# Patient Record
Sex: MALE | Race: WHITE | Employment: OTHER | URBAN - NONMETROPOLITAN AREA
[De-identification: names, ages, dates, MRNs, and addresses within clinical notes are randomized per-mention and may not be internally consistent; named-entity substitution may affect disease eponyms.]

---

## 2017-01-25 ENCOUNTER — OFFICE VISIT (OUTPATIENT)
Dept: FAMILY MEDICINE CLINIC | Age: 62
End: 2017-01-25

## 2017-01-25 VITALS
SYSTOLIC BLOOD PRESSURE: 120 MMHG | RESPIRATION RATE: 12 BRPM | WEIGHT: 191.6 LBS | BODY MASS INDEX: 24.59 KG/M2 | HEART RATE: 60 BPM | TEMPERATURE: 98.2 F | HEIGHT: 74 IN | DIASTOLIC BLOOD PRESSURE: 64 MMHG

## 2017-01-25 DIAGNOSIS — M25.512 ACUTE PAIN OF BOTH SHOULDERS: Primary | ICD-10-CM

## 2017-01-25 DIAGNOSIS — M25.521 BILATERAL ELBOW JOINT PAIN: ICD-10-CM

## 2017-01-25 DIAGNOSIS — M25.522 BILATERAL ELBOW JOINT PAIN: ICD-10-CM

## 2017-01-25 DIAGNOSIS — M25.511 ACUTE PAIN OF BOTH SHOULDERS: Primary | ICD-10-CM

## 2017-01-25 PROCEDURE — 99213 OFFICE O/P EST LOW 20 MIN: CPT | Performed by: NURSE PRACTITIONER

## 2017-01-25 RX ORDER — LISINOPRIL 2.5 MG/1
2.5 TABLET ORAL DAILY
COMMUNITY
End: 2017-10-09 | Stop reason: SDUPTHER

## 2017-01-25 RX ORDER — ETODOLAC 400 MG/1
400 TABLET, EXTENDED RELEASE ORAL DAILY
Qty: 30 TABLET | Refills: 3 | Status: SHIPPED | OUTPATIENT
Start: 2017-01-25 | End: 2017-04-04 | Stop reason: SDUPTHER

## 2017-01-31 ENCOUNTER — TELEPHONE (OUTPATIENT)
Dept: FAMILY MEDICINE CLINIC | Age: 62
End: 2017-01-31

## 2017-04-04 ENCOUNTER — OFFICE VISIT (OUTPATIENT)
Dept: FAMILY MEDICINE CLINIC | Age: 62
End: 2017-04-04

## 2017-04-04 ENCOUNTER — TELEPHONE (OUTPATIENT)
Dept: FAMILY MEDICINE CLINIC | Age: 62
End: 2017-04-04

## 2017-04-04 VITALS
BODY MASS INDEX: 23.97 KG/M2 | HEIGHT: 74 IN | SYSTOLIC BLOOD PRESSURE: 128 MMHG | RESPIRATION RATE: 12 BRPM | HEART RATE: 72 BPM | WEIGHT: 186.8 LBS | DIASTOLIC BLOOD PRESSURE: 72 MMHG

## 2017-04-04 DIAGNOSIS — J30.1 NON-SEASONAL ALLERGIC RHINITIS DUE TO POLLEN: ICD-10-CM

## 2017-04-04 DIAGNOSIS — G47.00 INSOMNIA, UNSPECIFIED TYPE: ICD-10-CM

## 2017-04-04 DIAGNOSIS — Z13.9 SCREENING: ICD-10-CM

## 2017-04-04 DIAGNOSIS — E78.01 FAMILIAL HYPERCHOLESTEROLEMIA: ICD-10-CM

## 2017-04-04 DIAGNOSIS — I10 ESSENTIAL HYPERTENSION: Primary | ICD-10-CM

## 2017-04-04 DIAGNOSIS — M25.511 ACUTE PAIN OF BOTH SHOULDERS: ICD-10-CM

## 2017-04-04 DIAGNOSIS — J42 CHRONIC BRONCHITIS, UNSPECIFIED CHRONIC BRONCHITIS TYPE (HCC): ICD-10-CM

## 2017-04-04 DIAGNOSIS — E11.9 CONTROLLED TYPE 2 DIABETES MELLITUS WITHOUT COMPLICATION, WITHOUT LONG-TERM CURRENT USE OF INSULIN (HCC): Primary | ICD-10-CM

## 2017-04-04 DIAGNOSIS — K21.9 GASTROESOPHAGEAL REFLUX DISEASE WITHOUT ESOPHAGITIS: ICD-10-CM

## 2017-04-04 DIAGNOSIS — M25.512 ACUTE PAIN OF BOTH SHOULDERS: ICD-10-CM

## 2017-04-04 DIAGNOSIS — M15.9 PRIMARY OSTEOARTHRITIS INVOLVING MULTIPLE JOINTS: Primary | ICD-10-CM

## 2017-04-04 DIAGNOSIS — E11.9 TYPE 2 DIABETES MELLITUS WITHOUT COMPLICATION, WITHOUT LONG-TERM CURRENT USE OF INSULIN (HCC): ICD-10-CM

## 2017-04-04 LAB
CREATININE, URINE: 69.9 MG/DL
HBA1C MFR BLD: 6.2 %
MICROALBUMIN UR-MCNC: < 1.2 MG/DL
MICROALBUMIN/CREAT UR-RTO: 17 MG/G (ref 0–30)

## 2017-04-04 PROCEDURE — 83036 HEMOGLOBIN GLYCOSYLATED A1C: CPT | Performed by: NURSE PRACTITIONER

## 2017-04-04 PROCEDURE — 99214 OFFICE O/P EST MOD 30 MIN: CPT | Performed by: NURSE PRACTITIONER

## 2017-04-04 RX ORDER — ETODOLAC 400 MG/1
400 TABLET, EXTENDED RELEASE ORAL DAILY
Qty: 30 TABLET | Refills: 6 | Status: SHIPPED | OUTPATIENT
Start: 2017-04-04 | End: 2017-04-04 | Stop reason: SDUPTHER

## 2017-04-04 RX ORDER — AZELASTINE 1 MG/ML
SPRAY, METERED NASAL
Qty: 1 BOTTLE | Refills: 3 | Status: SHIPPED | OUTPATIENT
Start: 2017-04-04 | End: 2017-10-09 | Stop reason: SDUPTHER

## 2017-04-04 RX ORDER — ASPIRIN 81 MG/1
TABLET, CHEWABLE ORAL
Qty: 30 TABLET | Refills: 11 | Status: SHIPPED | OUTPATIENT
Start: 2017-04-04 | End: 2018-04-09 | Stop reason: SDUPTHER

## 2017-04-04 RX ORDER — GABAPENTIN 600 MG/1
TABLET ORAL
Qty: 270 TABLET | Refills: 1 | Status: SHIPPED | OUTPATIENT
Start: 2017-04-04 | End: 2017-10-09 | Stop reason: ALTCHOICE

## 2017-04-04 RX ORDER — ATORVASTATIN CALCIUM 40 MG/1
TABLET, FILM COATED ORAL
Qty: 90 TABLET | Refills: 1 | Status: SHIPPED | OUTPATIENT
Start: 2017-04-04 | End: 2017-10-01 | Stop reason: SDUPTHER

## 2017-04-04 RX ORDER — QUETIAPINE FUMARATE 100 MG/1
TABLET, FILM COATED ORAL
Qty: 180 TABLET | Refills: 3 | Status: SHIPPED | OUTPATIENT
Start: 2017-04-04 | End: 2017-10-09 | Stop reason: SDUPTHER

## 2017-04-04 RX ORDER — ETODOLAC 400 MG/1
400 TABLET, EXTENDED RELEASE ORAL DAILY
Qty: 90 TABLET | Refills: 1 | Status: SHIPPED | OUTPATIENT
Start: 2017-04-04 | End: 2017-10-09 | Stop reason: SDUPTHER

## 2017-04-04 RX ORDER — PANTOPRAZOLE SODIUM 40 MG/1
TABLET, DELAYED RELEASE ORAL
Qty: 90 TABLET | Refills: 1 | Status: SHIPPED | OUTPATIENT
Start: 2017-04-04 | End: 2017-10-09 | Stop reason: SDUPTHER

## 2017-04-04 ASSESSMENT — PATIENT HEALTH QUESTIONNAIRE - PHQ9
SUM OF ALL RESPONSES TO PHQ9 QUESTIONS 1 & 2: 0
SUM OF ALL RESPONSES TO PHQ QUESTIONS 1-9: 0
2. FEELING DOWN, DEPRESSED OR HOPELESS: 0
1. LITTLE INTEREST OR PLEASURE IN DOING THINGS: 0

## 2017-04-05 ENCOUNTER — TELEPHONE (OUTPATIENT)
Dept: FAMILY MEDICINE CLINIC | Age: 62
End: 2017-04-05

## 2017-04-05 DIAGNOSIS — M25.511 ACUTE PAIN OF RIGHT SHOULDER: Primary | ICD-10-CM

## 2017-04-05 DIAGNOSIS — M25.511 ARTHRALGIA OF RIGHT ACROMIOCLAVICULAR JOINT: ICD-10-CM

## 2017-04-12 ENCOUNTER — OFFICE VISIT (OUTPATIENT)
Dept: INTERNAL MEDICINE | Age: 62
End: 2017-04-12

## 2017-04-12 VITALS
SYSTOLIC BLOOD PRESSURE: 124 MMHG | HEIGHT: 75 IN | HEART RATE: 80 BPM | WEIGHT: 186 LBS | BODY MASS INDEX: 23.13 KG/M2 | DIASTOLIC BLOOD PRESSURE: 72 MMHG

## 2017-04-12 DIAGNOSIS — Z86.010 HX OF ADENOMATOUS COLONIC POLYPS: Primary | ICD-10-CM

## 2017-04-12 PROCEDURE — 99202 OFFICE O/P NEW SF 15 MIN: CPT | Performed by: INTERNAL MEDICINE

## 2017-04-12 RX ORDER — POLYETHYLENE GLYCOL 3350 17 G/17G
250 POWDER, FOR SOLUTION ORAL DAILY
Qty: 250 BOTTLE | Refills: 0 | Status: SHIPPED | OUTPATIENT
Start: 2017-04-12 | End: 2017-04-13

## 2017-04-20 ENCOUNTER — TELEPHONE (OUTPATIENT)
Dept: FAMILY MEDICINE CLINIC | Age: 62
End: 2017-04-20

## 2017-05-30 ENCOUNTER — TELEPHONE (OUTPATIENT)
Dept: FAMILY MEDICINE CLINIC | Age: 62
End: 2017-05-30

## 2017-06-05 ENCOUNTER — OFFICE VISIT (OUTPATIENT)
Dept: FAMILY MEDICINE CLINIC | Age: 62
End: 2017-06-05

## 2017-06-05 VITALS
SYSTOLIC BLOOD PRESSURE: 120 MMHG | WEIGHT: 187 LBS | RESPIRATION RATE: 14 BRPM | BODY MASS INDEX: 24 KG/M2 | HEART RATE: 82 BPM | HEIGHT: 74 IN | TEMPERATURE: 97.7 F | DIASTOLIC BLOOD PRESSURE: 70 MMHG

## 2017-06-05 DIAGNOSIS — Z12.5 SCREENING FOR PROSTATE CANCER: ICD-10-CM

## 2017-06-05 DIAGNOSIS — Z00.00 ROUTINE GENERAL MEDICAL EXAMINATION AT A HEALTH CARE FACILITY: ICD-10-CM

## 2017-06-05 PROCEDURE — G0438 PPPS, INITIAL VISIT: HCPCS | Performed by: NURSE PRACTITIONER

## 2017-06-05 ASSESSMENT — ANXIETY QUESTIONNAIRES
GAD7 TOTAL SCORE: 2
GAD7 TOTAL SCORE: 0

## 2017-06-05 ASSESSMENT — PATIENT HEALTH QUESTIONNAIRE - PHQ9
SUM OF ALL RESPONSES TO PHQ QUESTIONS 1-9: 2
SUM OF ALL RESPONSES TO PHQ QUESTIONS 1-9: 0

## 2017-06-05 ASSESSMENT — LIFESTYLE VARIABLES: HOW OFTEN DO YOU HAVE A DRINK CONTAINING ALCOHOL: 0

## 2017-08-17 ENCOUNTER — HOSPITAL ENCOUNTER (OUTPATIENT)
Dept: PHYSICAL THERAPY | Age: 62
Setting detail: THERAPIES SERIES
Discharge: HOME | End: 2017-08-17
Payer: MEDICARE

## 2017-08-17 PROCEDURE — G8978 MOBILITY CURRENT STATUS: HCPCS

## 2017-08-17 PROCEDURE — G8979 MOBILITY GOAL STATUS: HCPCS

## 2017-08-17 PROCEDURE — 97162 PT EVAL MOD COMPLEX 30 MIN: CPT

## 2017-08-17 ASSESSMENT — PAIN SCALES - GENERAL: PAINLEVEL_OUTOF10: 7

## 2017-08-17 ASSESSMENT — PAIN DESCRIPTION - PAIN TYPE: TYPE: CHRONIC PAIN

## 2017-08-17 ASSESSMENT — PAIN DESCRIPTION - ORIENTATION: ORIENTATION: RIGHT;LEFT;MID

## 2017-08-17 ASSESSMENT — PAIN DESCRIPTION - LOCATION: LOCATION: NECK;SHOULDER

## 2017-09-01 ENCOUNTER — APPOINTMENT (OUTPATIENT)
Dept: PHYSICAL THERAPY | Age: 62
End: 2017-09-01
Payer: MEDICARE

## 2017-09-11 ENCOUNTER — TELEPHONE (OUTPATIENT)
Dept: FAMILY MEDICINE CLINIC | Age: 62
End: 2017-09-11

## 2017-09-12 ENCOUNTER — HOSPITAL ENCOUNTER (OUTPATIENT)
Dept: PHYSICAL THERAPY | Age: 62
Setting detail: THERAPIES SERIES
Discharge: HOME | End: 2017-09-12
Payer: MEDICARE

## 2017-09-12 PROCEDURE — 97035 APP MDLTY 1+ULTRASOUND EA 15: CPT

## 2017-09-12 PROCEDURE — 97110 THERAPEUTIC EXERCISES: CPT

## 2017-09-12 ASSESSMENT — PAIN SCALES - GENERAL: PAINLEVEL_OUTOF10: 7

## 2017-09-12 ASSESSMENT — PAIN DESCRIPTION - LOCATION: LOCATION: NECK;SHOULDER

## 2017-09-12 ASSESSMENT — PAIN DESCRIPTION - ORIENTATION: ORIENTATION: RIGHT;LEFT

## 2017-09-12 ASSESSMENT — PAIN DESCRIPTION - PAIN TYPE: TYPE: CHRONIC PAIN

## 2017-09-13 ENCOUNTER — HOSPITAL ENCOUNTER (OUTPATIENT)
Dept: PHYSICAL THERAPY | Age: 62
Setting detail: THERAPIES SERIES
Discharge: HOME | End: 2017-09-13
Payer: MEDICARE

## 2017-09-13 PROCEDURE — 97035 APP MDLTY 1+ULTRASOUND EA 15: CPT

## 2017-09-13 PROCEDURE — 97110 THERAPEUTIC EXERCISES: CPT

## 2017-09-13 ASSESSMENT — PAIN DESCRIPTION - ORIENTATION: ORIENTATION: RIGHT;LEFT

## 2017-09-13 ASSESSMENT — PAIN SCALES - GENERAL: PAINLEVEL_OUTOF10: 7

## 2017-09-13 ASSESSMENT — PAIN DESCRIPTION - PAIN TYPE: TYPE: CHRONIC PAIN

## 2017-09-13 ASSESSMENT — PAIN DESCRIPTION - LOCATION: LOCATION: NECK;SHOULDER

## 2017-09-15 ENCOUNTER — APPOINTMENT (OUTPATIENT)
Dept: PHYSICAL THERAPY | Age: 62
End: 2017-09-15
Payer: MEDICARE

## 2017-09-18 ENCOUNTER — APPOINTMENT (OUTPATIENT)
Dept: PHYSICAL THERAPY | Age: 62
End: 2017-09-18
Payer: MEDICARE

## 2017-09-19 ENCOUNTER — HOSPITAL ENCOUNTER (OUTPATIENT)
Dept: PHYSICAL THERAPY | Age: 62
Setting detail: THERAPIES SERIES
Discharge: HOME | End: 2017-09-19
Payer: MEDICARE

## 2017-09-19 PROCEDURE — 97110 THERAPEUTIC EXERCISES: CPT

## 2017-09-19 PROCEDURE — 97035 APP MDLTY 1+ULTRASOUND EA 15: CPT

## 2017-09-19 ASSESSMENT — PAIN DESCRIPTION - ORIENTATION: ORIENTATION: RIGHT;LEFT

## 2017-09-19 ASSESSMENT — PAIN SCALES - GENERAL: PAINLEVEL_OUTOF10: 7

## 2017-09-19 ASSESSMENT — PAIN DESCRIPTION - PAIN TYPE: TYPE: CHRONIC PAIN

## 2017-09-19 ASSESSMENT — PAIN DESCRIPTION - LOCATION: LOCATION: NECK;SHOULDER

## 2017-09-21 ENCOUNTER — HOSPITAL ENCOUNTER (OUTPATIENT)
Dept: PHYSICAL THERAPY | Age: 62
Setting detail: THERAPIES SERIES
Discharge: HOME | End: 2017-09-21
Payer: MEDICARE

## 2017-09-21 PROCEDURE — 97110 THERAPEUTIC EXERCISES: CPT

## 2017-09-21 PROCEDURE — G8979 MOBILITY GOAL STATUS: HCPCS

## 2017-09-21 PROCEDURE — G8978 MOBILITY CURRENT STATUS: HCPCS

## 2017-09-21 ASSESSMENT — PAIN DESCRIPTION - ORIENTATION: ORIENTATION: RIGHT;LEFT

## 2017-09-21 ASSESSMENT — PAIN DESCRIPTION - PAIN TYPE: TYPE: CHRONIC PAIN

## 2017-09-21 ASSESSMENT — PAIN SCALES - GENERAL: PAINLEVEL_OUTOF10: 7

## 2017-09-21 ASSESSMENT — PAIN DESCRIPTION - LOCATION: LOCATION: NECK;SHOULDER

## 2017-09-25 ENCOUNTER — HOSPITAL ENCOUNTER (OUTPATIENT)
Age: 62
Discharge: HOME | End: 2017-09-25
Payer: MEDICARE

## 2017-09-25 ENCOUNTER — TELEPHONE (OUTPATIENT)
Dept: FAMILY MEDICINE CLINIC | Age: 62
End: 2017-09-25

## 2017-09-25 LAB
ANION GAP SERPL CALCULATED.3IONS-SCNC: 14 MEQ/L (ref 8–16)
BUN BLDV-MCNC: 18 MG/DL (ref 7–22)
CALCIUM SERPL-MCNC: 9.8 MG/DL (ref 8.5–10.5)
CHLORIDE BLD-SCNC: 101 MEQ/L (ref 98–111)
CHOLESTEROL, TOTAL: 154 MG/DL (ref 100–199)
CO2: 25 MEQ/L (ref 23–33)
CREAT SERPL-MCNC: 0.9 MG/DL (ref 0.4–1.2)
GFR SERPL CREATININE-BSD FRML MDRD: 86 ML/MIN/1.73M2
GLUCOSE BLD-MCNC: 134 MG/DL (ref 70–108)
HDLC SERPL-MCNC: 36 MG/DL
LDL CHOLESTEROL CALCULATED: 83 MG/DL
POTASSIUM SERPL-SCNC: 4.8 MEQ/L (ref 3.5–5.2)
PROSTATE SPECIFIC ANTIGEN: 0.4 NG/ML (ref 0–1)
SODIUM BLD-SCNC: 140 MEQ/L (ref 135–145)
TRIGL SERPL-MCNC: 177 MG/DL (ref 0–199)

## 2017-09-25 PROCEDURE — 80048 BASIC METABOLIC PNL TOTAL CA: CPT

## 2017-09-25 PROCEDURE — 36415 COLL VENOUS BLD VENIPUNCTURE: CPT

## 2017-09-25 PROCEDURE — G0103 PSA SCREENING: HCPCS

## 2017-09-25 PROCEDURE — 80061 LIPID PANEL: CPT

## 2017-10-01 DIAGNOSIS — E78.01 FAMILIAL HYPERCHOLESTEROLEMIA: ICD-10-CM

## 2017-10-01 DIAGNOSIS — E11.9 TYPE 2 DIABETES MELLITUS WITHOUT COMPLICATION, WITHOUT LONG-TERM CURRENT USE OF INSULIN (HCC): ICD-10-CM

## 2017-10-02 RX ORDER — ATORVASTATIN CALCIUM 40 MG/1
TABLET, FILM COATED ORAL
Qty: 90 TABLET | Refills: 0 | Status: SHIPPED | OUTPATIENT
Start: 2017-10-02 | End: 2017-10-09 | Stop reason: SDUPTHER

## 2017-10-04 DIAGNOSIS — J42 CHRONIC BRONCHITIS, UNSPECIFIED CHRONIC BRONCHITIS TYPE (HCC): ICD-10-CM

## 2017-10-04 RX ORDER — TIOTROPIUM BROMIDE 18 UG/1
CAPSULE ORAL; RESPIRATORY (INHALATION)
Qty: 30 CAPSULE | Refills: 0 | Status: SHIPPED | OUTPATIENT
Start: 2017-10-04 | End: 2017-10-09 | Stop reason: SDUPTHER

## 2017-10-09 ENCOUNTER — OFFICE VISIT (OUTPATIENT)
Dept: FAMILY MEDICINE CLINIC | Age: 62
End: 2017-10-09
Payer: MEDICARE

## 2017-10-09 VITALS
BODY MASS INDEX: 27.54 KG/M2 | RESPIRATION RATE: 16 BRPM | HEIGHT: 73 IN | HEART RATE: 76 BPM | TEMPERATURE: 97.5 F | WEIGHT: 207.8 LBS | SYSTOLIC BLOOD PRESSURE: 136 MMHG | DIASTOLIC BLOOD PRESSURE: 82 MMHG

## 2017-10-09 DIAGNOSIS — M15.9 PRIMARY OSTEOARTHRITIS INVOLVING MULTIPLE JOINTS: ICD-10-CM

## 2017-10-09 DIAGNOSIS — K21.9 GASTROESOPHAGEAL REFLUX DISEASE WITHOUT ESOPHAGITIS: ICD-10-CM

## 2017-10-09 DIAGNOSIS — G47.00 INSOMNIA, UNSPECIFIED TYPE: ICD-10-CM

## 2017-10-09 DIAGNOSIS — Z23 NEEDS FLU SHOT: ICD-10-CM

## 2017-10-09 DIAGNOSIS — J30.1 NON-SEASONAL ALLERGIC RHINITIS DUE TO POLLEN: ICD-10-CM

## 2017-10-09 DIAGNOSIS — E11.9 TYPE 2 DIABETES MELLITUS WITHOUT COMPLICATION, WITHOUT LONG-TERM CURRENT USE OF INSULIN (HCC): ICD-10-CM

## 2017-10-09 DIAGNOSIS — J42 CHRONIC BRONCHITIS, UNSPECIFIED CHRONIC BRONCHITIS TYPE (HCC): ICD-10-CM

## 2017-10-09 DIAGNOSIS — Z72.0 TOBACCO ABUSE: Primary | ICD-10-CM

## 2017-10-09 DIAGNOSIS — E78.01 FAMILIAL HYPERCHOLESTEROLEMIA: ICD-10-CM

## 2017-10-09 DIAGNOSIS — I10 ESSENTIAL HYPERTENSION: ICD-10-CM

## 2017-10-09 PROCEDURE — G0008 ADMIN INFLUENZA VIRUS VAC: HCPCS | Performed by: NURSE PRACTITIONER

## 2017-10-09 PROCEDURE — 99214 OFFICE O/P EST MOD 30 MIN: CPT | Performed by: NURSE PRACTITIONER

## 2017-10-09 PROCEDURE — 90688 IIV4 VACCINE SPLT 0.5 ML IM: CPT | Performed by: NURSE PRACTITIONER

## 2017-10-09 RX ORDER — PANTOPRAZOLE SODIUM 40 MG/1
TABLET, DELAYED RELEASE ORAL
Qty: 90 TABLET | Refills: 0 | Status: SHIPPED | OUTPATIENT
Start: 2017-10-09 | End: 2017-10-09 | Stop reason: SDUPTHER

## 2017-10-09 RX ORDER — PANTOPRAZOLE SODIUM 40 MG/1
TABLET, DELAYED RELEASE ORAL
Qty: 90 TABLET | Refills: 1 | Status: SHIPPED | OUTPATIENT
Start: 2017-10-09 | End: 2018-04-09 | Stop reason: SDUPTHER

## 2017-10-09 RX ORDER — ATORVASTATIN CALCIUM 40 MG/1
TABLET, FILM COATED ORAL
Qty: 90 TABLET | Refills: 1 | Status: SHIPPED | OUTPATIENT
Start: 2017-10-09 | End: 2018-04-09 | Stop reason: SDUPTHER

## 2017-10-09 RX ORDER — BUPROPION HYDROCHLORIDE 150 MG/1
150 TABLET ORAL EVERY MORNING
Qty: 90 TABLET | Refills: 1 | Status: SHIPPED | OUTPATIENT
Start: 2017-10-09 | End: 2018-04-09 | Stop reason: SDUPTHER

## 2017-10-09 RX ORDER — AZELASTINE 1 MG/ML
SPRAY, METERED NASAL
Qty: 3 BOTTLE | Refills: 1 | Status: SHIPPED | OUTPATIENT
Start: 2017-10-09 | End: 2018-04-09 | Stop reason: SDUPTHER

## 2017-10-09 RX ORDER — ETODOLAC 400 MG/1
400 TABLET, EXTENDED RELEASE ORAL DAILY
Qty: 90 TABLET | Refills: 1 | Status: SHIPPED | OUTPATIENT
Start: 2017-10-09 | End: 2018-04-09 | Stop reason: SDUPTHER

## 2017-10-09 RX ORDER — ALBUTEROL SULFATE 90 UG/1
AEROSOL, METERED RESPIRATORY (INHALATION)
Qty: 1 INHALER | Refills: 3 | Status: SHIPPED | OUTPATIENT
Start: 2017-10-09 | End: 2018-04-09 | Stop reason: SDUPTHER

## 2017-10-09 RX ORDER — QUETIAPINE FUMARATE 100 MG/1
TABLET, FILM COATED ORAL
Qty: 180 TABLET | Refills: 1 | Status: SHIPPED | OUTPATIENT
Start: 2017-10-09 | End: 2018-10-02 | Stop reason: SDUPTHER

## 2017-10-09 RX ORDER — LISINOPRIL 2.5 MG/1
2.5 TABLET ORAL DAILY
Qty: 90 TABLET | Refills: 1 | Status: SHIPPED | OUTPATIENT
Start: 2017-10-09 | End: 2018-04-09 | Stop reason: SDUPTHER

## 2017-10-09 ASSESSMENT — ENCOUNTER SYMPTOMS
GASTROINTESTINAL NEGATIVE: 1
RHINORRHEA: 1
RESPIRATORY NEGATIVE: 1

## 2017-10-09 NOTE — PROGRESS NOTES
(GLUCOPHAGE) 500 MG tablet TAKE 1 TABLET BY MOUTH TWICE DAILY WITH MEALS 180 tablet 1    metoprolol tartrate (LOPRESSOR) 25 MG tablet TAKE 1/2 TABLET BY MOUTH TWICE DAILY.(HOLD FOR HEART RATE LESS THAN 60) 180 tablet 1    pantoprazole (PROTONIX) 40 MG tablet TAKE 1 TABLET BY MOUTH DAILY 90 tablet 1    QUEtiapine (SEROQUEL) 100 MG tablet TAKE 1-2 TABLET BY MOUTH AT BEDTIME 180 tablet 1    tiotropium (SPIRIVA HANDIHALER) 18 MCG inhalation capsule INHALE THE CONTENTS OF 1 CAPSULE INTO THE LUNGS EVERY DAY 90 capsule 1    lisinopril (PRINIVIL;ZESTRIL) 2.5 MG tablet Take 1 tablet by mouth daily 90 tablet 1    buPROPion (WELLBUTRIN XL) 150 MG extended release tablet Take 1 tablet by mouth every morning 90 tablet 1    aspirin 81 MG chewable tablet TAKE 1 TABLET BY MOUTH DAILY 30 tablet 11    tiZANidine (ZANAFLEX) 4 MG tablet Take 4 mg by mouth every 6 hours as needed       No current facility-administered medications for this visit.         Past Medical History:   Diagnosis Date    Abnormal liver enzymes     Alcohol abuse     Allergic rhinitis     Chronic back pain     Colonic polyp     COPD (chronic obstructive pulmonary disease) (HCC)     Depression     Diabetes mellitus (HCC)     DJD (degenerative joint disease) of cervical spine     DJD (degenerative joint disease) of lumbar spine     SP SURGERY    GERD (gastroesophageal reflux disease)     Hyperlipidemia     Hypertension     Osteoarthritis     Osteoarthritis     Smoker       Past Surgical History:   Procedure Laterality Date    BACK SURGERY  2008    X 3    C[address]IAC CATHETERIZATION  3/2015    [address]    CHOLECYSTECTOMY      COLONOSCOPY  6/2012    repeat 6/2017- no precancerous  polyps    EKG 12-LEAD  4/6/2015         HERNIA REPAIR      Umbilical    INGUINAL HERNIA REPAIR  2002    Bilateral Inguinal     LUMBAR SPINE SURGERY  2006, 2007, 2008    fusion 2008     Family History   Problem Relation Age of Onset    Diabetes Mother     Cancer Father tablet by mouth daily  Dispense: 90 tablet; Refill: 1    5. Gastroesophageal reflux disease without esophagitis  GERD diet  - pantoprazole (PROTONIX) 40 MG tablet; TAKE 1 TABLET BY MOUTH DAILY  Dispense: 90 tablet; Refill: 1    6. Insomnia, unspecified type    - QUEtiapine (SEROQUEL) 100 MG tablet; TAKE 1-2 TABLET BY MOUTH AT BEDTIME  Dispense: 180 tablet; Refill: 1    7. Chronic bronchitis, unspecified chronic bronchitis type ([facility]    - tiotropium (SPIRIVA HANDIHALER) 18 MCG inhalation capsule; INHALE THE CONTENTS OF 1 CAPSULE INTO THE LUNGS EVERY DAY  Dispense: 90 capsule; Refill: 1    8. Tobacco abuse    - buPROPion (WELLBUTRIN XL) 150 MG extended release tablet; Take 1 tablet by mouth every morning  Dispense: 90 tablet; Refill: 1    9. Primary osteoarthritis involving multiple joints      10. Smoker      11. Needs flu shot  - INFLUENZA, QUADV, 3 YRS AND OLDER, IM, MDV, 0.5ML (FLUZONE QUADV)    Chronic conditions stable continue meds as ordered  Return in about 6 months (around 4/9/2018). Reccommended tobacco cessation options including pharmacologic methods, counseled great than 3 minutes during this visit:  Yes  []  No  []       Patient given educational materials - see patient instructions. Discussed use, benefit, and side effects of prescribed medications. All patient questions answered. Pt voiced understanding. Reviewed health maintenance. Instructed to continue current medications, diet and exercise. Patient agreed with treatment plan. Follow up as directed.        Electronically signed by Flor Raygoza CNP on 10/9/2017 at 12:45 PM

## 2017-10-19 ENCOUNTER — HOSPITAL ENCOUNTER (OUTPATIENT)
Dept: PHYSICAL THERAPY | Age: 62
Setting detail: THERAPIES SERIES
Discharge: HOME | End: 2017-10-19
Payer: MEDICARE

## 2017-10-19 PROCEDURE — G8980 MOBILITY D/C STATUS: HCPCS

## 2017-10-19 PROCEDURE — G8979 MOBILITY GOAL STATUS: HCPCS

## 2017-10-19 PROCEDURE — 97110 THERAPEUTIC EXERCISES: CPT

## 2017-10-19 ASSESSMENT — PAIN DESCRIPTION - PAIN TYPE: TYPE: CHRONIC PAIN

## 2017-10-19 ASSESSMENT — PAIN DESCRIPTION - LOCATION: LOCATION: SHOULDER;NECK

## 2017-10-19 ASSESSMENT — PAIN SCALES - GENERAL: PAINLEVEL_OUTOF10: 8

## 2017-10-19 NOTE — PROGRESS NOTES
[facility]     I certify that I have examined the patient below and determined that Physical Medicine and Rehabilitation service is necessary; that the secondary diagnosis for the provision of rehabilitation services is consistent with identified needs; that service will be furnished on an outpatient basis while the patient is in my care; that I approve the above plan of care for up to 90 days or as specifically noted above and will review it within that time frame or more often if the patients condition requires. Attestation, signature or co-signature of physician indicates approval of certification requirements.    ________________________ ____________ __________  Physician Signature   Date   Time     Time In: 0253  Time Out: 1706  Minutes: 30  Timed Code Treatment Minutes: 30 Minutes     Date: 10/19/2017  Patient Name: Ochoa Metcalf,  Gender:  male        CSN: [id number]   : 1955  (65 y.o.)       Referring Practitioner: Dr. Kacie Tamayo      Diagnosis: M99.81 (ICD-10-CM) - Other biomechanical lesions of cervical region-foraminal stenosis of of cervical region  Treatment Diagnosis: cervical pain with BUE radicular sympoms effecting active shoulder ROM with reaching activities, limited neck rotation, poor posture/position awareness. Additional Pertinent Hx: comorbidities: DM, COPD, Back surgeries , ,  with lumbar fusion L4L5 PSF 1615, Neck surgery 1016. General:  PT Visit Information  Onset Date: 17  PT Insurance Information: [employer] Needs precerted after evaluation. Must send evaluation and plan of care to receive authorization for further treatment. Aquatic therapy covered, Modaltiies covered. Approved for 12 visits from 17 to 17 therapeutic ex and phonophoresis CPT codes only approved. As of 10/17/17  [id number]    RECEIVED AUTH.  FOR EXTENSION TO in supine position to 4/10 but returns to 8/10 with sitting and standing. with radiating symptoms to 4th adn 5th fingers. Short term goal 5: Note increased active ROM bilateral shoulder flexion to 110 degrees with no referred symptoms into UEs and neck. PARTIALLY MET right shoulder flexion 110 degrees, and left shoulder flexion to 93 degrees. Long term goals  Time Frame for Long term goals : 6 weeks  ( 6 sessions) 10/19/17   Long term goal 1: Neck Disability Index  from 40 % to no greater than 20-30%. GOAL NOT MET NDI at 60%   Long term goal 2: Patient to report of headache frequencey decreased from 5x per week to 2-3x per week with symptoms no longer referred and localized subocciput . GOAL NOT MET Continues to have headaches daily. Long term goal 3: Patient to demonstrate increased left and right neck rotation to 60 degrees with increased ease with tunring neck when driving. GOAL NOT MET left rotation to 20 degrees, right rotation 40 degrees. Long term goal 4: Pain levels decreased at neck and upper trap region from 5/10 to no greater than 3/10. GOAL NOT MET Pain levels remain 7-8/10   Long term goal 5: AROM at bilateral shoulders to 120 degress flexion and abduction without increase in neck and upper trap/shoulder pain. GOAL NOT MET See STG for measurements on 10/19/17  Long term goal 6: Patient to demonstrate independence in home ex program in order to meet above goalsGOAL MET Patient to follow up with Dr. Tera Ernandez due to lack of progress and ability to progress in exercises. PT G-Codes  Functional Assessment Tool Used: Neck Disability Index  Score: 30/50 @ 60%   Functional Limitation: Mobility: Walking and moving around  Mobility: Walking and Moving Around Goal Status (): At least 20 percent but less than 40 percent impaired, limited or restricted  Mobility: Walking and Moving Around Discharge Status ():  At least 60 percent but less than 80 percent impaired, limited or

## 2017-10-23 ENCOUNTER — APPOINTMENT (OUTPATIENT)
Dept: PHYSICAL THERAPY | Age: 62
End: 2017-10-23
Payer: MEDICARE

## 2017-11-03 DIAGNOSIS — J42 CHRONIC BRONCHITIS, UNSPECIFIED CHRONIC BRONCHITIS TYPE (HCC): ICD-10-CM

## 2017-11-03 RX ORDER — TIOTROPIUM BROMIDE 18 UG/1
CAPSULE ORAL; RESPIRATORY (INHALATION)
Qty: 90 CAPSULE | Refills: 1 | Status: SHIPPED | OUTPATIENT
Start: 2017-11-03 | End: 2019-04-10 | Stop reason: SDUPTHER

## 2017-12-01 ENCOUNTER — PROCEDURE VISIT (OUTPATIENT)
Dept: NEUROLOGY | Age: 62
End: 2017-12-01
Payer: MEDICARE

## 2017-12-01 DIAGNOSIS — R20.0 LEFT ARM NUMBNESS: ICD-10-CM

## 2017-12-01 DIAGNOSIS — G56.22 ULNAR NEUROPATHY AT ELBOW, LEFT: Primary | ICD-10-CM

## 2017-12-01 DIAGNOSIS — R29.898 LEFT ARM WEAKNESS: ICD-10-CM

## 2017-12-01 PROCEDURE — 95911 NRV CNDJ TEST 9-10 STUDIES: CPT | Performed by: PSYCHIATRY & NEUROLOGY

## 2017-12-01 PROCEDURE — 95886 MUSC TEST DONE W/N TEST COMP: CPT | Performed by: PSYCHIATRY & NEUROLOGY

## 2018-02-26 ENCOUNTER — TELEPHONE (OUTPATIENT)
Dept: FAMILY MEDICINE CLINIC | Age: 63
End: 2018-02-26

## 2018-03-26 ENCOUNTER — TELEPHONE (OUTPATIENT)
Dept: FAMILY MEDICINE CLINIC | Age: 63
End: 2018-03-26

## 2018-03-26 ENCOUNTER — OFFICE VISIT (OUTPATIENT)
Dept: FAMILY MEDICINE CLINIC | Age: 63
End: 2018-03-26
Payer: MEDICARE

## 2018-03-26 VITALS
HEIGHT: 73 IN | TEMPERATURE: 98.5 F | WEIGHT: 212 LBS | RESPIRATION RATE: 12 BRPM | SYSTOLIC BLOOD PRESSURE: 122 MMHG | BODY MASS INDEX: 28.1 KG/M2 | HEART RATE: 60 BPM | DIASTOLIC BLOOD PRESSURE: 64 MMHG

## 2018-03-26 DIAGNOSIS — N50.89 TESTICLE SWELLING: ICD-10-CM

## 2018-03-26 DIAGNOSIS — N50.812 LEFT TESTICULAR PAIN: Primary | ICD-10-CM

## 2018-03-26 LAB
BILIRUBIN, POC: NORMAL
BLOOD URINE, POC: NORMAL
CLARITY, POC: CLEAR
COLOR, POC: YELLOW
GLUCOSE URINE, POC: NORMAL
KETONES, POC: NORMAL
LEUKOCYTE EST, POC: NORMAL
NITRITE, POC: NORMAL
PH, POC: 7
PROTEIN, POC: NORMAL
SPECIFIC GRAVITY, POC: 1.01
UROBILINOGEN, POC: 1

## 2018-03-26 PROCEDURE — 99213 OFFICE O/P EST LOW 20 MIN: CPT | Performed by: NURSE PRACTITIONER

## 2018-03-26 PROCEDURE — 81002 URINALYSIS NONAUTO W/O SCOPE: CPT | Performed by: NURSE PRACTITIONER

## 2018-03-26 RX ORDER — AZELASTINE 1 MG/ML
SPRAY, METERED NASAL
Qty: 3 BOTTLE | Refills: 1 | Status: CANCELLED | OUTPATIENT
Start: 2018-03-26

## 2018-03-26 RX ORDER — QUETIAPINE FUMARATE 100 MG/1
TABLET, FILM COATED ORAL
Qty: 180 TABLET | Refills: 1 | Status: CANCELLED | OUTPATIENT
Start: 2018-03-26

## 2018-03-26 RX ORDER — ATORVASTATIN CALCIUM 40 MG/1
TABLET, FILM COATED ORAL
Qty: 90 TABLET | Refills: 1 | Status: CANCELLED | OUTPATIENT
Start: 2018-03-26

## 2018-03-26 RX ORDER — ASPIRIN 81 MG/1
TABLET, CHEWABLE ORAL
Qty: 30 TABLET | Refills: 11 | Status: CANCELLED | OUTPATIENT
Start: 2018-03-26

## 2018-03-26 RX ORDER — PANTOPRAZOLE SODIUM 40 MG/1
TABLET, DELAYED RELEASE ORAL
Qty: 90 TABLET | Refills: 1 | Status: CANCELLED | OUTPATIENT
Start: 2018-03-26

## 2018-03-26 RX ORDER — TIZANIDINE 4 MG/1
4 TABLET ORAL EVERY 6 HOURS PRN
Status: CANCELLED | OUTPATIENT
Start: 2018-03-26

## 2018-03-26 RX ORDER — BUPROPION HYDROCHLORIDE 150 MG/1
150 TABLET ORAL EVERY MORNING
Qty: 90 TABLET | Refills: 1 | Status: CANCELLED | OUTPATIENT
Start: 2018-03-26

## 2018-03-26 RX ORDER — LISINOPRIL 2.5 MG/1
2.5 TABLET ORAL DAILY
Qty: 90 TABLET | Refills: 1 | Status: CANCELLED | OUTPATIENT
Start: 2018-03-26

## 2018-03-26 RX ORDER — LEVOFLOXACIN 500 MG/1
500 TABLET, FILM COATED ORAL DAILY
Qty: 10 TABLET | Refills: 0 | Status: SHIPPED | OUTPATIENT
Start: 2018-03-26 | End: 2018-04-05

## 2018-03-26 RX ORDER — TRAMADOL HYDROCHLORIDE 50 MG/1
50 TABLET ORAL EVERY 6 HOURS PRN
Qty: 20 TABLET | Refills: 0 | Status: SHIPPED | OUTPATIENT
Start: 2018-03-26 | End: 2018-04-02

## 2018-03-26 RX ORDER — ETODOLAC 400 MG/1
400 TABLET, EXTENDED RELEASE ORAL DAILY
Qty: 90 TABLET | Refills: 1 | Status: CANCELLED | OUTPATIENT
Start: 2018-03-26

## 2018-03-26 RX ORDER — ALBUTEROL SULFATE 90 UG/1
AEROSOL, METERED RESPIRATORY (INHALATION)
Qty: 1 INHALER | Refills: 3 | Status: CANCELLED | OUTPATIENT
Start: 2018-03-26

## 2018-03-26 ASSESSMENT — ENCOUNTER SYMPTOMS
NAUSEA: 0
ABDOMINAL DISTENTION: 0
DIARRHEA: 0
RESPIRATORY NEGATIVE: 1
CONSTIPATION: 0

## 2018-03-26 NOTE — PROGRESS NOTES
Visit Information    Have you changed or started any medications since your last visit including any over-the-counter medicines, vitamins, or herbal medicines? no   Are you having any side effects from any of your medications? -  no  Have you stopped taking any of your medications? Is so, why? -  no    Have you seen any other physician or provider since your last visit? Yes - Records Obtained  Have you had any other diagnostic tests since your last visit? Yes - Records Obtained  Have you been seen in the emergency room and/or had an admission to a hospital since we last saw you? No  Have you had your routine dental cleaning in the past 6 months? no    Have you activated your [employer] account? If not, what are your barriers? Yes     Patient Care Team:  Hector Garcia CNP as PCP - General (Family Nurse Practitioner)    Medical History Review  Past Medical, Family, and Social History     Defer to provider.
Smokeless tobacco: Never Used    Alcohol use No      Comment: quit 2013        No Known Allergies    Health Maintenance   Topic Date Due    HIV screen  05/28/1971    Shingles Vaccine (1 of 2 - 2 Dose Series) 05/28/2006    Diabetic retinal exam  10/01/2017    A1C test (Diabetic or Prediabetic)  04/04/2018    Diabetic microalbuminuria test  04/04/2018    Lipid screen  09/25/2018    Potassium monitoring  09/25/2018    Creatinine monitoring  09/25/2018    Diabetic foot exam  10/09/2018    DTaP/Tdap/Td vaccine (2 - Td) 03/26/2023    Colon cancer screen colonoscopy  04/19/2027    Flu vaccine  Completed    Pneumococcal med risk  Completed    Hepatitis C screen  Completed       Subjective:      Review of Systems   Respiratory: Negative. Cardiovascular: Negative. Gastrointestinal: Negative for abdominal distention, constipation, diarrhea and nausea. Genitourinary: Positive for scrotal swelling and testicular pain. Negative for decreased urine volume, discharge, genital sores, hematuria, penile pain, penile swelling and urgency. Objective:     /64   Pulse 60   Temp 98.5 °F (36.9 °C) (Oral)   Resp 12   Ht 6' 0.5\" (1.842 m)   Wt 212 lb (96.2 kg)   BMI 28.36 kg/m²     Physical Exam   Constitutional: He appears well-developed and well-nourished. No distress. Cardiovascular: Normal rate, regular rhythm, normal heart sounds and intact distal pulses. No murmur heard. Pulmonary/Chest: Effort normal and breath sounds normal. No respiratory distress. He has no wheezes. Abdominal: Soft. Bowel sounds are normal. He exhibits no distension. There is no tenderness. Hernia confirmed negative in the left inguinal area. No suprapubic tenderness with palpation   Genitourinary: Penis normal. Left testis shows mass, swelling and tenderness. Left testis is descended. Cremasteric reflex is not absent on the left side. Uncircumcised. No phimosis, hypospadias or penile erythema. No discharge found.

## 2018-03-26 NOTE — PATIENT INSTRUCTIONS
You may receive a survey about your visit with us today. The feedback from our patients helps us identify what is working well and where the service to all patients can be enhanced. Thank you! Tobacco Cessation Programs     Telephonic behavior modification  Ash Danette ([phone])   Counseling service for those who are ready to quit using tobacco.     Available for uninsured [place] residents, [place] recipients, pregnant women, or patients whose health plans or employers are members of the [address] [place] [address] behavior modification   http://[place]. Quitlogix. org   Online support program to help patients through each step of the quitting process. Available 24 hours a day 7 days a week. Provides up to date researched based tool, step-by-step guides, and motivational messages. Online behavior modification   www.lungusa.org/stop-smoking/how-to-quit   HelpLine: 1-800-LUNG-USA (832-4430)   Email questions to:  Alonzo@CellNovo. ditlo    Website offers resources to help tobacco users figure out their reasons for quitting and then take the big step of quitting for good. Hypnosis   Location: [address], [facility], [place]   Contact: Joan Zhou, PhD at [phone]   Hypnosis for tobacco cessation   Cost $225 for the initial session and $175 for each session afterwards. Most patients require 6-8 sessions. There is the option to submit through the patients insurance. Hypnosis and behavior modification   Location: [place] [address],  [address] [facility], [place]   Contact: Karlos Torres, PhD at [phone]  [facility] Counseling and hypnosis for nicotine addition   Cost: For uninsured patients:  Please call above phone number  Cost for insured patients depends on patients insurance plan.     Behavior modification   Location: [facility], [address] [place] [address]., [facility], [address]: [place] [address] include four one-on-one appointments between the patient and a respiratory therapist.  The four appointments span over three weeks. The respiratory therapist schedules one of the appointments to occur 48 hours after the patients quit date.  Cost $100 total for the four sessions.   Tobacco cessation products are not included in the cost and are not provided by [facility] [place].     Hortencia

## 2018-03-26 NOTE — TELEPHONE ENCOUNTER
Pt notified    U,.S. scrotum  and testicles  [facility]  03/28/18 @ 2:30  Arrive 2 :00 main radiology  1st floor   Bring insurance card ,photo I.D.  And  Current med list

## 2018-03-27 ENCOUNTER — HOSPITAL ENCOUNTER (OUTPATIENT)
Dept: ULTRASOUND IMAGING | Age: 63
Discharge: HOME | End: 2018-03-27
Payer: MEDICARE

## 2018-03-27 ENCOUNTER — TELEPHONE (OUTPATIENT)
Dept: FAMILY MEDICINE CLINIC | Age: 63
End: 2018-03-27

## 2018-03-27 DIAGNOSIS — N50.812 LEFT TESTICULAR PAIN: ICD-10-CM

## 2018-03-27 DIAGNOSIS — N50.89 TESTICLE SWELLING: ICD-10-CM

## 2018-03-27 PROCEDURE — 76870 US EXAM SCROTUM: CPT

## 2018-03-27 NOTE — TELEPHONE ENCOUNTER
Patient is calling stating his testicle has tripled in size since his appointment on 3/26/18, he is not sure what to do please advise at [phone]

## 2018-03-28 ENCOUNTER — TELEPHONE (OUTPATIENT)
Dept: FAMILY MEDICINE CLINIC | Age: 63
End: 2018-03-28

## 2018-03-28 NOTE — TELEPHONE ENCOUNTER
----- Message from Ashanti Cooper CNP sent at 3/28/2018  1:42 PM EDT -----  Please let pt know his u/s identified a hydrocele of his left testicle. This is a collection of fluid. There is normal blood flow and the testicle is not twisted. If the swelling down not go down with the atb and he continues to have pain I would suggest a referral to Urology for the hydrocele.

## 2018-04-04 DIAGNOSIS — G47.00 INSOMNIA, UNSPECIFIED TYPE: ICD-10-CM

## 2018-04-04 DIAGNOSIS — I10 ESSENTIAL HYPERTENSION: ICD-10-CM

## 2018-04-04 RX ORDER — QUETIAPINE FUMARATE 100 MG/1
TABLET, FILM COATED ORAL
Qty: 180 TABLET | Refills: 0 | Status: SHIPPED | OUTPATIENT
Start: 2018-04-04 | End: 2018-04-09 | Stop reason: SDUPTHER

## 2018-04-09 ENCOUNTER — OFFICE VISIT (OUTPATIENT)
Dept: FAMILY MEDICINE CLINIC | Age: 63
End: 2018-04-09
Payer: MEDICARE

## 2018-04-09 VITALS
RESPIRATION RATE: 16 BRPM | BODY MASS INDEX: 28.34 KG/M2 | HEIGHT: 73 IN | WEIGHT: 213.8 LBS | HEART RATE: 80 BPM | SYSTOLIC BLOOD PRESSURE: 126 MMHG | TEMPERATURE: 98.1 F | DIASTOLIC BLOOD PRESSURE: 74 MMHG

## 2018-04-09 DIAGNOSIS — J30.1 NON-SEASONAL ALLERGIC RHINITIS DUE TO POLLEN, UNSPECIFIED CHRONICITY: ICD-10-CM

## 2018-04-09 DIAGNOSIS — K21.9 GASTROESOPHAGEAL REFLUX DISEASE WITHOUT ESOPHAGITIS: ICD-10-CM

## 2018-04-09 DIAGNOSIS — I10 ESSENTIAL HYPERTENSION: ICD-10-CM

## 2018-04-09 DIAGNOSIS — N43.3 HYDROCELE IN ADULT: ICD-10-CM

## 2018-04-09 DIAGNOSIS — E11.42 DIABETIC POLYNEUROPATHY ASSOCIATED WITH TYPE 2 DIABETES MELLITUS (HCC): ICD-10-CM

## 2018-04-09 DIAGNOSIS — J42 CHRONIC BRONCHITIS, UNSPECIFIED CHRONIC BRONCHITIS TYPE (HCC): ICD-10-CM

## 2018-04-09 DIAGNOSIS — Z72.0 TOBACCO ABUSE: ICD-10-CM

## 2018-04-09 DIAGNOSIS — G47.00 INSOMNIA, UNSPECIFIED TYPE: ICD-10-CM

## 2018-04-09 DIAGNOSIS — N50.812 TESTICULAR PAIN, LEFT: ICD-10-CM

## 2018-04-09 DIAGNOSIS — E78.01 FAMILIAL HYPERCHOLESTEROLEMIA: ICD-10-CM

## 2018-04-09 DIAGNOSIS — E11.9 TYPE 2 DIABETES MELLITUS WITHOUT COMPLICATION, WITHOUT LONG-TERM CURRENT USE OF INSULIN (HCC): Primary | ICD-10-CM

## 2018-04-09 LAB
BILIRUBIN, POC: NORMAL
BLOOD URINE, POC: NORMAL
CLARITY, POC: CLEAR
COLOR, POC: YELLOW
CREATININE URINE POCT: NORMAL
GLUCOSE URINE, POC: NORMAL
HBA1C MFR BLD: 6.7 %
KETONES, POC: NORMAL
LEUKOCYTE EST, POC: NORMAL
MICROALBUMIN/CREAT 24H UR: NORMAL MG/G{CREAT}
MICROALBUMIN/CREAT UR-RTO: NORMAL
NITRITE, POC: NORMAL
PH, POC: 5.5
PROTEIN, POC: NORMAL
SPECIFIC GRAVITY, POC: 1.02
UROBILINOGEN, POC: 1

## 2018-04-09 PROCEDURE — 82044 UR ALBUMIN SEMIQUANTITATIVE: CPT | Performed by: NURSE PRACTITIONER

## 2018-04-09 PROCEDURE — 83036 HEMOGLOBIN GLYCOSYLATED A1C: CPT | Performed by: NURSE PRACTITIONER

## 2018-04-09 PROCEDURE — 99214 OFFICE O/P EST MOD 30 MIN: CPT | Performed by: NURSE PRACTITIONER

## 2018-04-09 PROCEDURE — 81002 URINALYSIS NONAUTO W/O SCOPE: CPT | Performed by: NURSE PRACTITIONER

## 2018-04-09 RX ORDER — QUETIAPINE FUMARATE 100 MG/1
TABLET, FILM COATED ORAL
Qty: 180 TABLET | Refills: 1 | Status: CANCELLED | OUTPATIENT
Start: 2018-04-09

## 2018-04-09 RX ORDER — ATORVASTATIN CALCIUM 40 MG/1
TABLET, FILM COATED ORAL
Qty: 90 TABLET | Refills: 1 | Status: SHIPPED | OUTPATIENT
Start: 2018-04-09 | End: 2018-07-02 | Stop reason: SDUPTHER

## 2018-04-09 RX ORDER — ETODOLAC 400 MG/1
400 TABLET, EXTENDED RELEASE ORAL DAILY
Qty: 90 TABLET | Refills: 1 | Status: SHIPPED | OUTPATIENT
Start: 2018-04-09 | End: 2018-10-09 | Stop reason: SDUPTHER

## 2018-04-09 RX ORDER — HYDROCODONE BITARTRATE AND ACETAMINOPHEN 5; 325 MG/1; MG/1
1 TABLET ORAL EVERY 6 HOURS PRN
Qty: 28 TABLET | Refills: 0 | Status: SHIPPED | OUTPATIENT
Start: 2018-04-09 | End: 2018-04-19 | Stop reason: SDUPTHER

## 2018-04-09 RX ORDER — PANTOPRAZOLE SODIUM 40 MG/1
TABLET, DELAYED RELEASE ORAL
Qty: 90 TABLET | Refills: 1 | Status: SHIPPED | OUTPATIENT
Start: 2018-04-09 | End: 2018-10-02 | Stop reason: SDUPTHER

## 2018-04-09 RX ORDER — TIZANIDINE 4 MG/1
4 TABLET ORAL EVERY 6 HOURS PRN
Status: CANCELLED | OUTPATIENT
Start: 2018-04-09

## 2018-04-09 RX ORDER — BUPROPION HYDROCHLORIDE 150 MG/1
150 TABLET ORAL EVERY MORNING
Qty: 90 TABLET | Refills: 1 | Status: SHIPPED | OUTPATIENT
Start: 2018-04-09 | End: 2018-10-02 | Stop reason: SDUPTHER

## 2018-04-09 RX ORDER — ASPIRIN 81 MG/1
TABLET, CHEWABLE ORAL
Qty: 90 TABLET | Refills: 3 | Status: SHIPPED | OUTPATIENT
Start: 2018-04-09 | End: 2019-04-10 | Stop reason: SDUPTHER

## 2018-04-09 RX ORDER — QUETIAPINE FUMARATE 100 MG/1
TABLET, FILM COATED ORAL
Qty: 180 TABLET | Refills: 1 | Status: SHIPPED | OUTPATIENT
Start: 2018-04-09 | End: 2018-10-09 | Stop reason: SDUPTHER

## 2018-04-09 RX ORDER — LISINOPRIL 2.5 MG/1
2.5 TABLET ORAL DAILY
Qty: 90 TABLET | Refills: 1 | Status: SHIPPED | OUTPATIENT
Start: 2018-04-09 | End: 2018-10-02 | Stop reason: SDUPTHER

## 2018-04-09 RX ORDER — AZELASTINE 1 MG/ML
SPRAY, METERED NASAL
Qty: 3 BOTTLE | Refills: 1 | Status: SHIPPED | OUTPATIENT
Start: 2018-04-09 | End: 2018-10-09 | Stop reason: SDUPTHER

## 2018-04-09 RX ORDER — ALBUTEROL SULFATE 90 UG/1
AEROSOL, METERED RESPIRATORY (INHALATION)
Qty: 1 INHALER | Refills: 3 | Status: SHIPPED | OUTPATIENT
Start: 2018-04-09 | End: 2018-10-09 | Stop reason: SDUPTHER

## 2018-04-16 DIAGNOSIS — N50.812 TESTICULAR PAIN, LEFT: ICD-10-CM

## 2018-04-16 DIAGNOSIS — N43.3 HYDROCELE IN ADULT: ICD-10-CM

## 2018-04-16 RX ORDER — HYDROCODONE BITARTRATE AND ACETAMINOPHEN 5; 325 MG/1; MG/1
1 TABLET ORAL EVERY 6 HOURS PRN
Qty: 28 TABLET | Refills: 0 | OUTPATIENT
Start: 2018-04-16 | End: 2018-04-23

## 2018-04-19 ENCOUNTER — TELEPHONE (OUTPATIENT)
Dept: FAMILY MEDICINE CLINIC | Age: 63
End: 2018-04-19

## 2018-04-19 DIAGNOSIS — N50.812 TESTICULAR PAIN, LEFT: ICD-10-CM

## 2018-04-19 DIAGNOSIS — N43.3 HYDROCELE IN ADULT: ICD-10-CM

## 2018-04-19 RX ORDER — HYDROCODONE BITARTRATE AND ACETAMINOPHEN 5; 325 MG/1; MG/1
1 TABLET ORAL EVERY 6 HOURS PRN
Qty: 28 TABLET | Refills: 0 | Status: SHIPPED | OUTPATIENT
Start: 2018-04-19 | End: 2018-04-26

## 2018-04-19 RX ORDER — HYDROCODONE BITARTRATE AND ACETAMINOPHEN 5; 325 MG/1; MG/1
1 TABLET ORAL EVERY 6 HOURS PRN
Qty: 28 TABLET | Refills: 0 | Status: SHIPPED | OUTPATIENT
Start: 2018-04-19 | End: 2018-04-19 | Stop reason: SDUPTHER

## 2018-04-30 ENCOUNTER — OFFICE VISIT (OUTPATIENT)
Dept: UROLOGY | Age: 63
End: 2018-04-30
Payer: MEDICARE

## 2018-04-30 VITALS
BODY MASS INDEX: 28.23 KG/M2 | DIASTOLIC BLOOD PRESSURE: 80 MMHG | WEIGHT: 213 LBS | SYSTOLIC BLOOD PRESSURE: 122 MMHG | HEIGHT: 73 IN

## 2018-04-30 DIAGNOSIS — N43.3 HYDROCELE, UNSPECIFIED HYDROCELE TYPE: Primary | ICD-10-CM

## 2018-04-30 DIAGNOSIS — N50.819 TESTICULAR PAIN: ICD-10-CM

## 2018-04-30 LAB
BILIRUBIN URINE: ABNORMAL
BLOOD URINE, POC: NEGATIVE
CHARACTER, URINE: CLEAR
COLOR, URINE: YELLOW
GLUCOSE URINE: NEGATIVE MG/DL
KETONES, URINE: NEGATIVE
LEUKOCYTE CLUMPS, URINE: NEGATIVE
NITRITE, URINE: NEGATIVE
PH, URINE: 5
PROTEIN, URINE: NEGATIVE MG/DL
SPECIFIC GRAVITY, URINE: 1.01 (ref 1–1.03)
UROBILINOGEN, URINE: 0.2 EU/DL

## 2018-04-30 PROCEDURE — 81003 URINALYSIS AUTO W/O SCOPE: CPT | Performed by: NURSE PRACTITIONER

## 2018-04-30 PROCEDURE — 99204 OFFICE O/P NEW MOD 45 MIN: CPT | Performed by: NURSE PRACTITIONER

## 2018-04-30 RX ORDER — SULFAMETHOXAZOLE AND TRIMETHOPRIM 800; 160 MG/1; MG/1
1 TABLET ORAL 2 TIMES DAILY
Qty: 28 TABLET | Refills: 0 | Status: SHIPPED | OUTPATIENT
Start: 2018-04-30 | End: 2018-05-14

## 2018-04-30 RX ORDER — NAPROXEN 500 MG/1
500 TABLET ORAL 2 TIMES DAILY WITH MEALS
Qty: 60 TABLET | Refills: 1 | Status: SHIPPED | OUTPATIENT
Start: 2018-04-30 | End: 2018-06-27 | Stop reason: SDUPTHER

## 2018-05-01 RX ORDER — NAPROXEN 500 MG/1
TABLET ORAL
Qty: 180 TABLET | Refills: 1 | OUTPATIENT
Start: 2018-05-01

## 2018-05-23 ENCOUNTER — OFFICE VISIT (OUTPATIENT)
Dept: FAMILY MEDICINE CLINIC | Age: 63
End: 2018-05-23
Payer: MEDICARE

## 2018-05-23 VITALS
TEMPERATURE: 98.3 F | SYSTOLIC BLOOD PRESSURE: 116 MMHG | RESPIRATION RATE: 16 BRPM | DIASTOLIC BLOOD PRESSURE: 82 MMHG | BODY MASS INDEX: 27.8 KG/M2 | WEIGHT: 209.8 LBS | HEART RATE: 80 BPM | HEIGHT: 73 IN

## 2018-05-23 DIAGNOSIS — K92.1 BLACK STOOL: ICD-10-CM

## 2018-05-23 DIAGNOSIS — N50.82 SCROTAL PAIN: ICD-10-CM

## 2018-05-23 DIAGNOSIS — R10.32 LEFT LOWER QUADRANT PAIN: Primary | ICD-10-CM

## 2018-05-23 DIAGNOSIS — R11.0 NAUSEA: ICD-10-CM

## 2018-05-23 LAB
BILIRUBIN, POC: NORMAL
BLOOD URINE, POC: NEGATIVE
CLARITY, POC: CLEAR
COLOR, POC: YELLOW
GLUCOSE URINE, POC: NEGATIVE
KETONES, POC: NEGATIVE
LEUKOCYTE EST, POC: NEGATIVE
NITRITE, POC: NEGATIVE
PH, POC: 5
PROTEIN, POC: NEGATIVE
SPECIFIC GRAVITY, POC: 1.02
UROBILINOGEN, POC: NORMAL

## 2018-05-23 PROCEDURE — 99213 OFFICE O/P EST LOW 20 MIN: CPT | Performed by: NURSE PRACTITIONER

## 2018-05-23 PROCEDURE — 81002 URINALYSIS NONAUTO W/O SCOPE: CPT | Performed by: NURSE PRACTITIONER

## 2018-05-23 RX ORDER — MEDICAL SUPPLY, MISCELLANEOUS
1 EACH MISCELLANEOUS CONTINUOUS PRN
Qty: 1 EACH | Refills: 0 | Status: SHIPPED | OUTPATIENT
Start: 2018-05-23

## 2018-05-24 ENCOUNTER — HOSPITAL ENCOUNTER (OUTPATIENT)
Age: 63
Setting detail: SPECIMEN
Discharge: HOME | End: 2018-05-24
Payer: MEDICARE

## 2018-05-24 ENCOUNTER — TELEPHONE (OUTPATIENT)
Dept: FAMILY MEDICINE CLINIC | Age: 63
End: 2018-05-24

## 2018-05-24 ENCOUNTER — HOSPITAL ENCOUNTER (OUTPATIENT)
Age: 63
Discharge: HOME | End: 2018-05-24
Payer: MEDICARE

## 2018-05-24 DIAGNOSIS — R10.32 LEFT LOWER QUADRANT PAIN: ICD-10-CM

## 2018-05-24 DIAGNOSIS — K92.1 BLACK STOOL: ICD-10-CM

## 2018-05-24 DIAGNOSIS — R11.0 NAUSEA: ICD-10-CM

## 2018-05-24 LAB
ANION GAP SERPL CALCULATED.3IONS-SCNC: 15 MEQ/L (ref 8–16)
BASOPHILS # BLD: 0.2 %
BASOPHILS ABSOLUTE: 0 THOU/MM3 (ref 0–0.1)
BUN BLDV-MCNC: 17 MG/DL (ref 7–22)
CALCIUM SERPL-MCNC: 9.2 MG/DL (ref 8.5–10.5)
CHLORIDE BLD-SCNC: 98 MEQ/L (ref 98–111)
CO2: 24 MEQ/L (ref 23–33)
CREAT SERPL-MCNC: 0.9 MG/DL (ref 0.4–1.2)
EOSINOPHIL # BLD: 1.8 %
EOSINOPHILS ABSOLUTE: 0.2 THOU/MM3 (ref 0–0.4)
GFR SERPL CREATININE-BSD FRML MDRD: 86 ML/MIN/1.73M2
GLUCOSE BLD-MCNC: 177 MG/DL (ref 70–108)
HCT VFR BLD CALC: 45.3 % (ref 42–52)
HEMOCCULT STL QL: NEGATIVE
HEMOGLOBIN: 15.6 GM/DL (ref 14–18)
LYMPHOCYTES # BLD: 21.5 %
LYMPHOCYTES ABSOLUTE: 2.2 THOU/MM3 (ref 1–4.8)
MCH RBC QN AUTO: 32.4 PG (ref 27–31)
MCHC RBC AUTO-ENTMCNC: 34.4 GM/DL (ref 33–37)
MCV RBC AUTO: 94.3 FL (ref 80–94)
MONOCYTES # BLD: 5 %
MONOCYTES ABSOLUTE: 0.5 THOU/MM3 (ref 0.4–1.3)
NUCLEATED RED BLOOD CELLS: 0 /100 WBC
PDW BLD-RTO: 13.8 % (ref 11.5–14.5)
PLATELET # BLD: 236 THOU/MM3 (ref 130–400)
PMV BLD AUTO: 7.3 FL (ref 7.4–10.4)
POTASSIUM SERPL-SCNC: 4.1 MEQ/L (ref 3.5–5.2)
RBC # BLD: 4.8 MILL/MM3 (ref 4.7–6.1)
SEG NEUTROPHILS: 71.5 %
SEGMENTED NEUTROPHILS ABSOLUTE COUNT: 7.3 THOU/MM3 (ref 1.8–7.7)
SODIUM BLD-SCNC: 137 MEQ/L (ref 135–145)
WBC # BLD: 10.2 THOU/MM3 (ref 4.8–10.8)

## 2018-05-24 PROCEDURE — 80048 BASIC METABOLIC PNL TOTAL CA: CPT

## 2018-05-24 PROCEDURE — 36415 COLL VENOUS BLD VENIPUNCTURE: CPT

## 2018-05-24 PROCEDURE — 82272 OCCULT BLD FECES 1-3 TESTS: CPT

## 2018-05-24 PROCEDURE — 85025 COMPLETE CBC W/AUTO DIFF WBC: CPT

## 2018-05-30 ENCOUNTER — TELEPHONE (OUTPATIENT)
Dept: FAMILY MEDICINE CLINIC | Age: 63
End: 2018-05-30

## 2018-06-05 ENCOUNTER — NURSE TRIAGE (OUTPATIENT)
Dept: ADMINISTRATIVE | Age: 63
End: 2018-06-05

## 2018-06-12 ENCOUNTER — HOSPITAL ENCOUNTER (OUTPATIENT)
Dept: CT IMAGING | Age: 63
Discharge: HOME | End: 2018-06-12
Payer: MEDICARE

## 2018-06-12 DIAGNOSIS — R10.32 LEFT LOWER QUADRANT PAIN: ICD-10-CM

## 2018-06-12 DIAGNOSIS — K92.1 BLACK STOOL: ICD-10-CM

## 2018-06-12 DIAGNOSIS — R11.0 NAUSEA: ICD-10-CM

## 2018-06-12 PROCEDURE — 6360000004 HC RX CONTRAST MEDICATION: Performed by: NURSE PRACTITIONER

## 2018-06-12 PROCEDURE — 74177 CT ABD & PELVIS W/CONTRAST: CPT

## 2018-06-12 RX ADMIN — IOHEXOL 50 ML: 240 INJECTION, SOLUTION INTRATHECAL; INTRAVASCULAR; INTRAVENOUS; ORAL at 15:18

## 2018-06-12 RX ADMIN — IOPAMIDOL 80 ML: 755 INJECTION, SOLUTION INTRAVENOUS at 15:18

## 2018-06-13 ENCOUNTER — TELEPHONE (OUTPATIENT)
Dept: FAMILY MEDICINE CLINIC | Age: 63
End: 2018-06-13

## 2018-06-18 ENCOUNTER — TELEPHONE (OUTPATIENT)
Dept: UROLOGY | Age: 63
End: 2018-06-18

## 2018-06-18 ENCOUNTER — TELEPHONE (OUTPATIENT)
Dept: FAMILY MEDICINE CLINIC | Age: 63
End: 2018-06-18

## 2018-06-18 ENCOUNTER — OFFICE VISIT (OUTPATIENT)
Dept: UROLOGY | Age: 63
End: 2018-06-18
Payer: MEDICARE

## 2018-06-18 VITALS
HEIGHT: 73 IN | WEIGHT: 211.4 LBS | SYSTOLIC BLOOD PRESSURE: 134 MMHG | DIASTOLIC BLOOD PRESSURE: 86 MMHG | BODY MASS INDEX: 28.02 KG/M2

## 2018-06-18 DIAGNOSIS — N43.3 HYDROCELE IN ADULT: Primary | ICD-10-CM

## 2018-06-18 DIAGNOSIS — N43.3 HYDROCELE, UNSPECIFIED HYDROCELE TYPE: Primary | ICD-10-CM

## 2018-06-18 DIAGNOSIS — Z01.818 PRE-OP TESTING: ICD-10-CM

## 2018-06-18 DIAGNOSIS — I10 ESSENTIAL HYPERTENSION: ICD-10-CM

## 2018-06-18 DIAGNOSIS — J44.9 CHRONIC OBSTRUCTIVE PULMONARY DISEASE, UNSPECIFIED COPD TYPE (HCC): ICD-10-CM

## 2018-06-18 LAB
BILIRUBIN URINE: ABNORMAL
BLOOD URINE, POC: NEGATIVE
CHARACTER, URINE: CLEAR
COLOR, URINE: YELLOW
GLUCOSE URINE: NEGATIVE MG/DL
KETONES, URINE: NEGATIVE
LEUKOCYTE CLUMPS, URINE: NEGATIVE
NITRITE, URINE: NEGATIVE
PH, URINE: 6
PROTEIN, URINE: NEGATIVE MG/DL
SPECIFIC GRAVITY, URINE: <= 1.005 (ref 1–1.03)
UROBILINOGEN, URINE: 0.2 EU/DL

## 2018-06-18 PROCEDURE — 81003 URINALYSIS AUTO W/O SCOPE: CPT | Performed by: NURSE PRACTITIONER

## 2018-06-18 PROCEDURE — 99213 OFFICE O/P EST LOW 20 MIN: CPT | Performed by: NURSE PRACTITIONER

## 2018-06-26 ENCOUNTER — HOSPITAL ENCOUNTER (OUTPATIENT)
Age: 63
Discharge: HOME | End: 2018-06-26
Payer: MEDICARE

## 2018-06-26 ENCOUNTER — HOSPITAL ENCOUNTER (OUTPATIENT)
Dept: GENERAL RADIOLOGY | Age: 63
Discharge: HOME | End: 2018-06-26
Payer: MEDICARE

## 2018-06-26 DIAGNOSIS — N43.3 HYDROCELE IN ADULT: ICD-10-CM

## 2018-06-26 DIAGNOSIS — Z01.818 PRE-OP TESTING: ICD-10-CM

## 2018-06-26 DIAGNOSIS — I10 ESSENTIAL HYPERTENSION: ICD-10-CM

## 2018-06-26 DIAGNOSIS — J44.9 CHRONIC OBSTRUCTIVE PULMONARY DISEASE, UNSPECIFIED COPD TYPE (HCC): ICD-10-CM

## 2018-06-26 LAB
ANION GAP SERPL CALCULATED.3IONS-SCNC: 14 MEQ/L (ref 8–16)
BASOPHILS # BLD: 0.4 %
BASOPHILS ABSOLUTE: 0 THOU/MM3 (ref 0–0.1)
BUN BLDV-MCNC: 21 MG/DL (ref 7–22)
CALCIUM SERPL-MCNC: 9.9 MG/DL (ref 8.5–10.5)
CHLORIDE BLD-SCNC: 101 MEQ/L (ref 98–111)
CO2: 22 MEQ/L (ref 23–33)
CREAT SERPL-MCNC: 1 MG/DL (ref 0.4–1.2)
EKG ATRIAL RATE: 87 BPM
EKG P AXIS: 35 DEGREES
EKG P-R INTERVAL: 160 MS
EKG Q-T INTERVAL: 354 MS
EKG QRS DURATION: 86 MS
EKG QTC CALCULATION (BAZETT): 425 MS
EKG R AXIS: 68 DEGREES
EKG T AXIS: 45 DEGREES
EKG VENTRICULAR RATE: 87 BPM
EOSINOPHIL # BLD: 1.4 %
EOSINOPHILS ABSOLUTE: 0.1 THOU/MM3 (ref 0–0.4)
ERYTHROCYTE [DISTWIDTH] IN BLOOD BY AUTOMATED COUNT: 12.3 % (ref 11.5–14.5)
ERYTHROCYTE [DISTWIDTH] IN BLOOD BY AUTOMATED COUNT: 42.4 FL (ref 35–45)
GFR SERPL CREATININE-BSD FRML MDRD: 76 ML/MIN/1.73M2
GLUCOSE BLD-MCNC: 179 MG/DL (ref 70–108)
HCT VFR BLD CALC: 45.1 % (ref 42–52)
HEMOGLOBIN: 15.2 GM/DL (ref 14–18)
IMMATURE GRANS (ABS): 0.04 THOU/MM3 (ref 0–0.07)
IMMATURE GRANULOCYTES: 0.4 %
LYMPHOCYTES # BLD: 22.1 %
LYMPHOCYTES ABSOLUTE: 2.2 THOU/MM3 (ref 1–4.8)
MCH RBC QN AUTO: 31.6 PG (ref 26–33)
MCHC RBC AUTO-ENTMCNC: 33.7 GM/DL (ref 32–35)
MCV RBC AUTO: 93.8 FL (ref 80–94)
MONOCYTES # BLD: 5 %
MONOCYTES ABSOLUTE: 0.5 THOU/MM3 (ref 0.4–1.3)
NUCLEATED RED BLOOD CELLS: 0 /100 WBC
PLATELET # BLD: 215 THOU/MM3 (ref 130–400)
PMV BLD AUTO: 9.4 FL (ref 9–12)
POTASSIUM SERPL-SCNC: 4 MEQ/L (ref 3.5–5.2)
RBC # BLD: 4.81 MILL/MM3 (ref 4.7–6.1)
SEG NEUTROPHILS: 70.7 %
SEGMENTED NEUTROPHILS ABSOLUTE COUNT: 7.1 THOU/MM3 (ref 1.8–7.7)
SODIUM BLD-SCNC: 137 MEQ/L (ref 135–145)
WBC # BLD: 10.1 THOU/MM3 (ref 4.8–10.8)

## 2018-06-26 PROCEDURE — 80048 BASIC METABOLIC PNL TOTAL CA: CPT

## 2018-06-26 PROCEDURE — 85025 COMPLETE CBC W/AUTO DIFF WBC: CPT

## 2018-06-26 PROCEDURE — 71046 X-RAY EXAM CHEST 2 VIEWS: CPT

## 2018-06-26 PROCEDURE — 36415 COLL VENOUS BLD VENIPUNCTURE: CPT

## 2018-06-26 PROCEDURE — 93010 ELECTROCARDIOGRAM REPORT: CPT | Performed by: NUCLEAR MEDICINE

## 2018-06-26 PROCEDURE — 93005 ELECTROCARDIOGRAM TRACING: CPT

## 2018-07-02 ENCOUNTER — OFFICE VISIT (OUTPATIENT)
Dept: FAMILY MEDICINE CLINIC | Age: 63
End: 2018-07-02
Payer: MEDICARE

## 2018-07-02 VITALS
RESPIRATION RATE: 12 BRPM | BODY MASS INDEX: 28.36 KG/M2 | HEIGHT: 73 IN | DIASTOLIC BLOOD PRESSURE: 68 MMHG | TEMPERATURE: 98.1 F | SYSTOLIC BLOOD PRESSURE: 110 MMHG | WEIGHT: 214 LBS | HEART RATE: 76 BPM

## 2018-07-02 DIAGNOSIS — Z01.818 PREOPERATIVE CLEARANCE: Primary | ICD-10-CM

## 2018-07-02 DIAGNOSIS — Z11.59 ENCOUNTER FOR HEPATITIS C SCREENING TEST FOR LOW RISK PATIENT: ICD-10-CM

## 2018-07-02 DIAGNOSIS — E11.9 TYPE 2 DIABETES MELLITUS WITHOUT COMPLICATION, WITHOUT LONG-TERM CURRENT USE OF INSULIN (HCC): ICD-10-CM

## 2018-07-02 DIAGNOSIS — N43.3 HYDROCELE IN ADULT: ICD-10-CM

## 2018-07-02 LAB — HBA1C MFR BLD: 7.2 %

## 2018-07-02 PROCEDURE — 99213 OFFICE O/P EST LOW 20 MIN: CPT | Performed by: NURSE PRACTITIONER

## 2018-07-02 PROCEDURE — 83036 HEMOGLOBIN GLYCOSYLATED A1C: CPT | Performed by: NURSE PRACTITIONER

## 2018-07-02 ASSESSMENT — PATIENT HEALTH QUESTIONNAIRE - PHQ9
2. FEELING DOWN, DEPRESSED OR HOPELESS: 0
SUM OF ALL RESPONSES TO PHQ QUESTIONS 1-9: 1
1. LITTLE INTEREST OR PLEASURE IN DOING THINGS: 1
SUM OF ALL RESPONSES TO PHQ9 QUESTIONS 1 & 2: 1

## 2018-07-02 ASSESSMENT — ENCOUNTER SYMPTOMS
ABDOMINAL DISTENTION: 0
ABDOMINAL PAIN: 0
RESPIRATORY NEGATIVE: 1
SINUS PAIN: 0
RHINORRHEA: 0
BACK PAIN: 0
SINUS PRESSURE: 0

## 2018-07-02 NOTE — PROGRESS NOTES
Visit Information    Have you changed or started any medications since your last visit including any over-the-counter medicines, vitamins, or herbal medicines? no   Are you having any side effects from any of your medications? -  no  Have you stopped taking any of your medications? Is so, why? -  no    Have you seen any other physician or provider since your last visit? Yes - Records Obtained  Have you had any other diagnostic tests since your last visit? Yes - Records Obtained  Have you been seen in the emergency room and/or had an admission to a hospital since we last saw you? No  Have you had your routine dental cleaning in the past 6 months? no    Have you activated your [employer] account? If not, what are your barriers? Yes     Patient Care Team:  GERARDO Todd - CNP as PCP - General (Family Nurse Practitioner)    Medical History Review  Past Medical, Family, and Social History     Defer to provider.

## 2018-07-02 NOTE — PATIENT INSTRUCTIONS
You may receive a survey about your visit with us today. The feedback from our patients helps us identify what is working well and where the service to all patients can be enhanced. Thank you! Tobacco Cessation Programs     Telephonic behavior modification  Heidi Liu ([phone])   Counseling service for those who are ready to quit using tobacco.     Available for uninsured [place] residents, [facility] recipients, pregnant women, or patients whose health plans or employers are members of the [address] [place] [address] behavior modification   http://[place]. Quitlogix. org   Online support program to help patients through each step of the quitting process. Available 24 hours a day 7 days a week. Provides up to date researched based tool, step-by-step guides, and motivational messages. Online behavior modification   www.lungusa.org/stop-smoking/how-to-quit   HelpLine: 1-800-LUNG-USA (545-7516)   Email questions to:  Anson@baimos technologies. G-Snap!    Website offers resources to help tobacco users figure out their reasons for quitting and then take the big step of quitting for good. Hypnosis   [facility], [place], [place]   Contact: Jannie Talavera, PhD at [phone]   Hypnosis for tobacco cessation   Cost $225 for the initial session and $175 for each session afterwards. Most patients require 6-8 sessions. There is the option to submit through the patients insurance. Hypnosis and behavior modification   Location: [address],  [address] [employer] [employer] [address], [place]   Contact: Pati Humphries, PhD at [phone]  [facility] Counseling and hypnosis for nicotine addition   Cost: For uninsured patients:  Please call above phone number  Cost for insured patients depends on patients insurance plan.     Behavior modification   Location: [address], [address] [place] [address] [employer] [address], [address]: [place] [address] include four one-on-one appointments between the patient and a

## 2018-07-02 NOTE — PROGRESS NOTES
COLONOSCOPY  6/2012    repeat 6/2017- no precancerous  polyps    EKG 12-LEAD  4/6/2015         HERNIA REPAIR      Umbilical    INGUINAL HERNIA REPAIR  2002    Bilateral Inguinal    [facility] SPINE SURGERY  2006, 2007, 2008    fusion 2008     Family History   Problem Relation Age of Onset    Diabetes Mother     Cancer Father     Diabetes Sister     Heart Disease Brother     High Blood Pressure Neg Hx     Stroke Neg Hx      Social History   Substance Use Topics    Smoking status: Current Every Day Smoker     Packs/day: 0.75     Years: 38.00     Types: Cigarettes    Smokeless tobacco: Never Used    Alcohol use No      Comment: quit 2013        No Known Allergies    Health Maintenance   Topic Date Due    HIV screen  05/28/1971    Shingles Vaccine (1 of 2 - 2 Dose Series) 05/28/2006    Diabetic retinal exam  08/22/2018    Flu vaccine (1) 09/01/2018    Lipid screen  09/25/2018    Diabetic foot exam  10/09/2018    A1C test (Diabetic or Prediabetic)  04/09/2019    Diabetic microalbuminuria test  04/09/2019    Potassium monitoring  06/26/2019    Creatinine monitoring  06/26/2019    DTaP/Tdap/Td vaccine (2 - Td) 03/26/2023    Colon cancer screen colonoscopy  04/19/2027    Pneumococcal med risk  Completed    Hepatitis C screen  Completed       Subjective:      Review of Systems   Constitutional: Negative for chills, fatigue and fever. HENT: Negative for congestion, postnasal drip, rhinorrhea, sinus pain and sinus pressure. Respiratory: Negative. Cardiovascular: Negative. Gastrointestinal: Negative for abdominal distention and abdominal pain. Genitourinary: Positive for scrotal swelling. Negative for difficulty urinating and dysuria. Musculoskeletal: Negative for arthralgias, back pain and myalgias. Skin: Negative. Neurological: Negative for dizziness, weakness and headaches. Psychiatric/Behavioral: Negative for self-injury, sleep disturbance and suicidal ideas.        Objective: women, or patients whose health plans or employers are members of the [address] [place] [address] behavior modification   http://[place]. Quitlogix. org   Online support program to help patients through each step of the quitting process. Available 24 hours a day 7 days a week. Provides up to date researched based tool, step-by-step guides, and motivational messages. Online behavior modification   www.lungusa.org/stop-smoking/how-to-quit   HelpLine: 1-[facility] (865-9062)   Email questions to:  Matthew@M87. Streamup    Website offers resources to help tobacco users figure out their reasons for quitting and then take the big step of quitting for good. Hypnosis   Location: [address] [facility], [employer] [address], [address]   Contact: Moses Monroy, PhD at [phone]   Hypnosis for tobacco cessation   Cost $225 for the initial session and $175 for each session afterwards. Most patients require 6-8 sessions. There is the option to submit through the patients insurance. Hypnosis and behavior modification   Location: [facility],  [address] [employer] [place] [address], [address]   Contact: Je Montelongo, PhD at [phone]  [facility] Counseling and hypnosis for nicotine addition   Cost: For uninsured patients:  Please call above phone number  Cost for insured patients depends on patients insurance plan. Behavior modification   Location: [facility], [address] [facility] [address] [employer] [address] [employer], [address]   Contact: Mallory Hernandez include four one-on-one appointments between the patient and a respiratory therapist.  The four appointments span over three weeks. The respiratory therapist schedules one of the appointments to occur 48 hours after the patients quit date.  Cost $100 total for the four sessions. Tobacco cessation products are not included in the cost and are not provided by [facility].        Increase metformin to 1000 mg twice a day.   Decrease metoprolol to 1/2 tablet at night    Return in about 6 months (around 1/2/2019). Reccommended tobacco cessation options including pharmacologic methods, counseled great than 3 minutes during this visit:  Yes  []  No  []       Patient given educational materials - see patient instructions. Discussed use, benefit, and side effects of prescribed medications. All patient questions answered. Pt voiced understanding. Reviewed health maintenance. Instructed to continue current medications, diet and exercise. Patient agreed with treatment plan. Follow up as directed.        Electronically signed by GERARDO Hernandez CNP on 7/2/2018 at 11:51 AM

## 2018-07-05 ENCOUNTER — TELEPHONE (OUTPATIENT)
Dept: UROLOGY | Age: 63
End: 2018-07-05

## 2018-07-05 NOTE — TELEPHONE ENCOUNTER
Spoke to pt regarding scheduling the left hydrocelectomy with Dr Vic Tinajero on 7/31/18. Pt agreed. Advised arrival 2:00pm at [facility]. NPO midnight. Advised that PCP has given medical clearance. He voiced understanding.

## 2018-07-05 NOTE — TELEPHONE ENCOUNTER
SURGERY [address] [place] [address] [address] [facility] [address] [place] [address], [address]      Phone *[phone] *[phone]   Surgical Scheduling Direct Line Phone *[phone] Fax *[phone]      [place]ar [place] 1955 male    [address]   Marital Status:          Home Phone: [phone]      Cell Phone:    Telephone Information:   Mobile [phone]          Surgeon: Dr. Swapnil Howard Surgery Date: 7/31/18  Time: [place]    Procedure: Left hydrocelectomy    Diagnosis: Left hydrocele     Important Medical History: In [facility]    Special Inst/Equip: Regular Room    CPT Codes:    81179  Latex Allergy:  No     Cardiac Device:  No     Anesthesia:  Anesthesiologist (General/Spinal)          Admission Type:  Same Day                             Admit Prior to Day of Surgery: No    Case Location:  Main OR           Preadmission Testing:Phone Call              PAT Date and Time:______________________________________________________    PAT Confirmation #: ______________________________________________________    Post Op Visit: ___________________________________________________________    Need Preop Cardiac Clearance: No    Does Patient have Cardiologist/physician?      None    Surgery Confirmation #: __________________________________________________    : ________________________   Date: __________________________     [employer] Name: Foot Niraj

## 2018-07-09 ENCOUNTER — TELEPHONE (OUTPATIENT)
Dept: UROLOGY | Age: 63
End: 2018-07-09

## 2018-07-23 ENCOUNTER — HOSPITAL ENCOUNTER (OUTPATIENT)
Age: 63
Discharge: HOME | End: 2018-07-23
Payer: MEDICARE

## 2018-07-23 DIAGNOSIS — N43.3 HYDROCELE IN ADULT: ICD-10-CM

## 2018-07-23 DIAGNOSIS — I10 ESSENTIAL HYPERTENSION: ICD-10-CM

## 2018-07-23 DIAGNOSIS — Z01.818 PRE-OP TESTING: ICD-10-CM

## 2018-07-23 DIAGNOSIS — Z11.59 ENCOUNTER FOR HEPATITIS C SCREENING TEST FOR LOW RISK PATIENT: ICD-10-CM

## 2018-07-23 LAB
BILIRUBIN URINE: ABNORMAL
BLOOD, URINE: NEGATIVE
CHARACTER, URINE: CLEAR
COLOR: YELLOW
GLUCOSE URINE: NEGATIVE MG/DL
HEPATITIS C ANTIBODY: NEGATIVE
ICTOTEST: NEGATIVE
KETONES, URINE: NEGATIVE
LEUKOCYTE ESTERASE, URINE: NEGATIVE
NITRITE, URINE: NEGATIVE
PH UA: 5
PROTEIN UA: NEGATIVE
SPECIFIC GRAVITY, URINE: 1.02 (ref 1–1.03)
UROBILINOGEN, URINE: 0.2 EU/DL

## 2018-07-23 PROCEDURE — 86803 HEPATITIS C AB TEST: CPT

## 2018-07-23 PROCEDURE — 36415 COLL VENOUS BLD VENIPUNCTURE: CPT

## 2018-07-23 PROCEDURE — 81003 URINALYSIS AUTO W/O SCOPE: CPT

## 2018-07-27 NOTE — H&P
3    metoprolol tartrate (LOPRESSOR) 25 MG tablet TAKE 1/2 TABLET BY MOUTH TWICE DAILY(HOLD IF HEART RATE LESS THAN 60) 180 tablet 1    QUEtiapine (SEROQUEL) 100 MG tablet TAKE 1 TO 2 TABLETS BY MOUTH AT BEDTIME 180 tablet 1    SPIRIVA HANDIHALER 18 MCG inhalation capsule INHALE THE CONTENTS OF 1 CAPSULE INTO THE LUNGS EVERY DAY 90 capsule 1    metoprolol tartrate (LOPRESSOR) 25 MG tablet TAKE 1/2 TABLET BY MOUTH TWICE DAILY.(HOLD FOR HEART RATE LESS THAN 60) 180 tablet 1    QUEtiapine (SEROQUEL) 100 MG tablet TAKE 1-2 TABLET BY MOUTH AT BEDTIME 180 tablet 1    tiZANidine (ZANAFLEX) 4 MG tablet Take 4 mg by mouth every 6 hours as needed          No current facility-administered medications for this visit.             Past Medical History  Raymond Stark  has a past medical history of Abnormal liver enzymes; Alcohol abuse; Allergic rhinitis; Chronic back pain; Colonic polyp; COPD (chronic obstructive pulmonary disease) ([facility] [address] Depression; Diabetes mellitus ([facility] [address] DJD (degenerative joint disease) of cervical spine; DJD (degenerative joint disease) of lumbar spine; GERD (gastroesophageal reflux disease); Hyperlipidemia; Hypertension; Osteoarthritis; Osteoarthritis; and Smoker.     Past Surgical History  The patient  has a past surgical history that includes back surgery (2008); Inguinal hernia repair (2002); hernia repair; Cholecystectomy; Colonoscopy (6/2012); EKG 12 Lead (4/6/2015); Lumbar spine surgery (2006, 2007, 2008); and Cardiac catheterization (3/2015).    Family History  This patient's family history includes Cancer in his father; Diabetes in his mother and sister; Heart Disease in his brother.  Leilani Bolanos  reports that he has been smoking Cigarettes. He has a 28.50 pack-year smoking history. He has never used smokeless tobacco. He reports that he does not drink alcohol or use drugs.     Subjective:      Review of Systems  No problems with ears, nose or throat. No problems with eyes.   No chest pain, shortness of breath, abdominal pain, extremity pain or weakness, and no neurological deficits. No rashes.  symptoms per HPI. The remainder of the review of symptoms is negative.     Objective:      PE:   Vitals       Vitals:     06/18/18 1439   BP: 134/86   Weight: 211 lb 6.4 oz (95.9 kg)   Height: 6' 1\" (1.854 m)            Constitutional: Alert and oriented times 3, no acute distress and cooperative to examination with appropriate mood and affect. HENT:   Head:        Normocephalic and atraumatic. Mouth/Throat:         Mucous membranes are normal.   Eyes:         EOM are normal. No scleral icterus. PERRLA. Neck:        Supple, symmetrical, trachea midline  Pulmonary/Chest:      Chest symmetric with normal A/P diameter. Normal respiratory rate and rhthym. No use of accessory muscles. Abdominal:         Soft. No tenderness. Bowel sounds present. Musculoskeletal:         Normal range of motion. No edema or tenderness of lower extremities. Extremities: No cyanosis, clubbing, or edema present. Neurological:        Alert and oriented. No cranial nerve deficit. [facility]:        Normal mood and affect. Genitalia: Uncircumcised penis with urethral meatus in normal location, foreskin easily retracts over the glans, scrotal exam reveals a moderate size left hydrocele, no tenderness to palpation.  epididymal cyst palpated on the right     Labs   Urine dip demonstrates         Results for POC orders placed in visit on 06/18/18   POCT Urinalysis No Micro (Auto)   Result Value Ref Range     Glucose, Ur Negative NEGATIVE mg/dl     Bilirubin Urine Small (A)       Ketones, Urine Negative NEGATIVE     Specific Gravity, Urine <= 1.005 1.002 - 1.03     Blood, UA POC Negative NEGATIVE     pH, Urine 6.00 5.0 - 9.0     Protein, Urine Negative NEGATIVE mg/dl     Urobilinogen, Urine 0.20 0.0 - 1.0 eu/dl     Nitrite, Urine Negative NEGATIVE     Leukocyte Clumps, Urine Negative NEGATIVE     Color, Urine

## 2018-07-31 ENCOUNTER — ANESTHESIA EVENT (OUTPATIENT)
Dept: OPERATING ROOM | Age: 63
End: 2018-07-31
Payer: MEDICARE

## 2018-07-31 ENCOUNTER — ANESTHESIA (OUTPATIENT)
Dept: OPERATING ROOM | Age: 63
End: 2018-07-31
Payer: MEDICARE

## 2018-07-31 ENCOUNTER — HOSPITAL ENCOUNTER (OUTPATIENT)
Age: 63
Setting detail: OUTPATIENT SURGERY
Discharge: HOME | End: 2018-07-31
Attending: UROLOGY | Admitting: UROLOGY
Payer: MEDICARE

## 2018-07-31 VITALS
BODY MASS INDEX: 27.73 KG/M2 | SYSTOLIC BLOOD PRESSURE: 164 MMHG | OXYGEN SATURATION: 95 % | RESPIRATION RATE: 16 BRPM | HEART RATE: 58 BPM | DIASTOLIC BLOOD PRESSURE: 80 MMHG | HEIGHT: 73 IN | TEMPERATURE: 97.9 F | WEIGHT: 209.2 LBS

## 2018-07-31 VITALS
OXYGEN SATURATION: 96 % | RESPIRATION RATE: 19 BRPM | SYSTOLIC BLOOD PRESSURE: 89 MMHG | DIASTOLIC BLOOD PRESSURE: 63 MMHG

## 2018-07-31 DIAGNOSIS — G89.18 ACUTE POST-OPERATIVE PAIN: Primary | ICD-10-CM

## 2018-07-31 LAB
GLUCOSE BLD-MCNC: 115 MG/DL (ref 70–108)
GLUCOSE BLD-MCNC: 128 MG/DL (ref 70–108)

## 2018-07-31 PROCEDURE — 2500000003 HC RX 250 WO HCPCS: Performed by: UROLOGY

## 2018-07-31 PROCEDURE — 3600000003 HC SURGERY LEVEL 3 BASE: Performed by: UROLOGY

## 2018-07-31 PROCEDURE — 6360000002 HC RX W HCPCS: Performed by: NURSE ANESTHETIST, CERTIFIED REGISTERED

## 2018-07-31 PROCEDURE — 3700000000 HC ANESTHESIA ATTENDED CARE: Performed by: UROLOGY

## 2018-07-31 PROCEDURE — 2580000003 HC RX 258

## 2018-07-31 PROCEDURE — 3600000013 HC SURGERY LEVEL 3 ADDTL 15MIN: Performed by: UROLOGY

## 2018-07-31 PROCEDURE — 6360000002 HC RX W HCPCS

## 2018-07-31 PROCEDURE — 82948 REAGENT STRIP/BLOOD GLUCOSE: CPT

## 2018-07-31 PROCEDURE — 6360000002 HC RX W HCPCS: Performed by: ANESTHESIOLOGY

## 2018-07-31 PROCEDURE — 88302 TISSUE EXAM BY PATHOLOGIST: CPT

## 2018-07-31 PROCEDURE — 2709999900 HC NON-CHARGEABLE SUPPLY: Performed by: UROLOGY

## 2018-07-31 PROCEDURE — 7100000011 HC PHASE II RECOVERY - ADDTL 15 MIN: Performed by: UROLOGY

## 2018-07-31 PROCEDURE — 7100000010 HC PHASE II RECOVERY - FIRST 15 MIN: Performed by: UROLOGY

## 2018-07-31 PROCEDURE — 7100000000 HC PACU RECOVERY - FIRST 15 MIN: Performed by: UROLOGY

## 2018-07-31 PROCEDURE — 7100000001 HC PACU RECOVERY - ADDTL 15 MIN: Performed by: UROLOGY

## 2018-07-31 PROCEDURE — 6370000000 HC RX 637 (ALT 250 FOR IP)

## 2018-07-31 PROCEDURE — 55040 REMOVAL OF HYDROCELE: CPT | Performed by: UROLOGY

## 2018-07-31 PROCEDURE — 3700000001 HC ADD 15 MINUTES (ANESTHESIA): Performed by: UROLOGY

## 2018-07-31 RX ORDER — HYDROCODONE BITARTRATE AND ACETAMINOPHEN 5; 325 MG/1; MG/1
1 TABLET ORAL EVERY 6 HOURS PRN
Qty: 20 TABLET | Refills: 0 | Status: SHIPPED | OUTPATIENT
Start: 2018-07-31 | End: 2018-08-07

## 2018-07-31 RX ORDER — MEPERIDINE HYDROCHLORIDE 25 MG/ML
12.5 INJECTION INTRAMUSCULAR; INTRAVENOUS; SUBCUTANEOUS EVERY 5 MIN PRN
Status: DISCONTINUED | OUTPATIENT
Start: 2018-07-31 | End: 2018-07-31 | Stop reason: HOSPADM

## 2018-07-31 RX ORDER — DIPHENHYDRAMINE HYDROCHLORIDE 50 MG/ML
12.5 INJECTION INTRAMUSCULAR; INTRAVENOUS
Status: DISCONTINUED | OUTPATIENT
Start: 2018-07-31 | End: 2018-07-31 | Stop reason: HOSPADM

## 2018-07-31 RX ORDER — FENTANYL CITRATE 50 UG/ML
INJECTION, SOLUTION INTRAMUSCULAR; INTRAVENOUS PRN
Status: DISCONTINUED | OUTPATIENT
Start: 2018-07-31 | End: 2018-07-31 | Stop reason: SDUPTHER

## 2018-07-31 RX ORDER — PROPOFOL 10 MG/ML
INJECTION, EMULSION INTRAVENOUS PRN
Status: DISCONTINUED | OUTPATIENT
Start: 2018-07-31 | End: 2018-07-31 | Stop reason: SDUPTHER

## 2018-07-31 RX ORDER — HYDROCODONE BITARTRATE AND ACETAMINOPHEN 5; 325 MG/1; MG/1
TABLET ORAL
Status: COMPLETED
Start: 2018-07-31 | End: 2018-07-31

## 2018-07-31 RX ORDER — HYDROCODONE BITARTRATE AND ACETAMINOPHEN 5; 325 MG/1; MG/1
1 TABLET ORAL EVERY 6 HOURS PRN
Status: DISCONTINUED | OUTPATIENT
Start: 2018-07-31 | End: 2018-07-31 | Stop reason: HOSPADM

## 2018-07-31 RX ORDER — HYDRALAZINE HYDROCHLORIDE 20 MG/ML
5 INJECTION INTRAMUSCULAR; INTRAVENOUS EVERY 10 MIN PRN
Status: DISCONTINUED | OUTPATIENT
Start: 2018-07-31 | End: 2018-07-31 | Stop reason: HOSPADM

## 2018-07-31 RX ORDER — SULFAMETHOXAZOLE AND TRIMETHOPRIM 800; 160 MG/1; MG/1
1 TABLET ORAL 2 TIMES DAILY
Qty: 10 TABLET | Refills: 0 | Status: SHIPPED | OUTPATIENT
Start: 2018-07-31 | End: 2018-08-05

## 2018-07-31 RX ORDER — LABETALOL HYDROCHLORIDE 5 MG/ML
5 INJECTION, SOLUTION INTRAVENOUS EVERY 5 MIN PRN
Status: DISCONTINUED | OUTPATIENT
Start: 2018-07-31 | End: 2018-07-31 | Stop reason: HOSPADM

## 2018-07-31 RX ORDER — MORPHINE SULFATE 2 MG/ML
2 INJECTION, SOLUTION INTRAMUSCULAR; INTRAVENOUS EVERY 5 MIN PRN
Status: DISCONTINUED | OUTPATIENT
Start: 2018-07-31 | End: 2018-07-31 | Stop reason: HOSPADM

## 2018-07-31 RX ORDER — BUPIVACAINE HYDROCHLORIDE 5 MG/ML
INJECTION, SOLUTION PERINEURAL PRN
Status: DISCONTINUED | OUTPATIENT
Start: 2018-07-31 | End: 2018-07-31 | Stop reason: HOSPADM

## 2018-07-31 RX ORDER — ONDANSETRON 2 MG/ML
4 INJECTION INTRAMUSCULAR; INTRAVENOUS
Status: DISCONTINUED | OUTPATIENT
Start: 2018-07-31 | End: 2018-07-31 | Stop reason: HOSPADM

## 2018-07-31 RX ORDER — FENTANYL CITRATE 50 UG/ML
50 INJECTION, SOLUTION INTRAMUSCULAR; INTRAVENOUS EVERY 5 MIN PRN
Status: DISCONTINUED | OUTPATIENT
Start: 2018-07-31 | End: 2018-07-31 | Stop reason: HOSPADM

## 2018-07-31 RX ORDER — SODIUM CHLORIDE 9 MG/ML
INJECTION, SOLUTION INTRAVENOUS CONTINUOUS
Status: DISCONTINUED | OUTPATIENT
Start: 2018-07-31 | End: 2018-07-31 | Stop reason: HOSPADM

## 2018-07-31 RX ADMIN — FENTANYL CITRATE 100 MCG: 50 INJECTION INTRAMUSCULAR; INTRAVENOUS at 16:33

## 2018-07-31 RX ADMIN — FENTANYL CITRATE 50 MCG: 50 INJECTION INTRAMUSCULAR; INTRAVENOUS at 16:59

## 2018-07-31 RX ADMIN — PROPOFOL 200 MG: 10 INJECTION, EMULSION INTRAVENOUS at 16:33

## 2018-07-31 RX ADMIN — SODIUM CHLORIDE: 9 INJECTION, SOLUTION INTRAVENOUS at 14:41

## 2018-07-31 RX ADMIN — HYDROCODONE BITARTRATE AND ACETAMINOPHEN 1 TABLET: 5; 325 TABLET ORAL at 18:40

## 2018-07-31 RX ADMIN — FENTANYL CITRATE 50 MCG: 50 INJECTION INTRAMUSCULAR; INTRAVENOUS at 16:57

## 2018-07-31 RX ADMIN — FENTANYL CITRATE 50 MCG: 50 INJECTION, SOLUTION INTRAMUSCULAR; INTRAVENOUS at 17:36

## 2018-07-31 RX ADMIN — CEFAZOLIN 1 G: 1 INJECTION, POWDER, FOR SOLUTION INTRAMUSCULAR; INTRAVENOUS; PARENTERAL at 16:40

## 2018-07-31 ASSESSMENT — PULMONARY FUNCTION TESTS
PIF_VALUE: 13
PIF_VALUE: 12
PIF_VALUE: 13
PIF_VALUE: 13
PIF_VALUE: 12
PIF_VALUE: 13
PIF_VALUE: 11
PIF_VALUE: 13
PIF_VALUE: 13
PIF_VALUE: 1
PIF_VALUE: 11
PIF_VALUE: 5
PIF_VALUE: 13
PIF_VALUE: 12
PIF_VALUE: 1
PIF_VALUE: 13
PIF_VALUE: 13
PIF_VALUE: 12
PIF_VALUE: 12
PIF_VALUE: 1
PIF_VALUE: 13
PIF_VALUE: 13
PIF_VALUE: 17
PIF_VALUE: 12
PIF_VALUE: 3
PIF_VALUE: 13
PIF_VALUE: 13
PIF_VALUE: 12
PIF_VALUE: 13
PIF_VALUE: 1
PIF_VALUE: 13
PIF_VALUE: 12
PIF_VALUE: 1
PIF_VALUE: 12
PIF_VALUE: 13
PIF_VALUE: 17
PIF_VALUE: 12
PIF_VALUE: 12
PIF_VALUE: 13
PIF_VALUE: 12
PIF_VALUE: 11
PIF_VALUE: 13
PIF_VALUE: 18
PIF_VALUE: 13
PIF_VALUE: 12
PIF_VALUE: 2
PIF_VALUE: 12
PIF_VALUE: 13
PIF_VALUE: 12
PIF_VALUE: 1
PIF_VALUE: 13

## 2018-07-31 ASSESSMENT — PAIN SCALES - GENERAL
PAINLEVEL_OUTOF10: 5
PAINLEVEL_OUTOF10: 7
PAINLEVEL_OUTOF10: 6
PAINLEVEL_OUTOF10: 8
PAINLEVEL_OUTOF10: 8
PAINLEVEL_OUTOF10: 4

## 2018-07-31 ASSESSMENT — PAIN DESCRIPTION - PAIN TYPE
TYPE: SURGICAL PAIN

## 2018-07-31 ASSESSMENT — PAIN DESCRIPTION - LOCATION: LOCATION: SCROTUM

## 2018-07-31 NOTE — ANESTHESIA PRE PROCEDURE
pain) 5/23/18   Melisa Andrews APRN - CNP   etodolac (LODINE XL) 400 MG extended release tablet Take 1 tablet by mouth daily 4/9/18   Melisa Andrews APRAMBER - CNP   tiotropium (Gerlene Damme) 18 MCG inhalation capsule INHALE THE CONTENTS OF 1 CAPSULE INTO THE LUNGS EVERY DAY 4/9/18   Melisa DarrylGERARDO - CNP   aspirin 81 MG chewable tablet TAKE 1 TABLET BY MOUTH DAILY 4/9/18   Melisa DarrylGERARDO - CNP   QUEtiapine (SEROQUEL) 100 MG tablet TAKE 1 TO 2 TABLETS BY MOUTH AT BEDTIME 4/9/18   Melisa DarrylGERARDO CNP       Current medications:    Current Facility-Administered Medications   Medication Dose Route Frequency Provider Last Rate Last Dose    0.9 % sodium chloride infusion   Intravenous Continuous Lexis Carrasquillo  mL/hr at 55/85/80 1441      ceFAZolin (ANCEF) 1 g in dextrose 5 % 50 mL IVPB (mini-bag)  1 g Intravenous 27 Min Pre-Op Lexis Carrasquillo LPN           Allergies:  No Known Allergies    Problem List:    Patient Active Problem List   Diagnosis Code    Allergic rhinitis J30.9    Osteoarthritis M19.90    Depression F32.9    GERD (gastroesophageal reflux disease) K21.9    Hypertension I10    COPD (chronic obstructive pulmonary disease) J44.9    Smoker F17.200    DJD (degenerative joint disease) of cervical spine M47.812    Diabetes mellitus ([address] E11.9       Past Medical History:        Diagnosis Date    Abnormal liver enzymes     Alcohol abuse     Allergic rhinitis     Chronic back pain     Colonic polyp     COPD (chronic obstructive pulmonary disease) ([address]     Depression     Diabetes mellitus ([address]     DJD (degenerative joint disease) of cervical spine     DJD (degenerative joint disease) of lumbar spine     SP SURGERY    GERD (gastroesophageal reflux disease)     Hyperlipidemia     Hypertension     Osteoarthritis     Osteoarthritis     Smoker        Past Surgical History:        Procedure Laterality Date    BACK SURGERY  2008    X 3    [facility]    CHOLECYSTECTOMY      COLONOSCOPY  6/2012    repeat 6/2017- no precancerous  polyps    EKG 12-LEAD  4/6/2015         HERNIA REPAIR      Umbilical    INGUINAL HERNIA REPAIR  2002    Bilateral Inguinal     LUMBAR SPINE SURGERY  2006, 2007, 2008    fusion 2008       Social History:    Social History   Substance Use Topics    Smoking status: Current Every Day Smoker     Packs/day: 0.75     Years: 38.00     Types: Cigarettes    Smokeless tobacco: Never Used    Alcohol use No      Comment: quit 2013                                Ready to quit: Not Answered  Counseling given: Not Answered      Vital Signs (Current):   Vitals:    07/31/18 1410   BP: (!) 140/87   Pulse: 61   Resp: 18   Temp: 98 °F (36.7 °C)   TempSrc: Temporal   SpO2: 94%   Weight: 209 lb 3.2 oz (94.9 kg)   Height: 6' 1\" (1.854 m)                                              BP Readings from Last 3 Encounters:   07/31/18 (!) 140/87   07/02/18 110/68   06/18/18 134/86       NPO Status: Time of last liquid consumption: 1000                        Time of last solid consumption: 2200                        Date of last liquid consumption: 07/31/18                        Date of last solid food consumption: 07/30/18    BMI:   Wt Readings from Last 3 Encounters:   07/31/18 209 lb 3.2 oz (94.9 kg)   07/02/18 214 lb (97.1 kg)   06/18/18 211 lb 6.4 oz (95.9 kg)     Body mass index is 27.6 kg/m².     CBC:   Lab Results   Component Value Date    WBC 10.1 06/26/2018    RBC 4.81 06/26/2018    HGB 15.2 06/26/2018    HCT 45.1 06/26/2018    MCV 93.8 06/26/2018    RDW 13.8 05/24/2018     06/26/2018       CMP:   Lab Results   Component Value Date     06/26/2018    K 4.0 06/26/2018     06/26/2018    CO2 22 06/26/2018    BUN 21 06/26/2018    CREATININE 1.0 06/26/2018    LABGLOM 76 06/26/2018    GLUCOSE 179 06/26/2018    GLUCOSE 141 06/20/2016    PROT 7.8 12/30/2013    PROT 7.7 09/24/2013    CALCIUM 9.9 06/26/2018    BILITOT 0.4

## 2018-07-31 NOTE — ANESTHESIA POSTPROCEDURE EVALUATION
Department of Anesthesiology  Postprocedure Note    Patient: Mitesh Goodman  MRN: [id number]  YOB: 1955  Date of evaluation: 7/31/2018  Time:  7:33 PM     Procedure Summary     Date:  07/31/18 Room / Location:  [address] [place]    Anesthesia Start:  1629 Anesthesia Stop:  9439    Procedure:  LEFT HYDROCELECTOMY (Left Scrotum) Diagnosis:  (LEFT HYDROCELE)    Surgeon:  Naomy Perez MD Responsible Provider:  Lakeshia Maldonado DO    Anesthesia Type:  general ASA Status:  4          Anesthesia Type: general    Bhaskar Phase I: Bhaskar Score: 9    Bhaskar Phase II: Bhaskar Score: 10    Last vitals: Reviewed and per EMR flowsheets.        Anesthesia Post Evaluation    Patient location during evaluation: bedside  Patient participation: complete - patient participated  Level of consciousness: awake and alert  Pain score: 3  Airway patency: patent  Nausea & Vomiting: no nausea and no vomiting  Complications: no  Cardiovascular status: hemodynamically stable and blood pressure returned to baseline  Respiratory status: spontaneous ventilation and acceptable  Hydration status: stable

## 2018-08-02 NOTE — INTERVAL H&P NOTE
[address] [address]  History and Physical Update    Pt Name: Ksenia Sesay  MRN: [id number]  YOB: 1955  Date of evaluation: 8/2/2018    [] I have examined the patient and reviewed the H&P/Consult and there are no changes to the patient or plans. [x] I have examined the patient and reviewed the H&P/Consult and have noted the following changes: No changes per patient.         Carly Bridges MD  Electronically signed 8/2/2018 at 3:26 PM

## 2018-08-08 ENCOUNTER — TELEPHONE (OUTPATIENT)
Dept: UROLOGY | Age: 63
End: 2018-08-08

## 2018-08-13 ENCOUNTER — OFFICE VISIT (OUTPATIENT)
Dept: UROLOGY | Age: 63
End: 2018-08-13
Payer: MEDICARE

## 2018-08-13 VITALS
WEIGHT: 213 LBS | DIASTOLIC BLOOD PRESSURE: 76 MMHG | BODY MASS INDEX: 28.23 KG/M2 | HEIGHT: 73 IN | SYSTOLIC BLOOD PRESSURE: 122 MMHG

## 2018-08-13 DIAGNOSIS — N43.3 HYDROCELE IN ADULT: Primary | ICD-10-CM

## 2018-08-13 LAB
BILIRUBIN URINE: ABNORMAL
BLOOD URINE, POC: NEGATIVE
CHARACTER, URINE: CLEAR
COLOR, URINE: YELLOW
GLUCOSE URINE: NEGATIVE MG/DL
KETONES, URINE: NEGATIVE
LEUKOCYTE CLUMPS, URINE: NEGATIVE
NITRITE, URINE: NEGATIVE
PH, URINE: 7
PROTEIN, URINE: ABNORMAL MG/DL
SPECIFIC GRAVITY, URINE: 1.02 (ref 1–1.03)
UROBILINOGEN, URINE: 2 EU/DL

## 2018-08-13 PROCEDURE — 81003 URINALYSIS AUTO W/O SCOPE: CPT | Performed by: NURSE PRACTITIONER

## 2018-08-13 PROCEDURE — 99024 POSTOP FOLLOW-UP VISIT: CPT | Performed by: NURSE PRACTITIONER

## 2018-08-13 NOTE — PROGRESS NOTES
is negative. Objective:     PE:   Vitals:    08/13/18 0902   BP: 122/76   Weight: 213 lb (96.6 kg)   Height: 6' 1\" (1.854 m)       Constitutional: Alert and oriented times 3, no acute distress and cooperative to examination with appropriate mood and affect. HENT:   Head:        Normocephalic and atraumatic. Mouth/Throat:         Mucous membranes are normal.   Eyes:         EOM are normal. No scleral icterus. PERRLA. Neck:        Supple, symmetrical, trachea midline  Pulmonary/Chest:      Chest symmetric with normal A/P diameter. Normal respiratory rate and rhthym. No use of accessory muscles. Abdominal:         Soft. No tenderness. Bowel sounds present. Musculoskeletal:         Normal range of motion. No edema or tenderness of lower extremities. Extremities: No cyanosis, clubbing, or edema present. Neurological:        Alert and oriented. No cranial nerve deficit. Michelle Gomez Psychiatric:        Normal mood and affect. Genitalia: Uncircumcised penis with urethral meatus in normal location, foreskin easily retracts over the glans, scrotal exam reveals a moderate size left hydrocele, no tenderness to palpation.  epididymal cyst palpated on the right    Labs   Urine dip demonstrates   Results for POC orders placed in visit on 08/13/18   POCT Urinalysis No Micro (Auto)   Result Value Ref Range    Glucose, Ur Negative NEGATIVE mg/dl    Bilirubin Urine Large (A)     Ketones, Urine Negative NEGATIVE    Specific Gravity, Urine 1.025 1.002 - 1.03    Blood, UA POC Negative NEGATIVE    pH, Urine 7.00 5.0 - 9.0    Protein, Urine Trace (A) NEGATIVE mg/dl    Urobilinogen, Urine 2.00 0.0 - 1.0 eu/dl    Nitrite, Urine Negative NEGATIVE    Leukocyte Clumps, Urine Negative NEGATIVE    Color, Urine Yellow YELLOW-STR    Character, Urine Clear [id number]       Patients recent PSA values are as follows  Lab Results   Component Value Date    PSA 0.40 09/25/2017        Recent BUN/Creatinine:  Lab Results   Component Value Date

## 2018-10-02 DIAGNOSIS — Z72.0 TOBACCO ABUSE: ICD-10-CM

## 2018-10-02 DIAGNOSIS — I10 ESSENTIAL HYPERTENSION: ICD-10-CM

## 2018-10-02 DIAGNOSIS — K21.9 GASTROESOPHAGEAL REFLUX DISEASE WITHOUT ESOPHAGITIS: ICD-10-CM

## 2018-10-02 DIAGNOSIS — G47.00 INSOMNIA, UNSPECIFIED TYPE: ICD-10-CM

## 2018-10-02 RX ORDER — BUPROPION HYDROCHLORIDE 150 MG/1
150 TABLET ORAL EVERY MORNING
Qty: 90 TABLET | Refills: 0 | Status: SHIPPED | OUTPATIENT
Start: 2018-10-02 | End: 2018-12-30 | Stop reason: SDUPTHER

## 2018-10-02 RX ORDER — QUETIAPINE FUMARATE 100 MG/1
TABLET, FILM COATED ORAL
Qty: 180 TABLET | Refills: 0 | Status: SHIPPED | OUTPATIENT
Start: 2018-10-02 | End: 2018-10-09 | Stop reason: SDUPTHER

## 2018-10-02 RX ORDER — PANTOPRAZOLE SODIUM 40 MG/1
TABLET, DELAYED RELEASE ORAL
Qty: 90 TABLET | Refills: 0 | Status: SHIPPED | OUTPATIENT
Start: 2018-10-02 | End: 2018-10-09 | Stop reason: SDUPTHER

## 2018-10-02 RX ORDER — LISINOPRIL 2.5 MG/1
2.5 TABLET ORAL DAILY
Qty: 90 TABLET | Refills: 0 | Status: SHIPPED | OUTPATIENT
Start: 2018-10-02 | End: 2018-10-09 | Stop reason: SDUPTHER

## 2018-10-09 ENCOUNTER — TELEPHONE (OUTPATIENT)
Dept: FAMILY MEDICINE CLINIC | Age: 63
End: 2018-10-09

## 2018-10-09 ENCOUNTER — OFFICE VISIT (OUTPATIENT)
Dept: FAMILY MEDICINE CLINIC | Age: 63
End: 2018-10-09
Payer: MEDICARE

## 2018-10-09 ENCOUNTER — NURSE ONLY (OUTPATIENT)
Dept: LAB | Age: 63
End: 2018-10-09

## 2018-10-09 VITALS
DIASTOLIC BLOOD PRESSURE: 70 MMHG | SYSTOLIC BLOOD PRESSURE: 138 MMHG | HEIGHT: 73 IN | HEART RATE: 85 BPM | RESPIRATION RATE: 16 BRPM | WEIGHT: 217 LBS | BODY MASS INDEX: 28.76 KG/M2 | TEMPERATURE: 98.1 F

## 2018-10-09 DIAGNOSIS — R22.1 LOCALIZED SWELLING, MASS OR LUMP OF NECK: ICD-10-CM

## 2018-10-09 DIAGNOSIS — Z23 NEEDS FLU SHOT: ICD-10-CM

## 2018-10-09 DIAGNOSIS — E78.01 FAMILIAL HYPERCHOLESTEROLEMIA: ICD-10-CM

## 2018-10-09 DIAGNOSIS — M62.838 MUSCLE SPASM: ICD-10-CM

## 2018-10-09 DIAGNOSIS — G47.00 INSOMNIA, UNSPECIFIED TYPE: ICD-10-CM

## 2018-10-09 DIAGNOSIS — J42 CHRONIC BRONCHITIS, UNSPECIFIED CHRONIC BRONCHITIS TYPE (HCC): ICD-10-CM

## 2018-10-09 DIAGNOSIS — E08.21 DIABETES MELLITUS DUE TO UNDERLYING CONDITION WITH DIABETIC NEPHROPATHY, WITHOUT LONG-TERM CURRENT USE OF INSULIN (HCC): ICD-10-CM

## 2018-10-09 DIAGNOSIS — I10 ESSENTIAL HYPERTENSION: ICD-10-CM

## 2018-10-09 DIAGNOSIS — E11.9 TYPE 2 DIABETES MELLITUS WITHOUT COMPLICATION, WITHOUT LONG-TERM CURRENT USE OF INSULIN (HCC): Primary | ICD-10-CM

## 2018-10-09 DIAGNOSIS — J30.1 ALLERGIC RHINITIS DUE TO POLLEN, UNSPECIFIED SEASONALITY: ICD-10-CM

## 2018-10-09 DIAGNOSIS — Z72.0 TOBACCO ABUSE: ICD-10-CM

## 2018-10-09 DIAGNOSIS — K21.9 GASTROESOPHAGEAL REFLUX DISEASE WITHOUT ESOPHAGITIS: ICD-10-CM

## 2018-10-09 LAB
T4 FREE: 1.2 NG/DL (ref 0.93–1.76)
TSH SERPL DL<=0.05 MIU/L-ACNC: 2.44 UIU/ML (ref 0.4–4.2)

## 2018-10-09 PROCEDURE — 90686 IIV4 VACC NO PRSV 0.5 ML IM: CPT | Performed by: NURSE PRACTITIONER

## 2018-10-09 PROCEDURE — 99215 OFFICE O/P EST HI 40 MIN: CPT | Performed by: NURSE PRACTITIONER

## 2018-10-09 PROCEDURE — G0008 ADMIN INFLUENZA VIRUS VAC: HCPCS | Performed by: NURSE PRACTITIONER

## 2018-10-09 RX ORDER — QUETIAPINE FUMARATE 100 MG/1
TABLET, FILM COATED ORAL
Qty: 180 TABLET | Refills: 1 | Status: SHIPPED | OUTPATIENT
Start: 2018-10-09 | End: 2019-04-10 | Stop reason: SDUPTHER

## 2018-10-09 RX ORDER — AMITRIPTYLINE HYDROCHLORIDE 10 MG/1
10 TABLET, FILM COATED ORAL NIGHTLY
Qty: 90 TABLET | Refills: 1 | Status: SHIPPED | OUTPATIENT
Start: 2018-10-09 | End: 2019-04-10 | Stop reason: SDUPTHER

## 2018-10-09 RX ORDER — LISINOPRIL 2.5 MG/1
2.5 TABLET ORAL DAILY
Qty: 90 TABLET | Refills: 1 | Status: SHIPPED | OUTPATIENT
Start: 2018-10-09 | End: 2019-04-10 | Stop reason: SDUPTHER

## 2018-10-09 RX ORDER — ALBUTEROL SULFATE 90 UG/1
AEROSOL, METERED RESPIRATORY (INHALATION)
Qty: 1 INHALER | Refills: 3 | Status: SHIPPED | OUTPATIENT
Start: 2018-10-09 | End: 2019-10-10 | Stop reason: SDUPTHER

## 2018-10-09 RX ORDER — QUETIAPINE FUMARATE 200 MG/1
TABLET, FILM COATED ORAL
Qty: 90 TABLET | Refills: 1 | Status: SHIPPED | OUTPATIENT
Start: 2018-10-09 | End: 2019-03-30 | Stop reason: SDUPTHER

## 2018-10-09 RX ORDER — PANTOPRAZOLE SODIUM 40 MG/1
TABLET, DELAYED RELEASE ORAL
Qty: 90 TABLET | Refills: 1 | Status: SHIPPED | OUTPATIENT
Start: 2018-10-09 | End: 2019-04-10 | Stop reason: SDUPTHER

## 2018-10-09 RX ORDER — TIZANIDINE 4 MG/1
TABLET ORAL
Qty: 360 TABLET | Refills: 2 | Status: SHIPPED | OUTPATIENT
Start: 2018-10-09 | End: 2019-04-10 | Stop reason: ALTCHOICE

## 2018-10-09 RX ORDER — BUPROPION HYDROCHLORIDE 150 MG/1
150 TABLET ORAL EVERY MORNING
Qty: 90 TABLET | Refills: 0 | Status: CANCELLED | OUTPATIENT
Start: 2018-10-09

## 2018-10-09 RX ORDER — ATORVASTATIN CALCIUM 40 MG/1
TABLET, FILM COATED ORAL
Qty: 90 TABLET | Refills: 3 | Status: SHIPPED | OUTPATIENT
Start: 2018-10-09 | End: 2019-04-10 | Stop reason: SDUPTHER

## 2018-10-09 RX ORDER — TIZANIDINE 4 MG/1
4 TABLET ORAL EVERY 6 HOURS PRN
Qty: 40 TABLET | Refills: 2 | Status: SHIPPED | OUTPATIENT
Start: 2018-10-09 | End: 2018-10-09 | Stop reason: SDUPTHER

## 2018-10-09 RX ORDER — AZELASTINE 1 MG/ML
SPRAY, METERED NASAL
Qty: 3 BOTTLE | Refills: 1 | Status: SHIPPED | OUTPATIENT
Start: 2018-10-09 | End: 2019-10-10 | Stop reason: SDUPTHER

## 2018-10-09 RX ORDER — ETODOLAC 400 MG/1
400 TABLET, EXTENDED RELEASE ORAL DAILY
Qty: 90 TABLET | Refills: 1 | Status: SHIPPED | OUTPATIENT
Start: 2018-10-09 | End: 2019-04-03 | Stop reason: SDUPTHER

## 2018-10-09 NOTE — PROGRESS NOTES
Immunization(s) given during visit:    Immunizations     Name Date Dose Route    Influenza, Ashley Gouty, 3 yrs and older, IM, PF (Fluzone 3 yrs and older or Afluria 5 yrs and older) 10/9/2018 0.5 mL Intramuscular    Site: Deltoid- Left    Lot: [id number]    NDC: 54003-620-44          Most recent Vaccine Information Sheet dated 8/7/15 given to pt
4/9/2019) for A1C and med refill.

## 2018-10-11 ENCOUNTER — HOSPITAL ENCOUNTER (OUTPATIENT)
Dept: ULTRASOUND IMAGING | Age: 63
Discharge: HOME | End: 2018-10-11
Payer: MEDICARE

## 2018-10-11 DIAGNOSIS — R22.1 LOCALIZED SWELLING, MASS OR LUMP OF NECK: ICD-10-CM

## 2018-10-11 PROCEDURE — 76536 US EXAM OF HEAD AND NECK: CPT

## 2018-10-12 ENCOUNTER — TELEPHONE (OUTPATIENT)
Dept: FAMILY MEDICINE CLINIC | Age: 63
End: 2018-10-12

## 2018-10-12 DIAGNOSIS — E04.1 THYROID CYST: Primary | ICD-10-CM

## 2018-10-12 NOTE — TELEPHONE ENCOUNTER
----- Message from GERARDO Alexis CNP sent at 10/12/2018 10:09 AM EDT -----  Please let pt know his thyroid u/s  Did not identify any abnormalities at the area of the right neck lump. It did identify a small left thyroid lobe cyst. It is not suspicious for malignancy and according to criteria should be re u/s in 12-24 months. I will place the future order and please place it in the recall file folder.  thanks

## 2018-12-01 DIAGNOSIS — E11.9 TYPE 2 DIABETES MELLITUS WITHOUT COMPLICATION, WITHOUT LONG-TERM CURRENT USE OF INSULIN (HCC): ICD-10-CM

## 2018-12-05 ENCOUNTER — TELEPHONE (OUTPATIENT)
Dept: FAMILY MEDICINE CLINIC | Age: 63
End: 2018-12-05

## 2018-12-05 NOTE — TELEPHONE ENCOUNTER
Pharmacy called state they have  2 orders for Glucophage  Verifying which dose is correct ?   500 mg  Or 1000 mg

## 2018-12-30 DIAGNOSIS — K21.9 GASTROESOPHAGEAL REFLUX DISEASE WITHOUT ESOPHAGITIS: ICD-10-CM

## 2018-12-30 DIAGNOSIS — E78.01 FAMILIAL HYPERCHOLESTEROLEMIA: ICD-10-CM

## 2018-12-30 DIAGNOSIS — G47.00 INSOMNIA, UNSPECIFIED TYPE: ICD-10-CM

## 2018-12-30 DIAGNOSIS — Z72.0 TOBACCO ABUSE: ICD-10-CM

## 2018-12-30 DIAGNOSIS — E11.9 TYPE 2 DIABETES MELLITUS WITHOUT COMPLICATION, WITHOUT LONG-TERM CURRENT USE OF INSULIN (HCC): ICD-10-CM

## 2018-12-30 DIAGNOSIS — I10 ESSENTIAL HYPERTENSION: ICD-10-CM

## 2018-12-31 DIAGNOSIS — I10 ESSENTIAL HYPERTENSION: ICD-10-CM

## 2018-12-31 RX ORDER — ATORVASTATIN CALCIUM 40 MG/1
TABLET, FILM COATED ORAL
Qty: 90 TABLET | Refills: 0 | Status: SHIPPED | OUTPATIENT
Start: 2018-12-31 | End: 2019-10-10 | Stop reason: SDUPTHER

## 2018-12-31 RX ORDER — BUPROPION HYDROCHLORIDE 150 MG/1
150 TABLET ORAL EVERY MORNING
Qty: 90 TABLET | Refills: 0 | Status: SHIPPED | OUTPATIENT
Start: 2018-12-31 | End: 2019-04-10 | Stop reason: SDUPTHER

## 2018-12-31 RX ORDER — QUETIAPINE FUMARATE 100 MG/1
TABLET, FILM COATED ORAL
Qty: 180 TABLET | Refills: 0 | Status: SHIPPED | OUTPATIENT
Start: 2018-12-31 | End: 2019-04-10 | Stop reason: ALTCHOICE

## 2018-12-31 RX ORDER — LISINOPRIL 2.5 MG/1
2.5 TABLET ORAL DAILY
Qty: 90 TABLET | Refills: 0 | Status: SHIPPED | OUTPATIENT
Start: 2018-12-31 | End: 2019-04-10 | Stop reason: SDUPTHER

## 2018-12-31 RX ORDER — PANTOPRAZOLE SODIUM 40 MG/1
TABLET, DELAYED RELEASE ORAL
Qty: 90 TABLET | Refills: 0 | Status: SHIPPED | OUTPATIENT
Start: 2018-12-31 | End: 2019-04-10 | Stop reason: SDUPTHER

## 2019-03-09 ENCOUNTER — TELEPHONE (OUTPATIENT)
Dept: FAMILY MEDICINE CLINIC | Age: 64
End: 2019-03-09

## 2019-03-30 DIAGNOSIS — Z72.0 TOBACCO ABUSE: ICD-10-CM

## 2019-03-30 DIAGNOSIS — G47.00 INSOMNIA, UNSPECIFIED TYPE: ICD-10-CM

## 2019-03-30 DIAGNOSIS — J42 CHRONIC BRONCHITIS, UNSPECIFIED CHRONIC BRONCHITIS TYPE (HCC): ICD-10-CM

## 2019-04-01 RX ORDER — BUPROPION HYDROCHLORIDE 150 MG/1
150 TABLET ORAL EVERY MORNING
Qty: 90 TABLET | Refills: 0 | Status: SHIPPED | OUTPATIENT
Start: 2019-04-01 | End: 2019-04-10 | Stop reason: SDUPTHER

## 2019-04-01 RX ORDER — QUETIAPINE FUMARATE 200 MG/1
TABLET, FILM COATED ORAL
Qty: 90 TABLET | Refills: 0 | Status: SHIPPED | OUTPATIENT
Start: 2019-04-01 | End: 2019-04-10 | Stop reason: SDUPTHER

## 2019-04-03 DIAGNOSIS — E11.9 TYPE 2 DIABETES MELLITUS WITHOUT COMPLICATION, WITHOUT LONG-TERM CURRENT USE OF INSULIN (HCC): ICD-10-CM

## 2019-04-03 RX ORDER — ETODOLAC 400 MG/1
400 TABLET, EXTENDED RELEASE ORAL DAILY
Qty: 90 TABLET | Refills: 1 | Status: SHIPPED | OUTPATIENT
Start: 2019-04-03 | End: 2019-04-10 | Stop reason: SDUPTHER

## 2019-04-10 ENCOUNTER — OFFICE VISIT (OUTPATIENT)
Dept: FAMILY MEDICINE CLINIC | Age: 64
End: 2019-04-10
Payer: MEDICARE

## 2019-04-10 VITALS
RESPIRATION RATE: 18 BRPM | SYSTOLIC BLOOD PRESSURE: 134 MMHG | TEMPERATURE: 97.9 F | DIASTOLIC BLOOD PRESSURE: 64 MMHG | WEIGHT: 213 LBS | HEIGHT: 73 IN | HEART RATE: 84 BPM | BODY MASS INDEX: 28.23 KG/M2

## 2019-04-10 DIAGNOSIS — J42 CHRONIC BRONCHITIS, UNSPECIFIED CHRONIC BRONCHITIS TYPE (HCC): ICD-10-CM

## 2019-04-10 DIAGNOSIS — M19.90 ARTHRITIS: Primary | ICD-10-CM

## 2019-04-10 DIAGNOSIS — Z72.0 TOBACCO ABUSE: ICD-10-CM

## 2019-04-10 DIAGNOSIS — E78.5 HYPERLIPIDEMIA, UNSPECIFIED HYPERLIPIDEMIA TYPE: ICD-10-CM

## 2019-04-10 DIAGNOSIS — E11.9 TYPE 2 DIABETES MELLITUS WITHOUT COMPLICATION, WITHOUT LONG-TERM CURRENT USE OF INSULIN (HCC): ICD-10-CM

## 2019-04-10 DIAGNOSIS — I10 ESSENTIAL HYPERTENSION: ICD-10-CM

## 2019-04-10 DIAGNOSIS — G47.00 INSOMNIA, UNSPECIFIED TYPE: ICD-10-CM

## 2019-04-10 DIAGNOSIS — K21.9 GASTROESOPHAGEAL REFLUX DISEASE WITHOUT ESOPHAGITIS: ICD-10-CM

## 2019-04-10 LAB
CREATININE URINE POCT: NORMAL
HBA1C MFR BLD: 6.4 %
MICROALBUMIN/CREAT 24H UR: NORMAL MG/G{CREAT}
MICROALBUMIN/CREAT UR-RTO: NORMAL

## 2019-04-10 PROCEDURE — 99214 OFFICE O/P EST MOD 30 MIN: CPT | Performed by: NURSE PRACTITIONER

## 2019-04-10 PROCEDURE — 82044 UR ALBUMIN SEMIQUANTITATIVE: CPT | Performed by: NURSE PRACTITIONER

## 2019-04-10 PROCEDURE — 83036 HEMOGLOBIN GLYCOSYLATED A1C: CPT | Performed by: NURSE PRACTITIONER

## 2019-04-10 RX ORDER — BUPROPION HYDROCHLORIDE 150 MG/1
150 TABLET ORAL EVERY MORNING
Qty: 90 TABLET | Refills: 3 | Status: SHIPPED | OUTPATIENT
Start: 2019-04-10 | End: 2020-05-14 | Stop reason: SDUPTHER

## 2019-04-10 RX ORDER — QUETIAPINE FUMARATE 200 MG/1
TABLET, FILM COATED ORAL
Qty: 90 TABLET | Refills: 3 | Status: SHIPPED | OUTPATIENT
Start: 2019-04-10 | End: 2020-05-14 | Stop reason: SDUPTHER

## 2019-04-10 RX ORDER — ASPIRIN 81 MG/1
TABLET, CHEWABLE ORAL
Qty: 90 TABLET | Refills: 3 | Status: SHIPPED | OUTPATIENT
Start: 2019-04-10 | End: 2021-11-29 | Stop reason: SDUPTHER

## 2019-04-10 RX ORDER — ETODOLAC 400 MG/1
400 TABLET, EXTENDED RELEASE ORAL DAILY
Qty: 90 TABLET | Refills: 3 | Status: SHIPPED | OUTPATIENT
Start: 2019-04-10 | End: 2020-05-14

## 2019-04-10 RX ORDER — PANTOPRAZOLE SODIUM 40 MG/1
TABLET, DELAYED RELEASE ORAL
Qty: 90 TABLET | Refills: 3 | Status: SHIPPED | OUTPATIENT
Start: 2019-04-10 | End: 2020-05-14 | Stop reason: SDUPTHER

## 2019-04-10 RX ORDER — AMITRIPTYLINE HYDROCHLORIDE 25 MG/1
25 TABLET, FILM COATED ORAL NIGHTLY
Qty: 90 TABLET | Refills: 3 | Status: SHIPPED | OUTPATIENT
Start: 2019-04-10 | End: 2019-10-10 | Stop reason: SDUPTHER

## 2019-04-10 RX ORDER — SYRING-NEEDL,DISP,INSUL,0.3 ML 30 GX5/16"
1 SYRINGE, EMPTY DISPOSABLE MISCELLANEOUS ONCE
Qty: 1 DEVICE | Refills: 0 | Status: SHIPPED | OUTPATIENT
Start: 2019-04-10 | End: 2021-11-29

## 2019-04-10 RX ORDER — LISINOPRIL 2.5 MG/1
2.5 TABLET ORAL DAILY
Qty: 90 TABLET | Refills: 3 | Status: SHIPPED | OUTPATIENT
Start: 2019-04-10 | End: 2020-05-14 | Stop reason: SDUPTHER

## 2019-04-10 RX ORDER — LANCETS 30 GAUGE
1 EACH MISCELLANEOUS 2 TIMES DAILY
Qty: 100 EACH | Refills: 3 | Status: SHIPPED | OUTPATIENT
Start: 2019-04-10

## 2019-04-10 NOTE — PROGRESS NOTES
Ramona Merino is a 59 y.o. male for 6 Month Follow-Up (medication refill and discuss increasing Elavil)      Diabetes Type 2    Glucose control:   Does patient check blood glucoses at home? No  Report of hypoglycemia: no  Lab Results   Component Value Date    LABA1C 6.4 04/10/2019     No results found for: EAG    Symptoms  Polyuria, Polydipsia or Polyphagia? No  Chest Pain, SOB, or Palpitations? -  No  New Vision complaints? No  Paresthesias of the extremities? No    Medications  Current medication were reviewed. Compliant with medications? yes  Medication side effects? No  On ACE-I or ARB? Yes  On antiplatelet therapy? Yes  On Statin? Yes    Last Diabetic Eye Exam: 8/2018    Exercise  Exercise? No  Wt Readings from Last 3 Encounters:   04/10/19 213 lb (96.6 kg)   10/09/18 217 lb (98.4 kg)   08/13/18 213 lb (96.6 kg)       Diet discipline?:  Low salt, fat, sugar diet? Yes    He complains of arthritis in his bilateral index fingers, states his middle joint will hurt and ache, sometimes his fingers will feel stiff. Denies redness or swelling, he does take lodine for this and it does help. Insomnia    HPI:  Symptoms have been present for several year(s). Symptoms - frequent night time awakening and difficulty falling asleep    Patient sleeps in bed. Average Time going to Bed - 11pm  Average Time to Sleep - 6 hours  Average Wake Time - 5 am  Early morning wakenings? Yes  Naps during the day? No  Does patient read, watch TV or use computer in bed? No  Do other people sleep in the bed? No  Are there pets in the room? No  Caffeine, Alcohol or Nicotine use? Yes  Does patient exercise? No    Snoring? No  Apnea symptoms? No  Daytime sleepiness? No He has been taking seroquel 200 mg at night and elavil 10 mg at night for this. Would like to increase his elavil dose.      He continues to use his spiriva for his copd symptoms, he does continue to smoke is taking wellbutrin to help decrease the THE CONTENTS OF 1 CAPSULE INTO THE LUNGS EVERY DAY    Tobacco abuse  -     buPROPion (WELLBUTRIN XL) 150 MG extended release tablet; Take 1 tablet by mouth every morning    Type 2 diabetes mellitus without complication, without long-term current use of insulin (HCC)  -     SITagliptin (JANUVIA) 50 MG tablet; Take 1 tablet by mouth daily  -     POCT glycosylated hemoglobin (Hb A1C)  -     Cancel: POCT Urinalysis No Micro (Auto)  -     aspirin 81 MG chewable tablet; TAKE 1 TABLET BY MOUTH DAILY  -     Lancet Device MISC; 1 Device by Does not apply route once for 1 dose  -     Lancets MISC; 1 each by Does not apply route 2 times daily  -     POCT microalbumin  Follow diabetic diet  Essential hypertension  -     lisinopril (PRINIVIL;ZESTRIL) 2.5 MG tablet; Take 1 tablet by mouth daily  -     Lipid Panel; Future  -     Basic Metabolic Panel; Future    Gastroesophageal reflux disease without esophagitis  -     pantoprazole (PROTONIX) 40 MG tablet; TAKE 1 TABLET BY MOUTH DAILY  gerd diet  Hyperlipidemia, unspecified hyperlipidemia type  -     Lipid Panel; Future  -     Basic Metabolic Panel; Future    Low fat low cholesterol diet    Return in about 6 months (around 10/10/2019) for check up and med refill.

## 2019-05-23 ENCOUNTER — NURSE ONLY (OUTPATIENT)
Dept: LAB | Age: 64
End: 2019-05-23

## 2019-05-23 DIAGNOSIS — I10 ESSENTIAL HYPERTENSION: ICD-10-CM

## 2019-05-23 DIAGNOSIS — E78.5 HYPERLIPIDEMIA, UNSPECIFIED HYPERLIPIDEMIA TYPE: ICD-10-CM

## 2019-05-23 LAB
ANION GAP SERPL CALCULATED.3IONS-SCNC: 14 MEQ/L (ref 8–16)
BUN BLDV-MCNC: 10 MG/DL (ref 7–22)
CALCIUM SERPL-MCNC: 10.2 MG/DL (ref 8.5–10.5)
CHLORIDE BLD-SCNC: 103 MEQ/L (ref 98–111)
CHOLESTEROL, TOTAL: 148 MG/DL (ref 100–199)
CO2: 26 MEQ/L (ref 23–33)
CREAT SERPL-MCNC: 1 MG/DL (ref 0.4–1.2)
GFR SERPL CREATININE-BSD FRML MDRD: 75 ML/MIN/1.73M2
GLUCOSE BLD-MCNC: 123 MG/DL (ref 70–108)
HDLC SERPL-MCNC: 34 MG/DL
LDL CHOLESTEROL CALCULATED: 80 MG/DL
POTASSIUM SERPL-SCNC: 4.4 MEQ/L (ref 3.5–5.2)
SODIUM BLD-SCNC: 143 MEQ/L (ref 135–145)
TRIGL SERPL-MCNC: 170 MG/DL (ref 0–199)

## 2019-05-24 ENCOUNTER — TELEPHONE (OUTPATIENT)
Dept: FAMILY MEDICINE CLINIC | Age: 64
End: 2019-05-24

## 2019-05-24 NOTE — TELEPHONE ENCOUNTER
----- Message from GERARDO Moreno CNP sent at 5/23/2019  5:44 PM EDT -----  Please let pt know his labs are stable and to continue his current meds

## 2019-09-14 DIAGNOSIS — E11.9 TYPE 2 DIABETES MELLITUS WITHOUT COMPLICATION, WITHOUT LONG-TERM CURRENT USE OF INSULIN (HCC): ICD-10-CM

## 2019-09-17 ENCOUNTER — TELEPHONE (OUTPATIENT)
Dept: FAMILY MEDICINE CLINIC | Age: 64
End: 2019-09-17

## 2019-09-17 NOTE — TELEPHONE ENCOUNTER
Spoke with pt concerning schedule US   Future Appointments   Date Time Provider Ezra Macdonald   10/10/2019  8:40 AM Maria Alejandra Morel, APRN - [address] [address] [facility] [address] [facility] [place] [address]   10/11/2019 10:00 AM STR ULTRASOUND RM 2 STRZ US STR Radiolog       Denied any questions at this time

## 2019-10-10 ENCOUNTER — OFFICE VISIT (OUTPATIENT)
Dept: FAMILY MEDICINE CLINIC | Age: 64
End: 2019-10-10
Payer: MEDICARE

## 2019-10-10 VITALS
TEMPERATURE: 98.4 F | SYSTOLIC BLOOD PRESSURE: 114 MMHG | HEIGHT: 72 IN | WEIGHT: 211 LBS | BODY MASS INDEX: 28.58 KG/M2 | RESPIRATION RATE: 12 BRPM | DIASTOLIC BLOOD PRESSURE: 68 MMHG | HEART RATE: 88 BPM

## 2019-10-10 DIAGNOSIS — J30.1 ALLERGIC RHINITIS DUE TO POLLEN, UNSPECIFIED SEASONALITY: ICD-10-CM

## 2019-10-10 DIAGNOSIS — Z23 NEEDS FLU SHOT: ICD-10-CM

## 2019-10-10 DIAGNOSIS — J42 CHRONIC BRONCHITIS, UNSPECIFIED CHRONIC BRONCHITIS TYPE (HCC): ICD-10-CM

## 2019-10-10 DIAGNOSIS — G47.00 INSOMNIA, UNSPECIFIED TYPE: ICD-10-CM

## 2019-10-10 DIAGNOSIS — E78.01 FAMILIAL HYPERCHOLESTEROLEMIA: ICD-10-CM

## 2019-10-10 DIAGNOSIS — Z72.0 TOBACCO ABUSE: ICD-10-CM

## 2019-10-10 DIAGNOSIS — E11.9 TYPE 2 DIABETES MELLITUS WITHOUT COMPLICATION, WITHOUT LONG-TERM CURRENT USE OF INSULIN (HCC): Primary | ICD-10-CM

## 2019-10-10 PROCEDURE — G0008 ADMIN INFLUENZA VIRUS VAC: HCPCS | Performed by: NURSE PRACTITIONER

## 2019-10-10 PROCEDURE — 99214 OFFICE O/P EST MOD 30 MIN: CPT | Performed by: NURSE PRACTITIONER

## 2019-10-10 PROCEDURE — 90686 IIV4 VACC NO PRSV 0.5 ML IM: CPT | Performed by: NURSE PRACTITIONER

## 2019-10-10 RX ORDER — ALBUTEROL SULFATE 90 UG/1
AEROSOL, METERED RESPIRATORY (INHALATION)
Qty: 1 INHALER | Refills: 3 | Status: SHIPPED | OUTPATIENT
Start: 2019-10-10 | End: 2020-05-14 | Stop reason: SDUPTHER

## 2019-10-10 RX ORDER — AZELASTINE 1 MG/ML
SPRAY, METERED NASAL
Qty: 3 BOTTLE | Refills: 1 | Status: SHIPPED | OUTPATIENT
Start: 2019-10-10 | End: 2020-05-14 | Stop reason: SDUPTHER

## 2019-10-10 RX ORDER — AMITRIPTYLINE HYDROCHLORIDE 50 MG/1
25 TABLET, FILM COATED ORAL NIGHTLY
Qty: 90 TABLET | Refills: 1 | Status: SHIPPED | OUTPATIENT
Start: 2019-10-10 | End: 2019-12-17 | Stop reason: SDUPTHER

## 2019-10-10 RX ORDER — ATORVASTATIN CALCIUM 40 MG/1
TABLET, FILM COATED ORAL
Qty: 90 TABLET | Refills: 1 | Status: SHIPPED | OUTPATIENT
Start: 2019-10-10 | End: 2019-12-13 | Stop reason: SDUPTHER

## 2019-10-10 ASSESSMENT — ENCOUNTER SYMPTOMS
CHOKING: 0
COUGH: 1
GASTROINTESTINAL NEGATIVE: 1
SINUS PAIN: 0
WHEEZING: 0
SHORTNESS OF BREATH: 0
RHINORRHEA: 0

## 2019-10-11 ENCOUNTER — HOSPITAL ENCOUNTER (OUTPATIENT)
Dept: ULTRASOUND IMAGING | Age: 64
Discharge: HOME | End: 2019-10-11
Payer: MEDICARE

## 2019-10-11 DIAGNOSIS — E04.1 THYROID CYST: ICD-10-CM

## 2019-10-11 PROCEDURE — 76536 US EXAM OF HEAD AND NECK: CPT

## 2019-10-14 ENCOUNTER — TELEPHONE (OUTPATIENT)
Dept: FAMILY MEDICINE CLINIC | Age: 64
End: 2019-10-14

## 2019-12-13 DIAGNOSIS — E78.01 FAMILIAL HYPERCHOLESTEROLEMIA: ICD-10-CM

## 2019-12-13 DIAGNOSIS — I10 ESSENTIAL HYPERTENSION: ICD-10-CM

## 2019-12-13 DIAGNOSIS — E11.9 TYPE 2 DIABETES MELLITUS WITHOUT COMPLICATION, WITHOUT LONG-TERM CURRENT USE OF INSULIN (HCC): ICD-10-CM

## 2019-12-13 RX ORDER — ATORVASTATIN CALCIUM 40 MG/1
TABLET, FILM COATED ORAL
Qty: 90 TABLET | Refills: 3 | Status: SHIPPED | OUTPATIENT
Start: 2019-12-13 | End: 2020-11-09 | Stop reason: SDUPTHER

## 2019-12-17 ENCOUNTER — OFFICE VISIT (OUTPATIENT)
Dept: FAMILY MEDICINE CLINIC | Age: 64
End: 2019-12-17
Payer: MEDICARE

## 2019-12-17 VITALS
RESPIRATION RATE: 14 BRPM | HEIGHT: 72 IN | HEART RATE: 77 BPM | WEIGHT: 218.4 LBS | DIASTOLIC BLOOD PRESSURE: 66 MMHG | TEMPERATURE: 97.7 F | BODY MASS INDEX: 29.58 KG/M2 | SYSTOLIC BLOOD PRESSURE: 129 MMHG

## 2019-12-17 DIAGNOSIS — Z00.00 ROUTINE GENERAL MEDICAL EXAMINATION AT A HEALTH CARE FACILITY: ICD-10-CM

## 2019-12-17 DIAGNOSIS — I10 ESSENTIAL HYPERTENSION: ICD-10-CM

## 2019-12-17 DIAGNOSIS — G47.00 INSOMNIA, UNSPECIFIED TYPE: ICD-10-CM

## 2019-12-17 DIAGNOSIS — Z87.891 PERSONAL HISTORY OF TOBACCO USE, PRESENTING HAZARDS TO HEALTH: ICD-10-CM

## 2019-12-17 PROCEDURE — G0439 PPPS, SUBSEQ VISIT: HCPCS | Performed by: NURSE PRACTITIONER

## 2019-12-17 RX ORDER — AMITRIPTYLINE HYDROCHLORIDE 50 MG/1
50 TABLET, FILM COATED ORAL NIGHTLY
Qty: 90 TABLET | Refills: 3 | Status: SHIPPED | OUTPATIENT
Start: 2019-12-17 | End: 2020-11-09

## 2019-12-17 ASSESSMENT — PATIENT HEALTH QUESTIONNAIRE - PHQ9: SUM OF ALL RESPONSES TO PHQ QUESTIONS 1-9: 7

## 2019-12-17 ASSESSMENT — LIFESTYLE VARIABLES: HOW OFTEN DO YOU HAVE A DRINK CONTAINING ALCOHOL: 0

## 2020-05-05 ENCOUNTER — TELEPHONE (OUTPATIENT)
Dept: FAMILY MEDICINE CLINIC | Age: 65
End: 2020-05-05

## 2020-05-05 NOTE — TELEPHONE ENCOUNTER
PATIENT'S HIPAA IS  - LEFT GENERAL CALL BACK MESSAGE!     2020 LCBM - need for patient to RS 2020 appt. If patient is willing to do virtual visit, please set up this way!  If not willing to do VV, can RS on any of the following dates as OV:     2020 PM only  2020 AM only     2020 PM only  2020 AM only      Or as OV anytime 2020 on.

## 2020-05-14 ENCOUNTER — OFFICE VISIT (OUTPATIENT)
Dept: FAMILY MEDICINE CLINIC | Age: 65
End: 2020-05-14
Payer: MEDICARE

## 2020-05-14 VITALS
TEMPERATURE: 98 F | SYSTOLIC BLOOD PRESSURE: 126 MMHG | DIASTOLIC BLOOD PRESSURE: 68 MMHG | HEIGHT: 73 IN | WEIGHT: 218 LBS | OXYGEN SATURATION: 95 % | RESPIRATION RATE: 14 BRPM | BODY MASS INDEX: 28.89 KG/M2 | HEART RATE: 84 BPM

## 2020-05-14 LAB
CREATININE URINE POCT: 100
HBA1C MFR BLD: 7 % (ref 4.3–5.7)
MICROALBUMIN/CREAT 24H UR: 10 MG/G{CREAT}
MICROALBUMIN/CREAT UR-RTO: NORMAL

## 2020-05-14 PROCEDURE — 82044 UR ALBUMIN SEMIQUANTITATIVE: CPT | Performed by: NURSE PRACTITIONER

## 2020-05-14 PROCEDURE — 99215 OFFICE O/P EST HI 40 MIN: CPT | Performed by: NURSE PRACTITIONER

## 2020-05-14 PROCEDURE — 83036 HEMOGLOBIN GLYCOSYLATED A1C: CPT | Performed by: NURSE PRACTITIONER

## 2020-05-14 PROCEDURE — 3051F HG A1C>EQUAL 7.0%<8.0%: CPT | Performed by: NURSE PRACTITIONER

## 2020-05-14 RX ORDER — AZELASTINE 1 MG/ML
SPRAY, METERED NASAL
Qty: 3 BOTTLE | Refills: 1 | Status: CANCELLED | OUTPATIENT
Start: 2020-05-14

## 2020-05-14 RX ORDER — TIOTROPIUM BROMIDE 18 UG/1
CAPSULE ORAL; RESPIRATORY (INHALATION)
Qty: 90 CAPSULE | Refills: 3 | Status: CANCELLED | OUTPATIENT
Start: 2020-05-14

## 2020-05-14 RX ORDER — ALBUTEROL SULFATE 90 UG/1
AEROSOL, METERED RESPIRATORY (INHALATION)
Qty: 1 INHALER | Refills: 3 | Status: CANCELLED | OUTPATIENT
Start: 2020-05-14

## 2020-05-14 RX ORDER — ALBUTEROL SULFATE 90 UG/1
AEROSOL, METERED RESPIRATORY (INHALATION)
Qty: 1 INHALER | Refills: 3 | Status: SHIPPED | OUTPATIENT
Start: 2020-05-14 | End: 2021-07-02

## 2020-05-14 RX ORDER — LISINOPRIL 2.5 MG/1
2.5 TABLET ORAL DAILY
Qty: 90 TABLET | Refills: 3 | Status: SHIPPED | OUTPATIENT
Start: 2020-05-14 | End: 2021-05-07

## 2020-05-14 RX ORDER — PANTOPRAZOLE SODIUM 40 MG/1
TABLET, DELAYED RELEASE ORAL
Qty: 90 TABLET | Refills: 3 | Status: CANCELLED | OUTPATIENT
Start: 2020-05-14

## 2020-05-14 RX ORDER — NAPROXEN 500 MG/1
500 TABLET ORAL 2 TIMES DAILY WITH MEALS
Qty: 180 TABLET | Refills: 3 | Status: SHIPPED | OUTPATIENT
Start: 2020-05-14 | End: 2020-11-19

## 2020-05-14 RX ORDER — TIOTROPIUM BROMIDE 18 UG/1
CAPSULE ORAL; RESPIRATORY (INHALATION)
Qty: 90 CAPSULE | Refills: 3 | Status: SHIPPED | OUTPATIENT
Start: 2020-05-14 | End: 2020-12-17

## 2020-05-14 RX ORDER — QUETIAPINE FUMARATE 200 MG/1
TABLET, FILM COATED ORAL
Qty: 90 TABLET | Refills: 3 | Status: SHIPPED | OUTPATIENT
Start: 2020-05-14 | End: 2021-05-07

## 2020-05-14 RX ORDER — ETODOLAC 400 MG/1
400 TABLET, EXTENDED RELEASE ORAL DAILY
Qty: 90 TABLET | Refills: 3 | Status: CANCELLED | OUTPATIENT
Start: 2020-05-14

## 2020-05-14 RX ORDER — BUPROPION HYDROCHLORIDE 150 MG/1
150 TABLET ORAL EVERY MORNING
Qty: 90 TABLET | Refills: 3 | Status: SHIPPED | OUTPATIENT
Start: 2020-05-14 | End: 2021-05-07

## 2020-05-14 RX ORDER — AZELASTINE 1 MG/ML
SPRAY, METERED NASAL
Qty: 3 BOTTLE | Refills: 1 | Status: SHIPPED | OUTPATIENT
Start: 2020-05-14 | End: 2021-11-29 | Stop reason: SDUPTHER

## 2020-05-14 RX ORDER — QUETIAPINE FUMARATE 200 MG/1
TABLET, FILM COATED ORAL
Qty: 90 TABLET | Refills: 3 | Status: CANCELLED | OUTPATIENT
Start: 2020-05-14

## 2020-05-14 RX ORDER — ATORVASTATIN CALCIUM 40 MG/1
TABLET, FILM COATED ORAL
Qty: 90 TABLET | Refills: 3 | Status: CANCELLED | OUTPATIENT
Start: 2020-05-14

## 2020-05-14 RX ORDER — AMITRIPTYLINE HYDROCHLORIDE 50 MG/1
50 TABLET, FILM COATED ORAL NIGHTLY
Qty: 90 TABLET | Refills: 3 | Status: CANCELLED | OUTPATIENT
Start: 2020-05-14

## 2020-05-14 RX ORDER — PANTOPRAZOLE SODIUM 40 MG/1
TABLET, DELAYED RELEASE ORAL
Qty: 90 TABLET | Refills: 3 | Status: SHIPPED | OUTPATIENT
Start: 2020-05-14 | End: 2021-05-07

## 2020-05-14 RX ORDER — LISINOPRIL 2.5 MG/1
2.5 TABLET ORAL DAILY
Qty: 90 TABLET | Refills: 3 | Status: CANCELLED | OUTPATIENT
Start: 2020-05-14

## 2020-05-14 ASSESSMENT — ENCOUNTER SYMPTOMS
BACK PAIN: 1
RESPIRATORY NEGATIVE: 1

## 2020-05-14 NOTE — PROGRESS NOTES
[place] [address] MEDICINE  [address] [place] [facility] [place] [address]  Dept: [phone]  Dept Fax: [phone]  Loc: [phone]    Rudolph Kessler is a 62 y.o. Dietra Salen presents today for his medical conditions/complaints as noted below. Mary Jo Stephenson c/o of Follow-up (DM EYE  - fischbaugh , no new concerns ) and Medication Refill      HPI:      Pt here to follow up on many chronic health concerns today. Diabetes Type 2    Glucose control:   Does patient check blood glucoses at home? Yes  Report of hypoglycemia: no  Lab Results       Component                Value               Date                       LABA1C                   7.0 (H)             05/14/2020            No results found for: EAG    Symptoms  Polyuria, Polydipsia or Polyphagia? No  Chest Pain, SOB, or Palpitations? -  No  New Vision complaints? No  Paresthesias of the extremities? Yes - he does have some intermittent left arm numbness and tingling to his elbow due to neck impingement. Medications  Current medication were reviewed. Compliant with medications? yes  Medication side effects? No  On ACE-I or ARB? Yes  On antiplatelet therapy? Yes  On Statin? Yes    Last Diabetic Eye Exam: 2020    Exercise  Exercise? Yes  Wt Readings from Last 3 Encounters:  05/14/20 : 218 lb (98.9 kg)  12/17/19 : 218 lb 6.4 oz (99.1 kg)  10/10/19 : 211 lb (95.7 kg)      Diet discipline?:  Low salt, fat, sugar diet? yes    Blood pressure control:  BP Readings from Last 3 Encounters:  05/14/20 : 126/68  12/17/19 : 129/66  10/10/19 : 114/68      Lab Results       Component                Value               Date                       LABMICR                  < 1.20              04/04/2017              Lab Results       Component                Value               Date                       LDLCALC                  80                  05/23/2019         His A1C has increased from 6.4 to 7.  States he has also Electronicallysigned by GERARDO Simon CNP on 5/14/2020 at 9:34 AM

## 2020-05-26 ENCOUNTER — TELEPHONE (OUTPATIENT)
Dept: FAMILY MEDICINE CLINIC | Age: 65
End: 2020-05-26

## 2020-06-12 ENCOUNTER — HOSPITAL ENCOUNTER (OUTPATIENT)
Dept: CT IMAGING | Age: 65
Discharge: HOME | End: 2020-06-12
Payer: MEDICARE

## 2020-06-12 PROCEDURE — 72128 CT CHEST SPINE W/O DYE: CPT

## 2020-06-12 PROCEDURE — 72125 CT NECK SPINE W/O DYE: CPT

## 2020-06-15 ENCOUNTER — TELEPHONE (OUTPATIENT)
Dept: FAMILY MEDICINE CLINIC | Age: 65
End: 2020-06-15

## 2020-06-15 ENCOUNTER — HOSPITAL ENCOUNTER (OUTPATIENT)
Age: 65
Discharge: HOME | End: 2020-06-15
Payer: MEDICARE

## 2020-06-15 LAB
ALBUMIN SERPL-MCNC: 4.2 G/DL (ref 3.5–5.1)
ALP BLD-CCNC: 90 U/L (ref 38–126)
ALT SERPL-CCNC: 33 U/L (ref 11–66)
ANION GAP SERPL CALCULATED.3IONS-SCNC: 9 MEQ/L (ref 8–16)
AST SERPL-CCNC: 27 U/L (ref 5–40)
BILIRUB SERPL-MCNC: 0.3 MG/DL (ref 0.3–1.2)
BUN BLDV-MCNC: 12 MG/DL (ref 7–22)
CALCIUM SERPL-MCNC: 9.5 MG/DL (ref 8.5–10.5)
CHLORIDE BLD-SCNC: 103 MEQ/L (ref 98–111)
CHOLESTEROL, TOTAL: 148 MG/DL (ref 100–199)
CO2: 27 MEQ/L (ref 23–33)
CREAT SERPL-MCNC: 0.8 MG/DL (ref 0.4–1.2)
GFR SERPL CREATININE-BSD FRML MDRD: > 90 ML/MIN/1.73M2
GLUCOSE BLD-MCNC: 159 MG/DL (ref 70–108)
HDLC SERPL-MCNC: 40 MG/DL
LDL CHOLESTEROL CALCULATED: 51 MG/DL
POTASSIUM SERPL-SCNC: 4.6 MEQ/L (ref 3.5–5.2)
PROSTATE SPECIFIC ANTIGEN: 0.33 NG/ML (ref 0–1)
SODIUM BLD-SCNC: 139 MEQ/L (ref 135–145)
TOTAL PROTEIN: 7.1 G/DL (ref 6.1–8)
TRIGL SERPL-MCNC: 284 MG/DL (ref 0–199)
TSH SERPL DL<=0.05 MIU/L-ACNC: 2.92 UIU/ML (ref 0.4–4.2)

## 2020-06-15 PROCEDURE — 84153 ASSAY OF PSA TOTAL: CPT

## 2020-06-15 PROCEDURE — 80061 LIPID PANEL: CPT

## 2020-06-15 PROCEDURE — 36415 COLL VENOUS BLD VENIPUNCTURE: CPT

## 2020-06-15 PROCEDURE — 84443 ASSAY THYROID STIM HORMONE: CPT

## 2020-06-15 PROCEDURE — 80053 COMPREHEN METABOLIC PANEL: CPT

## 2020-06-15 NOTE — TELEPHONE ENCOUNTER
----- Message from GERARDO Nassar CNP sent at 6/15/2020 11:39 AM EDT -----  Let pt know most labs look good other than his fasting sugar.  It was 159 ask if he has been taking his meds and see if lab can run an A1C on this thanks

## 2020-06-15 NOTE — TELEPHONE ENCOUNTER
Spoke to  Lab they cannot  run an A1C with the blood they have @ the lab . Pt notified of results. States he has been taking medications as prescribed. States he iws willing to get extra blood work drawn if Eric wants. Just mail labs to home address and he will do them .

## 2020-06-17 ENCOUNTER — OFFICE VISIT (OUTPATIENT)
Dept: PHYSICAL MEDICINE AND REHAB | Age: 65
End: 2020-06-17
Payer: MEDICARE

## 2020-06-17 VITALS
WEIGHT: 218 LBS | BODY MASS INDEX: 28.89 KG/M2 | SYSTOLIC BLOOD PRESSURE: 138 MMHG | TEMPERATURE: 96.8 F | HEIGHT: 73 IN | DIASTOLIC BLOOD PRESSURE: 74 MMHG

## 2020-06-17 PROCEDURE — 99204 OFFICE O/P NEW MOD 45 MIN: CPT | Performed by: PAIN MEDICINE

## 2020-06-17 RX ORDER — HYDROCODONE BITARTRATE AND ACETAMINOPHEN 5; 325 MG/1; MG/1
1 TABLET ORAL 2 TIMES DAILY PRN
Qty: 30 TABLET | Refills: 0 | Status: SHIPPED | OUTPATIENT
Start: 2020-06-17 | End: 2020-07-02

## 2020-06-17 ASSESSMENT — ENCOUNTER SYMPTOMS
SINUS PRESSURE: 0
CHEST TIGHTNESS: 0
RHINORRHEA: 0
VOMITING: 0
WHEEZING: 0
COUGH: 0
SHORTNESS OF BREATH: 0
PHOTOPHOBIA: 0
BACK PAIN: 0
NAUSEA: 0
EYE PAIN: 0
DIARRHEA: 0
SORE THROAT: 0
ABDOMINAL PAIN: 0
CONSTIPATION: 0
COLOR CHANGE: 0

## 2020-06-17 NOTE — PROGRESS NOTES
[facility]  [address]  Dept: [phone]  Dept Fax: [phone]: [phone]    Visit Date: 6/17/2020    Srinath Sesay is a 60 y.o. male who is referred for pain management evaluation and treatment per Dr. Fidel Kent. CAGE and CAGE-AID Questions   1. In the last three months, have you felt you should cut down or stop drinking or using drugs? Yes []        No [x]     2. In the last three months, has anyone annoyed you or gotten on your nerves by telling you to cut down or stop drinking or using drugs? Yes []        No [x]     3. In the last three months, have you felt guilty or bad about how much you drink or use drugs? Yes []        No [x]     4. In the last three months, have you been waking up wanting to have an alcoholic drink or use drugs? Yes []        No [x]        Opioid Risk Tool:  Clinician Form       1. Family History of Substance Abuse: Female Male    Alcohol   []1   [x]3    Illegal drugs   []2   []3    Prescription drugs     []4   []4   2. Personal History of Substance Abuse:          Alcohol   []3   []3    Illegal drugs   []4   []4    Prescription drugs     []5   []5   3. Age (phill box if between 12 and 39):     []1   []1   4. History of Preadolescent Sexual Abuse:     []3   []0   5. Psychological Disease:      Attention deficit disorder, obsessive-compulsive disorder, bipolar, schizophrenia   []2   []2      Depression     []1   []1    Scoring Totals       Total Score  Low Risk  Moderate Risk  High Risk   Risk Category   0 - 3   4 - 7   8 or Above      Patient states symptoms interfere with:  A.  General Activity:  yes   B. Mood: no    C. Walking Ability:   yes   D. Normal Work (Includes both work outside the home and housework):   yes    E.  Relations with Other People:  yes   F. Sleep:   yes   G.  Enjoyment of Life:  yes       HPI: acute osseous abnormality. There are relatively minor degenerative changes at the nonfused levels most pronounced at C3-4 where there is mild right and moderate left neural    foraminal stenosis in association with uncovertebral joint degenerative change.           CT THORACIC SPINE 06/12/2020:  FINDINGS:   Renato Oven is anatomic vertebral body height and alignment. No fracture of the thoracic vertebral column is identified. There are mild to moderate emphysematous changes in the visualized portions of lungs. Paraspinal soft tissues are otherwise unremarkable. Intervertebral discs are relatively preserved. There is no significant spinal canal stenosis at any thoracic level. There is mild to moderate neural foraminal stenosis at the T11-12 level from mild facet hypertrophy. There are small anterior osteophytes    at T9-10 and T10-11.           Impression    Mild degenerative changes of the thoracic spine without significant spinal canal or neuroforaminal stenosis at any thoracic level.                   The patient has No Known Allergies. PastMedical History  Lennie Castillo  has a past medical history of Abnormal liver enzymes, Alcohol abuse, Allergic rhinitis, Chronic back pain, Colonic polyp, COPD (chronic obstructive pulmonary disease) ([address] Depression, Diabetes mellitus ([address] DJD (degenerative joint disease) of cervical spine, DJD (degenerative joint disease) of lumbar spine, GERD (gastroesophageal reflux disease), Hyperlipidemia, Hypertension, Osteoarthritis, Osteoarthritis, and Smoker. Past Surgical History  The patient  has a past surgical history that includes back surgery (2008); Inguinal hernia repair (2002); hernia repair; Cholecystectomy; Colonoscopy (6/2012); EKG 12 Lead (4/6/2015); Lumbar spine surgery (2006, 2007, 2008); Cardiac catheterization (3/2015); and pr removal of hydrocele,tunica,unilat (Left, 7/31/2018).     Family History  This patient's family history includes Cancer in his father; Diabetes in effects. Previous Treatments tried:  PT: Yes,  any benefit? Yes, how many weeks? 6, last date done: several yrs ago after neck sx. NSAIDs: Yes,  any benefit? No,  how long taken: 6 weeks  Chiropractic: No,  any benefit? No  Muscle relaxants: No,  any benefit? No  Narcotics: No,  any benefit? No  Spine surgeon consult: Yes  Any Implants: Yes    Meds. Prescribed:   Orders Placed This Encounter   Medications    HYDROcodone-acetaminophen (NORCO) 5-325 MG per tablet     Sig: Take 1 tablet by mouth 2 times daily as needed for Pain for up to 15 days. Take lowest dose possible to manage pain     Dispense:  30 tablet     Refill:  0     Reduce doses taken as pain becomes manageable       Return BILATERAL C-FACET MBB @ C4-5,C5-6 and C6-7. Time spent with patient was 60 minutes more than 50% was spent  Counseling/coordinated the patient'scare.     Electronically signed by José Gray MD on 6/17/2020 at 10:01 AM

## 2020-06-25 NOTE — TELEPHONE ENCOUNTER
OARRS reviewed. UDS: + for  ETS, ETG inconsitent. Narcan offered: offered  Last seen: 06/17/2020. Follow-up: 07/21/2020    The pharmacy only was able to fill as 7 day supply.

## 2020-06-25 NOTE — TELEPHONE ENCOUNTER
Patient is calling in regarding his hydrocodone prescription that was sent in on 6/17/2020 for 15 day supply. He said his insurance would only allow 7 day prescription so he is asking for another refill to [facility] on [address], [address]. Please verify.

## 2020-06-29 ENCOUNTER — TELEPHONE (OUTPATIENT)
Dept: PHYSICAL MEDICINE AND REHAB | Age: 65
End: 2020-06-29

## 2020-06-29 RX ORDER — HYDROCODONE BITARTRATE AND ACETAMINOPHEN 5; 325 MG/1; MG/1
1 TABLET ORAL 2 TIMES DAILY PRN
Qty: 30 TABLET | Refills: 0 | OUTPATIENT
Start: 2020-06-29 | End: 2020-07-14

## 2020-06-29 RX ORDER — HYDROCODONE BITARTRATE AND ACETAMINOPHEN 5; 325 MG/1; MG/1
1 TABLET ORAL 2 TIMES DAILY PRN
Qty: 60 TABLET | Refills: 0 | Status: SHIPPED | OUTPATIENT
Start: 2020-06-29 | End: 2020-07-21 | Stop reason: SDUPTHER

## 2020-07-02 ENCOUNTER — HOSPITAL ENCOUNTER (OUTPATIENT)
Age: 65
Discharge: HOME | End: 2020-07-02
Payer: MEDICARE

## 2020-07-02 ENCOUNTER — TELEPHONE (OUTPATIENT)
Dept: FAMILY MEDICINE CLINIC | Age: 65
End: 2020-07-02

## 2020-07-02 LAB
AVERAGE GLUCOSE: 159 MG/DL (ref 70–126)
HBA1C MFR BLD: 7.3 % (ref 4.4–6.4)

## 2020-07-02 PROCEDURE — 83036 HEMOGLOBIN GLYCOSYLATED A1C: CPT

## 2020-07-02 PROCEDURE — U0002 COVID-19 LAB TEST NON-CDC: HCPCS

## 2020-07-02 PROCEDURE — 36415 COLL VENOUS BLD VENIPUNCTURE: CPT

## 2020-07-03 LAB
PERFORMING LAB: NORMAL
REPORT: NORMAL
SARS-COV-2: NOT DETECTED

## 2020-07-06 ENCOUNTER — TELEPHONE (OUTPATIENT)
Dept: PHYSICAL MEDICINE AND REHAB | Age: 65
End: 2020-07-06

## 2020-07-07 ENCOUNTER — ANESTHESIA EVENT (OUTPATIENT)
Dept: OPERATING ROOM | Age: 65
End: 2020-07-07
Payer: MEDICARE

## 2020-07-07 ENCOUNTER — ANESTHESIA (OUTPATIENT)
Dept: OPERATING ROOM | Age: 65
End: 2020-07-07
Payer: MEDICARE

## 2020-07-07 ENCOUNTER — HOSPITAL ENCOUNTER (OUTPATIENT)
Age: 65
Setting detail: OUTPATIENT SURGERY
Discharge: HOME | End: 2020-07-07
Attending: PAIN MEDICINE | Admitting: PAIN MEDICINE
Payer: MEDICARE

## 2020-07-07 ENCOUNTER — APPOINTMENT (OUTPATIENT)
Dept: GENERAL RADIOLOGY | Age: 65
End: 2020-07-07
Attending: PAIN MEDICINE
Payer: MEDICARE

## 2020-07-07 VITALS
WEIGHT: 215 LBS | SYSTOLIC BLOOD PRESSURE: 138 MMHG | OXYGEN SATURATION: 92 % | RESPIRATION RATE: 16 BRPM | HEART RATE: 82 BPM | BODY MASS INDEX: 27.59 KG/M2 | TEMPERATURE: 97.5 F | HEIGHT: 74 IN | DIASTOLIC BLOOD PRESSURE: 74 MMHG

## 2020-07-07 VITALS
DIASTOLIC BLOOD PRESSURE: 83 MMHG | OXYGEN SATURATION: 100 % | RESPIRATION RATE: 14 BRPM | SYSTOLIC BLOOD PRESSURE: 127 MMHG

## 2020-07-07 LAB — GLUCOSE BLD-MCNC: 154 MG/DL (ref 70–108)

## 2020-07-07 PROCEDURE — 6360000002 HC RX W HCPCS: Performed by: PAIN MEDICINE

## 2020-07-07 PROCEDURE — 6360000002 HC RX W HCPCS: Performed by: NURSE ANESTHETIST, CERTIFIED REGISTERED

## 2020-07-07 PROCEDURE — 64490 INJ PARAVERT F JNT C/T 1 LEV: CPT | Performed by: PAIN MEDICINE

## 2020-07-07 PROCEDURE — 64492 INJ PARAVERT F JNT C/T 3 LEV: CPT | Performed by: PAIN MEDICINE

## 2020-07-07 PROCEDURE — 2709999900 HC NON-CHARGEABLE SUPPLY: Performed by: PAIN MEDICINE

## 2020-07-07 PROCEDURE — 2500000003 HC RX 250 WO HCPCS: Performed by: PAIN MEDICINE

## 2020-07-07 PROCEDURE — 3600000054 HC PAIN LEVEL 3 BASE: Performed by: PAIN MEDICINE

## 2020-07-07 PROCEDURE — 64491 INJ PARAVERT F JNT C/T 2 LEV: CPT | Performed by: PAIN MEDICINE

## 2020-07-07 PROCEDURE — 3700000000 HC ANESTHESIA ATTENDED CARE: Performed by: PAIN MEDICINE

## 2020-07-07 PROCEDURE — 3209999900 FLUORO FOR SURGICAL PROCEDURES

## 2020-07-07 PROCEDURE — 3700000001 HC ADD 15 MINUTES (ANESTHESIA): Performed by: PAIN MEDICINE

## 2020-07-07 PROCEDURE — 3600000055 HC PAIN LEVEL 3 ADDL 15 MIN: Performed by: PAIN MEDICINE

## 2020-07-07 PROCEDURE — 82948 REAGENT STRIP/BLOOD GLUCOSE: CPT

## 2020-07-07 PROCEDURE — 2580000003 HC RX 258: Performed by: NURSE ANESTHETIST, CERTIFIED REGISTERED

## 2020-07-07 PROCEDURE — 7100000010 HC PHASE II RECOVERY - FIRST 15 MIN: Performed by: PAIN MEDICINE

## 2020-07-07 PROCEDURE — 7100000011 HC PHASE II RECOVERY - ADDTL 15 MIN: Performed by: PAIN MEDICINE

## 2020-07-07 RX ORDER — DEXAMETHASONE SODIUM PHOSPHATE 4 MG/ML
INJECTION, SOLUTION INTRA-ARTICULAR; INTRALESIONAL; INTRAMUSCULAR; INTRAVENOUS; SOFT TISSUE PRN
Status: DISCONTINUED | OUTPATIENT
Start: 2020-07-07 | End: 2020-07-07 | Stop reason: ALTCHOICE

## 2020-07-07 RX ORDER — SODIUM CHLORIDE 9 MG/ML
INJECTION, SOLUTION INTRAVENOUS CONTINUOUS PRN
Status: DISCONTINUED | OUTPATIENT
Start: 2020-07-07 | End: 2020-07-07 | Stop reason: SDUPTHER

## 2020-07-07 RX ORDER — BUPIVACAINE HYDROCHLORIDE 2.5 MG/ML
INJECTION, SOLUTION EPIDURAL; INFILTRATION; INTRACAUDAL PRN
Status: DISCONTINUED | OUTPATIENT
Start: 2020-07-07 | End: 2020-07-07 | Stop reason: ALTCHOICE

## 2020-07-07 RX ORDER — LIDOCAINE HYDROCHLORIDE 10 MG/ML
INJECTION, SOLUTION INFILTRATION; PERINEURAL PRN
Status: DISCONTINUED | OUTPATIENT
Start: 2020-07-07 | End: 2020-07-07 | Stop reason: ALTCHOICE

## 2020-07-07 RX ORDER — FENTANYL CITRATE 50 UG/ML
INJECTION, SOLUTION INTRAMUSCULAR; INTRAVENOUS PRN
Status: DISCONTINUED | OUTPATIENT
Start: 2020-07-07 | End: 2020-07-07 | Stop reason: SDUPTHER

## 2020-07-07 RX ADMIN — FENTANYL CITRATE 100 MCG: 50 INJECTION, SOLUTION INTRAMUSCULAR; INTRAVENOUS at 08:50

## 2020-07-07 RX ADMIN — SODIUM CHLORIDE: 9 INJECTION, SOLUTION INTRAVENOUS at 08:50

## 2020-07-07 ASSESSMENT — PAIN - FUNCTIONAL ASSESSMENT: PAIN_FUNCTIONAL_ASSESSMENT: 0-10

## 2020-07-07 ASSESSMENT — PAIN DESCRIPTION - DESCRIPTORS: DESCRIPTORS: CONSTANT;ACHING

## 2020-07-07 ASSESSMENT — PULMONARY FUNCTION TESTS
PIF_VALUE: 0
PIF_VALUE: 1
PIF_VALUE: 0

## 2020-07-07 NOTE — ANESTHESIA PRE PROCEDURE
Department of Anesthesiology  Preprocedure Note       Name:  Roma Colvin   Age:  60 y.o.  :  1955                                          MRN:  [id number]         Date:  2020      Surgeon: Karen Broussard):  Lissette Marc MD    Procedure: Procedure(s):  BILATERAL C-FACET MBB @ C4-5,C5-6 and C6-7    Medications prior to admission:   Prior to Admission medications    Medication Sig Start Date End Date Taking? Authorizing Provider   SITagliptin (JANUVIA) 100 MG tablet Take 1 tablet by mouth daily 20   Stan Homans, APRN - CNP   HYDROcodone-acetaminophen (NORCO) 5-325 MG per tablet Take 1 tablet by mouth 2 times daily as needed for Pain for up to 30 days. 20  Lissette aMrc MD   naproxen (NAPROSYN) 500 MG tablet Take 1 tablet by mouth 2 times daily (with meals) 20   Stan Homans, APRN - CNP   albuterol sulfate HFA (PROAIR HFA) 108 (90 Base) MCG/ACT inhaler INHALE 2 PUFFS INTO THE LUNGS EVERY 6 HOURS AS NEEDED FOR WHEEZING 20   Stan Homans, APRN - CNP   azelastine (ASTELIN) 0.1 % nasal spray SPRAY ONCE IN EACH NOSTRIL TWICE DAILY AS DIRECTED 20   Stan Homans, APRN - CNP   QUEtiapine (SEROQUEL) 200 MG tablet Take 1 tablet at bedtime 20   Stan Homans, APRN - CNP   tiotropium (Walterine Taisah) 18 MCG inhalation capsule INHALE THE CONTENTS OF 1 CAPSULE INTO THE LUNGS EVERY DAY 20   Stan Homans, APRN - CNP   buPROPion (WELLBUTRIN XL) 150 MG extended release tablet Take 1 tablet by mouth every morning 20   Stan Homans, APRN - CNP   lisinopril (PRINIVIL;ZESTRIL) 2.5 MG tablet Take 1 tablet by mouth daily 20   Stan Homans, APRN - CNP   pantoprazole (PROTONIX) 40 MG tablet TAKE 1 TABLET BY MOUTH DAILY 20   Stan Homans, APRN - CNP   amitriptyline (ELAVIL) 50 MG tablet Take 1 tablet by mouth nightly 19   Eric Homans, APRN - CNP   metoprolol tartrate (LOPRESSOR) 25 MG tablet TAKE 1/2 TABLET BY MOUTH TWICE DAILY.  [address] RATE LESS THAN 60 12/17/19   GERARDO Frances CNP   atorvastatin (LIPITOR) 40 MG tablet TAKE 1 TABLET BY MOUTH DAILY 12/13/19   Gabo Marlow DO   metFORMIN (GLUCOPHAGE) 1000 MG tablet Take 1 tablet by mouth 2 times daily (with meals) 12/13/19   GERARDO Otriz CNP   nicotine (NICODERM CQ) 7 MG/24HR Place 1 patch onto the skin every 24 hours    Historical Provider, MD   aspirin 81 MG chewable tablet TAKE 1 TABLET BY MOUTH DAILY 4/10/19   GERARDO Frances CNP   Lancet Device MISC 1 Device by Does not apply route once for 1 dose 4/10/19 4/10/19  GERARDO Frances CNP   Lancets MISC 1 each by Does not apply route 2 times daily 4/10/19   GERARDO Frances CNP   Elastic Bandages & Supports (B & B A-2 ATHLETIC SUPPORTER) MISC 1 Device by Does not apply route continuous prn (prn pain) 5/23/18   GERARDO Frances CNP       Current medications:    No current facility-administered medications for this encounter.         Allergies:  No Known Allergies    Problem List:    Patient Active Problem List   Diagnosis Code    Allergic rhinitis J30.9    Osteoarthritis M19.90    Depression F32.9    GERD (gastroesophageal reflux disease) K21.9    Hypertension I10    COPD (chronic obstructive pulmonary disease) J44.9    Smoker F17.200    DJD (degenerative joint disease) of cervical spine M47.812    Diabetes mellitus ([facility] [address] E11.9    Hydrocele N43.3       Past Medical History:        Diagnosis Date    Abnormal liver enzymes     Alcohol abuse     Allergic rhinitis     Chronic back pain     Colonic polyp     COPD (chronic obstructive pulmonary disease) (HCC)     Depression     Diabetes mellitus (HCC)     DJD (degenerative joint disease) of cervical spine     DJD (degenerative joint disease) of lumbar spine     SP SURGERY    GERD (gastroesophageal reflux disease)     Hyperlipidemia     Hypertension     Osteoarthritis     Osteoarthritis     Smoker        Past Surgical History: Procedure Laterality Date    BACK SURGERY  2008    X 3    [facility]    CHOLECYSTECTOMY      COLONOSCOPY  6/2012    repeat 6/2017- no precancerous  polyps    EKG 12-LEAD  4/6/2015         HERNIA REPAIR      Umbilical    INGUINAL HERNIA REPAIR  2002    Bilateral Inguinal    [facility] SPINE SURGERY  2006, 2007, 2008    fusion 2008    [place] REMOVAL OF HYDROCELE,TUNICA,UNILAT Left 7/31/2018    LEFT HYDROCELECTOMY performed by Sylwia Veloz MD at [place] [address] History:    Social History     Tobacco Use    Smoking status: Current Every Day Smoker     Packs/day: 0.75     Years: 38.00     Pack years: 28.50     Types: Cigarettes    Smokeless tobacco: Never Used   Substance Use Topics    Alcohol use: No     Alcohol/week: 0.0 standard drinks     Comment: quit 2013                                Ready to quit: Not Answered  Counseling given: Not Answered      Vital Signs (Current): There were no vitals filed for this visit.                                            BP Readings from Last 3 Encounters:   06/17/20 138/74   05/14/20 126/68   12/17/19 129/66       NPO Status:                                                                                 BMI:   Wt Readings from Last 3 Encounters:   06/17/20 218 lb (98.9 kg)   05/14/20 218 lb (98.9 kg)   12/17/19 218 lb 6.4 oz (99.1 kg)     There is no height or weight on file to calculate BMI.    CBC:   Lab Results   Component Value Date    WBC 10.1 06/26/2018    RBC 4.81 06/26/2018    HGB 15.2 06/26/2018    HCT 45.1 06/26/2018    MCV 93.8 06/26/2018    RDW 13.8 05/24/2018     06/26/2018       CMP:   Lab Results   Component Value Date     06/15/2020    K 4.6 06/15/2020     06/15/2020    CO2 27 06/15/2020    BUN 12 06/15/2020    CREATININE 0.8 06/15/2020    LABGLOM >90 06/15/2020    GLUCOSE 159 06/15/2020    GLUCOSE 141 06/20/2016    PROT 7.1 06/15/2020    PROT 7.7 09/24/2013    CALCIUM 9.5 06/15/2020    BILITOT 0.3 06/15/2020    ALKPHOS 90 06/15/2020    AST 27 06/15/2020    ALT 33 06/15/2020       POC Tests: No results for input(s): POCGLU, POCNA, POCK, POCCL, POCBUN, POCHEMO, POCHCT in the last 72 hours. Coags: No results found for: PROTIME, INR, APTT    HCG (If Applicable): No results found for: PREGTESTUR, PREGSERUM, HCG, HCGQUANT     ABGs: No results found for: PHART, PO2ART, DSG8OPO, HUJ8MKQ, BEART, N4TOJMWT     Type & Screen (If Applicable):  Lab Results   Component Value Date    LABRH POS 04/06/2015       Drug/Infectious Status (If Applicable):  Lab Results   Component Value Date    HEPCAB Negative 07/23/2018       COVID-19 Screening (If Applicable):   Lab Results   Component Value Date    COVID19 NOT DETECTED 07/02/2020         Anesthesia Evaluation    Airway: Mallampati: II  TM distance: >3 FB   Neck ROM: full  Mouth opening: > = 3 FB Dental:          Pulmonary:   (+) COPD:  decreased breath sounds,                             Cardiovascular:            Rhythm: regular                      Neuro/Psych:   (+) psychiatric history:            GI/Hepatic/Renal:   (+) GERD:,           Endo/Other:    (+) Diabetes, . Abdominal:           Vascular:                                        Anesthesia Plan      MAC     ASA 3       Induction: intravenous. Anesthetic plan and risks discussed with patient. Plan discussed with CRNA.                   Lakesha Casper MD   7/7/2020

## 2020-07-07 NOTE — ANESTHESIA POSTPROCEDURE EVALUATION
Department of Anesthesiology  Postprocedure Note    Patient: Mj Stock  MRN: [id number]  YOB: 1955  Date of evaluation: 7/7/2020  Time:  9:38 AM     Procedure Summary     Date:  07/07/20 Room / Location:  [address] [place] [address] 03 / [address] [facility]    Anesthesia Start:  7838 Anesthesia Stop:  0900    Procedure:  BILATERAL C-FACET MBB @ C4-5,C5-6 and C6-7 (Bilateral Neck) Diagnosis:  (Cervical Spondylosis)    Surgeon:  Staci Arvizu MD Responsible Provider:  Slime Bell MD    Anesthesia Type:  MAC ASA Status:  3          Anesthesia Type: MAC    Bhaskar Phase I:      Bhaskar Phase II: Bhaskar Score: 10    Last vitals: Reviewed and per EMR flowsheets.        Anesthesia Post Evaluation    Patient location during evaluation: PACU  Patient participation: complete - patient participated  Level of consciousness: awake  Airway patency: patent  Nausea & Vomiting: no vomiting and no nausea  Complications: no  Cardiovascular status: hemodynamically stable  Respiratory status: acceptable  Hydration status: stable

## 2020-07-07 NOTE — PROGRESS NOTES
Blood sugar was 154. Derril Shoup Discharge instructions given to pt. Belongings packed and sent with pt. Pt verbalized understanding of instructions.

## 2020-07-07 NOTE — H&P
H & P    Neck Pain    Chronicity: Patient is a 61 y/o male comes with neck pain. Patient had ACDF C4-C7 per Dr Mercedes Faulkner which helped his pain for about a year. Patient denies any recent injuries. The problem occurs constantly. The problem has been gradually worsening. The pain is associated with nothing. The pain is present in the left side and right side. The quality of the pain is described as aching. Pain scale: States pain scal is a 8/10 and at best is a 5/10. The pain is worse during the day. Associated symptoms include headaches, numbness and tingling. Pertinent negatives include no chest pain, fever, photophobia or weakness. He has tried NSAIDs for the symptoms. The treatment provided no relief. CT CERVICAL SPINE 06/12/2020:  FINDINGS:   There is been interval ACDF bridging C4-C7. There is no evidence of hardware failure. There is incorporation of the intervertebral fusion devices. No fracture is evident. The craniocervical junction appears intact. No paraspinal or epidural fluid    collection is identified. Paraspinal soft tissues are unremarkable. At C2-3 there is no significant spinal canal or neuroforaminal stenosis. At C3-4 there is no significant spinal canal stenosis. There is mild right and moderate left neural foraminal stenosis in association with uncovertebral joint degenerative change and facet hypertrophy. At C4-5 there is no significant spinal canal stenosis. There is moderate left neuroforaminal stenosis in association with facet hypertrophy. At C5-6 there is no significant spinal canal stenosis. There is moderate right neuroforaminal stenosis in association with residual osteophytes. At C6-7 there is no significant spinal canal or neuroforaminal stenosis. At C7-T1 there is a shallow disc bulge without significant spinal canal or neuroforaminal stenosis.         Impression    Interval ACDF bridging C4-C7 compared to prior CT in 2012 without evidence of acute osseous abnormality. (NICODERM CQ) 7 MG/24HR, Place 1 patch onto the skin every 24 hours, Disp: , Rfl:     SITagliptin (JANUVIA) 50 MG tablet, Take 1 tablet by mouth daily, Disp: 90 tablet, Rfl: 3    aspirin 81 MG chewable tablet, TAKE 1 TABLET BY MOUTH DAILY, Disp: 90 tablet, Rfl: 3    Lancets MISC, 1 each by Does not apply route 2 times daily, Disp: 100 each, Rfl: 3    Elastic Bandages & Supports (B & B A-2 ATHLETIC SUPPORTER) MISC, 1 Device by Does not apply route continuous prn (prn pain), Disp: 1 each, Rfl: 0    Lancet Device MISC, 1 Device by Does not apply route once for 1 dose, Disp: 1 Device, Rfl: 0        Subjective:      Review of Systems   Constitutional: Negative for activity change, appetite change, chills, diaphoresis, fatigue, fever and unexpected weight change. HENT: Negative for congestion, ear pain, hearing loss, mouth sores, nosebleeds, rhinorrhea, sinus pressure and sore throat. Eyes: Negative for photophobia, pain and visual disturbance. Respiratory: Negative for cough, chest tightness, shortness of breath and wheezing. Cardiovascular: Negative for chest pain and palpitations. Gastrointestinal: Negative for abdominal pain, constipation, diarrhea, nausea and vomiting. Endocrine: Negative for cold intolerance, heat intolerance, polydipsia, polyphagia and polyuria. Hx DM   Genitourinary: Negative for decreased urine volume, difficulty urinating, frequency and hematuria. Musculoskeletal: Positive for neck pain and neck stiffness. Negative for arthralgias, back pain, gait problem, joint swelling and myalgias. Skin: Negative for color change and rash. Allergic/Immunologic: Negative for food allergies and immunocompromised state. Neurological: Positive for tingling, numbness and headaches. Negative for dizziness, tremors, seizures, syncope, facial asymmetry, speech difficulty, weakness and light-headedness. Hematological: Does not bruise/bleed easily.    Psychiatric/Behavioral: Positive for sleep disturbance. Negative for agitation, behavioral problems, confusion, decreased concentration, dysphoric mood, hallucinations, self-injury and suicidal ideas. The patient is nervous/anxious. The patient is not hyperactive.          Objective:      Vitals       Vitals:     06/17/20 0852   BP: 138/74   Site: Left Upper Arm   Position: Sitting   Cuff Size: Medium Adult   Temp: 96.8 °F (36 °C)   Weight: 218 lb (98.9 kg)   Height: 6' 0.5\" (1.842 m)            Physical Exam  Vitals signs and nursing note reviewed. Constitutional:       General: He is not in acute distress. Appearance: He is well-developed. He is not diaphoretic. HENT:      Head: Normocephalic and atraumatic. Right Ear: External ear normal.      Left Ear: External ear normal.      Nose: Nose normal.      Mouth/Throat:      Pharynx: No oropharyngeal exudate. Eyes:      General: No scleral icterus. Right eye: No discharge. Left eye: No discharge. Conjunctiva/sclera: Conjunctivae normal.      Pupils: Pupils are equal, round, and reactive to light. Neck:      Musculoskeletal: Full passive range of motion without pain, normal range of motion and neck supple. Normal range of motion. No edema, erythema, neck rigidity or muscular tenderness. Thyroid: No thyromegaly. Cardiovascular:      Rate and Rhythm: Normal rate and regular rhythm. Heart sounds: Normal heart sounds. No murmur. No friction rub. No gallop. Pulmonary:      Effort: Pulmonary effort is normal. No respiratory distress. Breath sounds: Normal breath sounds. No wheezing or rales. Chest:      Chest wall: No tenderness. Abdominal:      General: Bowel sounds are normal. There is no distension. Palpations: Abdomen is soft. Tenderness: There is no abdominal tenderness. There is no guarding or rebound. Musculoskeletal:      Right shoulder: He exhibits decreased range of motion and tenderness.       Left shoulder: He exhibits normal. He is communicative. Speech is not rapid and pressured, delayed, slurred or tangential.         Behavior: Behavior normal. Behavior is not agitated, slowed, aggressive, withdrawn, hyperactive or combative. Behavior is cooperative. Thought Content: Thought content normal. Thought content is not paranoid or delusional. Thought content does not include homicidal or suicidal ideation. Thought content does not include homicidal or suicidal plan. Cognition and Memory: Cognition normal. Memory is not impaired. He does not exhibit impaired recent memory or impaired remote memory. Judgment: Judgment is not impulsive or inappropriate.         JIMENA test: POS  Yeoman's test: POS  [place]len test: POS  Assessment:      1. Cervical spondylosis    2. Thoracic spondylosis    3. Sacroiliac inflammation ([facility] [address]    4. Chronic pain syndrome       Plan:      · Patient read and signed orientation and opioid agreement. · OARRS reviewed. Current MED: 0  · Patient was not offered naloxone for home. · Discussed long term side effects of medications, tolerance, dependency and addiction. · UDS preformed today. · Patient told can not receive any pain medications from any other source. · No evidence of abuse, diversion or aberrant behavior. · Prescription Needs: Will prescribe patient a short length of pain medications in good samantha until UDS come back  · Medications and/or procedures to improve function and quality of life- patient understanding with this and that may not be pain free  · Discussed possible weaning of medication dosing dependent on treatment/procedure results. · Discussed with patient about safe storage of medications at home  · Testing: Reviewed Ct Cervical and Thoracic Spine with patient. . in detail. · Procedures: Bilateral C-facet MBB @ C4-5,C5-6 and C6-7 for diagnostic purpose.   · Discussed with patient about risks with procedure including infection, reaction to medication, increased pain, or bleeding. · Medications:Reviewed side effects.        Previous Treatments tried:  · PT: Yes,  any benefit? Yes, how many weeks? 6, last date done: several yrs ago after neck sx. · NSAIDs: Yes,  any benefit? No,  how long taken: 6 weeks  · Chiropractic: No,  any benefit? No  · Muscle relaxants: No,  any benefit? No  · Narcotics: No,  any benefit? No  · Spine surgeon consult: Yes  · Any Implants: Yes          Return BILATERAL C-FACET MBB @ C4-5,C5-6 and C6-7.

## 2020-07-07 NOTE — PROGRESS NOTES
7349 patient to phase 2 via cart. Surgery RN at bedside with report. IV patent and infusing. Vitals stable on room air. Injections sites clean and dry. 0893 gave patient snack and drink.     9773 IV removed no complications    3063 patient getting dressed    [phone] escorted patient to discharge

## 2020-07-07 NOTE — OP NOTE
Operative Note  Pre-Procedure Note    Patient Name: Patty Guzmán   YOB: 1955  Medical Record Number: [id number]  Date: 7/7/20    Indication:  NECK PAIN  Consent: On file. Vital Signs:   Vitals:    07/07/20 0826   BP: 138/74   Pulse: 82   Resp: 16   Temp: 97.5 °F (36.4 °C)   SpO2: 92%       Past Medical History:   has a past medical history of Abnormal liver enzymes, Alcohol abuse, Allergic rhinitis, Chronic back pain, Colonic polyp, COPD (chronic obstructive pulmonary disease) ([facility] [address] Depression, Diabetes mellitus ([facility] [address] DJD (degenerative joint disease) of cervical spine, DJD (degenerative joint disease) of lumbar spine, GERD (gastroesophageal reflux disease), Hyperlipidemia, Hypertension, Osteoarthritis, Osteoarthritis, and Smoker. Past Surgical History:   has a past surgical history that includes back surgery (2008); Inguinal hernia repair (2002); hernia repair; Cholecystectomy; Colonoscopy (6/2012); EKG 12 Lead (4/6/2015); Lumbar spine surgery (2006, 2007, 2008); Cardiac catheterization (3/2015); and pr removal of hydrocele,tunica,unilat (Left, 7/31/2018). Pre-Sedation Documentation and Exam:   Vital signs have been reviewed (see flow sheet for vitals). Sedation/ Anesthesia Plan:   MAC      Patient is an appropriate candidate for plan of sedation: yes      Preoperative Diagnosis: C-spondylosis    Post-Op Dx: as above    Procedure Performed : Diagnostic / Confirmatory Median Branch Blocks at the levels of C4-5,C5-6 and C6-7 bilateral under fluoroscopic guidance # 1. Indication for the Procedure:  Patient had history of chronic neck pain that is not responding well to the conservative treatment. Patient's pain is mostly axial in nature. Pain is interfering with the activities of daily living. Examination revealed facet tenderness and facet loading is positive.    Hence we decided to do confirmatory median branch blocks for possible radiofrequency abalation of median branches for long term pain releif. The procedure and risks  were discussed with the patient and an informed consent was obtained. Procedure:  Bilateral  Patient is placed in prone position and skin over the back was prepped and draped in sterile manner. Then using fluoroscopy the waist of the facet joint complex of the vertebra was observed and the view  was optimized. The skin and deep tissues over the area were infiltrated with 18 ml of 1% lidocaine. Then a #22-gauge 3-1/2 / 5-1/2  inch spinal needle was introduced through the skin wheal under fluoroscopy guidance such that the tip of the needle lies at the junction of the transverse process with the superior processes of the facet joint. Then after negative aspiration a total of 3 ml of 0.25% Marcaine and Dexamethasone 12 mg was injected through the needle in divided doses . EBL-0   Patient's vital signs and neurological status remained stable through  the procedure and the post procedural  period. The patient was instructed to keep track of pain for next 24 hours every hour and bring it to the next visit. Patient tolerated the procedure well and was discharged home in stable condition.     Electronically signed by Philip Escobar MD on 7/7/20 at 8:49 AM EDT

## 2020-07-21 ENCOUNTER — OFFICE VISIT (OUTPATIENT)
Dept: PHYSICAL MEDICINE AND REHAB | Age: 65
End: 2020-07-21
Payer: MEDICARE

## 2020-07-21 VITALS
HEIGHT: 74 IN | DIASTOLIC BLOOD PRESSURE: 84 MMHG | TEMPERATURE: 97.5 F | SYSTOLIC BLOOD PRESSURE: 136 MMHG | BODY MASS INDEX: 27.59 KG/M2 | WEIGHT: 215 LBS

## 2020-07-21 PROCEDURE — 99213 OFFICE O/P EST LOW 20 MIN: CPT | Performed by: NURSE PRACTITIONER

## 2020-07-21 RX ORDER — HYDROCODONE BITARTRATE AND ACETAMINOPHEN 5; 325 MG/1; MG/1
1 TABLET ORAL 2 TIMES DAILY PRN
Qty: 60 TABLET | Refills: 0 | Status: SHIPPED | OUTPATIENT
Start: 2020-07-29 | End: 2020-08-20 | Stop reason: SDUPTHER

## 2020-07-21 ASSESSMENT — ENCOUNTER SYMPTOMS
RESPIRATORY NEGATIVE: 1
GASTROINTESTINAL NEGATIVE: 1

## 2020-07-21 NOTE — PROGRESS NOTES
scale with pain medications or at its best is 3/10. Last dose of Polson was today  Drug screen reviewed from 6/17/2020 and was + for alcohol   Pill count was completed today and was appropriate  Patient does not have naloxone available at home. Patient has not required use of naloxone at home since last office visit. The patienthas No Known Allergies. Past Medical History  Tamela Rico  has a past medical history of Abnormal liver enzymes, Alcohol abuse, Allergic rhinitis, Chronic back pain, Colonic polyp, COPD (chronic obstructive pulmonary disease) ([facility] [address] Depression, Diabetes mellitus ([facility] [address] DJD (degenerative joint disease) of cervical spine, DJD (degenerative joint disease) of lumbar spine, GERD (gastroesophageal reflux disease), Hyperlipidemia, Hypertension, Osteoarthritis, Osteoarthritis, and Smoker. Past Surgical History  The patient  has a past surgical history that includes back surgery (2008); Inguinal hernia repair (2002); hernia repair; Cholecystectomy; Colonoscopy (6/2012); EKG 12 Lead (4/6/2015); Lumbar spine surgery (2006, 2007, 2008); Cardiac catheterization (3/2015); pr removal of hydrocele,tunica,unilat (Left, 7/31/2018); and Facet joint injection (Bilateral, 7/7/2020). Family History  This patient's family history includes Cancer in his father; Diabetes in his mother and sister; Heart Disease in his brother. Social History  Tamela Rico  reports that he has been smoking cigarettes. He has a 28.50 pack-year smoking history. He has never used smokeless tobacco. He reports that he does not drink alcohol or use drugs. Medications    Current Outpatient Medications:     [START ON 7/29/2020] HYDROcodone-acetaminophen (NORCO) 5-325 MG per tablet, Take 1 tablet by mouth 2 times daily as needed for Pain for up to 30 days. , Disp: 60 tablet, Rfl: 0    SITagliptin (JANUVIA) 100 MG tablet, Take 1 tablet by mouth daily, Disp: 90 tablet, Rfl: 1    naproxen (NAPROSYN) 500 MG tablet, Take 1 tablet by mouth 2 times daily (with meals), Disp: 180 tablet, Rfl: 3    albuterol sulfate HFA (PROAIR HFA) 108 (90 Base) MCG/ACT inhaler, INHALE 2 PUFFS INTO THE LUNGS EVERY 6 HOURS AS NEEDED FOR WHEEZING, Disp: 1 Inhaler, Rfl: 3    azelastine (ASTELIN) 0.1 % nasal spray, SPRAY ONCE IN EACH NOSTRIL TWICE DAILY AS DIRECTED, Disp: 3 Bottle, Rfl: 1    QUEtiapine (SEROQUEL) 200 MG tablet, Take 1 tablet at bedtime, Disp: 90 tablet, Rfl: 3    tiotropium (SPIRIVA HANDIHALER) 18 MCG inhalation capsule, INHALE THE CONTENTS OF 1 CAPSULE INTO THE LUNGS EVERY DAY, Disp: 90 capsule, Rfl: 3    buPROPion (WELLBUTRIN XL) 150 MG extended release tablet, Take 1 tablet by mouth every morning, Disp: 90 tablet, Rfl: 3    lisinopril (PRINIVIL;ZESTRIL) 2.5 MG tablet, Take 1 tablet by mouth daily, Disp: 90 tablet, Rfl: 3    pantoprazole (PROTONIX) 40 MG tablet, TAKE 1 TABLET BY MOUTH DAILY, Disp: 90 tablet, Rfl: 3    amitriptyline (ELAVIL) 50 MG tablet, Take 1 tablet by mouth nightly, Disp: 90 tablet, Rfl: 3    metoprolol tartrate (LOPRESSOR) 25 MG tablet, TAKE 1/2 TABLET BY MOUTH TWICE DAILY. HOLD FOR HEART RATE LESS THAN 60, Disp: 90 tablet, Rfl: 3    atorvastatin (LIPITOR) 40 MG tablet, TAKE 1 TABLET BY MOUTH DAILY, Disp: 90 tablet, Rfl: 3    metFORMIN (GLUCOPHAGE) 1000 MG tablet, Take 1 tablet by mouth 2 times daily (with meals), Disp: 180 tablet, Rfl: 3    nicotine (NICODERM CQ) 7 MG/24HR, Place 1 patch onto the skin every 24 hours, Disp: , Rfl:     aspirin 81 MG chewable tablet, TAKE 1 TABLET BY MOUTH DAILY, Disp: 90 tablet, Rfl: 3    Lancets MISC, 1 each by Does not apply route 2 times daily, Disp: 100 each, Rfl: 3    Elastic Bandages & Supports (B & B A-2 ATHLETIC SUPPORTER) MISC, 1 Device by Does not apply route continuous prn (prn pain), Disp: 1 each, Rfl: 0    Lancet Device MISC, 1 Device by Does not apply route once for 1 dose, Disp: 1 Device, Rfl: 0    Subjective:      Review of Systems   Constitutional: Negative.     HENT: Negative. Respiratory: Negative. Gastrointestinal: Negative. Musculoskeletal: Positive for myalgias, neck pain and neck stiffness. Negative for arthralgias, gait problem and joint swelling. Neurological: Positive for headaches. Negative for weakness and numbness. Psychiatric/Behavioral: Negative. Objective:     Vitals:    07/21/20 0848   BP: 136/84   Temp: 97.5 °F (36.4 °C)   Weight: 215 lb (97.5 kg)   Height: 6' 1.5\" (1.867 m)       Physical Exam  Vitals signs and nursing note reviewed. Constitutional:       General: He is not in acute distress. Appearance: He is well-developed. He is not diaphoretic. HENT:      Head: Normocephalic and atraumatic. Right Ear: External ear normal.      Left Ear: External ear normal.      Nose: Nose normal.      Mouth/Throat:      Pharynx: No oropharyngeal exudate. Eyes:      General: No scleral icterus. Right eye: No discharge. Left eye: No discharge. Conjunctiva/sclera: Conjunctivae normal.      Pupils: Pupils are equal, round, and reactive to light. Neck:      Musculoskeletal: Full passive range of motion without pain, normal range of motion and neck supple. Normal range of motion. No edema, erythema, neck rigidity or muscular tenderness. Thyroid: No thyromegaly. Cardiovascular:      Rate and Rhythm: Normal rate and regular rhythm. Heart sounds: Normal heart sounds. No murmur. No friction rub. No gallop. Pulmonary:      Effort: Pulmonary effort is normal. No respiratory distress. Breath sounds: Normal breath sounds. No wheezing or rales. Chest:      Chest wall: No tenderness. Abdominal:      General: Bowel sounds are normal. There is no distension. Palpations: Abdomen is soft. Tenderness: There is no abdominal tenderness. There is no guarding or rebound. Musculoskeletal:      Right shoulder: He exhibits decreased range of motion and tenderness.       Left shoulder: He exhibits decreased He is communicative. Speech is not rapid and pressured, delayed, slurred or tangential.         Behavior: Behavior normal. Behavior is not agitated, slowed, aggressive, withdrawn, hyperactive or combative. Behavior is cooperative. Thought Content: Thought content normal. Thought content is not paranoid or delusional. Thought content does not include homicidal or suicidal ideation. Thought content does not include homicidal or suicidal plan. Cognition and Memory: Cognition normal. Memory is not impaired. He does not exhibit impaired recent memory or impaired remote memory. Judgment: Judgment is not impulsive or inappropriate. JIMENA test: + bilaterally   Yeomans test: + bilaterally   Gaenslen test: + bilaterally      Assessment:     1. Cervical spondylosis    2. Thoracic spondylosis    3. Sacroiliac inflammation ([facility] [address]    4. Chronic pain syndrome            Plan:      OARRS reviewed. Current MED: 10.00  Patient was offered naloxone for home. Discussed long term side effects of medications, tolerance, dependency and addiction. Previous UDS reviewed  UDS preformed today for compliance. Patient told can not receive any pain medications from any other source. No evidence of abuse, diversion or aberrant behavior. Medications and/or procedures to improve function and quality of life- patient understanding with this and that may not be pain free  Discussed with patient about safe storage of medications at home  Discussed possible weaning of medication dosing dependent on treatment/procedure results. Discussed with patient about risks with procedure including infection, reaction to medication, increased pain, or bleeding. Procedure notes reviewed in detail   Received 100% relief of neck pain from C-facet MBB # 1 for 7 days with improvement of mobility. Plan to repeat bilateral C-facet MBB # 2 @ C4-5,C5-6 and C6-7 for diagnostic purpose.  Procedure and risks discussed in detail with

## 2020-07-31 ENCOUNTER — HOSPITAL ENCOUNTER (OUTPATIENT)
Age: 65
Discharge: HOME | End: 2020-07-31
Payer: MEDICARE

## 2020-07-31 PROCEDURE — U0003 INFECTIOUS AGENT DETECTION BY NUCLEIC ACID (DNA OR RNA); SEVERE ACUTE RESPIRATORY SYNDROME CORONAVIRUS 2 (SARS-COV-2) (CORONAVIRUS DISEASE [COVID-19]), AMPLIFIED PROBE TECHNIQUE, MAKING USE OF HIGH THROUGHPUT TECHNOLOGIES AS DESCRIBED BY CMS-2020-01-R: HCPCS

## 2020-08-01 LAB — SARS-COV-2: NOT DETECTED

## 2020-08-04 ENCOUNTER — TELEPHONE (OUTPATIENT)
Dept: PHYSICAL MEDICINE AND REHAB | Age: 65
End: 2020-08-04

## 2020-08-06 ENCOUNTER — HOSPITAL ENCOUNTER (OUTPATIENT)
Age: 65
Setting detail: OUTPATIENT SURGERY
Discharge: HOME | End: 2020-08-06
Attending: PAIN MEDICINE | Admitting: PAIN MEDICINE
Payer: MEDICARE

## 2020-08-06 ENCOUNTER — APPOINTMENT (OUTPATIENT)
Dept: GENERAL RADIOLOGY | Age: 65
End: 2020-08-06
Attending: PAIN MEDICINE
Payer: MEDICARE

## 2020-08-06 ENCOUNTER — ANESTHESIA (OUTPATIENT)
Dept: OPERATING ROOM | Age: 65
End: 2020-08-06
Payer: MEDICARE

## 2020-08-06 ENCOUNTER — ANESTHESIA EVENT (OUTPATIENT)
Dept: OPERATING ROOM | Age: 65
End: 2020-08-06
Payer: MEDICARE

## 2020-08-06 VITALS
DIASTOLIC BLOOD PRESSURE: 86 MMHG | HEART RATE: 77 BPM | OXYGEN SATURATION: 93 % | TEMPERATURE: 97.5 F | RESPIRATION RATE: 18 BRPM | SYSTOLIC BLOOD PRESSURE: 154 MMHG

## 2020-08-06 VITALS
SYSTOLIC BLOOD PRESSURE: 133 MMHG | RESPIRATION RATE: 16 BRPM | OXYGEN SATURATION: 98 % | DIASTOLIC BLOOD PRESSURE: 85 MMHG

## 2020-08-06 LAB — GLUCOSE BLD-MCNC: 156 MG/DL (ref 70–108)

## 2020-08-06 PROCEDURE — 2500000003 HC RX 250 WO HCPCS: Performed by: PAIN MEDICINE

## 2020-08-06 PROCEDURE — 3209999900 FLUORO FOR SURGICAL PROCEDURES

## 2020-08-06 PROCEDURE — 2580000003 HC RX 258: Performed by: NURSE ANESTHETIST, CERTIFIED REGISTERED

## 2020-08-06 PROCEDURE — 7100000010 HC PHASE II RECOVERY - FIRST 15 MIN: Performed by: PAIN MEDICINE

## 2020-08-06 PROCEDURE — 7100000011 HC PHASE II RECOVERY - ADDTL 15 MIN: Performed by: PAIN MEDICINE

## 2020-08-06 PROCEDURE — 6360000002 HC RX W HCPCS: Performed by: NURSE ANESTHETIST, CERTIFIED REGISTERED

## 2020-08-06 PROCEDURE — 2709999900 HC NON-CHARGEABLE SUPPLY: Performed by: PAIN MEDICINE

## 2020-08-06 PROCEDURE — 3700000000 HC ANESTHESIA ATTENDED CARE: Performed by: PAIN MEDICINE

## 2020-08-06 PROCEDURE — 6360000002 HC RX W HCPCS: Performed by: PAIN MEDICINE

## 2020-08-06 PROCEDURE — 64491 INJ PARAVERT F JNT C/T 2 LEV: CPT | Performed by: PAIN MEDICINE

## 2020-08-06 PROCEDURE — 3600000054 HC PAIN LEVEL 3 BASE: Performed by: PAIN MEDICINE

## 2020-08-06 PROCEDURE — 82948 REAGENT STRIP/BLOOD GLUCOSE: CPT

## 2020-08-06 PROCEDURE — 64490 INJ PARAVERT F JNT C/T 1 LEV: CPT | Performed by: PAIN MEDICINE

## 2020-08-06 PROCEDURE — 64492 INJ PARAVERT F JNT C/T 3 LEV: CPT | Performed by: PAIN MEDICINE

## 2020-08-06 RX ORDER — FENTANYL CITRATE 50 UG/ML
INJECTION, SOLUTION INTRAMUSCULAR; INTRAVENOUS PRN
Status: DISCONTINUED | OUTPATIENT
Start: 2020-08-06 | End: 2020-08-06 | Stop reason: SDUPTHER

## 2020-08-06 RX ORDER — SODIUM CHLORIDE 9 MG/ML
INJECTION, SOLUTION INTRAVENOUS CONTINUOUS PRN
Status: DISCONTINUED | OUTPATIENT
Start: 2020-08-06 | End: 2020-08-06 | Stop reason: SDUPTHER

## 2020-08-06 RX ORDER — LIDOCAINE HYDROCHLORIDE 10 MG/ML
INJECTION, SOLUTION INFILTRATION; PERINEURAL PRN
Status: DISCONTINUED | OUTPATIENT
Start: 2020-08-06 | End: 2020-08-06 | Stop reason: ALTCHOICE

## 2020-08-06 RX ORDER — BUPIVACAINE HYDROCHLORIDE 2.5 MG/ML
INJECTION, SOLUTION EPIDURAL; INFILTRATION; INTRACAUDAL PRN
Status: DISCONTINUED | OUTPATIENT
Start: 2020-08-06 | End: 2020-08-06 | Stop reason: ALTCHOICE

## 2020-08-06 RX ORDER — DEXAMETHASONE SODIUM PHOSPHATE 4 MG/ML
INJECTION, SOLUTION INTRA-ARTICULAR; INTRALESIONAL; INTRAMUSCULAR; INTRAVENOUS; SOFT TISSUE PRN
Status: DISCONTINUED | OUTPATIENT
Start: 2020-08-06 | End: 2020-08-06 | Stop reason: ALTCHOICE

## 2020-08-06 RX ADMIN — FENTANYL CITRATE 50 MCG: 50 INJECTION, SOLUTION INTRAMUSCULAR; INTRAVENOUS at 12:12

## 2020-08-06 RX ADMIN — FENTANYL CITRATE 50 MCG: 50 INJECTION, SOLUTION INTRAMUSCULAR; INTRAVENOUS at 12:14

## 2020-08-06 RX ADMIN — SODIUM CHLORIDE: 9 INJECTION, SOLUTION INTRAVENOUS at 12:10

## 2020-08-06 ASSESSMENT — PAIN SCALES - GENERAL: PAINLEVEL_OUTOF10: 0

## 2020-08-06 ASSESSMENT — PULMONARY FUNCTION TESTS
PIF_VALUE: 0

## 2020-08-06 ASSESSMENT — LIFESTYLE VARIABLES: SMOKING_STATUS: 1

## 2020-08-06 NOTE — ANESTHESIA POSTPROCEDURE EVALUATION
Department of Anesthesiology  Postprocedure Note    Patient: Blaine Habermann  MRN: [id number]  YOB: 1955  Date of evaluation: 8/6/2020  Time:  1:16 PM     Procedure Summary     Date:  08/06/20 Room / Location:  [address] [place] [address] Tony    Anesthesia Start:  1210 Anesthesia Stop:  9854    Procedure:  bilateral C-facet MBB # 2 @ C4-5, C5-6 and C6-7 (Bilateral Neck) Diagnosis:  (Cervical Spondylosis)    Surgeon:  Obdulio Tadeo MD Responsible Provider:  Arabella Morris DO    Anesthesia Type:  MAC ASA Status:  3          Anesthesia Type: MAC    Bhaskar Phase I:      Bhaskar Phase II: Bhaskar Score: 10    Last vitals: Reviewed and per EMR flowsheets.        Anesthesia Post Evaluation    Patient location during evaluation: bedside  Patient participation: complete - patient participated  Level of consciousness: awake and alert  Pain score: 1  Airway patency: patent  Nausea & Vomiting: no nausea and no vomiting  Complications: no  Cardiovascular status: hemodynamically stable and blood pressure returned to baseline  Respiratory status: spontaneous ventilation, room air and acceptable  Hydration status: stable

## 2020-08-06 NOTE — OP NOTE
pain releif. The procedure and risks  were discussed with the patient and an informed consent was obtained.     Procedure:  Bilateral  Patient is placed in prone position and skin over the back was prepped and draped in sterile manner. Then using fluoroscopy the waist of the facet joint complex of the vertebra was observed and the view  was optimized. The skin and deep tissues over the area were infiltrated with 18 ml of 1% lidocaine. Then a #22-gauge 3-1/2   inch spinal needle was introduced through the skin wheal under fluoroscopy guidance such that the tip of the needle lies at the junction of the transverse process with the superior processes of the facet joint. Then after negative aspiration a total of 3 ml of 0.25% Marcaine and Dexamethasone 12 mg was injected through the needle in divided doses . EBL-0   Patient's vital signs and neurological status remained stable through  the procedure and the post procedural  period. The patient was instructed to keep track of pain for next 24 hours every hour and bring it to the next visit. Patient tolerated the procedure well and was discharged home in stable condition.       Electronically signed by Alexis Khan MD on 8/6/20 at 12:12 PM EDT

## 2020-08-06 NOTE — ANESTHESIA PRE PROCEDURE
Department of Anesthesiology  Preprocedure Note       Name:  Blaine Habermann   Age:  60 y.o.  :  1955                                          MRN:  [id number]         Date:  2020      Surgeon: Soto Stevens):  Obdulio Tadeo MD    Procedure: Procedure(s):  bilateral C-facet MBB # 2 @ C4-5, C5-6 and C6-7    Medications prior to admission:   Prior to Admission medications    Medication Sig Start Date End Date Taking? Authorizing Provider   HYDROcodone-acetaminophen (NORCO) 5-325 MG per tablet Take 1 tablet by mouth 2 times daily as needed for Pain for up to 30 days. 20  Brie LabellaGERARDO CNP   SITagliptin (JANUVIA) 100 MG tablet Take 1 tablet by mouth daily 20   GERARDO Winter CNP   naproxen (NAPROSYN) 500 MG tablet Take 1 tablet by mouth 2 times daily (with meals) 20   GERARDO Winter CNP   albuterol sulfate HFA (PROAIR HFA) 108 (90 Base) MCG/ACT inhaler INHALE 2 PUFFS INTO THE LUNGS EVERY 6 HOURS AS NEEDED FOR WHEEZING 20   GERARDO Winter CNP   azelastine (ASTELIN) 0.1 % nasal spray SPRAY ONCE IN EACH NOSTRIL TWICE DAILY AS DIRECTED 20   GERARDO Winter CNP   QUEtiapine (SEROQUEL) 200 MG tablet Take 1 tablet at bedtime 20   GERARDO Winter CNP   tiotropium (Jasmyne Confer) 18 MCG inhalation capsule INHALE THE CONTENTS OF 1 CAPSULE INTO THE LUNGS EVERY DAY 20   GERARDO Winter CNP   buPROPion (WELLBUTRIN XL) 150 MG extended release tablet Take 1 tablet by mouth every morning 20   GERARDO Winter CNP   lisinopril (PRINIVIL;ZESTRIL) 2.5 MG tablet Take 1 tablet by mouth daily 20   GERARDO Winter CNP   pantoprazole (PROTONIX) 40 MG tablet TAKE 1 TABLET BY MOUTH DAILY 20   GERARDO Winter CNP   amitriptyline (ELAVIL) 50 MG tablet Take 1 tablet by mouth nightly 19   GERARDO Winter - CNP   metoprolol tartrate (LOPRESSOR) 25 MG tablet TAKE 1/2 TABLET BY MOUTH TWICE DAILY.  HOLD FOR HEART RATE LESS THAN 60 12/17/19   GERARDO Caldera CNP   atorvastatin (LIPITOR) 40 MG tablet TAKE 1 TABLET BY MOUTH DAILY 12/13/19   Rani Mejía DO   metFORMIN (GLUCOPHAGE) 1000 MG tablet Take 1 tablet by mouth 2 times daily (with meals) 12/13/19   GERARDO Wilson CNP   nicotine (NICODERM CQ) 7 MG/24HR Place 1 patch onto the skin every 24 hours    Historical Provider, MD   aspirin 81 MG chewable tablet TAKE 1 TABLET BY MOUTH DAILY 4/10/19   GERARDO Caldera CNP   Lancet Device MISC 1 Device by Does not apply route once for 1 dose 4/10/19 4/10/19  GERARDO Caldera CNP   Lancets MISC 1 each by Does not apply route 2 times daily 4/10/19   GERARDO Caldera CNP   Elastic Bandages & Supports (B & B A-2 ATHLETIC SUPPORTER) MISC 1 Device by Does not apply route continuous prn (prn pain) 5/23/18   GERARDO Caldera CNP       Current medications:    No current facility-administered medications for this encounter.         Allergies:  No Known Allergies    Problem List:    Patient Active Problem List   Diagnosis Code    Allergic rhinitis J30.9    Osteoarthritis M19.90    Depression F32.9    GERD (gastroesophageal reflux disease) K21.9    Hypertension I10    COPD (chronic obstructive pulmonary disease) J44.9    Smoker F17.200    Cervical spondylosis M47.812    Diabetes mellitus ([facility] [address] E11.9    Hydrocele N43.3       Past Medical History:        Diagnosis Date    Abnormal liver enzymes     Alcohol abuse     Allergic rhinitis     Chronic back pain     Colonic polyp     COPD (chronic obstructive pulmonary disease) (HCC)     Depression     Diabetes mellitus (HCC)     DJD (degenerative joint disease) of cervical spine     DJD (degenerative joint disease) of lumbar spine     SP SURGERY    GERD (gastroesophageal reflux disease)     Hyperlipidemia     Hypertension     Osteoarthritis     Osteoarthritis     Smoker        Past Surgical History:        Procedure Laterality Date    BACK SURGERY  2008    X 3    [facility]    [facility]    CHOLECYSTECTOMY      COLONOSCOPY  6/2012    repeat 6/2017- no precancerous  polyps    EKG 12-LEAD  4/6/2015         FACET JOINT INJECTION Bilateral 7/7/2020    BILATERAL C-FACET MBB @ C4-5,C5-6 and C6-7 performed by Pecola Goldmann, MD at [address] Caitie Skinner  2002    Bilateral Inguinal    [facility] SPINE SURGERY  2006, 2007, 2008    fusion 2008    [place] REMOVAL OF HYDROCELE,TUNICA,UNILAT Left 7/31/2018    LEFT HYDROCELECTOMY performed by Cash Nicholas MD at [facility] History:    Social History     Tobacco Use    Smoking status: Current Every Day Smoker     Packs/day: 0.75     Years: 38.00     Pack years: 28.50     Types: Cigarettes    Smokeless tobacco: Never Used   Substance Use Topics    Alcohol use: No     Alcohol/week: 0.0 standard drinks     Comment: quit 2013                                Ready to quit: Not Answered  Counseling given: Not Answered      Vital Signs (Current): There were no vitals filed for this visit.                                            BP Readings from Last 3 Encounters:   07/21/20 136/84   07/07/20 138/74   07/07/20 127/83       NPO Status:                                                                                 BMI:   Wt Readings from Last 3 Encounters:   07/21/20 215 lb (97.5 kg)   07/07/20 215 lb (97.5 kg)   06/17/20 218 lb (98.9 kg)     There is no height or weight on file to calculate BMI.    CBC:   Lab Results   Component Value Date    WBC 10.1 06/26/2018    RBC 4.81 06/26/2018    HGB 15.2 06/26/2018    HCT 45.1 06/26/2018    MCV 93.8 06/26/2018    RDW 13.8 05/24/2018     06/26/2018       CMP:   Lab Results   Component Value Date     06/15/2020    K 4.6 06/15/2020     06/15/2020    CO2 27 06/15/2020    BUN 12 06/15/2020    CREATININE 0.8 06/15/2020    LABGLOM >90 06/15/2020    GLUCOSE

## 2020-08-06 NOTE — H&P
H&P    Bilateral C-facet MBB # 1 from 7/7/2020. Received 100% relief neck pain for 1 full week with improvement of activity and mobility. Was able to tolerate more chores and improvement in ROM. Then pain returned back to the level it was prior in posterior neck radiates across to bilateral shoulder blades. Constant aching pain. Procedure helped headaches as well   Taking Norco prn which is effective in decreasing      Medications reviewed. Patient denies side effects with medications. Patient states he is taking medications as prescribed. Hedenies receiving pain medications from other sources. He denies any ER visits since last visit.     Pain scale with out pain medications or at its worst is 7/10. Pain scale with pain medications or at its best is 3/10. Last dose of [place] was today  Drug screen reviewed from 6/17/2020 and was + for alcohol   Pill count was completed today and was appropriate  Patient does not have naloxone available at home. Patient has not required use of naloxone at home since last office visit.         The patienthas No Known Allergies.     Past Medical History  Rikki Sesay  has a past medical history of Abnormal liver enzymes, Alcohol abuse, Allergic rhinitis, Chronic back pain, Colonic polyp, COPD (chronic obstructive pulmonary disease) ([address] Depression, Diabetes mellitus ([address] DJD (degenerative joint disease) of cervical spine, DJD (degenerative joint disease) of lumbar spine, GERD (gastroesophageal reflux disease), Hyperlipidemia, Hypertension, Osteoarthritis, Osteoarthritis, and Smoker.     Past Surgical History  The patient  has a past surgical history that includes back surgery (2008); Inguinal hernia repair (2002); hernia repair; Cholecystectomy; Colonoscopy (6/2012); EKG 12 Lead (4/6/2015); Lumbar spine surgery (2006, 2007, 2008); Cardiac catheterization (3/2015); pr removal of hydrocele,tunica,unilat (Left, 7/31/2018); and Facet joint injection (Bilateral, 7/7/2020).      Family History  This patient's family history includes Cancer in his father; Diabetes in his mother and sister; Heart Disease in his brother.  Tera Wilkins  reports that he has been smoking cigarettes. He has a 28.50 pack-year smoking history. He has never used smokeless tobacco. He reports that he does not drink alcohol or use drugs.     Medications  Current Medication     Current Outpatient Medications:     [START ON 7/29/2020] HYDROcodone-acetaminophen (NORCO) 5-325 MG per tablet, Take 1 tablet by mouth 2 times daily as needed for Pain for up to 30 days. , Disp: 60 tablet, Rfl: 0    SITagliptin (JANUVIA) 100 MG tablet, Take 1 tablet by mouth daily, Disp: 90 tablet, Rfl: 1    naproxen (NAPROSYN) 500 MG tablet, Take 1 tablet by mouth 2 times daily (with meals), Disp: 180 tablet, Rfl: 3    albuterol sulfate HFA (PROAIR HFA) 108 (90 Base) MCG/ACT inhaler, INHALE 2 PUFFS INTO THE LUNGS EVERY 6 HOURS AS NEEDED FOR WHEEZING, Disp: 1 Inhaler, Rfl: 3    azelastine (ASTELIN) 0.1 % nasal spray, SPRAY ONCE IN EACH NOSTRIL TWICE DAILY AS DIRECTED, Disp: 3 Bottle, Rfl: 1    QUEtiapine (SEROQUEL) 200 MG tablet, Take 1 tablet at bedtime, Disp: 90 tablet, Rfl: 3    tiotropium (SPIRIVA HANDIHALER) 18 MCG inhalation capsule, INHALE THE CONTENTS OF 1 CAPSULE INTO THE LUNGS EVERY DAY, Disp: 90 capsule, Rfl: 3    buPROPion (WELLBUTRIN XL) 150 MG extended release tablet, Take 1 tablet by mouth every morning, Disp: 90 tablet, Rfl: 3    lisinopril (PRINIVIL;ZESTRIL) 2.5 MG tablet, Take 1 tablet by mouth daily, Disp: 90 tablet, Rfl: 3    pantoprazole (PROTONIX) 40 MG tablet, TAKE 1 TABLET BY MOUTH DAILY, Disp: 90 tablet, Rfl: 3    amitriptyline (ELAVIL) 50 MG tablet, Take 1 tablet by mouth nightly, Disp: 90 tablet, Rfl: 3    metoprolol tartrate (LOPRESSOR) 25 MG tablet, TAKE 1/2 TABLET BY MOUTH TWICE DAILY.  HOLD FOR HEART RATE LESS THAN 60, Disp: 90 tablet, Rfl: 3    atorvastatin (LIPITOR) 40 MG tablet, TAKE 1 TABLET BY MOUTH DAILY, Disp: 90 tablet, Rfl: 3    metFORMIN (GLUCOPHAGE) 1000 MG tablet, Take 1 tablet by mouth 2 times daily (with meals), Disp: 180 tablet, Rfl: 3    nicotine (NICODERM CQ) 7 MG/24HR, Place 1 patch onto the skin every 24 hours, Disp: , Rfl:     aspirin 81 MG chewable tablet, TAKE 1 TABLET BY MOUTH DAILY, Disp: 90 tablet, Rfl: 3    Lancets MISC, 1 each by Does not apply route 2 times daily, Disp: 100 each, Rfl: 3    Elastic Bandages & Supports (B & B A-2 ATHLETIC SUPPORTER) MISC, 1 Device by Does not apply route continuous prn (prn pain), Disp: 1 each, Rfl: 0    Lancet Device MISC, 1 Device by Does not apply route once for 1 dose, Disp: 1 Device, Rfl: 0        Subjective:      Review of Systems   Constitutional: Negative. HENT: Negative. Respiratory: Negative. Gastrointestinal: Negative. Musculoskeletal: Positive for myalgias, neck pain and neck stiffness. Negative for arthralgias, gait problem and joint swelling. Neurological: Positive for headaches. Negative for weakness and numbness. Psychiatric/Behavioral: Negative.          Objective:      Vitals       Vitals:     07/21/20 0848   BP: 136/84   Temp: 97.5 °F (36.4 °C)   Weight: 215 lb (97.5 kg)   Height: 6' 1.5\" (1.867 m)           Physical Exam  Vitals signs and nursing note reviewed. Constitutional:       General: He is not in acute distress. Appearance: He is well-developed. He is not diaphoretic. HENT:      Head: Normocephalic and atraumatic. Right Ear: External ear normal.      Left Ear: External ear normal.      Nose: Nose normal.      Mouth/Throat:      Pharynx: No oropharyngeal exudate. Eyes:      General: No scleral icterus. Right eye: No discharge. Left eye: No discharge. Conjunctiva/sclera: Conjunctivae normal.      Pupils: Pupils are equal, round, and reactive to light. Neck:      Musculoskeletal: Full passive range of motion without pain, normal range of motion and neck supple.  Normal range of motion. No edema, erythema, neck rigidity or muscular tenderness. Thyroid: No thyromegaly. Cardiovascular:      Rate and Rhythm: Normal rate and regular rhythm. Heart sounds: Normal heart sounds. No murmur. No friction rub. No gallop. Pulmonary:      Effort: Pulmonary effort is normal. No respiratory distress. Breath sounds: Normal breath sounds. No wheezing or rales. Chest:      Chest wall: No tenderness. Abdominal:      General: Bowel sounds are normal. There is no distension. Palpations: Abdomen is soft. Tenderness: There is no abdominal tenderness. There is no guarding or rebound. Musculoskeletal:      Right shoulder: He exhibits decreased range of motion and tenderness. Left shoulder: He exhibits decreased range of motion and tenderness. Right hip: He exhibits no tenderness. Left hip: He exhibits no tenderness. Right knee: He exhibits normal range of motion. No tenderness found. Left knee: He exhibits normal range of motion. No tenderness found. Right ankle: He exhibits no swelling. Left ankle: He exhibits no swelling. Cervical back: He exhibits decreased range of motion, tenderness, pain and spasm. Thoracic back: He exhibits tenderness, pain and spasm. Lumbar back: He exhibits decreased range of motion, tenderness and pain. Back:       Right lower leg: He exhibits no swelling. Left lower leg: He exhibits no swelling. Right foot: No swelling. Left foot: No swelling. Skin:     General: Skin is warm. Coloration: Skin is not pale. Findings: No erythema or rash. Neurological:      Mental Status: He is alert and oriented to person, place, and time. He is not disoriented. Cranial Nerves: No cranial nerve deficit. Sensory: Sensory deficit present. Motor: No atrophy or abnormal muscle tone.       Coordination: Coordination normal.      Gait: Gait normal.      Deep Tendon Reflexes: Babinski sign absent on the right side. Babinski sign absent on the left side. Reflex Scores:       Tricep reflexes are 1+ on the right side and 1+ on the left side. Bicep reflexes are 1+ on the right side and 1+ on the left side. Brachioradialis reflexes are 1+ on the right side and 1+ on the left side. Patellar reflexes are 1+ on the right side and 1+ on the left side. Achilles reflexes are 1+ on the right side and 1+ on the left side. Comments: SENSORY DECREASED STOCKING DISTRIBUTION   Psychiatric:         Attention and Perception: Attention normal. He is attentive. Mood and Affect: Mood normal. Mood is not anxious or depressed. Affect is not labile, blunt, angry or inappropriate. Speech: Speech normal. He is communicative. Speech is not rapid and pressured, delayed, slurred or tangential.         Behavior: Behavior normal. Behavior is not agitated, slowed, aggressive, withdrawn, hyperactive or combative. Behavior is cooperative. Thought Content: Thought content normal. Thought content is not paranoid or delusional. Thought content does not include homicidal or suicidal ideation. Thought content does not include homicidal or suicidal plan. Cognition and Memory: Cognition normal. Memory is not impaired. He does not exhibit impaired recent memory or impaired remote memory. Judgment: Judgment is not impulsive or inappropriate.         JIMENA test: + bilaterally   Yeomans test: + bilaterally   Gaenslen test: + bilaterally      Assessment:      1. Cervical spondylosis    2. Thoracic spondylosis    3. Sacroiliac inflammation ([address]    4. Chronic pain syndrome            Plan:      · OARRS reviewed. Current MED: 10.00  · Patient was offered naloxone for home. · Discussed long term side effects of medications, tolerance, dependency and addiction. · Previous UDS reviewed  · UDS preformed today for compliance.   · Patient told can not receive any pain medications from any other source. · No evidence of abuse, diversion or aberrant behavior. · Medications and/or procedures to improve function and quality of life- patient understanding with this and that may not be pain free  · Discussed with patient about safe storage of medications at home  · Discussed possible weaning of medication dosing dependent on treatment/procedure results. · Discussed with patient about risks with procedure including infection, reaction to medication, increased pain, or bleeding. · Procedure notes reviewed in detail   · Received 100% relief of neck pain from C-facet MBB # 1 for 7 days with improvement of mobility. · Plan to repeat bilateral C-facet MBB # 2 @ C4-5,C5-6 and C6-7 for diagnostic purpose. Procedure and risks discussed in detail with patient.    · Continue Norco 5/325 BID prn- ordered refill  · Updated UDS today              Return for bilateral C-facet MBB # 2 @ C4-5, C5-6 and C6-7. , follow up after procedure.

## 2020-08-06 NOTE — H&P
[address] [address]  History and Physical Update    Pt Name: Eric Martines  MRN: [id number]  YOB: 1955  Date of evaluation: 8/6/2020      I have examined the patient and reviewed the H&P/Consult and there are no changes to the patient or plans.         Electronically signed by Alexis Khan MD on 8/6/2020 at 10:58 AM 03-Jun-2019 12:26

## 2020-08-06 NOTE — PROGRESS NOTES
1223: Patient to phase 2 recovery room via cart. Patient is awake and alert. Report received from surgical RN, Yovani Salinas. Patient's vitals obtained, see charting.   5319: Patient is denying pain and nausea at this time. IV is infusing into his left hand. 1227: Offered drink and snack provided to the patient. Bed is in the lowest position and call light is within reach. 1241: IV removed at this time- no complications and dressing applied. [id number] [id number]: Patient ambulated to the car and discharged home in stable condition with his daughter in law.

## 2020-08-20 ENCOUNTER — OFFICE VISIT (OUTPATIENT)
Dept: PHYSICAL MEDICINE AND REHAB | Age: 65
End: 2020-08-20
Payer: MEDICARE

## 2020-08-20 VITALS
HEIGHT: 74 IN | TEMPERATURE: 96.8 F | DIASTOLIC BLOOD PRESSURE: 82 MMHG | BODY MASS INDEX: 27.59 KG/M2 | SYSTOLIC BLOOD PRESSURE: 138 MMHG | WEIGHT: 215 LBS

## 2020-08-20 PROCEDURE — 99213 OFFICE O/P EST LOW 20 MIN: CPT | Performed by: NURSE PRACTITIONER

## 2020-08-20 RX ORDER — HYDROCODONE BITARTRATE AND ACETAMINOPHEN 5; 325 MG/1; MG/1
1 TABLET ORAL 2 TIMES DAILY PRN
Qty: 60 TABLET | Refills: 0 | Status: SHIPPED | OUTPATIENT
Start: 2020-08-28 | End: 2020-09-24 | Stop reason: SDUPTHER

## 2020-08-20 ASSESSMENT — ENCOUNTER SYMPTOMS: BACK PAIN: 0

## 2020-08-20 NOTE — PROGRESS NOTES
[facility]  [address]  Dept: [phone]  Dept Fax: [phone]: [phone]    Visit Date: 8/20/2020    Functionality Assessment/Goals Worksheet     On a scale of 0 (Does not Interfere) to 10 (Completely Interferes)     1. Which number describes how during the past week pain has interfered with       the following:  A. General Activity:  6  B. Mood: 4  C. Walking Ability:  2  D. Normal Work (Includes both work outside the home and housework):  6  E. Relations with Other People:   0  F. Sleep:   5  G. Enjoyment of Life:   4    2. Patient Prefers to Take their Pain Medications:     [x]  On a regular basis   []  Only when necessary    []  Does not take pain medications    3. What are the Patient's Goals/Expectations for Visiting Pain Management? [x]  Learn about my pain    []  Receive Medication   []  Physical Therapy     []  Treat Depression   []  Receive Injections    []  Treat Sleep   []  Deal with Anxiety and Stress   []  Treat Opoid Dependence/Addiction   []  Other:      HPI:   Matt Jerome is a 60 y.o. male is here today for    Chief Complaint: Neck pain. HPI   FU from bilateral C-facet MBB # 2 from 8/6/2020. Had increased pain and swelling initially after procedure but then received 85% relief of posterior neck pain for 6 days following C-facet MBB # 2 with improvement of ROM and mobility. Since then pain returned in posterior neck radiates across to bilateral shoulder blades. Constant aching pain. Procedure relieved headaches as well. Norco prn is effective in decreasing neck pain and headaches,     Medications reviewed. Patient denies side effects with medications. Patient states he is taking medications as prescribed. Hedenies receiving pain medications from other sources. He denies any ER visits since last visit.     Pain scale with out pain medications or at its worst is 7/10. Pain scale with pain medications or at its best is 3/10. Last dose of [place] was yesterday  Drug screen reviewed from 7/21/2020 and was appropriate  Pill count was completed today and was appropriate  Patient does not have naloxone available at home. Patient has not required use of naloxone at home since last office visit. The patienthas No Known Allergies. Past Medical History  Devon Noland  has a past medical history of Abnormal liver enzymes, Alcohol abuse, Allergic rhinitis, Chronic back pain, Colonic polyp, COPD (chronic obstructive pulmonary disease) ([address] Depression, Diabetes mellitus ([address] DJD (degenerative joint disease) of cervical spine, DJD (degenerative joint disease) of lumbar spine, GERD (gastroesophageal reflux disease), Hyperlipidemia, Hypertension, Osteoarthritis, Osteoarthritis, and Smoker. Past Surgical History  The patient  has a past surgical history that includes back surgery (2008); Inguinal hernia repair (2002); hernia repair; Cholecystectomy; Colonoscopy (6/2012); EKG 12 Lead (4/6/2015); Lumbar spine surgery (2006, 2007, 2008); Cardiac catheterization (3/2015); pr removal of hydrocele,tunica,unilat (Left, 7/31/2018); Facet joint injection (Bilateral, 7/7/2020); and Facet joint injection (Bilateral, 8/6/2020). Family History  This patient's family history includes Cancer in his father; Diabetes in his mother and sister; Heart Disease in his brother. Social History  Devon Noland  reports that he has been smoking cigarettes. He has a 28.50 pack-year smoking history. He has never used smokeless tobacco. He reports that he does not drink alcohol or use drugs. Medications    Current Outpatient Medications:     [START ON 8/28/2020] HYDROcodone-acetaminophen (NORCO) 5-325 MG per tablet, Take 1 tablet by mouth 2 times daily as needed for Pain for up to 30 days. , Disp: 60 tablet, Rfl: 0    SITagliptin (JANUVIA) 100 MG tablet, Take 1 tablet by mouth daily, Disp: 90 tablet, Rfl: 1    naproxen (NAPROSYN) 500 MG tablet, Take 1 tablet by mouth 2 times daily (with meals), Disp: 180 tablet, Rfl: 3    albuterol sulfate HFA (PROAIR HFA) 108 (90 Base) MCG/ACT inhaler, INHALE 2 PUFFS INTO THE LUNGS EVERY 6 HOURS AS NEEDED FOR WHEEZING, Disp: 1 Inhaler, Rfl: 3    azelastine (ASTELIN) 0.1 % nasal spray, SPRAY ONCE IN EACH NOSTRIL TWICE DAILY AS DIRECTED, Disp: 3 Bottle, Rfl: 1    QUEtiapine (SEROQUEL) 200 MG tablet, Take 1 tablet at bedtime, Disp: 90 tablet, Rfl: 3    tiotropium (SPIRIVA HANDIHALER) 18 MCG inhalation capsule, INHALE THE CONTENTS OF 1 CAPSULE INTO THE LUNGS EVERY DAY, Disp: 90 capsule, Rfl: 3    buPROPion (WELLBUTRIN XL) 150 MG extended release tablet, Take 1 tablet by mouth every morning, Disp: 90 tablet, Rfl: 3    lisinopril (PRINIVIL;ZESTRIL) 2.5 MG tablet, Take 1 tablet by mouth daily, Disp: 90 tablet, Rfl: 3    pantoprazole (PROTONIX) 40 MG tablet, TAKE 1 TABLET BY MOUTH DAILY, Disp: 90 tablet, Rfl: 3    amitriptyline (ELAVIL) 50 MG tablet, Take 1 tablet by mouth nightly, Disp: 90 tablet, Rfl: 3    metoprolol tartrate (LOPRESSOR) 25 MG tablet, TAKE 1/2 TABLET BY MOUTH TWICE DAILY.  HOLD FOR HEART RATE LESS THAN 60, Disp: 90 tablet, Rfl: 3    atorvastatin (LIPITOR) 40 MG tablet, TAKE 1 TABLET BY MOUTH DAILY, Disp: 90 tablet, Rfl: 3    metFORMIN (GLUCOPHAGE) 1000 MG tablet, Take 1 tablet by mouth 2 times daily (with meals), Disp: 180 tablet, Rfl: 3    nicotine (NICODERM CQ) 7 MG/24HR, Place 1 patch onto the skin every 24 hours, Disp: , Rfl:     aspirin 81 MG chewable tablet, TAKE 1 TABLET BY MOUTH DAILY, Disp: 90 tablet, Rfl: 3    Lancets MISC, 1 each by Does not apply route 2 times daily, Disp: 100 each, Rfl: 3    Elastic Bandages & Supports (B & B A-2 ATHLETIC SUPPORTER) MISC, 1 Device by Does not apply route continuous prn (prn pain), Disp: 1 each, Rfl: 0    Lancet Device MISC, 1 Device by Does not apply route once for 1 dose, Disp: 1 Device, Rfl: 0    Subjective:      Review of Systems   Musculoskeletal: Positive for arthralgias, myalgias, neck pain and neck stiffness. Negative for back pain, gait problem and joint swelling. Neurological: Positive for headaches. Negative for weakness and numbness. Objective:     Vitals:    08/20/20 0831   BP: 138/82   Temp: 96.8 °F (36 °C)   Weight: 215 lb (97.5 kg)   Height: 6' 1.5\" (1.867 m)       Physical Exam  Vitals signs and nursing note reviewed. Constitutional:       General: He is not in acute distress. Appearance: He is well-developed. He is not diaphoretic. HENT:      Head: Normocephalic and atraumatic. Right Ear: External ear normal.      Left Ear: External ear normal.      Nose: Nose normal.      Mouth/Throat:      Pharynx: No oropharyngeal exudate. Eyes:      General: No scleral icterus. Right eye: No discharge. Left eye: No discharge. Conjunctiva/sclera: Conjunctivae normal.      Pupils: Pupils are equal, round, and reactive to light. Neck:      Musculoskeletal: Full passive range of motion without pain, normal range of motion and neck supple. Normal range of motion. No edema, erythema, neck rigidity or muscular tenderness. Thyroid: No thyromegaly. Cardiovascular:      Rate and Rhythm: Normal rate and regular rhythm. Heart sounds: Normal heart sounds. No murmur. No friction rub. No gallop. Pulmonary:      Effort: Pulmonary effort is normal. No respiratory distress. Breath sounds: Normal breath sounds. No wheezing or rales. Chest:      Chest wall: No tenderness. Abdominal:      General: Bowel sounds are normal. There is no distension. Palpations: Abdomen is soft. Tenderness: There is no abdominal tenderness. There is no guarding or rebound. Musculoskeletal:      Right shoulder: He exhibits decreased range of motion and tenderness. Left shoulder: He exhibits decreased range of motion and tenderness.       Right hip: He exhibits no tenderness. Left hip: He exhibits no tenderness. Right knee: He exhibits normal range of motion. No tenderness found. Left knee: He exhibits normal range of motion. No tenderness found. Right ankle: He exhibits no swelling. Left ankle: He exhibits no swelling. Cervical back: He exhibits decreased range of motion, tenderness, pain and spasm. Thoracic back: He exhibits tenderness, pain and spasm. Lumbar back: He exhibits decreased range of motion, tenderness and pain. Back:       Right lower leg: He exhibits no swelling. Left lower leg: He exhibits no swelling. Right foot: No swelling. Left foot: No swelling. Skin:     General: Skin is warm. Coloration: Skin is not pale. Findings: No erythema or rash. Neurological:      Mental Status: He is alert and oriented to person, place, and time. He is not disoriented. Cranial Nerves: No cranial nerve deficit. Sensory: Sensory deficit present. Motor: No atrophy or abnormal muscle tone. Coordination: Coordination normal.      Gait: Gait normal.      Deep Tendon Reflexes: Babinski sign absent on the right side. Babinski sign absent on the left side. Reflex Scores:       Tricep reflexes are 1+ on the right side and 1+ on the left side. Bicep reflexes are 1+ on the right side and 1+ on the left side. Brachioradialis reflexes are 1+ on the right side and 1+ on the left side. Patellar reflexes are 1+ on the right side and 1+ on the left side. Achilles reflexes are 1+ on the right side and 1+ on the left side. Comments: SENSORY DECREASED STOCKING DISTRIBUTION   Psychiatric:         Attention and Perception: Attention normal. He is attentive. Mood and Affect: Mood normal. Mood is not anxious or depressed. Affect is not labile, blunt, angry or inappropriate. Speech: Speech normal. He is communicative.  Speech is not rapid and pressured, delayed, slurred or tangential.         Behavior: Behavior normal. Behavior is not agitated, slowed, aggressive, withdrawn, hyperactive or combative. Behavior is cooperative. Thought Content: Thought content normal. Thought content is not paranoid or delusional. Thought content does not include homicidal or suicidal ideation. Thought content does not include homicidal or suicidal plan. Cognition and Memory: Cognition normal. Memory is not impaired. He does not exhibit impaired recent memory or impaired remote memory. Judgment: Judgment is not impulsive or inappropriate. JIMENA test: + bilaterally   Yeomans test: + bilaterally   Gaenslen test: + bilaterally      Assessment:     1. Cervical spondylosis    2. Thoracic spondylosis    3. [address]roiliac inflammation ([facility] [address]    4. Chronic pain syndrome            Plan:      OARRS reviewed. Current MED: 10.00  Patient was not offered naloxone for home. Discussed long term side effects of medications, tolerance, dependency and addiction. Previous UDS reviewed  UDS preformed today for compliance. Patient told can not receive any pain medications from any other source. No evidence of abuse, diversion or aberrant behavior. Medications and/or procedures to improve function and quality of life- patient understanding with this and that may not be pain free  Discussed with patient about safe storage of medications at home  Discussed possible weaning of medication dosing dependent on treatment/procedure results. Discussed with patient about risks with procedure including infection, reaction to medication, increased pain, or bleeding. Procedure notes reviewed in detail   Received 85% relief of posterior neck pain for 6 days following C-facet MBB # 2 with improvement of ROM and mobility. Plan Bilateral C-facet RFA @ C4-5,C5-6 and C6-7 RIGHT SIDE FIRST for longer term pain relief. Procedure and risks discussed in detail with patient.    Continue Norco 5/325 BID prn- Ordered refill. patient is compliant      Meds. Prescribed:   Orders Placed This Encounter   Medications    HYDROcodone-acetaminophen (NORCO) 5-325 MG per tablet     Sig: Take 1 tablet by mouth 2 times daily as needed for Pain for up to 30 days. Dispense:  60 tablet     Refill:  0     Reduce doses taken as pain becomes manageable       Return for Bilateral C-facet RFA @ C4-5, C5-6 and C6-7 RIGHT SIDE FIRST. , follow up after procedure.            Electronically signed by GERARDO Diallo CNP on8/20/2020 at 8:48 AM

## 2020-08-24 RX ORDER — ETODOLAC 400 MG/1
400 TABLET, EXTENDED RELEASE ORAL DAILY
Qty: 90 TABLET | Refills: 3 | Status: SHIPPED | OUTPATIENT
Start: 2020-08-24 | End: 2021-09-24

## 2020-08-24 NOTE — TELEPHONE ENCOUNTER
Recent Visits  Date Type Provider Dept   05/14/20 Office Visit Carlos Minder, APRN - CNP [facility]   12/17/19 Office Visit Carlos Minder, APRN - CNP [facility]   10/10/19 Office Visit Carlos Minder, APRN - CNP [facility]   04/10/19 Office Visit Carlos Minder, APRN - CNP [facility]   Showing recent visits within past 540 days with a meds authorizing provider and meeting all other requirements     Future Appointments  Date Type Provider Dept   11/19/20 Appointment Carlos Minder, APRN - CNP [facility]   12/17/20 Appointment Carlos Minder, APRN - CNP [facility]   Showing future appointments within next 150 days with a meds authorizing provider and meeting all other requirements     Future Appointments   Date Time Provider Ezra Macdonald   9/24/2020  8:30 AM Benson Erm, APRN - CNP [facility] [place]   11/19/2020  9:00 AM Carlosherminia Asher, GERARDO - [id number] [address] - [place]   12/17/2020  9:00 AM Carlos GERARDO Asher

## 2020-08-31 ENCOUNTER — TELEPHONE (OUTPATIENT)
Dept: PHYSICAL MEDICINE AND REHAB | Age: 65
End: 2020-08-31

## 2020-09-02 ENCOUNTER — TELEPHONE (OUTPATIENT)
Dept: PHYSICAL MEDICINE AND REHAB | Age: 65
End: 2020-09-02

## 2020-09-02 NOTE — TELEPHONE ENCOUNTER
----- Message from Erik Hicmkan sent at 9/2/2020 10:15 AM EDT -----  Regarding: partial approval  Per medicare guidelines only 2 levels are approved not 3 for RFA  Procedure updated.  C6-7 removed

## 2020-09-04 ENCOUNTER — ANESTHESIA (OUTPATIENT)
Dept: OPERATING ROOM | Age: 65
End: 2020-09-04
Payer: MEDICARE

## 2020-09-04 ENCOUNTER — HOSPITAL ENCOUNTER (OUTPATIENT)
Age: 65
Setting detail: OUTPATIENT SURGERY
Discharge: HOME | End: 2020-09-04
Attending: PAIN MEDICINE | Admitting: PAIN MEDICINE
Payer: MEDICARE

## 2020-09-04 ENCOUNTER — ANESTHESIA EVENT (OUTPATIENT)
Dept: OPERATING ROOM | Age: 65
End: 2020-09-04
Payer: MEDICARE

## 2020-09-04 ENCOUNTER — APPOINTMENT (OUTPATIENT)
Dept: GENERAL RADIOLOGY | Age: 65
End: 2020-09-04
Attending: PAIN MEDICINE
Payer: MEDICARE

## 2020-09-04 VITALS
TEMPERATURE: 97.3 F | SYSTOLIC BLOOD PRESSURE: 131 MMHG | OXYGEN SATURATION: 95 % | HEART RATE: 77 BPM | HEIGHT: 73 IN | RESPIRATION RATE: 12 BRPM | BODY MASS INDEX: 28.49 KG/M2 | WEIGHT: 215 LBS | DIASTOLIC BLOOD PRESSURE: 78 MMHG

## 2020-09-04 VITALS
RESPIRATION RATE: 16 BRPM | DIASTOLIC BLOOD PRESSURE: 98 MMHG | SYSTOLIC BLOOD PRESSURE: 154 MMHG | OXYGEN SATURATION: 99 %

## 2020-09-04 LAB — GLUCOSE BLD-MCNC: 216 MG/DL (ref 70–108)

## 2020-09-04 PROCEDURE — 3700000000 HC ANESTHESIA ATTENDED CARE: Performed by: PAIN MEDICINE

## 2020-09-04 PROCEDURE — 2709999900 HC NON-CHARGEABLE SUPPLY: Performed by: PAIN MEDICINE

## 2020-09-04 PROCEDURE — 3600000056 HC PAIN LEVEL 4 BASE: Performed by: PAIN MEDICINE

## 2020-09-04 PROCEDURE — 6360000002 HC RX W HCPCS: Performed by: NURSE ANESTHETIST, CERTIFIED REGISTERED

## 2020-09-04 PROCEDURE — 2500000003 HC RX 250 WO HCPCS: Performed by: NURSE ANESTHETIST, CERTIFIED REGISTERED

## 2020-09-04 PROCEDURE — 64633 DESTROY CERV/THOR FACET JNT: CPT | Performed by: PAIN MEDICINE

## 2020-09-04 PROCEDURE — 3209999900 FLUORO FOR SURGICAL PROCEDURES

## 2020-09-04 PROCEDURE — 82948 REAGENT STRIP/BLOOD GLUCOSE: CPT

## 2020-09-04 PROCEDURE — 6360000002 HC RX W HCPCS: Performed by: PAIN MEDICINE

## 2020-09-04 PROCEDURE — 2580000003 HC RX 258: Performed by: NURSE ANESTHETIST, CERTIFIED REGISTERED

## 2020-09-04 PROCEDURE — 7100000010 HC PHASE II RECOVERY - FIRST 15 MIN: Performed by: PAIN MEDICINE

## 2020-09-04 PROCEDURE — 3700000001 HC ADD 15 MINUTES (ANESTHESIA): Performed by: PAIN MEDICINE

## 2020-09-04 PROCEDURE — 2500000003 HC RX 250 WO HCPCS: Performed by: PAIN MEDICINE

## 2020-09-04 PROCEDURE — 3600000057 HC PAIN LEVEL 4 ADDL 15 MIN: Performed by: PAIN MEDICINE

## 2020-09-04 PROCEDURE — 2720000010 HC SURG SUPPLY STERILE: Performed by: PAIN MEDICINE

## 2020-09-04 PROCEDURE — 64634 DESTROY C/TH FACET JNT ADDL: CPT | Performed by: PAIN MEDICINE

## 2020-09-04 PROCEDURE — 7100000011 HC PHASE II RECOVERY - ADDTL 15 MIN: Performed by: PAIN MEDICINE

## 2020-09-04 RX ORDER — BUPIVACAINE HYDROCHLORIDE 2.5 MG/ML
INJECTION, SOLUTION EPIDURAL; INFILTRATION; INTRACAUDAL PRN
Status: DISCONTINUED | OUTPATIENT
Start: 2020-09-04 | End: 2020-09-04 | Stop reason: ALTCHOICE

## 2020-09-04 RX ORDER — LIDOCAINE HYDROCHLORIDE 10 MG/ML
INJECTION, SOLUTION EPIDURAL; INFILTRATION; INTRACAUDAL; PERINEURAL PRN
Status: DISCONTINUED | OUTPATIENT
Start: 2020-09-04 | End: 2020-09-04 | Stop reason: ALTCHOICE

## 2020-09-04 RX ORDER — SODIUM CHLORIDE 9 MG/ML
INJECTION, SOLUTION INTRAVENOUS CONTINUOUS PRN
Status: DISCONTINUED | OUTPATIENT
Start: 2020-09-04 | End: 2020-09-04 | Stop reason: SDUPTHER

## 2020-09-04 RX ORDER — DEXAMETHASONE SODIUM PHOSPHATE 4 MG/ML
INJECTION, SOLUTION INTRA-ARTICULAR; INTRALESIONAL; INTRAMUSCULAR; INTRAVENOUS; SOFT TISSUE PRN
Status: DISCONTINUED | OUTPATIENT
Start: 2020-09-04 | End: 2020-09-04 | Stop reason: ALTCHOICE

## 2020-09-04 RX ORDER — FENTANYL CITRATE 50 UG/ML
INJECTION, SOLUTION INTRAMUSCULAR; INTRAVENOUS PRN
Status: DISCONTINUED | OUTPATIENT
Start: 2020-09-04 | End: 2020-09-04 | Stop reason: SDUPTHER

## 2020-09-04 RX ORDER — LIDOCAINE HYDROCHLORIDE 20 MG/ML
INJECTION, SOLUTION EPIDURAL; INFILTRATION; INTRACAUDAL; PERINEURAL PRN
Status: DISCONTINUED | OUTPATIENT
Start: 2020-09-04 | End: 2020-09-04 | Stop reason: SDUPTHER

## 2020-09-04 RX ADMIN — LIDOCAINE HYDROCHLORIDE 20 MG: 20 INJECTION, SOLUTION EPIDURAL; INFILTRATION; INTRACAUDAL; PERINEURAL at 10:01

## 2020-09-04 RX ADMIN — LIDOCAINE HYDROCHLORIDE 20 MG: 20 INJECTION, SOLUTION EPIDURAL; INFILTRATION; INTRACAUDAL; PERINEURAL at 09:58

## 2020-09-04 RX ADMIN — LIDOCAINE HYDROCHLORIDE 40 MG: 20 INJECTION, SOLUTION EPIDURAL; INFILTRATION; INTRACAUDAL; PERINEURAL at 09:54

## 2020-09-04 RX ADMIN — LIDOCAINE HYDROCHLORIDE 40 MG: 20 INJECTION, SOLUTION EPIDURAL; INFILTRATION; INTRACAUDAL; PERINEURAL at 09:55

## 2020-09-04 RX ADMIN — LIDOCAINE HYDROCHLORIDE 40 MG: 20 INJECTION, SOLUTION EPIDURAL; INFILTRATION; INTRACAUDAL; PERINEURAL at 09:53

## 2020-09-04 RX ADMIN — SODIUM CHLORIDE: 9 INJECTION, SOLUTION INTRAVENOUS at 09:10

## 2020-09-04 RX ADMIN — FENTANYL CITRATE 50 MCG: 50 INJECTION, SOLUTION INTRAMUSCULAR; INTRAVENOUS at 09:52

## 2020-09-04 ASSESSMENT — PAIN - FUNCTIONAL ASSESSMENT: PAIN_FUNCTIONAL_ASSESSMENT: 0-10

## 2020-09-04 ASSESSMENT — PULMONARY FUNCTION TESTS
PIF_VALUE: 0

## 2020-09-04 ASSESSMENT — PAIN SCALES - GENERAL: PAINLEVEL_OUTOF10: 5

## 2020-09-04 ASSESSMENT — PAIN DESCRIPTION - DESCRIPTORS: DESCRIPTORS: CONSTANT

## 2020-09-04 NOTE — H&P
H&P    Bilateral C-facet MBB # 2 from 8/6/2020. Had increased pain and swelling initially after procedure but then received 85% relief of posterior neck pain for 6 days following C-facet MBB # 2 with improvement of ROM and mobility. Since then pain returned in posterior neck radiates across to bilateral shoulder blades. Constant aching pain. Procedure relieved headaches as well.      Norco prn is effective in decreasing neck pain and headaches,      Medications reviewed. Patient denies side effects with medications. Patient states he is taking medications as prescribed. Hedenies receiving pain medications from other sources. He denies any ER visits since last visit.     Pain scale with out pain medications or at its worst is 7/10. Pain scale with pain medications or at its best is 3/10. Last dose of Fletcher was yesterday  Drug screen reviewed from 7/21/2020 and was appropriate  Pill count was completed today and was appropriate  Patient does not have naloxone available at home. Patient has not required use of naloxone at home since last office visit.         The patienthas No Known Allergies.     Past Medical History  Marianela Parsons  has a past medical history of Abnormal liver enzymes, Alcohol abuse, Allergic rhinitis, Chronic back pain, Colonic polyp, COPD (chronic obstructive pulmonary disease) ([address] Depression, Diabetes mellitus ([address] DJD (degenerative joint disease) of cervical spine, DJD (degenerative joint disease) of lumbar spine, GERD (gastroesophageal reflux disease), Hyperlipidemia, Hypertension, Osteoarthritis, Osteoarthritis, and Smoker.     Past Surgical History  The patient  has a past surgical history that includes back surgery (2008); Inguinal hernia repair (2002); hernia repair; Cholecystectomy; Colonoscopy (6/2012); EKG 12 Lead (4/6/2015); Lumbar spine surgery (2006, 2007, 2008); Cardiac catheterization (3/2015); pr removal of hydrocele,tunica,unilat (Left, 7/31/2018);  Facet joint injection (Bilateral, tablet, Rfl: 3    atorvastatin (LIPITOR) 40 MG tablet, TAKE 1 TABLET BY MOUTH DAILY, Disp: 90 tablet, Rfl: 3    metFORMIN (GLUCOPHAGE) 1000 MG tablet, Take 1 tablet by mouth 2 times daily (with meals), Disp: 180 tablet, Rfl: 3    nicotine (NICODERM CQ) 7 MG/24HR, Place 1 patch onto the skin every 24 hours, Disp: , Rfl:     aspirin 81 MG chewable tablet, TAKE 1 TABLET BY MOUTH DAILY, Disp: 90 tablet, Rfl: 3    Lancets MISC, 1 each by Does not apply route 2 times daily, Disp: 100 each, Rfl: 3    Elastic Bandages & Supports (B & B A-2 ATHLETIC SUPPORTER) MISC, 1 Device by Does not apply route continuous prn (prn pain), Disp: 1 each, Rfl: 0    Lancet Device MISC, 1 Device by Does not apply route once for 1 dose, Disp: 1 Device, Rfl: 0        Subjective:      Review of Systems   Musculoskeletal: Positive for arthralgias, myalgias, neck pain and neck stiffness. Negative for back pain, gait problem and joint swelling. Neurological: Positive for headaches. Negative for weakness and numbness.         Objective:      Vitals       Vitals:     08/20/20 0831   BP: 138/82   Temp: 96.8 °F (36 °C)   Weight: 215 lb (97.5 kg)   Height: 6' 1.5\" (1.867 m)           Physical Exam  Vitals signs and nursing note reviewed. Constitutional:       General: He is not in acute distress. Appearance: He is well-developed. He is not diaphoretic. HENT:      Head: Normocephalic and atraumatic. Right Ear: External ear normal.      Left Ear: External ear normal.      Nose: Nose normal.      Mouth/Throat:      Pharynx: No oropharyngeal exudate. Eyes:      General: No scleral icterus. Right eye: No discharge. Left eye: No discharge. Conjunctiva/sclera: Conjunctivae normal.      Pupils: Pupils are equal, round, and reactive to light. Neck:      Musculoskeletal: Full passive range of motion without pain, normal range of motion and neck supple. Normal range of motion.  No edema, erythema, neck rigidity or muscular tenderness. Thyroid: No thyromegaly. Cardiovascular:      Rate and Rhythm: Normal rate and regular rhythm. Heart sounds: Normal heart sounds. No murmur. No friction rub. No gallop. Pulmonary:      Effort: Pulmonary effort is normal. No respiratory distress. Breath sounds: Normal breath sounds. No wheezing or rales. Chest:      Chest wall: No tenderness. Abdominal:      General: Bowel sounds are normal. There is no distension. Palpations: Abdomen is soft. Tenderness: There is no abdominal tenderness. There is no guarding or rebound. Musculoskeletal:      Right shoulder: He exhibits decreased range of motion and tenderness. Left shoulder: He exhibits decreased range of motion and tenderness. Right hip: He exhibits no tenderness. Left hip: He exhibits no tenderness. Right knee: He exhibits normal range of motion. No tenderness found. Left knee: He exhibits normal range of motion. No tenderness found. Right ankle: He exhibits no swelling. Left ankle: He exhibits no swelling. Cervical back: He exhibits decreased range of motion, tenderness, pain and spasm. Thoracic back: He exhibits tenderness, pain and spasm. Lumbar back: He exhibits decreased range of motion, tenderness and pain. Back:       Right lower leg: He exhibits no swelling. Left lower leg: He exhibits no swelling. Right foot: No swelling. Left foot: No swelling. Skin:     General: Skin is warm. Coloration: Skin is not pale. Findings: No erythema or rash. Neurological:      Mental Status: He is alert and oriented to person, place, and time. He is not disoriented. Cranial Nerves: No cranial nerve deficit. Sensory: Sensory deficit present. Motor: No atrophy or abnormal muscle tone. Coordination: Coordination normal.      Gait: Gait normal.      Deep Tendon Reflexes: Babinski sign absent on the right side.  Babinski sign absent on the left side. Reflex Scores:       Tricep reflexes are 1+ on the right side and 1+ on the left side. Bicep reflexes are 1+ on the right side and 1+ on the left side. Brachioradialis reflexes are 1+ on the right side and 1+ on the left side. Patellar reflexes are 1+ on the right side and 1+ on the left side. Achilles reflexes are 1+ on the right side and 1+ on the left side. Comments: SENSORY DECREASED STOCKING DISTRIBUTION   Psychiatric:         Attention and Perception: Attention normal. He is attentive. Mood and Affect: Mood normal. Mood is not anxious or depressed. Affect is not labile, blunt, angry or inappropriate. Speech: Speech normal. He is communicative. Speech is not rapid and pressured, delayed, slurred or tangential.         Behavior: Behavior normal. Behavior is not agitated, slowed, aggressive, withdrawn, hyperactive or combative. Behavior is cooperative. Thought Content: Thought content normal. Thought content is not paranoid or delusional. Thought content does not include homicidal or suicidal ideation. Thought content does not include homicidal or suicidal plan. Cognition and Memory: Cognition normal. Memory is not impaired. He does not exhibit impaired recent memory or impaired remote memory. Judgment: Judgment is not impulsive or inappropriate.         JIMENA test: + bilaterally   Yeomans test: + bilaterally   Gaenslen test: + bilaterally      Assessment:      1. Cervical spondylosis    2. Thoracic spondylosis    3. Sacroiliac inflammation ([facility]    4. Chronic pain syndrome            Plan:      · OARRS reviewed. Current MED: 10.00  · Patient was not offered naloxone for home. · Discussed long term side effects of medications, tolerance, dependency and addiction. · Previous UDS reviewed  · UDS preformed today for compliance. · Patient told can not receive any pain medications from any other source.   · No evidence of abuse, diversion or aberrant behavior. · Medications and/or procedures to improve function and quality of life- patient understanding with this and that may not be pain free  · Discussed with patient about safe storage of medications at home  · Discussed possible weaning of medication dosing dependent on treatment/procedure results. · Discussed with patient about risks with procedure including infection, reaction to medication, increased pain, or bleeding. · Procedure notes reviewed in detail   · Received 85% relief of posterior neck pain for 6 days following C-facet MBB # 2 with improvement of ROM and mobility. · Plan Bilateral C-facet RFA @ C4-5,C5-6 and C6-7 RIGHT SIDE FIRST for longer term pain relief. Procedure and risks discussed in detail with patient. · Continue Norco 5/325 BID prn- Ordered refill. patient is compliant        Meds.  Prescribed:   Encounter Medications         Return for Bilateral C-facet RFA @ C4-5, C5-6 and C6-7 RIGHT SIDE FIRST. , follow up after procedure.

## 2020-09-04 NOTE — H&P
[address] [address]  History and Physical Update    Pt Name: Andrzej Palumbo  MRN: [id number]  YOB: 1955  Date of evaluation: 9/4/2020      I have examined the patient and reviewed the H&P/Consult and there are no changes to the patient or plans.         Electronically signed by Yanique Rodriguez MD on 9/4/2020 at 7:58 AM

## 2020-09-04 NOTE — ANESTHESIA POSTPROCEDURE EVALUATION
Department of Anesthesiology  Postprocedure Note    Patient: Mindy Mccarthy  MRN: [id number]  YOB: 1955  Date of evaluation: 9/4/2020  Time:  10:47 AM     Procedure Summary     Date:  09/04/20 Room / Location:  [address] [place] [address] 03 / [address] [facility]    Anesthesia Start:  9448 Anesthesia Stop:  1010    Procedure:  Bilateral C-facet RFA @ C4-5, C5-6. RIGHT SIDE FIRST. (Right Neck) Diagnosis:  (Cervical Spondylosis)    Surgeon:  Nikhil Mccloud MD Responsible Provider:  Gabe Dobson MD    Anesthesia Type:  MAC ASA Status:  3          Anesthesia Type: MAC    Bhaskar Phase I:      Bhaskar Phase II: Bhaskar Score: 10    Last vitals: Reviewed and per EMR flowsheets.        Anesthesia Post Evaluation

## 2020-09-04 NOTE — ANESTHESIA PRE PROCEDURE
Department of Anesthesiology  Preprocedure Note       Name:  Ludivina Nicole   Age:  60 y.o.  :  1955                                          MRN:  [id number]         Date:  2020      Surgeon: Adela Hawkins):  Erich Melendez MD    Procedure: Procedure(s):  Bilateral C-facet RFA @ C4-5, C5-6. RIGHT SIDE FIRST. Medications prior to admission:   Prior to Admission medications    Medication Sig Start Date End Date Taking? Authorizing Provider   HYDROcodone-acetaminophen (NORCO) 5-325 MG per tablet Take 1 tablet by mouth 2 times daily as needed for Pain for up to 30 days. 20 Yes GERARDO Burnett CNP   SITagliptin (JANUVIA) 100 MG tablet Take 1 tablet by mouth daily 20  Yes GERARDO Bullock CNP   albuterol sulfate HFA (PROAIR HFA) 108 (90 Base) MCG/ACT inhaler INHALE 2 PUFFS INTO THE LUNGS EVERY 6 HOURS AS NEEDED FOR WHEEZING 20  Yes GERARDO Bullock CNP   azelastine (ASTELIN) 0.1 % nasal spray SPRAY ONCE IN EACH NOSTRIL TWICE DAILY AS DIRECTED 20  Yes GERARDO Bullock CNP   QUEtiapine (SEROQUEL) 200 MG tablet Take 1 tablet at bedtime 20  Yes GERARDO Bullock CNP   tiotropium (Irina Soda) 18 MCG inhalation capsule INHALE THE CONTENTS OF 1 CAPSULE INTO THE LUNGS EVERY DAY 20  Yes GERARDO Bullock CNP   buPROPion (WELLBUTRIN XL) 150 MG extended release tablet Take 1 tablet by mouth every morning 20  Yes GERARDO Bullock CNP   lisinopril (PRINIVIL;ZESTRIL) 2.5 MG tablet Take 1 tablet by mouth daily 20  Yes GERARDO Bullock CNP   pantoprazole (PROTONIX) 40 MG tablet TAKE 1 TABLET BY MOUTH DAILY 20  Yes GERARDO Bullock CNP   amitriptyline (ELAVIL) 50 MG tablet Take 1 tablet by mouth nightly 19  Yes GERARDO Bullock CNP   metoprolol tartrate (LOPRESSOR) 25 MG tablet TAKE 1/2 TABLET BY MOUTH TWICE DAILY.  HOLD FOR HEART RATE LESS THAN 60 19  Yes GERARDO Bullock - DOMINGO   atorvastatin (LIPITOR) 40 MG tablet TAKE 1 TABLET BY MOUTH DAILY 12/13/19  Khai Cruz DO   etodolac (LODINE XL) 400 MG extended release tablet TAKE 1 TABLET BY MOUTH DAILY 8/24/20   Onita Denilson APRN - CNP   naproxen (NAPROSYN) 500 MG tablet Take 1 tablet by mouth 2 times daily (with meals) 5/14/20   Onita Denilson, APRN - CNP   metFORMIN (GLUCOPHAGE) 1000 MG tablet Take 1 tablet by mouth 2 times daily (with meals) 12/13/19   Dudley Harp APRN - CNP   nicotine (NICODERM CQ) 7 MG/24HR Place 1 patch onto the skin every 24 hours    Historical Provider, MD   aspirin 81 MG chewable tablet TAKE 1 TABLET BY MOUTH DAILY 4/10/19   Onita Denilson, APRN - CNP   Lancet Device MISC 1 Device by Does not apply route once for 1 dose 4/10/19 4/10/19  Onita Denilson, APRN - CNP   Lancets MISC 1 each by Does not apply route 2 times daily 4/10/19   Onita Denilson, APRN - CNP   Elastic Bandages & Supports (B & B A-2 ATHLETIC SUPPORTER) MISC 1 Device by Does not apply route continuous prn (prn pain) 5/23/18   Onita Denilson, APRN - CNP       Current medications:    No current facility-administered medications for this encounter.         Allergies:  No Known Allergies    Problem List:    Patient Active Problem List   Diagnosis Code    Allergic rhinitis J30.9    Osteoarthritis M19.90    Depression F32.9    GERD (gastroesophageal reflux disease) K21.9    Hypertension I10    COPD (chronic obstructive pulmonary disease) J44.9    Smoker F17.200    Cervical spondylosis M47.812    Diabetes mellitus ([facility] [address] E11.9    Hydrocele N43.3       Past Medical History:        Diagnosis Date    Abnormal liver enzymes     Alcohol abuse     Allergic rhinitis     Chronic back pain     Colonic polyp     COPD (chronic obstructive pulmonary disease) (HCC)     Depression     Diabetes mellitus (HCC)     DJD (degenerative joint disease) of cervical spine     DJD (degenerative joint disease) of lumbar spine     SP SURGERY    GERD (gastroesophageal reflux disease)     Hyperlipidemia     Hypertension     Osteoarthritis     Osteoarthritis     Smoker        Past Surgical History:        Procedure Laterality Date    BACK SURGERY  2008    X 3    [facility]    CHOLECYSTECTOMY      COLONOSCOPY  6/2012    repeat 6/2017- no precancerous  polyps    EKG 12-LEAD  4/6/2015         FACET JOINT INJECTION Bilateral 7/7/2020    BILATERAL C-FACET MBB @ C4-5,C5-6 and C6-7 performed by Philip Escobar MD at [address] INJECTION Bilateral 8/6/2020    bilateral C-facet MBB # 2 @ C4-5, C5-6 and C6-7 performed by Philip Escobar MD at [address] Caitie Skinner  2002    Bilateral Inguinal     LUMBAR SPINE SURGERY  2006, 2007, 2008    fusion 2008    [place] REMOVAL OF HYDROCELE,TUNICA,UNILAT Left 7/31/2018    LEFT HYDROCELECTOMY performed by Rosemary Sandhu MD at [facility] History:    Social History     Tobacco Use    Smoking status: Current Every Day Smoker     Packs/day: 0.75     Years: 38.00     Pack years: 28.50     Types: Cigarettes    Smokeless tobacco: Never Used   Substance Use Topics    Alcohol use: No     Alcohol/week: 0.0 standard drinks     Comment: quit 2013                                Ready to quit: Not Answered  Counseling given: Not Answered      Vital Signs (Current):   Vitals:    09/04/20 0812   BP: (!) 145/76   Pulse: 83   Resp: 16   Temp: 97.2 °F (36.2 °C)   TempSrc: Temporal   SpO2: 95%   Weight: 215 lb (97.5 kg)   Height: 6' 1\" (1.854 m)                                              BP Readings from Last 3 Encounters:   09/04/20 (!) 145/76   08/20/20 138/82   08/06/20 (!) 154/86       NPO Status: Time of last liquid consumption: 2200                        Time of last solid consumption: 1800                        Date of last liquid consumption: 09/03/20                        Date of last solid food consumption: 09/03/20    BMI:   Wt Readings from Last 3 Encounters:   09/04/20 215 lb (97.5 kg)   08/20/20 215 lb (97.5 kg)   07/21/20 215 lb (97.5 kg)     Body mass index is 28.37 kg/m². CBC:   Lab Results   Component Value Date    WBC 10.1 06/26/2018    RBC 4.81 06/26/2018    HGB 15.2 06/26/2018    HCT 45.1 06/26/2018    MCV 93.8 06/26/2018    RDW 13.8 05/24/2018     06/26/2018       CMP:   Lab Results   Component Value Date     06/15/2020    K 4.6 06/15/2020     06/15/2020    CO2 27 06/15/2020    BUN 12 06/15/2020    CREATININE 0.8 06/15/2020    LABGLOM >90 06/15/2020    GLUCOSE 159 06/15/2020    GLUCOSE 141 06/20/2016    PROT 7.1 06/15/2020    PROT 7.7 09/24/2013    CALCIUM 9.5 06/15/2020    BILITOT 0.3 06/15/2020    ALKPHOS 90 06/15/2020    AST 27 06/15/2020    ALT 33 06/15/2020       POC Tests: No results for input(s): POCGLU, POCNA, POCK, POCCL, POCBUN, POCHEMO, POCHCT in the last 72 hours. Coags: No results found for: PROTIME, INR, APTT    HCG (If Applicable): No results found for: PREGTESTUR, PREGSERUM, HCG, HCGQUANT     ABGs: No results found for: PHART, PO2ART, ZSG9FTB, VEH6CBB, BEART, Y1MKWCXT     Type & Screen (If Applicable):  Lab Results   Component Value Date    LABRH POS 04/06/2015       Drug/Infectious Status (If Applicable):  Lab Results   Component Value Date    HEPCAB Negative 07/23/2018       COVID-19 Screening (If Applicable):   Lab Results   Component Value Date    COVID19 Not Detected 07/31/2020         Anesthesia Evaluation    Airway: Mallampati: II       Mouth opening: > = 3 FB Dental:          Pulmonary:   (+) COPD:                             Cardiovascular:    (+) hypertension:,                   Neuro/Psych:               GI/Hepatic/Renal:   (+) GERD:,           Endo/Other:    (+) Diabetes, .                  Abdominal:           Vascular:                                        Anesthesia Plan      MAC     ASA 3             Anesthetic plan and risks discussed with patient. Plan discussed with CRNA.                   Arlene Garay MD   9/4/2020

## 2020-09-04 NOTE — PROGRESS NOTES
1008: Patient arrived to Phase II recovery via cart. Awake and alert. Report received from surgical RN and CRNA. 1010: VSS and patient rating pain to R neck 5/10. HOB elevated and snack and drink provided. Call light placed within reach. 1025: IV removed without complications. Bandaid applied to site. Patient dressed at bedside independently. 1033: Patient discharged home with daughter in stable condition.

## 2020-09-04 NOTE — OP NOTE
facet joint injections with pain relief that lasted for only a short period of time and confirmatory median branch block gave patient pain relief of 100 % . Hence we decided to do radiofrequency abalation of median branches for long term pain relief. The procedure and risks were discussed with the patient and an informed consent was obtained. Procedure:  Right    A meaningful communication was kept up with the patient throughout  the procedure. Patient is placed in prone position and skin over the back was prepped and draped in sterile manner. Then using fluoroscopy the waist of facet joint complex of the vertebra was observed and the view was optimized . The skin and deep tissues posterior were infiltrated with 6 ml of 1% xylocaine The RF canula with the 10 mm active tip was introduced through the skin wheal under fluoroscopy guidance such that the tip of the needle lies in the groove of the waist of facet joint complex. Then a lateral view of cervical  spine was obtained to make sure the tip of needle is in the centroid of the articular pillar.  hen electric impedence was checked to make sure it is acceptable. Then a sensory stimulus was applied at 50 Hz up to 1 volt and concordant pain symptoms were reproduced. Then a motor stimulus was applied at 2 Hz up to 2 volts and no motor stimulation was seen in upper extremities. Some multifidus stimulus was seen. Then after negative aspiration a mixture of dexamethasone 6 mg and 0.25%  Marcaine 1cc  was injected through the needle. Then an  lesion was done at 80 degrees centigrade for 90 seconds. EBL-0     Patient tolerated the procedure well and was discharged home in stable condition.     Electronically signed by Rose Mora MD on 9/4/20 at 9:52 AM EDT

## 2020-09-24 ENCOUNTER — OFFICE VISIT (OUTPATIENT)
Dept: PHYSICAL MEDICINE AND REHAB | Age: 65
End: 2020-09-24
Payer: MEDICARE

## 2020-09-24 VITALS
BODY MASS INDEX: 28.49 KG/M2 | HEIGHT: 73 IN | DIASTOLIC BLOOD PRESSURE: 84 MMHG | WEIGHT: 215 LBS | SYSTOLIC BLOOD PRESSURE: 124 MMHG

## 2020-09-24 PROCEDURE — 99213 OFFICE O/P EST LOW 20 MIN: CPT | Performed by: NURSE PRACTITIONER

## 2020-09-24 RX ORDER — HYDROCODONE BITARTRATE AND ACETAMINOPHEN 5; 325 MG/1; MG/1
1 TABLET ORAL 2 TIMES DAILY PRN
Qty: 60 TABLET | Refills: 0 | Status: SHIPPED | OUTPATIENT
Start: 2020-09-27 | End: 2020-10-27 | Stop reason: SDUPTHER

## 2020-09-24 ASSESSMENT — ENCOUNTER SYMPTOMS: BACK PAIN: 0

## 2020-09-24 NOTE — PROGRESS NOTES
[facility]  [address]  Dept: [phone]  Dept Fax: [phone]: [phone]    Visit Date: 9/24/2020    Functionality Assessment/Goals Worksheet     On a scale of 0 (Does not Interfere) to 10 (Completely Interferes)     1. Which number describes how during the past week pain has interfered with       the following:  A. General Activity:  5  B. Mood: 5  C. Walking Ability:  4  D. Normal Work (Includes both work outside the home and housework):  7  E. Relations with Other People:   4  F. Sleep:   6  G. Enjoyment of Life:   6    2. Patient Prefers to Take their Pain Medications:     [x]  On a regular basis   []  Only when necessary    []  Does not take pain medications    3. What are the Patient's Goals/Expectations for Visiting Pain Management? [x]  Learn about my pain    []  Receive Medication   []  Physical Therapy     []  Treat Depression   []  Receive Injections    []  Treat Sleep   []  Deal with Anxiety and Stress   []  Treat Opoid Dependence/Addiction   []  Other:      HPI:   rEica Reese is a 60 y.o. male is here today for    Chief Complaint: Neck pain     HPI   FU from Right C-facet RFA from 9/4/2020. Receiving about 80% relief of right side neck pain. Difficult to tell relief because still having pain in left side of neck and still gets headaches- Aching and sore pain. Norco pain remains effective     Medications reviewed. Patient denies side effects with medications. Patient states he is taking medications as prescribed. Hedenies receiving pain medications from other sources. He denies any ER visits since last visit. Pain scale with out pain medications or at its worst is 7/10. Pain scale with pain medications or at its best is 3/10.   Last dose of Lynn was yesterday  Drug screen reviewed from 7/21/2020 and was appropriate  Pill count was completed today and was appropriate  Patient does not have naloxone available at home. Patient has not required use of naloxone at home since last office visit. The patienthas No Known Allergies. Past Medical History  Marlene Chavez  has a past medical history of Abnormal liver enzymes, Alcohol abuse, Allergic rhinitis, Chronic back pain, Colonic polyp, COPD (chronic obstructive pulmonary disease) ([facility] [address] Depression, Diabetes mellitus ([facility] [address] DJD (degenerative joint disease) of cervical spine, DJD (degenerative joint disease) of lumbar spine, GERD (gastroesophageal reflux disease), Hyperlipidemia, Hypertension, Osteoarthritis, Osteoarthritis, and Smoker. Past Surgical History  The patient  has a past surgical history that includes back surgery (2008); Inguinal hernia repair (2002); hernia repair; Cholecystectomy; Colonoscopy (6/2012); EKG 12 Lead (4/6/2015); Lumbar spine surgery (2006, 2007, 2008); Cardiac catheterization (3/2015); pr removal of hydrocele,tunica,unilat (Left, 7/31/2018); Facet joint injection (Bilateral, 7/7/2020); Facet joint injection (Bilateral, 8/6/2020); and Pain management procedure (Right, 9/4/2020). Family History  This patient's family history includes Cancer in his father; Diabetes in his mother and sister; Heart Disease in his brother. Social History  Marlene Chavez  reports that he has been smoking cigarettes. He has a 28.50 pack-year smoking history. He has never used smokeless tobacco. He reports that he does not drink alcohol or use drugs. Medications    Current Outpatient Medications:     [START ON 9/27/2020] HYDROcodone-acetaminophen (NORCO) 5-325 MG per tablet, Take 1 tablet by mouth 2 times daily as needed for Pain for up to 30 days. , Disp: 60 tablet, Rfl: 0    etodolac (LODINE XL) 400 MG extended release tablet, TAKE 1 TABLET BY MOUTH DAILY, Disp: 90 tablet, Rfl: 3    SITagliptin (JANUVIA) 100 MG tablet, Take 1 tablet by mouth daily, Disp: 90 tablet, Rfl: 1    naproxen (NAPROSYN) 500 MG tablet, Take 1 tablet by mouth 2 times daily (with meals), Disp: 180 tablet, Rfl: 3    albuterol sulfate HFA (PROAIR HFA) 108 (90 Base) MCG/ACT inhaler, INHALE 2 PUFFS INTO THE LUNGS EVERY 6 HOURS AS NEEDED FOR WHEEZING, Disp: 1 Inhaler, Rfl: 3    azelastine (ASTELIN) 0.1 % nasal spray, SPRAY ONCE IN EACH NOSTRIL TWICE DAILY AS DIRECTED, Disp: 3 Bottle, Rfl: 1    QUEtiapine (SEROQUEL) 200 MG tablet, Take 1 tablet at bedtime, Disp: 90 tablet, Rfl: 3    tiotropium (SPIRIVA HANDIHALER) 18 MCG inhalation capsule, INHALE THE CONTENTS OF 1 CAPSULE INTO THE LUNGS EVERY DAY, Disp: 90 capsule, Rfl: 3    buPROPion (WELLBUTRIN XL) 150 MG extended release tablet, Take 1 tablet by mouth every morning, Disp: 90 tablet, Rfl: 3    lisinopril (PRINIVIL;ZESTRIL) 2.5 MG tablet, Take 1 tablet by mouth daily, Disp: 90 tablet, Rfl: 3    pantoprazole (PROTONIX) 40 MG tablet, TAKE 1 TABLET BY MOUTH DAILY, Disp: 90 tablet, Rfl: 3    amitriptyline (ELAVIL) 50 MG tablet, Take 1 tablet by mouth nightly, Disp: 90 tablet, Rfl: 3    metoprolol tartrate (LOPRESSOR) 25 MG tablet, TAKE 1/2 TABLET BY MOUTH TWICE DAILY.  HOLD FOR HEART RATE LESS THAN 60, Disp: 90 tablet, Rfl: 3    atorvastatin (LIPITOR) 40 MG tablet, TAKE 1 TABLET BY MOUTH DAILY, Disp: 90 tablet, Rfl: 3    metFORMIN (GLUCOPHAGE) 1000 MG tablet, Take 1 tablet by mouth 2 times daily (with meals), Disp: 180 tablet, Rfl: 3    nicotine (NICODERM CQ) 7 MG/24HR, Place 1 patch onto the skin every 24 hours, Disp: , Rfl:     aspirin 81 MG chewable tablet, TAKE 1 TABLET BY MOUTH DAILY, Disp: 90 tablet, Rfl: 3    Lancets MISC, 1 each by Does not apply route 2 times daily, Disp: 100 each, Rfl: 3    Elastic Bandages & Supports (B & B A-2 ATHLETIC SUPPORTER) MISC, 1 Device by Does not apply route continuous prn (prn pain), Disp: 1 each, Rfl: 0    Lancet Device MISC, 1 Device by Does not apply route once for 1 dose, Disp: 1 Device, Rfl: 0    Subjective:      Review of Systems Musculoskeletal: Positive for arthralgias, myalgias, neck pain and neck stiffness. Negative for back pain, gait problem and joint swelling. Neurological: Positive for headaches. Negative for weakness and numbness. Psychiatric/Behavioral: Negative. Objective:     Vitals:    09/24/20 0827   BP: 124/84   Weight: 215 lb (97.5 kg)   Height: 6' 1\" (1.854 m)       Physical Exam  Vitals signs and nursing note reviewed. Constitutional:       General: He is not in acute distress. Appearance: He is well-developed. He is not diaphoretic. HENT:      Head: Normocephalic and atraumatic. Right Ear: External ear normal.      Left Ear: External ear normal.      Nose: Nose normal.      Mouth/Throat:      Mouth: Mucous membranes are dry. Pharynx: No oropharyngeal exudate. Eyes:      General: No scleral icterus. Right eye: No discharge. Left eye: No discharge. Conjunctiva/sclera: Conjunctivae normal.      Pupils: Pupils are equal, round, and reactive to light. Neck:      Musculoskeletal: Full passive range of motion without pain, normal range of motion and neck supple. Normal range of motion. No edema, erythema, neck rigidity or muscular tenderness. Thyroid: No thyromegaly. Cardiovascular:      Rate and Rhythm: Normal rate and regular rhythm. Heart sounds: Normal heart sounds. No murmur. No friction rub. No gallop. Pulmonary:      Effort: Pulmonary effort is normal. No respiratory distress. Breath sounds: Normal breath sounds. No wheezing or rales. Chest:      Chest wall: No tenderness. Abdominal:      General: Bowel sounds are normal. There is no distension. Palpations: Abdomen is soft. Tenderness: There is no abdominal tenderness. There is no guarding or rebound. Musculoskeletal:         General: Swelling and tenderness present. Right shoulder: He exhibits decreased range of motion and tenderness.       Left shoulder: He exhibits decreased range of motion and tenderness. Right hip: He exhibits no tenderness. Left hip: He exhibits no tenderness. Right knee: He exhibits normal range of motion. No tenderness found. Left knee: He exhibits normal range of motion. No tenderness found. Right ankle: He exhibits no swelling. Left ankle: He exhibits no swelling. Cervical back: He exhibits decreased range of motion, tenderness, pain and spasm. Thoracic back: He exhibits tenderness, pain and spasm. Lumbar back: He exhibits decreased range of motion, tenderness and pain. Back:       Right lower leg: He exhibits no swelling. No edema. Left lower leg: He exhibits no swelling. No edema. Right foot: No swelling. Left foot: No swelling. Skin:     General: Skin is warm. Coloration: Skin is not pale. Findings: No erythema or rash. Neurological:      General: No focal deficit present. Mental Status: He is alert and oriented to person, place, and time. He is not disoriented. Cranial Nerves: No cranial nerve deficit. Sensory: Sensory deficit present. Motor: No atrophy or abnormal muscle tone. Coordination: Coordination normal.      Gait: Gait normal.      Deep Tendon Reflexes: Babinski sign absent on the right side. Babinski sign absent on the left side. Reflex Scores:       Tricep reflexes are 1+ on the right side and 1+ on the left side. Bicep reflexes are 1+ on the right side and 1+ on the left side. Brachioradialis reflexes are 1+ on the right side and 1+ on the left side. Patellar reflexes are 1+ on the right side and 1+ on the left side. Achilles reflexes are 1+ on the right side and 1+ on the left side. Comments: SENSORY DECREASED STOCKING DISTRIBUTION   Psychiatric:         Attention and Perception: Attention normal. He is attentive. Mood and Affect: Mood normal. Mood is not anxious or depressed.  Affect is not labile, blunt, angry or inappropriate. Speech: Speech normal. He is communicative. Speech is not rapid and pressured, delayed, slurred or tangential.         Behavior: Behavior normal. Behavior is not agitated, slowed, aggressive, withdrawn, hyperactive or combative. Behavior is cooperative. Thought Content: Thought content normal. Thought content is not paranoid or delusional. Thought content does not include homicidal or suicidal ideation. Thought content does not include homicidal or suicidal plan. Cognition and Memory: Cognition normal. Memory is not impaired. He does not exhibit impaired recent memory or impaired remote memory. Judgment: Judgment is not impulsive or inappropriate. JIMENA test: + bilaterally   Yeomans test: + bilaterally   Gaenslen test: + bilaterally      Assessment:     1. Cervical spondylosis    2. Thoracic spondylosis    3. Sacroiliac inflammation ([facility] [address]    4. Chronic pain syndrome            Plan:      OARRS reviewed. Current MED: 10.00  Patient was offered naloxone for home. Discussed long term side effects of medications, tolerance, dependency and addiction. Previous UDS reviewed  UDS preformed today for compliance. Patient told can not receive any pain medications from any other source. No evidence of abuse, diversion or aberrant behavior. Medications and/or procedures to improve function and quality of life- patient understanding with this and that may not be pain free  Discussed with patient about safe storage of medications at home  Discussed possible weaning of medication dosing dependent on treatment/procedure results. Discussed with patient about risks with procedure including infection, reaction to medication, increased pain, or bleeding. Procedure notes reveiwed in detail. Currently receiving 80% relief of right side neck pain from Right C-facet RFA. Left side neck pain is the main complaint.    Plan Left C-facet RFA @ C4-5, and C5-6 for longer term pain relief. Procedure and risks discussed in detail with patient. Norco 5/325 BID prn- Ordered refill. Patient is compliant  Will order next UDS at next procedural FU          Meds. Prescribed:   Orders Placed This Encounter   Medications    HYDROcodone-acetaminophen (NORCO) 5-325 MG per tablet     Sig: Take 1 tablet by mouth 2 times daily as needed for Pain for up to 30 days. Dispense:  60 tablet     Refill:  0     Reduce doses taken as pain becomes manageable       Return for Left C-facet RFA @ C4-5, and C5-6. , follow up after procedure.              Electronically signed by GERARDO Dominguez CNP on9/24/2020 at 8:41 AM

## 2020-10-06 ENCOUNTER — TELEPHONE (OUTPATIENT)
Dept: PHYSICAL MEDICINE AND REHAB | Age: 65
End: 2020-10-06

## 2020-10-09 ENCOUNTER — HOSPITAL ENCOUNTER (OUTPATIENT)
Age: 65
Setting detail: OUTPATIENT SURGERY
Discharge: HOME | End: 2020-10-09
Attending: PAIN MEDICINE | Admitting: PAIN MEDICINE
Payer: MEDICARE

## 2020-10-09 ENCOUNTER — ANESTHESIA EVENT (OUTPATIENT)
Dept: OPERATING ROOM | Age: 65
End: 2020-10-09
Payer: MEDICARE

## 2020-10-09 ENCOUNTER — APPOINTMENT (OUTPATIENT)
Dept: GENERAL RADIOLOGY | Age: 65
End: 2020-10-09
Attending: PAIN MEDICINE
Payer: MEDICARE

## 2020-10-09 ENCOUNTER — ANESTHESIA (OUTPATIENT)
Dept: OPERATING ROOM | Age: 65
End: 2020-10-09
Payer: MEDICARE

## 2020-10-09 VITALS
DIASTOLIC BLOOD PRESSURE: 76 MMHG | RESPIRATION RATE: 16 BRPM | WEIGHT: 212.4 LBS | SYSTOLIC BLOOD PRESSURE: 110 MMHG | TEMPERATURE: 97.1 F | OXYGEN SATURATION: 92 % | BODY MASS INDEX: 28.15 KG/M2 | HEART RATE: 78 BPM | HEIGHT: 73 IN

## 2020-10-09 VITALS
OXYGEN SATURATION: 97 % | DIASTOLIC BLOOD PRESSURE: 82 MMHG | SYSTOLIC BLOOD PRESSURE: 119 MMHG | RESPIRATION RATE: 14 BRPM

## 2020-10-09 LAB — GLUCOSE BLD-MCNC: 193 MG/DL (ref 70–108)

## 2020-10-09 PROCEDURE — 3600000056 HC PAIN LEVEL 4 BASE: Performed by: PAIN MEDICINE

## 2020-10-09 PROCEDURE — 7100000011 HC PHASE II RECOVERY - ADDTL 15 MIN: Performed by: PAIN MEDICINE

## 2020-10-09 PROCEDURE — 2580000003 HC RX 258: Performed by: NURSE ANESTHETIST, CERTIFIED REGISTERED

## 2020-10-09 PROCEDURE — 64634 DESTROY C/TH FACET JNT ADDL: CPT | Performed by: PAIN MEDICINE

## 2020-10-09 PROCEDURE — 3209999900 FLUORO FOR SURGICAL PROCEDURES

## 2020-10-09 PROCEDURE — 6360000002 HC RX W HCPCS: Performed by: PAIN MEDICINE

## 2020-10-09 PROCEDURE — 3700000001 HC ADD 15 MINUTES (ANESTHESIA): Performed by: PAIN MEDICINE

## 2020-10-09 PROCEDURE — 6360000002 HC RX W HCPCS: Performed by: NURSE ANESTHETIST, CERTIFIED REGISTERED

## 2020-10-09 PROCEDURE — 2500000003 HC RX 250 WO HCPCS: Performed by: NURSE ANESTHETIST, CERTIFIED REGISTERED

## 2020-10-09 PROCEDURE — 3600000057 HC PAIN LEVEL 4 ADDL 15 MIN: Performed by: PAIN MEDICINE

## 2020-10-09 PROCEDURE — 2500000003 HC RX 250 WO HCPCS: Performed by: PAIN MEDICINE

## 2020-10-09 PROCEDURE — 82948 REAGENT STRIP/BLOOD GLUCOSE: CPT

## 2020-10-09 PROCEDURE — 64633 DESTROY CERV/THOR FACET JNT: CPT | Performed by: PAIN MEDICINE

## 2020-10-09 PROCEDURE — 2709999900 HC NON-CHARGEABLE SUPPLY: Performed by: PAIN MEDICINE

## 2020-10-09 PROCEDURE — 2720000010 HC SURG SUPPLY STERILE: Performed by: PAIN MEDICINE

## 2020-10-09 PROCEDURE — 3700000000 HC ANESTHESIA ATTENDED CARE: Performed by: PAIN MEDICINE

## 2020-10-09 PROCEDURE — 7100000010 HC PHASE II RECOVERY - FIRST 15 MIN: Performed by: PAIN MEDICINE

## 2020-10-09 RX ORDER — SODIUM CHLORIDE 9 MG/ML
INJECTION, SOLUTION INTRAVENOUS CONTINUOUS PRN
Status: DISCONTINUED | OUTPATIENT
Start: 2020-10-09 | End: 2020-10-09 | Stop reason: SDUPTHER

## 2020-10-09 RX ORDER — PROPOFOL 10 MG/ML
INJECTION, EMULSION INTRAVENOUS PRN
Status: DISCONTINUED | OUTPATIENT
Start: 2020-10-09 | End: 2020-10-09 | Stop reason: SDUPTHER

## 2020-10-09 RX ORDER — LIDOCAINE HYDROCHLORIDE 10 MG/ML
INJECTION, SOLUTION EPIDURAL; INFILTRATION; INTRACAUDAL; PERINEURAL PRN
Status: DISCONTINUED | OUTPATIENT
Start: 2020-10-09 | End: 2020-10-09 | Stop reason: ALTCHOICE

## 2020-10-09 RX ORDER — FENTANYL CITRATE 50 UG/ML
INJECTION, SOLUTION INTRAMUSCULAR; INTRAVENOUS PRN
Status: DISCONTINUED | OUTPATIENT
Start: 2020-10-09 | End: 2020-10-09 | Stop reason: SDUPTHER

## 2020-10-09 RX ORDER — LIDOCAINE HYDROCHLORIDE 20 MG/ML
INJECTION, SOLUTION EPIDURAL; INFILTRATION; INTRACAUDAL; PERINEURAL PRN
Status: DISCONTINUED | OUTPATIENT
Start: 2020-10-09 | End: 2020-10-09 | Stop reason: SDUPTHER

## 2020-10-09 RX ORDER — DEXAMETHASONE SODIUM PHOSPHATE 4 MG/ML
INJECTION, SOLUTION INTRA-ARTICULAR; INTRALESIONAL; INTRAMUSCULAR; INTRAVENOUS; SOFT TISSUE PRN
Status: DISCONTINUED | OUTPATIENT
Start: 2020-10-09 | End: 2020-10-09 | Stop reason: ALTCHOICE

## 2020-10-09 RX ORDER — BUPIVACAINE HYDROCHLORIDE 2.5 MG/ML
INJECTION, SOLUTION EPIDURAL; INFILTRATION; INTRACAUDAL PRN
Status: DISCONTINUED | OUTPATIENT
Start: 2020-10-09 | End: 2020-10-09 | Stop reason: ALTCHOICE

## 2020-10-09 RX ADMIN — LIDOCAINE HYDROCHLORIDE 50 MG: 20 INJECTION, SOLUTION EPIDURAL; INFILTRATION; INTRACAUDAL; PERINEURAL at 11:04

## 2020-10-09 RX ADMIN — PROPOFOL 70 MG: 10 INJECTION, EMULSION INTRAVENOUS at 11:16

## 2020-10-09 RX ADMIN — SODIUM CHLORIDE: 9 INJECTION, SOLUTION INTRAVENOUS at 11:03

## 2020-10-09 RX ADMIN — FENTANYL CITRATE 100 MCG: 50 INJECTION, SOLUTION INTRAMUSCULAR; INTRAVENOUS at 11:04

## 2020-10-09 ASSESSMENT — PULMONARY FUNCTION TESTS
PIF_VALUE: 0

## 2020-10-09 ASSESSMENT — PAIN - FUNCTIONAL ASSESSMENT: PAIN_FUNCTIONAL_ASSESSMENT: 0-10

## 2020-10-09 ASSESSMENT — PAIN DESCRIPTION - DESCRIPTORS: DESCRIPTORS: ACHING

## 2020-10-09 NOTE — H&P
[facility]  History and Physical Update    Pt Name: Ingrid Miles  MRN: [id number]  YOB: 1955  Date of evaluation: 10/9/2020      I have examined the patient and reviewed the H&P/Consult and there are no changes to the patient or plans.         Electronically signed by Shelia Oshea MD on 10/9/2020 at 9:18 AM

## 2020-10-09 NOTE — ANESTHESIA PRE PROCEDURE
Department of Anesthesiology  Preprocedure Note       Name:  Dot Castellano   Age:  60 y.o.  :  1955                                          MRN:  [id number]         Date:  10/9/2020      Surgeon: Shanice Quick):  Jose Garcia MD    Procedure: Procedure(s):  Left C-facet RFA @ C4-5, and C5-6    Medications prior to admission:   Prior to Admission medications    Medication Sig Start Date End Date Taking? Authorizing Provider   HYDROcodone-acetaminophen (NORCO) 5-325 MG per tablet Take 1 tablet by mouth 2 times daily as needed for Pain for up to 30 days. 9/27/20 10/27/20 Yes GERARDO Kang CNP   albuterol sulfate HFA (PROAIR HFA) 108 (90 Base) MCG/ACT inhaler INHALE 2 PUFFS INTO THE LUNGS EVERY 6 HOURS AS NEEDED FOR WHEEZING 20  Yes GERARDO Todd CNP   QUEtiapine (SEROQUEL) 200 MG tablet Take 1 tablet at bedtime 20  Yes GERARDO Todd CNP   tiotropium (Robles Cassia) 18 MCG inhalation capsule INHALE THE CONTENTS OF 1 CAPSULE INTO THE LUNGS EVERY DAY 20  Yes GERARDO Todd CNP   buPROPion (WELLBUTRIN XL) 150 MG extended release tablet Take 1 tablet by mouth every morning 20  Yes GERARDO Todd CNP   lisinopril (PRINIVIL;ZESTRIL) 2.5 MG tablet Take 1 tablet by mouth daily 20  Yes GERARDO Todd CNP   pantoprazole (PROTONIX) 40 MG tablet TAKE 1 TABLET BY MOUTH DAILY 20  Yes GERARDO Todd CNP   amitriptyline (ELAVIL) 50 MG tablet Take 1 tablet by mouth nightly 19  Yes GERARDO Todd CNP   metoprolol tartrate (LOPRESSOR) 25 MG tablet TAKE 1/2 TABLET BY MOUTH TWICE DAILY.  HOLD FOR HEART RATE LESS THAN 60 19  Yes GERARDO Todd CNP   atorvastatin (LIPITOR) 40 MG tablet TAKE 1 TABLET BY MOUTH DAILY 19  Yes Jose Deleon DO   etodolac (LODINE XL) 400 MG extended release tablet TAKE 1 TABLET BY MOUTH DAILY 20   GERARDO Todd - CNP   SITagliptin (JANUVIA) 100 MG tablet Take 1 tablet by mouth daily 7/2/20   Ramona Arriaza, APRN - CNP   naproxen (NAPROSYN) 500 MG tablet Take 1 tablet by mouth 2 times daily (with meals) 5/14/20   Ramona Arriaza, APRN - CNP   azelastine (ASTELIN) 0.1 % nasal spray SPRAY ONCE IN EACH NOSTRIL TWICE DAILY AS DIRECTED 5/14/20   Ramona Arriaza APRN - CNP   metFORMIN (GLUCOPHAGE) 1000 MG tablet Take 1 tablet by mouth 2 times daily (with meals) 12/13/19   Sandie Jha, APRN - CNP   aspirin 81 MG chewable tablet TAKE 1 TABLET BY MOUTH DAILY 4/10/19   Skale Arriaza, APRN - CNP   Lancet Device MISC 1 Device by Does not apply route once for 1 dose 4/10/19 4/10/19  Ramona Arriaza APRN - CNP   Lancets MISC 1 each by Does not apply route 2 times daily 4/10/19   Ramona Arriaza APRN - CNP   Elastic Bandages & Supports (B & B A-2 ATHLETIC SUPPORTER) MISC 1 Device by Does not apply route continuous prn (prn pain) 5/23/18   Ramona Arriaza APRN - CNP       Current medications:    No current facility-administered medications for this encounter.         Allergies:  No Known Allergies    Problem List:    Patient Active Problem List   Diagnosis Code    Allergic rhinitis J30.9    Osteoarthritis M19.90    Depression F32.9    GERD (gastroesophageal reflux disease) K21.9    Hypertension I10    COPD (chronic obstructive pulmonary disease) J44.9    Smoker F17.200    Cervical spondylosis M47.812    Diabetes mellitus ([facility] [address] E11.9    Hydrocele N43.3       Past Medical History:        Diagnosis Date    Abnormal liver enzymes     Alcohol abuse     Allergic rhinitis     Chronic back pain     Colonic polyp     COPD (chronic obstructive pulmonary disease) (HCC)     Depression     Diabetes mellitus (HCC)     DJD (degenerative joint disease) of cervical spine     DJD (degenerative joint disease) of lumbar spine     SP SURGERY    GERD (gastroesophageal reflux disease)     Hyperlipidemia     Hypertension     Osteoarthritis     Osteoarthritis     Smoker        Past Surgical History:        Procedure Laterality Date    BACK SURGERY  2008    X 3    CARD[facility]AC [facility]  3/[facility]    [facility]    CHOLECYSTECTOMY      COLONOSCOPY  6/2012    repeat 6/2017- no precancerous  polyps    EKG 12-LEAD  4/6/2015         FACET JOINT INJECTION Bilateral 7/7/2020    BILATERAL C-FACET MBB @ C4-5,C5-6 and C6-7 performed by Mali Beltran MD at [address] INJECTION Bilateral 8/6/2020    bilateral C-facet MBB # 2 @ C4-5, C5-6 and C6-7 performed by Mali Beltran MD at [address] Caitie Skinner  2002    Bilateral Inguinal     LUMBAR SPINE SURGERY  2006, 2007, 2008    fusion 2008    PAIN MANAGEMENT PROCEDURE Right 9/4/2020    Bilateral C-facet RFA @ C4-5, C5-6. RIGHT SIDE FIRST.  performed by Mali Beltran MD at [address] OF HYDROCELE,TUNICA,UNILAT Left 7/31/2018    LEFT HYDROCELECTOMY performed by Ambrocio Magaña MD at [facility] History:    Social History     Tobacco Use    Smoking status: Current Every Day Smoker     Packs/day: 0.75     Years: 38.00     Pack years: 28.50     Types: Cigarettes    Smokeless tobacco: Never Used   Substance Use Topics    Alcohol use: No     Alcohol/week: 0.0 standard drinks     Comment: quit 2013                                Ready to quit: Not Answered  Counseling given: Not Answered      Vital Signs (Current):   Vitals:    10/09/20 0935   BP: 117/83   Pulse: 80   Resp: 16   Temp: 97 °F (36.1 °C)   TempSrc: Temporal   SpO2: 94%   Weight: 212 lb 6.4 oz (96.3 kg)   Height: 6' 1\" (1.854 m)                                              BP Readings from Last 3 Encounters:   10/09/20 117/83   09/24/20 124/84   09/04/20 131/78       NPO Status: Time of last liquid consumption: 2000                        Time of last solid consumption: 2000                        Date of last liquid consumption: 10/08/20

## 2020-10-09 NOTE — OP NOTE
Operative Note    Pre-Procedure Note    Patient Name: Lowell Marsh   Date of [facility] Record Number: [id number]  Date: 10/9/20     Indication:  Neck pain  Consent: On file. Vital Signs:   Vitals:    10/09/20 0935   BP: 117/83   Pulse: 80   Resp: 16   Temp: 97 °F (36.1 °C)   SpO2: 94%       Past Medical History:   has a past medical history of Abnormal liver enzymes, Alcohol abuse, Allergic rhinitis, Chronic back pain, Colonic polyp, COPD (chronic obstructive pulmonary disease) ([address] Depression, Diabetes mellitus ([address] DJD (degenerative joint disease) of cervical spine, DJD (degenerative joint disease) of lumbar spine, GERD (gastroesophageal reflux disease), Hyperlipidemia, Hypertension, Osteoarthritis, Osteoarthritis, and Smoker. Past Surgical History:   has a past surgical history that includes back surgery (2008); Inguinal hernia repair (2002); hernia repair; Cholecystectomy; Colonoscopy (6/2012); EKG 12 Lead (4/6/2015); Lumbar spine surgery (2006, 2007, 2008); Cardiac catheterization (3/2015); pr removal of hydrocele,tunica,unilat (Left, 7/31/2018); Facet joint injection (Bilateral, 7/7/2020); Facet joint injection (Bilateral, 8/6/2020); and Pain management procedure (Right, 9/4/2020). Pre-Sedation Documentation and Exam:   Vital signs have been reviewed (see flow sheet for vitals). Sedation/ Anesthesia :  MAC. Patient is an appropriate candidate for plan of sedation: yes          Preoperative Diagnosis:  C-spondylosis     Post-Op Dx: as above     Procedure Performed:  Radiofrequency abalation of median branchs at the levels of C4-5 and C5-6 left under fluoroscopic guidance      Indication for the Procedure: The patient had history of chronic neck pain that is not responding well to the conservative treatment. Patient's pain is mostly axial in nature. Pain is interfering with the activities of daily living.   Physical examination revealed facet tenderness and facet loading is positive. The patient had undergone cervical  facet joint injections with pain relief that lasted for only a short period of time and confirmatory median branch block gave patient pain relief of 100 % . Hence we decided to do radiofrequency abalation of median branches for long term pain relief. The procedure and risks were discussed with the patient and an informed consent was obtained.     Procedure:  Left    A meaningful communication was kept up with the patient throughout  the procedure. Patient is placed in prone position and skin over the back was prepped and draped in sterile manner. Then using fluoroscopy the waist of facet joint complex of the vertebra was observed and the view was optimized . The skin and deep tissues posterior were infiltrated with 6 ml of 1% xylocaine The RF canula with the 10 mm active tip was introduced through the skin wheal under fluoroscopy guidance such that the tip of the needle lies in the groove of the waist of facet joint complex. Then a lateral view of cervical  spine was obtained to make sure the tip of needle is in the centroid of the articular pillar.  hen electric impedence was checked to make sure it is acceptable. Then a sensory stimulus was applied at 50 Hz up to 1 volt and concordant pain symptoms were reproduced. Then a motor stimulus was applied at 2 Hz up to 2 volts and no motor stimulation was seen in upper extremities. Some multifidus stimulus was seen. Then after negative aspiration a mixture of dexamethasone 6 mg and 0.25%  Marcaine 1cc  was injected through the needle. Then an  lesion was done at 80 degrees centigrade for 90 seconds.   EBL-0     Patient tolerated the procedure well and was discharged home in stable condition      Electronically signed by Brittany Herrera MD on 10/9/20 at 11:07 AM EDT

## 2020-10-09 NOTE — ANESTHESIA POSTPROCEDURE EVALUATION
Department of Anesthesiology  Postprocedure Note    Patient: Vinnie Singh  MRN: [id number]  YOB: 1955  Date of evaluation: 10/9/2020  Time:  1:05 PM     Procedure Summary     Date:  10/09/20 Room / Location:  [address] [place] [address] 03 / [facility]    Anesthesia Start:  1103 Anesthesia Stop:  7864    Procedure:  Left C-facet RFA @ C4-5, and C5-6 (Left Neck) Diagnosis:  (Cervical Spondylosis)    Surgeon:  Andres Parker MD Responsible Provider:  Vega Aguilar MD    Anesthesia Type:  MAC ASA Status:  3          Anesthesia Type: MAC    Bhaskar Phase I: Bhaskar Score: 10    Bhaskar Phase II: Bhaskar Score: 10    Last vitals: Reviewed and per EMR flowsheets.        Anesthesia Post Evaluation

## 2020-10-09 NOTE — PROGRESS NOTES
1122- Pt arrived to PACU phase 2, Letty RN at bedside for report, pt hooked up to monitor, VSS, pt breathing deeply on room air. 1126- Scant amt of drainage from injection site dabbed with gauze, ice pack applied. Pt given snack. 1133- Pt doing well finishing up snack denies needs. 1134- Ride called. 1140- IV removed pt getting dressed.    [phone]- Pt discharged walked to car with this RN

## 2020-10-09 NOTE — H&P
H&P    FU from Right C-facet RFA from 9/4/2020. Receiving about 80% relief of right side neck pain. Difficult to tell relief because still having pain in left side of neck and still gets headaches- Aching and sore pain.      Norco pain remains effective      Medications reviewed. Patient denies side effects with medications. Patient states he is taking medications as prescribed. Hedenies receiving pain medications from other sources. He denies any ER visits since last visit.     Pain scale with out pain medications or at its worst is 7/10. Pain scale with pain medications or at its best is 3/10. Last dose of [place] was yesterday  Drug screen reviewed from 7/21/2020 and was appropriate  Pill count was completed today and was appropriate  Patient does not have naloxone available at home. Patient has not required use of naloxone at home since last office visit.         The patienthas No Known Allergies.     Past Medical History  Bari Skiff  has a past medical history of Abnormal liver enzymes, Alcohol abuse, Allergic rhinitis, Chronic back pain, Colonic polyp, COPD (chronic obstructive pulmonary disease) ([address] Depression, Diabetes mellitus ([address] DJD (degenerative joint disease) of cervical spine, DJD (degenerative joint disease) of lumbar spine, GERD (gastroesophageal reflux disease), Hyperlipidemia, Hypertension, Osteoarthritis, Osteoarthritis, and Smoker.     Past Surgical History  The patient  has a past surgical history that includes back surgery (2008); Inguinal hernia repair (2002); hernia repair; Cholecystectomy; Colonoscopy (6/2012); EKG 12 Lead (4/6/2015); Lumbar spine surgery (2006, 2007, 2008); Cardiac catheterization (3/2015); pr removal of hydrocele,tunica,unilat (Left, 7/31/2018); Facet joint injection (Bilateral, 7/7/2020); Facet joint injection (Bilateral, 8/6/2020); and Pain management procedure (Right, 9/4/2020).      Family History  This patient's family history includes Cancer in his father; Diabetes in his mother and sister; Heart Disease in his brother.  Shine Phelps  reports that he has been smoking cigarettes. He has a 28.50 pack-year smoking history. He has never used smokeless tobacco. He reports that he does not drink alcohol or use drugs.     Medications  Current Medication     Current Outpatient Medications:     [START ON 9/27/2020] HYDROcodone-acetaminophen (NORCO) 5-325 MG per tablet, Take 1 tablet by mouth 2 times daily as needed for Pain for up to 30 days. , Disp: 60 tablet, Rfl: 0    etodolac (LODINE XL) 400 MG extended release tablet, TAKE 1 TABLET BY MOUTH DAILY, Disp: 90 tablet, Rfl: 3    SITagliptin (JANUVIA) 100 MG tablet, Take 1 tablet by mouth daily, Disp: 90 tablet, Rfl: 1    naproxen (NAPROSYN) 500 MG tablet, Take 1 tablet by mouth 2 times daily (with meals), Disp: 180 tablet, Rfl: 3    albuterol sulfate HFA (PROAIR HFA) 108 (90 Base) MCG/ACT inhaler, INHALE 2 PUFFS INTO THE LUNGS EVERY 6 HOURS AS NEEDED FOR WHEEZING, Disp: 1 Inhaler, Rfl: 3    azelastine (ASTELIN) 0.1 % nasal spray, SPRAY ONCE IN EACH NOSTRIL TWICE DAILY AS DIRECTED, Disp: 3 Bottle, Rfl: 1    QUEtiapine (SEROQUEL) 200 MG tablet, Take 1 tablet at bedtime, Disp: 90 tablet, Rfl: 3    tiotropium (SPIRIVA HANDIHALER) 18 MCG inhalation capsule, INHALE THE CONTENTS OF 1 CAPSULE INTO THE LUNGS EVERY DAY, Disp: 90 capsule, Rfl: 3    buPROPion (WELLBUTRIN XL) 150 MG extended release tablet, Take 1 tablet by mouth every morning, Disp: 90 tablet, Rfl: 3    lisinopril (PRINIVIL;ZESTRIL) 2.5 MG tablet, Take 1 tablet by mouth daily, Disp: 90 tablet, Rfl: 3    pantoprazole (PROTONIX) 40 MG tablet, TAKE 1 TABLET BY MOUTH DAILY, Disp: 90 tablet, Rfl: 3    amitriptyline (ELAVIL) 50 MG tablet, Take 1 tablet by mouth nightly, Disp: 90 tablet, Rfl: 3    metoprolol tartrate (LOPRESSOR) 25 MG tablet, TAKE 1/2 TABLET BY MOUTH TWICE DAILY.  HOLD FOR HEART RATE LESS THAN 60, Disp: 90 tablet, Rfl: 3    atorvastatin (LIPITOR) 40 MG tablet, TAKE 1 TABLET BY MOUTH DAILY, Disp: 90 tablet, Rfl: 3    metFORMIN (GLUCOPHAGE) 1000 MG tablet, Take 1 tablet by mouth 2 times daily (with meals), Disp: 180 tablet, Rfl: 3    nicotine (NICODERM CQ) 7 MG/24HR, Place 1 patch onto the skin every 24 hours, Disp: , Rfl:     aspirin 81 MG chewable tablet, TAKE 1 TABLET BY MOUTH DAILY, Disp: 90 tablet, Rfl: 3    Lancets MISC, 1 each by Does not apply route 2 times daily, Disp: 100 each, Rfl: 3    Elastic Bandages & Supports (B & B A-2 ATHLETIC SUPPORTER) MISC, 1 Device by Does not apply route continuous prn (prn pain), Disp: 1 each, Rfl: 0    Lancet Device MISC, 1 Device by Does not apply route once for 1 dose, Disp: 1 Device, Rfl: 0        Subjective:      Review of Systems   Musculoskeletal: Positive for arthralgias, myalgias, neck pain and neck stiffness. Negative for back pain, gait problem and joint swelling. Neurological: Positive for headaches. Negative for weakness and numbness. Psychiatric/Behavioral: Negative.          Objective:      Vitals       Vitals:     09/24/20 0827   BP: 124/84   Weight: 215 lb (97.5 kg)   Height: 6' 1\" (1.854 m)           Physical Exam  Vitals signs and nursing note reviewed. Constitutional:       General: He is not in acute distress. Appearance: He is well-developed. He is not diaphoretic. HENT:      Head: Normocephalic and atraumatic. Right Ear: External ear normal.      Left Ear: External ear normal.      Nose: Nose normal.      Mouth/Throat:      Mouth: Mucous membranes are dry. Pharynx: No oropharyngeal exudate. Eyes:      General: No scleral icterus. Right eye: No discharge. Left eye: No discharge. Conjunctiva/sclera: Conjunctivae normal.      Pupils: Pupils are equal, round, and reactive to light. Neck:      Musculoskeletal: Full passive range of motion without pain, normal range of motion and neck supple. Normal range of motion.  No edema, erythema, neck rigidity or muscular tenderness. Thyroid: No thyromegaly. Cardiovascular:      Rate and Rhythm: Normal rate and regular rhythm. Heart sounds: Normal heart sounds. No murmur. No friction rub. No gallop. Pulmonary:      Effort: Pulmonary effort is normal. No respiratory distress. Breath sounds: Normal breath sounds. No wheezing or rales. Chest:      Chest wall: No tenderness. Abdominal:      General: Bowel sounds are normal. There is no distension. Palpations: Abdomen is soft. Tenderness: There is no abdominal tenderness. There is no guarding or rebound. Musculoskeletal:         General: Swelling and tenderness present. Right shoulder: He exhibits decreased range of motion and tenderness. Left shoulder: He exhibits decreased range of motion and tenderness. Right hip: He exhibits no tenderness. Left hip: He exhibits no tenderness. Right knee: He exhibits normal range of motion. No tenderness found. Left knee: He exhibits normal range of motion. No tenderness found. Right ankle: He exhibits no swelling. Left ankle: He exhibits no swelling. Cervical back: He exhibits decreased range of motion, tenderness, pain and spasm. Thoracic back: He exhibits tenderness, pain and spasm. Lumbar back: He exhibits decreased range of motion, tenderness and pain. Back:       Right lower leg: He exhibits no swelling. No edema. Left lower leg: He exhibits no swelling. No edema. Right foot: No swelling. Left foot: No swelling. Skin:     General: Skin is warm. Coloration: Skin is not pale. Findings: No erythema or rash. Neurological:      General: No focal deficit present. Mental Status: He is alert and oriented to person, place, and time. He is not disoriented. Cranial Nerves: No cranial nerve deficit. Sensory: Sensory deficit present. Motor: No atrophy or abnormal muscle tone.       Coordination: Coordination normal.      Gait: Gait normal.      Deep Tendon Reflexes: Babinski sign absent on the right side. Babinski sign absent on the left side. Reflex Scores:       Tricep reflexes are 1+ on the right side and 1+ on the left side. Bicep reflexes are 1+ on the right side and 1+ on the left side. Brachioradialis reflexes are 1+ on the right side and 1+ on the left side. Patellar reflexes are 1+ on the right side and 1+ on the left side. Achilles reflexes are 1+ on the right side and 1+ on the left side. Comments: SENSORY DECREASED STOCKING DISTRIBUTION   Psychiatric:         Attention and Perception: Attention normal. He is attentive. Mood and Affect: Mood normal. Mood is not anxious or depressed. Affect is not labile, blunt, angry or inappropriate. Speech: Speech normal. He is communicative. Speech is not rapid and pressured, delayed, slurred or tangential.         Behavior: Behavior normal. Behavior is not agitated, slowed, aggressive, withdrawn, hyperactive or combative. Behavior is cooperative. Thought Content: Thought content normal. Thought content is not paranoid or delusional. Thought content does not include homicidal or suicidal ideation. Thought content does not include homicidal or suicidal plan. Cognition and Memory: Cognition normal. Memory is not impaired. He does not exhibit impaired recent memory or impaired remote memory. Judgment: Judgment is not impulsive or inappropriate.         JIMENA test: + bilaterally   Yeomans test: + bilaterally   Gaenslen test: + bilaterally   Assessment:      1. Cervical spondylosis    2. Thoracic spondylosis    3. Sacroiliac inflammation ([facility] [address]    4. Chronic pain syndrome       Plan:      · OARRS reviewed. Current MED: 10.00  · Patient was offered naloxone for home. · Discussed long term side effects of medications, tolerance, dependency and addiction.   · Previous UDS reviewed  · UDS preformed today for compliance. · Patient told can not receive any pain medications from any other source. · No evidence of abuse, diversion or aberrant behavior. · Medications and/or procedures to improve function and quality of life- patient understanding with this and that may not be pain free  · Discussed with patient about safe storage of medications at home  · Discussed possible weaning of medication dosing dependent on treatment/procedure results. · Discussed with patient about risks with procedure including infection, reaction to medication, increased pain, or bleeding. · Procedure notes reveiwed in detail. · Currently receiving 80% relief of right side neck pain from Right C-facet RFA. Left side neck pain is the main complaint. · Plan Left C-facet RFA @ C4-5, and C5-6 for longer term pain relief. Procedure and risks discussed in detail with patient. · Norco 5/325 BID prn- Ordered refill. Patient is compliant  · Will order next UDS at next procedural FU            Meds.  Prescribed:   Encounter Medications         Orders Placed This Encounter   Medications    HYDROcodone-acetaminophen (NORCO) 5-325 MG per tablet       Sig: Take 1 tablet by mouth 2 times daily as needed for Pain for up to 30 days.       Dispense:  60 tablet       Refill:  0       Reduce doses taken as pain becomes manageable           Return for Left C-facet RFA @ C4-5, and C5-6. , follow up after procedure.

## 2020-10-27 ENCOUNTER — OFFICE VISIT (OUTPATIENT)
Dept: PHYSICAL MEDICINE AND REHAB | Age: 65
End: 2020-10-27
Payer: MEDICARE

## 2020-10-27 VITALS
HEIGHT: 73 IN | DIASTOLIC BLOOD PRESSURE: 62 MMHG | BODY MASS INDEX: 28.1 KG/M2 | WEIGHT: 212 LBS | SYSTOLIC BLOOD PRESSURE: 104 MMHG | TEMPERATURE: 96.8 F

## 2020-10-27 PROCEDURE — 99213 OFFICE O/P EST LOW 20 MIN: CPT | Performed by: NURSE PRACTITIONER

## 2020-10-27 RX ORDER — HYDROCODONE BITARTRATE AND ACETAMINOPHEN 5; 325 MG/1; MG/1
1 TABLET ORAL 2 TIMES DAILY PRN
Qty: 60 TABLET | Refills: 0 | Status: SHIPPED | OUTPATIENT
Start: 2020-10-29 | End: 2020-11-30 | Stop reason: SDUPTHER

## 2020-10-27 ASSESSMENT — ENCOUNTER SYMPTOMS
RESPIRATORY NEGATIVE: 1
BACK PAIN: 0
GASTROINTESTINAL NEGATIVE: 1

## 2020-10-27 NOTE — PROGRESS NOTES
[facility]  [address]  Dept: [phone]  Dept Fax: [phone]: [phone]    Visit Date: 10/27/2020    Functionality Assessment/Goals Worksheet     On a scale of 0 (Does not Interfere) to 10 (Completely Interferes)     1. Which number describes how during the past week pain has interfered with       the following:  A. General Activity:  6  B. Mood: 6  C. Walking Ability:  5  D. Normal Work (Includes both work outside the home and housework):  5  E. Relations with Other People:   5  F. Sleep:   4  G. Enjoyment of Life:   4    2. Patient Prefers to Take their Pain Medications:     [x]  On a regular basis   []  Only when necessary    []  Does not take pain medications    3. What are the Patient's Goals/Expectations for Visiting Pain Management? [x]  Learn about my pain    []  Receive Medication   []  Physical Therapy     []  Treat Depression   []  Receive Injections    []  Treat Sleep   []  Deal with Anxiety and Stress   []  Treat Opoid Dependence/Addiction   []  Other:      HPI:   Camden Aguilar is a 60 y.o. male is here today for    Chief Complaint: Neck pain    HPI   Fu from bilateral C-facet RFA, right side from 9/4/2020 and left side from 10/9/2020. Receiving over 70% relief of posterior neck pain from procedure. Continues to get headaches but they are not as frequent or not as intense. Headache in temporal area. Continues to have posterior neck pain- aching. Still some swelling in posterior neck from procedure     Norco prn remains effective       Medications reviewed. Patient denies side effects with medications. Patient states he is taking medications as prescribed. Hedenies receiving pain medications from other sources. He denies any ER visits since last visit. Pain scale with out pain medications or at its worst is 6/10.   Pain scale with pain medications or at its best is 3/10. Last dose of [place] was yesterday  Drug screen reviewed from 7/21/2020 and was appropriate  Pill count was completed today and was appropriate  Patient does not have naloxone available at home. Patient has not required use of naloxone at home since last office visit. The patienthas No Known Allergies. Past Medical History  Arcelia Gu  has a past medical history of Abnormal liver enzymes, Alcohol abuse, Allergic rhinitis, Chronic back pain, Colonic polyp, COPD (chronic obstructive pulmonary disease) ([facility] [address] Depression, Diabetes mellitus ([facility] [address] DJD (degenerative joint disease) of cervical spine, DJD (degenerative joint disease) of lumbar spine, GERD (gastroesophageal reflux disease), Hyperlipidemia, Hypertension, Osteoarthritis, Osteoarthritis, and Smoker. Past Surgical History  The patient  has a past surgical history that includes back surgery (2008); Inguinal hernia repair (2002); hernia repair; Cholecystectomy; Colonoscopy (6/2012); EKG 12 Lead (4/6/2015); Lumbar spine surgery (2006, 2007, 2008); Cardiac catheterization (3/2015); pr removal of hydrocele,tunica,unilat (Left, 7/31/2018); Facet joint injection (Bilateral, 7/7/2020); Facet joint injection (Bilateral, 8/6/2020); Pain management procedure (Right, 9/4/2020); and Pain management procedure (Left, 10/9/2020). Family History  This patient's family history includes Cancer in his father; Diabetes in his mother and sister; Heart Disease in his brother. Social History  Arcelia Gu  reports that he has been smoking cigarettes. He has a 28.50 pack-year smoking history. He has never used smokeless tobacco. He reports that he does not drink alcohol or use drugs. Medications    Current Outpatient Medications:     [START ON 10/29/2020] HYDROcodone-acetaminophen (NORCO) 5-325 MG per tablet, Take 1 tablet by mouth 2 times daily as needed for Pain for up to 30 days. , Disp: 60 tablet, Rfl: 0    etodolac (LODINE XL) 400 MG extended release tablet, TAKE 1 TABLET BY MOUTH DAILY, Disp: 90 tablet, Rfl: 3    SITagliptin (JANUVIA) 100 MG tablet, Take 1 tablet by mouth daily, Disp: 90 tablet, Rfl: 1    naproxen (NAPROSYN) 500 MG tablet, Take 1 tablet by mouth 2 times daily (with meals), Disp: 180 tablet, Rfl: 3    albuterol sulfate HFA (PROAIR HFA) 108 (90 Base) MCG/ACT inhaler, INHALE 2 PUFFS INTO THE LUNGS EVERY 6 HOURS AS NEEDED FOR WHEEZING, Disp: 1 Inhaler, Rfl: 3    azelastine (ASTELIN) 0.1 % nasal spray, SPRAY ONCE IN EACH NOSTRIL TWICE DAILY AS DIRECTED, Disp: 3 Bottle, Rfl: 1    QUEtiapine (SEROQUEL) 200 MG tablet, Take 1 tablet at bedtime, Disp: 90 tablet, Rfl: 3    tiotropium (SPIRIVA HANDIHALER) 18 MCG inhalation capsule, INHALE THE CONTENTS OF 1 CAPSULE INTO THE LUNGS EVERY DAY, Disp: 90 capsule, Rfl: 3    buPROPion (WELLBUTRIN XL) 150 MG extended release tablet, Take 1 tablet by mouth every morning, Disp: 90 tablet, Rfl: 3    lisinopril (PRINIVIL;ZESTRIL) 2.5 MG tablet, Take 1 tablet by mouth daily, Disp: 90 tablet, Rfl: 3    pantoprazole (PROTONIX) 40 MG tablet, TAKE 1 TABLET BY MOUTH DAILY, Disp: 90 tablet, Rfl: 3    amitriptyline (ELAVIL) 50 MG tablet, Take 1 tablet by mouth nightly, Disp: 90 tablet, Rfl: 3    metoprolol tartrate (LOPRESSOR) 25 MG tablet, TAKE 1/2 TABLET BY MOUTH TWICE DAILY.  HOLD FOR HEART RATE LESS THAN 60, Disp: 90 tablet, Rfl: 3    atorvastatin (LIPITOR) 40 MG tablet, TAKE 1 TABLET BY MOUTH DAILY, Disp: 90 tablet, Rfl: 3    metFORMIN (GLUCOPHAGE) 1000 MG tablet, Take 1 tablet by mouth 2 times daily (with meals), Disp: 180 tablet, Rfl: 3    aspirin 81 MG chewable tablet, TAKE 1 TABLET BY MOUTH DAILY, Disp: 90 tablet, Rfl: 3    Lancets MISC, 1 each by Does not apply route 2 times daily, Disp: 100 each, Rfl: 3    Elastic Bandages & Supports (B & B A-2 ATHLETIC SUPPORTER) MISC, 1 Device by Does not apply route continuous prn (prn pain), Disp: 1 each, Rfl: 0    Lancet Device MISC, 1 Device by Does not apply route once for 1 dose, Disp: 1 Device, Rfl: 0    Subjective:      Review of Systems   Constitutional: Negative. HENT: Negative. Respiratory: Negative. Cardiovascular: Negative. Gastrointestinal: Negative. Musculoskeletal: Positive for arthralgias, myalgias, neck pain and neck stiffness. Negative for back pain, gait problem and joint swelling. Neurological: Positive for headaches. Negative for weakness and numbness. Psychiatric/Behavioral: Negative. Objective:     Vitals:    10/27/20 0829   BP: 104/62   Temp: 96.8 °F (36 °C)   Weight: 212 lb (96.2 kg)   Height: 6' 1\" (1.854 m)       Physical Exam  Vitals signs and nursing note reviewed. Constitutional:       General: He is not in acute distress. Appearance: Normal appearance. He is well-developed. He is not diaphoretic. HENT:      Head: Normocephalic and atraumatic. Right Ear: External ear normal.      Left Ear: External ear normal.      Nose: Nose normal.      Mouth/Throat:      Mouth: Mucous membranes are moist.      Pharynx: No oropharyngeal exudate. Eyes:      General: No scleral icterus. Right eye: No discharge. Left eye: No discharge. Conjunctiva/sclera: Conjunctivae normal.      Pupils: Pupils are equal, round, and reactive to light. Neck:      Musculoskeletal: Full passive range of motion without pain and normal range of motion. Normal range of motion. Neck rigidity and muscular tenderness present. No edema or erythema. Thyroid: No thyromegaly. Cardiovascular:      Rate and Rhythm: Normal rate and regular rhythm. Heart sounds: Normal heart sounds. No murmur. No friction rub. No gallop. Pulmonary:      Effort: Pulmonary effort is normal. No respiratory distress. Breath sounds: Normal breath sounds. No wheezing or rales. Chest:      Chest wall: No tenderness. Abdominal:      General: Bowel sounds are normal. There is no distension. Palpations: Abdomen is soft. Tenderness: There is no abdominal tenderness. There is no guarding or rebound. Musculoskeletal:         General: Swelling and tenderness present. Right shoulder: He exhibits decreased range of motion and tenderness. Left shoulder: He exhibits decreased range of motion and tenderness. Right hip: He exhibits no tenderness. Left hip: He exhibits no tenderness. Right knee: He exhibits normal range of motion. No tenderness found. Left knee: He exhibits normal range of motion. No tenderness found. Right ankle: He exhibits no swelling. Left ankle: He exhibits no swelling. Cervical back: He exhibits decreased range of motion, tenderness, pain and spasm. Thoracic back: He exhibits tenderness, pain and spasm. Lumbar back: He exhibits decreased range of motion, tenderness and pain. Back:       Right lower leg: He exhibits no swelling. No edema. Left lower leg: He exhibits no swelling. No edema. Right foot: No swelling. Left foot: No swelling. Skin:     General: Skin is warm. Coloration: Skin is not pale. Findings: No erythema or rash. Neurological:      General: No focal deficit present. Mental Status: He is alert and oriented to person, place, and time. He is not disoriented. Cranial Nerves: No cranial nerve deficit. Sensory: Sensory deficit present. Motor: No atrophy or abnormal muscle tone. Coordination: Coordination normal.      Gait: Gait normal.      Deep Tendon Reflexes: Babinski sign absent on the right side. Babinski sign absent on the left side. Reflex Scores:       Tricep reflexes are 1+ on the right side and 1+ on the left side. Bicep reflexes are 1+ on the right side and 1+ on the left side. Brachioradialis reflexes are 1+ on the right side and 1+ on the left side. Patellar reflexes are 1+ on the right side and 1+ on the left side.        Achilles reflexes are 1+ on the right side and 1+ on the left side. Comments: SENSORY DECREASED STOCKING DISTRIBUTION   Psychiatric:         Attention and Perception: Attention normal. He is attentive. Mood and Affect: Mood normal. Mood is not anxious or depressed. Affect is not labile, blunt, angry or inappropriate. Speech: Speech normal. He is communicative. Speech is not rapid and pressured, delayed, slurred or tangential.         Behavior: Behavior normal. Behavior is not agitated, slowed, aggressive, withdrawn, hyperactive or combative. Behavior is cooperative. Thought Content: Thought content normal. Thought content is not paranoid or delusional. Thought content does not include homicidal or suicidal ideation. Thought content does not include homicidal or suicidal plan. Cognition and Memory: Cognition normal. Memory is not impaired. He does not exhibit impaired recent memory or impaired remote memory. Judgment: Judgment is not impulsive or inappropriate. JIMENA test: + bilaterally   Yeomans test: + bilaterally   Gaenslen test: + bilaterally      Assessment:     1. Cervical spondylosis    2. Thoracic spondylosis    3. Chronic pain syndrome    4. Sacroiliac inflammation ([facility] [address]            Plan:      OARRS reviewed. Current MED: 10.00  Patient was offered naloxone for home. Discussed long term side effects of medications, tolerance, dependency and addiction. Previous UDS reviewed  UDS preformed today for compliance. Patient told can not receive any pain medications from any other source. No evidence of abuse, diversion or aberrant behavior. Medications and/or procedures to improve function and quality of life- patient understanding with this and that may not be pain free  Discussed with patient about safe storage of medications at home  Discussed possible weaning of medication dosing dependent on treatment/procedure results.    Discussed with patient about risks with procedure including infection,

## 2020-11-09 RX ORDER — ATORVASTATIN CALCIUM 40 MG/1
TABLET, FILM COATED ORAL
Qty: 90 TABLET | Refills: 3 | Status: SHIPPED | OUTPATIENT
Start: 2020-11-09 | End: 2021-11-03

## 2020-11-09 RX ORDER — AMITRIPTYLINE HYDROCHLORIDE 50 MG/1
TABLET, FILM COATED ORAL
Qty: 90 TABLET | Refills: 3 | Status: SHIPPED | OUTPATIENT
Start: 2020-11-09 | End: 2021-11-09

## 2020-11-09 RX ORDER — SITAGLIPTIN 100 MG/1
TABLET, FILM COATED ORAL
Qty: 90 TABLET | Refills: 1 | Status: SHIPPED | OUTPATIENT
Start: 2020-11-09 | End: 2021-05-05

## 2020-11-09 NOTE — TELEPHONE ENCOUNTER
Future Appointments   Date Time Provider Ezra Macdonald   11/19/2020  9:20 AM [place] [facility] [id number] [facility] [place]   12/17/2020  9:20 AM [place] [facility] [place] [id number] [facility] [place]   12/29/2020  8:30 AM GERARDO Mays CNP [facility] [place]       Recent Visits  Date Type Provider Dept   05/14/20 Office Visit GERARDO Adames CNP [facility]   12/17/19 Office Visit GERARDO Adames CNP [facility]   10/10/19 Office Visit GERARDO Adames CNP [facility]   Showing recent visits within past 540 days with a meds authorizing provider and meeting all other requirements     Future Appointments  Date Type Provider Dept   11/19/20 Appointment GERARDO Adames CNP [facility]   12/17/20 Appointment GERARDO Adames CNP [facility]   Showing future appointments within next 150 days with a meds authorizing provider and meeting all other requirements

## 2020-11-09 NOTE — TELEPHONE ENCOUNTER
Future Appointments   Date Time Provider Ezra Macdonald   11/19/2020  9:20 AM GERARDO Souza - [address] [facility] - [place]   12/17/2020  9:20 AM GERARDO Souza - [address] [facility] - [place]   12/29/2020  8:30 AM Arthuro Bumpers, APRN - CNP [facility] - [place]       Recent Visits  Date Type Provider Dept   05/14/20 Office Visit GERARDO Souza CNP [facility]   12/17/19 Office Visit GERARDO Souza CNP [facility]   10/10/19 Office Visit GERARDO Souza CNP [facility]   Showing recent visits within past 540 days with a meds authorizing provider and meeting all other requirements     Future Appointments  Date Type Provider Dept   11/19/20 Appointment GERARDO Suoza CNP [facility]   12/17/20 Appointment GERARDO Souza CNP [facility]   Showing future appointments within next 150 days with a meds authorizing provider and meeting all other requirements

## 2020-11-19 ENCOUNTER — OFFICE VISIT (OUTPATIENT)
Dept: FAMILY MEDICINE CLINIC | Age: 65
End: 2020-11-19
Payer: MEDICARE

## 2020-11-19 VITALS
TEMPERATURE: 97.2 F | DIASTOLIC BLOOD PRESSURE: 64 MMHG | OXYGEN SATURATION: 95 % | SYSTOLIC BLOOD PRESSURE: 113 MMHG | HEART RATE: 84 BPM | WEIGHT: 215.4 LBS | HEIGHT: 73 IN | RESPIRATION RATE: 18 BRPM | BODY MASS INDEX: 28.55 KG/M2

## 2020-11-19 LAB — HBA1C MFR BLD: 8 % (ref 4.3–5.7)

## 2020-11-19 PROCEDURE — 3052F HG A1C>EQUAL 8.0%<EQUAL 9.0%: CPT | Performed by: NURSE PRACTITIONER

## 2020-11-19 PROCEDURE — 99214 OFFICE O/P EST MOD 30 MIN: CPT | Performed by: NURSE PRACTITIONER

## 2020-11-19 PROCEDURE — 90686 IIV4 VACC NO PRSV 0.5 ML IM: CPT | Performed by: NURSE PRACTITIONER

## 2020-11-19 PROCEDURE — G0008 ADMIN INFLUENZA VIRUS VAC: HCPCS | Performed by: NURSE PRACTITIONER

## 2020-11-19 RX ORDER — NICOTINE 21 MG/24HR
1 PATCH, TRANSDERMAL 24 HOURS TRANSDERMAL DAILY
Qty: 42 PATCH | Refills: 0 | Status: SHIPPED | OUTPATIENT
Start: 2020-11-19 | End: 2021-12-20

## 2020-11-19 RX ORDER — NICOTINE 21 MG/24HR
1 PATCH, TRANSDERMAL 24 HOURS TRANSDERMAL DAILY
Qty: 42 PATCH | Refills: 0 | Status: SHIPPED | OUTPATIENT
Start: 2020-11-19 | End: 2022-05-31

## 2020-11-19 NOTE — PROGRESS NOTES
Vaccine Information Sheet, \"Influenza - Inactivated\"  given to Meaghan , or parent/legal guardian of  Meaghan  and verbalized understanding. Patient responses:    Have you ever had a reaction to a flu vaccine? No  Do you have an allergy to eggs, neomycin or polymixin? No  Do you have an allergy to Thimerosal, contact lens solution, or Merthiolate? No  Have you ever had [place]illian [place] Syndrome? No  Do you have any current illness? No  Do you have a temperature above 100 degrees? No  Are you pregnant? No  If pregnant, permission obtained from physician? No  Do you have an active neurological disorder? No      Flu vaccine given per order. Please see immunization tab.

## 2020-11-19 NOTE — PROGRESS NOTES
will eat 6 little donuts at breakfast. Will add SGLT-2 to his current regimen. Physical Exam    /64   Pulse 84   Temp 97.2 °F (36.2 °C) (Temporal)   Resp 18   Ht 6' 1\" (1.854 m)   Wt 215 lb 6.4 oz (97.7 kg)   SpO2 95%   BMI 28.42 kg/m²   Appearance: alert, well appearing, and in no distress, normal appearing weight and well hydrated. Neck exam - supple, no significant adenopathy. CVS exam: normal rate, regular rhythm, normal S1, S2, no murmurs, rubs, clicks or gallops. Lungs: clear to auscultation, no wheezes, rales or rhonchi, symmetric air entry. Abdomen:  BS normal, soft, non tender, non distended, no rebound or guarding  Mental Status: normal mood, affect behavior, speech, dress,  and thought processes. Neuro:  Alert, muscle tone grossly normal  Skin exam: normal coloration and turgor, no rashes, no suspicious skin lesions noted. Foot exam:  No pedal edema, no erythematous lesions noted b/l, sensation wnl b/l, PT and TP pulses wnl b/l    ASSESSMENT & PLAN  Sharita Alvarenga was seen today for 6 month follow-up. Diagnoses and all orders for this visit:    Uncontrolled type 2 diabetes mellitus with hyperglycemia ([facility] [address]  -     canagliflozin (INVOKANA) 100 MG TABS tablet; Take 1 tablet by mouth every morning (before breakfast)  -      DIABETES FOOT EXAM  -     Hemoglobin A1C; Future  Add invokana  Essential hypertension  -     metoprolol tartrate (LOPRESSOR) 25 MG tablet; TAKE 1/2 TABLET BY MOUTH TWICE DAILY. HOLD FOR HEART RATE LESS THAN 60    Needs flu shot    Tobacco abuse  -     [place] TOBACCO USE CESSATION INTENSIVE >10 MINUTES  -     nicotine (NICODERM CQ) 21 MG/24HR; Place 1 patch onto the skin daily  -     nicotine (NICODERM CQ) 14 MG/24HR; Place 1 patch onto the skin daily  -     nicotine (NICODERM CQ) 7 MG/24HR;  Place 1 patch onto the skin daily    Personal history of nicotine dependence   -     [place] TOBACCO USE CESSATION INTENSIVE >10 MINUTES    Other orders  -     INFLUENZA, QUADV, 3 YRS AND OLDER, IM PF, PREFILL SYR OR SDV, 0.5ML (AFLURIA QUADV, PF)  -     POCT glycosylated hemoglobin (Hb A1C)  Pt in agreement with plan. Return in about 6 months (around 5/19/2021) for a1c and med refill.

## 2020-11-30 RX ORDER — HYDROCODONE BITARTRATE AND ACETAMINOPHEN 5; 325 MG/1; MG/1
1 TABLET ORAL 2 TIMES DAILY PRN
Qty: 60 TABLET | Refills: 0 | Status: SHIPPED | OUTPATIENT
Start: 2020-11-30 | End: 2020-12-29 | Stop reason: SDUPTHER

## 2020-12-02 ENCOUNTER — TELEPHONE (OUTPATIENT)
Dept: FAMILY MEDICINE CLINIC | Age: 65
End: 2020-12-02

## 2020-12-02 NOTE — TELEPHONE ENCOUNTER
Recd denial from [address] for Nicotine TD Patch. States Nicotine TD patch is an excluded drug from pt's drug plan.   (see attached correspondence)

## 2020-12-17 ENCOUNTER — OFFICE VISIT (OUTPATIENT)
Dept: FAMILY MEDICINE CLINIC | Age: 65
End: 2020-12-17
Payer: MEDICARE

## 2020-12-17 ENCOUNTER — TELEPHONE (OUTPATIENT)
Dept: FAMILY MEDICINE CLINIC | Age: 65
End: 2020-12-17

## 2020-12-17 VITALS
WEIGHT: 215.2 LBS | HEIGHT: 73 IN | SYSTOLIC BLOOD PRESSURE: 123 MMHG | TEMPERATURE: 97.2 F | HEART RATE: 97 BPM | RESPIRATION RATE: 16 BRPM | OXYGEN SATURATION: 93 % | DIASTOLIC BLOOD PRESSURE: 77 MMHG | BODY MASS INDEX: 28.52 KG/M2

## 2020-12-17 PROCEDURE — G0439 PPPS, SUBSEQ VISIT: HCPCS | Performed by: NURSE PRACTITIONER

## 2020-12-17 PROCEDURE — 99497 ADVNCD CARE PLAN 30 MIN: CPT | Performed by: NURSE PRACTITIONER

## 2020-12-17 RX ORDER — ALBUTEROL SULFATE 90 UG/1
2 AEROSOL, METERED RESPIRATORY (INHALATION) 4 TIMES DAILY PRN
Qty: 3 INHALER | Refills: 1 | Status: SHIPPED | OUTPATIENT
Start: 2020-12-17 | End: 2021-05-26 | Stop reason: ALTCHOICE

## 2020-12-17 ASSESSMENT — LIFESTYLE VARIABLES: HOW OFTEN DO YOU HAVE A DRINK CONTAINING ALCOHOL: 0

## 2020-12-17 ASSESSMENT — PATIENT HEALTH QUESTIONNAIRE - PHQ9
SUM OF ALL RESPONSES TO PHQ QUESTIONS 1-9: 0
SUM OF ALL RESPONSES TO PHQ9 QUESTIONS 1 & 2: 0
2. FEELING DOWN, DEPRESSED OR HOPELESS: 0
SUM OF ALL RESPONSES TO PHQ QUESTIONS 1-9: 0
1. LITTLE INTEREST OR PLEASURE IN DOING THINGS: 0
SUM OF ALL RESPONSES TO PHQ QUESTIONS 1-9: 0

## 2020-12-17 NOTE — TELEPHONE ENCOUNTER
scheduling  PFT 01/07/21 @  [facility] @ 10:15   Arrive @ 9:45 to 2nd floor [facility] center   Wear mask     ** pt must get Covid Test on 12/31/20 ( order in epic )  1st floor [facility] **     1. Light meal prior  2. No breathing meds 4-6 hr prior to test  3. Hold antihistamine 48 hr prior  4. No caffeine, smoking, alcohol day of test  5.  No exercising day of test.  6. Cover mouth to prevent breathing cold air day of test.

## 2020-12-17 NOTE — PATIENT INSTRUCTIONS
Advance Directives: Care Instructions  Overview  An advance directive is a legal way to state your wishes at the end of your life. It tells your family and your doctor what to do if you can't say what you want. There are two main types of advance directives. You can change them any time your wishes change. Living will. This form tells your family and your doctor your wishes about life support and other treatment. The form is also called a declaration. Medical power of . This form lets you name a person to make treatment decisions for you when you can't speak for yourself. This person is called a health care agent (health care proxy, health care surrogate). The form is also called a durable power of  for health care. If you do not have an advance directive, decisions about your medical care may be made by a family member, or by a doctor or a  who doesn't know you. It may help to think of an advance directive as a gift to the people who care for you. If you have one, they won't have to make tough decisions by themselves. Follow-up care is a key part of your treatment and safety. Be sure to make and go to all appointments, and call your doctor if you are having problems. It's also a good idea to know your test results and keep a list of the medicines you take. What should you include in an advance directive? Many states have a unique advance directive form. (It may ask you to address specific issues.) Or you might use a universal form that's approved by many states. If your form doesn't tell you what to address, it may be hard to know what to include in your advance directive. Use the questions below to help you get started. · Who do you want to make decisions about your medical care if you are not able to? · What life-support measures do you want if you have a serious illness that gets worse over time or can't be cured? · What are you most afraid of that might happen? glaucoma; cataracts, macular degeneration, and other eye disorders. · A preventive dental visit is recommended every 6 months. · Try to get at least 150 minutes of exercise per week or 10,000 steps per day on a pedometer . · Order or download the FREE \"Exercise & Physical Activity: Your Everyday Guide\" from [employer]. Call 6-583.968.6298 or search [employer] online. · You need 8617-8011 mg of calcium and 6432-9704 IU of vitamin D per day. It is possible to meet your calcium requirement with diet alone, but a vitamin D supplement is usually necessary to meet this goal.  · When exposed to the sun, use a sunscreen that protects against both UVA and UVB radiation with an SPF of 30 or greater. Reapply every 2 to 3 hours or after sweating, drying off with a towel, or swimming. · Always wear a seat belt when traveling in a car. Always wear a helmet when riding a bicycle or motorcycle.

## 2020-12-17 NOTE — PROGRESS NOTES
TABLET BY MOUTH DAILY Yes GERARDO Rodriguez CNP   amitriptyline (ELAVIL) 50 MG tablet TAKE 1 TABLET BY MOUTH EVERY NIGHT Yes GERARDO Rodriguez CNP   atorvastatin (LIPITOR) 40 MG tablet 1 tab po daily Yes GERARDO Rodriguez CNP   etodolac (LODINE XL) 400 MG extended release tablet TAKE 1 TABLET BY MOUTH DAILY Yes GERARDO Rodriguez CNP   albuterol sulfate HFA (PROAIR HFA) 108 (90 Base) MCG/ACT inhaler INHALE 2 PUFFS INTO THE LUNGS EVERY 6 HOURS AS NEEDED FOR WHEEZING Yes GERARDO Rodriguez CNP   azelastine (ASTELIN) 0.1 % nasal spray SPRAY ONCE IN EACH NOSTRIL TWICE DAILY AS DIRECTED Yes GERARDO Rodriguez CNP   QUEtiapine (SEROQUEL) 200 MG tablet Take 1 tablet at bedtime Yes GERARDO Rodriguez CNP   buPROPion (WELLBUTRIN XL) 150 MG extended release tablet Take 1 tablet by mouth every morning Yes GERARDO Rodriguez CNP   lisinopril (PRINIVIL;ZESTRIL) 2.5 MG tablet Take 1 tablet by mouth daily Yes GERARDO Rodriguez CNP   pantoprazole (PROTONIX) 40 MG tablet TAKE 1 TABLET BY MOUTH DAILY Yes GERARDO Rodriguez CNP   aspirin 81 MG chewable tablet TAKE 1 TABLET BY MOUTH DAILY Yes GERARDO Rodriguez CNP   Lancets MISC 1 each by Does not apply route 2 times daily Yes GERARDO Rodriguez CNP   Elastic Bandages & Supports (B & B A-2 ATHLETIC SUPPORTER) MISC 1 Device by Does not apply route continuous prn (prn pain) Yes GERARDO Rodriguez CNP   Lancet Device MISC 1 Device by Does not apply route once for 1 dose  GERARDO Rodriguez CNP         Past Medical History:   Diagnosis Date    Abnormal liver enzymes     Alcohol abuse     Allergic rhinitis     Chronic back pain     Colonic polyp     COPD (chronic obstructive pulmonary disease) ([facility] [address]     Depression     Diabetes mellitus (HCC)     DJD (degenerative joint disease) of cervical spine     DJD (degenerative joint disease) of lumbar spine     SP SURGERY    GERD (gastroesophageal reflux disease)     Hyperlipidemia     Hypertension     Osteoarthritis     Osteoarthritis     Smoker        Past Surgical History:   Procedure Laterality Date    BACK SURGERY  2008    X 3    [facility]    CHOLECYSTECTOMY      COLONOSCOPY  6/2012    repeat 6/2017- no precancerous  polyps    EKG 12-LEAD  4/6/2015         FACET JOINT INJECTION Bilateral 7/7/2020    BILATERAL C-FACET MBB @ C4-5,C5-6 and C6-7 performed by Giana Donohue MD at [facility] INJECTION Bilateral 8/6/2020    bilateral C-facet MBB # 2 @ C4-5, C5-6 and C6-7 performed by Giana Donohue [facility] [address] [facility]  [facility]    Bilateral Inguinal     LUMBAR SPINE SURGERY  2006, 2007, 2008    fusion 2008    PAIN MANAGEMENT PROCEDURE Right 9/4/2020    Bilateral C-facet RFA @ C4-5, C5-6. RIGHT SIDE FIRST.  [facility] Left 10/9/2020    Left C-facet RFA @ C4-5, and C5-6 performed by Giana Donohue MD at [facility] OF HYDROCELE,TUNICA,UNILAT Left 7/31/2018    LEFT HYDROCELECTOMY performed by Jared De La Garza MD at [address] [facility] History   Problem Relation Age of Onset    Diabetes Mother     Cancer Father     Diabetes Sister     Heart Disease Brother     High Blood Pressure Neg Hx     Stroke Neg Hx        CareTeam (Including outside providers/suppliers regularly involved in providing care):   Patient Care Team:  GERARDO Quinones CNP as PCP - General (Family Nurse Practitioner)  GERARDO Quinones CNP as PCP - [facility] Empaneled Provider    Wt Readings from Last 3 Encounters:   12/17/20 215 lb 3.2 oz (97.6 kg)   11/19/20 215 lb 6.4 oz (97.7 kg)   10/27/20 212 lb (96.2 kg)     Vitals:    12/17/20 0923   BP: 123/77   Pulse: 97   Resp: 16   Temp: 97.2 °F (36.2 °C)   TempSrc: Temporal   SpO2: 93%   Weight: 215 lb 3.2 oz (97.6 kg) Height: 6' 1\" (1.854 m)     Body mass index is 28.39 kg/m². Based upon direct observation of the patient, evaluation of cognition reveals recent and remote memory intact. General Appearance: alert and oriented to person, place and time, well-developed and well-nourished, in no acute distress  Skin: warm and dry, no rash or erythema  Head: normocephalic and atraumatic  Eyes: pupils equal, round, and reactive to light, extraocular eye movements intact, conjunctivae normal  ENT: tympanic membrane, external ear and ear canal normal bilaterally, oropharynx clear and moist with normal mucous membranes  Neck: neck supple and non tender without mass, no thyromegaly or thyroid nodules, no cervical lymphadenopathy   Pulmonary/Chest: clear to auscultation bilaterally- no wheezes, rales or rhonchi, normal air movement, no respiratory distress  Cardiovascular: normal rate, normal S1 and S2, no gallops, intact distal pulses and no carotid bruits  Abdomen: soft, non-tender, non-distended, normal bowel sounds, no masses or organomegaly    Patient's complete Health Risk Assessment and screening values have been reviewed and are found in Flowsheets. The following problems were reviewed today and where indicated follow up appointments were made and/or referrals ordered. Positive Risk Factor Screenings with Interventions:         Substance History:  Social History     Tobacco History     Smoking Status  Current Every Day Smoker Smoking Start Date  11/19/2012 Smoking Frequency  0.75 packs/day for 38 years (28.5 pk yrs) Smoking Tobacco Type  Cigarettes    Smokeless Tobacco Use  Never Used          Alcohol History     Alcohol Use Status  No Comment  quit 2013          Drug Use     Drug Use Status  No Comment  last use 5/2015          Sexual Activity     Sexually Active  Not Asked               Alcohol Screening:       A score of 8 or more is associated with harmful or hazardous drinking.  A score of 13 or more in women, and 15 or more in men, is likely to indicate alcohol dependence. Substance Abuse Interventions:  · Tobacco abuse:  tobacco cessation tips and resources provided, patient is going to start patches     General Health and ACP:  General  In general, how would you say your health is?: Fair  In the past 7 days, have you experienced any of the following? New or Increased Pain, New or Increased Fatigue, Loneliness, Social Isolation, Stress or Anger?: None of These  Do you get the social and emotional support that you need?: Yes  Do you have a Living Will?: (!) No  Advance Directives     Power of [address] Will ACP-Advance Directive ACP-Power of     Not on File Not on File Not on File Not on File      General Health Risk Interventions:  · No Living Will: Advance Care Planning addressed with patient today    Health Habits/Nutrition:  Health Habits/Nutrition  Do you exercise for at least 20 minutes 2-3 times per week?: Yes  Have you lost any weight without trying in the past 3 months?: No  Do you eat fewer than 2 meals per day?: No  Have you seen a dentist within the past year?: (!) No  Body mass index: (!) 28.39  Health Habits/Nutrition Interventions:  · Dental exam overdue:  patient encouraged to make appointment with his/her dentist     Safety:  Safety  Do you have working smoke detectors?: Yes  Have all throw rugs been removed or fastened?: Yes  Do you have non-slip mats or surfaces in all bathtubs/showers?: (!) No  Do all of your stairways have a railing or banister?: Yes  Are your doorways, halls and stairs free of clutter?: Yes  Do you always fasten your seatbelt when you are in a car?: Yes  Safety Interventions:  · Home safety tips provided        COPD    HPI:    Current medication regimen - spiriva but needs changed to incruse based on his insurance  Compliant with medications? yes    Limitations in function - increased physical activity   Does patient smoke?   Yes    Chronic cough?: yes  Chest pain/Tightness?:  no  Shortness of breath?: no  Wheezing?  no    Last PFTs - will order    Hospitalized and/or intubated in the past?: Yes  Number of times prescribed oral steroids in the past year - 0  Influenza vaccine up to date? Yes  Pneumococcal vaccine up to date? Yes  Personalized Preventive Plan   Current Health Maintenance Status  Immunization History   Administered Date(s) Administered    Influenza 11/02/2011, 12/04/2012, 09/26/2013    Influenza Vaccine, unspecified formulation 12/04/2012, 09/26/2013    Influenza Virus Vaccine 11/05/2014, 10/27/2015, 10/27/2016, 10/09/2017    Influenza, Мария Felipe, IM, (6 mo and older Fluzone, Flulaval, Fluarix and 3 yrs and older Afluria) 10/27/2016, 10/09/2017    Influenza, Quadv, IM, PF (6 mo and older Fluzone, Flulaval, Fluarix, and 3 yrs and older Afluria) 10/09/2018, 10/10/2019, 11/19/2020    Pneumococcal Conjugate 7-valent (Prevnar7) 01/01/2011    Pneumococcal Polysaccharide ([id number]) 04/27/2016    Tdap (Boostrix, Adacel) 03/26/2013    Zoster Recombinant (Shingrix) 12/18/2019, 02/17/2020        Health Maintenance   Topic Date Due    Annual Wellness Visit (AWV)  05/29/2019    HIV screen  11/19/2021 (Originally 5/28/1970)    Diabetic microalbuminuria test  05/14/2021    Lipid screen  06/15/2021    Potassium monitoring  06/15/2021    Creatinine monitoring  06/15/2021    Diabetic retinal exam  09/22/2021    Diabetic foot exam  11/19/2021    A1C test (Diabetic or Prediabetic)  11/19/2021    DTaP/Tdap/Td vaccine (2 - Td) 03/26/2023    Colon cancer screen colonoscopy  04/19/2027    Flu vaccine  Completed    Shingles Vaccine  Completed    Pneumococcal 0-64 years Vaccine  Completed    Hepatitis C screen  Completed    Hepatitis A vaccine  Aged Out    Hib vaccine  Aged Out    Meningococcal (ACWY) vaccine  Aged Out     Recommendations for [employer] Due: see orders and patient instructions/AVS.  .   Recommended screening schedule for the next 5-10 years is provided to the patient in written form: see Patient Instructions/AVS.    Nicolasa Lind was seen today for medicare awv and discuss medications. Diagnoses and all orders for this visit:    Routine general medical examination at a health care facility    ACP (advance care planning)  -     [place] ADVANCED CARE PLAN FACE TO FACE, 1ST 30MIN [id number]    Chronic obstructive pulmonary disease, unspecified COPD type ([facility] [address]  -     Full PFT Study With Bronchodilator; Future  -     COVID-19 Ambulatory; Future  -     Umeclidinium Bromide 62.5 MCG/INH AEPB; Inhale 1 puff into the lungs daily    Other orders  -     albuterol sulfate HFA (VENTOLIN HFA) 108 (90 Base) MCG/ACT inhaler; Inhale 2 puffs into the lungs 4 times daily as needed for Wheezing    Chronic conditions stable continue meds as ordered  Pt in agreement with plan              Advance Care Planning   Advanced Care Planning: Discussed the patients choices for care and treatment in case of a health event that adversely affects decision-making abilities. Also discussed the patients long-term treatment options. Reviewed with the patient the [address] [place] [address] Declaration forms  Reviewed the process of designating a competent adult as an Agent (or -in-fact) that could take make health care decisions for the patient if incompetent. Patient was asked to complete the declaration forms, either acknowledge the forms by a public notary or an eligible witness and provide a signed copy to the practice office. Time spent (minutes): 10    Cardiovascular Disease Risk Counseling: Assessed the patient's risk to develop cardiovascular disease and reviewed main risk factors.    Reviewed steps to reduce disease risk including:   · Quitting tobacco use, reducing amount smoked, or not starting the habit  · Making healthy food choices  · Being physically active and gradualy increasing activity levels   · Reduce weight and determine a healthy BMI goal  · Monitor blood pressure and treat if higher than 140/90 mmHg  · Maintain blood total cholesterol levels under 5 mmol/l or 190 mg/dl  · Maintain LDL cholesterol levels under 3.0 mmol/l or 115 mg/dl   · Control blood glucose levels  · Consider taking aspirin (75 mg daily), once blood pressure is controlled   Provided a follow up plan.   Time spent (minutes): 10

## 2020-12-29 ENCOUNTER — OFFICE VISIT (OUTPATIENT)
Dept: PHYSICAL MEDICINE AND REHAB | Age: 65
End: 2020-12-29
Payer: MEDICARE

## 2020-12-29 VITALS
DIASTOLIC BLOOD PRESSURE: 78 MMHG | SYSTOLIC BLOOD PRESSURE: 118 MMHG | WEIGHT: 215 LBS | HEIGHT: 73 IN | BODY MASS INDEX: 28.49 KG/M2 | TEMPERATURE: 96.9 F

## 2020-12-29 PROCEDURE — 99213 OFFICE O/P EST LOW 20 MIN: CPT | Performed by: NURSE PRACTITIONER

## 2020-12-29 RX ORDER — HYDROCODONE BITARTRATE AND ACETAMINOPHEN 5; 325 MG/1; MG/1
1 TABLET ORAL 2 TIMES DAILY PRN
Qty: 60 TABLET | Refills: 0 | Status: SHIPPED | OUTPATIENT
Start: 2020-12-30 | End: 2021-01-28 | Stop reason: SDUPTHER

## 2020-12-29 ASSESSMENT — ENCOUNTER SYMPTOMS
RESPIRATORY NEGATIVE: 1
BACK PAIN: 0
GASTROINTESTINAL NEGATIVE: 1

## 2020-12-29 NOTE — PROGRESS NOTES
[facility]  [address]  Dept: [phone]  Dept Fax: [phone]: [phone]    Visit Date: 12/29/2020    Functionality Assessment/Goals Worksheet     On a scale of 0 (Does not Interfere) to 10 (Completely Interferes)     1. Which number describes how during the past week pain has interfered with       the following:  A. General Activity:  7  B. Mood: 0  C. Walking Ability:  0  D. Normal Work (Includes both work outside the home and housework):  6  E. Relations with Other People:   0  F. Sleep:   6  G. Enjoyment of Life:   0    2. Patient Prefers to Take their Pain Medications:     [x]  On a regular basis   []  Only when necessary    []  Does not take pain medications    3. What are the Patient's Goals/Expectations for Visiting Pain Management? [x]  Learn about my pain    []  Receive Medication   []  Physical Therapy     []  Treat Depression   []  Receive Injections    []  Treat Sleep   []  Deal with Anxiety and Stress   []  Treat Opoid Dependence/Addiction   []  Other:      HPI:   Danielle Torrez is a 60 y.o. male is here today for    Chief Complaint: Neck pain, and headaches    HPI   2 month FU. Continues to have a main complaint of posterior neck pain radiating across shoulder blades. Pain has been up and down. Aching pain with periods of sharp pain. Continues to get relief from bilateral C-facet RFA, headaches are not as intense and less frequent. Norco prn remains very effective in decreasing pain and \"sometimes takes pain completely away\". Medications reviewed. Patient denies side effects with medications. Patient states he is taking medications as prescribed. Hedenies receiving pain medications from other sources. He denies any ER visits since last visit. Pain scale with out pain medications or at its worst is 7/10.   Pain scale with pain medications or at its best is 0-3/10. Last dose of Norco was yesterday  Drug screen reviewed from 10/27/2020 and was appropriate per note  Pill count was completed today and was appropriate  Patient does not have naloxone available at home. Patient has not required use of naloxone at home since last office visit. The patienthas No Known Allergies. Past Medical History  Rosa Isela Shah  has a past medical history of Abnormal liver enzymes, Alcohol abuse, Allergic rhinitis, Chronic back pain, Colonic polyp, COPD (chronic obstructive pulmonary disease) ([facility] [address] Depression, Diabetes mellitus ([facility] [address] DJD (degenerative joint disease) of cervical spine, DJD (degenerative joint disease) of lumbar spine, GERD (gastroesophageal reflux disease), Hyperlipidemia, Hypertension, Osteoarthritis, Osteoarthritis, and Smoker. Past Surgical History  The patient  has a past surgical history that includes back surgery (2008); Inguinal hernia repair (2002); hernia repair; Cholecystectomy; Colonoscopy (6/2012); EKG 12 Lead (4/6/2015); Lumbar spine surgery (2006, 2007, 2008); Cardiac catheterization (3/2015); pr removal of hydrocele,tunica,unilat (Left, 7/31/2018); Facet joint injection (Bilateral, 7/7/2020); Facet joint injection (Bilateral, 8/6/2020); Pain management procedure (Right, 9/4/2020); and Pain management procedure (Left, 10/9/2020). Family History  This patient's family history includes Cancer in his father; Diabetes in his mother and sister; Heart Disease in his brother. Social History  Rosa Isela Shah  reports that he has been smoking cigarettes. He started smoking about 8 years ago. He has a 28.50 pack-year smoking history. He has never used smokeless tobacco. He reports that he does not drink alcohol or use drugs. Medications    Current Outpatient Medications:     [START ON 12/30/2020] HYDROcodone-acetaminophen (NORCO) 5-325 MG per tablet, Take 1 tablet by mouth 2 times daily as needed for Pain for up to 30 days. , Disp: 60 tablet, Rfl: 0   Umeclidinium Bromide 62.5 MCG/INH AEPB, Inhale 1 puff into the lungs daily, Disp: 1 each, Rfl: 3    albuterol sulfate HFA (VENTOLIN HFA) 108 (90 Base) MCG/ACT inhaler, Inhale 2 puffs into the lungs 4 times daily as needed for Wheezing, Disp: 3 Inhaler, Rfl: 1    metoprolol tartrate (LOPRESSOR) 25 MG tablet, TAKE 1/2 TABLET BY MOUTH TWICE DAILY.  HOLD FOR HEART RATE LESS THAN 60, Disp: 90 tablet, Rfl: 3    canagliflozin (INVOKANA) 100 MG TABS tablet, Take 1 tablet by mouth every morning (before breakfast), Disp: 90 tablet, Rfl: 3    nicotine (NICODERM CQ) 21 MG/24HR, Place 1 patch onto the skin daily, Disp: 42 patch, Rfl: 0    nicotine (NICODERM CQ) 14 MG/24HR, Place 1 patch onto the skin daily, Disp: 42 patch, Rfl: 0    nicotine (NICODERM CQ) 7 MG/24HR, Place 1 patch onto the skin daily, Disp: 42 patch, Rfl: 0    metFORMIN (GLUCOPHAGE) 1000 MG tablet, TAKE 1 TABLET BY MOUTH TWICE DAILY WITH MEALS, Disp: 180 tablet, Rfl: 3    JANUVIA 100 MG tablet, TAKE 1 TABLET BY MOUTH DAILY, Disp: 90 tablet, Rfl: 1    amitriptyline (ELAVIL) 50 MG tablet, TAKE 1 TABLET BY MOUTH EVERY NIGHT, Disp: 90 tablet, Rfl: 3    atorvastatin (LIPITOR) 40 MG tablet, 1 tab po daily, Disp: 90 tablet, Rfl: 3    etodolac (LODINE XL) 400 MG extended release tablet, TAKE 1 TABLET BY MOUTH DAILY, Disp: 90 tablet, Rfl: 3    albuterol sulfate HFA (PROAIR HFA) 108 (90 Base) MCG/ACT inhaler, INHALE 2 PUFFS INTO THE LUNGS EVERY 6 HOURS AS NEEDED FOR WHEEZING, Disp: 1 Inhaler, Rfl: 3    azelastine (ASTELIN) 0.1 % nasal spray, SPRAY ONCE IN EACH NOSTRIL TWICE DAILY AS DIRECTED, Disp: 3 Bottle, Rfl: 1    QUEtiapine (SEROQUEL) 200 MG tablet, Take 1 tablet at bedtime, Disp: 90 tablet, Rfl: 3    buPROPion (WELLBUTRIN XL) 150 MG extended release tablet, Take 1 tablet by mouth every morning, Disp: 90 tablet, Rfl: 3    lisinopril (PRINIVIL;ZESTRIL) 2.5 MG tablet, Take 1 tablet by mouth daily, Disp: 90 tablet, Rfl: 3    pantoprazole (PROTONIX) 40 MG tablet, TAKE 1 TABLET BY MOUTH DAILY, Disp: 90 tablet, Rfl: 3    aspirin 81 MG chewable tablet, TAKE 1 TABLET BY MOUTH DAILY, Disp: 90 tablet, Rfl: 3    Lancets MISC, 1 each by Does not apply route 2 times daily, Disp: 100 each, Rfl: 3    Elastic Bandages & Supports (B & B A-2 ATHLETIC SUPPORTER) MISC, 1 Device by Does not apply route continuous prn (prn pain), Disp: 1 each, Rfl: 0    Lancet Device MISC, 1 Device by Does not apply route once for 1 dose, Disp: 1 Device, Rfl: 0    Subjective:      Review of Systems   Constitutional: Negative. HENT: Negative. Respiratory: Negative. Cardiovascular: Negative. Gastrointestinal: Negative. Musculoskeletal: Positive for arthralgias, myalgias, neck pain and neck stiffness. Negative for back pain, gait problem and joint swelling. Neurological: Positive for headaches. Negative for weakness and numbness. Psychiatric/Behavioral: Negative. Objective:     Vitals:    12/29/20 0830   BP: 118/78   Temp: 96.9 °F (36.1 °C)   Weight: 215 lb (97.5 kg)   Height: 6' 1\" (1.854 m)       Physical Exam  Vitals signs and nursing note reviewed. Constitutional:       General: He is not in acute distress. Appearance: Normal appearance. He is well-developed. He is not diaphoretic. HENT:      Head: Normocephalic and atraumatic. Right Ear: External ear normal.      Left Ear: External ear normal.      Nose: Nose normal.      Mouth/Throat:      Mouth: Mucous membranes are moist.      Pharynx: No oropharyngeal exudate. Eyes:      General: No scleral icterus. Right eye: No discharge. Left eye: No discharge. Conjunctiva/sclera: Conjunctivae normal.      Pupils: Pupils are equal, round, and reactive to light. Neck:      Musculoskeletal: Full passive range of motion without pain and normal range of motion. Normal range of motion. Neck rigidity and muscular tenderness present. No edema or erythema. Thyroid: No thyromegaly. Cardiovascular:      Rate and Rhythm: Normal rate and regular rhythm. Heart sounds: Normal heart sounds. No murmur. No friction rub. No gallop. Pulmonary:      Effort: Pulmonary effort is normal. No respiratory distress. Breath sounds: Normal breath sounds. No wheezing or rales. Chest:      Chest wall: No tenderness. Abdominal:      General: Bowel sounds are normal. There is no distension. Palpations: Abdomen is soft. Tenderness: There is no abdominal tenderness. There is no guarding or rebound. Musculoskeletal:         General: Swelling and tenderness present. Right shoulder: He exhibits decreased range of motion and tenderness. Left shoulder: He exhibits decreased range of motion and tenderness. Right hip: He exhibits no tenderness. Left hip: He exhibits no tenderness. Right knee: He exhibits normal range of motion. No tenderness found. Left knee: He exhibits normal range of motion. No tenderness found. Right ankle: He exhibits no swelling. Left ankle: He exhibits no swelling. Cervical back: He exhibits decreased range of motion, tenderness, pain and spasm. Thoracic back: He exhibits tenderness, pain and spasm. Lumbar back: He exhibits decreased range of motion, tenderness and pain. Back:       Right lower leg: He exhibits no swelling. No edema. Left lower leg: He exhibits no swelling. No edema. Right foot: No swelling. Left foot: No swelling. Skin:     General: Skin is warm. Coloration: Skin is not pale. Findings: No erythema or rash. Neurological:      General: No focal deficit present. Mental Status: He is alert and oriented to person, place, and time. He is not disoriented. Cranial Nerves: No cranial nerve deficit. Sensory: Sensory deficit present. Motor: No atrophy or abnormal muscle tone.       Coordination: Coordination normal.      Gait: Gait normal.      Deep Tendon Reflexes: Babinski sign absent on the right side. Babinski sign absent on the left side. Reflex Scores:       Tricep reflexes are 1+ on the right side and 1+ on the left side. Bicep reflexes are 1+ on the right side and 1+ on the left side. Brachioradialis reflexes are 1+ on the right side and 1+ on the left side. Patellar reflexes are 1+ on the right side and 1+ on the left side. Achilles reflexes are 1+ on the right side and 1+ on the left side. Comments: SENSORY DECREASED STOCKING DISTRIBUTION   Psychiatric:         Attention and Perception: Attention normal. He is attentive. Mood and Affect: Mood normal. Mood is not anxious or depressed. Affect is not labile, blunt, angry or inappropriate. Speech: Speech normal. He is communicative. Speech is not rapid and pressured, delayed, slurred or tangential.         Behavior: Behavior normal. Behavior is not agitated, slowed, aggressive, withdrawn, hyperactive or combative. Behavior is cooperative. Thought Content: Thought content normal. Thought content is not paranoid or delusional. Thought content does not include homicidal or suicidal ideation. Thought content does not include homicidal or suicidal plan. Cognition and Memory: Cognition normal. Memory is not impaired. He does not exhibit impaired recent memory or impaired remote memory. Judgment: Judgment is not impulsive or inappropriate. JIMENA test: +   Yeomans test: +   Gaenslen test: +      Assessment:     1. Cervical spondylosis    2. Thoracic spondylosis    3. Sacroiliac inflammation ([facility] [address]    4. Chronic pain syndrome            Plan:      OARRS reviewed. Current MED: 10.00  Patient was offered naloxone for home. Discussed long term side effects of medications, tolerance, dependency and addiction. Previous UDS reviewed  UDS preformed today for compliance. Patient told can not receive any pain medications from any other source.   No evidence of abuse, diversion or aberrant behavior. Medications and/or procedures to improve function and quality of life- patient understanding with this and that may not be pain free  Discussed with patient about safe storage of medications at home  Discussed possible weaning of medication dosing dependent on treatment/procedure results. Discussed with patient about risks with procedure including infection, reaction to medication, increased pain, or bleeding. Procedure notes reviewed in detail   Continues to receive about 80% relief of neck pain from bilateral C-facet RFA  Continue Norco 5/325 BID prn- ordered refill       Meds. Prescribed:   Orders Placed This Encounter   Medications    HYDROcodone-acetaminophen (NORCO) 5-325 MG per tablet     Sig: Take 1 tablet by mouth 2 times daily as needed for Pain for up to 30 days. Dispense:  60 tablet     Refill:  0     Reduce doses taken as pain becomes manageable       Return in about 2 months (around 2/28/2021), or if symptoms worsen or fail to improve, for follow up  for medications.              Electronically signed by GERARDO Yeboah CNP on12/29/2020 at 8:42 AM

## 2020-12-31 ENCOUNTER — HOSPITAL ENCOUNTER (OUTPATIENT)
Age: 65
Discharge: HOME | End: 2020-12-31
Payer: MEDICARE

## 2020-12-31 PROCEDURE — U0003 INFECTIOUS AGENT DETECTION BY NUCLEIC ACID (DNA OR RNA); SEVERE ACUTE RESPIRATORY SYNDROME CORONAVIRUS 2 (SARS-COV-2) (CORONAVIRUS DISEASE [COVID-19]), AMPLIFIED PROBE TECHNIQUE, MAKING USE OF HIGH THROUGHPUT TECHNOLOGIES AS DESCRIBED BY CMS-2020-01-R: HCPCS

## 2021-01-01 LAB — SARS-COV-2: NOT DETECTED

## 2021-01-04 ENCOUNTER — TELEPHONE (OUTPATIENT)
Dept: FAMILY MEDICINE CLINIC | Age: 66
End: 2021-01-04

## 2021-01-04 NOTE — TELEPHONE ENCOUNTER
----- Message from GERARDO Bledsoe CNP sent at 1/4/2021  7:56 AM EST -----  Let pt know his covid test is negative

## 2021-01-07 ENCOUNTER — HOSPITAL ENCOUNTER (OUTPATIENT)
Dept: PULMONOLOGY | Age: 66
Discharge: HOME | End: 2021-01-07
Payer: MEDICARE

## 2021-01-07 DIAGNOSIS — J44.9 CHRONIC OBSTRUCTIVE PULMONARY DISEASE, UNSPECIFIED COPD TYPE (HCC): ICD-10-CM

## 2021-01-07 PROCEDURE — 94726 PLETHYSMOGRAPHY LUNG VOLUMES: CPT

## 2021-01-07 PROCEDURE — 94060 EVALUATION OF WHEEZING: CPT

## 2021-01-07 PROCEDURE — 94729 DIFFUSING CAPACITY: CPT

## 2021-01-11 ENCOUNTER — TELEPHONE (OUTPATIENT)
Dept: FAMILY MEDICINE CLINIC | Age: 66
End: 2021-01-11

## 2021-01-28 DIAGNOSIS — M47.814 THORACIC SPONDYLOSIS: ICD-10-CM

## 2021-01-28 DIAGNOSIS — G89.4 CHRONIC PAIN SYNDROME: ICD-10-CM

## 2021-01-28 DIAGNOSIS — M46.1 SACROILIAC INFLAMMATION (HCC): ICD-10-CM

## 2021-01-28 DIAGNOSIS — M47.812 CERVICAL SPONDYLOSIS: ICD-10-CM

## 2021-01-29 NOTE — TELEPHONE ENCOUNTER
Melisa Selby called requesting a refill on the following medications:  Requested Prescriptions     Pending Prescriptions Disp Refills    HYDROcodone-acetaminophen (NORCO) 5-325 MG per tablet 60 tablet 0     Sig: Take 1 tablet by mouth 2 times daily as needed for Pain for up to 30 days.      Pharmacy verified:  [facility] [address]    Date of last visit: 12/29/2020  Date of next visit (if applicable): 25/57/1722

## 2021-02-01 RX ORDER — HYDROCODONE BITARTRATE AND ACETAMINOPHEN 5; 325 MG/1; MG/1
1 TABLET ORAL 2 TIMES DAILY PRN
Qty: 60 TABLET | Refills: 0 | Status: SHIPPED | OUTPATIENT
Start: 2021-02-01 | End: 2021-03-02 | Stop reason: SDUPTHER

## 2021-02-01 NOTE — TELEPHONE ENCOUNTER
OARRS reviewed. UDS: Inconsistent Hydromorphone, Positive  Dihydrocodeine, Hydrocodone and Norhydrocodone. Last seen: 12/29/2020.  Follow-up: 3/2/21

## 2021-02-18 ENCOUNTER — TELEPHONE (OUTPATIENT)
Dept: FAMILY MEDICINE CLINIC | Age: 66
End: 2021-02-18

## 2021-02-18 ENCOUNTER — HOSPITAL ENCOUNTER (OUTPATIENT)
Age: 66
Discharge: HOME | End: 2021-02-18
Payer: COMMERCIAL

## 2021-02-18 DIAGNOSIS — E11.65 UNCONTROLLED TYPE 2 DIABETES MELLITUS WITH HYPERGLYCEMIA (HCC): ICD-10-CM

## 2021-02-18 LAB
AVERAGE GLUCOSE: 150 MG/DL (ref 70–126)
HBA1C MFR BLD: 7 % (ref 4.4–6.4)

## 2021-02-18 PROCEDURE — 36415 COLL VENOUS BLD VENIPUNCTURE: CPT

## 2021-02-18 PROCEDURE — 83036 HEMOGLOBIN GLYCOSYLATED A1C: CPT

## 2021-02-18 NOTE — TELEPHONE ENCOUNTER
----- Message from GERARDO Michelle CNP sent at 2/18/2021  3:13 PM EST -----  Tell Reyna Lopez his A1C has improved to 7.0 have him continue current meds for diabetes

## 2021-03-02 ENCOUNTER — OFFICE VISIT (OUTPATIENT)
Dept: PHYSICAL MEDICINE AND REHAB | Age: 66
End: 2021-03-02
Payer: COMMERCIAL

## 2021-03-02 VITALS
TEMPERATURE: 96.5 F | DIASTOLIC BLOOD PRESSURE: 76 MMHG | BODY MASS INDEX: 28.49 KG/M2 | HEIGHT: 73 IN | SYSTOLIC BLOOD PRESSURE: 118 MMHG | WEIGHT: 215 LBS

## 2021-03-02 DIAGNOSIS — M46.1 SACROILIAC INFLAMMATION (HCC): ICD-10-CM

## 2021-03-02 DIAGNOSIS — G89.4 CHRONIC PAIN SYNDROME: ICD-10-CM

## 2021-03-02 DIAGNOSIS — G44.009 ATYPICAL CLUSTER HEADACHE: ICD-10-CM

## 2021-03-02 DIAGNOSIS — M47.814 THORACIC SPONDYLOSIS: ICD-10-CM

## 2021-03-02 DIAGNOSIS — M47.812 CERVICAL SPONDYLOSIS: Primary | ICD-10-CM

## 2021-03-02 PROCEDURE — 99213 OFFICE O/P EST LOW 20 MIN: CPT | Performed by: NURSE PRACTITIONER

## 2021-03-02 PROCEDURE — G8482 FLU IMMUNIZE ORDER/ADMIN: HCPCS | Performed by: NURSE PRACTITIONER

## 2021-03-02 PROCEDURE — 4004F PT TOBACCO SCREEN RCVD TLK: CPT | Performed by: NURSE PRACTITIONER

## 2021-03-02 PROCEDURE — G8427 DOCREV CUR MEDS BY ELIG CLIN: HCPCS | Performed by: NURSE PRACTITIONER

## 2021-03-02 PROCEDURE — 3017F COLORECTAL CA SCREEN DOC REV: CPT | Performed by: NURSE PRACTITIONER

## 2021-03-02 PROCEDURE — G8419 CALC BMI OUT NRM PARAM NOF/U: HCPCS | Performed by: NURSE PRACTITIONER

## 2021-03-02 RX ORDER — HYDROCODONE BITARTRATE AND ACETAMINOPHEN 5; 325 MG/1; MG/1
1 TABLET ORAL 2 TIMES DAILY PRN
Qty: 60 TABLET | Refills: 0 | Status: SHIPPED | OUTPATIENT
Start: 2021-03-02 | End: 2021-04-01 | Stop reason: SDUPTHER

## 2021-03-02 ASSESSMENT — ENCOUNTER SYMPTOMS
GASTROINTESTINAL NEGATIVE: 1
BACK PAIN: 0
RESPIRATORY NEGATIVE: 1

## 2021-03-02 NOTE — PROGRESS NOTES
[facility] [address]  Dept: [phone]  Dept Fax: [phone]: [phone]    Visit Date: 3/2/2021    Functionality Assessment/Goals Worksheet     On a scale of 0 (Does not Interfere) to 10 (Completely Interferes)     1. Which number describes how during the past week pain has interfered with       the following:  A. General Activity:  6  B. Mood: 5  C. Walking Ability:  4  D. Normal Work (Includes both work outside the home and housework):  7  E. Relations with Other People:   5  F. Sleep:   5  G. Enjoyment of Life:   4    2. Patient Prefers to Take their Pain Medications:     [x]  On a regular basis   []  Only when necessary    []  Does not take pain medications    3. What are the Patient's Goals/Expectations for Visiting Pain Management? [x]  Learn about my pain    []  Receive Medication   []  Physical Therapy     []  Treat Depression   []  Receive Injections    []  Treat Sleep   []  Deal with Anxiety and Stress   []  Treat Opoid Dependence/Addiction   []  Other:      HPI:   Benigno Milligan is a 60 y.o. male is here today for    Chief Complaint: Neck pain, and headaches     HPI   2 month FU. Today noticing increased right side neck pain radiating to shoulder blade with increased headaches as effects from right C-facet RFA is waning. Continues relief in left side. Aching pain and again headaches are becoming more frequent. Norco prn remains effective in decreasing pain     Medications reviewed. Patient denies side effects with medications. Patient states he is taking medications as prescribed. Hedenies receiving pain medications from other sources. He denies any ER visits since last visit. Pain scale with out pain medications or at its worst is 7/10. Pain scale with pain medications or at its best is 4/10.   Last dose of Randolph was today  Drug screen reviewed from 12/29/2020 and was appropriate  Pill count was completed today and was appropriate  Patient does not have naloxone available at home. Patient has not required use of naloxone at home since last office visit. The patienthas No Known Allergies. Subjective:      Review of Systems   Constitutional: Negative. HENT: Negative. Respiratory: Negative. Cardiovascular: Negative. Gastrointestinal: Negative. Musculoskeletal: Positive for arthralgias, myalgias, neck pain and neck stiffness. Negative for back pain, gait problem and joint swelling. Neurological: Positive for headaches. Negative for weakness and numbness. Psychiatric/Behavioral: Negative. Objective:     Vitals:    03/02/21 0829   BP: 118/76   Temp: 96.5 °F (35.8 °C)   Weight: 215 lb (97.5 kg)   Height: 6' 1\" (1.854 m)       Physical Exam  Vitals signs and nursing note reviewed. Constitutional:       General: He is not in acute distress. Appearance: Normal appearance. He is well-developed. He is not diaphoretic. HENT:      Head: Normocephalic and atraumatic. Right Ear: External ear normal.      Left Ear: External ear normal.      Nose: Nose normal.      Mouth/Throat:      Mouth: Mucous membranes are moist.      Pharynx: No oropharyngeal exudate. Eyes:      General: No scleral icterus. Right eye: No discharge. Left eye: No discharge. Conjunctiva/sclera: Conjunctivae normal.      Pupils: Pupils are equal, round, and reactive to light. Neck:      Musculoskeletal: Full passive range of motion without pain and normal range of motion. Normal range of motion. Neck rigidity and muscular tenderness present. No edema or erythema. Thyroid: No thyromegaly. Cardiovascular:      Rate and Rhythm: Normal rate and regular rhythm. Heart sounds: Normal heart sounds. No murmur. No friction rub. No gallop. Pulmonary:      Effort: Pulmonary effort is normal. No respiratory distress. Breath sounds: Normal breath sounds. No wheezing or rales. Chest:      Chest wall: No tenderness. Abdominal:      General: Bowel sounds are normal. There is no distension. Palpations: Abdomen is soft. Tenderness: There is no abdominal tenderness. There is no guarding or rebound. Musculoskeletal:         General: Swelling and tenderness present. Right shoulder: He exhibits decreased range of motion and tenderness. Left shoulder: He exhibits decreased range of motion and tenderness. Right hip: He exhibits no tenderness. Left hip: He exhibits no tenderness. Right knee: He exhibits normal range of motion. No tenderness found. Left knee: He exhibits normal range of motion. No tenderness found. Right ankle: He exhibits no swelling. Left ankle: He exhibits no swelling. Cervical back: He exhibits decreased range of motion, tenderness, pain and spasm. Thoracic back: He exhibits tenderness, pain and spasm. Lumbar back: He exhibits decreased range of motion, tenderness and pain. Back:       Right lower leg: He exhibits no swelling. No edema. Left lower leg: He exhibits no swelling. No edema. Right foot: No swelling. Left foot: No swelling. Skin:     General: Skin is warm. Coloration: Skin is not pale. Findings: No erythema or rash. Neurological:      General: No focal deficit present. Mental Status: He is alert and oriented to person, place, and time. He is not disoriented. Cranial Nerves: No cranial nerve deficit. Sensory: Sensory deficit present. Motor: No atrophy or abnormal muscle tone. Coordination: Coordination normal.      Gait: Gait normal.      Deep Tendon Reflexes: Babinski sign absent on the right side. Babinski sign absent on the left side. Reflex Scores:       Tricep reflexes are 1+ on the right side and 1+ on the left side.        Bicep reflexes are 1+ on the right side and 1+ on the left side. Brachioradialis reflexes are 1+ on the right side and 1+ on the left side. Patellar reflexes are 1+ on the right side and 1+ on the left side. Achilles reflexes are 1+ on the right side and 1+ on the left side. Comments: SENSORY DECREASED STOCKING DISTRIBUTION   Psychiatric:         Attention and Perception: Attention normal. He is attentive. Mood and Affect: Mood normal. Mood is not anxious or depressed. Affect is not labile, blunt, angry or inappropriate. Speech: Speech normal. He is communicative. Speech is not rapid and pressured, delayed, slurred or tangential.         Behavior: Behavior normal. Behavior is not agitated, slowed, aggressive, withdrawn, hyperactive or combative. Behavior is cooperative. Thought Content: Thought content normal. Thought content is not paranoid or delusional. Thought content does not include homicidal or suicidal ideation. Thought content does not include homicidal or suicidal plan. Cognition and Memory: Cognition normal. Memory is not impaired. He does not exhibit impaired recent memory or impaired remote memory. Judgment: Judgment is not impulsive or inappropriate. JIMENA test: + bilaterally   Yeomans test: + bilaterally   Gaenslen test: + bilaterally      Assessment:     1. Cervical spondylosis    2. Thoracic spondylosis    3. Sacroiliac inflammation ([facility] [address]    4. Chronic pain syndrome    5. Atypical cluster headache            Plan:      OARRS reviewed. Current MED: 10.00  Patient was offered naloxone for home. Discussed long term side effects of medications, tolerance, dependency and addiction. Previous UDS reviewed  UDS preformed today for compliance. Patient told can not receive any pain medications from any other source. No evidence of abuse, diversion or aberrant behavior.   Medications and/or procedures to improve function and quality of life- patient understanding with this and that may not be pain free  Discussed with patient about safe storage of medications at home  Discussed possible weaning of medication dosing dependent on treatment/procedure results. Discussed with patient about risks with procedure including infection, reaction to medication, increased pain, or bleeding. Right side neck pain has been increasing as effects from right c-facet RFA is waning. Received 80% releif of right side neck pain and headaches for 6 months. Pain still tolerable in left side. Plan Right C-facet RFA @ C4-5,C5-6 for longer term pain relief. Procedure and risks discussed in detail with patient. Continue Norco 5/325 BID prn- ordered refill   Patient is complaint, ordered updated UDS today  On 81 mg of aspirin      Meds. Prescribed:   Orders Placed This Encounter   Medications    HYDROcodone-acetaminophen (NORCO) 5-325 MG per tablet     Sig: Take 1 tablet by mouth 2 times daily as needed for Pain for up to 30 days. Dispense:  60 tablet     Refill:  0     Reduce doses taken as pain becomes manageable       Return for Right C-facet RFA @ C4-5, C5-6. , follow up after procedure.                Electronically signed by GERARDO Hernández CNP on3/2/2021 at 8:52 AM

## 2021-03-17 ENCOUNTER — TELEPHONE (OUTPATIENT)
Dept: FAMILY MEDICINE CLINIC | Age: 66
End: 2021-03-17

## 2021-03-18 ENCOUNTER — IMMUNIZATION (OUTPATIENT)
Dept: PRIMARY CARE CLINIC | Age: 66
End: 2021-03-18
Payer: COMMERCIAL

## 2021-03-18 PROCEDURE — 0001A COVID-19, PFIZER VACCINE 30MCG/0.3ML DOSE: CPT

## 2021-03-18 PROCEDURE — 91300 COVID-19, PFIZER VACCINE 30MCG/0.3ML DOSE: CPT

## 2021-03-24 ENCOUNTER — TELEPHONE (OUTPATIENT)
Dept: PHYSICAL MEDICINE AND REHAB | Age: 66
End: 2021-03-24

## 2021-03-24 DIAGNOSIS — E11.65 UNCONTROLLED TYPE 2 DIABETES MELLITUS WITH HYPERGLYCEMIA (HCC): ICD-10-CM

## 2021-03-24 NOTE — TELEPHONE ENCOUNTER
Let pt know I ordered jardiance per his insurance coverage. I sent it to his local pharmacy.  We will recheck his A1C at his next appt in May

## 2021-03-25 ENCOUNTER — ANESTHESIA (OUTPATIENT)
Dept: OPERATING ROOM | Age: 66
End: 2021-03-25
Payer: COMMERCIAL

## 2021-03-25 ENCOUNTER — ANESTHESIA EVENT (OUTPATIENT)
Dept: OPERATING ROOM | Age: 66
End: 2021-03-25
Payer: COMMERCIAL

## 2021-03-25 ENCOUNTER — HOSPITAL ENCOUNTER (OUTPATIENT)
Age: 66
Setting detail: OUTPATIENT SURGERY
Discharge: HOME | End: 2021-03-25
Attending: PAIN MEDICINE | Admitting: PAIN MEDICINE
Payer: COMMERCIAL

## 2021-03-25 ENCOUNTER — APPOINTMENT (OUTPATIENT)
Dept: GENERAL RADIOLOGY | Age: 66
End: 2021-03-25
Attending: PAIN MEDICINE
Payer: COMMERCIAL

## 2021-03-25 VITALS
WEIGHT: 205 LBS | TEMPERATURE: 97.7 F | HEART RATE: 71 BPM | RESPIRATION RATE: 13 BRPM | SYSTOLIC BLOOD PRESSURE: 114 MMHG | DIASTOLIC BLOOD PRESSURE: 73 MMHG | HEIGHT: 73 IN | BODY MASS INDEX: 27.17 KG/M2 | OXYGEN SATURATION: 92 %

## 2021-03-25 VITALS
RESPIRATION RATE: 17 BRPM | DIASTOLIC BLOOD PRESSURE: 81 MMHG | OXYGEN SATURATION: 97 % | SYSTOLIC BLOOD PRESSURE: 136 MMHG

## 2021-03-25 LAB — GLUCOSE BLD-MCNC: 144 MG/DL (ref 70–108)

## 2021-03-25 PROCEDURE — 2709999900 HC NON-CHARGEABLE SUPPLY: Performed by: PAIN MEDICINE

## 2021-03-25 PROCEDURE — 64633 DESTROY CERV/THOR FACET JNT: CPT | Performed by: PAIN MEDICINE

## 2021-03-25 PROCEDURE — 7100000010 HC PHASE II RECOVERY - FIRST 15 MIN: Performed by: PAIN MEDICINE

## 2021-03-25 PROCEDURE — 3209999900 FLUORO FOR SURGICAL PROCEDURES

## 2021-03-25 PROCEDURE — 2500000003 HC RX 250 WO HCPCS: Performed by: PAIN MEDICINE

## 2021-03-25 PROCEDURE — 64634 DESTROY C/TH FACET JNT ADDL: CPT | Performed by: PAIN MEDICINE

## 2021-03-25 PROCEDURE — 6360000002 HC RX W HCPCS: Performed by: NURSE ANESTHETIST, CERTIFIED REGISTERED

## 2021-03-25 PROCEDURE — 3600000056 HC PAIN LEVEL 4 BASE: Performed by: PAIN MEDICINE

## 2021-03-25 PROCEDURE — 6360000002 HC RX W HCPCS: Performed by: PAIN MEDICINE

## 2021-03-25 PROCEDURE — 2500000003 HC RX 250 WO HCPCS: Performed by: NURSE ANESTHETIST, CERTIFIED REGISTERED

## 2021-03-25 PROCEDURE — 3600000057 HC PAIN LEVEL 4 ADDL 15 MIN: Performed by: PAIN MEDICINE

## 2021-03-25 PROCEDURE — 3700000001 HC ADD 15 MINUTES (ANESTHESIA): Performed by: PAIN MEDICINE

## 2021-03-25 PROCEDURE — 7100000011 HC PHASE II RECOVERY - ADDTL 15 MIN: Performed by: PAIN MEDICINE

## 2021-03-25 PROCEDURE — 82948 REAGENT STRIP/BLOOD GLUCOSE: CPT

## 2021-03-25 PROCEDURE — 3700000000 HC ANESTHESIA ATTENDED CARE: Performed by: PAIN MEDICINE

## 2021-03-25 PROCEDURE — 2580000003 HC RX 258: Performed by: NURSE ANESTHETIST, CERTIFIED REGISTERED

## 2021-03-25 RX ORDER — SODIUM CHLORIDE 9 MG/ML
INJECTION, SOLUTION INTRAVENOUS CONTINUOUS PRN
Status: DISCONTINUED | OUTPATIENT
Start: 2021-03-25 | End: 2021-03-25 | Stop reason: SDUPTHER

## 2021-03-25 RX ORDER — LIDOCAINE HYDROCHLORIDE 20 MG/ML
INJECTION, SOLUTION EPIDURAL; INFILTRATION; INTRACAUDAL; PERINEURAL PRN
Status: DISCONTINUED | OUTPATIENT
Start: 2021-03-25 | End: 2021-03-25 | Stop reason: SDUPTHER

## 2021-03-25 RX ORDER — PROPOFOL 10 MG/ML
INJECTION, EMULSION INTRAVENOUS PRN
Status: DISCONTINUED | OUTPATIENT
Start: 2021-03-25 | End: 2021-03-25 | Stop reason: SDUPTHER

## 2021-03-25 RX ORDER — LIDOCAINE HYDROCHLORIDE 10 MG/ML
INJECTION, SOLUTION EPIDURAL; INFILTRATION; INTRACAUDAL; PERINEURAL PRN
Status: DISCONTINUED | OUTPATIENT
Start: 2021-03-25 | End: 2021-03-25 | Stop reason: ALTCHOICE

## 2021-03-25 RX ORDER — BUPIVACAINE HYDROCHLORIDE 2.5 MG/ML
INJECTION, SOLUTION EPIDURAL; INFILTRATION; INTRACAUDAL PRN
Status: DISCONTINUED | OUTPATIENT
Start: 2021-03-25 | End: 2021-03-25 | Stop reason: ALTCHOICE

## 2021-03-25 RX ORDER — FENTANYL CITRATE 50 UG/ML
INJECTION, SOLUTION INTRAMUSCULAR; INTRAVENOUS PRN
Status: DISCONTINUED | OUTPATIENT
Start: 2021-03-25 | End: 2021-03-25 | Stop reason: SDUPTHER

## 2021-03-25 RX ORDER — DEXAMETHASONE SODIUM PHOSPHATE 4 MG/ML
INJECTION, SOLUTION INTRA-ARTICULAR; INTRALESIONAL; INTRAMUSCULAR; INTRAVENOUS; SOFT TISSUE PRN
Status: DISCONTINUED | OUTPATIENT
Start: 2021-03-25 | End: 2021-03-25 | Stop reason: ALTCHOICE

## 2021-03-25 RX ADMIN — LIDOCAINE HYDROCHLORIDE 100 MG: 20 INJECTION, SOLUTION EPIDURAL; INFILTRATION; INTRACAUDAL; PERINEURAL at 10:13

## 2021-03-25 RX ADMIN — FENTANYL CITRATE 100 MCG: 50 INJECTION, SOLUTION INTRAMUSCULAR; INTRAVENOUS at 10:13

## 2021-03-25 RX ADMIN — SODIUM CHLORIDE: 9 INJECTION, SOLUTION INTRAVENOUS at 10:13

## 2021-03-25 RX ADMIN — PROPOFOL 110 MG: 10 INJECTION, EMULSION INTRAVENOUS at 10:24

## 2021-03-25 ASSESSMENT — PAIN SCALES - GENERAL: PAINLEVEL_OUTOF10: 7

## 2021-03-25 ASSESSMENT — LIFESTYLE VARIABLES: SMOKING_STATUS: 1

## 2021-03-25 ASSESSMENT — PULMONARY FUNCTION TESTS
PIF_VALUE: 0

## 2021-03-25 ASSESSMENT — PAIN DESCRIPTION - PAIN TYPE: TYPE: CHRONIC PAIN

## 2021-03-25 ASSESSMENT — PAIN - FUNCTIONAL ASSESSMENT: PAIN_FUNCTIONAL_ASSESSMENT: 0-10

## 2021-03-25 ASSESSMENT — PAIN DESCRIPTION - DESCRIPTORS: DESCRIPTORS: CONSTANT

## 2021-03-25 NOTE — ANESTHESIA PRE PROCEDURE
mellitus ([facility] [address] E11.9    Hydrocele N43.3       Past Medical History:        Diagnosis Date    Abnormal liver enzymes     Alcohol abuse     Allergic rhinitis     Chronic back pain     Colonic polyp     COPD (chronic obstructive pulmonary disease) (HCC)     Depression     Diabetes mellitus (HCC)     DJD (degenerative joint disease) of cervical spine     DJD (degenerative joint disease) of lumbar spine     SP SURGERY    GERD (gastroesophageal reflux disease)     Hyperlipidemia     Hypertension     Osteoarthritis     Osteoarthritis     Smoker        Past Surgical History:        Procedure Laterality Date    BACK SURGERY  2008    X 3    CARDIAC CATHETERIZATION  3/2015    [facility]    CHOLECYSTECTOMY      COLONOSCOPY  6/2012    repeat 6/2017- no precancerous  polyps    EKG 12-LEAD  4/6/2015         FACET JOINT INJECTION Bilateral 7/7/2020    BILATERAL C-FACET MBB @ C4-5,C5-6 and C6-7 performed by Bambi Mcrae MD at [address] INJECTION Bilateral 8/6/2020    bilateral C-facet MBB # 2 @ C4-5, C5-6 and C6-7 performed by Bambi Mcrae MD at [address]       [place]   [facility]  2002    Bilateral Inguinal     LUMBAR SPINE SURGERY  2006, 2007, 2008    fusion 2008    PAIN MANAGEMENT PROCEDURE Right 9/4/2020    Bilateral C-facet RFA @ C4-5, C5-6. RIGHT SIDE FIRST.  performed by Bambi Mcrae MD at [place] [address] Left 10/9/2020    Left C-facet RFA @ C4-5, and C5-6 performed by Bambi Mcrae MD at [facility] OF HYDROCELE,TUNICA,UNILAT Left 7/31/2018    LEFT HYDROCELECTOMY performed by Bishop Meme MD at [facility] History:    Social History     Tobacco Use    Smoking status: Current Every Day Smoker     Packs/day: 0.75     Years: 38.00     Pack years: 28.50     Types: Cigarettes     Start date: 11/19/2012    Smokeless tobacco: Never Used   Substance Use Topics    Alcohol use: No     Alcohol/week: 0.0 standard drinks     Comment: quit 2013                                Ready to quit: Not Answered  Counseling given: Not Answered      Vital Signs (Current): There were no vitals filed for this visit. BP Readings from Last 3 Encounters:   03/25/21 (!) 146/80   03/02/21 118/76   12/29/20 118/78       NPO Status:                                                                                 BMI:   Wt Readings from Last 3 Encounters:   03/25/21 205 lb (93 kg)   03/02/21 215 lb (97.5 kg)   12/29/20 215 lb (97.5 kg)     There is no height or weight on file to calculate BMI.    CBC:   Lab Results   Component Value Date    WBC 10.1 06/26/2018    RBC 4.81 06/26/2018    HGB 15.2 06/26/2018    HCT 45.1 06/26/2018    MCV 93.8 06/26/2018    RDW 13.8 05/24/2018     06/26/2018       CMP:   Lab Results   Component Value Date     06/15/2020    K 4.6 06/15/2020     06/15/2020    CO2 27 06/15/2020    BUN 12 06/15/2020    CREATININE 0.8 06/15/2020    LABGLOM >90 06/15/2020    GLUCOSE 159 06/15/2020    GLUCOSE 141 06/20/2016    PROT 7.1 06/15/2020    PROT 7.7 09/24/2013    CALCIUM 9.5 06/15/2020    BILITOT 0.3 06/15/2020    ALKPHOS 90 06/15/2020    AST 27 06/15/2020    ALT 33 06/15/2020       POC Tests:   No results for input(s): POCGLU, POCNA, POCK, POCCL, POCBUN, POCHEMO, POCHCT in the last 72 hours.     Coags: No results found for: PROTIME, INR, APTT    HCG (If Applicable): No results found for: PREGTESTUR, PREGSERUM, HCG, HCGQUANT     ABGs: No results found for: PHART, PO2ART, DAI3IWH, GZE0CVL, BEART, F1PBWCZI     Type & Screen (If Applicable):  Lab Results   Component Value Date    LABRH POS 04/06/2015       Drug/Infectious Status (If Applicable):  Lab Results   Component Value Date    HEPCAB Negative 07/23/2018       COVID-19 Screening (If Applicable):   Lab Results   Component Value Date COVID19 Not Detected 12/31/2020         Anesthesia Evaluation  Patient summary reviewed no history of anesthetic complications:   Airway: Mallampati: II  TM distance: >3 FB   Neck ROM: full  Mouth opening: > = 3 FB Dental:          Pulmonary: breath sounds clear to auscultation  (+) COPD:  current smoker    (-) recent URI                           Cardiovascular:    (+) hypertension:, hyperlipidemia        Rhythm: regular  Rate: normal                    Neuro/Psych:   (+) neuromuscular disease:, depression/anxiety             GI/Hepatic/Renal:   (+) GERD:,           Endo/Other:    (+) Diabetes, : arthritis: OA., .                 Abdominal:           Vascular: negative vascular ROS. Anesthesia Plan      MAC     ASA 3       Induction: intravenous. Anesthetic plan and risks discussed with patient. Plan discussed with CRNA.                   Coleen Bates MD   3/25/2021

## 2021-03-25 NOTE — H&P
H&P    Today noticing increased right side neck pain radiating to shoulder blade with increased headaches as effects from right C-facet RFA is waning. Continues relief in left side. Aching pain and again headaches are becoming more frequent.      Norco prn remains effective in decreasing pain      Medications reviewed. Patient denies side effects with medications. Patient states he is taking medications as prescribed. Hedenies receiving pain medications from other sources. He denies any ER visits since last visit.     Pain scale with out pain medications or at its worst is 7/10. Pain scale with pain medications or at its best is 4/10. Last dose of [place] was today  Drug screen reviewed from 12/29/2020 and was appropriate  Pill count was completed today and was appropriate  Patient does not have naloxone available at home. Patient has not required use of naloxone at home since last office visit.         The patienthas No Known Allergies.        Subjective:      Review of Systems   Constitutional: Negative. HENT: Negative. Respiratory: Negative. Cardiovascular: Negative. Gastrointestinal: Negative. Musculoskeletal: Positive for arthralgias, myalgias, neck pain and neck stiffness. Negative for back pain, gait problem and joint swelling. Neurological: Positive for headaches. Negative for weakness and numbness. Psychiatric/Behavioral: Negative.          Objective:      Vitals       Vitals:     03/02/21 0829   BP: 118/76   Temp: 96.5 °F (35.8 °C)   Weight: 215 lb (97.5 kg)   Height: 6' 1\" (1.854 m)            Physical Exam  Vitals signs and nursing note reviewed. Constitutional:       General: He is not in acute distress. Appearance: Normal appearance. He is well-developed. He is not diaphoretic. HENT:      Head: Normocephalic and atraumatic.       Right Ear: External ear normal.      Left Ear: External ear normal.      Nose: Nose normal.      Mouth/Throat:      Mouth: Mucous membranes are moist.      Pharynx: No oropharyngeal exudate. Eyes:      General: No scleral icterus. Right eye: No discharge. Left eye: No discharge. Conjunctiva/sclera: Conjunctivae normal.      Pupils: Pupils are equal, round, and reactive to light. Neck:      Musculoskeletal: Full passive range of motion without pain and normal range of motion. Normal range of motion. Neck rigidity and muscular tenderness present. No edema or erythema. Thyroid: No thyromegaly. Cardiovascular:      Rate and Rhythm: Normal rate and regular rhythm. Heart sounds: Normal heart sounds. No murmur. No friction rub. No gallop. Pulmonary:      Effort: Pulmonary effort is normal. No respiratory distress. Breath sounds: Normal breath sounds. No wheezing or rales. Chest:      Chest wall: No tenderness. Abdominal:      General: Bowel sounds are normal. There is no distension. Palpations: Abdomen is soft. Tenderness: There is no abdominal tenderness. There is no guarding or rebound. Musculoskeletal:         General: Swelling and tenderness present. Right shoulder: He exhibits decreased range of motion and tenderness. Left shoulder: He exhibits decreased range of motion and tenderness. Right hip: He exhibits no tenderness. Left hip: He exhibits no tenderness. Right knee: He exhibits normal range of motion. No tenderness found. Left knee: He exhibits normal range of motion. No tenderness found. Right ankle: He exhibits no swelling. Left ankle: He exhibits no swelling. Cervical back: He exhibits decreased range of motion, tenderness, pain and spasm. Thoracic back: He exhibits tenderness, pain and spasm. Lumbar back: He exhibits decreased range of motion, tenderness and pain. Back:       Right lower leg: He exhibits no swelling. No edema. Left lower leg: He exhibits no swelling. No edema. Right foot: No swelling.       Left foot: No Yeomans test: + bilaterally   Gaenslen test: + bilaterally   Assessment:      1. Cervical spondylosis    2. Thoracic spondylosis    3. Sacroiliac inflammation ([address]    4. Chronic pain syndrome    5. Atypical cluster headache       Plan:      · OARRS reviewed. Current MED: 10.00  · Patient was offered naloxone for home. · Discussed long term side effects of medications, tolerance, dependency and addiction. · Previous UDS reviewed  · UDS preformed today for compliance. · Patient told can not receive any pain medications from any other source. · No evidence of abuse, diversion or aberrant behavior. · Medications and/or procedures to improve function and quality of life- patient understanding with this and that may not be pain free  · Discussed with patient about safe storage of medications at home  · Discussed possible weaning of medication dosing dependent on treatment/procedure results. · Discussed with patient about risks with procedure including infection, reaction to medication, increased pain, or bleeding. · Right side neck pain has been increasing as effects from right c-facet RFA is waning. Received 80% releif of right side neck pain and headaches for 6 months. Pain still tolerable in left side. · Plan Right C-facet RFA @ C4-5,C5-6 for longer term pain relief.  Procedure and risks discussed in detail with patient.   · Continue Norco 5/325 BID prn- ordered refill   · Patient is complaint, ordered updated UDS today  · On 81 mg of aspirin                Return for Right C-facet RFA @ C4-5, C5-6. , follow up after procedure.

## 2021-03-25 NOTE — H&P
[address] [place] [address]  History and Physical Update    Pt Name: Jesusita Chester  MRN: [id number]  YOB: 1955  Date of evaluation: 3/25/2021      I have examined the patient and reviewed the H&P/Consult and there are no changes to the patient or plans.         Electronically signed by Katheryn Dobbs MD on 3/25/2021 at 9:36 AM

## 2021-03-25 NOTE — OP NOTE
Operative Note  Pre-Procedure Note    Patient Name: Arminda Edmonds   YOB: 1955  Medical Record Number: [id number]  Date: 3/25/21     Indication:  Neck pain  Consent: On file. Vital Signs:   Vitals:    03/25/21 0915   BP: (!) 146/80   Pulse: 86   Resp: 16   Temp: 97.5 °F (36.4 °C)   SpO2: 93%       Past Medical History:   has a past medical history of Abnormal liver enzymes, Alcohol abuse, Allergic rhinitis, Chronic back pain, Colonic polyp, COPD (chronic obstructive pulmonary disease) ([facility] [address] Depression, Diabetes mellitus ([facility] [address] DJD (degenerative joint disease) of cervical spine, DJD (degenerative joint disease) of lumbar spine, GERD (gastroesophageal reflux disease), Hyperlipidemia, Hypertension, Osteoarthritis, Osteoarthritis, and Smoker. Past Surgical History:   has a past surgical history that includes back surgery (2008); Inguinal hernia repair (2002); hernia repair; Cholecystectomy; Colonoscopy (6/2012); EKG 12 Lead (4/6/2015); Lumbar spine surgery (2006, 2007, 2008); Cardiac catheterization (3/2015); pr removal of hydrocele,tunica,unilat (Left, 7/31/2018); Facet joint injection (Bilateral, 7/7/2020); Facet joint injection (Bilateral, 8/6/2020); Pain management procedure (Right, 9/4/2020); and Pain management procedure (Left, 10/9/2020). Pre-Sedation Documentation and Exam:   Vital signs have been reviewed (see flow sheet for vitals). Sedation/ Anesthesia :  MAC.     Patient is an appropriate candidate for plan of sedation: yes         Preoperative Diagnosis:  C-spondylosis     Post-Op Dx: as above     Procedure Performed:  Radiofrequency abalation of median branchs at the levels of C4-5 and C5-6 right under fluoroscopic guidance      Indication for the Procedure:  The patient had history of chronic neck pain that is not responding well to the conservative treatment.  Patient's pain is mostly axial in nature.  Pain is interfering with the activities of daily living. [facility] examination revealed facet tenderness and facet loading is positive.  The patient had undergone cervical  facet joint injections with pain relief that lasted for only a short period of time and confirmatory median branch block gave patient pain relief of 100 % .  Hence we decided to do radiofrequency abalation of median branches for long term pain relief.  The procedure and risks were discussed with the patient and an informed consent was obtained.     Procedure:  Right    A meaningful communication was kept up with the patient throughout  the procedure. Patient is placed in prone position and skin over the back was prepped and draped in sterile manner.  Then using fluoroscopy the waist of facet joint complex of the vertebra was observed and the view was optimized . The skin and deep tissues posterior were infiltrated with 6 ml of 1% xylocaine The RF canula with the 10 mm active tip was introduced through the skin wheal under fluoroscopy guidance such that the tip of the needle lies in the groove of the waist of facet joint complex.  Then a lateral view of cervical  spine was obtained to make sure the tip of needle is in the centroid of the articular pillar.  hen electric impedence was checked to make sure it is acceptable.  Then a sensory stimulus was applied at 50 Hz up to 1 volt and concordant pain symptoms were reproduced. Darylene Neve a motor stimulus was applied at 2 Hz up to 2 volts and no motor stimulation was seen in upper extremities.  Some multifidus stimulus was seen. Darylene Neve after negative aspiration a mixture of dexamethasone 6 mg and 0.25%  Marcaine 1cc  was injected through the needle. Then an  lesion was done at 80 degrees centigrade for 90 seconds.   EBL-0     Patient tolerated the procedure well and was discharged home in stable condition         Electronically signed by Kylah Salguero MD on 3/25/21 at 10:16 AM EDT

## 2021-03-25 NOTE — ANESTHESIA POSTPROCEDURE EVALUATION
Department of Anesthesiology  Postprocedure Note    Patient: Christine Haddad  MRN: [id number]  YOB: 1955  Date of evaluation: 3/25/2021  Time:  11:17 AM     Procedure Summary     Date: 03/25/21 Room / Location: [address] [place] [address] 03 / [facility]    Anesthesia Start: 1013 Anesthesia Stop: 6667    Procedure: Right C-facet RFA @ C4-5, C5-6 (Right Neck) Diagnosis: (Cervical Spondylosis)    Surgeons: Ga Godfrey MD Responsible Provider: Jing Moran MD    Anesthesia Type: MAC ASA Status: 3          Anesthesia Type: MAC    Bhaskar Phase I:      Bhaskar Phase II: Bhaskar Score: 10    Last vitals: Reviewed and per EMR flowsheets.        Anesthesia Post Evaluation    Patient location during evaluation: PACU  Patient participation: complete - patient participated  Level of consciousness: awake  Nausea & Vomiting: no nausea  Complications: no  Cardiovascular status: hemodynamically stable  Respiratory status: acceptable

## 2021-03-25 NOTE — PROGRESS NOTES
[id number]  Patient arrived to phase II via cart. Spontaneous respirations even and unlabored. Placed on monitor--VSS. Report received from [facility]. 1034-  Assessment completed. Patient is alert and oriented x4. IV capped off-- no complications. Patient states pain 7/10. Ice pack applied. 2x2 in place at injection site, small amount of drainage. 1038-  Snack and drink given to patient. Family in car. 1045-  IV removed-- no complications. Bandage applied. 1047-  Patient dressing. 1053-  Patient discharged in stable condition with all belongings. This RN walked patient to car.

## 2021-04-01 DIAGNOSIS — G89.4 CHRONIC PAIN SYNDROME: ICD-10-CM

## 2021-04-01 DIAGNOSIS — M46.1 SACROILIAC INFLAMMATION (HCC): ICD-10-CM

## 2021-04-01 DIAGNOSIS — M47.814 THORACIC SPONDYLOSIS: ICD-10-CM

## 2021-04-01 DIAGNOSIS — M47.812 CERVICAL SPONDYLOSIS: ICD-10-CM

## 2021-04-01 RX ORDER — HYDROCODONE BITARTRATE AND ACETAMINOPHEN 5; 325 MG/1; MG/1
1 TABLET ORAL 2 TIMES DAILY PRN
Qty: 60 TABLET | Refills: 0 | Status: SHIPPED | OUTPATIENT
Start: 2021-04-02 | End: 2021-04-29 | Stop reason: SDUPTHER

## 2021-04-01 NOTE — TELEPHONE ENCOUNTER
Sancho Malcolm called requesting a refill on the following medications:  Requested Prescriptions     Pending Prescriptions Disp Refills    HYDROcodone-acetaminophen (NORCO) 5-325 MG per tablet 60 tablet 0     Sig: Take 1 tablet by mouth 2 times daily as needed for Pain for up to 30 days.      Pharmacy verified:  [facility]    Date of last visit: 03/02/21  Date of next visit (if applicable): 2/8/2889

## 2021-04-01 NOTE — TELEPHONE ENCOUNTER
OARRS reviewed. UDS: + for  Hydrocodone -consistent.      Last seen: 3/2/2021    Follow-up: 4/8/2021

## 2021-04-08 ENCOUNTER — TELEPHONE (OUTPATIENT)
Dept: PHYSICAL MEDICINE AND REHAB | Age: 66
End: 2021-04-08

## 2021-04-08 ENCOUNTER — OFFICE VISIT (OUTPATIENT)
Dept: PHYSICAL MEDICINE AND REHAB | Age: 66
End: 2021-04-08
Payer: MEDICARE

## 2021-04-08 ENCOUNTER — IMMUNIZATION (OUTPATIENT)
Dept: PRIMARY CARE CLINIC | Age: 66
End: 2021-04-08
Payer: MEDICARE

## 2021-04-08 VITALS
DIASTOLIC BLOOD PRESSURE: 84 MMHG | WEIGHT: 205 LBS | SYSTOLIC BLOOD PRESSURE: 124 MMHG | HEIGHT: 73 IN | BODY MASS INDEX: 27.17 KG/M2

## 2021-04-08 DIAGNOSIS — G44.009 ATYPICAL CLUSTER HEADACHE: ICD-10-CM

## 2021-04-08 DIAGNOSIS — G89.4 CHRONIC PAIN SYNDROME: ICD-10-CM

## 2021-04-08 DIAGNOSIS — M47.812 CERVICAL SPONDYLOSIS: Primary | ICD-10-CM

## 2021-04-08 DIAGNOSIS — M54.2 NECK PAIN: ICD-10-CM

## 2021-04-08 DIAGNOSIS — M47.814 THORACIC SPONDYLOSIS: ICD-10-CM

## 2021-04-08 DIAGNOSIS — M46.1 SACROILIAC INFLAMMATION (HCC): ICD-10-CM

## 2021-04-08 PROCEDURE — G8419 CALC BMI OUT NRM PARAM NOF/U: HCPCS | Performed by: NURSE PRACTITIONER

## 2021-04-08 PROCEDURE — 0002A COVID-19, PFIZER VACCINE 30MCG/0.3ML DOSE: CPT | Performed by: PHARMACIST

## 2021-04-08 PROCEDURE — 91300 COVID-19, PFIZER VACCINE 30MCG/0.3ML DOSE: CPT | Performed by: PHARMACIST

## 2021-04-08 PROCEDURE — 4004F PT TOBACCO SCREEN RCVD TLK: CPT | Performed by: NURSE PRACTITIONER

## 2021-04-08 PROCEDURE — G8427 DOCREV CUR MEDS BY ELIG CLIN: HCPCS | Performed by: NURSE PRACTITIONER

## 2021-04-08 PROCEDURE — 3017F COLORECTAL CA SCREEN DOC REV: CPT | Performed by: NURSE PRACTITIONER

## 2021-04-08 PROCEDURE — 99213 OFFICE O/P EST LOW 20 MIN: CPT | Performed by: NURSE PRACTITIONER

## 2021-04-08 ASSESSMENT — ENCOUNTER SYMPTOMS
BACK PAIN: 0
RESPIRATORY NEGATIVE: 1
GASTROINTESTINAL NEGATIVE: 1

## 2021-04-08 NOTE — PROGRESS NOTES
[address] [facility] [address]  Dept: [phone]  Dept Fax: [phone]: [phone]    Visit Date: 4/8/2021    Functionality Assessment/Goals Worksheet     On a scale of 0 (Does not Interfere) to 10 (Completely Interferes)     1. Which number describes how during the past week pain has interfered with       the following:  A. General Activity:  5  B. Mood: 5  C. Walking Ability:  4  D. Normal Work (Includes both work outside the home and housework):  7  E. Relations with Other People:   3  F. Sleep:   6  G. Enjoyment of Life:   4    2. Patient Prefers to Take their Pain Medications:     [x]  On a regular basis   []  Only when necessary    []  Does not take pain medications    3. What are the Patient's Goals/Expectations for Visiting Pain Management? [x]  Learn about my pain    []  Receive Medication   []  Physical Therapy     []  Treat Depression   [x]  Receive Injections    []  Treat Sleep   []  Deal with Anxiety and Stress   []  Treat Opoid Dependence/Addiction   []  Other:      HPI:   Jonatan Vee is a 60 y.o. male is here today for    Chief Complaint: Neck pain and headaches    HPI   FU from right C-facet RFA from 3/25/2021. Receiving about 80% relief of pain in right side of neck from right C-facet RFA, neck pain and headaches are minimal and less frequent. Since procedure feels improved ROM and less pain with activity. Now noticing left side neck pain as this is the main complaint as left C-facet RFA is waning. Pain in left side of neck is sharp and constant aching and increased with activity. Norco prn remains effective   Medications reviewed. Patient denies side effects with medications. Patient states he is taking medications as prescribed. Hedenies receiving pain medications from other sources. He denies any ER visits since last visit.      Pain scale with out pain medications or at its worst is 6-7/10 mainly left side of neck   Pain scale with pain medications or at its best is 3/10. Last dose of [place] was yesterday  Drug screen reviewed from 3/2/2021 and was appropriate  Pill count was completed today and was appropriate  Patient does not have naloxone available at home. Patient has not required use of naloxone at home since last office visit. The patienthas No Known Allergies. Subjective:      Review of Systems   Constitutional: Negative. HENT: Negative. Respiratory: Negative. Cardiovascular: Negative. Gastrointestinal: Negative. Musculoskeletal: Positive for arthralgias, myalgias, neck pain and neck stiffness. Negative for back pain, gait problem and joint swelling. Neurological: Positive for headaches. Negative for weakness and numbness. Psychiatric/Behavioral: Negative. Objective:     Vitals:    04/08/21 0857   BP: 124/84   Weight: 205 lb (93 kg)   Height: 6' 1\" (1.854 m)       Physical Exam  Vitals signs and nursing note reviewed. Constitutional:       General: He is not in acute distress. Appearance: Normal appearance. He is well-developed. He is not diaphoretic. HENT:      Head: Normocephalic and atraumatic. Right Ear: External ear normal.      Left Ear: External ear normal.      Nose: Nose normal.      Mouth/Throat:      Mouth: Mucous membranes are moist.      Pharynx: No oropharyngeal exudate. Eyes:      General: No scleral icterus. Right eye: No discharge. Left eye: No discharge. Conjunctiva/sclera: Conjunctivae normal.      Pupils: Pupils are equal, round, and reactive to light. Neck:      Musculoskeletal: Full passive range of motion without pain and normal range of motion. Normal range of motion. Neck rigidity and muscular tenderness present. No edema or erythema. Thyroid: No thyromegaly. Cardiovascular:      Rate and Rhythm: Normal rate and regular rhythm.       Heart sounds: Normal heart sounds. No murmur. No friction rub. No gallop. Pulmonary:      Effort: Pulmonary effort is normal. No respiratory distress. Breath sounds: Normal breath sounds. No wheezing or rales. Chest:      Chest wall: No tenderness. Abdominal:      General: Bowel sounds are normal. There is no distension. Palpations: Abdomen is soft. Tenderness: There is no abdominal tenderness. There is no guarding or rebound. Musculoskeletal:         General: Swelling and tenderness present. Right shoulder: He exhibits decreased range of motion and tenderness. Left shoulder: He exhibits decreased range of motion and tenderness. Right hip: He exhibits no tenderness. Left hip: He exhibits no tenderness. Right knee: He exhibits normal range of motion. No tenderness found. Left knee: He exhibits normal range of motion. No tenderness found. Right ankle: He exhibits no swelling. Left ankle: He exhibits no swelling. Cervical back: He exhibits decreased range of motion, tenderness, pain and spasm. Thoracic back: He exhibits tenderness, pain and spasm. Lumbar back: He exhibits decreased range of motion, tenderness and pain. Back:       Right lower leg: He exhibits no swelling. No edema. Left lower leg: He exhibits no swelling. No edema. Right foot: No swelling. Left foot: No swelling. Skin:     General: Skin is warm. Coloration: Skin is not pale. Findings: No erythema or rash. Neurological:      General: No focal deficit present. Mental Status: He is alert and oriented to person, place, and time. He is not disoriented. Cranial Nerves: No cranial nerve deficit. Sensory: Sensory deficit present. Motor: No atrophy or abnormal muscle tone. Coordination: Coordination normal.      Gait: Gait normal.      Deep Tendon Reflexes: Babinski sign absent on the right side.  Babinski sign absent on the left side.      Reflex Scores:       Tricep reflexes are 1+ on the right side and 1+ on the left side. Bicep reflexes are 1+ on the right side and 1+ on the left side. Brachioradialis reflexes are 1+ on the right side and 1+ on the left side. Patellar reflexes are 1+ on the right side and 1+ on the left side. Achilles reflexes are 1+ on the right side and 1+ on the left side. Comments: SENSORY DECREASED STOCKING DISTRIBUTION   Psychiatric:         Attention and Perception: Attention normal. He is attentive. Mood and Affect: Mood normal. Mood is not anxious or depressed. Affect is not labile, blunt, angry or inappropriate. Speech: Speech normal. He is communicative. Speech is not rapid and pressured, delayed, slurred or tangential.         Behavior: Behavior normal. Behavior is not agitated, slowed, aggressive, withdrawn, hyperactive or combative. Behavior is cooperative. Thought Content: Thought content normal. Thought content is not paranoid or delusional. Thought content does not include homicidal or suicidal ideation. Thought content does not include homicidal or suicidal plan. Cognition and Memory: Cognition normal. Memory is not impaired. He does not exhibit impaired recent memory or impaired remote memory. Judgment: Judgment is not impulsive or inappropriate. JIMENA test: + bilaterally   Yeomans test: + bilaterally   Gaenslen test: + bilaterally      Assessment:     1. Cervical spondylosis    2. Neck pain    3. Thoracic spondylosis    4. Sacroiliac inflammation ([facility] [address]    5. Atypical cluster headache    6. Chronic pain syndrome            Plan:      OARRS reviewed. Current MED: 10.00  Patient was offered naloxone for home. Discussed long term side effects of medications, tolerance, dependency and addiction. Previous UDS reviewed  UDS preformed today for compliance. Patient told can not receive any pain medications from any other source.   No evidence of abuse, diversion or aberrant behavior. Medications and/or procedures to improve function and quality of life- patient understanding with this and that may not be pain free  Discussed with patient about safe storage of medications at home  Discussed possible weaning of medication dosing dependent on treatment/procedure results. Discussed with patient about risks with procedure including infection, reaction to medication, increased pain, or bleeding. Procedure notes reveiwed in detail   Recieveing 80% relief of right side neck pain from right C-facet RFA. Now main complaint is pain in left neck as left C-facet OZZIE is waning- received over 80% relief of this pain for 6 months. Plan Left C-facet RFA @ C4-5,C5-6 for longer term pain relief. Procedure and risks discussed in detail with patient.   Continue Norco 5/325 BID prn- Filled 4/2/2020  Patient is complaint will order next UDS at next procedural FU  On 81 mg of aspirin      Meds. Prescribed:   No orders of the defined types were placed in this encounter. Return for  Left C-facet RFA @ C4-5, C5-6. , follow up after procedure.                Electronically signed by GERARDO Wharton CNP on4/8/2021 at 9:19 AM

## 2021-04-08 NOTE — TELEPHONE ENCOUNTER
Pt denies taking any blood thinners except ASA 81 mg. (no cardiac events in the last 6 months) prior to scheduling procedure.

## 2021-04-28 ENCOUNTER — TELEPHONE (OUTPATIENT)
Dept: PHYSICAL MEDICINE AND REHAB | Age: 66
End: 2021-04-28

## 2021-04-29 ENCOUNTER — APPOINTMENT (OUTPATIENT)
Dept: GENERAL RADIOLOGY | Age: 66
End: 2021-04-29
Attending: PAIN MEDICINE
Payer: MEDICARE

## 2021-04-29 ENCOUNTER — ANESTHESIA EVENT (OUTPATIENT)
Dept: OPERATING ROOM | Age: 66
End: 2021-04-29
Payer: MEDICARE

## 2021-04-29 ENCOUNTER — HOSPITAL ENCOUNTER (OUTPATIENT)
Age: 66
Setting detail: OUTPATIENT SURGERY
Discharge: HOME | End: 2021-04-29
Attending: PAIN MEDICINE | Admitting: PAIN MEDICINE
Payer: MEDICARE

## 2021-04-29 ENCOUNTER — ANESTHESIA (OUTPATIENT)
Dept: OPERATING ROOM | Age: 66
End: 2021-04-29
Payer: MEDICARE

## 2021-04-29 VITALS
RESPIRATION RATE: 16 BRPM | DIASTOLIC BLOOD PRESSURE: 76 MMHG | HEIGHT: 73 IN | HEART RATE: 83 BPM | BODY MASS INDEX: 26.77 KG/M2 | SYSTOLIC BLOOD PRESSURE: 111 MMHG | OXYGEN SATURATION: 92 % | WEIGHT: 202 LBS | TEMPERATURE: 98.1 F

## 2021-04-29 VITALS
SYSTOLIC BLOOD PRESSURE: 136 MMHG | RESPIRATION RATE: 14 BRPM | DIASTOLIC BLOOD PRESSURE: 86 MMHG | OXYGEN SATURATION: 96 %

## 2021-04-29 DIAGNOSIS — M47.812 CERVICAL SPONDYLOSIS: ICD-10-CM

## 2021-04-29 DIAGNOSIS — M47.814 THORACIC SPONDYLOSIS: ICD-10-CM

## 2021-04-29 DIAGNOSIS — G89.4 CHRONIC PAIN SYNDROME: ICD-10-CM

## 2021-04-29 DIAGNOSIS — M46.1 SACROILIAC INFLAMMATION (HCC): ICD-10-CM

## 2021-04-29 LAB — GLUCOSE BLD-MCNC: 158 MG/DL (ref 70–108)

## 2021-04-29 PROCEDURE — 7100000011 HC PHASE II RECOVERY - ADDTL 15 MIN: Performed by: PAIN MEDICINE

## 2021-04-29 PROCEDURE — 7100000010 HC PHASE II RECOVERY - FIRST 15 MIN: Performed by: PAIN MEDICINE

## 2021-04-29 PROCEDURE — 64634 DESTROY C/TH FACET JNT ADDL: CPT | Performed by: PAIN MEDICINE

## 2021-04-29 PROCEDURE — 3700000001 HC ADD 15 MINUTES (ANESTHESIA): Performed by: PAIN MEDICINE

## 2021-04-29 PROCEDURE — 2709999900 HC NON-CHARGEABLE SUPPLY: Performed by: PAIN MEDICINE

## 2021-04-29 PROCEDURE — 64633 DESTROY CERV/THOR FACET JNT: CPT | Performed by: PAIN MEDICINE

## 2021-04-29 PROCEDURE — 3600000056 HC PAIN LEVEL 4 BASE: Performed by: PAIN MEDICINE

## 2021-04-29 PROCEDURE — 82948 REAGENT STRIP/BLOOD GLUCOSE: CPT

## 2021-04-29 PROCEDURE — 2580000003 HC RX 258: Performed by: NURSE ANESTHETIST, CERTIFIED REGISTERED

## 2021-04-29 PROCEDURE — 3209999900 FLUORO FOR SURGICAL PROCEDURES

## 2021-04-29 PROCEDURE — 3600000057 HC PAIN LEVEL 4 ADDL 15 MIN: Performed by: PAIN MEDICINE

## 2021-04-29 PROCEDURE — 2500000003 HC RX 250 WO HCPCS: Performed by: PAIN MEDICINE

## 2021-04-29 PROCEDURE — 6360000002 HC RX W HCPCS: Performed by: NURSE ANESTHETIST, CERTIFIED REGISTERED

## 2021-04-29 PROCEDURE — 3700000000 HC ANESTHESIA ATTENDED CARE: Performed by: PAIN MEDICINE

## 2021-04-29 PROCEDURE — 6360000002 HC RX W HCPCS: Performed by: PAIN MEDICINE

## 2021-04-29 RX ORDER — SODIUM CHLORIDE 9 MG/ML
INJECTION, SOLUTION INTRAVENOUS CONTINUOUS PRN
Status: DISCONTINUED | OUTPATIENT
Start: 2021-04-29 | End: 2021-04-29 | Stop reason: SDUPTHER

## 2021-04-29 RX ORDER — PROPOFOL 10 MG/ML
INJECTION, EMULSION INTRAVENOUS PRN
Status: DISCONTINUED | OUTPATIENT
Start: 2021-04-29 | End: 2021-04-29 | Stop reason: SDUPTHER

## 2021-04-29 RX ORDER — FENTANYL CITRATE 50 UG/ML
INJECTION, SOLUTION INTRAMUSCULAR; INTRAVENOUS PRN
Status: DISCONTINUED | OUTPATIENT
Start: 2021-04-29 | End: 2021-04-29 | Stop reason: SDUPTHER

## 2021-04-29 RX ORDER — BUPIVACAINE HYDROCHLORIDE 2.5 MG/ML
INJECTION, SOLUTION EPIDURAL; INFILTRATION; INTRACAUDAL PRN
Status: DISCONTINUED | OUTPATIENT
Start: 2021-04-29 | End: 2021-04-29 | Stop reason: ALTCHOICE

## 2021-04-29 RX ORDER — HYDROCODONE BITARTRATE AND ACETAMINOPHEN 5; 325 MG/1; MG/1
1 TABLET ORAL 2 TIMES DAILY PRN
Qty: 60 TABLET | Refills: 0 | Status: SHIPPED | OUTPATIENT
Start: 2021-05-02 | End: 2021-05-28 | Stop reason: SDUPTHER

## 2021-04-29 RX ORDER — DEXAMETHASONE SODIUM PHOSPHATE 4 MG/ML
INJECTION, SOLUTION INTRA-ARTICULAR; INTRALESIONAL; INTRAMUSCULAR; INTRAVENOUS; SOFT TISSUE PRN
Status: DISCONTINUED | OUTPATIENT
Start: 2021-04-29 | End: 2021-04-29 | Stop reason: ALTCHOICE

## 2021-04-29 RX ORDER — LIDOCAINE HYDROCHLORIDE 10 MG/ML
INJECTION, SOLUTION EPIDURAL; INFILTRATION; INTRACAUDAL; PERINEURAL PRN
Status: DISCONTINUED | OUTPATIENT
Start: 2021-04-29 | End: 2021-04-29 | Stop reason: ALTCHOICE

## 2021-04-29 RX ADMIN — FENTANYL CITRATE 50 MCG: 50 INJECTION, SOLUTION INTRAMUSCULAR; INTRAVENOUS at 09:45

## 2021-04-29 RX ADMIN — FENTANYL CITRATE 50 MCG: 50 INJECTION, SOLUTION INTRAMUSCULAR; INTRAVENOUS at 09:47

## 2021-04-29 RX ADMIN — PROPOFOL 20 MG: 10 INJECTION, EMULSION INTRAVENOUS at 09:52

## 2021-04-29 RX ADMIN — PROPOFOL 40 MG: 10 INJECTION, EMULSION INTRAVENOUS at 09:51

## 2021-04-29 RX ADMIN — SODIUM CHLORIDE: 9 INJECTION, SOLUTION INTRAVENOUS at 09:38

## 2021-04-29 ASSESSMENT — PULMONARY FUNCTION TESTS
PIF_VALUE: 0

## 2021-04-29 ASSESSMENT — PAIN - FUNCTIONAL ASSESSMENT
PAIN_FUNCTIONAL_ASSESSMENT: PREVENTS OR INTERFERES SOME ACTIVE ACTIVITIES AND ADLS
PAIN_FUNCTIONAL_ASSESSMENT: 0-10

## 2021-04-29 NOTE — H&P
H&P    FU from right C-facet RFA from 3/25/2021. Receiving about 80% relief of pain in right side of neck from right C-facet RFA, neck pain and headaches are minimal and less frequent. Since procedure feels improved ROM and less pain with activity. Now noticing left side neck pain as this is the main complaint as left C-facet RFA is waning. Pain in left side of neck is sharp and constant aching and increased with activity.       Norco prn remains effective   Medications reviewed. Patient denies side effects with medications. Patient states he is taking medications as prescribed. Hedenies receiving pain medications from other sources. He denies any ER visits since last visit. Pain scale with out pain medications or at its worst is 6-7/10 mainly left side of neck   Pain scale with pain medications or at its best is 3/10. Last dose of [place] was yesterday  Drug screen reviewed from 3/2/2021 and was appropriate  Pill count was completed today and was appropriate  Patient does not have naloxone available at home. Patient has not required use of naloxone at home since last office visit.         The patienthas No Known Allergies.        Subjective:      Review of Systems   Constitutional: Negative. HENT: Negative. Respiratory: Negative. Cardiovascular: Negative. Gastrointestinal: Negative. Musculoskeletal: Positive for arthralgias, myalgias, neck pain and neck stiffness. Negative for back pain, gait problem and joint swelling. Neurological: Positive for headaches. Negative for weakness and numbness. Psychiatric/Behavioral: Negative.          Objective:      Vitals       Vitals:     04/08/21 0857   BP: 124/84   Weight: 205 lb (93 kg)   Height: 6' 1\" (1.854 m)            Physical Exam  Vitals signs and nursing note reviewed. Constitutional:       General: He is not in acute distress. Appearance: Normal appearance. He is well-developed. He is not diaphoretic.    HENT:      Head: Normocephalic and atraumatic. Right Ear: External ear normal.      Left Ear: External ear normal.      Nose: Nose normal.      Mouth/Throat:      Mouth: Mucous membranes are moist.      Pharynx: No oropharyngeal exudate. Eyes:      General: No scleral icterus. Right eye: No discharge. Left eye: No discharge. Conjunctiva/sclera: Conjunctivae normal.      Pupils: Pupils are equal, round, and reactive to light. Neck:      Musculoskeletal: Full passive range of motion without pain and normal range of motion. Normal range of motion. Neck rigidity and muscular tenderness present. No edema or erythema. Thyroid: No thyromegaly. Cardiovascular:      Rate and Rhythm: Normal rate and regular rhythm. Heart sounds: Normal heart sounds. No murmur. No friction rub. No gallop. Pulmonary:      Effort: Pulmonary effort is normal. No respiratory distress. Breath sounds: Normal breath sounds. No wheezing or rales. Chest:      Chest wall: No tenderness. Abdominal:      General: Bowel sounds are normal. There is no distension. Palpations: Abdomen is soft. Tenderness: There is no abdominal tenderness. There is no guarding or rebound. Musculoskeletal:         General: Swelling and tenderness present. Right shoulder: He exhibits decreased range of motion and tenderness. Left shoulder: He exhibits decreased range of motion and tenderness. Right hip: He exhibits no tenderness. Left hip: He exhibits no tenderness. Right knee: He exhibits normal range of motion. No tenderness found. Left knee: He exhibits normal range of motion. No tenderness found. Right ankle: He exhibits no swelling. Left ankle: He exhibits no swelling. Cervical back: He exhibits decreased range of motion, tenderness, pain and spasm. Thoracic back: He exhibits tenderness, pain and spasm. Lumbar back: He exhibits decreased range of motion, tenderness and pain. Back:       Right lower leg: He exhibits no swelling. No edema. Left lower leg: He exhibits no swelling. No edema. Right foot: No swelling. Left foot: No swelling. Skin:     General: Skin is warm. Coloration: Skin is not pale. Findings: No erythema or rash. Neurological:      General: No focal deficit present. Mental Status: He is alert and oriented to person, place, and time. He is not disoriented. Cranial Nerves: No cranial nerve deficit. Sensory: Sensory deficit present. Motor: No atrophy or abnormal muscle tone. Coordination: Coordination normal.      Gait: Gait normal.      Deep Tendon Reflexes: Babinski sign absent on the right side. Babinski sign absent on the left side. Reflex Scores:       Tricep reflexes are 1+ on the right side and 1+ on the left side. Bicep reflexes are 1+ on the right side and 1+ on the left side. Brachioradialis reflexes are 1+ on the right side and 1+ on the left side. Patellar reflexes are 1+ on the right side and 1+ on the left side. Achilles reflexes are 1+ on the right side and 1+ on the left side. Comments: SENSORY DECREASED STOCKING DISTRIBUTION   Psychiatric:         Attention and Perception: Attention normal. He is attentive. Mood and Affect: Mood normal. Mood is not anxious or depressed. Affect is not labile, blunt, angry or inappropriate. Speech: Speech normal. He is communicative. Speech is not rapid and pressured, delayed, slurred or tangential.         Behavior: Behavior normal. Behavior is not agitated, slowed, aggressive, withdrawn, hyperactive or combative. Behavior is cooperative. Thought Content: Thought content normal. Thought content is not paranoid or delusional. Thought content does not include homicidal or suicidal ideation. Thought content does not include homicidal or suicidal plan.          Cognition and Memory: Cognition normal. Memory is not impaired. He does not exhibit impaired recent memory or impaired remote memory. Judgment: Judgment is not impulsive or inappropriate.         JIMENA test: + bilaterally   Yeomans test: + bilaterally   Gaenslen test: + bilaterally   Assessment:      1. Cervical spondylosis    2. Neck pain    3. Thoracic spondylosis    4. Sacroiliac inflammation ([address]    5. Atypical cluster headache    6. Chronic pain syndrome       Plan:      · OARRS reviewed. Current MED: 10.00  · Patient was offered naloxone for home. · Discussed long term side effects of medications, tolerance, dependency and addiction. · Previous UDS reviewed  · UDS preformed today for compliance. · Patient told can not receive any pain medications from any other source. · No evidence of abuse, diversion or aberrant behavior. · Medications and/or procedures to improve function and quality of life- patient understanding with this and that may not be pain free  · Discussed with patient about safe storage of medications at home  · Discussed possible weaning of medication dosing dependent on treatment/procedure results. · Discussed with patient about risks with procedure including infection, reaction to medication, increased pain, or bleeding. · Procedure notes reveiwed in detail   · Recieveing 80% relief of right side neck pain from right C-facet RFA. Now main complaint is pain in left neck as left C-facet OZZIE is waning- received over 80% relief of this pain for 6 months. · Plan Left C-facet RFA @ C4-5,C5-6 for longer term pain relief. Procedure and risks discussed in detail with patient.   · Continue Norco 5/325 BID prn- Filled 4/2/2020  · Patient is complaint will order next UDS at next procedural FU  · On 81 mg of aspirin        Meds.  Prescribed:     Encounter Medications    No orders of the defined types were placed in this encounter.         Return for  Left C-facet RFA @ C4-5, C5-6. , follow up after procedure.

## 2021-04-29 NOTE — OP NOTE
Operative Note    Pre-Procedure Note    Patient Name: Nathalie Houston   YOB: 1955  Medical Record Number: [id number]  Date: 4/29/21     Indication:  Neck pain  Consent: On file. Vital Signs:   Vitals:    04/29/21 0830   BP: (!) 150/79   Pulse: 83   Resp: 16   Temp: 97.7 °F (36.5 °C)   SpO2: 92%       Past Medical History:   has a past medical history of Abnormal liver enzymes, Alcohol abuse, Allergic rhinitis, Chronic back pain, Colonic polyp, COPD (chronic obstructive pulmonary disease) ([facility] [address] Depression, Diabetes mellitus ([facility] [address] DJD (degenerative joint disease) of cervical spine, DJD (degenerative joint disease) of lumbar spine, GERD (gastroesophageal reflux disease), Hyperlipidemia, Hypertension, Osteoarthritis, Osteoarthritis, and Smoker. Past Surgical History:   has a past surgical history that includes back surgery (2008); Inguinal hernia repair (2002); hernia repair; Cholecystectomy; Colonoscopy (6/2012); EKG 12 Lead (4/6/2015); Lumbar spine surgery (2006, 2007, 2008); Cardiac catheterization (3/2015); pr removal of hydrocele,tunica,unilat (Left, 7/31/2018); Facet joint injection (Bilateral, 7/7/2020); Facet joint injection (Bilateral, 8/6/2020); Pain management procedure (Right, 9/4/2020); Pain management procedure (Left, 10/9/2020); and Pain management procedure (Right, 3/25/2021). Pre-Sedation Documentation and Exam:   Vital signs have been reviewed (see flow sheet for vitals). Sedation/ Anesthesia :  MAC.     Patient is an appropriate candidate for plan of sedation: yes            Preoperative Diagnosis:  C-spondylosis     Post-Op Dx: as above     Procedure Performed:  Radiofrequency abalation of median branchs at the levels of C4-5 and C5-6 left   under fluoroscopic guidance      Indication for the Procedure:  The patient had history of chronic neck pain that is not responding well to the conservative treatment.  Patient's pain is mostly axial in nature.  Pain is interfering with the activities of daily living.  Physical examination revealed facet tenderness and facet loading is positive.  The patient had undergone cervical  facet joint injections with pain relief that lasted for only a short period of time and confirmatory median branch block gave patient pain relief of 100 % .  Hence we decided to do radiofrequency abalation of median branches for long term pain relief.  The procedure and risks were discussed with the patient and an informed consent was obtained.     Procedure:  Left    A meaningful communication was kept up with the patient throughout  the procedure. Patient is placed in prone position and skin over the back was prepped and draped in sterile manner.  Then using fluoroscopy the waist of facet joint complex of the vertebra was observed and the view was optimized . The skin and deep tissues posterior were infiltrated with 8 ml of 1% xylocaine The RF canula with the 10 mm active tip was introduced through the skin wheal under fluoroscopy guidance such that the tip of the needle lies in the groove of the waist of facet joint complex.  Then a lateral view of cervical  spine was obtained to make sure the tip of needle is in the centroid of the articular pillar.  hen electric impedence was checked to make sure it is acceptable.  Then a sensory stimulus was applied at 50 Hz up to 1 volt and concordant pain symptoms were reproduced. Denhoff Ok a motor stimulus was applied at 2 Hz up to 2 volts and no motor stimulation was seen in upper extremities.  Some multifidus stimulus was seen. Bairon Ok after negative aspiration a mixture of dexamethasone 6 mg and 0.25%  Marcaine 1cc  was injected through the needle. Then an  lesion was done at 80 degrees centigrade for 90 seconds.   EBL-0     Patient tolerated the procedure well and was discharged home in stable condition    Electronically signed by Hannah Murray MD on 4/29/21 at 9:44 AM EDT

## 2021-04-29 NOTE — ANESTHESIA POSTPROCEDURE EVALUATION
Department of Anesthesiology  Postprocedure Note    Patient: Rocio Martinez  MRN: [id number]  YOB: 1955  Date of evaluation: 4/29/2021  Time:  10:31 AM     Procedure Summary     Date: 04/29/21 Room / Location: [address] [place] [address] 03 / Jann Judith    Anesthesia Start: 2546 Anesthesia Stop: 5072    Procedure: Left C-facet RFA @ C4-5, C5-6 (Left Neck) Diagnosis: (A74.758)    Surgeons: Ann Weldon MD Responsible Provider: Suzette Quinones MD    Anesthesia Type: MAC ASA Status: 3          Anesthesia Type: MAC    Bhaskar Phase I:      Bhaskar Phase II: Bhaskar Score: 10    Last vitals: Reviewed and per EMR flowsheets.        Anesthesia Post Evaluation    Patient location during evaluation: PACU  Level of consciousness: awake  Complications: no

## 2021-04-29 NOTE — TELEPHONE ENCOUNTER
Sahra [facility] called requesting a refill on the following medications:  Requested Prescriptions     Pending Prescriptions Disp Refills    HYDROcodone-acetaminophen (NORCO) 5-325 MG per tablet 60 tablet 0     Sig: Take 1 tablet by mouth 2 times daily as needed for Pain for up to 30 days. Pharmacy verified: [facility] [address], [address]  . pv      Date of last visit: 4/08/2021  Date of next visit (if applicable): 4/06/1018

## 2021-04-29 NOTE — ANESTHESIA PRE PROCEDURE
1 patch onto the skin daily 11/19/20 12/31/20  Agarwalmatias Gamingosse, APRN - CNP   nicotine (NICODERM CQ) 14 MG/24HR Place 1 patch onto the skin daily 11/19/20 12/31/20  Agarwalmatias GamingGERARDO duffy - CNP   nicotine (NICODERM CQ) 7 MG/24HR Place 1 patch onto the skin daily 11/19/20 12/31/20  GERARDO Espino CNP   metFORMIN (GLUCOPHAGE) 1000 MG tablet TAKE 1 TABLET BY MOUTH TWICE DAILY WITH MEALS 11/9/20   Agarwal Lacrosse, APRN - CNP   amitriptyline (ELAVIL) 50 MG tablet TAKE 1 TABLET BY MOUTH EVERY NIGHT 11/9/20   GERARDO Espino CNP   atorvastatin (LIPITOR) 40 MG tablet 1 tab po daily 11/9/20   GERARDO Espino CNP   etodolac (LODINE XL) 400 MG extended release tablet TAKE 1 TABLET BY MOUTH DAILY 8/24/20   GERARDO Espino CNP   albuterol sulfate HFA (PROAIR HFA) 108 (90 Base) MCG/ACT inhaler INHALE 2 PUFFS INTO THE LUNGS EVERY 6 HOURS AS NEEDED FOR WHEEZING 5/14/20   GERARDO Espino CNP   azelastine (ASTELIN) 0.1 % nasal spray SPRAY ONCE IN EACH NOSTRIL TWICE DAILY AS DIRECTED 5/14/20   GERARDO Espino CNP   QUEtiapine (SEROQUEL) 200 MG tablet Take 1 tablet at bedtime 5/14/20   GERARDO Espino CNP   lisinopril (PRINIVIL;ZESTRIL) 2.5 MG tablet Take 1 tablet by mouth daily 5/14/20   GERARDO Espino CNP   aspirin 81 MG chewable tablet TAKE 1 TABLET BY MOUTH DAILY 4/10/19   GERARDO Espino CNP   Lancet Device MISC 1 Device by Does not apply route once for 1 dose 4/10/19 4/10/19  GERARDO Espino CNP       Current medications:    No current facility-administered medications for this encounter.         Allergies:  No Known Allergies    Problem List:    Patient Active Problem List   Diagnosis Code    Allergic rhinitis J30.9    Osteoarthritis M19.90    Depression F32.9    GERD (gastroesophageal reflux disease) K21.9    Hypertension I10    COPD (chronic obstructive pulmonary disease) J44.9    Smoker F17.200    Cervical spondylosis M47.812    Diabetes mellitus ([address] E11.9    Hydrocele N43.3       Past Medical History:        Diagnosis Date    Abnormal liver enzymes     Alcohol abuse     Allergic rhinitis     Chronic back pain     Colonic polyp     COPD (chronic obstructive pulmonary disease) (HCC)     Depression     Diabetes mellitus (HCC)     DJD (degenerative joint disease) of cervical spine     DJD (degenerative joint disease) of lumbar spine     SP SURGERY    GERD (gastroesophageal reflux disease)     Hyperlipidemia     Hypertension     Osteoarthritis     Osteoarthritis     Smoker        Past Surgical History:        Procedure Laterality Date    BACK SURGERY  2008    X 3    [facility]  3/2015    [facility]    CHOLECYSTECTOMY      COLONOSCOPY  6/2012    repeat 6/2017- no precancerous  polyps    EKG 12-LEAD  4/6/2015         FACET JOINT INJECTION Bilateral 7/7/2020    BILATERAL C-FACET MBB @ C4-5,C5-6 and C6-7 performed by Moira Truong MD at [address] [facility] INJECTION Bilateral 8/6/2020    bilateral C-facet MBB # 2 @ C4-5, C5-6 and C6-7 [facility] [address]  2002    Bilateral Inguinal     LUMBAR SPINE SURGERY  2006, 2007, 2008    fusion 2008    PAIN MANAGEMENT PROCEDURE Right 9/4/2020    Bilateral C-facet RFA @ C4-5, C5-6. RIGHT SIDE FIRST.  performed by Moira Truong MD at [place] [address] Left 10/9/2020    Left C-facet RFA @ C4-5, and C5-6 performed by Moira Truong MD at [facility] [address] Right 3/25/2021    Right C-facet RFA @ C4-5, C5-6 performed by Moira Truong MD at [facility] Left 7/31/2018    LEFT HYDROCELECTOMY performed by Joseluis Jennings MD at [facility] History:    Social History     Tobacco Use    Smoking status: Current Every Day Smoker Packs/day: 0.75     Years: 38.00     Pack years: 28.50     Types: Cigarettes     Start date: 11/19/2012    Smokeless tobacco: Never Used   Substance Use Topics    Alcohol use: No     Alcohol/week: 0.0 standard drinks     Comment: quit 2013                                Ready to quit: Not Answered  Counseling given: Not Answered      Vital Signs (Current):   Vitals:    04/29/21 0830   BP: (!) 150/79   Pulse: 83   Resp: 16   Temp: 97.7 °F (36.5 °C)   TempSrc: Temporal   SpO2: 92%   Weight: 202 lb (91.6 kg)   Height: 6' 1\" (1.854 m)                                              BP Readings from Last 3 Encounters:   04/29/21 (!) 150/79   04/08/21 124/84   03/25/21 114/73       NPO Status: Time of last liquid consumption: 2000                        Time of last solid consumption: 2000                        Date of last liquid consumption: 04/28/21                        Date of last solid food consumption: 04/28/21    BMI:   Wt Readings from Last 3 Encounters:   04/29/21 202 lb (91.6 kg)   04/08/21 205 lb (93 kg)   03/25/21 205 lb (93 kg)     Body mass index is 26.65 kg/m².     CBC:   Lab Results   Component Value Date    WBC 10.1 06/26/2018    RBC 4.81 06/26/2018    HGB 15.2 06/26/2018    HCT 45.1 06/26/2018    MCV 93.8 06/26/2018    RDW 13.8 05/24/2018     06/26/2018       CMP:   Lab Results   Component Value Date     06/15/2020    K 4.6 06/15/2020     06/15/2020    CO2 27 06/15/2020    BUN 12 06/15/2020    CREATININE 0.8 06/15/2020    LABGLOM >90 06/15/2020    GLUCOSE 159 06/15/2020    GLUCOSE 141 06/20/2016    PROT 7.1 06/15/2020    PROT 7.7 09/24/2013    CALCIUM 9.5 06/15/2020    BILITOT 0.3 06/15/2020    ALKPHOS 90 06/15/2020    AST 27 06/15/2020    ALT 33 06/15/2020       POC Tests:   Recent Labs     04/29/21  0838   POCGLU 158*       Coags: No results found for: PROTIME, INR, APTT    HCG (If Applicable): No results found for: PREGTESTUR, PREGSERUM, HCG, HCGQUANT     ABGs: No results found for: PHART, PO2ART, APV7QOR, SVM2HDR, BEART, C9NYXRVT     Type & Screen (If Applicable):  Lab Results   Component Value Date    LABRH POS 04/06/2015       Drug/Infectious Status (If Applicable):  Lab Results   Component Value Date    HEPCAB Negative 07/23/2018       COVID-19 Screening (If Applicable):   Lab Results   Component Value Date    COVID19 Not Detected 12/31/2020           Anesthesia Evaluation    Airway: Mallampati: II       Mouth opening: > = 3 FB Dental:          Pulmonary:   (+) COPD:                             Cardiovascular:    (+) hypertension:,                   Neuro/Psych:   (+) psychiatric history:            GI/Hepatic/Renal:   (+) GERD:,           Endo/Other:    (+) Diabetes, . Abdominal:           Vascular:                                        Anesthesia Plan      MAC     ASA 3             Anesthetic plan and risks discussed with patient. Plan discussed with CRNA.                   Adán Sanchez MD   4/29/2021

## 2021-04-29 NOTE — TELEPHONE ENCOUNTER
OARRS reviewed. UDS: + for Dihydrocodeine, Hydrocodone, Norhydrocodone,   Last seen: 4/8/2021.  Follow-up: 5/19/21

## 2021-04-29 NOTE — H&P
[address] [address]  History and Physical Update    Pt Name: Shanique Lozada  MRN: [id number]  YOB: 1955  Date of evaluation: 4/29/2021      I have examined the patient and reviewed the H&P/Consult and there are no changes to the patient or plans.         Electronically signed by Moira Truong MD on 4/29/2021 at 9:29 AM

## 2021-04-29 NOTE — PROGRESS NOTES
3313: Patient to phase 2 recovery room via cart. Patient is awake and alert. Report received from surgical RN, [facility]. Patient's vitals obtained, see charting. 1000: Patient is denying pain and nausea at this time. IV is infusing into his hand. 1002: Offered drink and snack provided to the patient. Bed is in the lowest position and call light is within reach. 1017: IV removed at this time- no complications and dressing applied. Patient stating he understood his discharge instructions given in pre op. 1019: Patient is getting dressed at this time. 1024: Patient ambulated to the car and discharged home in stable condition with family.

## 2021-05-05 DIAGNOSIS — E11.9 TYPE 2 DIABETES MELLITUS WITHOUT COMPLICATION, WITHOUT LONG-TERM CURRENT USE OF INSULIN (HCC): ICD-10-CM

## 2021-05-05 RX ORDER — SITAGLIPTIN 100 MG/1
TABLET, FILM COATED ORAL
Qty: 90 TABLET | Refills: 1 | Status: SHIPPED | OUTPATIENT
Start: 2021-05-05 | End: 2021-11-11

## 2021-05-05 NOTE — TELEPHONE ENCOUNTER
Recent Visits  Date Type Provider Dept   12/17/20 Office Visit Fox DrissGERARDO CNP [facility]   11/19/20 Office Visit Fox NaqviGERARDO CNP [facility]   05/14/20 Office Visit Fox NaqviGERARDO CNP [facility]   12/17/19 Office Visit Fox NaqviGERARDO CNP [facility]   Showing recent visits within past 540 days with a meds authorizing provider and meeting all other requirements     Future Appointments  Date Type Provider Dept   05/26/21 Appointment Fox DrissGERARDO CNP [facility]   Showing future appointments within next 150 days with a meds authorizing provider and meeting all other requirements     Future Appointments   Date Time Provider Ezra Macdonald   5/19/2021  9:15 AM GERARDO Smiley CNP [facility] [place]   5/26/2021  8:20 AM GERARDO Hutchins - [address] [facility] [place]   12/20/2021  9:40 AM GERARDO Hutchins 86

## 2021-05-07 DIAGNOSIS — G47.00 INSOMNIA, UNSPECIFIED TYPE: ICD-10-CM

## 2021-05-07 DIAGNOSIS — Z72.0 TOBACCO ABUSE: ICD-10-CM

## 2021-05-07 DIAGNOSIS — I10 ESSENTIAL HYPERTENSION: ICD-10-CM

## 2021-05-07 DIAGNOSIS — K21.9 GASTROESOPHAGEAL REFLUX DISEASE WITHOUT ESOPHAGITIS: ICD-10-CM

## 2021-05-07 RX ORDER — LISINOPRIL 2.5 MG/1
2.5 TABLET ORAL DAILY
Qty: 90 TABLET | Refills: 3 | Status: SHIPPED | OUTPATIENT
Start: 2021-05-07 | End: 2022-05-02

## 2021-05-07 RX ORDER — QUETIAPINE FUMARATE 200 MG/1
TABLET, FILM COATED ORAL
Qty: 90 TABLET | Refills: 3 | Status: SHIPPED | OUTPATIENT
Start: 2021-05-07 | End: 2022-05-02

## 2021-05-07 RX ORDER — PANTOPRAZOLE SODIUM 40 MG/1
TABLET, DELAYED RELEASE ORAL
Qty: 90 TABLET | Refills: 3 | Status: SHIPPED | OUTPATIENT
Start: 2021-05-07 | End: 2022-05-02

## 2021-05-07 RX ORDER — BUPROPION HYDROCHLORIDE 150 MG/1
150 TABLET ORAL EVERY MORNING
Qty: 90 TABLET | Refills: 3 | Status: SHIPPED | OUTPATIENT
Start: 2021-05-07 | End: 2022-05-02

## 2021-05-07 NOTE — TELEPHONE ENCOUNTER
Recent Visits  Date Type Provider Dept   12/17/20 Office Visit GERARDO Wagner CNP [facility]   11/19/20 Office Visit GERARDO Wagner CNP [facility]   05/14/20 Office Visit GERARDO Wagner CNP [facility]   12/17/19 Office Visit GERARDO Wagner CNP [facility]   Showing recent visits within past 540 days with a meds authorizing provider and meeting all other requirements     Future Appointments  Date Type Provider Dept   05/26/21 Appointment GERARDO Wagner CNP [facility]   Showing future appointments within next 150 days with a meds authorizing provider and meeting all other requirements       Future Appointments   Date Time Provider Ezra Macdonald   5/19/2021  9:15 AM GERARDO Singh CNP [facility] - [place]   5/26/2021  8:20 AM GERARDO Wagner [address] [id number] [address] [place]   12/20/2021  9:40 AM GERARDO Wagner 86

## 2021-05-19 ENCOUNTER — OFFICE VISIT (OUTPATIENT)
Dept: PHYSICAL MEDICINE AND REHAB | Age: 66
End: 2021-05-19
Payer: MEDICARE

## 2021-05-19 VITALS
HEIGHT: 73 IN | DIASTOLIC BLOOD PRESSURE: 76 MMHG | BODY MASS INDEX: 26.77 KG/M2 | WEIGHT: 202 LBS | SYSTOLIC BLOOD PRESSURE: 110 MMHG

## 2021-05-19 DIAGNOSIS — G89.4 CHRONIC PAIN SYNDROME: ICD-10-CM

## 2021-05-19 DIAGNOSIS — M54.9 MID BACK PAIN: ICD-10-CM

## 2021-05-19 DIAGNOSIS — G89.29 CHRONIC MYOFASCIAL PAIN: Primary | ICD-10-CM

## 2021-05-19 DIAGNOSIS — F11.90 CHRONIC, CONTINUOUS USE OF OPIOIDS: ICD-10-CM

## 2021-05-19 DIAGNOSIS — M54.2 NECK PAIN: ICD-10-CM

## 2021-05-19 DIAGNOSIS — M47.812 CERVICAL SPONDYLOSIS: ICD-10-CM

## 2021-05-19 DIAGNOSIS — M79.18 CHRONIC MYOFASCIAL PAIN: Primary | ICD-10-CM

## 2021-05-19 DIAGNOSIS — M47.814 THORACIC SPONDYLOSIS: ICD-10-CM

## 2021-05-19 PROCEDURE — 99214 OFFICE O/P EST MOD 30 MIN: CPT | Performed by: NURSE PRACTITIONER

## 2021-05-19 ASSESSMENT — ENCOUNTER SYMPTOMS
BACK PAIN: 1
RESPIRATORY NEGATIVE: 1
GASTROINTESTINAL NEGATIVE: 1

## 2021-05-19 NOTE — PROGRESS NOTES
Gangaes receiving pain medications from other sources. He denies any ER visits since last visit. Pain scale with out pain medications or at its worst is 4-5/10. Pain scale with pain medications or at its best is 1-2/10. Last dose of Norco was today  Drug screen reviewed from 3/2/2021 and was appropriate  Pill count was completed today and was appropriate  Patient does not have naloxone available at home. Patient has not required use of naloxone at home since last office visit. The patienthas No Known Allergies. Subjective:      Review of Systems   Constitutional: Negative. HENT: Negative. Respiratory: Negative. Cardiovascular: Negative. Gastrointestinal: Negative. Musculoskeletal: Positive for arthralgias, back pain, myalgias, neck pain and neck stiffness. Negative for gait problem and joint swelling. Neurological: Negative for weakness, numbness and headaches. Psychiatric/Behavioral: Negative. Objective:     Vitals:    05/19/21 0917   BP: 110/76   Weight: 202 lb (91.6 kg)   Height: 6' 1\" (1.854 m)       Physical Exam  Vitals and nursing note reviewed. Constitutional:       General: He is not in acute distress. Appearance: Normal appearance. He is well-developed. He is not diaphoretic. HENT:      Head: Normocephalic and atraumatic. Right Ear: External ear normal.      Left Ear: External ear normal.      Nose: Nose normal.      Mouth/Throat:      Mouth: Mucous membranes are moist.      Pharynx: No oropharyngeal exudate. Eyes:      General: No scleral icterus. Right eye: No discharge. Left eye: No discharge. Conjunctiva/sclera: Conjunctivae normal.      Pupils: Pupils are equal, round, and reactive to light. Neck:      Thyroid: No thyromegaly. Cardiovascular:      Rate and Rhythm: Normal rate and regular rhythm. Heart sounds: Normal heart sounds. No murmur heard. No friction rub. No gallop.     Pulmonary:      Effort: Pulmonary effort is normal. No respiratory distress. Breath sounds: Normal breath sounds. No wheezing or rales. Chest:      Chest wall: No tenderness. Abdominal:      General: Bowel sounds are normal. There is no distension. Palpations: Abdomen is soft. Tenderness: There is no abdominal tenderness. There is no guarding or rebound. Musculoskeletal:         General: Swelling and tenderness present. Right shoulder: Tenderness present. Decreased range of motion. Left shoulder: Tenderness present. Decreased range of motion. Cervical back: Full passive range of motion without pain and normal range of motion. Rigidity, spasms and tenderness present. No edema or erythema. Muscular tenderness present. Normal range of motion. Thoracic back: Spasms and tenderness present. Lumbar back: Tenderness present. Decreased range of motion. Back:       Right hip: No tenderness. Left hip: No tenderness. Right knee: Normal range of motion. No tenderness. Left knee: Normal range of motion. No tenderness. Right lower leg: No swelling. No edema. Left lower leg: No swelling. No edema. Right ankle: No swelling. Left ankle: No swelling. Right foot: No swelling. Left foot: No swelling. Skin:     General: Skin is warm. Coloration: Skin is not pale. Findings: No erythema or rash. Neurological:      General: No focal deficit present. Mental Status: He is alert and oriented to person, place, and time. He is not disoriented. Cranial Nerves: No cranial nerve deficit. Sensory: Sensory deficit present. Motor: No atrophy or abnormal muscle tone. Coordination: Coordination normal.      Gait: Gait normal.      Deep Tendon Reflexes: Babinski sign absent on the right side. Babinski sign absent on the left side. Reflex Scores:       Tricep reflexes are 1+ on the right side and 1+ on the left side.        Bicep reflexes are 1+ on the right side and 1+ on the left side. Brachioradialis reflexes are 1+ on the right side and 1+ on the left side. Patellar reflexes are 1+ on the right side and 1+ on the left side. Achilles reflexes are 1+ on the right side and 1+ on the left side. Comments: SENSORY DECREASED STOCKING DISTRIBUTION   Psychiatric:         Attention and Perception: Attention normal. He is attentive. Mood and Affect: Mood normal. Mood is not anxious or depressed. Affect is not labile, blunt, angry or inappropriate. Speech: Speech normal. He is communicative. Speech is not rapid and pressured, delayed, slurred or tangential.         Behavior: Behavior normal. Behavior is not agitated, slowed, aggressive, withdrawn, hyperactive or combative. Behavior is cooperative. Thought Content: Thought content normal. Thought content is not paranoid or delusional. Thought content does not include homicidal or suicidal ideation. Thought content does not include homicidal or suicidal plan. Cognition and Memory: Cognition normal. Memory is not impaired. He does not exhibit impaired recent memory or impaired remote memory. Judgment: Judgment is not impulsive or inappropriate. JIMENA test: +   Yeomans test: +   Gaenslen test: +      Assessment:     1. Chronic myofascial pain    2. Cervical spondylosis    3. Neck pain    4. Thoracic spondylosis    5. Mid back pain    6. Chronic pain syndrome    7. Chronic, continuous use of opioids            Plan:      OARRS reviewed. Current MED: 10.00  Patient was offered naloxone for home. Discussed long term side effects of medications, tolerance, dependency and addiction. Previous UDS reviewed  UDS preformed today for compliance. Patient told can not receive any pain medications from any other source. No evidence of abuse, diversion or aberrant behavior.   Medications and/or procedures to improve function and quality of life- patient understanding with this and that may not be pain free  Discussed with patient about safe storage of medications at home  Discussed possible weaning of medication dosing dependent on treatment/procedure results. Discussed with patient about risks with procedure including infection, reaction to medication, increased pain, or bleeding. Procedure notes reviewed in detail   Receiving over 80% to 85% relief of neck pain headaches with improvement of mobility from bilateral C-facet RFA. Now main complaint is thoracic pain- reviewed CT scan   Plan thoracic trigger point injections in office with Dr. Demetris Rose. Discussed possible T-facet MBB in future   Continue Norco 5/325 BID prn- Filled 5/2/2021 and has plenty   Updated UDS ordered today       Meds. Prescribed:   No orders of the defined types were placed in this encounter. Return for thoracic trigger point injections in office with Dr. Demetris Rose.                Electronically signed by GERARDO Novak CNP on5/19/2021 at 2:13 PM

## 2021-05-26 ENCOUNTER — OFFICE VISIT (OUTPATIENT)
Dept: FAMILY MEDICINE CLINIC | Age: 66
End: 2021-05-26
Payer: MEDICARE

## 2021-05-26 VITALS
RESPIRATION RATE: 14 BRPM | HEART RATE: 87 BPM | SYSTOLIC BLOOD PRESSURE: 126 MMHG | HEIGHT: 73 IN | DIASTOLIC BLOOD PRESSURE: 64 MMHG | TEMPERATURE: 98.3 F | WEIGHT: 202 LBS | OXYGEN SATURATION: 96 % | BODY MASS INDEX: 26.77 KG/M2

## 2021-05-26 DIAGNOSIS — Z72.0 TOBACCO ABUSE: ICD-10-CM

## 2021-05-26 DIAGNOSIS — S39.012A STRAIN OF LUMBAR REGION, INITIAL ENCOUNTER: ICD-10-CM

## 2021-05-26 DIAGNOSIS — K92.1 HEMATOCHEZIA: ICD-10-CM

## 2021-05-26 DIAGNOSIS — I10 ESSENTIAL HYPERTENSION: ICD-10-CM

## 2021-05-26 DIAGNOSIS — K21.9 GASTROESOPHAGEAL REFLUX DISEASE, UNSPECIFIED WHETHER ESOPHAGITIS PRESENT: ICD-10-CM

## 2021-05-26 DIAGNOSIS — J44.9 CHRONIC OBSTRUCTIVE PULMONARY DISEASE, UNSPECIFIED COPD TYPE (HCC): ICD-10-CM

## 2021-05-26 DIAGNOSIS — E11.9 TYPE 2 DIABETES MELLITUS WITHOUT COMPLICATION, WITHOUT LONG-TERM CURRENT USE OF INSULIN (HCC): Primary | ICD-10-CM

## 2021-05-26 DIAGNOSIS — R10.9 FLANK PAIN: ICD-10-CM

## 2021-05-26 LAB
HBA1C MFR BLD: 7.1 % (ref 4.3–5.7)
MICROALB/CREAT RATIO POC: < 30 MG/G
MICROALBUMIN/CREAT UR-RTO: 30 MG/L
POC CREATININE: 300 MG/DL

## 2021-05-26 PROCEDURE — 99214 OFFICE O/P EST MOD 30 MIN: CPT | Performed by: NURSE PRACTITIONER

## 2021-05-26 PROCEDURE — 1000F TOBACCO USE ASSESSED: CPT | Performed by: NURSE PRACTITIONER

## 2021-05-26 PROCEDURE — 3051F HG A1C>EQUAL 7.0%<8.0%: CPT | Performed by: NURSE PRACTITIONER

## 2021-05-26 NOTE — PROGRESS NOTES
Puma Ramos is a 60 y.o. male for 6 Month Follow-Up and Back Pain (pain right flank for past 3 month, no other urine symptoms )      Diabetes Type 2    Glucose control:   Does patient check blood glucoses at home? Yes  Report of hypoglycemia: no  Lab Results   Component Value Date    LABA1C 7.1 (H) 05/26/2021     No results found for: EAG    Symptoms  Polyuria, Polydipsia or Polyphagia? No  Chest Pain, SOB, or Palpitations? -  No  New Vision complaints? No  Paresthesias of the extremities? No    Medications  Current medication were reviewed. Compliant with medications? yes  Medication side effects? No  On ACE-I or ARB? Yes  On antiplatelet therapy? Yes  On Statin? Yes    Last Diabetic Eye Exam: within the last year    Exercise  Exercise? Yes - is active no formal exercise program   Wt Readings from Last 3 Encounters:   05/26/21 202 lb (91.6 kg)   05/19/21 202 lb (91.6 kg)   04/29/21 202 lb (91.6 kg)       Diet discipline?:  Low salt, fat, sugar diet? yes    HTN    Does patient check BP regularly at home? - No  Current Medication regimen - lisinopril 2.5 mg daily, lopressor 25 mg bid   Tolerating medications well? - yes    Shortness of breath or chest pain? No  Headache or visual complaints? No  Neurologic changes like confusion? No  Extremity edema? No    BP Readings from Last 3 Encounters:   05/26/21 126/64   05/19/21 110/76   04/29/21 111/76       COPD    HPI:    Current medication regimen - bromide daily  Compliant with medications? yes    Limitations in function - none  Does patient smoke? Yes - he is trying to cut down takes wellbutrin for this and it does help but still smoking 1/2 pack a day also hs patches he uses from time to time     Chronic cough?: no  Chest pain/Tightness?:  no  Shortness of breath?: no  Wheezing?  no    Last PFTs - 2021    Hospitalized and/or intubated in the past?: No  Number of times prescribed oral steroids in the past year - 0  Influenza vaccine up to date? Yes  Pneumococcal vaccine up to date? Yes    Pt states he has had episodes of having blood in his stools, he states when this happens he will t hen have mahogony colored loose stools for a week or so after. He denies any rectal pain or straining to have a BM states he does not have hemorrhoids, had a colonoscopy in 2017 with polyp removal. He denies abdominal pain or nausea with this. GERD    HPI:     Symptoms:  heartburn  Length of symptoms: 6 months  Current therapy and response:  protonix 40 mg usually helps some day s does not   Recent change in symptoms? No  Symptoms associated with certain foods? Yes  Alcohol use? No  Tobacco use? Yes    Abdominal Pain? No  Mid Back Pain? No  Melena? Yes      Pt states he has noticed right sided flank pain for the last few days does not remember any specific injury it aches from time to kelsea 5/10 denies urinary issues does not radiate down his legs denies N/T of his legs. Blood pressure control:  BP Readings from Last 3 Encounters:   05/26/21 126/64   05/19/21 110/76   04/29/21 111/76       Lab Results   Component Value Date    LABMICR < 1.20 04/04/2017       Lab Results   Component Value Date    LDLCALC 51 06/15/2020       Physical Exam    /64   Pulse 87   Temp 98.3 °F (36.8 °C) (Oral)   Resp 14   Ht 6' 1\" (1.854 m)   Wt 202 lb (91.6 kg)   SpO2 96%   BMI 26.65 kg/m²   Appearance: alert, well appearing, and in no distress, normal appearing weight and well hydrated. Neck exam - supple, no significant adenopathy. CVS exam: normal rate, regular rhythm, normal S1, S2, no murmurs, rubs, clicks or gallops. Lungs: clear to auscultation, no wheezes, rales or rhonchi, symmetric air entry. Abdomen:  BS normal, soft, non tender, non distended, no rebound or guarding  Mental Status: normal mood, affect behavior, speech, dress,  and thought processes.     Back exam - full range of motion, no tenderness, palpable spasm or pain on motion, tenderness noted

## 2021-05-27 DIAGNOSIS — J44.9 CHRONIC OBSTRUCTIVE PULMONARY DISEASE, UNSPECIFIED COPD TYPE (HCC): ICD-10-CM

## 2021-05-28 DIAGNOSIS — M47.814 THORACIC SPONDYLOSIS: ICD-10-CM

## 2021-05-28 DIAGNOSIS — M47.812 CERVICAL SPONDYLOSIS: ICD-10-CM

## 2021-05-28 DIAGNOSIS — G89.4 CHRONIC PAIN SYNDROME: ICD-10-CM

## 2021-05-28 DIAGNOSIS — M46.1 SACROILIAC INFLAMMATION (HCC): ICD-10-CM

## 2021-06-01 RX ORDER — HYDROCODONE BITARTRATE AND ACETAMINOPHEN 5; 325 MG/1; MG/1
1 TABLET ORAL 2 TIMES DAILY PRN
Qty: 60 TABLET | Refills: 0 | Status: SHIPPED | OUTPATIENT
Start: 2021-06-01 | End: 2021-06-30 | Stop reason: SDUPTHER

## 2021-06-01 NOTE — TELEPHONE ENCOUNTER
OARRS reviewed. UDS: + for Dihydrocodeine, Hydrocodone, Norhydrocodone  Last seen: 5/19/2021.  Follow-up:   Future Appointments   Date Time Provider Ezra Macdonald   6/30/2021  2:00 PM Heena Brandt MD N [facility]   11/29/2021  8:00 AM GERARDO Tena - [place] [address]   12/20/2021  9:40 AM GERARDO Tena CNP [facility] [address]

## 2021-06-30 ENCOUNTER — OFFICE VISIT (OUTPATIENT)
Dept: PHYSICAL MEDICINE AND REHAB | Age: 66
End: 2021-06-30
Payer: MEDICARE

## 2021-06-30 VITALS
DIASTOLIC BLOOD PRESSURE: 68 MMHG | WEIGHT: 202 LBS | BODY MASS INDEX: 26.77 KG/M2 | SYSTOLIC BLOOD PRESSURE: 122 MMHG | HEIGHT: 73 IN

## 2021-06-30 DIAGNOSIS — M47.812 CERVICAL SPONDYLOSIS: ICD-10-CM

## 2021-06-30 DIAGNOSIS — G89.4 CHRONIC PAIN SYNDROME: ICD-10-CM

## 2021-06-30 DIAGNOSIS — M47.814 THORACIC SPONDYLOSIS: ICD-10-CM

## 2021-06-30 DIAGNOSIS — M46.1 SACROILIAC INFLAMMATION (HCC): ICD-10-CM

## 2021-06-30 DIAGNOSIS — M79.18 MYOFASCIAL PAIN SYNDROME: Primary | ICD-10-CM

## 2021-06-30 PROCEDURE — 20552 NJX 1/MLT TRIGGER POINT 1/2: CPT | Performed by: PAIN MEDICINE

## 2021-06-30 NOTE — PROGRESS NOTES
Procedure  Visit Date: 6/30/21    Ena Harrington is a 73 y.o. male presents today in the office for the following:      History of Present Illness   Marcelino Koyanagi is here today for trigger point injections. Pre-Op Diagnosis   Myofacial pain syndrome     Post-Op Diagnosis   Myofacial pain syndrome     Procedure Documentation   Patient identified. Consent signed. Site identified. A solution of 9cc 0.25% marcaine and 40mg (1cc) DepoMedrol was combined in each syringe. Site was sprayed with Ethyl chloride and then prepped with alcohol swap X 3. Then with a 25g needle entered each trigger point and after negative aspiration, injected 1-2 cc of Marcaine/DepoMedrol solution at each site. A total of 20 cc was used for procedure. Total of 10 sites were injected into 1 muscle   Thoracic paraspinal muscles  Vitals:    06/30/21 1355   BP: 122/68   Weight: 202 lb (91.6 kg)   Height: 6' 1\" (1.854 m)       Conclusion   No complications encountered. Patient discharged stable condition. Patient told if any problems to call office or go to ER. No orders of the defined types were placed in this encounter.       Electronically signed by Halie Mcdaniel MD on 6/30/21 at 2:12 PM EDT

## 2021-07-01 RX ORDER — HYDROCODONE BITARTRATE AND ACETAMINOPHEN 5; 325 MG/1; MG/1
1 TABLET ORAL 2 TIMES DAILY PRN
Qty: 60 TABLET | Refills: 0 | Status: SHIPPED | OUTPATIENT
Start: 2021-07-01 | End: 2021-07-29 | Stop reason: SDUPTHER

## 2021-07-01 NOTE — TELEPHONE ENCOUNTER
OARRS reviewed. UDS: + for Dihydrocodeine, Hydrocodone, Norhydrocodone. Last seen: 5/19/2021.  Follow-up:   Future Appointments   Date Time Provider Ezra Macdonald   9/1/2021 12:30 PM GERARDO Peters CNP [facility] [address]   11/29/2021  8:00 AM GERARDO Watkins CNP [address]   12/20/2021  9:40 AM GERARDO Watkins CNP [address]

## 2021-07-02 DIAGNOSIS — J42 CHRONIC BRONCHITIS, UNSPECIFIED CHRONIC BRONCHITIS TYPE (HCC): ICD-10-CM

## 2021-07-02 RX ORDER — ALBUTEROL SULFATE 90 UG/1
AEROSOL, METERED RESPIRATORY (INHALATION)
Qty: 54 G | Refills: 3 | Status: SHIPPED | OUTPATIENT
Start: 2021-07-02 | End: 2021-07-29 | Stop reason: SDUPTHER

## 2021-07-29 DIAGNOSIS — M47.812 CERVICAL SPONDYLOSIS: ICD-10-CM

## 2021-07-29 DIAGNOSIS — M47.814 THORACIC SPONDYLOSIS: ICD-10-CM

## 2021-07-29 DIAGNOSIS — J42 CHRONIC BRONCHITIS, UNSPECIFIED CHRONIC BRONCHITIS TYPE (HCC): ICD-10-CM

## 2021-07-29 DIAGNOSIS — M46.1 SACROILIAC INFLAMMATION (HCC): ICD-10-CM

## 2021-07-29 DIAGNOSIS — G89.4 CHRONIC PAIN SYNDROME: ICD-10-CM

## 2021-07-29 RX ORDER — ALBUTEROL SULFATE 90 UG/1
AEROSOL, METERED RESPIRATORY (INHALATION)
Qty: 54 G | Refills: 3 | Status: SHIPPED | OUTPATIENT
Start: 2021-07-29 | End: 2022-04-22

## 2021-07-30 RX ORDER — HYDROCODONE BITARTRATE AND ACETAMINOPHEN 5; 325 MG/1; MG/1
1 TABLET ORAL 2 TIMES DAILY PRN
Qty: 60 TABLET | Refills: 0 | Status: SHIPPED | OUTPATIENT
Start: 2021-07-31 | End: 2021-09-01 | Stop reason: SDUPTHER

## 2021-07-30 NOTE — TELEPHONE ENCOUNTER
OARRS reviewed. UDS: + for  Hydrocodone consistent. Last seen: 5/19/2021.  Follow-up:   Future Appointments   Date Time Provider Ezra Macdonald   9/1/2021 12:30 PM GERARDO Dodge CNP [facility]   11/29/2021  8:00 AM [facility]   12/20/2021  9:40 AM [facility] Hubbard [facility]

## 2021-08-02 ENCOUNTER — TELEPHONE (OUTPATIENT)
Dept: FAMILY MEDICINE CLINIC | Age: 66
End: 2021-08-02

## 2021-08-02 ENCOUNTER — HOSPITAL ENCOUNTER (OUTPATIENT)
Age: 66
Discharge: HOME | End: 2021-08-02
Payer: MEDICARE

## 2021-08-02 DIAGNOSIS — I10 ESSENTIAL HYPERTENSION: ICD-10-CM

## 2021-08-02 DIAGNOSIS — E11.9 TYPE 2 DIABETES MELLITUS WITHOUT COMPLICATION, WITHOUT LONG-TERM CURRENT USE OF INSULIN (HCC): ICD-10-CM

## 2021-08-02 DIAGNOSIS — K92.1 HEMATOCHEZIA: ICD-10-CM

## 2021-08-02 LAB
ALBUMIN SERPL-MCNC: 4.1 G/DL (ref 3.5–5.1)
ALP BLD-CCNC: 120 U/L (ref 38–126)
ALT SERPL-CCNC: 22 U/L (ref 11–66)
ANION GAP SERPL CALCULATED.3IONS-SCNC: 10 MEQ/L (ref 8–16)
AST SERPL-CCNC: 18 U/L (ref 5–40)
BASOPHILS # BLD: 0.4 %
BASOPHILS ABSOLUTE: 0 THOU/MM3 (ref 0–0.1)
BILIRUB SERPL-MCNC: 0.2 MG/DL (ref 0.3–1.2)
BUN BLDV-MCNC: 12 MG/DL (ref 7–22)
CALCIUM SERPL-MCNC: 9.8 MG/DL (ref 8.5–10.5)
CHLORIDE BLD-SCNC: 102 MEQ/L (ref 98–111)
CHOLESTEROL, TOTAL: 152 MG/DL (ref 100–199)
CO2: 26 MEQ/L (ref 23–33)
CREAT SERPL-MCNC: 0.8 MG/DL (ref 0.4–1.2)
EOSINOPHIL # BLD: 1.2 %
EOSINOPHILS ABSOLUTE: 0.1 THOU/MM3 (ref 0–0.4)
ERYTHROCYTE [DISTWIDTH] IN BLOOD BY AUTOMATED COUNT: 13.7 % (ref 11.5–14.5)
ERYTHROCYTE [DISTWIDTH] IN BLOOD BY AUTOMATED COUNT: 48.6 FL (ref 35–45)
GFR SERPL CREATININE-BSD FRML MDRD: > 90 ML/MIN/1.73M2
GLUCOSE BLD-MCNC: 140 MG/DL (ref 70–108)
HCT VFR BLD CALC: 48.6 % (ref 42–52)
HDLC SERPL-MCNC: 43 MG/DL
HEMOGLOBIN: 15.8 GM/DL (ref 14–18)
IMMATURE GRANS (ABS): 0.08 THOU/MM3 (ref 0–0.07)
IMMATURE GRANULOCYTES: 0.7 %
LDL CHOLESTEROL CALCULATED: 80 MG/DL
LYMPHOCYTES # BLD: 17.3 %
LYMPHOCYTES ABSOLUTE: 2.1 THOU/MM3 (ref 1–4.8)
MCH RBC QN AUTO: 31.4 PG (ref 26–33)
MCHC RBC AUTO-ENTMCNC: 32.5 GM/DL (ref 32.2–35.5)
MCV RBC AUTO: 96.6 FL (ref 80–94)
MONOCYTES # BLD: 5.9 %
MONOCYTES ABSOLUTE: 0.7 THOU/MM3 (ref 0.4–1.3)
NUCLEATED RED BLOOD CELLS: 0 /100 WBC
PLATELET # BLD: 264 THOU/MM3 (ref 130–400)
PMV BLD AUTO: 8.9 FL (ref 9.4–12.4)
POTASSIUM SERPL-SCNC: 4.5 MEQ/L (ref 3.5–5.2)
RBC # BLD: 5.03 MILL/MM3 (ref 4.7–6.1)
SEG NEUTROPHILS: 74.5 %
SEGMENTED NEUTROPHILS ABSOLUTE COUNT: 8.9 THOU/MM3 (ref 1.8–7.7)
SODIUM BLD-SCNC: 138 MEQ/L (ref 135–145)
TOTAL PROTEIN: 7.5 G/DL (ref 6.1–8)
TRIGL SERPL-MCNC: 145 MG/DL (ref 0–199)
TSH SERPL DL<=0.05 MIU/L-ACNC: 1.35 UIU/ML (ref 0.4–4.2)
WBC # BLD: 12 THOU/MM3 (ref 4.8–10.8)

## 2021-08-02 PROCEDURE — 80061 LIPID PANEL: CPT

## 2021-08-02 PROCEDURE — 85025 COMPLETE CBC W/AUTO DIFF WBC: CPT

## 2021-08-02 PROCEDURE — 36415 COLL VENOUS BLD VENIPUNCTURE: CPT

## 2021-08-02 PROCEDURE — 80053 COMPREHEN METABOLIC PANEL: CPT

## 2021-08-02 PROCEDURE — 84443 ASSAY THYROID STIM HORMONE: CPT

## 2021-08-02 NOTE — TELEPHONE ENCOUNTER
----- Message from GERARDO Lee CNP sent at 8/2/2021  2:29 PM EDT -----  Let pt know most labs look good, fasting sugar creping up at 140 is he taking all of his diabetic meds everyday?  Thanks

## 2021-08-03 NOTE — TELEPHONE ENCOUNTER
Patient has been informed and voiced understanding and states that he is taking all of his DM as directed    Please advise

## 2021-08-03 NOTE — TELEPHONE ENCOUNTER
Left detailed msg on machine, waiting for pt to return call and let us know if taking diabetic meds daily

## 2021-08-25 ENCOUNTER — OFFICE VISIT (OUTPATIENT)
Dept: FAMILY MEDICINE CLINIC | Age: 66
End: 2021-08-25
Payer: MEDICARE

## 2021-08-25 VITALS
RESPIRATION RATE: 14 BRPM | WEIGHT: 202.13 LBS | TEMPERATURE: 98.3 F | HEART RATE: 80 BPM | BODY MASS INDEX: 26.79 KG/M2 | OXYGEN SATURATION: 95 % | SYSTOLIC BLOOD PRESSURE: 128 MMHG | DIASTOLIC BLOOD PRESSURE: 80 MMHG | HEIGHT: 73 IN

## 2021-08-25 DIAGNOSIS — Z48.02 ENCOUNTER FOR REMOVAL OF SUTURES: Primary | ICD-10-CM

## 2021-08-25 DIAGNOSIS — S61.011S THUMB LACERATION, RIGHT, SEQUELA: ICD-10-CM

## 2021-08-25 PROCEDURE — 99213 OFFICE O/P EST LOW 20 MIN: CPT | Performed by: NURSE PRACTITIONER

## 2021-08-25 ASSESSMENT — ENCOUNTER SYMPTOMS
COLOR CHANGE: 1
RESPIRATORY NEGATIVE: 1

## 2021-08-25 NOTE — PROGRESS NOTES
[facility]  [address] [place] [address]  Dept: [phone]  Dept Fax: [phone]  Loc: [phone]    Carolin Cortez is a 73 y.o. Federico Francisco presents today for his medical conditions/complaints as noted below. Sabrina Stephenson c/o of Suture / Staple Removal (right thumb, no concerns )      HPI:      Pt had been working with his son and cut his right thumb on a dryer vent and had to go to ER and have stitches placed to close the wound. He is currently taking an atb. He denies any redness or swelling or drainage of the right thumb. Sutures have been in place for 9 days, his last TDaP was 2013 up to date. He denies any N/T of the thumb has good ROM, the right thumb is a little tender at the tip. Current Outpatient Medications   Medication Sig Dispense Refill    HYDROcodone-acetaminophen (NORCO) 5-325 MG per tablet Take 1 tablet by mouth 2 times daily as needed for Pain for up to 30 days. 60 tablet 0    albuterol sulfate  (90 Base) MCG/ACT inhaler INHALE 2 PUFFS INTO THE LUNGS FOUR TIMES DAILY AS NEEDED FOR WHEEZING 54 g 3    INCRUSE ELLIPTA 62.5 MCG/INH AEPB INHALE 1 PUFF INTO THE LUNGS DAILY 3 each 3    diclofenac sodium (VOLTAREN) 1 % GEL Apply 2 g topically 2 times daily 100 g 2    lisinopril (PRINIVIL;ZESTRIL) 2.5 MG tablet TAKE 1 TABLET BY MOUTH DAILY 90 tablet 3    pantoprazole (PROTONIX) 40 MG tablet TAKE 1 TABLET BY MOUTH DAILY 90 tablet 3    buPROPion (WELLBUTRIN XL) 150 MG extended release tablet TAKE 1 TABLET BY MOUTH EVERY MORNING 90 tablet 3    QUEtiapine (SEROQUEL) 200 MG tablet TAKE 1 TABLET BY MOUTH AT BEDTIME 90 tablet 3    JANUVIA 100 MG tablet TAKE 1 TABLET BY MOUTH DAILY 90 tablet 1    empagliflozin (JARDIANCE) 10 MG tablet Take 1 tablet by mouth daily 90 tablet 3    metoprolol tartrate (LOPRESSOR) 25 MG tablet TAKE 1/2 TABLET BY MOUTH TWICE DAILY.  HOLD FOR HEART RATE LESS THAN 60 90 tablet 3    metFORMIN (GLUCOPHAGE) 1000 MG tablet TAKE 1 TABLET BY MOUTH TWICE DAILY WITH MEALS 180 tablet 3    amitriptyline (ELAVIL) 50 MG tablet TAKE 1 TABLET BY MOUTH EVERY NIGHT 90 tablet 3    atorvastatin (LIPITOR) 40 MG tablet 1 tab po daily 90 tablet 3    etodolac (LODINE XL) 400 MG extended release tablet TAKE 1 TABLET BY MOUTH DAILY 90 tablet 3    azelastine (ASTELIN) 0.1 % nasal spray SPRAY ONCE IN EACH NOSTRIL TWICE DAILY AS DIRECTED 3 Bottle 1    aspirin 81 MG chewable tablet TAKE 1 TABLET BY MOUTH DAILY 90 tablet 3    Lancet Device MISC 1 Device by Does not apply route once for 1 dose 1 Device 0    Lancets MISC 1 each by Does not apply route 2 times daily 100 each 3    Elastic Bandages & Supports (B & B A-2 ATHLETIC SUPPORTER) MISC 1 Device by Does not apply route continuous prn (prn pain) 1 each 0    nicotine (NICODERM CQ) 21 MG/24HR Place 1 patch onto the skin daily 42 patch 0    nicotine (NICODERM CQ) 14 MG/24HR Place 1 patch onto the skin daily 42 patch 0    nicotine (NICODERM CQ) 7 MG/24HR Place 1 patch onto the skin daily 42 patch 0     No current facility-administered medications for this visit.           Past Medical History:   Diagnosis Date    Abnormal liver enzymes     Alcohol abuse     Allergic rhinitis     Chronic back pain     Colonic polyp     COPD (chronic obstructive pulmonary disease) (HCC)     Depression     Diabetes mellitus (HCC)     DJD (degenerative joint disease) of cervical spine     DJD (degenerative joint disease) of lumbar spine     SP SURGERY    GERD (gastroesophageal reflux disease)     Hyperlipidemia     Hypertension     Osteoarthritis     Osteoarthritis     Smoker       Past Surgical History:   Procedure Laterality Date    BACK SURGERY  2008    X 3    CARD[facility]AC [facility]TERIZATION  3/2015    [facility]    CHOLECYSTECTOMY      COLONOSCOPY  6/2012    repeat 6/2017- no precancerous  polyps    EKG 12-LEAD  4/6/2015         FACET JOINT INJECTION Bilateral 7/7/2020 BILATERAL C-FACET MBB @ C4-5,C5-6 and C6-7 performed by Yisel Orozco MD at [address] INJECTION Bilateral 8/6/2020    bilateral C-facet MBB # 2 @ C4-5, C5-6 and C6-7 performed by Yisel Orozco MD at [address] [facility] [address] Caitie Skinner  2002    Bilateral Inguinal     LUMBAR SPINE SURGERY  2006, 2007, 2008    fusion 2008    PAIN MANAGEMENT PROCEDURE Right 9/4/2020    Bilateral C-facet RFA @ C4-5, C5-6. RIGHT SIDE FIRST.  performed by Yisel Orozco MD at [place] [address] Left 10/9/2020    Left C-facet RFA @ C4-5, and C5-6 performed by Yisel Orozco MD at [place] [address] Right 3/25/2021    Right C-facet RFA @ C4-5, C5-6 performed by Yisel Orozco MD at [place] [address] Left 4/29/2021    Left C-facet RFA @ C4-5, C5-6 performed by Yisel Orozco MD at [facility] OF HYDROCELE,TUNICA,UNILAT Left 7/31/2018    LEFT HYDROCELECTOMY performed by Agusto Garcia MD at [facility] History   Problem Relation Age of Onset    Diabetes Mother     Cancer Father     Diabetes Sister     Heart Disease Brother     High Blood Pressure Neg Hx     Stroke Neg Hx      Social History     Tobacco Use    Smoking status: Current Every Day Smoker     Packs/day: 0.75     Years: 38.00     Pack years: 28.50     Types: Cigarettes     Start date: 11/19/2012    Smokeless tobacco: Never Used   Substance Use Topics    Alcohol use: No     Alcohol/week: 0.0 standard drinks     Comment: quit 2013        No Known Allergies    Health Maintenance   Topic Date Due    Low dose CT lung screening  06/27/2015    Pneumococcal 65+ years Vaccine (1 of 1 - PPSV23) 05/28/2021    HIV screen  11/19/2021 (Originally 5/28/1970)    Flu vaccine (1) 09/01/2021    Diabetic retinal exam  09/22/2021    Diabetic foot exam  11/19/2021    Annual Wellness Visit (AWV)  12/18/2021    A1C test (Diabetic or Prediabetic)  05/26/2022    Diabetic microalbuminuria test  05/26/2022    Lipid screen  08/02/2022    Potassium monitoring  08/02/2022    Creatinine monitoring  08/02/2022    DTaP/Tdap/Td vaccine (2 - Td or Tdap) 03/26/2023    Colon cancer screen colonoscopy  04/19/2027    Shingles Vaccine  Completed    COVID-19 Vaccine  Completed    AAA screen  Completed    Hepatitis C screen  Completed    Hepatitis A vaccine  Aged Out    Hib vaccine  Aged Out    Meningococcal (ACWY) vaccine  Aged Out       Subjective:      Review of Systems   Respiratory: Negative. Cardiovascular: Negative. Musculoskeletal: Positive for myalgias. Skin: Positive for color change and wound. Neurological: Negative for weakness and numbness. Objective:      /80   Pulse 80   Temp 98.3 °F (36.8 °C) (Oral)   Resp 14   Ht 6' 1\" (1.854 m)   Wt 202 lb 2 oz (91.7 kg)   SpO2 95%   BMI 26.67 kg/m²      Physical Exam  Vitals and nursing note reviewed. Constitutional:       Appearance: He is not ill-appearing. Cardiovascular:      Rate and Rhythm: Normal rate and regular rhythm. Musculoskeletal:      Right hand: Swelling (at base and tip of right thumb ), laceration (well healed with wound edges well approximated, 6 sutures in place. ) and tenderness present. No bony tenderness. Normal strength. Normal sensation. Left hand: Normal.      Comments: Right mid thumb cleansed with antiseptic and 6 sutures removed from right thumb. Cap refill 2+ right thumb pink and has good ROM, no erythema or drainage from the wound. Wound edges are well approximated. Steri strips placed   Skin:     General: Skin is warm and dry. Capillary Refill: Capillary refill takes less than 2 seconds. Neurological:      Mental Status: He is alert. Assessment/Plan:           1. Encounter for removal of sutures    2.  Thumb laceration, right, sequela  Leave steri strips in place until they fall off  Call office for any redness or foul odor or drainage from the wound  Pt in agreement with plan       Return if symptoms worsen or fail to improve. Reccommended tobaccocessation options including pharmacologic methods, counseled great than 3 minutesduring this visit:  Yes[]  No  []       Patient given educational materials -see patient instructions. Discussed use, benefit, and side effects of prescribedmedications. All patient questions answered. Pt voiced understanding. Reviewedhealth maintenance. Instructed to continue current medications, diet and exercise. Patient agreed with treatment plan. Follow up as directed.        Electronicallysigned by GERARDO Cadet CNP on 8/25/2021 at 1:54 PM

## 2021-09-01 ENCOUNTER — OFFICE VISIT (OUTPATIENT)
Dept: PHYSICAL MEDICINE AND REHAB | Age: 66
End: 2021-09-01
Payer: MEDICARE

## 2021-09-01 VITALS
HEIGHT: 73 IN | DIASTOLIC BLOOD PRESSURE: 78 MMHG | SYSTOLIC BLOOD PRESSURE: 130 MMHG | WEIGHT: 202 LBS | BODY MASS INDEX: 26.77 KG/M2

## 2021-09-01 DIAGNOSIS — M47.814 THORACIC SPONDYLOSIS: ICD-10-CM

## 2021-09-01 DIAGNOSIS — G89.4 CHRONIC PAIN SYNDROME: ICD-10-CM

## 2021-09-01 DIAGNOSIS — M79.18 MYOFASCIAL PAIN SYNDROME: ICD-10-CM

## 2021-09-01 DIAGNOSIS — M47.812 CERVICAL SPONDYLOSIS: Primary | ICD-10-CM

## 2021-09-01 DIAGNOSIS — M46.1 SACROILIAC INFLAMMATION (HCC): ICD-10-CM

## 2021-09-01 DIAGNOSIS — M54.2 NECK PAIN: ICD-10-CM

## 2021-09-01 PROCEDURE — 99214 OFFICE O/P EST MOD 30 MIN: CPT | Performed by: NURSE PRACTITIONER

## 2021-09-01 RX ORDER — HYDROCODONE BITARTRATE AND ACETAMINOPHEN 5; 325 MG/1; MG/1
1 TABLET ORAL 2 TIMES DAILY PRN
Qty: 60 TABLET | Refills: 0 | Status: SHIPPED | OUTPATIENT
Start: 2021-09-01 | End: 2021-09-30 | Stop reason: SDUPTHER

## 2021-09-01 ASSESSMENT — ENCOUNTER SYMPTOMS
GASTROINTESTINAL NEGATIVE: 1
BACK PAIN: 1
RESPIRATORY NEGATIVE: 1

## 2021-09-01 NOTE — PROGRESS NOTES
[facility] [address]  Dept: [phone]  Dept Fax: [phone]: [phone]    Visit Date: 9/1/2021    Functionality Assessment/Goals Worksheet     On a scale of 0 (Does not Interfere) to 10 (Completely Interferes)     1. Which number describes how during the past week pain has interfered with       the following:  A. General Activity:  5  B. Mood: 4  C. Walking Ability:  5  D. Normal Work (Includes both work outside the home and housework):  6  E. Relations with Other People:   5  F. Sleep:   6  G. Enjoyment of Life:   5    2. Patient Prefers to Take their Pain Medications:     []  On a regular basis   [x]  Only when necessary    []  Does not take pain medications    3. What are the Patient's Goals/Expectations for Visiting Pain Management? []  Learn about my pain    [x]  Receive Medication   []  Physical Therapy     []  Treat Depression   [x]  Receive Injections    []  Treat Sleep   []  Deal with Anxiety and Stress   []  Treat Opoid Dependence/Addiction   []  Other:      HPI:   Meaghan Guevraa is a 73 y.o. male is here today for    Chief Complaint: Neck pain, headaches, mid back pain     HPI   FU from thoracic trigger point injections from 6/30/2021. Received over 80% relief of mid back and muscle pain but only last 1.5 weeks from trigger point injections and then pain returned back to the level it was prior in mid back. Still good relief from C-facet RFA. still neck pain with occasional headaches but remains tolerable. Norco prn remains effective   Pain increases with bending, lifting, twisting , turning head and getting up and down. Medications reviewed. Patient denies side effects with medications. Patient states he is taking medications as prescribed. Hedenies receiving pain medications from other sources.  He had [facility] since last visit for finger laceration     Pain scale with out pain medications or at its worst is 6/10. Pain scale with pain medications or at its best is 1/10. Last dose of Norco was yesterday   Drug screen reviewed from 5/19/2021 and was appropriate   Pill count was completed today and was appropriate Pt is out of meds:   Patient does not have naloxone available at home. Patient has not required use of naloxone at home since last office visit. The patienthas No Known Allergies. Subjective:      Review of Systems   Constitutional: Negative. HENT: Negative. Respiratory: Negative. Cardiovascular: Negative. Gastrointestinal: Negative. Musculoskeletal: Positive for arthralgias, back pain, myalgias, neck pain and neck stiffness. Negative for gait problem and joint swelling. Neurological: Positive for headaches. Negative for weakness and numbness. Psychiatric/Behavioral: Negative. Objective:     Vitals:    09/01/21 1231   BP: 130/78   Weight: 202 lb (91.6 kg)   Height: 6' 1\" (1.854 m)       Physical Exam  Vitals and nursing note reviewed. Constitutional:       General: He is not in acute distress. Appearance: Normal appearance. He is well-developed. He is not diaphoretic. HENT:      Head: Normocephalic and atraumatic. Right Ear: External ear normal.      Left Ear: External ear normal.      Nose: Nose normal.      Mouth/Throat:      Mouth: Mucous membranes are moist.      Pharynx: No oropharyngeal exudate. Eyes:      General: No scleral icterus. Right eye: No discharge. Left eye: No discharge. Conjunctiva/sclera: Conjunctivae normal.      Pupils: Pupils are equal, round, and reactive to light. Neck:      Thyroid: No thyromegaly. Cardiovascular:      Rate and Rhythm: Normal rate and regular rhythm. Heart sounds: Normal heart sounds. No murmur heard. No friction rub. No gallop. Pulmonary:      Effort: Pulmonary effort is normal. No respiratory distress. Breath sounds: Normal breath sounds. No wheezing or rales. Chest:      Chest wall: No tenderness. Abdominal:      General: Bowel sounds are normal. There is no distension. Palpations: Abdomen is soft. Tenderness: There is no abdominal tenderness. There is no guarding or rebound. Musculoskeletal:         General: Swelling and tenderness present. Right shoulder: Tenderness present. Decreased range of motion. Left shoulder: Tenderness present. Decreased range of motion. Cervical back: Full passive range of motion without pain and normal range of motion. Rigidity, spasms and tenderness present. No edema or erythema. Muscular tenderness present. Normal range of motion. Thoracic back: Spasms and tenderness present. Lumbar back: Tenderness present. Decreased range of motion. Back:       Right hip: No tenderness. Left hip: No tenderness. Right knee: Normal range of motion. No tenderness. Left knee: Normal range of motion. No tenderness. Right lower leg: No swelling. No edema. Left lower leg: No swelling. No edema. Right ankle: No swelling. Left ankle: No swelling. Right foot: No swelling. Left foot: No swelling. Skin:     General: Skin is warm. Coloration: Skin is not pale. Findings: No erythema or rash. Neurological:      General: No focal deficit present. Mental Status: He is alert and oriented to person, place, and time. He is not disoriented. Cranial Nerves: No cranial nerve deficit. Sensory: Sensory deficit present. Motor: No atrophy or abnormal muscle tone. Coordination: Coordination normal.      Gait: Gait normal.      Deep Tendon Reflexes: Babinski sign absent on the right side. Babinski sign absent on the left side. Reflex Scores:       Tricep reflexes are 1+ on the right side and 1+ on the left side. Bicep reflexes are 1+ on the right side and 1+ on the left side. Brachioradialis reflexes are 1+ on the right side and 1+ on the left side. Patellar reflexes are 1+ on the right side and 1+ on the left side. Achilles reflexes are 1+ on the right side and 1+ on the left side. Comments: SENSORY DECREASED STOCKING DISTRIBUTION   Psychiatric:         Attention and Perception: Attention normal. He is attentive. Mood and Affect: Mood normal. Mood is not anxious or depressed. Affect is not labile, blunt, angry or inappropriate. Speech: Speech normal. He is communicative. Speech is not rapid and pressured, delayed, slurred or tangential.         Behavior: Behavior normal. Behavior is not agitated, slowed, aggressive, withdrawn, hyperactive or combative. Behavior is cooperative. Thought Content: Thought content normal. Thought content is not paranoid or delusional. Thought content does not include homicidal or suicidal ideation. Thought content does not include homicidal or suicidal plan. Cognition and Memory: Cognition normal. Memory is not impaired. He does not exhibit impaired recent memory or impaired remote memory. Judgment: Judgment is not impulsive or inappropriate. JIMENA test: +  Yeomans test: +   Gaenslen test: +      Assessment:     1. Cervical spondylosis    2. Thoracic spondylosis    3. Myofascial pain syndrome    4. Neck pain    5. Chronic pain syndrome    6. Sacroiliac inflammation ([facility] [address]            Plan:      OARRS reviewed. Current MED: 10.00  Patient was offered naloxone for home. Discussed long term side effects of medications, tolerance, dependency and addiction. Previous UDS reviewed  UDS preformed today for compliance. Patient told can not receive any pain medications from any other source. No evidence of abuse, diversion or aberrant behavior.   Medications and/or procedures to improve function and quality of life- patient understanding with this and that may not be pain free  Discussed with patient about safe storage of medications at home  Discussed possible weaning of medication dosing dependent on treatment/procedure results. Discussed with patient about risks with procedure including infection, reaction to medication, increased pain, or bleeding. Procedure notes reveiwed in detail  Received good relief from thoracic trigger point injections but only lasted 1.5 weeks   Continues to receive over 80% to 85% relief of neck pain headaches with improvement of mobility from bilateral C-facet RFA. Discussed possible T-facet MBB in future   Pain remains tolerable with medications and procedures  Continue Norco 5/325 BID prn- ordered refill   Updated UDS ordered today. Patient is compliant       Meds. Prescribed:   Orders Placed This Encounter   Medications    HYDROcodone-acetaminophen (NORCO) 5-325 MG per tablet     Sig: Take 1 tablet by mouth 2 times daily as needed for Pain for up to 30 days. Dispense:  60 tablet     Refill:  0     Reduce doses taken as pain becomes manageable       Return in about 10 weeks (around 11/10/2021), or if symptoms worsen or fail to improve, for follow up  for medications.                Electronically signed by GERARDO Cardona CNP on9/1/2021 at 12:45 PM

## 2021-09-24 DIAGNOSIS — M19.90 ARTHRITIS: ICD-10-CM

## 2021-09-24 RX ORDER — ETODOLAC 400 MG/1
400 TABLET, EXTENDED RELEASE ORAL DAILY
Qty: 90 TABLET | Refills: 3 | Status: SHIPPED | OUTPATIENT
Start: 2021-09-24 | End: 2022-09-19

## 2021-09-24 NOTE — TELEPHONE ENCOUNTER
Recent Visits  Date Type Provider Dept   08/25/21 Office Visit GERARDO Seymour CNP [facility]   05/26/21 Office Visit GERARDO Seymour CNP [facility]   12/17/20 Office Visit GERARDO Seymour CNP [facility]   11/19/20 Office Visit GERARDO Seymour CNP [facility]   05/14/20 Office Visit GERARDO Seymour CNP [facility]   Showing recent visits within past 540 days with a meds authorizing provider and meeting all other requirements  Future Appointments  Date Type Provider Dept   11/29/21 Appointment GERARDO Seymour CNP [facility]   12/20/21 Appointment GERARDO Seymour CNP [facility]   Showing future appointments within next 150 days with a meds authorizing provider and meeting all other requirements            Future Appointments   Date Time Provider Ezra Hernandezi   11/10/2021 12:00 PM GERARDO Quiñonez CNP [facility] [place] [address] [address]   11/29/2021  8:00 AM GERARDO Seymour - [address] [facility] [place] [address]   12/20/2021  9:40 AM GERARDO Seymour

## 2021-09-30 DIAGNOSIS — M47.814 THORACIC SPONDYLOSIS: ICD-10-CM

## 2021-09-30 DIAGNOSIS — M46.1 SACROILIAC INFLAMMATION (HCC): ICD-10-CM

## 2021-09-30 DIAGNOSIS — G89.4 CHRONIC PAIN SYNDROME: ICD-10-CM

## 2021-09-30 DIAGNOSIS — M47.812 CERVICAL SPONDYLOSIS: ICD-10-CM

## 2021-09-30 RX ORDER — HYDROCODONE BITARTRATE AND ACETAMINOPHEN 5; 325 MG/1; MG/1
1 TABLET ORAL 2 TIMES DAILY PRN
Qty: 60 TABLET | Refills: 0 | Status: SHIPPED | OUTPATIENT
Start: 2021-10-01 | End: 2021-10-29 | Stop reason: SDUPTHER

## 2021-09-30 NOTE — TELEPHONE ENCOUNTER
Anitra Varela called requesting a refill on the following medications:  Requested Prescriptions     Pending Prescriptions Disp Refills    HYDROcodone-acetaminophen (NORCO) 5-325 MG per tablet 60 tablet 0     Sig: Take 1 tablet by mouth 2 times daily as needed for Pain for up to 30 days.      Pharmacy verified:  [facility]    Date of last visit: 09/01/2021  Date of next visit (if applicable): 86/57/3450

## 2021-10-14 ENCOUNTER — TELEPHONE (OUTPATIENT)
Dept: FAMILY MEDICINE CLINIC | Age: 66
End: 2021-10-14

## 2021-10-14 DIAGNOSIS — E04.1 THYROID NODULE: Primary | ICD-10-CM

## 2021-10-14 NOTE — TELEPHONE ENCOUNTER
Thyroid ultrasound @[facility] 10/18/21 @6pm with a 5:45pm arrival to main radiology. No prep. Pt informed.

## 2021-10-18 ENCOUNTER — HOSPITAL ENCOUNTER (OUTPATIENT)
Dept: ULTRASOUND IMAGING | Age: 66
Discharge: HOME | End: 2021-10-18
Payer: MEDICARE

## 2021-10-18 DIAGNOSIS — E04.1 THYROID NODULE: ICD-10-CM

## 2021-10-18 PROCEDURE — 76536 US EXAM OF HEAD AND NECK: CPT

## 2021-10-20 ENCOUNTER — TELEPHONE (OUTPATIENT)
Dept: FAMILY MEDICINE CLINIC | Age: 66
End: 2021-10-20

## 2021-10-20 DIAGNOSIS — E04.1 THYROID NODULE: Primary | ICD-10-CM

## 2021-10-20 NOTE — TELEPHONE ENCOUNTER
----- Message from GERARDO Guillermo CNP sent at 10/19/2021  5:13 PM EDT -----  Let pt know his thyroid u/s identifies a left lower lobe thyroid nodule. It is recommended due to the nodule size that it be biopsied. This is done as a procedure at the hospital. Under local anesthetic. If pt is agreeable I will order it.

## 2021-10-29 DIAGNOSIS — G89.4 CHRONIC PAIN SYNDROME: ICD-10-CM

## 2021-10-29 DIAGNOSIS — M47.814 THORACIC SPONDYLOSIS: ICD-10-CM

## 2021-10-29 DIAGNOSIS — M46.1 SACROILIAC INFLAMMATION (HCC): ICD-10-CM

## 2021-10-29 DIAGNOSIS — M47.812 CERVICAL SPONDYLOSIS: ICD-10-CM

## 2021-10-29 RX ORDER — HYDROCODONE BITARTRATE AND ACETAMINOPHEN 5; 325 MG/1; MG/1
1 TABLET ORAL 2 TIMES DAILY PRN
Qty: 60 TABLET | Refills: 0 | Status: SHIPPED | OUTPATIENT
Start: 2021-10-31 | End: 2021-11-29 | Stop reason: SDUPTHER

## 2021-10-29 NOTE — TELEPHONE ENCOUNTER
Caleb Liang called requesting a refill on the following medications:  Requested Prescriptions     Pending Prescriptions Disp Refills    HYDROcodone-acetaminophen (NORCO) 5-325 MG per tablet 60 tablet 0     Sig: Take 1 tablet by mouth 2 times daily as needed for Pain for up to 30 days. Pharmacy verified:[facility]      Date of last visit:   Date of next visit (if applicable): 36/51/9744

## 2021-11-03 DIAGNOSIS — E78.01 FAMILIAL HYPERCHOLESTEROLEMIA: ICD-10-CM

## 2021-11-03 DIAGNOSIS — E11.9 TYPE 2 DIABETES MELLITUS WITHOUT COMPLICATION, WITHOUT LONG-TERM CURRENT USE OF INSULIN (HCC): ICD-10-CM

## 2021-11-03 RX ORDER — ATORVASTATIN CALCIUM 40 MG/1
TABLET, FILM COATED ORAL
Qty: 90 TABLET | Refills: 3 | Status: SHIPPED | OUTPATIENT
Start: 2021-11-03 | End: 2022-10-31

## 2021-11-03 NOTE — TELEPHONE ENCOUNTER
Recent Visits  Date Type Provider Dept   08/25/21 Office Visit GERARDO Kang CNP [facility]   05/26/21 Office Visit GERARDO Kang CNP [facility]   12/17/20 Office Visit GERARDO Kang CNP [facility]   11/19/20 Office Visit GERARDO Kang CNP [facility]   05/14/20 Office Visit GERARDO Kang CNP [facility]   Showing recent visits within past 540 days with a meds authorizing provider and meeting all other requirements  Future Appointments  Date Type Provider Dept   11/29/21 Appointment GERARDO Kang CNP [facility]   12/20/21 Appointment GERARDO Kang CNP [facility]   Showing future appointments within next 150 days with a meds authorizing provider and meeting all other requirements    Future Appointments   Date Time Provider Ezra Macdonald   11/4/2021  2:00 PM STR ULTRASOUND RM 1 [facility] US STR Radiolog   11/10/2021 12:00 PM GERARDO Jaime CNP [facility] [place] [address]   11/29/2021  8:00 AM GERARDO Kang CNP [facility] - [place] [address]   12/20/2021  9:40 AM GERARDO Kang - [address] [facility] - [place] [address]

## 2021-11-04 ENCOUNTER — HOSPITAL ENCOUNTER (OUTPATIENT)
Dept: ULTRASOUND IMAGING | Age: 66
Discharge: HOME | End: 2021-11-04
Payer: COMMERCIAL

## 2021-11-04 DIAGNOSIS — E04.1 THYROID NODULE: ICD-10-CM

## 2021-11-04 PROCEDURE — 88173 CYTOPATH EVAL FNA REPORT: CPT

## 2021-11-04 PROCEDURE — 88172 CYTP DX EVAL FNA 1ST EA SITE: CPT

## 2021-11-04 PROCEDURE — 88177 CYTP FNA EVAL EA ADDL: CPT

## 2021-11-04 PROCEDURE — 60300 ASPIR/INJ THYROID CYST: CPT

## 2021-11-05 DIAGNOSIS — G47.00 INSOMNIA, UNSPECIFIED TYPE: ICD-10-CM

## 2021-11-08 NOTE — TELEPHONE ENCOUNTER
Recent Visits  Date Type Provider Dept   08/25/21 Office Visit Renell Osler, APRN - CNP [facility]   05/26/21 Office Visit Renell Osler, APRN - CNP [facility]   12/17/20 Office Visit Renell Osler, APRN - CNP [facility]   11/19/20 Office Visit Renell Osler, APRN - CNP [facility]   Showing recent visits within past 540 days with a meds authorizing provider and meeting all other requirements  Future Appointments  Date Type Provider Dept   11/29/21 Appointment Renell Osler, APRN - CNP [facility]   12/20/21 Appointment Renell Osler, APRN - CNP [facility]   Showing future appointments within next 150 days with a meds authorizing provider and meeting all other requirements            Future Appointments   Date Time Provider Ezra Macdonald   11/10/2021 12:00 PM GERARDO Farr CNP [facility] [place] [address]   11/29/2021  8:00 AM Renell Osler, APRN [address] [address] [address] [address] [place] [address]   12/20/2021  9:40 AM Renell Osler, APRN - [address]

## 2021-11-09 ENCOUNTER — TELEPHONE (OUTPATIENT)
Dept: FAMILY MEDICINE CLINIC | Age: 66
End: 2021-11-09

## 2021-11-09 RX ORDER — AMITRIPTYLINE HYDROCHLORIDE 50 MG/1
TABLET, FILM COATED ORAL
Qty: 90 TABLET | Refills: 3 | Status: SHIPPED | OUTPATIENT
Start: 2021-11-09 | End: 2022-10-31

## 2021-11-09 NOTE — TELEPHONE ENCOUNTER
----- Message from Tera Holter, APRN - CNP sent at 11/9/2021  1:02 PM EST -----  Let pt know the pathology is back on his thyroid cyst and it is benign. There is  no cancer in the thyroid nodule. This is good news. Let me now if  he has any questions.

## 2021-11-10 ENCOUNTER — OFFICE VISIT (OUTPATIENT)
Dept: PHYSICAL MEDICINE AND REHAB | Age: 66
End: 2021-11-10
Payer: MEDICARE

## 2021-11-10 VITALS
DIASTOLIC BLOOD PRESSURE: 70 MMHG | SYSTOLIC BLOOD PRESSURE: 126 MMHG | HEIGHT: 73 IN | WEIGHT: 202 LBS | BODY MASS INDEX: 26.77 KG/M2

## 2021-11-10 DIAGNOSIS — M47.812 CERVICAL SPONDYLOSIS: Primary | ICD-10-CM

## 2021-11-10 DIAGNOSIS — M47.814 THORACIC SPONDYLOSIS: ICD-10-CM

## 2021-11-10 DIAGNOSIS — Z98.1 HISTORY OF LUMBAR FUSION: ICD-10-CM

## 2021-11-10 DIAGNOSIS — M54.2 NECK PAIN: ICD-10-CM

## 2021-11-10 DIAGNOSIS — F11.90 CHRONIC, CONTINUOUS USE OF OPIOIDS: ICD-10-CM

## 2021-11-10 DIAGNOSIS — G89.4 CHRONIC PAIN SYNDROME: ICD-10-CM

## 2021-11-10 DIAGNOSIS — M79.18 MYOFASCIAL PAIN SYNDROME: ICD-10-CM

## 2021-11-10 PROCEDURE — 99214 OFFICE O/P EST MOD 30 MIN: CPT | Performed by: NURSE PRACTITIONER

## 2021-11-10 PROCEDURE — 4004F PT TOBACCO SCREEN RCVD TLK: CPT | Performed by: NURSE PRACTITIONER

## 2021-11-10 PROCEDURE — G8417 CALC BMI ABV UP PARAM F/U: HCPCS | Performed by: NURSE PRACTITIONER

## 2021-11-10 PROCEDURE — 1123F ACP DISCUSS/DSCN MKR DOCD: CPT | Performed by: NURSE PRACTITIONER

## 2021-11-10 PROCEDURE — 3017F COLORECTAL CA SCREEN DOC REV: CPT | Performed by: NURSE PRACTITIONER

## 2021-11-10 PROCEDURE — G8427 DOCREV CUR MEDS BY ELIG CLIN: HCPCS | Performed by: NURSE PRACTITIONER

## 2021-11-10 PROCEDURE — G8484 FLU IMMUNIZE NO ADMIN: HCPCS | Performed by: NURSE PRACTITIONER

## 2021-11-10 PROCEDURE — 4040F PNEUMOC VAC/ADMIN/RCVD: CPT | Performed by: NURSE PRACTITIONER

## 2021-11-10 ASSESSMENT — ENCOUNTER SYMPTOMS
RESPIRATORY NEGATIVE: 1
BACK PAIN: 1
GASTROINTESTINAL NEGATIVE: 1

## 2021-11-10 NOTE — PROGRESS NOTES
[facility] [address]  Dept: [phone]  Dept Fax: [phone]: [phone]    Visit Date: 11/10/2021    Functionality Assessment/Goals Worksheet     On a scale of 0 (Does not Interfere) to 10 (Completely Interferes)     1. Which number describes how during the past week pain has interfered with       the following:  A. General Activity:  8  B. Mood: 7  C. Walking Ability:  8  D. Normal Work (Includes both work outside the home and housework):  7  E. Relations with Other People:   6  F. Sleep:   6  G. Enjoyment of Life:   7    2. Patient Prefers to Take their Pain Medications:     [x]  On a regular basis   [x]  Only when necessary    []  Does not take pain medications    3. What are the Patient's Goals/Expectations for Visiting Pain Management? [x]  Learn about my pain    [x]  Receive Medication   []  Physical Therapy     []  Treat Depression   [x]  Receive Injections    []  Treat Sleep   []  Deal with Anxiety and Stress   []  Treat Opoid Dependence/Addiction   []  Other:      HPI:   Scarlet Shane is a 73 y.o. male is here today for    Chief Complaint: Neck pain, headaches, mid back pain     HPI   10 week FU. Continues to gave neck pain- aching pain and intermittent headaches aching. Overall pain remains tolerable as he is still getting relief from C-facet RFA. Intermittent aching and sharp pains in mid back. Norco prn remains very effective and patient able to get around   Pain increases with bending, lifting, twisting , turning head and getting up and down. Medications reviewed. Patient denies side effects with medications. Patient states he is taking medications as prescribed. Hedenies receiving pain medications from other sources. He denies any ER visits since last visit. Pain scale with out pain medications or at its worst is 7-8/10.   Pain scale with pain medications or at its best is 3-4/10. Last dose of Norco was last night   Drug screen reviewed from 9/1/2021 and was appropriate  Pill count was completed today and was appropriate   Patient does not have naloxone available at home. Patient has not required use of naloxone at home since last office visit. The patienthas No Known Allergies. Subjective:      Review of Systems   Constitutional: Negative. HENT: Negative. Respiratory: Negative. Cardiovascular: Negative. Gastrointestinal: Negative. Musculoskeletal: Positive for arthralgias, back pain, myalgias, neck pain and neck stiffness. Negative for gait problem and joint swelling. Neurological: Positive for headaches. Negative for weakness and numbness. Psychiatric/Behavioral: Negative. Objective:     Vitals:    11/10/21 1153   BP: 126/70   Weight: 202 lb (91.6 kg)   Height: 6' 1\" (1.854 m)       Physical Exam  Vitals and nursing note reviewed. Constitutional:       General: He is not in acute distress. Appearance: Normal appearance. He is well-developed. He is not diaphoretic. HENT:      Head: Normocephalic and atraumatic. Right Ear: External ear normal.      Left Ear: External ear normal.      Nose: Nose normal.      Mouth/Throat:      Mouth: Mucous membranes are moist.      Pharynx: No oropharyngeal exudate. Eyes:      General: No scleral icterus. Right eye: No discharge. Left eye: No discharge. Conjunctiva/sclera: Conjunctivae normal.      Pupils: Pupils are equal, round, and reactive to light. Neck:      Thyroid: No thyromegaly. Cardiovascular:      Rate and Rhythm: Normal rate and regular rhythm. Heart sounds: Normal heart sounds. No murmur heard. No friction rub. No gallop. Pulmonary:      Effort: Pulmonary effort is normal. No respiratory distress. Breath sounds: Normal breath sounds. No wheezing or rales. Chest:      Chest wall: No tenderness. Abdominal:      General: Bowel sounds are normal. There is no distension. Palpations: Abdomen is soft. Tenderness: There is no abdominal tenderness. There is no guarding or rebound. Musculoskeletal:         General: Swelling and tenderness present. Right shoulder: Tenderness present. Decreased range of motion. Left shoulder: Tenderness present. Decreased range of motion. Cervical back: Full passive range of motion without pain and normal range of motion. Rigidity, spasms and tenderness present. No edema or erythema. Muscular tenderness present. Normal range of motion. Thoracic back: Spasms and tenderness present. Lumbar back: Tenderness present. Decreased range of motion. Back:       Right hip: No tenderness. Left hip: No tenderness. Right knee: Normal range of motion. No tenderness. Left knee: Normal range of motion. No tenderness. Right lower leg: No swelling. No edema. Left lower leg: No swelling. No edema. Right ankle: No swelling. Left ankle: No swelling. Right foot: No swelling. Left foot: No swelling. Skin:     General: Skin is warm. Coloration: Skin is not pale. Findings: No erythema or rash. Neurological:      General: No focal deficit present. Mental Status: He is alert and oriented to person, place, and time. He is not disoriented. Cranial Nerves: No cranial nerve deficit. Sensory: Sensory deficit present. Motor: No atrophy or abnormal muscle tone. Coordination: Coordination normal.      Gait: Gait normal.      Deep Tendon Reflexes: Babinski sign absent on the right side. Babinski sign absent on the left side. Reflex Scores:       Tricep reflexes are 1+ on the right side and 1+ on the left side. Bicep reflexes are 1+ on the right side and 1+ on the left side. Brachioradialis reflexes are 1+ on the right side and 1+ on the left side.        Patellar reflexes are 1+ on the right side and 1+ on the left side. Achilles reflexes are 1+ on the right side and 1+ on the left side. Comments: SENSORY DECREASED STOCKING DISTRIBUTION   Psychiatric:         Attention and Perception: Attention normal. He is attentive. Mood and Affect: Mood normal. Mood is not anxious or depressed. Affect is not labile, blunt, angry or inappropriate. Speech: Speech normal. He is communicative. Speech is not rapid and pressured, delayed, slurred or tangential.         Behavior: Behavior normal. Behavior is not agitated, slowed, aggressive, withdrawn, hyperactive or combative. Behavior is cooperative. Thought Content: Thought content normal. Thought content is not paranoid or delusional. Thought content does not include homicidal or suicidal ideation. Thought content does not include homicidal or suicidal plan. Cognition and Memory: Cognition normal. Memory is not impaired. He does not exhibit impaired recent memory or impaired remote memory. Judgment: Judgment is not impulsive or inappropriate.          JIMENA test: +  Yeomans test: +   Gaenslen test: +      Assessment:     1. Cervical spondylosis    2. Neck pain    3. Myofascial pain syndrome    4. Thoracic spondylosis    5. Chronic pain syndrome    6. History of lumbar fusion    7. Chronic, continuous use of opioids            Plan:      OARRS reviewed. Current MED: 10.00  Patient was offered naloxone for home. Discussed long term side effects of medications, tolerance, dependency and addiction. Previous UDS reviewed  UDS preformed today for compliance. Patient told can not receive any pain medications from any other source. No evidence of abuse, diversion or aberrant behavior.   Medications and/or procedures to improve function and quality of life- patient understanding with this and that may not be pain free  Discussed with patient about safe storage of medications at home  Discussed possible weaning of medication dosing dependent on treatment/procedure results. Discussed with patient about risks with procedure including infection, reaction to medication, increased pain, or bleeding. Procedure notes reveiwed in detail  Continues to receive 80%relief of neck pain and headaches with improvement of mobility from bilateral C-facet RFA. Discussed repeating in future when needing to   Pain remains tolerable with medications and procedures  Continue Norco 5/325 BID prn- Filled 10/29/2021  Discussed PT with dry needle therapy for muscle pain- he would like to wait   Updated UDS ordered today. Patient is compliant       Meds. Prescribed:   No orders of the defined types were placed in this encounter. Return in about 2 months (around 1/10/2022), or if symptoms worsen or fail to improve, for follow up  for medications.                Electronically signed by GERARDO Banks CNP on11/10/2021 at 12:26 PM

## 2021-11-29 ENCOUNTER — OFFICE VISIT (OUTPATIENT)
Dept: FAMILY MEDICINE CLINIC | Age: 66
End: 2021-11-29
Payer: MEDICARE

## 2021-11-29 VITALS
OXYGEN SATURATION: 96 % | BODY MASS INDEX: 26.98 KG/M2 | RESPIRATION RATE: 14 BRPM | WEIGHT: 203.6 LBS | SYSTOLIC BLOOD PRESSURE: 122 MMHG | HEART RATE: 77 BPM | TEMPERATURE: 97.7 F | DIASTOLIC BLOOD PRESSURE: 62 MMHG | HEIGHT: 73 IN

## 2021-11-29 DIAGNOSIS — E11.9 TYPE 2 DIABETES MELLITUS WITHOUT COMPLICATION, WITHOUT LONG-TERM CURRENT USE OF INSULIN (HCC): Primary | ICD-10-CM

## 2021-11-29 DIAGNOSIS — F32.4 MAJOR DEPRESSIVE DISORDER WITH SINGLE EPISODE, IN PARTIAL REMISSION (HCC): ICD-10-CM

## 2021-11-29 DIAGNOSIS — M46.1 SACROILIAC INFLAMMATION (HCC): ICD-10-CM

## 2021-11-29 DIAGNOSIS — G89.4 CHRONIC PAIN SYNDROME: ICD-10-CM

## 2021-11-29 DIAGNOSIS — F17.200 SMOKER: ICD-10-CM

## 2021-11-29 DIAGNOSIS — I10 ESSENTIAL HYPERTENSION: ICD-10-CM

## 2021-11-29 DIAGNOSIS — J44.9 CHRONIC OBSTRUCTIVE PULMONARY DISEASE, UNSPECIFIED COPD TYPE (HCC): ICD-10-CM

## 2021-11-29 DIAGNOSIS — J30.1 ALLERGIC RHINITIS DUE TO POLLEN, UNSPECIFIED SEASONALITY: ICD-10-CM

## 2021-11-29 DIAGNOSIS — K21.9 GASTROESOPHAGEAL REFLUX DISEASE WITHOUT ESOPHAGITIS: ICD-10-CM

## 2021-11-29 DIAGNOSIS — M47.814 THORACIC SPONDYLOSIS: ICD-10-CM

## 2021-11-29 DIAGNOSIS — M47.812 CERVICAL SPONDYLOSIS: ICD-10-CM

## 2021-11-29 DIAGNOSIS — Z87.891 PERSONAL HISTORY OF TOBACCO USE: ICD-10-CM

## 2021-11-29 LAB — HBA1C MFR BLD: 6.3 % (ref 4.3–5.7)

## 2021-11-29 PROCEDURE — G0008 ADMIN INFLUENZA VIRUS VAC: HCPCS | Performed by: NURSE PRACTITIONER

## 2021-11-29 PROCEDURE — 99214 OFFICE O/P EST MOD 30 MIN: CPT | Performed by: NURSE PRACTITIONER

## 2021-11-29 PROCEDURE — 90694 VACC AIIV4 NO PRSRV 0.5ML IM: CPT | Performed by: NURSE PRACTITIONER

## 2021-11-29 PROCEDURE — G0296 VISIT TO DETERM LDCT ELIG: HCPCS | Performed by: NURSE PRACTITIONER

## 2021-11-29 PROCEDURE — 90732 PPSV23 VACC 2 YRS+ SUBQ/IM: CPT | Performed by: NURSE PRACTITIONER

## 2021-11-29 PROCEDURE — G0009 ADMIN PNEUMOCOCCAL VACCINE: HCPCS | Performed by: NURSE PRACTITIONER

## 2021-11-29 RX ORDER — HYDROCODONE BITARTRATE AND ACETAMINOPHEN 5; 325 MG/1; MG/1
1 TABLET ORAL 2 TIMES DAILY PRN
Qty: 60 TABLET | Refills: 0 | Status: SHIPPED | OUTPATIENT
Start: 2021-11-30 | End: 2021-12-29 | Stop reason: SDUPTHER

## 2021-11-29 RX ORDER — AZELASTINE 1 MG/ML
SPRAY, METERED NASAL
Qty: 1 EACH | Refills: 3 | Status: SHIPPED | OUTPATIENT
Start: 2021-11-29

## 2021-11-29 RX ORDER — ASPIRIN 81 MG/1
TABLET, CHEWABLE ORAL
Qty: 90 TABLET | Refills: 3 | Status: SHIPPED | OUTPATIENT
Start: 2021-11-29

## 2021-11-29 RX ORDER — BLOOD PRESSURE TEST KIT
1 KIT MISCELLANEOUS 2 TIMES DAILY
Qty: 100 EACH | Refills: 3 | Status: SHIPPED | OUTPATIENT
Start: 2021-11-29

## 2021-11-29 SDOH — ECONOMIC STABILITY: FOOD INSECURITY: WITHIN THE PAST 12 MONTHS, YOU WORRIED THAT YOUR FOOD WOULD RUN OUT BEFORE YOU GOT MONEY TO BUY MORE.: NEVER TRUE

## 2021-11-29 SDOH — ECONOMIC STABILITY: FOOD INSECURITY: WITHIN THE PAST 12 MONTHS, THE FOOD YOU BOUGHT JUST DIDN'T LAST AND YOU DIDN'T HAVE MONEY TO GET MORE.: NEVER TRUE

## 2021-11-29 ASSESSMENT — SOCIAL DETERMINANTS OF HEALTH (SDOH): HOW HARD IS IT FOR YOU TO PAY FOR THE VERY BASICS LIKE FOOD, HOUSING, MEDICAL CARE, AND HEATING?: NOT HARD AT ALL

## 2021-11-29 NOTE — TELEPHONE ENCOUNTER
Monica Vilchis called requesting a refill on the following medications:  Requested Prescriptions     Pending Prescriptions Disp Refills    HYDROcodone-acetaminophen (NORCO) 5-325 MG per tablet 60 tablet 0     Sig: Take 1 tablet by mouth 2 times daily as needed for Pain for up to 30 days.      Pharmacy verified:  [id number] - [place], [address] [facility] [phone] Pat[address] Cisse [phone]    Date of last visit: 11/10/2021  Date of next visit (if applicable): 0/64/5101

## 2021-11-29 NOTE — PROGRESS NOTES
Tracey Vargas is a 73 y.o. male for Follow-up (DM ) and Health Maintenance (Eric did last DM foot exam )      Diabetes Type 2    Glucose control:   Does patient check blood glucoses at home? Yes  Report of hypoglycemia: no  Lab Results   Component Value Date    LABA1C 6.3 (H) 11/29/2021     No results found for: EAG    Symptoms  Polyuria, Polydipsia or Polyphagia? No  Chest Pain, SOB, or Palpitations? -  No  New Vision complaints? No  Paresthesias of the extremities? No    Medications  Current medication were reviewed. Compliant with medications? yes  Medication side effects? No  On ACE-I or ARB? Yes  On antiplatelet therapy? Yes  On Statin? Yes    Last Diabetic Eye Exam: within the last year    Exercise  Exercise? Yes - is active no formal exercise program   Wt Readings from Last 3 Encounters:   11/29/21 203 lb 9.6 oz (92.4 kg)   11/10/21 202 lb (91.6 kg)   09/01/21 202 lb (91.6 kg)       Diet discipline?:  Low salt, fat, sugar diet? yes    HTN    Does patient check BP regularly at home? - No  Current Medication regimen - lisinopril 2.5 mg daily, lopressor 25 mg bid   Tolerating medications well? - yes    Shortness of breath or chest pain? No  Headache or visual complaints? No  Neurologic changes like confusion? No  Extremity edema? No    BP Readings from Last 3 Encounters:   11/29/21 122/62   11/10/21 126/70   09/01/21 130/78       COPD    HPI:    Current medication regimen - bromide daily  Compliant with medications? yes    Limitations in function - none  Does patient smoke? Yes - he is trying to cut down takes wellbutrin for this and it does help but still smoking 1/2 pack a day also has been using gum which has helped. Chronic cough?: no  Chest pain/Tightness?:  no  Shortness of breath?: no  Wheezing?  no    Last PFTs - 2021    Hospitalized and/or intubated in the past?: No  Number of times prescribed oral steroids in the past year - 0  Influenza vaccine up to date?   Yes  Pneumococcal vaccine up to date? Yes    Pt states he has had episodes of having blood in his stools, he states when this happens he will t hen have mahogony colored loose stools for a week or so after. He denies any rectal pain or straining to have a BM states he does not have hemorrhoids, had a colonoscopy in 2017 with polyp removal. He denies abdominal pain or nausea with this. GERD    HPI:     Symptoms:  heartburn  Length of symptoms: 6 months  Current therapy and response:  protonix 40 mg usually helps some day s does not   Recent change in symptoms? No  Symptoms associated with certain foods? Yes  Alcohol use? No  Tobacco use? Yes    Abdominal Pain? No  Mid Back Pain? No  Melena? Yes      Allergies    Symptoms:  Clear nasal drainage  Current treatment:  astelin nasal spray which does help to relieve drainage  Triggers:  Weather chagnes  Pets:none  Smoker: smoker  (1/2 ppd x 20 yrs)  Other smokers in the home:No    Depressed Mood    HPI:    Depressed Mood? yes - but better with seroquel and elavil and wellbutrin  Anhedonia?  no  Appetite changes?  no  Sleep disturbances? yes - but better with seroquel and elavil   Feelings of guilt? no  Decreased energy? yes - some days   Impaired concentration? no  Substance abuse? no    Suicidal/Homicidal Ideation? no      Compliant with meds: Yes  Med side effects: no   Sees therapist?:  no  Family History of Mental Illness?   yes -     Review of Systems - Psychological ROS: positive for - anxiety, depression and sleep disturbances, negative for - suicidal ideation        Blood pressure control:  BP Readings from Last 3 Encounters:   11/29/21 122/62   11/10/21 126/70   09/01/21 130/78       Lab Results   Component Value Date    LABMICR < 1.20 04/04/2017       Lab Results   Component Value Date    LDLCALC 80 08/02/2021       Physical Exam    /62   Pulse 77   Temp 97.7 °F (36.5 °C) (Oral)   Resp 14   Ht 6' 0.5\" (1.842 m)   Wt 203 lb 9.6 oz (92.4 kg)   SpO2 96%   BMI 27.23 kg/m²   Appearance: alert, well appearing, and in no distress, normal appearing weight and well hydrated. Neck exam - supple, no significant adenopathy. CVS exam: normal rate, regular rhythm, normal S1, S2, no murmurs, rubs, clicks or gallops. Lungs: clear to auscultation, no wheezes, rales or rhonchi, symmetric air entry. Abdomen:  BS normal, soft, non tender, non distended, no rebound or guarding  Mental Status: normal mood, affect behavior, speech, dress,  and thought processes. Visual inspection:  Deformity/amputation: absent  Skin lesions/pre-ulcerative calluses: absent  Edema: right- negative, left- negative    Sensory exam:  Monofilament sensation: normal  (minimum of 5 random plantar locations tested, avoiding callused areas - > 1 area with absence of sensation is + for neuropathy)    Plus at least one of the following:  Pulses: normal,   Pinprick: Intact  Proprioception: Intact  Vibration (128 Hz): Intact    Neuro:  Alert, muscle tone grossly normal  Skin exam: normal coloration and turgor, no rashes, no suspicious skin lesions noted. ASSESSMENT & PLAN    Encounter Diagnoses   Name Primary?  Type 2 diabetes mellitus without complication, without long-term current use of insulin ([facility]) Yes    Gastroesophageal reflux disease without esophagitis     Essential hypertension     Allergic rhinitis due to pollen, unspecified seasonality     Major depressive disorder with single episode, in partial remission ([address]     Smoker     Personal history of tobacco use     Chronic obstructive pulmonary disease, unspecified COPD type ([facility]      Wyatt Manrique was seen today for 6 month follow-up and back pain. Diagnoses and all orders for this visit:    Pt in agreement with plan     Return in about 6 months (around 5/29/2022), or if symptoms worsen or fail to improve.   Low Dose CT (LDCT) Lung Screening criteria met:     Age 55-77(Medicare) or 50-80 (USPST)   Pack year smoking >30 (Medicare) or >20 (USPSTF)   Still smoking or less than 15 year since quit   No sign or symptoms of lung cancer   > 11 months since last LDCT     Risks and benefits of lung cancer screening with LDCT scans discussed:    Significance of positive screen - False-positive LDCT results often occur. 95% of all positive results do not lead to a diagnosis of cancer. Usually further imaging can resolve most false-positive results; however, some patients may require invasive procedures. Over diagnosis risk - 10% to 12% of screen-detected lung cancer cases are over diagnosed--that is, the cancer would not have been detected in the patient's lifetime without the screening. Need for follow up screens annually to continue lung cancer screening effectiveness     Risks associated with radiation from annual LDCT- Radiation exposure is about the same as for a mammogram, which is about 1/3 of the annual background radiation exposure from everyday life. Starting screening at age 54 is not likely to increase cancer risk from radiation exposure. Patients with comorbidities resulting in life expectancy of < 10 years, or that would preclude treatment of an abnormality identified on CT, should not be screened due to lack of benefit.     To obtain maximal benefit from this screening, smoking cessation and long-term abstinence from smoking is critical

## 2021-11-29 NOTE — PROGRESS NOTES
Vaccine Information Sheet, \"Influenza - Inactivated\"  given to Arie Maza, or parent/legal guardian of  Arie Maza and verbalized understanding. Patient responses:    Have you ever had a reaction to a flu vaccine? No  Do you have an allergy to eggs, neomycin or polymixin? No  Do you have an allergy to Thimerosal, contact lens solution, or Merthiolate? No  Have you ever had [place] Syndrome? No  Do you have any current illness? No  Do you have a temperature above 100 degrees? No  Are you pregnant? No  If pregnant, permission obtained from physician? No  Do you have an active neurological disorder? No      Flu vaccine given per order. Please see immunization tab.

## 2021-11-30 ENCOUNTER — TELEPHONE (OUTPATIENT)
Dept: FAMILY MEDICINE CLINIC | Age: 66
End: 2021-11-30

## 2021-11-30 DIAGNOSIS — M19.09 PRIMARY OSTEOARTHRITIS OF OTHER SITE: Primary | ICD-10-CM

## 2021-11-30 NOTE — TELEPHONE ENCOUNTER
Ct 12/10/21 @[facility] @4:20pm with A 3:50 arrival. No prep. Pt informed. 06-Dec-2018 04:57

## 2021-11-30 NOTE — TELEPHONE ENCOUNTER
Pt requesting a script for handicap placard. If it needs to be picked up please contact pt to inform him.

## 2021-12-10 ENCOUNTER — HOSPITAL ENCOUNTER (OUTPATIENT)
Dept: CT IMAGING | Age: 66
Discharge: HOME | End: 2021-12-10
Payer: MEDICARE

## 2021-12-10 DIAGNOSIS — F17.200 SMOKER: ICD-10-CM

## 2021-12-10 PROCEDURE — 71271 CT THORAX LUNG CANCER SCR C-: CPT

## 2021-12-14 ENCOUNTER — TELEPHONE (OUTPATIENT)
Dept: FAMILY MEDICINE CLINIC | Age: 66
End: 2021-12-14

## 2021-12-14 DIAGNOSIS — J43.9 PULMONARY EMPHYSEMA, UNSPECIFIED EMPHYSEMA TYPE (HCC): Primary | ICD-10-CM

## 2021-12-14 NOTE — TELEPHONE ENCOUNTER
----- Message from GERARDO Terrell CNP sent at 12/14/2021  4:37 PM EST -----  Let pt know his ct chest scan identified emphysema, and recommend yearly screenings, due to the emphysema I would like to order an additional labs test for his lungs, to see if he has an alpha 1 deficiency, which is treatable. Have him continue incruse in the meantime.

## 2021-12-20 ENCOUNTER — OFFICE VISIT (OUTPATIENT)
Dept: FAMILY MEDICINE CLINIC | Age: 66
End: 2021-12-20
Payer: MEDICARE

## 2021-12-20 VITALS
SYSTOLIC BLOOD PRESSURE: 136 MMHG | BODY MASS INDEX: 27.25 KG/M2 | RESPIRATION RATE: 14 BRPM | HEIGHT: 72 IN | OXYGEN SATURATION: 93 % | WEIGHT: 201.2 LBS | DIASTOLIC BLOOD PRESSURE: 80 MMHG | TEMPERATURE: 98.1 F | HEART RATE: 80 BPM

## 2021-12-20 DIAGNOSIS — Z00.00 ROUTINE GENERAL MEDICAL EXAMINATION AT A HEALTH CARE FACILITY: ICD-10-CM

## 2021-12-20 DIAGNOSIS — Z87.891 PERSONAL HISTORY OF TOBACCO USE, PRESENTING HAZARDS TO HEALTH: ICD-10-CM

## 2021-12-20 PROCEDURE — G0439 PPPS, SUBSEQ VISIT: HCPCS | Performed by: NURSE PRACTITIONER

## 2021-12-20 ASSESSMENT — PATIENT HEALTH QUESTIONNAIRE - PHQ9
SUM OF ALL RESPONSES TO PHQ9 QUESTIONS 1 & 2: 0
2. FEELING DOWN, DEPRESSED OR HOPELESS: 0
1. LITTLE INTEREST OR PLEASURE IN DOING THINGS: 0
SUM OF ALL RESPONSES TO PHQ QUESTIONS 1-9: 0

## 2021-12-20 ASSESSMENT — LIFESTYLE VARIABLES: HOW OFTEN DO YOU HAVE A DRINK CONTAINING ALCOHOL: 0

## 2021-12-20 NOTE — PATIENT INSTRUCTIONS
Personalized Preventive Plan for Tracey Vargas - 12/20/2021  Medicare offers a range of preventive health benefits. Some of the tests and screenings are paid in full while other may be subject to a deductible, co-insurance, and/or copay. Some of these benefits include a comprehensive review of your medical history including lifestyle, illnesses that may run in your family, and various assessments and screenings as appropriate. After reviewing your medical record and screening and assessments performed today your provider may have ordered immunizations, labs, imaging, and/or referrals for you. A list of these orders (if applicable) as well as your Preventive Care list are included within your After Visit Summary for your review. Other Preventive Recommendations:    · A preventive eye exam performed by an eye specialist is recommended every 1-2 years to screen for glaucoma; cataracts, macular degeneration, and other eye disorders. · A preventive dental visit is recommended every 6 months. · Try to get at least 150 minutes of exercise per week or 10,000 steps per day on a pedometer . · Order or download the FREE \"Exercise & Physical Activity: Your Everyday Guide\" from The "[employer]. Call 4-134.591.5879 or search The "[employer] online. · You need 5327-7878 mg of calcium and 6877-0030 IU of vitamin D per day. It is possible to meet your calcium requirement with diet alone, but a vitamin D supplement is usually necessary to meet this goal.  · When exposed to the sun, use a sunscreen that protects against both UVA and UVB radiation with an SPF of 30 or greater. Reapply every 2 to 3 hours or after sweating, drying off with a towel, or swimming. · Always wear a seat belt when traveling in a car. Always wear a helmet when riding a bicycle or motorcycle.
Calm

## 2021-12-20 NOTE — PROGRESS NOTES
Medicare Annual Wellness Visit  Name: Sebastien Mcfarland Date: 2021   MRN: [id number] Sex: Male   Age: 73 y.o. Ethnicity: Non- / Non    : 1955 Race: White (non-)      Alesha Jacinto is here for Medicare AWV    Screenings for behavioral, psychosocial and functional/safety risks, and cognitive dysfunction are all negative except as indicated below. These results, as well as other patient data from the [address] [facility], are documented in Flowsheets linked to this Encounter. No Known Allergies      Prior to Visit Medications    Medication Sig Taking? Authorizing Provider   Handicap Placard MISC by Does not apply route Duration: 5 years Yes GERARDO Muhammad CNP   metoprolol tartrate (LOPRESSOR) 25 MG tablet TAKE 1/2 TABLET BY MOUTH TWICE DAILY. HOLD FOR HEART RATE LESS THAN 60 Yes GERARDO Muhammad CNP   aspirin 81 MG chewable tablet TAKE 1 TABLET BY MOUTH DAILY Yes GERARDO Muhammad CNP   azelastine (ASTELIN) 0.1 % nasal spray SPRAY ONCE IN EACH NOSTRIL TWICE DAILY AS DIRECTED Yes GERARDO Muhammad CNP   Alcohol Swabs PADS 1 each by Does not apply route 2 times daily Yes GERARDO Muhammad CNP   HYDROcodone-acetaminophen (NORCO) 5-325 MG per tablet Take 1 tablet by mouth 2 times daily as needed for Pain for up to 30 days.  Yes GERARDO Su CNP   JANUVIA 100 MG tablet TAKE 1 TABLET BY MOUTH DAILY Yes GERARDO Muhammad CNP   amitriptyline (ELAVIL) 50 MG tablet TAKE 1/2 TABLET BY MOUTH NIGHTLY Yes GERARDO Muhammad CNP   atorvastatin (LIPITOR) 40 MG tablet TAKE 1 TABLET BY MOUTH DAILY Yes GERARDO Muhammad CNP   metFORMIN (GLUCOPHAGE) 1000 MG tablet TAKE 1 TABLET BY MOUTH TWICE DAILY WITH MEALS Yes GERARDO Muhammad CNP   etodolac (LODINE XL) 400 MG extended release tablet TAKE 1 TABLET BY MOUTH DAILY Yes GERARDO Muhammad CNP   albuterol sulfate  (90 Base) MCG/ACT inhaler INHALE 2 PUFFS INTO THE LUNGS FOUR TIMES DAILY AS NEEDED FOR WHEEZING Yes GERARDO Bass CNP   INCRUSE ELLIPTA 62.5 MCG/INH AEPB INHALE 1 PUFF INTO THE LUNGS DAILY Yes GERARDO Bass CNP   diclofenac sodium (VOLTAREN) 1 % GEL Apply 2 g topically 2 times daily Yes GERARDO Bass CNP   lisinopril (PRINIVIL;ZESTRIL) 2.5 MG tablet TAKE 1 TABLET BY MOUTH DAILY Yes GERARDO Bass CNP   pantoprazole (PROTONIX) 40 MG tablet TAKE 1 TABLET BY MOUTH DAILY Yes GERARDO Bass CNP   buPROPion (WELLBUTRIN XL) 150 MG extended release tablet TAKE 1 TABLET BY MOUTH EVERY MORNING Yes GERARDO Bass CNP   QUEtiapine (SEROQUEL) 200 MG tablet TAKE 1 TABLET BY MOUTH AT BEDTIME Yes GERARDO Bass CNP   empagliflozin (JARDIANCE) 10 MG tablet Take 1 tablet by mouth daily Yes GERARDO Bass CNP   Lancets MISC 1 each by Does not apply route 2 times daily Yes GERARDO Bass CNP   Elastic Bandages & Supports (B & B A-2 ATHLETIC SUPPORTER) MISC 1 Device by Does not apply route continuous prn (prn pain) Yes GERARDO Bass CNP   nicotine (NICODERM CQ) 21 MG/24HR Place 1 patch onto the skin daily  Patient not taking: Reported on 11/29/2021  GERARDO Bass CNP   Lancet Device MISC 1 Device by Does not apply route once for 1 dose  GERARDO Bass CNP         Past Medical History:   Diagnosis Date    Abnormal liver enzymes     Alcohol abuse     Allergic rhinitis     Chronic back pain     Colonic polyp     [facility]     Depression     Diabetes mellitus ([facility]     DJD (degenerative joint disease) of cervical spine     DJD (degenerative joint disease) of lumbar spine     SP SURGERY    GERD (gastroesophageal reflux disease)     Hyperlipidemia     Hypertension     Osteoarthritis     Osteoarthritis     Smoker        Past Surgical History:   Procedure Laterality Date    BACK SURGERY  2008    X 3    [facility]ARD[facility]  3/[facility]    [facility]    CHOLECYSTECTOMY  COLONOSCOPY  6/2012    repeat 6/2017- no precancerous  polyps    EKG 12-LEAD  4/6/2015         FACET JOINT INJECTION Bilateral 7/7/2020    BILATERAL C-FACET MBB @ C4-5,C5-6 and C6-7 performed by Sharifa Best MD at [address] [facility] INJECTION Bilateral 8/6/2020    bilateral C-facet MBB # 2 @ C4-5, C5-6 and C6-7 performed by Sharifa Best MD at [address]      [facility]  2002    Bilateral Inguinal     LUMBAR SPINE SURGERY  2006, 2007, 2008    fusion 2008    PAIN MANAGEMENT PROCEDURE Right 9/4/2020    Bilateral C-facet RFA @ C4-5, C5-6. RIGHT SIDE FIRST.  performed by Sharifa Best MD at [place] [address] Left 10/9/2020    Left C-facet RFA @ C4-5, and C5-6 performed by Sharifa Best MD at [facility] [address] Right 3/25/2021    Right C-facet RFA @ C4-5, C5-6 performed by Sharifa Best MD at [place] [address] Left 4/29/2021    Left C-facet RFA @ C4-5, C5-6 performed by Sharifa Best MD at [facility] Left 7/31/2018    LEFT HYDROCELECTOMY performed by Fern Moise MD at [facility] INJECTION  11/4/2021     THYROID CYST ASPIRATION AND OR INJECTION 11/4/2021 Isa Velazco MD STR ULTRASOUND         Family History   Problem Relation Age of Onset    Diabetes Mother     Cancer Father     Diabetes Sister     Heart Disease Brother     High Blood Pressure Neg Hx     Stroke Neg Hx        CareTeam (Including outside providers/suppliers regularly involved in providing care):   Patient Care Team:  GERARDO Terrell CNP as PCP - General (Family Nurse Practitioner)  GERARDO Terrell CNP as PCP - [facility] Empaneled Provider    Wt Readings from Last 3 Encounters:   12/20/21 201 lb 3.2 oz (91.3 kg)   11/29/21 203 lb 9.6 oz (92.4 kg)   11/10/21 202 lb (91.6 kg)     Vitals:    12/20/21 0949   BP: 136/80   Pulse: 80   Resp: 14   Temp: 98.1 °F (36.7 °C)   TempSrc: Oral   SpO2: 93%   Weight: 201 lb 3.2 oz (91.3 kg)   Height: 6' (1.829 m)     Body mass index is 27.29 kg/m². Based upon direct observation of the patient, evaluation of cognition reveals recent and remote memory intact. General Appearance: alert and oriented to person, place and time, well-developed and well-nourished, in no acute distress  Skin: warm and dry, no rash or erythema  ENT: tympanic membrane, external ear and ear canal normal bilaterally, oropharynx clear and moist with normal mucous membranes  Neck: neck supple and non tender without mass, no thyromegaly or thyroid nodules, no cervical lymphadenopathy   Pulmonary/Chest: clear to auscultation bilaterally- no wheezes, rales or rhonchi, normal air movement, no respiratory distress    Patient's complete Health Risk Assessment and screening values have been reviewed and are found in Flowsheets. The following problems were reviewed today and where indicated follow up appointments were made and/or referrals ordered. Positive Risk Factor Screenings with Interventions:         Substance History:  Social History     Tobacco History     Smoking Status  Current Every Day Smoker Smoking Start Date  11/19/2012 Smoking Frequency  0.75 packs/day for 38 years (28.5 pk yrs) Smoking Tobacco Type  Cigarettes    Smokeless Tobacco Use  Never Used          Alcohol History     Alcohol Use Status  No Comment  quit 2013          Drug Use     Drug Use Status  No Comment  last use 5/2015          Sexual Activity     Sexually Active  Not Asked               Alcohol Screening:       A score of 8 or more is associated with harmful or hazardous drinking. A score of 13 or more in women, and 15 or more in men, is likely to indicate alcohol dependence.   Substance Abuse Interventions:  · Tobacco abuse:  tobacco cessation tips and resources provided, pt started on wellbutrin, states it has not started working yet, smokes 1/2 pack a day     General Health and ACP:  General  In general, how would you say your health is?: Fair  In the past 7 days, have you experienced any of the following? New or Increased Pain, New or Increased Fatigue, Loneliness, Social Isolation, Stress or Anger?: None of These  Do you get the social and emotional support that you need?: Yes  Do you have a Living Will?: (!) No  Advance Directives     Power of [address] Will ACP-Advance Directive ACP-Power of     Not on File Not on File Not on File Not on File      General Health Risk Interventions:  · No Living Will: Patient declines ACP discussion/assistance    Health Habits/Nutrition:  Health Habits/Nutrition  Do you exercise for at least 20 minutes 2-3 times per week?: Yes  Have you lost any weight without trying in the past 3 months?: No  Do you eat only one meal per day?: No  Have you seen the dentist within the past year?: (!) No  Body mass index: (!) 27.28  Health Habits/Nutrition Interventions:  · Dental exam overdue:  patient encouraged to make appointment with his/her dentist    Hearing/Vision:  No exam data present  Hearing/Vision  Do you or your family notice any trouble with your hearing that hasn't been managed with hearing aids?: No  Do you have difficulty driving, watching TV, or doing any of your daily activities because of your eyesight?: (!) Yes  Have you had an eye exam within the past year?: Yes  Hearing/Vision Interventions:  · Vision concerns:  just got new glasses, states he continues to have a film over his left eye, adivsed referral to opthalmology for possibel cataract.  He is going to contact otometrist, declines an opthalmology referral.     Safety:  Safety  Do you have working smoke detectors?: Yes  Have all throw rugs been removed or fastened?: Yes  Do you have non-slip mats or surfaces in all bathtubs/showers?: (!) No  Do all of your stairways have a railing or banister?: (!) No (No stairs)  Are your doorways, halls and stairs free of clutter?: Yes  Do you always fasten your seatbelt when you are in a car?: Yes  Safety Interventions:  · Home safety tips provided     Personalized Preventive Plan   Current Health Maintenance Status  Immunization History   Administered Date(s) Administered    COVID-19, Sponge, PF, 30mcg/0.3mL 03/18/2021, 04/08/2021    Influenza 11/02/2011, 12/04/2012, 09/26/2013    Influenza Vaccine, unspecified formulation 12/04/2012, 09/26/2013    Influenza Virus Vaccine 11/05/2014, 10/27/2015, 10/27/2016, 10/09/2017    Influenza, Vega Herrera, IM, (6 mo and older Fluzone, Flulaval, Fluarix and 3 yrs and older Afluria) 10/27/2016, 10/09/2017    Influenza, Vega Herrera, IM, PF (6 mo and older Fluzone, Flulaval, Fluarix, and 3 yrs and older Afluria) 10/09/2018, 10/10/2019, 11/19/2020    Influenza, Quadv, adjuvanted, 65 yrs +, IM, PF (Fluad) 11/29/2021    Pneumococcal Conjugate 7-valent (Prevnar7) 01/01/2011    Pneumococcal Polysaccharide ([id number]) 04/27/2016, 11/29/2021    Tdap (Boostrix, Adacel) 03/26/2013    Zoster Recombinant (Shingrix) 12/18/2019, 02/17/2020        Health Maintenance   Topic Date Due    COVID-19 Vaccine (3 - Booster for Sponge series) 10/08/2021    Annual Wellness Visit (AWV)  12/18/2021    Diabetic microalbuminuria test  05/26/2022    Lipid screen  08/02/2022    Potassium monitoring  08/02/2022    Creatinine monitoring  08/02/2022    Diabetic retinal exam  09/23/2022    Diabetic foot exam  11/29/2022    A1C test (Diabetic or Prediabetic)  11/29/2022    Low dose CT lung screening  12/10/2022    DTaP/Tdap/Td vaccine (2 - Td or Tdap) 03/26/2023    Colon cancer screen colonoscopy  04/19/2027    Flu vaccine  Completed    Shingles Vaccine  Completed    Pneumococcal 65+ years Vaccine  Completed    AAA screen  Completed    Hepatitis C screen  Completed    Hepatitis A vaccine Aged Out    Hib vaccine  Aged Out    Meningococcal (ACWY) vaccine  Aged Out    HIV screen  Discontinued     Recommendations for leemail Due: see orders and patient instructions/AVS.  . Recommended screening schedule for the next 5-10 years is provided to the patient in written form: see Patient Instructions/AVS.    Deanne Dawson was seen today for medicare aw. Diagnoses and all orders for this visit:    Routine general medical examination at a health care facility    Personal history of tobacco use, presenting hazards to health  -     MA TOBACCO USE CESSATION INTERMEDIATE 3-10 MINUTES [39597]    Continue wellbutrin  Pt in agreement with plan  All labs updated. Tobacco Cessation Counseling: Patient advised about behavior change, including information about personal health harms, usage of appropriate cessation measures and benefits of cessation.   Time spent (minutes): 9

## 2021-12-29 DIAGNOSIS — M47.812 CERVICAL SPONDYLOSIS: ICD-10-CM

## 2021-12-29 DIAGNOSIS — M47.814 THORACIC SPONDYLOSIS: ICD-10-CM

## 2021-12-29 DIAGNOSIS — M46.1 SACROILIAC INFLAMMATION (HCC): ICD-10-CM

## 2021-12-29 DIAGNOSIS — G89.4 CHRONIC PAIN SYNDROME: ICD-10-CM

## 2021-12-29 RX ORDER — HYDROCODONE BITARTRATE AND ACETAMINOPHEN 5; 325 MG/1; MG/1
1 TABLET ORAL 2 TIMES DAILY PRN
Qty: 60 TABLET | Refills: 0 | Status: SHIPPED | OUTPATIENT
Start: 2021-12-31 | End: 2022-01-27 | Stop reason: SDUPTHER

## 2021-12-29 NOTE — TELEPHONE ENCOUNTER
Cr Garnett called requesting a refill on the following medications:  Requested Prescriptions     Pending Prescriptions Disp Refills    HYDROcodone-acetaminophen (NORCO) 5-325 MG per tablet 60 tablet 0     Sig: Take 1 tablet by mouth 2 times daily as needed for Pain for up to 30 days. Pharmacy verified: [facility] on [address] [place] [address] Drive, [address]  . pv      Date of last visit: 11/10/2021  Date of next visit (if applicable): 8/80/2487

## 2022-01-27 DIAGNOSIS — M46.1 SACROILIAC INFLAMMATION (HCC): ICD-10-CM

## 2022-01-27 DIAGNOSIS — M47.814 THORACIC SPONDYLOSIS: ICD-10-CM

## 2022-01-27 DIAGNOSIS — G89.4 CHRONIC PAIN SYNDROME: ICD-10-CM

## 2022-01-27 DIAGNOSIS — M47.812 CERVICAL SPONDYLOSIS: ICD-10-CM

## 2022-01-27 RX ORDER — HYDROCODONE BITARTRATE AND ACETAMINOPHEN 5; 325 MG/1; MG/1
1 TABLET ORAL 2 TIMES DAILY PRN
Qty: 60 TABLET | Refills: 0 | Status: SHIPPED | OUTPATIENT
Start: 2022-01-30 | End: 2022-02-25 | Stop reason: SDUPTHER

## 2022-01-27 NOTE — TELEPHONE ENCOUNTER
Ani Ross called requesting a refill on the following medications:  Requested Prescriptions     Pending Prescriptions Disp Refills    HYDROcodone-acetaminophen (NORCO) 5-325 MG per tablet 60 tablet 0     Sig: Take 1 tablet by mouth 2 times daily as needed for Pain for up to 30 days.      Pharmacy verified:  [facility]      Date of last visit: 11-10-21  Date of next visit (if applicable): 7/64/1096

## 2022-01-31 ENCOUNTER — OFFICE VISIT (OUTPATIENT)
Dept: PHYSICAL MEDICINE AND REHAB | Age: 67
End: 2022-01-31
Payer: MEDICARE

## 2022-01-31 ENCOUNTER — TELEPHONE (OUTPATIENT)
Dept: PHYSICAL MEDICINE AND REHAB | Age: 67
End: 2022-01-31

## 2022-01-31 ENCOUNTER — HOSPITAL ENCOUNTER (OUTPATIENT)
Age: 67
Discharge: HOME | End: 2022-01-31
Payer: MEDICARE

## 2022-01-31 VITALS
HEIGHT: 72 IN | BODY MASS INDEX: 27.22 KG/M2 | DIASTOLIC BLOOD PRESSURE: 82 MMHG | SYSTOLIC BLOOD PRESSURE: 124 MMHG | WEIGHT: 201 LBS

## 2022-01-31 DIAGNOSIS — M54.2 NECK PAIN: ICD-10-CM

## 2022-01-31 DIAGNOSIS — M47.814 THORACIC SPONDYLOSIS: ICD-10-CM

## 2022-01-31 DIAGNOSIS — F11.90 CHRONIC, CONTINUOUS USE OF OPIOIDS: ICD-10-CM

## 2022-01-31 DIAGNOSIS — M47.812 CERVICAL SPONDYLOSIS: Primary | ICD-10-CM

## 2022-01-31 DIAGNOSIS — J43.9 PULMONARY EMPHYSEMA, UNSPECIFIED EMPHYSEMA TYPE (HCC): ICD-10-CM

## 2022-01-31 DIAGNOSIS — M79.18 MYOFASCIAL PAIN SYNDROME: ICD-10-CM

## 2022-01-31 DIAGNOSIS — G89.4 CHRONIC PAIN SYNDROME: ICD-10-CM

## 2022-01-31 LAB — ALPHA-1 ANTITRYPSIN: 119 MG/DL (ref 90–200)

## 2022-01-31 PROCEDURE — 82103 ALPHA-1-ANTITRYPSIN TOTAL: CPT

## 2022-01-31 PROCEDURE — 99214 OFFICE O/P EST MOD 30 MIN: CPT | Performed by: NURSE PRACTITIONER

## 2022-01-31 PROCEDURE — 36415 COLL VENOUS BLD VENIPUNCTURE: CPT

## 2022-01-31 ASSESSMENT — ENCOUNTER SYMPTOMS
GASTROINTESTINAL NEGATIVE: 1
WHEEZING: 1
BACK PAIN: 1

## 2022-01-31 NOTE — PROGRESS NOTES
with pain medications or at its best is 5/10. Last dose of Winslow was today   Drug screen reviewed from 11/10/2021 and was appropriate  Pill count completed  today and WNL: Yes      The patienthas No Known Allergies. Subjective:      Review of Systems   Constitutional: Negative. HENT: Negative. Eyes: Positive for visual disturbance. Wears corrective glasses    Respiratory: Positive for wheezing.         + tobacco use    Cardiovascular: Negative. Gastrointestinal: Negative. Musculoskeletal: Positive for arthralgias, back pain, myalgias, neck pain and neck stiffness. Negative for gait problem and joint swelling. Neurological: Positive for headaches. Negative for weakness and numbness. Psychiatric/Behavioral: Negative. Objective:     Vitals:    01/31/22 0915   BP: 124/82   Weight: 201 lb (91.2 kg)   Height: 6' (1.829 m)       Physical Exam  Vitals and nursing note reviewed. Constitutional:       General: He is not in acute distress. Appearance: Normal appearance. He is well-developed. He is not diaphoretic. HENT:      Head: Normocephalic and atraumatic. Right Ear: External ear normal.      Left Ear: External ear normal.      Nose: Nose normal.      Mouth/Throat:      Mouth: Mucous membranes are moist.      Pharynx: No oropharyngeal exudate. Eyes:      General: No scleral icterus. Right eye: No discharge. Left eye: No discharge. Conjunctiva/sclera: Conjunctivae normal.      Pupils: Pupils are equal, round, and reactive to light. Neck:      Thyroid: No thyromegaly. Cardiovascular:      Rate and Rhythm: Normal rate and regular rhythm. Heart sounds: Normal heart sounds. No murmur heard. No friction rub. No gallop. Pulmonary:      Effort: Pulmonary effort is normal. No respiratory distress. Breath sounds: Wheezing present. No rales. Chest:      Chest wall: No tenderness.    Abdominal:      General: Bowel sounds are normal. There is no distension. Palpations: Abdomen is soft. Tenderness: There is no abdominal tenderness. There is no guarding or rebound. Musculoskeletal:         General: Swelling and tenderness present. Right shoulder: Tenderness present. Decreased range of motion. Left shoulder: Tenderness present. Decreased range of motion. Cervical back: Full passive range of motion without pain and normal range of motion. Rigidity, spasms and tenderness present. No edema or erythema. Muscular tenderness present. Normal range of motion. Thoracic back: Spasms and tenderness present. Lumbar back: Tenderness present. Decreased range of motion. Back:       Right hip: No tenderness. Left hip: No tenderness. Right knee: Normal range of motion. No tenderness. Left knee: Normal range of motion. No tenderness. Right lower leg: No swelling. No edema. Left lower leg: No swelling. No edema. Right ankle: No swelling. Left ankle: No swelling. Right foot: No swelling. Left foot: No swelling. Skin:     General: Skin is warm. Coloration: Skin is not pale. Findings: No erythema or rash. Neurological:      General: No focal deficit present. Mental Status: He is alert and oriented to person, place, and time. He is not disoriented. Cranial Nerves: No cranial nerve deficit. Sensory: Sensory deficit present. Motor: No atrophy or abnormal muscle tone. Coordination: Coordination normal.      Gait: Gait normal.      Deep Tendon Reflexes: Babinski sign absent on the right side. Babinski sign absent on the left side. Reflex Scores:       Tricep reflexes are 1+ on the right side and 1+ on the left side. Bicep reflexes are 1+ on the right side and 1+ on the left side. Brachioradialis reflexes are 1+ on the right side and 1+ on the left side. Patellar reflexes are 1+ on the right side and 1+ on the left side.        Achilles reflexes are 1+ on the right side and 1+ on the left side. Comments: SENSORY DECREASED STOCKING DISTRIBUTION   Psychiatric:         Attention and Perception: Attention normal. He is attentive. Mood and Affect: Mood normal. Mood is not anxious or depressed. Affect is not labile, blunt, angry or inappropriate. Speech: Speech normal. He is communicative. Speech is not rapid and pressured, delayed, slurred or tangential.         Behavior: Behavior normal. Behavior is not agitated, slowed, aggressive, withdrawn, hyperactive or combative. Behavior is cooperative. Thought Content: Thought content normal. Thought content is not paranoid or delusional. Thought content does not include homicidal or suicidal ideation. Thought content does not include homicidal or suicidal plan. Cognition and Memory: Cognition normal. Memory is not impaired. He does not exhibit impaired recent memory or impaired remote memory. Judgment: Judgment is not impulsive or inappropriate. JIMENA  Patricks test  positive  Yeoman's  positive  Gaenslen's  positive         Assessment:     1. Cervical spondylosis    2. Neck pain    3. Thoracic spondylosis    4. Myofascial pain syndrome    5. Chronic pain syndrome    6. Chronic, continuous use of opioids            Plan:      OARRS reviewed. Current MED: 10.00  Patient was offered naloxone for home. Discussed long term side effects of medications, tolerance, dependency and addiction. Previous UDS reviewed  UDS preformed today for compliance. Patient told can not receive any pain medications from any other source. No evidence of abuse, diversion or aberrant behavior. Medications and/or procedures to improve function and quality of life- patient understanding with this and that may not be pain free  Discussed with patient about safe storage of medications at home  Discussed possible weaning of medication dosing dependent on treatment/procedure results. Discussed with patient about risks with procedure including infection, reaction to medication, increased pain, or bleeding. Procedure notes reveiwed in detail  Received over 80% relief from previous C-facet RFA for 9-10 months with improvement of neck pain and headaches and improvement of activity and mobility. Plan Bilateral C-facet RFA @ C4-5 and C5-6 for longer term pain relief. Procedure and risks discussed in detail with patient. If patient is on blood thinners will need approval to hold yes: On 81 mg of aspirin  Continue Norco 5/325 BID prn- Filled recently 1/27/2022- and has plenty   Patient is compliant       Meds. Prescribed:   No orders of the defined types were placed in this encounter. Return for Bilateral C-facet RFA @ C4-5 and C5-6. , follow up after procedure.                Electronically signed by GERARDO Marino CNP on1/31/2022 at 9:33 AM

## 2022-02-19 DIAGNOSIS — E11.65 UNCONTROLLED TYPE 2 DIABETES MELLITUS WITH HYPERGLYCEMIA (HCC): ICD-10-CM

## 2022-02-21 ENCOUNTER — TELEPHONE (OUTPATIENT)
Dept: PHYSICAL MEDICINE AND REHAB | Age: 67
End: 2022-02-21

## 2022-02-21 RX ORDER — EMPAGLIFLOZIN 10 MG/1
TABLET, FILM COATED ORAL
Qty: 90 TABLET | Refills: 3 | Status: SHIPPED | OUTPATIENT
Start: 2022-02-21

## 2022-02-21 NOTE — TELEPHONE ENCOUNTER
Pt. contacted. Procedure cancelled due to provider out of office. Follow up cancelled. Advised to call office in 1 week if we do not contact pt. Verbalized understanding.

## 2022-02-21 NOTE — TELEPHONE ENCOUNTER
Recent Visits  Date Type Provider Dept   12/20/21 Office Visit GERARDO Paris CNP [facility]   11/29/21 Office Visit CaitlynGERARDO Gusman CNP [facility]   08/25/21 Office Visit CaitlynGERARDO Gusman CNP [facility]   05/26/21 Office Visit GERARDO Paris CNP [facility]   12/17/20 Office Visit GERARDO Paris CNP [facility]   11/19/20 Office Visit CaitlynGERARDO Gusman CNP [facility]   Showing recent visits within past 540 days with a meds authorizing provider and meeting all other requirements  Future Appointments  Date Type Provider Dept   05/31/22 Appointment CaitlynGERARDO Gusman CNP [facility]   Showing future appointments within next 150 days with a meds authorizing provider and meeting all other requirements    Future Appointments   Date Time Provider Ezra Macdonald   3/10/2022  9:15 AM GERARDO Cortez CNP [facility]   5/31/2022  8:20 AM GEARRDO Paris - [address] [facility]   12/21/2022  8:20 AM GERARDO Paris 86

## 2022-02-24 DIAGNOSIS — G89.4 CHRONIC PAIN SYNDROME: ICD-10-CM

## 2022-02-24 DIAGNOSIS — M46.1 SACROILIAC INFLAMMATION (HCC): ICD-10-CM

## 2022-02-24 DIAGNOSIS — M47.812 CERVICAL SPONDYLOSIS: ICD-10-CM

## 2022-02-24 DIAGNOSIS — M47.814 THORACIC SPONDYLOSIS: ICD-10-CM

## 2022-02-24 NOTE — TELEPHONE ENCOUNTER
Hernesto Boston called requesting a refill on the following medications:  Requested Prescriptions     Pending Prescriptions Disp Refills    HYDROcodone-acetaminophen (NORCO) 5-325 MG per tablet 60 tablet 0     Sig: Take 1 tablet by mouth 2 times daily as needed for Pain for up to 30 days.      Pharmacy verified: [facility] [address]      Date of last visit: 1/31/22  Date of next visit (if applicable): 6/45/3179

## 2022-02-25 RX ORDER — HYDROCODONE BITARTRATE AND ACETAMINOPHEN 5; 325 MG/1; MG/1
1 TABLET ORAL 2 TIMES DAILY PRN
Qty: 60 TABLET | Refills: 0 | Status: SHIPPED | OUTPATIENT
Start: 2022-02-28 | End: 2022-03-24 | Stop reason: SDUPTHER

## 2022-02-25 NOTE — TELEPHONE ENCOUNTER
OARRS reviewed. UDS: + for Amitriptyline, Dihydrocodeine, Hydrocodone, Norhydrocodone, Nortriptyline, Quetiapine, Bupropion,   Last seen: 1/31/2022.  Follow-up: 4/25/22

## 2022-03-13 ENCOUNTER — APPOINTMENT (OUTPATIENT)
Dept: CT IMAGING | Age: 67
End: 2022-03-13
Payer: MEDICARE

## 2022-03-13 ENCOUNTER — APPOINTMENT (OUTPATIENT)
Dept: GENERAL RADIOLOGY | Age: 67
End: 2022-03-13
Payer: MEDICARE

## 2022-03-13 ENCOUNTER — HOSPITAL ENCOUNTER (EMERGENCY)
Age: 67
Discharge: HOME | End: 2022-03-13
Attending: EMERGENCY MEDICINE
Payer: MEDICARE

## 2022-03-13 VITALS
HEART RATE: 87 BPM | HEIGHT: 74 IN | TEMPERATURE: 98.6 F | SYSTOLIC BLOOD PRESSURE: 142 MMHG | BODY MASS INDEX: 26.18 KG/M2 | WEIGHT: 204 LBS | RESPIRATION RATE: 21 BRPM | OXYGEN SATURATION: 92 % | DIASTOLIC BLOOD PRESSURE: 84 MMHG

## 2022-03-13 DIAGNOSIS — K85.90 ACUTE PANCREATITIS WITHOUT INFECTION OR NECROSIS, UNSPECIFIED PANCREATITIS TYPE: Primary | ICD-10-CM

## 2022-03-13 LAB
ALBUMIN SERPL-MCNC: 4.5 G/DL (ref 3.5–5.1)
ALP BLD-CCNC: 135 U/L (ref 38–126)
ALT SERPL-CCNC: 19 U/L (ref 11–66)
ANION GAP SERPL CALCULATED.3IONS-SCNC: 15 MEQ/L (ref 8–16)
AST SERPL-CCNC: 15 U/L (ref 5–40)
BACTERIA: ABNORMAL /HPF
BASOPHILS # BLD: 0.4 %
BASOPHILS ABSOLUTE: 0.1 THOU/MM3 (ref 0–0.1)
BILIRUB SERPL-MCNC: 0.6 MG/DL (ref 0.3–1.2)
BILIRUBIN URINE: NEGATIVE
BLOOD, URINE: NEGATIVE
BUN BLDV-MCNC: 10 MG/DL (ref 7–22)
CALCIUM SERPL-MCNC: 9.9 MG/DL (ref 8.5–10.5)
CASTS 2: ABNORMAL /LPF
CASTS UA: ABNORMAL /LPF
CHARACTER, URINE: CLEAR
CHLORIDE BLD-SCNC: 97 MEQ/L (ref 98–111)
CO2: 23 MEQ/L (ref 23–33)
COLOR: YELLOW
CREAT SERPL-MCNC: 1 MG/DL (ref 0.4–1.2)
CRYSTALS, UA: ABNORMAL
EOSINOPHIL # BLD: 1.2 %
EOSINOPHILS ABSOLUTE: 0.2 THOU/MM3 (ref 0–0.4)
EPITHELIAL CELLS, UA: ABNORMAL /HPF
ERYTHROCYTE [DISTWIDTH] IN BLOOD BY AUTOMATED COUNT: 12.9 % (ref 11.5–14.5)
ERYTHROCYTE [DISTWIDTH] IN BLOOD BY AUTOMATED COUNT: 44.4 FL (ref 35–45)
GFR SERPL CREATININE-BSD FRML MDRD: 75 ML/MIN/1.73M2
GLUCOSE BLD-MCNC: 161 MG/DL (ref 70–108)
GLUCOSE URINE: >= 1000 MG/DL
HCT VFR BLD CALC: 48.1 % (ref 42–52)
HEMOGLOBIN: 16.5 GM/DL (ref 14–18)
IMMATURE GRANS (ABS): 0.07 THOU/MM3 (ref 0–0.07)
IMMATURE GRANULOCYTES: 0.5 %
KETONES, URINE: NEGATIVE
LEUKOCYTE ESTERASE, URINE: NEGATIVE
LIPASE: 373.2 U/L (ref 5.6–51.3)
LYMPHOCYTES # BLD: 17 %
LYMPHOCYTES ABSOLUTE: 2.4 THOU/MM3 (ref 1–4.8)
MCH RBC QN AUTO: 32 PG (ref 26–33)
MCHC RBC AUTO-ENTMCNC: 34.3 GM/DL (ref 32.2–35.5)
MCV RBC AUTO: 93.4 FL (ref 80–94)
MISCELLANEOUS 2: ABNORMAL
MONOCYTES # BLD: 7.1 %
MONOCYTES ABSOLUTE: 1 THOU/MM3 (ref 0.4–1.3)
NITRITE, URINE: NEGATIVE
NUCLEATED RED BLOOD CELLS: 0 /100 WBC
OSMOLALITY CALCULATION: 272.6 MOSMOL/KG (ref 275–300)
PH UA: 5.5 (ref 5–9)
PLATELET # BLD: 228 THOU/MM3 (ref 130–400)
PMV BLD AUTO: 8.6 FL (ref 9.4–12.4)
POTASSIUM REFLEX MAGNESIUM: 4 MEQ/L (ref 3.5–5.2)
PROTEIN UA: ABNORMAL
RBC # BLD: 5.15 MILL/MM3 (ref 4.7–6.1)
RBC URINE: ABNORMAL /HPF
RENAL EPITHELIAL, UA: ABNORMAL
SEG NEUTROPHILS: 73.8 %
SEGMENTED NEUTROPHILS ABSOLUTE COUNT: 10.6 THOU/MM3 (ref 1.8–7.7)
SODIUM BLD-SCNC: 135 MEQ/L (ref 135–145)
SPECIFIC GRAVITY, URINE: > 1.03 (ref 1–1.03)
TOTAL PROTEIN: 8.9 G/DL (ref 6.1–8)
TROPONIN T: < 0.01 NG/ML
UROBILINOGEN, URINE: 0.2 EU/DL (ref 0–1)
WBC # BLD: 14.3 THOU/MM3 (ref 4.8–10.8)
WBC UA: ABNORMAL /HPF
YEAST: ABNORMAL

## 2022-03-13 PROCEDURE — 6360000004 HC RX CONTRAST MEDICATION: Performed by: FAMILY MEDICINE

## 2022-03-13 PROCEDURE — 84484 ASSAY OF TROPONIN QUANT: CPT

## 2022-03-13 PROCEDURE — 2580000003 HC RX 258: Performed by: STUDENT IN AN ORGANIZED HEALTH CARE EDUCATION/TRAINING PROGRAM

## 2022-03-13 PROCEDURE — 96374 THER/PROPH/DIAG INJ IV PUSH: CPT

## 2022-03-13 PROCEDURE — 83690 ASSAY OF LIPASE: CPT

## 2022-03-13 PROCEDURE — 71045 X-RAY EXAM CHEST 1 VIEW: CPT

## 2022-03-13 PROCEDURE — 85025 COMPLETE CBC W/AUTO DIFF WBC: CPT

## 2022-03-13 PROCEDURE — 80053 COMPREHEN METABOLIC PANEL: CPT

## 2022-03-13 PROCEDURE — 74177 CT ABD & PELVIS W/CONTRAST: CPT

## 2022-03-13 PROCEDURE — 93005 ELECTROCARDIOGRAM TRACING: CPT | Performed by: EMERGENCY MEDICINE

## 2022-03-13 PROCEDURE — 81001 URINALYSIS AUTO W/SCOPE: CPT

## 2022-03-13 PROCEDURE — 99284 EMERGENCY DEPT VISIT MOD MDM: CPT

## 2022-03-13 PROCEDURE — 96376 TX/PRO/DX INJ SAME DRUG ADON: CPT

## 2022-03-13 PROCEDURE — 6360000002 HC RX W HCPCS: Performed by: STUDENT IN AN ORGANIZED HEALTH CARE EDUCATION/TRAINING PROGRAM

## 2022-03-13 RX ORDER — MORPHINE SULFATE 2 MG/ML
4 INJECTION, SOLUTION INTRAMUSCULAR; INTRAVENOUS ONCE
Status: COMPLETED | OUTPATIENT
Start: 2022-03-13 | End: 2022-03-13

## 2022-03-13 RX ORDER — 0.9 % SODIUM CHLORIDE 0.9 %
1000 INTRAVENOUS SOLUTION INTRAVENOUS ONCE
Status: COMPLETED | OUTPATIENT
Start: 2022-03-13 | End: 2022-03-13

## 2022-03-13 RX ADMIN — MORPHINE SULFATE 4 MG: 2 INJECTION, SOLUTION INTRAMUSCULAR; INTRAVENOUS at 20:28

## 2022-03-13 RX ADMIN — MORPHINE SULFATE 4 MG: 2 INJECTION, SOLUTION INTRAMUSCULAR; INTRAVENOUS at 21:54

## 2022-03-13 RX ADMIN — IOPAMIDOL 80 ML: 755 INJECTION, SOLUTION INTRAVENOUS at 20:36

## 2022-03-13 RX ADMIN — SODIUM CHLORIDE 1000 ML: 9 INJECTION, SOLUTION INTRAVENOUS at 20:30

## 2022-03-13 ASSESSMENT — ENCOUNTER SYMPTOMS
VOMITING: 0
RESPIRATORY NEGATIVE: 1
EYES NEGATIVE: 1
CONSTIPATION: 0
NAUSEA: 0
DIARRHEA: 0
ABDOMINAL PAIN: 1
BLOOD IN STOOL: 0
ALLERGIC/IMMUNOLOGIC NEGATIVE: 1
ABDOMINAL DISTENTION: 0

## 2022-03-13 ASSESSMENT — PAIN SCALES - GENERAL
PAINLEVEL_OUTOF10: 8
PAINLEVEL_OUTOF10: 9

## 2022-03-13 NOTE — ED TRIAGE NOTES
Pt presents to the ED through triage with c/o abdominal pain. Pt states pain started about a week ago and radiates throughout his entire abdomen. Pt denies back or chest pain. Pt states pain decreases when he \"pushes on the flank area\". Pt denies any vomiting or difficulty going to the bathroom. Pt states he last had a BM pta. Pt states he no longer has his gallbladder. RR even and unlabored.  EKG complete

## 2022-03-13 NOTE — ED PROVIDER NOTES
[place] ENCOUNTER          Pt Name: Severiano Scrape  MRN: [id number]  [place] 1955  Date of evaluation: 3/13/2022  Treating Resident Physician: Gianni Siegel MD  Supervising Physician: Dr. Kurtis Smith     No chief complaint on file. History obtained from the patient. HISTORY OF PRESENT ILLNESS    HPI  Severiano Scrape is a 73 y.o. male who presents to the emergency department for evaluation of lysed abdominal pain. Patient states that this pain has been going on for the last 2 weeks but he is coming in today because the pain has been worsening. He cannot localize the pain but says that it is all around his abdomen and that the pain improves when he squeezes his pannus. He denies any fever, nausea, vomiting, diarrhea, constipation, shortness of breath, chest pain. He states that he has not noticed any increased distention of his abdomen. He does have a history of emphysema and a chronic cough. States that he is a social drinker and does not have a history of hepatitis or cirrhosis. The patient has no other acute complaints at this time. REVIEW OF SYSTEMS   Review of Systems   Constitutional: Negative. HENT: Negative. Eyes: Negative. Respiratory: Negative. Cardiovascular: Negative. Gastrointestinal: Positive for abdominal pain. Negative for abdominal distention, blood in stool, constipation, diarrhea, nausea and vomiting. Endocrine: Negative. Genitourinary: Negative. Musculoskeletal: Negative. Skin: Negative. Allergic/Immunologic: Negative. Neurological: Negative. Hematological: Negative. Psychiatric/Behavioral: Negative.           PAST MEDICAL AND SURGICAL HISTORY     Past Medical History:   Diagnosis Date    Abnormal liver enzymes     Alcohol abuse     Allergic rhinitis     Chronic back pain     Colonic polyp     COPD (chronic obstructive pulmonary disease) (HCC)     Depression     Diabetes mellitus ([facility] [address]     DJD (degenerative joint disease) of cervical spine     DJD (degenerative joint disease) of lumbar spine     SP SURGERY    GERD (gastroesophageal reflux disease)     Hyperlipidemia     Hypertension     Osteoarthritis     Osteoarthritis     Smoker      Past Surgical History:   Procedure Laterality Date    BACK SURGERY  2008    X 3    CARD[facility]AC [facility]TER[facility]  3/[facility]    [facility]    CHOLECYSTECTOMY      COLONOSCOPY  6/2012    repeat 6/2017- no precancerous  polyps    EKG 12-LEAD  4/6/2015         FACET JOINT INJECTION Bilateral 7/7/2020    BILATERAL C-FACET MBB @ C4-5,C5-6 and C6-7 performed by Becky Austin MD at [address] [facility] INJECTION Bilateral 8/6/2020    bilateral C-facet MBB # 2 @ C4-5, C5-6 and C6-7 performed by Becky Austin MD at [address] [facility] [address] Caitie Skinner  2002    Bilateral Inguinal     LUMBAR SPINE SURGERY  2006, 2007, 2008    fusion 2008    PAIN MANAGEMENT PROCEDURE Right 9/4/2020    Bilateral C-facet RFA @ C4-5, C5-6. RIGHT SIDE FIRST.  performed by Becky Austin MD at [place] [address] Left 10/9/2020    Left C-facet RFA @ C4-5, and C5-6 performed by Becky Austin MD at [place] [address] Right 3/25/2021    Right C-facet RFA @ C4-5, C5-6 performed by Becky Austin MD at [place] [address] Left 4/29/2021    Left C-facet RFA @ C4-5, C5-6 performed by Becky Austin MD at [address] [facility] Left 7/31/2018    LEFT HYDROCELECTOMY performed by Justino Stephen MD at [address] INJECTION  11/4/2021     THYROID CYST ASPIRATION AND OR INJECTION [facility]4/2021 [facility]         MEDICATIONS     Current Facility-Administered Medications:     morphine (PF) injection 4 mg, 4 mg, IntraVENous, Once, Samson Holstein, MD    Current Outpatient Medications:     HYDROcodone-acetaminophen (NORCO) 5-325 MG per tablet, Take 1 tablet by mouth 2 times daily as needed for Pain for up to 30 days. , Disp: 60 tablet, Rfl: 0    JARDIANCE 10 MG tablet, TAKE 1 TABLET BY MOUTH DAILY, Disp: 90 tablet, Rfl: 3    Handicap Placard MISC, by Does not apply route Duration: 5 years, Disp: 1 each, Rfl: 0    metoprolol tartrate (LOPRESSOR) 25 MG tablet, TAKE 1/2 TABLET BY MOUTH TWICE DAILY.  HOLD FOR HEART RATE LESS THAN 60, Disp: 90 tablet, Rfl: 3    aspirin 81 MG chewable tablet, TAKE 1 TABLET BY MOUTH DAILY, Disp: 90 tablet, Rfl: 3    azelastine (ASTELIN) 0.1 % nasal spray, SPRAY ONCE IN EACH NOSTRIL TWICE DAILY AS DIRECTED, Disp: 1 each, Rfl: 3    Alcohol Swabs PADS, 1 each by Does not apply route 2 times daily, Disp: 100 each, Rfl: 3    JANUVIA 100 MG tablet, TAKE 1 TABLET BY MOUTH DAILY, Disp: 90 tablet, Rfl: 3    amitriptyline (ELAVIL) 50 MG tablet, TAKE 1/2 TABLET BY MOUTH NIGHTLY, Disp: 90 tablet, Rfl: 3    atorvastatin (LIPITOR) 40 MG tablet, TAKE 1 TABLET BY MOUTH DAILY, Disp: 90 tablet, Rfl: 3    metFORMIN (GLUCOPHAGE) 1000 MG tablet, TAKE 1 TABLET BY MOUTH TWICE DAILY WITH MEALS, Disp: 180 tablet, Rfl: 3    etodolac (LODINE XL) 400 MG extended release tablet, TAKE 1 TABLET BY MOUTH DAILY, Disp: 90 tablet, Rfl: 3    albuterol sulfate  (90 Base) MCG/ACT inhaler, INHALE 2 PUFFS INTO THE LUNGS FOUR TIMES DAILY AS NEEDED FOR WHEEZING, Disp: 54 g, Rfl: 3    INCRUSE ELLIPTA 62.5 MCG/INH AEPB, INHALE 1 PUFF INTO THE LUNGS DAILY, Disp: 3 each, Rfl: 3    diclofenac sodium (VOLTAREN) 1 % GEL, Apply 2 g topically 2 times daily, Disp: 100 g, Rfl: 2    lisinopril (PRINIVIL;ZESTRIL) 2.5 MG tablet, TAKE 1 TABLET BY MOUTH DAILY, Disp: 90 tablet, Rfl: 3    pantoprazole (PROTONIX) 40 MG tablet, TAKE 1 TABLET BY MOUTH DAILY, Disp: 90 tablet, Rfl: 3    buPROPion (WELLBUTRIN XL) 150 MG extended release tablet, TAKE 1 TABLET BY MOUTH EVERY MORNING, Disp: 90 tablet, Rfl: 3    QUEtiapine (SEROQUEL) 200 MG tablet, TAKE 1 TABLET BY MOUTH AT BEDTIME, Disp: 90 tablet, Rfl: 3    nicotine (NICODERM CQ) 21 MG/24HR, Place 1 patch onto the skin daily (Patient not taking: Reported on 11/29/2021), Disp: 42 patch, Rfl: 0    Lancet Device MISC, 1 Device by Does not apply route once for 1 dose, Disp: 1 Device, Rfl: 0    Lancets MISC, 1 each by Does not apply route 2 times daily, Disp: 100 each, Rfl: 3    Elastic Bandages & Supports (B & B A-2 ATHLETIC SUPPORTER) MISC, 1 Device by Does not apply route continuous prn (prn pain), Disp: 1 each, Rfl: 0      SOCIAL HISTORY     Social History     Social History Narrative    Not on file     Social History     Tobacco Use    Smoking status: Current Every Day Smoker     Packs/day: 0.75     Years: 38.00     Pack years: 28.50     Types: Cigarettes     Start date: 11/19/2012    Smokeless tobacco: Never Used   Substance Use Topics    Alcohol use: No     Alcohol/week: 0.0 standard drinks     Comment: quit 2013    Drug use: No     Types: Marijuana (Weed)     Comment: last use 5/2015         ALLERGIES   No Known Allergies      FAMILY HISTORY     Family History   Problem Relation Age of Onset    Diabetes Mother     Cancer Father     Diabetes Sister     Heart Disease Brother     High Blood Pressure Neg Hx     Stroke Neg Hx          PREVIOUS RECORDS   Previous records reviewed. PHYSICAL EXAM     ED Triage Vitals [03/13/22 1913]   BP Temp Temp Source Pulse Resp SpO2 Height Weight   (!) 147/81 98.6 °F (37 °C) Oral 92 18 94 % 6' 2\" (1.88 m) 204 lb (92.5 kg)     Initial vital signs and nursing assessment reviewed and abnormal from Hypertension. Body mass index is 26.19 kg/m². Pulsoximetry is normal per my interpretation.     Additional Vital Signs:  Vitals:    03/13/22 2129   BP: 134/85   Pulse: 87   Resp: 21   Temp:    SpO2: 93%       Physical Exam  Vitals reviewed. Constitutional:       General: He is not in acute distress. Appearance: Normal appearance. He is not ill-appearing, toxic-appearing or diaphoretic. HENT:      Head: Normocephalic and atraumatic. Right Ear: External ear normal.      Left Ear: External ear normal.      Nose: Nose normal.      Mouth/Throat:      Mouth: Mucous membranes are moist.      Pharynx: Oropharynx is clear. Eyes:      Extraocular Movements: Extraocular movements intact. Conjunctiva/sclera: Conjunctivae normal.   Cardiovascular:      Rate and Rhythm: Normal rate and regular rhythm. Pulses: Normal pulses. Heart sounds: Normal heart sounds. Pulmonary:      Effort: Pulmonary effort is normal.   Abdominal:      General: There is distension. Palpations: There is no mass. Tenderness: There is no abdominal tenderness. There is no right CVA tenderness, left CVA tenderness, guarding or rebound. Hernia: A hernia is present. Musculoskeletal:         General: Normal range of motion. Cervical back: Normal range of motion. No rigidity or tenderness. Right lower leg: No edema. Left lower leg: No edema. Skin:     General: Skin is warm and dry. Capillary Refill: Capillary refill takes less than 2 seconds. Neurological:      General: No focal deficit present. Mental Status: He is alert and oriented to person, place, and time. Psychiatric:         Mood and Affect: Mood normal.         Behavior: Behavior normal.             MEDICAL DECISION MAKING   Initial Assessment: This is a 25-year-old male with past medical history for cholecystectomy presenting with generalized abdominal pain was found to have an elevated lipase nearly 8 times greater than the upper normal limit on labs. Physical exam is overall unremarkable. CT of the abdomen not showing any complications associated with pancreatitis.   It is possible that this is alcohol induced pancreatitis and patient may be under reporting the amount of alcohol he ingests on a daily basis. Patient reporting adequate pain control and says he tolerates p.o. intake at this time. Shared decision-making was had with the patient regarding admission for pain control and observation versus discharge home with follow-up to PCP. Patient would prefer to go home as his pain is controlled and can tolerate p.o. intake. Vital signs stable on discharge. Will finish IV fluid bolus before discharge. 1. Pancreatitis without complications. Plan:    CBC, CMP, UA, lipase   IV fluid bolus   Pain control   CT of the abdomen and pelvis   Discharge home with PCP follow-up.         ED RESULTS   Laboratory results:  Labs Reviewed   CBC WITH AUTO DIFFERENTIAL - Abnormal; Notable for the following components:       Result Value    WBC 14.3 (*)     MPV 8.6 (*)     Segs Absolute 10.6 (*)     All other components within normal limits   COMPREHENSIVE METABOLIC PANEL W/ REFLEX TO MG FOR LOW K - Abnormal; Notable for the following components:    Glucose 161 (*)     Chloride 97 (*)     Alkaline Phosphatase 135 (*)     Total Protein 8.9 (*)     All other components within normal limits   LIPASE - Abnormal; Notable for the following components:    Lipase 373.2 (*)     All other components within normal limits   GLOMERULAR FILTRATION RATE, ESTIMATED - Abnormal; Notable for the following components:    Est, Glom Filt Rate 75 (*)     All other components within normal limits   OSMOLALITY - Abnormal; Notable for the following components:    Osmolality Calc 272.6 (*)     All other components within normal limits   URINE WITH REFLEXED MICRO - Abnormal; Notable for the following components:    Glucose, Ur >= 1000 (*)     Specific Gravity, Urine > 1.030 (*)     Protein, UA TRACE (*)     All other components within normal limits   ANION GAP   TROPONIN       Radiologic studies results:  XR CHEST 1 VIEW   Final Result   Mild interstitial prominence. This could be acute or chronic. Etiology is    indeterminate. This document has been electronically signed by: Luis Garduno MD on 03/13/2022 09:10 PM      CT ABDOMEN PELVIS W IV CONTRAST Additional Contrast? None   Final Result   Findings consistent with uncomplicated acute pancreatitis. Air-fluid levels in nondistended small bowel, which could be due to    enteritis or dysmotility. Possible cystitis. Nonemergent/incidental findings in the report. This document has been electronically signed by: Shauna Richardson MD on    03/13/2022 09:36 PM      All CTs at this facility use dose modulation techniques and iterative    reconstructions, and/or weight-based dosing   when appropriate to reduce radiation to a low as reasonably achievable. ED Medications administered this visit:   Medications   morphine (PF) injection 4 mg (has no administration in time range)   0.9 % sodium chloride bolus (1,000 mLs IntraVENous New Bag 3/13/22 2030)   morphine (PF) injection 4 mg (4 mg IntraVENous Given 3/13/22 2028)   iopamidol (ISOVUE-370) 76 % injection 80 mL (80 mLs IntraVENous Given 3/13/22 2036)         ED COURSE     ED Course as of 03/13/22 2152   Sun Mar 13, 2022   2022 Lipase(!): 373.2  Lipase nearly 8 times greater than upper normal limit [JT]   2140 Of care discussed with the patient. Discussed the CT scan results including no discovery of pseudocyst or abscess around his pancreas. Patient feels comfortable going home and feels that he has adequate pain control. Says that he will drink plenty of fluids once he gets home and will progress his diet as tolerated. Also discussed importance of EtOH cessation during this time. Patient verbalizes understanding plan of care.  [JT]      ED Course User Index  [JT] Nilda Lo MD       Strict return precautions and follow up instructions were discussed with the patient prior to discharge, with which the patient agrees. MEDICATION CHANGES     New Prescriptions    No medications on file         FINAL DISPOSITION     Final diagnoses:   Acute pancreatitis without infection or necrosis, unspecified pancreatitis type     Condition: condition: fair  Dispo: Discharge to home      This transcription was electronically signed. Parts of this transcriptions may have been dictated by use of voice recognition software and electronically transcribed, and parts may have been transcribed with the assistance of an ED scribe. The transcription may contain errors not detected in proofreading. Please refer to my supervising physician's documentation if my documentation differs.     Electronically Signed: Shakeel Gil MD, 03/13/22, 9:52 PM          Shakeel Gil MD  Resident  03/13/22 [id number]

## 2022-03-14 LAB
EKG ATRIAL RATE: 94 BPM
EKG P AXIS: 5 DEGREES
EKG P-R INTERVAL: 138 MS
EKG Q-T INTERVAL: 332 MS
EKG QRS DURATION: 86 MS
EKG QTC CALCULATION (BAZETT): 415 MS
EKG R AXIS: 66 DEGREES
EKG T AXIS: 36 DEGREES
EKG VENTRICULAR RATE: 94 BPM

## 2022-03-14 PROCEDURE — 93010 ELECTROCARDIOGRAM REPORT: CPT | Performed by: NUCLEAR MEDICINE

## 2022-03-14 NOTE — ED NOTES
Pt and vs reassessed. RR even and unlabored. Pt ambulated to the bathroom and is now resting in bed. Pt states pain is now a 4 out of 10. Pt denies any needs and updated on POC. No distress noted.  Pt stable at this time     Caroline Handy, [facility]  03/13/22 [id number]

## 2022-03-14 NOTE — ED NOTES
Pt and vs reassessed. RR even and unlabored. Pt resting in bed alert and denies any needs. No distress noted.  Pt stable at this time and updated on [address]  03/13/22 2023

## 2022-03-14 NOTE — ED NOTES
Pt and vs reassessed. RR even and unlabored. Pt resting in bed alert. Pt states pain is 9 out of 10 and medicated per MAR.  Pt denies any other needs and is now being taken to CT in stable condition     Rafi Vieira RN  03/13/22 2041

## 2022-03-14 NOTE — ED NOTES
Pt and vs reassessed. RR even and unlabored. Pt resting in bed alert. Pt states pain is 8 out of 10. Pt medicated per [facility] and denies any other needs. No distress noted.  Pt stable at this time     Flakito Barney, [address] [place] [address]  03/13/22 [id number]

## 2022-03-24 DIAGNOSIS — G89.4 CHRONIC PAIN SYNDROME: ICD-10-CM

## 2022-03-24 DIAGNOSIS — M47.812 CERVICAL SPONDYLOSIS: ICD-10-CM

## 2022-03-24 DIAGNOSIS — M46.1 SACROILIAC INFLAMMATION (HCC): ICD-10-CM

## 2022-03-24 DIAGNOSIS — M47.814 THORACIC SPONDYLOSIS: ICD-10-CM

## 2022-03-28 RX ORDER — HYDROCODONE BITARTRATE AND ACETAMINOPHEN 5; 325 MG/1; MG/1
1 TABLET ORAL 2 TIMES DAILY PRN
Qty: 60 TABLET | Refills: 0 | Status: SHIPPED | OUTPATIENT
Start: 2022-03-30 | End: 2022-04-25 | Stop reason: SDUPTHER

## 2022-03-28 NOTE — TELEPHONE ENCOUNTER
[facility] reviewed. UDS: + for  hydrocodone bupropion seroquel amitriptyline consistent. Last seen: 1/31/2022.  Follow-up:   Future Appointments   Date Time Provider Ezra Macdonald   3/30/2022  9:40 AM GERARDO [place] - [id number] [address]   4/25/2022 10:00 AM GERARDO June CNP N [facility] [address]   5/31/2022  8:20 AM GERARDO Blackwood CNP [address]   12/21/2022  8:20 AM GERARDO Blackwood

## 2022-03-30 ENCOUNTER — OFFICE VISIT (OUTPATIENT)
Dept: FAMILY MEDICINE CLINIC | Age: 67
End: 2022-03-30
Payer: MEDICARE

## 2022-03-30 VITALS
OXYGEN SATURATION: 95 % | BODY MASS INDEX: 25.28 KG/M2 | TEMPERATURE: 98.2 F | HEIGHT: 74 IN | DIASTOLIC BLOOD PRESSURE: 70 MMHG | SYSTOLIC BLOOD PRESSURE: 134 MMHG | HEART RATE: 85 BPM | WEIGHT: 197 LBS | RESPIRATION RATE: 16 BRPM

## 2022-03-30 DIAGNOSIS — K85.90 ACUTE PANCREATITIS WITHOUT INFECTION OR NECROSIS, UNSPECIFIED PANCREATITIS TYPE: Primary | ICD-10-CM

## 2022-03-30 DIAGNOSIS — R74.8 ELEVATED LIPASE: ICD-10-CM

## 2022-03-30 DIAGNOSIS — E11.9 TYPE 2 DIABETES MELLITUS WITHOUT COMPLICATION, WITHOUT LONG-TERM CURRENT USE OF INSULIN (HCC): ICD-10-CM

## 2022-03-30 LAB — HBA1C MFR BLD: 6.9 % (ref 4.3–5.7)

## 2022-03-30 PROCEDURE — 99214 OFFICE O/P EST MOD 30 MIN: CPT | Performed by: NURSE PRACTITIONER

## 2022-03-30 PROCEDURE — 3044F HG A1C LEVEL LT 7.0%: CPT | Performed by: NURSE PRACTITIONER

## 2022-03-30 ASSESSMENT — ENCOUNTER SYMPTOMS
VOMITING: 0
ABDOMINAL DISTENTION: 0
ABDOMINAL PAIN: 0
NAUSEA: 0
RESPIRATORY NEGATIVE: 1

## 2022-03-30 NOTE — PROGRESS NOTES
[address]  [address] [place] [facility] [address]  Dept: [phone]  Dept Fax: [phone]  Loc: [phone]    Cris Gerber is a 73 y.o. Belvia Grew presents today for his medical conditions/complaints as noted below. Daniela Stephenson c/o of Follow-up      HPI:      Pt here to follow up on an ER visit where he went with abdominal pain. Pt states he went home ,incresaed his fluid intake and abdominal pain has resolved. States he has never had pancreatitis before. He had had a history of alcohol abuse in the past and stopped but restarted in [place] , since this recent epsiode has not had any drinks, states when he drank he would drink 3 tall boys in 2 days. HPI below HPI from 3/13/2022  Cris Gerber is a 73 y.o. male who presents to the emergency department for evaluation of lysed abdominal pain. Patient states that this pain has been going on for the last 2 weeks but he is coming in today because the pain has been worsening. He cannot localize the pain but says that it is all around his abdomen and that the pain improves when he squeezes his pannus. He denies any fever, nausea, vomiting, diarrhea, constipation, shortness of breath, chest pain. He states that he has not noticed any increased distention of his abdomen. He does have a history of emphysema and a chronic cough. States that he is a social drinker and does not have a history of hepatitis or cirrhosis. The patient has no other acute complaints at this time. CT ABDOMEN PELVIS W IV CONTRAST Additional Contrast? None  Narrative: CT Abdomen and Pelvis W Contrast    COMPARISON: None    FINDINGS:  Diffusely hypodense liver consistent with hepatic steatosis. Normal spleen. Normal kidneys. Normal adrenal glands. Fat stranding adjacent to the head, neck, and body of the pancreas. No   pancreatic ductal dilatation. The pancreas fairly homogeneously enhances.   Status post cholecystectomy. No abnormal biliary dilatation. Pneumobilia. Air-fluid levels in nondistended small bowel. No mucosal thickening. No   evidence of obstruction. Normal appendix. Mild diffuse bladder wall thickening. No ascites. No pneumoperitoneum. No lymphadenopathy. No acute fracture. Status post posterior L3-S1 fusion, bilateral L3-L5   laminectomies, and L5-S1 discectomy. Degenerative changes in the spine. Chronic appearing left rib fractures. No abdominal aortic aneurysm. Atherosclerosis. Impression: Findings consistent with uncomplicated acute pancreatitis. Air-fluid levels in nondistended small bowel, which could be due to   enteritis or dysmotility. Possible cystitis. Nonemergent/incidental findings in the report. Comparison: None    Findings:  No consolidation or effusion. Heart size is normal.  No acute fracture. Impression: Mild interstitial prominence. This could be acute or chronic. Etiology is   indeterminate. Results reviewed and discussed with pt. He declines an u/s of pancreas or liver. Diabetes Type 2    Glucose control:   Does patient check blood glucoses at home? Yes  Report of hypoglycemia: no  Lab Results       Component                Value               Date                       LABA1C                   6.9 (H)             03/30/2022            No results found for: EAG    Symptoms  Polyuria, Polydipsia or Polyphagia? No  Chest Pain, SOB, or Palpitations? -  No  New Vision complaints? No  Paresthesias of the extremities? No    Medications  Current medication were reviewed. Compliant with medications? yes  Medication side effects? No  On ACE-I or ARB? Yes  On antiplatelet therapy? Yes  On Statin? Yes    Last Diabetic Eye Exam: in the last year    Exercise  Exercise? Yes  Wt Readings from Last 3 Encounters:  03/30/22 : 197 lb (89.4 kg)  03/13/22 : 204 lb (92.5 kg)  01/31/22 : 201 lb (91.2 kg)      Diet discipline?:  Low salt, fat, sugar diet? yes    Blood pressure control:  BP Readings from Last 3 Encounters:  03/30/22 : 134/70  03/13/22 : (!) 142/84  01/31/22 : 124/82      Lab Results       Component                Value               Date                       LABMICR                  < 1.20              04/04/2017              Lab Results       Component                Value               Date                       LDLCALC                  80                  08/02/2021                     Current Outpatient Medications   Medication Sig Dispense Refill    HYDROcodone-acetaminophen (NORCO) 5-325 MG per tablet Take 1 tablet by mouth 2 times daily as needed for Pain for up to 30 days. 60 tablet 0    JARDIANCE 10 MG tablet TAKE 1 TABLET BY MOUTH DAILY 90 tablet 3    Handicap Placard MISC by Does not apply route Duration: 5 years 1 each 0    metoprolol tartrate (LOPRESSOR) 25 MG tablet TAKE 1/2 TABLET BY MOUTH TWICE DAILY.  HOLD FOR HEART RATE LESS THAN 60 90 tablet 3    aspirin 81 MG chewable tablet TAKE 1 TABLET BY MOUTH DAILY 90 tablet 3    azelastine (ASTELIN) 0.1 % nasal spray SPRAY ONCE IN EACH NOSTRIL TWICE DAILY AS DIRECTED 1 each 3    Alcohol Swabs PADS 1 each by Does not apply route 2 times daily 100 each 3    JANUVIA 100 MG tablet TAKE 1 TABLET BY MOUTH DAILY 90 tablet 3    amitriptyline (ELAVIL) 50 MG tablet TAKE 1/2 TABLET BY MOUTH NIGHTLY 90 tablet 3    atorvastatin (LIPITOR) 40 MG tablet TAKE 1 TABLET BY MOUTH DAILY 90 tablet 3    metFORMIN (GLUCOPHAGE) 1000 MG tablet TAKE 1 TABLET BY MOUTH TWICE DAILY WITH MEALS 180 tablet 3    etodolac (LODINE XL) 400 MG extended release tablet TAKE 1 TABLET BY MOUTH DAILY 90 tablet 3    albuterol sulfate  (90 Base) MCG/ACT inhaler INHALE 2 PUFFS INTO THE LUNGS FOUR TIMES DAILY AS NEEDED FOR WHEEZING 54 g 3    INCRUSE ELLIPTA 62.5 MCG/INH AEPB INHALE 1 PUFF INTO THE LUNGS DAILY 3 each 3    diclofenac sodium (VOLTAREN) 1 % GEL Apply 2 g topically 2 times daily 100 g 2    lisinopril (PRINIVIL;ZESTRIL) 2.5 MG tablet TAKE 1 TABLET BY MOUTH DAILY 90 tablet 3    pantoprazole (PROTONIX) 40 MG tablet TAKE 1 TABLET BY MOUTH DAILY 90 tablet 3    buPROPion (WELLBUTRIN XL) 150 MG extended release tablet TAKE 1 TABLET BY MOUTH EVERY MORNING 90 tablet 3    QUEtiapine (SEROQUEL) 200 MG tablet TAKE 1 TABLET BY MOUTH AT BEDTIME 90 tablet 3    Lancets MISC 1 each by Does not apply route 2 times daily 100 each 3    Elastic Bandages & Supports (B & B A-2 ATHLETIC SUPPORTER) MISC 1 Device by Does not apply route continuous prn (prn pain) 1 each 0    nicotine (NICODERM CQ) 21 MG/24HR Place 1 patch onto the skin daily (Patient not taking: Reported on 11/29/2021) 42 patch 0    Lancet Device MISC 1 Device by Does not apply route once for 1 dose 1 Device 0     No current facility-administered medications for this visit.           Past Medical History:   Diagnosis Date    Abnormal liver enzymes     Alcohol abuse     Allergic rhinitis     Chronic back pain     Colonic polyp     COPD (chronic obstructive pulmonary disease) (HCC)     Depression     Diabetes mellitus (HCC)     DJD (degenerative joint disease) of cervical spine     DJD (degenerative joint disease) of lumbar spine     SP SURGERY    GERD (gastroesophageal reflux disease)     Hyperlipidemia     Hypertension     Osteoarthritis     Osteoarthritis     Smoker       Past Surgical History:   Procedure Laterality Date    BACK SURGERY  2008    X 3    CARDI[facility]IZATION  3/[facility]    CHOLECYSTECTOMY      COLONOSCOPY  6/2012    repeat 6/2017- no precancerous  polyps    EKG 12-LEAD  4/6/2015         FACET JOINT INJECTION Bilateral 7/7/2020    BILATERAL C-FACET MBB @ C4-5,C5-6 and C6-7 performed by Tiburcio Burk MD at [address] INJECTION Bilateral 8/6/2020    bilateral C-facet MBB # 2 @ C4-5, C5-6 and C6-7 performed by Tiburcio Burk MD at [address] [facility]  Umbilical    INGUINAL HERNIA REPAIR  2002    Bilateral Inguinal    [facility] SPINE SURGERY  2006, 2007, 2008    fusion 2008    PAIN MANAGEMENT PROCEDURE Right 9/4/2020    Bilateral C-facet RFA @ C4-5, C5-6. RIGHT SIDE FIRST.  performed by Edilma Maldonado MD at [place] [id number] Left 10/9/2020    Left C-facet RFA @ C4-5, and C5-6 performed by Edilma Maldonado MD at [place] [address] Right 3/25/2021    Right C-facet RFA @ C4-5, C5-6 performed by Edilma Maldonado MD at [place] [address] Left 4/29/2021    Left C-facet RFA @ C4-5, C5-6 performed by Edilma Maldonado MD at [address] [facility] [place] Left 7/31/2018    LEFT HYDROCELECTOMY performed by Jose Perales MD at [address] [place] [address]  11/4/2021    US THYROID CYST ASPIRATION AND OR INJECTION 11/4/2021 Luiza Wahl MD [facility]     Family History   Problem Relation Age of Onset    Diabetes Mother     Cancer Father     Diabetes Sister     Heart Disease Brother     High Blood Pressure Neg Hx     Stroke Neg Hx      Social History     Tobacco Use    Smoking status: Current Every Day Smoker     Packs/day: 0.75     Years: 38.00     Pack years: 28.50     Types: Cigarettes     Start date: 11/19/2012    Smokeless tobacco: Never Used   Substance Use Topics    Alcohol use: No     Alcohol/week: 0.0 standard drinks     Comment: quit 2013        No Known Allergies    Health Maintenance   Topic Date Due    COVID-19 Vaccine (3 - Booster for Pfizer series) 09/08/2021    Diabetic microalbuminuria test  05/26/2022    Lipid screen  08/02/2022    Diabetic retinal exam  09/23/2022    Diabetic foot exam  11/29/2022    Low dose CT lung screening  12/10/2022    Depression Monitoring  12/20/2022    Annual Wellness Visit (AWV)  12/21/2022    Potassium monitoring 03/13/2023    Creatinine monitoring  03/13/2023    DTaP/Tdap/Td vaccine (2 - Td or Tdap) 03/26/2023    A1C test (Diabetic or Prediabetic)  03/30/2023    Colorectal Cancer Screen  04/19/2027    Flu vaccine  Completed    Shingles Vaccine  Completed    Pneumococcal 65+ years Vaccine  Completed    AAA screen  Completed    Hepatitis C screen  Completed    Hepatitis A vaccine  Aged Out    Hib vaccine  Aged Out    Meningococcal (ACWY) vaccine  Aged Out    HIV screen  Discontinued       Subjective:      Review of Systems   Constitutional: Negative for fatigue. Respiratory: Negative. Cardiovascular: Negative. Gastrointestinal: Negative for abdominal distention, abdominal pain, nausea and vomiting. Genitourinary: Negative for difficulty urinating and dysuria. Skin: Negative. Neurological: Negative for light-headedness. Objective:      /70   Pulse 85   Temp 98.2 °F (36.8 °C) (Oral)   Resp 16   Ht 6' 2\" (1.88 m)   Wt 197 lb (89.4 kg)   SpO2 95%   BMI 25.29 kg/m²      Physical Exam  Vitals and nursing note reviewed. Constitutional:       Appearance: He is not ill-appearing. Cardiovascular:      Rate and Rhythm: Normal rate and regular rhythm. Pulses: Normal pulses. Heart sounds: Normal heart sounds. No murmur heard. Pulmonary:      Effort: Pulmonary effort is normal. No respiratory distress. Breath sounds: Normal breath sounds. Abdominal:      General: Abdomen is flat. Bowel sounds are normal. There is no distension. Palpations: Abdomen is soft. Tenderness: There is no abdominal tenderness. There is no guarding. Neurological:      Mental Status: He is alert. Assessment/Plan:           1.  Acute pancreatitis without infection or necrosis, unspecified pancreatitis type  Symptoms resolved  Decline alcohol use  Offered pt u/s of liver and pancreas and he declines for now  Will monitor symptoms and consider pancrease if symptoms return and/or Gi referral.   Pt in agreement with plan   - Lipase; Future    2. Type 2 diabetes mellitus without complication, without long-term current use of insulin (HCC)  controlled    3. Elevated lipase  #1  Unlikely due to lisinopril use as dose is low  Will monitor  Likely exacerbation due to return to use of alcohol. Return if symptoms worsen or fail to improve. Reccommended tobaccocessation options including pharmacologic methods, counseled great than 3 minutesduring this visit:  Yes[]  No  []       Patient given educational materials -see patient instructions. Discussed use, benefit, and side effects of prescribedmedications. All patient questions answered. Pt voiced understanding. Reviewedhealth maintenance. Instructed to continue current medications, diet and exercise. Patient agreed with treatment plan. Follow up as directed.        Electronicallysigned by GREARDO Loo CNP on 3/30/2022 at 10:33 AM

## 2022-03-30 NOTE — PATIENT INSTRUCTIONS
Patient Education        Learning About Acute Pancreatitis  What is acute pancreatitis? The pancreas is an organ behind the stomach. It makes hormones like insulin that help control how your body uses sugar. It also makes enzymes that help youdigest food. Usually these enzymes flow from the pancreas to the intestines. But if they leak into the pancreas, they can irritate it and cause pain and swelling. Whenthis happens suddenly, it's called acute pancreatitis. What causes it? Most of the time, acute pancreatitis is caused by gallstones or by heavyalcohol use. But several other things can cause it, such as:   High levels of fats in the blood.  An injury.  A problem after a surgery or a procedure.  Certain medicines. What are the symptoms? The main symptom is pain in the upper belly. The pain can be severe. You also may have a fever, nausea, or vomiting. Some people get so sick that they haveproblems breathing. They also may have low blood pressure. How is it diagnosed? Your doctor will diagnose pancreatitis with an exam and by looking at your lab tests. Your doctor may think that you have this problem based on your symptomsand where you have pain in your belly. You may have blood tests of enzymes called amylase and lipase. In pancreatitis,the level of these enzymes is usually much higher than normal.  You also may have imaging tests of the belly. These may include an ultrasound, a CT scan, or an MRI. A special MRI called MRCP can show images of the bileducts. This test can be very helpful when gallstones are causing the problem. How is it treated? Treatment of acute pancreatitis usually takes place in the hospital. It focuses on taking care of pain and supporting your body while your pancreas heals. In severe cases, treatment may occur in an intensive care unit to supportbreathing, treat serious infections, or help raise very low blood pressure.   If a gallstone is causing the problem, the gallstone may need to be removed. This is done during a procedure called ERCP. The doctor puts a scope in your mouth and moves it gently through the stomach and into the ducts from thepancreas and gallbladder. The doctor is then able to see a stone and remove it. Sometimes the gallbladder, which makes gallstones, needs to be removed bysurgery. People with pancreatitis often need a lot of fluid to help support their other organs and their blood pressure. They get fluids through a vein (intravenous, or IV). Instead of food by mouth, nutrition is sometimes given through a veinwhile the pancreas heals. Where can you learn more? Go to https://[employer] Vocals[employer]. org and sign in to your [email or web address] account. Enter P610 in the Jet[employer] box to learn more about \"Learning About Acute Pancreatitis. \"     If you do not have an account, please click on the \"Sign Up Now\" link. Current as of: September 8, 2021               Content Version: 13.2  © 2006-2022 [employer]. Care instructions adapted under license by [facility] ([facility]). If you have questions about a medical condition or this instruction, always ask your healthcare professional. [place] [address] any warranty or liability for your use of this information.

## 2022-04-21 DIAGNOSIS — J42 CHRONIC BRONCHITIS, UNSPECIFIED CHRONIC BRONCHITIS TYPE (HCC): ICD-10-CM

## 2022-04-22 RX ORDER — ALBUTEROL SULFATE 90 UG/1
AEROSOL, METERED RESPIRATORY (INHALATION)
Qty: 54 G | Refills: 3 | Status: SHIPPED | OUTPATIENT
Start: 2022-04-22

## 2022-04-25 ENCOUNTER — TELEPHONE (OUTPATIENT)
Dept: PHYSICAL MEDICINE AND REHAB | Age: 67
End: 2022-04-25

## 2022-04-25 ENCOUNTER — OFFICE VISIT (OUTPATIENT)
Dept: PHYSICAL MEDICINE AND REHAB | Age: 67
End: 2022-04-25
Payer: MEDICARE

## 2022-04-25 VITALS
WEIGHT: 197 LBS | HEIGHT: 74 IN | SYSTOLIC BLOOD PRESSURE: 134 MMHG | BODY MASS INDEX: 25.28 KG/M2 | DIASTOLIC BLOOD PRESSURE: 72 MMHG

## 2022-04-25 DIAGNOSIS — M47.814 THORACIC SPONDYLOSIS: ICD-10-CM

## 2022-04-25 DIAGNOSIS — M47.812 CERVICAL SPONDYLOSIS: Primary | ICD-10-CM

## 2022-04-25 DIAGNOSIS — M54.2 NECK PAIN: ICD-10-CM

## 2022-04-25 DIAGNOSIS — M79.18 MYOFASCIAL PAIN SYNDROME: ICD-10-CM

## 2022-04-25 DIAGNOSIS — G89.4 CHRONIC PAIN SYNDROME: ICD-10-CM

## 2022-04-25 DIAGNOSIS — F11.90 CHRONIC, CONTINUOUS USE OF OPIOIDS: ICD-10-CM

## 2022-04-25 DIAGNOSIS — Z98.1 HISTORY OF LUMBAR FUSION: ICD-10-CM

## 2022-04-25 DIAGNOSIS — M46.1 SACROILIAC INFLAMMATION (HCC): ICD-10-CM

## 2022-04-25 PROCEDURE — 99214 OFFICE O/P EST MOD 30 MIN: CPT | Performed by: NURSE PRACTITIONER

## 2022-04-25 RX ORDER — HYDROCODONE BITARTRATE AND ACETAMINOPHEN 5; 325 MG/1; MG/1
1 TABLET ORAL 2 TIMES DAILY PRN
Qty: 60 TABLET | Refills: 0 | Status: SHIPPED | OUTPATIENT
Start: 2022-04-27 | End: 2022-05-24 | Stop reason: SDUPTHER

## 2022-04-25 ASSESSMENT — ENCOUNTER SYMPTOMS
GASTROINTESTINAL NEGATIVE: 1
COUGH: 1
BACK PAIN: 1
WHEEZING: 1

## 2022-04-25 NOTE — PROGRESS NOTES
[facility] [address]  Dept: [phone]  Dept Fax: [phone]: [phone]    Visit Date: 4/25/2022    Functionality Assessment/Goals Worksheet     On a scale of 0 (Does not Interfere) to 10 (Completely Interferes)     1. Which number describes how during the past week pain has interfered with       the following:  A. General Activity:  7  B. Mood: 8  C. Walking Ability:  6  D. Normal Work (Includes both work outside the home and housework):  9  E. Relations with Other People:   5  F. Sleep:   7  G. Enjoyment of Life:   7    2. Patient Prefers to Take their Pain Medications:     [x]  On a regular basis   [x]  Only when necessary    []  Does not take pain medications    3. What are the Patient's Goals/Expectations for Visiting Pain Management? []  Learn about my pain    [x]  Receive Medication   []  Physical Therapy     []  Treat Depression   [x]  Receive Injections    []  Treat Sleep   []  Deal with Anxiety and Stress   []  Treat Opoid Dependence/Addiction   []  Other:      HPI:   Diego Mendez is a 73 y.o. male is here today for    Chief Complaint: Neck pain, headaches     HPI   3 month FU. Continues to have pain in posterior neck and up into head aching, sharp, stiff pain. Did not get bilateral C-facet RFA completed that was ordered last visit d/t provider being off. Pain increases with bending, lifting, twisting , turning head and housework or working at job      Norco prn remains effective in making pain tolerable. Patient states that sometimes it just does not last.     Medications reviewed. Patient denies side effects with medications. Patient states he is taking medications as prescribed. Hedenies receiving pain medications from other sources. He denies any ER visits since last visit.     Pain scale with out pain medications or at its worst is 6-7/10. Pain scale with pain medications or at its best is 3/10. Last dose of [place] was today   Drug screen reviewed from 1/31/2022 and was appropriate  Pill count completed  today and WNL: Yes      The patienthas No Known Allergies. Subjective:      Review of Systems   Constitutional: Negative. HENT: Negative. Eyes: Positive for visual disturbance. Wears corrective glasses    Respiratory: Positive for cough and wheezing.         + tobacco use    Cardiovascular: Negative. Gastrointestinal: Negative. Musculoskeletal: Positive for arthralgias, back pain, myalgias, neck pain and neck stiffness. Negative for gait problem and joint swelling. Neurological: Positive for headaches. Negative for weakness and numbness. Psychiatric/Behavioral: Negative. Objective:     Vitals:    04/25/22 0957   BP: 134/72   Weight: 197 lb (89.4 kg)   Height: 6' 2\" (1.88 m)       Physical Exam  Vitals and nursing note reviewed. Constitutional:       General: He is not in acute distress. Appearance: Normal appearance. He is well-developed. He is not diaphoretic. HENT:      Head: Normocephalic and atraumatic. Right Ear: External ear normal.      Left Ear: External ear normal.      Nose: Nose normal.      Mouth/Throat:      Mouth: Mucous membranes are moist.      Pharynx: No oropharyngeal exudate. Eyes:      General: No scleral icterus. Right eye: No discharge. Left eye: No discharge. Conjunctiva/sclera: Conjunctivae normal.      Pupils: Pupils are equal, round, and reactive to light. Neck:      Thyroid: No thyromegaly. Cardiovascular:      Rate and Rhythm: Normal rate and regular rhythm. Heart sounds: Normal heart sounds. No murmur heard. No friction rub. No gallop. Pulmonary:      Effort: Pulmonary effort is normal. No respiratory distress. Breath sounds: Wheezing present. No rales. Chest:      Chest wall: No tenderness.    Abdominal:      General: Bowel sounds are normal. There is no distension. Palpations: Abdomen is soft. Tenderness: There is no abdominal tenderness. There is no guarding or rebound. Musculoskeletal:         General: Swelling and tenderness present. Right shoulder: Tenderness present. Decreased range of motion. Left shoulder: Tenderness present. Decreased range of motion. Cervical back: Full passive range of motion without pain and normal range of motion. Rigidity, spasms and tenderness present. No edema or erythema. Muscular tenderness present. Normal range of motion. Thoracic back: Spasms and tenderness present. Lumbar back: Tenderness present. Decreased range of motion. Back:       Right hip: No tenderness. Left hip: No tenderness. Right knee: Normal range of motion. No tenderness. Left knee: Normal range of motion. No tenderness. Right lower leg: No swelling. No edema. Left lower leg: No swelling. No edema. Right ankle: No swelling. Left ankle: No swelling. Right foot: No swelling. Left foot: No swelling. Skin:     General: Skin is warm. Coloration: Skin is not pale. Findings: No erythema or rash. Neurological:      General: No focal deficit present. Mental Status: He is alert and oriented to person, place, and time. He is not disoriented. Cranial Nerves: No cranial nerve deficit. Sensory: Sensory deficit present. Motor: No atrophy or abnormal muscle tone. Coordination: Coordination normal.      Gait: Gait normal.      Deep Tendon Reflexes: Babinski sign absent on the right side. Babinski sign absent on the left side. Reflex Scores:       Tricep reflexes are 1+ on the right side and 1+ on the left side. Bicep reflexes are 1+ on the right side and 1+ on the left side. Brachioradialis reflexes are 1+ on the right side and 1+ on the left side.        Patellar reflexes are 1+ on the right side and 1+ on the left side. Achilles reflexes are 1+ on the right side and 1+ on the left side. Comments: SENSORY DECREASED STOCKING DISTRIBUTION   Psychiatric:         Attention and Perception: Attention normal. He is attentive. Mood and Affect: Mood normal. Mood is not anxious or depressed. Affect is not labile, blunt, angry or inappropriate. Speech: Speech normal. He is communicative. Speech is not rapid and pressured, delayed, slurred or tangential.         Behavior: Behavior normal. Behavior is not agitated, slowed, aggressive, withdrawn, hyperactive or combative. Behavior is cooperative. Thought Content: Thought content normal. Thought content is not paranoid or delusional. Thought content does not include homicidal or suicidal ideation. Thought content does not include homicidal or suicidal plan. Cognition and Memory: Cognition normal. Memory is not impaired. He does not exhibit impaired recent memory or impaired remote memory. Judgment: Judgment is not impulsive or inappropriate. Assessment:     1. Cervical spondylosis    2. Neck pain    3. Thoracic spondylosis    4. Myofascial pain syndrome    5. Chronic pain syndrome    6. Chronic, continuous use of opioids    7. History of lumbar fusion    8. Sacroiliac inflammation ([facility] [address]            Plan:      OARRS reviewed. Current MED: 10.00  Patient was offered naloxone for home. Discussed long term side effects of medications, tolerance, dependency and addiction. Previous UDS reviewed  UDS preformed today for compliance. Patient told can not receive any pain medications from any other source. No evidence of abuse, diversion or aberrant behavior.   Medications and/or procedures to improve function and quality of life- patient understanding with this and that may not be pain free  Discussed with patient about safe storage of medications at home  Discussed possible weaning of medication dosing dependent on treatment/procedure results. Discussed with patient about risks with procedure including infection, reaction to medication, increased pain, or bleeding. Procedure notes reveiwed in detail  Received over 80% relief from previous C-facet RFA for 9-10 months with improvement of neck pain and headaches and improvement of activity and mobility. Plan Bilateral C-facet RFA @ C4-5 and C5-6 for longer term pain relief. Procedure and risks discussed in detail with patient. If patient is on blood thinners will need approval to hold yes: On 81 mg of aspirin  Continue Norco 5/325 BID prn- Ordered refill  Patient is compliant       Meds. Prescribed:   Orders Placed This Encounter   Medications    HYDROcodone-acetaminophen (NORCO) 5-325 MG per tablet     Sig: Take 1 tablet by mouth 2 times daily as needed for Pain for up to 30 days. Dispense:  60 tablet     Refill:  0     Reduce doses taken as pain becomes manageable       Return for Bilateral C-facet RFA @ C4-5 and C5-6. , follow up after procedure.                Electronically signed by GERARDO Gerard CNP on4/25/2022 at 10:24 AM

## 2022-05-02 DIAGNOSIS — Z72.0 TOBACCO ABUSE: ICD-10-CM

## 2022-05-02 DIAGNOSIS — I10 ESSENTIAL HYPERTENSION: ICD-10-CM

## 2022-05-02 DIAGNOSIS — K21.9 GASTROESOPHAGEAL REFLUX DISEASE WITHOUT ESOPHAGITIS: ICD-10-CM

## 2022-05-02 DIAGNOSIS — G47.00 INSOMNIA, UNSPECIFIED TYPE: ICD-10-CM

## 2022-05-02 RX ORDER — LISINOPRIL 2.5 MG/1
2.5 TABLET ORAL DAILY
Qty: 90 TABLET | Refills: 3 | Status: SHIPPED | OUTPATIENT
Start: 2022-05-02

## 2022-05-02 RX ORDER — PANTOPRAZOLE SODIUM 40 MG/1
TABLET, DELAYED RELEASE ORAL
Qty: 90 TABLET | Refills: 3 | Status: SHIPPED | OUTPATIENT
Start: 2022-05-02

## 2022-05-02 RX ORDER — BUPROPION HYDROCHLORIDE 150 MG/1
150 TABLET ORAL EVERY MORNING
Qty: 90 TABLET | Refills: 3 | Status: SHIPPED | OUTPATIENT
Start: 2022-05-02

## 2022-05-02 RX ORDER — QUETIAPINE FUMARATE 200 MG/1
TABLET, FILM COATED ORAL
Qty: 90 TABLET | Refills: 3 | Status: SHIPPED | OUTPATIENT
Start: 2022-05-02

## 2022-05-06 ENCOUNTER — PREP FOR PROCEDURE (OUTPATIENT)
Dept: PHYSICAL MEDICINE AND REHAB | Age: 67
End: 2022-05-06

## 2022-05-10 ENCOUNTER — APPOINTMENT (OUTPATIENT)
Dept: GENERAL RADIOLOGY | Age: 67
End: 2022-05-10
Attending: PAIN MEDICINE
Payer: MEDICARE

## 2022-05-10 ENCOUNTER — HOSPITAL ENCOUNTER (OUTPATIENT)
Age: 67
Setting detail: OUTPATIENT SURGERY
Discharge: HOME | End: 2022-05-10
Attending: PAIN MEDICINE | Admitting: PAIN MEDICINE
Payer: MEDICARE

## 2022-05-10 ENCOUNTER — ANESTHESIA (OUTPATIENT)
Dept: OPERATING ROOM | Age: 67
End: 2022-05-10
Payer: MEDICARE

## 2022-05-10 ENCOUNTER — ANESTHESIA EVENT (OUTPATIENT)
Dept: OPERATING ROOM | Age: 67
End: 2022-05-10
Payer: MEDICARE

## 2022-05-10 VITALS
BODY MASS INDEX: 25.08 KG/M2 | WEIGHT: 195.4 LBS | OXYGEN SATURATION: 94 % | TEMPERATURE: 97.1 F | HEIGHT: 74 IN | DIASTOLIC BLOOD PRESSURE: 66 MMHG | HEART RATE: 77 BPM | SYSTOLIC BLOOD PRESSURE: 127 MMHG | RESPIRATION RATE: 16 BRPM

## 2022-05-10 VITALS
RESPIRATION RATE: 14 BRPM | SYSTOLIC BLOOD PRESSURE: 147 MMHG | DIASTOLIC BLOOD PRESSURE: 88 MMHG | OXYGEN SATURATION: 94 %

## 2022-05-10 LAB — GLUCOSE BLD-MCNC: 130 MG/DL (ref 70–108)

## 2022-05-10 PROCEDURE — 2709999900 HC NON-CHARGEABLE SUPPLY: Performed by: PAIN MEDICINE

## 2022-05-10 PROCEDURE — 3700000001 HC ADD 15 MINUTES (ANESTHESIA): Performed by: PAIN MEDICINE

## 2022-05-10 PROCEDURE — 6360000002 HC RX W HCPCS

## 2022-05-10 PROCEDURE — 64633 DESTROY CERV/THOR FACET JNT: CPT | Performed by: PAIN MEDICINE

## 2022-05-10 PROCEDURE — 3600000055 HC PAIN LEVEL 3 ADDL 15 MIN: Performed by: PAIN MEDICINE

## 2022-05-10 PROCEDURE — 6360000002 HC RX W HCPCS: Performed by: PAIN MEDICINE

## 2022-05-10 PROCEDURE — 2580000003 HC RX 258

## 2022-05-10 PROCEDURE — 3209999900 FLUORO FOR SURGICAL PROCEDURES

## 2022-05-10 PROCEDURE — 3700000000 HC ANESTHESIA ATTENDED CARE: Performed by: PAIN MEDICINE

## 2022-05-10 PROCEDURE — 7100000011 HC PHASE II RECOVERY - ADDTL 15 MIN: Performed by: PAIN MEDICINE

## 2022-05-10 PROCEDURE — 2500000003 HC RX 250 WO HCPCS: Performed by: PAIN MEDICINE

## 2022-05-10 PROCEDURE — 64634 DESTROY C/TH FACET JNT ADDL: CPT | Performed by: PAIN MEDICINE

## 2022-05-10 PROCEDURE — 3600000054 HC PAIN LEVEL 3 BASE: Performed by: PAIN MEDICINE

## 2022-05-10 PROCEDURE — 82948 REAGENT STRIP/BLOOD GLUCOSE: CPT

## 2022-05-10 PROCEDURE — 7100000010 HC PHASE II RECOVERY - FIRST 15 MIN: Performed by: PAIN MEDICINE

## 2022-05-10 RX ORDER — SODIUM CHLORIDE 9 MG/ML
INJECTION, SOLUTION INTRAVENOUS CONTINUOUS PRN
Status: DISCONTINUED | OUTPATIENT
Start: 2022-05-10 | End: 2022-05-10 | Stop reason: SDUPTHER

## 2022-05-10 RX ORDER — DEXAMETHASONE SODIUM PHOSPHATE 4 MG/ML
INJECTION, SOLUTION INTRA-ARTICULAR; INTRALESIONAL; INTRAMUSCULAR; INTRAVENOUS; SOFT TISSUE PRN
Status: DISCONTINUED | OUTPATIENT
Start: 2022-05-10 | End: 2022-05-10 | Stop reason: ALTCHOICE

## 2022-05-10 RX ORDER — PROPOFOL 10 MG/ML
INJECTION, EMULSION INTRAVENOUS PRN
Status: DISCONTINUED | OUTPATIENT
Start: 2022-05-10 | End: 2022-05-10 | Stop reason: SDUPTHER

## 2022-05-10 RX ORDER — BUPIVACAINE HYDROCHLORIDE 2.5 MG/ML
INJECTION, SOLUTION EPIDURAL; INFILTRATION; INTRACAUDAL PRN
Status: DISCONTINUED | OUTPATIENT
Start: 2022-05-10 | End: 2022-05-10 | Stop reason: ALTCHOICE

## 2022-05-10 RX ORDER — LIDOCAINE HYDROCHLORIDE 10 MG/ML
INJECTION, SOLUTION INFILTRATION; PERINEURAL PRN
Status: DISCONTINUED | OUTPATIENT
Start: 2022-05-10 | End: 2022-05-10 | Stop reason: ALTCHOICE

## 2022-05-10 RX ORDER — FENTANYL CITRATE 50 UG/ML
INJECTION, SOLUTION INTRAMUSCULAR; INTRAVENOUS PRN
Status: DISCONTINUED | OUTPATIENT
Start: 2022-05-10 | End: 2022-05-10 | Stop reason: SDUPTHER

## 2022-05-10 RX ADMIN — PROPOFOL 60 MG: 10 INJECTION, EMULSION INTRAVENOUS at 13:41

## 2022-05-10 RX ADMIN — FENTANYL CITRATE 100 MCG: 50 INJECTION, SOLUTION INTRAMUSCULAR; INTRAVENOUS at 13:23

## 2022-05-10 RX ADMIN — PROPOFOL 20 MG: 10 INJECTION, EMULSION INTRAVENOUS at 13:42

## 2022-05-10 RX ADMIN — SODIUM CHLORIDE: 9 INJECTION, SOLUTION INTRAVENOUS at 13:20

## 2022-05-10 ASSESSMENT — PULMONARY FUNCTION TESTS
PIF_VALUE: 1

## 2022-05-10 ASSESSMENT — LIFESTYLE VARIABLES: SMOKING_STATUS: 1

## 2022-05-10 ASSESSMENT — PAIN - FUNCTIONAL ASSESSMENT: PAIN_FUNCTIONAL_ASSESSMENT: 0-10

## 2022-05-10 NOTE — PROGRESS NOTES
[id number] [phone] [id number]: Patient arrives to recovery room via cart. Spontaneous respirations, vss, report received from surgical RN. Patient denies pain, numbness, tingling , nausea. Injection site clean, dry, intact. HOB elevated. IV capped. Snack and drink given. Call light within reach. 1400: IV removed, patient denies needs, getting dressed. 1415: patient ambulated to discharge lobby in stable condition. patient discharged home with family.

## 2022-05-10 NOTE — OP NOTE
Pre-Procedure Note    Patient Name: Vinnie Singh   YOB: 1955  Medical Record Number: [id number]  Date: 5/10/22     Indication:  Neck pain  Consent: On file. Vital Signs:   Vitals:    05/10/22 1203   BP: (!) 162/76   Pulse: 83   Resp: 16   Temp: 97.6 °F (36.4 °C)   SpO2: 97%       Past Medical History:   has a past medical history of Abnormal liver enzymes, Alcohol abuse, Allergic rhinitis, Chronic back pain, Colonic polyp, COPD (chronic obstructive pulmonary disease) ([facility] [address] Depression, Diabetes mellitus ([facility] [address] DJD (degenerative joint disease) of cervical spine, DJD (degenerative joint disease) of lumbar spine, GERD (gastroesophageal reflux disease), Hyperlipidemia, Hypertension, Osteoarthritis, Osteoarthritis, and Smoker. Past Surgical History:   has a past surgical history that includes back surgery (2008); Inguinal hernia repair (2002); hernia repair; Cholecystectomy; Colonoscopy (6/2012); EKG 12 Lead (4/6/2015); Lumbar spine surgery (2006, 2007, 2008); Cardiac catheterization (3/2015); pr removal of hydrocele,tunica,unilat (Left, 7/31/2018); Facet joint injection (Bilateral, 7/7/2020); Facet joint injection (Bilateral, 8/6/2020); Pain management procedure (Right, 9/4/2020); Pain management procedure (Left, 10/9/2020); Pain management procedure (Right, 3/25/2021); Pain management procedure (Left, 4/29/2021); and US ASP/INJ THYROID CYST (11/4/2021). Pre-Sedation Documentation and Exam:   Vital signs have been reviewed (see flow sheet for vitals). Sedation/ Anesthesia :  MAC. Patient is an appropriate candidate for plan of sedation: yes         Preoperative Diagnosis:  C-spondylosis     Post-Op Dx: as above     Procedure Performed:  Radiofrequency abalation of median branchs at the levels of C4-5 and C5-6 bilateral   under fluoroscopic guidance      Indication for the Procedure:  The patient had history of chronic neck pain that is not responding well to the conservative treatment.  Patient's pain is mostly axial in nature.  Pain is interfering with the activities of daily living.  Physical examination revealed facet tenderness and facet loading is positive.  The patient had undergone cervical  facet joint injections with pain relief that lasted for only a short period of time and confirmatory median branch block gave patient pain relief of 100 % .  Hence we decided to do radiofrequency abalation of median branches for long term pain relief.  The procedure and risks were discussed with the patient and an informed consent was obtained.     Procedure:     A meaningful communication was kept up with the patient throughout  the procedure. Patient is placed in prone position and skin over the back was prepped and draped in sterile manner.  Then using fluoroscopy the waist of facet joint complex of the vertebra was observed and the view was optimized . The skin and deep tissues posterior were infiltrated with 20 ml of 1% xylocaine The RF canula with the 10 mm active tip was introduced through the skin wheal under fluoroscopy guidance such that the tip of the needle lies in the groove of the waist of facet joint complex.  Then a lateral view of cervical  spine was obtained to make sure the tip of needle is in the centroid of the articular pillar.  hen electric impedence was checked to make sure it is acceptable.  Then a sensory stimulus was applied at 50 Hz up to 1 volt and concordant pain symptoms were reproduced. Antoine Nguyen a motor stimulus was applied at 2 Hz up to 2 volts and no motor stimulation was seen in upper extremities.  Some multifidus stimulus was seen. Antoine Nguyen after negative aspiration a mixture of dexamethasone 6 mg and 0.25%  Marcaine 2cc  was injected through the needle in divided doses. Then an  lesion was done at 80 degrees centigrade for 90 seconds.   EBL-0     Patient tolerated the procedure well and was discharged home in stable condition        Electronically signed by Mercedes Martinez MD [place] on 5/10/22 at 1:27 PM EDT

## 2022-05-10 NOTE — ANESTHESIA POSTPROCEDURE EVALUATION
Department of Anesthesiology  Postprocedure Note    Patient: Clarita Rosa  MRN: [id number]  YOB: 1955  Date of evaluation: 5/10/2022  Time:  1:51 PM     Procedure Summary     Date: 05/10/22 Room / Location: [address] [place] [address] 03 / [facility]    Anesthesia Start: 1320 Anesthesia Stop: 6603    Procedure: Bilateral C-facet RFA @ C4-5 and C5-6 (Bilateral Neck) Diagnosis: (Cervical spondylosis)    Surgeons: Giana Donohue MD Responsible Provider: Camden Castaneda MD    Anesthesia Type: MAC ASA Status: 3          Anesthesia Type: No value filed. Bhaskar Phase I:      Bhaskar Phase II:      Last vitals: Reviewed and per EMR flowsheets. Anesthesia Post Evaluation    Patient location during evaluation: bedside  Patient participation: complete - patient cannot participate  Level of consciousness: awake and alert  Airway patency: patent  Nausea & Vomiting: no nausea and no vomiting  Complications: no  Cardiovascular status: hemodynamically stable  Respiratory status: acceptable  Hydration status: euvolemic      [facility]  POST-ANESTHESIA NOTE       Name:  Clarita Rosa                                         Age:  73 y.o.   MRN:  [id number]      Last Vitals:  BP (!) 162/76   Pulse 83   Temp 97.6 °F (36.4 °C) (Temporal)   Resp 16   Ht 6' 1.5\" (1.867 m)   Wt 195 lb 6.4 oz (88.6 kg)   SpO2 97%   BMI 25.43 kg/m²   Patient Vitals for the past 4 hrs:   BP Temp Temp src Pulse Resp SpO2 Height Weight   05/10/22 1203 (!) 162/76 97.6 °F (36.4 °C) Temporal 83 16 97 % 6' 1.5\" (1.867 m) 195 lb 6.4 oz (88.6 kg)       Level of Consciousness:  Awake    Respiratory:  Stable    Oxygen Saturation:  Stable    Cardiovascular:  Stable    Hydration:  Adequate    PONV:  Stable    Post-op Pain:  Adequate analgesia    Post-op Assessment:  No apparent anesthetic complications    Additional Follow-Up / Treatment / Comment:  None    Epi Funez MD  May 10, 2022   1:52 PM

## 2022-05-10 NOTE — H&P
H&P    Continues to have pain in posterior neck and up into head aching, sharp, stiff pain. Did not get bilateral C-facet RFA completed that was ordered last visit d/t provider being off. Pain increases with bending, lifting, twisting , turning head and housework or working at [employer] prn remains effective in making pain tolerable. Patient states that sometimes it just does not last.      Medications reviewed. Patient denies side effects with medications. Patient states he is taking medications as prescribed. Hedenies receiving pain medications from other sources. He denies any ER visits since last visit.     Pain scale with out pain medications or at its worst is 6-7/10. Pain scale with pain medications or at its best is 3/10. Last dose of [place] was today   Drug screen reviewed from 1/31/2022 and was appropriate  Pill count completed  today and WNL: Yes        The patienthas No Known Allergies.        Subjective:      Review of Systems   Constitutional: Negative. HENT: Negative. Eyes: Positive for visual disturbance. Wears corrective glasses    Respiratory: Positive for cough and wheezing.         + tobacco use    Cardiovascular: Negative. Gastrointestinal: Negative. Musculoskeletal: Positive for arthralgias, back pain, myalgias, neck pain and neck stiffness. Negative for gait problem and joint swelling. Neurological: Positive for headaches. Negative for weakness and numbness. Psychiatric/Behavioral: Negative.          Objective:      Vitals       Vitals:     04/25/22 0957   BP: 134/72   Weight: 197 lb (89.4 kg)   Height: 6' 2\" (1.88 m)            Physical Exam  Vitals and nursing note reviewed. Constitutional:       General: He is not in acute distress. Appearance: Normal appearance. He is well-developed. He is not diaphoretic. HENT:      Head: Normocephalic and atraumatic.       Right Ear: External ear normal.      Left Ear: External ear normal.      Nose: Nose normal. Mouth/Throat:      Mouth: Mucous membranes are moist.      Pharynx: No oropharyngeal exudate. Eyes:      General: No scleral icterus. Right eye: No discharge. Left eye: No discharge. Conjunctiva/sclera: Conjunctivae normal.      Pupils: Pupils are equal, round, and reactive to light. Neck:      Thyroid: No thyromegaly. Cardiovascular:      Rate and Rhythm: Normal rate and regular rhythm. Heart sounds: Normal heart sounds. No murmur heard. No friction rub. No gallop. Pulmonary:      Effort: Pulmonary effort is normal. No respiratory distress. Breath sounds: Wheezing present. No rales. Chest:      Chest wall: No tenderness. Abdominal:      General: Bowel sounds are normal. There is no distension. Palpations: Abdomen is soft. Tenderness: There is no abdominal tenderness. There is no guarding or rebound. Musculoskeletal:         General: Swelling and tenderness present. Right shoulder: Tenderness present. Decreased range of motion. Left shoulder: Tenderness present. Decreased range of motion. Cervical back: Full passive range of motion without pain and normal range of motion. Rigidity, spasms and tenderness present. No edema or erythema. Muscular tenderness present. Normal range of motion. Thoracic back: Spasms and tenderness present. Lumbar back: Tenderness present. Decreased range of motion. Back:       Right hip: No tenderness. Left hip: No tenderness. Right knee: Normal range of motion. No tenderness. Left knee: Normal range of motion. No tenderness. Right lower leg: No swelling. No edema. Left lower leg: No swelling. No edema. Right ankle: No swelling. Left ankle: No swelling. Right foot: No swelling. Left foot: No swelling. Skin:     General: Skin is warm. Coloration: Skin is not pale. Findings: No erythema or rash.    Neurological:      General: No focal deficit present. Mental Status: He is alert and oriented to person, place, and time. He is not disoriented. Cranial Nerves: No cranial nerve deficit. Sensory: Sensory deficit present. Motor: No atrophy or abnormal muscle tone. Coordination: Coordination normal.      Gait: Gait normal.      Deep Tendon Reflexes: Babinski sign absent on the right side. Babinski sign absent on the left side. Reflex Scores:       Tricep reflexes are 1+ on the right side and 1+ on the left side. Bicep reflexes are 1+ on the right side and 1+ on the left side. Brachioradialis reflexes are 1+ on the right side and 1+ on the left side. Patellar reflexes are 1+ on the right side and 1+ on the left side. Achilles reflexes are 1+ on the right side and 1+ on the left side. Comments: SENSORY DECREASED STOCKING DISTRIBUTION   Psychiatric:         Attention and Perception: Attention normal. He is attentive. Mood and Affect: Mood normal. Mood is not anxious or depressed. Affect is not labile, blunt, angry or inappropriate. Speech: Speech normal. He is communicative. Speech is not rapid and pressured, delayed, slurred or tangential.         Behavior: Behavior normal. Behavior is not agitated, slowed, aggressive, withdrawn, hyperactive or combative. Behavior is cooperative. Thought Content: Thought content normal. Thought content is not paranoid or delusional. Thought content does not include homicidal or suicidal ideation. Thought content does not include homicidal or suicidal plan. Cognition and Memory: Cognition normal. Memory is not impaired. He does not exhibit impaired recent memory or impaired remote memory. Judgment: Judgment is not impulsive or inappropriate.         Assessment:      1. Cervical spondylosis    2. Neck pain    3. Thoracic spondylosis    4. Myofascial pain syndrome    5. Chronic pain syndrome    6. Chronic, continuous use of opioids    7. History of lumbar fusion    8. Sacroiliac inflammation ([facility] [address]       Plan:      · OARRS reviewed. Current MED: 10.00  · Patient was offered naloxone for home. · Discussed long term side effects of medications, tolerance, dependency and addiction. · Previous UDS reviewed  · UDS preformed today for compliance. · Patient told can not receive any pain medications from any other source. · No evidence of abuse, diversion or aberrant behavior. · Medications and/or procedures to improve function and quality of life- patient understanding with this and that may not be pain free  · Discussed with patient about safe storage of medications at home  · Discussed possible weaning of medication dosing dependent on treatment/procedure results. · Discussed with patient about risks with procedure including infection, reaction to medication, increased pain, or bleeding. · Procedure notes reveiwed in detail  · Received over 80% relief from previous C-facet RFA for 9-10 months with improvement of neck pain and headaches and improvement of activity and mobility. · Plan Bilateral C-facet RFA @ C4-5 and C5-6 for longer term pain relief.  Procedure and risks discussed in detail with patient.   · If patient is on blood thinners will need approval to hold yes: On 81 mg of aspirin  · Continue Norco 5/325 BID prn- Ordered refill  · Patient is compliant           Return for Bilateral C-facet RFA @ C4-5 and C5-6. , follow up after procedure.

## 2022-05-10 NOTE — ANESTHESIA PRE PROCEDURE
Department of Anesthesiology  Preprocedure Note       Name:  Carlos Alberto Vuong   Age:  73 y.o.  :  1955                                          MRN:  [id number]         Date:  5/10/2022      Surgeon: Miko Garibay):  Jayce Officer, MD    Procedure: Procedure(s):  Bilateral C-facet RFA @ C4-5 and C5-6    Medications prior to admission:   Prior to Admission medications    Medication Sig Start Date End Date Taking? Authorizing Provider   lisinopril (PRINIVIL;ZESTRIL) 2.5 MG tablet TAKE 1 TABLET BY MOUTH DAILY 22   Claude Pilot, DO   QUEtiapine (SEROQUEL) 200 MG tablet TAKE 1 TABLET BY MOUTH AT BEDTIME 22   Claude , DO   buPROPion (WELLBUTRIN XL) 150 MG extended release tablet TAKE 1 TABLET BY MOUTH EVERY MORNING 22   Claude, DO   pantoprazole (PROTONIX) 40 MG tablet TAKE 1 TABLET BY MOUTH DAILY 22   Claude , DO   HYDROcodone-acetaminophen (NORCO) 5-325 MG per tablet Take 1 tablet by mouth 2 times daily as needed for Pain for up to 30 days. 22  Arthuro Bumpers, APRN - CNP   albuterol sulfate  (90 Base) MCG/ACT inhaler INHALE 2 PUFFS INTO THE LUNGS FOUR TIMES DAILY AS NEEDED FOR WHEEZING 22   Corinne Raja, APRN - CNP   JARDIANCE 10 MG tablet TAKE 1 TABLET BY MOUTH DAILY 22   Corinne Raja, APRN - CNP   Handicap Placard MISC by Does not apply route Duration: 5 years 21   Corinne Raja, APRN - CNP   metoprolol tartrate (LOPRESSOR) 25 MG tablet TAKE 1/2 TABLET BY MOUTH TWICE DAILY.  HOLD FOR HEART RATE LESS THAN 60 21   Corinne Raja, APRN - CNP   aspirin 81 MG chewable tablet TAKE 1 TABLET BY MOUTH DAILY 21   Corinne Raja, APRN - CNP   azelastine (ASTELIN) 0.1 % nasal spray SPRAY ONCE IN EACH NOSTRIL TWICE DAILY AS DIRECTED 21   Corinne Raja, APRN - CNP   Alcohol Swabs PADS 1 each by Does not apply route 2 times daily 21   Corinne Raja, APRN - CNP   JANUVIA 100 MG tablet TAKE 1 TABLET BY MOUTH DAILY 11/11/21   GERARDO Christiansen CNP   amitriptyline (ELAVIL) 50 MG tablet TAKE 1/2 TABLET BY MOUTH NIGHTLY 11/9/21   GERARDO Christiansen CNP   atorvastatin (LIPITOR) 40 MG tablet TAKE 1 TABLET BY MOUTH DAILY 11/3/21   GERARDO Christiansen CNP   metFORMIN (GLUCOPHAGE) 1000 MG tablet TAKE 1 TABLET BY MOUTH TWICE DAILY WITH MEALS 11/3/21   GERARDO Christiansen CNP   etodolac (LODINE XL) 400 MG extended release tablet TAKE 1 TABLET BY MOUTH DAILY 9/24/21   GERARDO Christiansen CNP   INCRUSE ELLIPTA 62.5 MCG/INH AEPB INHALE 1 PUFF INTO THE LUNGS DAILY 6/1/21   GERARDO Christiansen CNP   diclofenac sodium (VOLTAREN) 1 % GEL Apply 2 g topically 2 times daily 5/26/21   GERARDO Christiansen CNP   nicotine (NICODERM CQ) 21 MG/24HR Place 1 patch onto the skin daily  Patient not taking: Reported on 11/29/2021 11/19/20 12/31/20  GERARDO Christiansen CNP   Lancet Device MISC 1 Device by Does not apply route once for 1 dose 4/10/19 11/29/21  GERARDO Christiansen CNP   Lancets MISC 1 each by Does not apply route 2 times daily 4/10/19   GERARDO Christiansen CNP   Elastic Bandages & Supports (B & B A-2 ATHLETIC SUPPORTER) MISC 1 Device by Does not apply route continuous prn (prn pain) 5/23/18   GERARDO Christiansen CNP       Current medications:    No current facility-administered medications for this visit. No current outpatient medications on file.      Facility-Administered Medications Ordered in Other Visits   Medication Dose Route Frequency Provider Last Rate Last Admin    fentaNYL (SUBLIMAZE) injection   IntraVENous PRN Juana Penanthonyt, APRN - CRNA   100 mcg at 05/10/22 1323    0.9 % sodium chloride infusion   IntraVENous Continuous PRN Juana Pennant, APRN - CRNA   New Bag at 05/10/22 1320    bupivacaine (PF) (MARCAINE) 0.25 % injection    PRN Janette Lanier MD   2 mL at 05/10/22 1324    Dexamethasone Sodium Phosphate injection    PRN Janette Lanier MD   6 mg at 05/10/22 1324 Allergies:  No Known Allergies    Problem List:    Patient Active Problem List   Diagnosis Code    Allergic rhinitis J30.9    Osteoarthritis M19.90    Depression F32. A    GERD (gastroesophageal reflux disease) K21.9    Hypertension I10    COPD (chronic obstructive pulmonary disease) J44.9    Smoker F17.200    Cervical spondylosis M47.812    Diabetes mellitus (HCC) E11.9    Hydrocele N43.3       Past Medical History:        Diagnosis Date    Abnormal liver enzymes     Alcohol abuse     Allergic rhinitis     Chronic back pain     Colonic polyp     COPD (chronic obstructive pulmonary disease) (HCC)     Depression     Diabetes mellitus (HCC)     DJD (degenerative joint disease) of cervical spine     DJD (degenerative joint disease) of lumbar spine     SP SURGERY    GERD (gastroesophageal reflux disease)     Hyperlipidemia     Hypertension     Osteoarthritis     Osteoarthritis     Smoker        Past Surgical History:        Procedure Laterality Date    BACK SURGERY  2008    X 3    [facility]    CHOLECYSTECTOMY      COLONOSCOPY  6/2012    repeat 6/2017- no precancerous  polyps    EKG 12-LEAD  4/6/2015         FACET JOINT INJECTION Bilateral 7/7/2020    BILATERAL C-FACET MBB @ C4-5,C5-6 and C6-7 performed by Sandra Hernandez MD at [address] INJECTION Bilateral 8/6/2020    bilateral C-facet MBB # 2 @ C4-5, C5-6 and C6-7 [facility] [address]  2002    Bilateral Inguinal    [address]  2006, 2007, 2008    fusion 2008    PAIN MANAGEMENT PROCEDURE Right 9/4/2020    Bilateral C-facet RFA @ C4-5, C5-6. RIGHT SIDE FIRST.  performed by Sandra Hernandez MD at [place] [address] Left 10/9/2020    Left C-facet RFA @ C4-5, and C5-6 performed by Sandra [facility] MD at [facility] OR    PAIN MANAGEMENT PROCEDURE Right 3/25/2021    Right C-facet RFA @ C4-5, C5-6 performed by Dane Cardoza MD at [place] [address] Left 4/29/2021    Left C-facet RFA @ C4-5, C5-6 performed by Dane Cardoza MD at [address] [place] [address] Left 7/31/2018    LEFT HYDROCELECTOMY performed by Sheri Howell MD at [address] INJECTION  11/4/2021     THYROID CYST ASPIRATION AND OR INJECTION 11/4/2021 Chichi Horvath MD [facility]TR[facility]OUND       Social History:    Social History     Tobacco Use    Smoking status: Current Every Day Smoker     Packs/day: 0.75     Years: 38.00     Pack years: 28.50     Types: Cigarettes     Start date: 11/19/2012    Smokeless tobacco: Never Used   Substance Use Topics    Alcohol use: Yes     Alcohol/week: 0.0 standard drinks     Comment: occasional                                Ready to quit: Not Answered  Counseling given: Not Answered      Vital Signs (Current): There were no vitals filed for this visit.                                            BP Readings from Last 3 Encounters:   05/10/22 (!) 162/76   05/10/22 138/83   04/25/22 134/72       NPO Status:                                                                                 BMI:   Wt Readings from Last 3 Encounters:   05/10/22 195 lb 6.4 oz (88.6 kg)   04/25/22 197 lb (89.4 kg)   03/30/22 197 lb (89.4 kg)     There is no height or weight on file to calculate BMI.    CBC:   Lab Results   Component Value Date    WBC 14.3 03/13/2022    RBC 5.15 03/13/2022    HGB 16.5 03/13/2022    HCT 48.1 03/13/2022    MCV 93.4 03/13/2022    RDW 13.8 05/24/2018     03/13/2022       CMP:   Lab Results   Component Value Date     03/13/2022    K 4.0 03/13/2022    CL 97 03/13/2022    CO2 23 03/13/2022    BUN 10 03/13/2022    CREATININE 1.0 03/13/2022    LABGLOM 75 03/13/2022    GLUCOSE 161 03/13/2022    GLUCOSE 141 06/20/2016    PROT 8.9 03/13/2022    PROT 7.7 09/24/2013    CALCIUM 9.9 03/13/2022    BILITOT 0.6 03/13/2022    ALKPHOS 135 03/13/2022    AST 15 03/13/2022    ALT 19 03/13/2022       POC Tests:   Recent Labs     05/10/22  1210   POCGLU 130*       Coags: No results found for: PROTIME, INR, APTT    HCG (If Applicable): No results found for: PREGTESTUR, PREGSERUM, HCG, HCGQUANT     ABGs: No results found for: PHART, PO2ART, NZI3BXE, XPP2PAC, BEART, S3ASVEEJ     Type & Screen (If Applicable):  Lab Results   Component Value Date    LABRH POS 04/06/2015       Drug/Infectious Status (If Applicable):  Lab Results   Component Value Date    HEPCAB Negative 07/23/2018       COVID-19 Screening (If Applicable):   Lab Results   Component Value Date    COVID19 Not Detected 12/31/2020         Anesthesia Evaluation  Patient summary reviewed no history of anesthetic complications:   Airway: Mallampati: II  TM distance: >3 FB   Neck ROM: full  Mouth opening: > = 3 FB Dental:          Pulmonary: breath sounds clear to auscultation  (+) COPD:  current smoker    (-) recent URI                           Cardiovascular:    (+) hypertension:, hyperlipidemia        Rhythm: regular  Rate: normal                    Neuro/Psych:   (+) neuromuscular disease:, depression/anxiety             GI/Hepatic/Renal:   (+) GERD:,           Endo/Other:    (+) Diabetes, : arthritis: OA., .                 Abdominal:             Vascular: negative vascular ROS. Other Findings:               Anesthesia Plan      MAC     ASA 3       Induction: intravenous. Anesthetic plan and risks discussed with patient.       Plan discussed with CRNA and surgical team.                  Radhika Soto MD   5/10/2022

## 2022-05-13 ENCOUNTER — HOSPITAL ENCOUNTER (OUTPATIENT)
Age: 67
Discharge: HOME | End: 2022-05-13
Payer: MEDICARE

## 2022-05-13 DIAGNOSIS — K85.90 ACUTE PANCREATITIS WITHOUT INFECTION OR NECROSIS, UNSPECIFIED PANCREATITIS TYPE: ICD-10-CM

## 2022-05-13 LAB — LIPASE: 29.3 U/L (ref 5.6–51.3)

## 2022-05-13 PROCEDURE — 83690 ASSAY OF LIPASE: CPT

## 2022-05-13 PROCEDURE — 36415 COLL VENOUS BLD VENIPUNCTURE: CPT

## 2022-05-17 ENCOUNTER — TELEPHONE (OUTPATIENT)
Dept: FAMILY MEDICINE CLINIC | Age: 67
End: 2022-05-17

## 2022-05-17 NOTE — TELEPHONE ENCOUNTER
----- Message from GERARDO Miles CNP sent at 5/16/2022  5:35 PM EDT -----  Let pt know his lipase has normalized

## 2022-05-21 DIAGNOSIS — J44.9 CHRONIC OBSTRUCTIVE PULMONARY DISEASE, UNSPECIFIED COPD TYPE (HCC): ICD-10-CM

## 2022-05-23 DIAGNOSIS — G89.4 CHRONIC PAIN SYNDROME: ICD-10-CM

## 2022-05-23 DIAGNOSIS — M47.814 THORACIC SPONDYLOSIS: ICD-10-CM

## 2022-05-23 DIAGNOSIS — M47.812 CERVICAL SPONDYLOSIS: ICD-10-CM

## 2022-05-23 DIAGNOSIS — M46.1 SACROILIAC INFLAMMATION (HCC): ICD-10-CM

## 2022-05-23 RX ORDER — UMECLIDINIUM 62.5 UG/1
AEROSOL, POWDER ORAL
Qty: 90 EACH | Refills: 1 | Status: SHIPPED | OUTPATIENT
Start: 2022-05-23

## 2022-05-23 NOTE — TELEPHONE ENCOUNTER
Ingrid Miles called requesting a refill on the following medications:  Requested Prescriptions     Pending Prescriptions Disp Refills    HYDROcodone-acetaminophen (NORCO) 5-325 MG per tablet 60 tablet 0     Sig: Take 1 tablet by mouth 2 times daily as needed for Pain for up to 30 days. Pharmacy verified:[facility]  . pv      Date of last visit: 4-  Date of next visit (if applicable): 8/9/4581

## 2022-05-24 RX ORDER — HYDROCODONE BITARTRATE AND ACETAMINOPHEN 5; 325 MG/1; MG/1
1 TABLET ORAL 2 TIMES DAILY PRN
Qty: 60 TABLET | Refills: 0 | Status: SHIPPED | OUTPATIENT
Start: 2022-05-27 | End: 2022-06-28 | Stop reason: SDUPTHER

## 2022-05-24 NOTE — TELEPHONE ENCOUNTER
OARRS reviewed. UDS: + for  Amitriptyline, Quetiapine, Norhydrocodone -consistent. + for Bupropion -inconsistent   Last seen: 4/25/2022.  Follow-up: 6/2/2022

## 2022-05-31 ENCOUNTER — OFFICE VISIT (OUTPATIENT)
Dept: FAMILY MEDICINE CLINIC | Age: 67
End: 2022-05-31
Payer: MEDICARE

## 2022-05-31 VITALS
RESPIRATION RATE: 12 BRPM | HEIGHT: 73 IN | SYSTOLIC BLOOD PRESSURE: 126 MMHG | HEART RATE: 84 BPM | TEMPERATURE: 98.2 F | OXYGEN SATURATION: 93 % | BODY MASS INDEX: 26.24 KG/M2 | WEIGHT: 198 LBS | DIASTOLIC BLOOD PRESSURE: 58 MMHG

## 2022-05-31 DIAGNOSIS — Z12.5 SCREENING FOR PROSTATE CANCER: ICD-10-CM

## 2022-05-31 DIAGNOSIS — R35.1 NOCTURIA: ICD-10-CM

## 2022-05-31 DIAGNOSIS — K21.9 GASTROESOPHAGEAL REFLUX DISEASE WITHOUT ESOPHAGITIS: ICD-10-CM

## 2022-05-31 DIAGNOSIS — H61.91 EARLOBE LESION, RIGHT: ICD-10-CM

## 2022-05-31 DIAGNOSIS — I10 PRIMARY HYPERTENSION: ICD-10-CM

## 2022-05-31 DIAGNOSIS — R22.41 LUMP OF SKIN OF RIGHT LOWER EXTREMITY: ICD-10-CM

## 2022-05-31 DIAGNOSIS — Z72.0 TOBACCO ABUSE: ICD-10-CM

## 2022-05-31 DIAGNOSIS — E11.9 TYPE 2 DIABETES MELLITUS WITHOUT COMPLICATION, WITHOUT LONG-TERM CURRENT USE OF INSULIN (HCC): ICD-10-CM

## 2022-05-31 DIAGNOSIS — F32.4 MAJOR DEPRESSIVE DISORDER WITH SINGLE EPISODE, IN PARTIAL REMISSION (HCC): Primary | ICD-10-CM

## 2022-05-31 PROCEDURE — 3044F HG A1C LEVEL LT 7.0%: CPT | Performed by: NURSE PRACTITIONER

## 2022-05-31 PROCEDURE — 1123F ACP DISCUSS/DSCN MKR DOCD: CPT | Performed by: NURSE PRACTITIONER

## 2022-05-31 PROCEDURE — 99214 OFFICE O/P EST MOD 30 MIN: CPT | Performed by: NURSE PRACTITIONER

## 2022-05-31 ASSESSMENT — ENCOUNTER SYMPTOMS
RESPIRATORY NEGATIVE: 1
SINUS PAIN: 0
BACK PAIN: 0
GASTROINTESTINAL NEGATIVE: 1
RHINORRHEA: 0

## 2022-05-31 ASSESSMENT — PATIENT HEALTH QUESTIONNAIRE - PHQ9
10. IF YOU CHECKED OFF ANY PROBLEMS, HOW DIFFICULT HAVE THESE PROBLEMS MADE IT FOR YOU TO DO YOUR WORK, TAKE CARE OF THINGS AT HOME, OR GET ALONG WITH OTHER PEOPLE: 0
8. MOVING OR SPEAKING SO SLOWLY THAT OTHER PEOPLE COULD HAVE NOTICED. OR THE OPPOSITE, BEING SO FIGETY OR RESTLESS THAT YOU HAVE BEEN MOVING AROUND A LOT MORE THAN USUAL: 0
SUM OF ALL RESPONSES TO PHQ QUESTIONS 1-9: 6
SUM OF ALL RESPONSES TO PHQ9 QUESTIONS 1 & 2: 2
6. FEELING BAD ABOUT YOURSELF - OR THAT YOU ARE A FAILURE OR HAVE LET YOURSELF OR YOUR FAMILY DOWN: 1
1. LITTLE INTEREST OR PLEASURE IN DOING THINGS: 1
2. FEELING DOWN, DEPRESSED OR HOPELESS: 1
SUM OF ALL RESPONSES TO PHQ QUESTIONS 1-9: 6
4. FEELING TIRED OR HAVING LITTLE ENERGY: 1
9. THOUGHTS THAT YOU WOULD BE BETTER OFF DEAD, OR OF HURTING YOURSELF: 0
5. POOR APPETITE OR OVEREATING: 1
SUM OF ALL RESPONSES TO PHQ QUESTIONS 1-9: 6
SUM OF ALL RESPONSES TO PHQ QUESTIONS 1-9: 6
3. TROUBLE FALLING OR STAYING ASLEEP: 1
7. TROUBLE CONCENTRATING ON THINGS, SUCH AS READING THE NEWSPAPER OR WATCHING TELEVISION: 0

## 2022-05-31 NOTE — PROGRESS NOTES
[address] [place] [address]  [address] [facility] [place] [address]  Dept: [phone]  Dept Fax: [phone]  Loc: [phone]    Edita Guaman is a 78 y.o. Jacquiline Carbon presents today for his medical conditions/complaints as noted below. Alvaro Stephenson c/o of Follow-up (chronic conditions ), Other (spot on rt ear that wont eal x 3 months ), Other (spot  on rt calf  - notice last week ), and Other (hands and fingers turn red )      HPI:      Pt here for a 6 month check up. Pt has a few concerns he would like to discuss today. Pt states he has a lesion on  His right ear that he has had for several months, he states it is a sore that scabs up and then the scab returns. No history of skin cancers. He has noticed some redness of the palms of his hands with engorged bilateral lowe arm veins. He denies any pain or N/T of hands or arms, he denies neck pain or shoulder pain. Depressed Mood    HPI:    Depressed Mood? no  Anhedonia?  no  Appetite changes?  no  Sleep disturbances? yes - in past but better with the seroquel and elavil he states he is sleeping fine now. Feelings of guilt? no  Decreased energy? no  Impaired concentration? no  Substance abuse? no    Suicidal/Homicidal Ideation? no      Compliant with meds: Yes  Med side effects: no   Sees therapist?:  yes -   Family History of Mental Illness? yes -     Review of Systems - Psychological ROS: positive for - anxiety, negative for - sleep disturbances or suicidal ideation    GERD    HPI:     Symptoms:  heartburn  Length of symptoms: 1 years  Current therapy and response:  protonix 40 mg working well  Recent change in symptoms? No  Symptoms associated with certain foods? Yes  Alcohol use? No  Tobacco use? Yes - takes wellbutrin and nictone lozenges to help stop smoking has cut down 10 cigarettes a day     Abdominal Pain? No  Mid Back Pain? No  Melena?    No    Diabetes Type 2    Glucose control: Does patient check blood glucoses at home? Yes  Report of hypoglycemia: no  Lab Results       Component                Value               Date                       LABA1C                   6.9 (H)             03/30/2022            No results found for: EAG    Symptoms  Polyuria, Polydipsia or Polyphagia? No  Chest Pain, SOB, or Palpitations? -  No  New Vision complaints? No  Paresthesias of the extremities? No    Medications  Current medication were reviewed. Compliant with medications? yes  Medication side effects? No  On ACE-I or ARB? Yes  On antiplatelet therapy? Yes  On Statin? Yes    Last Diabetic Eye Exam: in the last year    Exercise  Exercise? No  Wt Readings from Last 3 Encounters:  05/31/22 : 198 lb (89.8 kg)  05/10/22 : 195 lb 6.4 oz (88.6 kg)  04/25/22 : 197 lb (89.4 kg)      Diet discipline?:  Low salt, fat, sugar diet? yes    Blood pressure control:  BP Readings from Last 3 Encounters:  05/31/22 : (!) 126/58  05/10/22 : 127/66  05/10/22 : (!) 147/88      Lab Results       Component                Value               Date                       LABMICR                  < 1.20              04/04/2017              Lab Results       Component                Value               Date                       LDLCALC                  80                  08/02/2021            Lab Results       Component                Value               Date                       LABA1C                   6.9 (H)             03/30/2022            No results found for: EAG  , pt denies CP or sob or pedal edema. BP controlled with current meds    Pt gets up 1-2 times a night to urinate,     Pt has a lesion under the skin of his LLE, denies any injury to it, it does not hurt or get red. He thinks he has a BB in his skin.              Current Outpatient Medications   Medication Sig Dispense Refill    HYDROcodone-acetaminophen (NORCO) 5-325 MG per tablet Take 1 tablet by mouth 2 times daily as needed for Pain for up to 30 days. 60 tablet 0    INCRUSE ELLIPTA 62.5 MCG/INH AEPB INHALE 1 PUFF INTO THE LUNGS DAILY 90 each 1    lisinopril (PRINIVIL;ZESTRIL) 2.5 MG tablet TAKE 1 TABLET BY MOUTH DAILY 90 tablet 3    QUEtiapine (SEROQUEL) 200 MG tablet TAKE 1 TABLET BY MOUTH AT BEDTIME 90 tablet 3    buPROPion (WELLBUTRIN XL) 150 MG extended release tablet TAKE 1 TABLET BY MOUTH EVERY MORNING 90 tablet 3    pantoprazole (PROTONIX) 40 MG tablet TAKE 1 TABLET BY MOUTH DAILY 90 tablet 3    albuterol sulfate  (90 Base) MCG/ACT inhaler INHALE 2 PUFFS INTO THE LUNGS FOUR TIMES DAILY AS NEEDED FOR WHEEZING 54 g 3    JARDIANCE 10 MG tablet TAKE 1 TABLET BY MOUTH DAILY 90 tablet 3    Handicap Placard MISC by Does not apply route Duration: 5 years 1 each 0    metoprolol tartrate (LOPRESSOR) 25 MG tablet TAKE 1/2 TABLET BY MOUTH TWICE DAILY.  HOLD FOR HEART RATE LESS THAN 60 90 tablet 3    aspirin 81 MG chewable tablet TAKE 1 TABLET BY MOUTH DAILY 90 tablet 3    azelastine (ASTELIN) 0.1 % nasal spray SPRAY ONCE IN EACH NOSTRIL TWICE DAILY AS DIRECTED 1 each 3    Alcohol Swabs PADS 1 each by Does not apply route 2 times daily 100 each 3    JANUVIA 100 MG tablet TAKE 1 TABLET BY MOUTH DAILY 90 tablet 3    amitriptyline (ELAVIL) 50 MG tablet TAKE 1/2 TABLET BY MOUTH NIGHTLY 90 tablet 3    atorvastatin (LIPITOR) 40 MG tablet TAKE 1 TABLET BY MOUTH DAILY 90 tablet 3    metFORMIN (GLUCOPHAGE) 1000 MG tablet TAKE 1 TABLET BY MOUTH TWICE DAILY WITH MEALS 180 tablet 3    etodolac (LODINE XL) 400 MG extended release tablet TAKE 1 TABLET BY MOUTH DAILY 90 tablet 3    diclofenac sodium (VOLTAREN) 1 % GEL Apply 2 g topically 2 times daily 100 g 2    Lancets MISC 1 each by Does not apply route 2 times daily 100 each 3    Elastic Bandages & Supports (B & B A-2 ATHLETIC SUPPORTER) MISC 1 Device by Does not apply route continuous prn (prn pain) 1 each 0    Lancet Device MISC 1 Device by Does not apply route once for 1 dose 1 Device 0 No current facility-administered medications for this visit. Past Medical History:   Diagnosis Date    Abnormal liver enzymes     Alcohol abuse     Allergic rhinitis     Chronic back pain     Colonic polyp     COPD (chronic obstructive pulmonary disease) (HCC)     Depression     Diabetes mellitus (HCC)     DJD (degenerative joint disease) of cervical spine     DJD (degenerative joint disease) of lumbar spine     SP SURGERY    GERD (gastroesophageal reflux disease)     Hyperlipidemia     Hypertension     Osteoarthritis     Osteoarthritis     Smoker       Past Surgical History:   Procedure Laterality Date    BACK SURGERY  2008    X 3    [facility]    CHOLECYSTECTOMY      COLONOSCOPY  6/2012    repeat 6/2017- no precancerous  polyps    EKG 12-LEAD  4/6/2015         FACET JOINT INJECTION Bilateral 7/7/2020    BILATERAL C-FACET MBB @ C4-5,C5-6 and C6-7 performed by Brittany Herrera MD at [address] INJECTION Bilateral 8/6/2020    bilateral C-facet MBB # 2 @ C4-5, C5-6 and C6-7 performed by Brittany Herrera MD at [address] INJECTION Bilateral 5/10/2022    Bilateral C-facet RFA @ C4-5 and C5-6 performed by Brittany Herrera MD at [address] Caitie Skinner  2002    Bilateral Inguinal     LUMBAR SPINE SURGERY  2006, 2007, 2008    fusion 2008    PAIN MANAGEMENT PROCEDURE Right 9/4/2020    Bilateral C-facet RFA @ C4-5, C5-6. RIGHT SIDE FIRST.  performed by Brittany Herrera MD at [facility] Left 10/9/2020    Left C-facet RFA @ C4-5, and C5-6 performed by Brittany Herrera MD at [facility] Right 3/25/2021    Right C-facet RFA @ C4-5, C5-6 performed by Brittany Herrera MD at [facility] Left 4/29/2021 Left C-facet RFA @ C4-5, C5-6 performed by Brittany Herrera MD at [address] Left 7/31/2018    LEFT HYDROCELECTOMY performed by Adonis Khalil MD at [address] INJECTION  11/4/2021     THYROID CYST ASPIRATION AND OR INJECTION 11/4/2021 Jordan Peng MD [facility]     Family History   Problem Relation Age of Onset    Diabetes Mother     Cancer Father     Diabetes Sister     Heart Disease Brother     High Blood Pressure Neg Hx     Stroke Neg Hx      Social History     Tobacco Use    Smoking status: Current Every Day Smoker     Packs/day: 0.75     Years: 38.00     Pack years: 28.50     Types: Cigarettes     Start date: 11/19/2012    Smokeless tobacco: Never Used   Substance Use Topics    Alcohol use: Yes     Alcohol/week: 0.0 standard drinks     Comment: occasional        No Known Allergies    Health Maintenance   Topic Date Due    Prostate Specific Antigen (PSA) Screening or Monitoring  06/15/2021    COVID-19 Vaccine (3 - Booster for Pfizer series) 09/08/2021    Diabetic microalbuminuria test  05/26/2022    Lipids  08/02/2022    Diabetic retinal exam  09/23/2022    Diabetic foot exam  11/29/2022    Pneumococcal 65+ years Vaccine (2 - PCV) 11/29/2022    Low dose CT lung screening  12/10/2022    Depression Monitoring  12/20/2022    Annual Wellness Visit (AWV)  12/21/2022    DTaP/Tdap/Td vaccine (2 - Td or Tdap) 03/26/2023    A1C test (Diabetic or Prediabetic)  03/30/2023    Colorectal Cancer Screen  04/19/2027    Flu vaccine  Completed    Shingles vaccine  Completed    AAA screen  Completed    Hepatitis C screen  Completed    Hepatitis A vaccine  Aged Out    Hib vaccine  Aged Out    Meningococcal (ACWY) vaccine  Aged Out       Subjective:      Review of Systems   Constitutional: Negative for chills, fatigue and fever. HENT: Negative for congestion, rhinorrhea and sinus pain. Respiratory: Negative. Cardiovascular: Negative. Gastrointestinal: Negative. Genitourinary: Negative for difficulty urinating, dysuria and hematuria. Musculoskeletal: Negative for arthralgias, back pain and myalgias. Skin: Negative. Neurological: Negative for dizziness, facial asymmetry, weakness, light-headedness and headaches. Psychiatric/Behavioral: Positive for dysphoric mood. Negative for agitation, confusion, decreased concentration, self-injury, sleep disturbance and suicidal ideas. Objective:      BP (!) 126/58   Pulse 84   Temp 98.2 °F (36.8 °C) (Oral)   Resp 12   Ht 6' 0.5\" (1.842 m)   Wt 198 lb (89.8 kg)   SpO2 93%   BMI 26.48 kg/m²      Physical Exam  Vitals and nursing note reviewed. Constitutional:       Appearance: He is not ill-appearing. HENT:      Right Ear: Tympanic membrane, ear canal and external ear normal.      Left Ear: Tympanic membrane, ear canal and external ear normal.      Nose: Nose normal.      Mouth/Throat:      Mouth: Mucous membranes are moist.   Cardiovascular:      Rate and Rhythm: Normal rate and regular rhythm. Pulses: Normal pulses. Heart sounds: Normal heart sounds. No murmur heard. Pulmonary:      Effort: Pulmonary effort is normal. No respiratory distress. Breath sounds: Normal breath sounds. No wheezing. Abdominal:      General: Abdomen is flat. Bowel sounds are normal. There is no distension. Palpations: Abdomen is soft. Musculoskeletal:         General: Normal range of motion. Right lower leg: No edema. Left lower leg: No edema. Skin:     General: Skin is warm and dry. Capillary Refill: Capillary refill takes less than 2 seconds. Comments: bilateral hand grasp equal and strong. radial pulses equal and strong, skin of palms flesh colored, no erythema.      UE lower arms with normal vein pattern, veins are superficial to the skin but appear normal, no swelling of lower arms,    Right ear with a 2 mm raised firm area with a white flaky patch of pinna   Neurological:      General: No focal deficit present. Mental Status: He is alert and oriented to person, place, and time. Assessment/Plan:           1. Major depressive disorder with single episode, in partial remission ([address]    Stable doing well  Continue current meds  2. Gastroesophageal reflux disease without esophagitis  Stable  gerd diet   Continue with protonix     3. Primary hypertension  controlled    4. Type 2 diabetes mellitus without complication, without long-term current use of insulin (HCC)  controlled    5. Tobacco abuse  Improving  Continue with wellbutrin and nicotine lozenges     6. Earlobe lesion, right  May need biopsied to R/O skin cancer  - External Referral To Dermatology    7. Lump of skin of right lower extremity  likely enlarged lymph node  Will r/. o FB  History f no consistent with sebaceous cyst    - XR TIBIA FIBULA RIGHT (2 VIEWS); Future    8. Screening for prostate cancer    - PSA Prostatic Specific Antigen; Future    9. Nocturia     - PSA Prostatic Specific Antigen; Future  Pt in agreement with plan     Return if symptoms worsen or fail to improve. Reccommended tobaccocessation options including pharmacologic methods, counseled great than 3 minutesduring this visit:  Yes[]  No  []       Patient given educational materials -see patient instructions. Discussed use, benefit, and side effects of prescribedmedications. All patient questions answered. Pt voiced understanding. Reviewedhealth maintenance. Instructed to continue current medications, diet and exercise. Patient agreed with treatment plan. Follow up as directed.        Electronicallysigned by GERARDO Love CNP on 5/31/2022 at 10:46 AM

## 2022-06-02 ENCOUNTER — OFFICE VISIT (OUTPATIENT)
Dept: PHYSICAL MEDICINE AND REHAB | Age: 67
End: 2022-06-02
Payer: MEDICARE

## 2022-06-02 VITALS
SYSTOLIC BLOOD PRESSURE: 112 MMHG | WEIGHT: 198 LBS | BODY MASS INDEX: 26.82 KG/M2 | HEIGHT: 72 IN | DIASTOLIC BLOOD PRESSURE: 64 MMHG

## 2022-06-02 DIAGNOSIS — M54.9 MID BACK PAIN: ICD-10-CM

## 2022-06-02 DIAGNOSIS — M54.2 NECK PAIN: ICD-10-CM

## 2022-06-02 DIAGNOSIS — M47.814 THORACIC SPONDYLOSIS: ICD-10-CM

## 2022-06-02 DIAGNOSIS — M79.18 CHRONIC MYOFASCIAL PAIN: Primary | ICD-10-CM

## 2022-06-02 DIAGNOSIS — Z98.1 HISTORY OF LUMBAR FUSION: ICD-10-CM

## 2022-06-02 DIAGNOSIS — G89.4 CHRONIC PAIN SYNDROME: ICD-10-CM

## 2022-06-02 DIAGNOSIS — F11.90 CHRONIC, CONTINUOUS USE OF OPIOIDS: ICD-10-CM

## 2022-06-02 DIAGNOSIS — G89.29 CHRONIC MYOFASCIAL PAIN: Primary | ICD-10-CM

## 2022-06-02 DIAGNOSIS — M47.812 CERVICAL SPONDYLOSIS: ICD-10-CM

## 2022-06-02 PROCEDURE — 1123F ACP DISCUSS/DSCN MKR DOCD: CPT | Performed by: NURSE PRACTITIONER

## 2022-06-02 PROCEDURE — 99214 OFFICE O/P EST MOD 30 MIN: CPT | Performed by: NURSE PRACTITIONER

## 2022-06-02 ASSESSMENT — ENCOUNTER SYMPTOMS: BACK PAIN: 1

## 2022-06-02 NOTE — PROGRESS NOTES
[facility] [address]  Dept: [phone]  Dept Fax: [phone]: [phone]    Visit Date: 6/2/2022    Functionality Assessment/Goals Worksheet     On a scale of 0 (Does not Interfere) to 10 (Completely Interferes)     1. Which number describes how during the past week pain has interfered with       the following:  A. General Activity:  6  B. Mood: 4  C. Walking Ability:  4  D. Normal Work (Includes both work outside the home and housework):  8  E. Relations with Other People:   3  F. Sleep:   6  G. Enjoyment of Life:   5    2. Patient Prefers to Take their Pain Medications:     [x]  On a regular basis   []  Only when necessary    []  Does not take pain medications    3. What are the Patient's Goals/Expectations for Visiting Pain Management? []  Learn about my pain    [x]  Receive Medication   []  Physical Therapy     []  Treat Depression   [x]  Receive Injections    []  Treat Sleep   []  Deal with Anxiety and Stress   []  Treat Opoid Dependence/Addiction   []  Other:      HPI:   Meaghan Bland is a 78 y.o. male is here today for    Chief Complaint: Neck pain, muscle pain      HPI   Fu from bilateral C-facet RFA from 5/10/2022. Recieveing good relief from this procedure of about 80% or more of neck pain and complete relief off headaches. Overall pain is a lot better. Can tolerate more activity with less pain. Neck ROM improved. Has some pain a little lower between shoulder blades- dull aching pain muscle pain   Pain increases with bending, lifting, twisting , reaching, pushing, pulling and turning head. Norco prn remains effective     Medications reviewed. Patient denies side effects with medications. Patient states he is taking medications as prescribed. Hedenies receiving pain medications from other sources. He denies any ER visits since last visit.     Pain scale with out pain medications or at its worst is 6/10. Pain scale with pain medications or at its best is 1/10. States lasts a lot longer since procedure   Last dose of Norco was today   Drug screen reviewed from 4/25/2022 and was appropriate  Pill count completed  today and WNL: Yes      The patienthas No Known Allergies. Subjective:      Review of Systems   Constitutional: Negative. Musculoskeletal: Positive for back pain, myalgias, neck pain and neck stiffness. Neurological: Negative for numbness and headaches. Psychiatric/Behavioral: Negative. Objective:     Vitals:    06/02/22 1315   BP: 112/64   Weight: 198 lb (89.8 kg)   Height: 6' 0.05\" (1.83 m)       Physical Exam  Vitals and nursing note reviewed. Constitutional:       General: He is not in acute distress. Appearance: Normal appearance. He is well-developed. He is not diaphoretic. HENT:      Head: Normocephalic and atraumatic. Right Ear: External ear normal.      Left Ear: External ear normal.      Nose: Nose normal.      Mouth/Throat:      Mouth: Mucous membranes are moist.      Pharynx: No oropharyngeal exudate. Eyes:      General: No scleral icterus. Right eye: No discharge. Left eye: No discharge. Conjunctiva/sclera: Conjunctivae normal.      Pupils: Pupils are equal, round, and reactive to light. Neck:      Thyroid: No thyromegaly. Cardiovascular:      Rate and Rhythm: Normal rate and regular rhythm. Heart sounds: Normal heart sounds. No murmur heard. No friction rub. No gallop. Pulmonary:      Effort: Pulmonary effort is normal. No respiratory distress. Breath sounds: Wheezing present. No rales. Chest:      Chest wall: No tenderness. Abdominal:      General: Bowel sounds are normal. There is no distension. Palpations: Abdomen is soft. Tenderness: There is no abdominal tenderness. There is no guarding or rebound.    Musculoskeletal:         General: Swelling and tenderness present. Right shoulder: Tenderness present. Decreased range of motion. Left shoulder: Tenderness present. Decreased range of motion. Cervical back: Full passive range of motion without pain and normal range of motion. Swelling, rigidity, spasms, tenderness and bony tenderness present. No edema or erythema. Muscular tenderness present. Normal range of motion. Thoracic back: Spasms and tenderness present. Lumbar back: Tenderness and bony tenderness present. Decreased range of motion. Negative right straight leg raise test and negative left straight leg raise test.        Back:       Right hip: No tenderness. Left hip: No tenderness. Right knee: Normal range of motion. No tenderness. Left knee: Normal range of motion. No tenderness. Right lower leg: No swelling. No edema. Left lower leg: No swelling. No edema. Right ankle: No swelling. Left ankle: No swelling. Right foot: No swelling. Left foot: No swelling. Skin:     General: Skin is warm. Coloration: Skin is not pale. Findings: No erythema or rash. Neurological:      General: No focal deficit present. Mental Status: He is alert and oriented to person, place, and time. He is not disoriented. Cranial Nerves: No cranial nerve deficit. Sensory: Sensory deficit present. Motor: No atrophy or abnormal muscle tone. Coordination: Coordination normal.      Gait: Gait normal.      Deep Tendon Reflexes: Babinski sign absent on the right side. Babinski sign absent on the left side. Reflex Scores:       Tricep reflexes are 1+ on the right side and 1+ on the left side. Bicep reflexes are 1+ on the right side and 1+ on the left side. Brachioradialis reflexes are 1+ on the right side and 1+ on the left side. Patellar reflexes are 1+ on the right side and 1+ on the left side.        Achilles reflexes are 1+ on the right side and 1+ on the left side.  Psychiatric:         Attention and Perception: Attention normal. He is attentive. Mood and Affect: Mood normal. Mood is not anxious or depressed. Affect is not labile, blunt, angry or inappropriate. Speech: Speech normal. He is communicative. Speech is not rapid and pressured, delayed, slurred or tangential.         Behavior: Behavior normal. Behavior is not agitated, slowed, aggressive, withdrawn, hyperactive or combative. Behavior is cooperative. Thought Content: Thought content normal. Thought content is not paranoid or delusional. Thought content does not include homicidal or suicidal ideation. Thought content does not include homicidal or suicidal plan. Cognition and Memory: Cognition normal. Memory is not impaired. He does not exhibit impaired recent memory or impaired remote memory. Judgment: Judgment is not impulsive or inappropriate. Assessment:     1. Chronic myofascial pain    2. Cervical spondylosis    3. Thoracic spondylosis    4. Neck pain    5. Mid back pain    6. History of lumbar fusion    7. Chronic pain syndrome    8. Chronic, continuous use of opioids            Plan:      OARRS reviewed. Current MED: 10.00  Patient was offered naloxone for home. Discussed long term side effects of medications, tolerance, dependency and addiction. Previous UDS reviewed  UDS preformed today for compliance. Patient told can not receive any pain medications from any other source. No evidence of abuse, diversion or aberrant behavior. Medications and/or procedures to improve function and quality of life- patient understanding with this and that may not be pain free  Discussed with patient about safe storage of medications at home  Discussed possible weaning of medication dosing dependent on treatment/procedure results. Discussed with patient about risks with procedure including infection, reaction to medication, increased pain, or bleeding.   Procedure notes reveiwed in detail  Receiving 80% relief or more of neck pain from C-facet RFA and 100% relief of headaches and continues to receive that relief. Plan upper thoracic and cervical trigger point injections in office with Yun LANE   Continue Norco 5/325 BID prn- filled 5/27/2022 and has plenty. Instructed to take less as pain is improved. Meds. Prescribed:   No orders of the defined types were placed in this encounter. Return for upper thoracic and cervical trigger point injections in office with Yun LANE. FU in 2 months with me.                Electronically signed by Arthuro Bumpers, APRN - CNP on6/2/2022 at 1:53 PM

## 2022-06-09 ENCOUNTER — OFFICE VISIT (OUTPATIENT)
Dept: PHYSICAL MEDICINE AND REHAB | Age: 67
End: 2022-06-09
Payer: MEDICARE

## 2022-06-09 VITALS
HEIGHT: 72 IN | BODY MASS INDEX: 26.82 KG/M2 | DIASTOLIC BLOOD PRESSURE: 70 MMHG | SYSTOLIC BLOOD PRESSURE: 112 MMHG | WEIGHT: 198 LBS

## 2022-06-09 DIAGNOSIS — G89.4 CHRONIC PAIN SYNDROME: ICD-10-CM

## 2022-06-09 DIAGNOSIS — M79.18 MYOFASCIAL PAIN: Primary | ICD-10-CM

## 2022-06-09 PROCEDURE — 20553 NJX 1/MLT TRIGGER POINTS 3/>: CPT | Performed by: NURSE PRACTITIONER

## 2022-06-09 RX ORDER — METHOCARBAMOL 100 MG/ML
100 INJECTION, SOLUTION INTRAMUSCULAR; INTRAVENOUS ONCE
Status: COMPLETED | OUTPATIENT
Start: 2022-06-09 | End: 2022-06-09

## 2022-06-09 RX ADMIN — METHOCARBAMOL 100 MG: 100 INJECTION, SOLUTION INTRAMUSCULAR; INTRAVENOUS at 09:34

## 2022-06-09 NOTE — PROGRESS NOTES
Procedure  Visit Date: 6/9/22    Leopoldo Acosta is a 78 y.o. male presents today in the office for the following:  Chief Complaint   Patient presents with    Injections     Thoracic and cervical trigger point injections       History of Present Illness   Leonard Palumbo is here today for trigger point injections. Pre-Op Diagnosis   Myofacial pain syndrome     Post-Op Diagnosis   Myofacial pain syndrome     Procedure: Trigger point injection(s)    Procedure Documentation   Patient identified. Consent signed. Site identified. Trigger points were identified in the bilateral cervical paraspinals, bilateral trapezius, bilateral thoracici paraspinals for a total of 20 trigger point injections. Then with a 25g needle entered each trigger point and after negative aspiration, 1 cc of a mixture containing 1:10 - 100 mg methocarbamol: 0.5% bupivacaine was injected at each trigger point for a total of 20 trigger points. Procedural Complications: None      Vitals:    06/09/22 0902   BP: 112/70   Weight: 198 lb (89.8 kg)   Height: 6' 0.05\" (1.83 m)       Conclusion   No complications encountered. Patient discharged stable condition. Patient told if any problems to call office or go to ER. No orders of the defined types were placed in this encounter.       Electronically signed by GERARDO Henry CNP on 6/9/22 at 9:05 AM EDT

## 2022-06-27 DIAGNOSIS — M46.1 SACROILIAC INFLAMMATION (HCC): ICD-10-CM

## 2022-06-27 DIAGNOSIS — M47.814 THORACIC SPONDYLOSIS: ICD-10-CM

## 2022-06-27 DIAGNOSIS — G89.4 CHRONIC PAIN SYNDROME: ICD-10-CM

## 2022-06-27 DIAGNOSIS — M47.812 CERVICAL SPONDYLOSIS: ICD-10-CM

## 2022-06-27 NOTE — TELEPHONE ENCOUNTER
Laura Score called requesting a refill on the following medications:  Requested Prescriptions     Pending Prescriptions Disp Refills    HYDROcodone-acetaminophen (NORCO) 5-325 MG per tablet 60 tablet 0     Sig: Take 1 tablet by mouth 2 times daily as needed for Pain for up to 30 days. Pharmacy verified: [facility] [address], [address]  . pv      Date of last visit: 6/2/2022  Date of next visit (if applicable): 5/2/5481

## 2022-06-28 RX ORDER — HYDROCODONE BITARTRATE AND ACETAMINOPHEN 5; 325 MG/1; MG/1
1 TABLET ORAL 2 TIMES DAILY PRN
Qty: 60 TABLET | Refills: 0 | Status: SHIPPED | OUTPATIENT
Start: 2022-06-28 | End: 2022-07-28 | Stop reason: SDUPTHER

## 2022-06-28 NOTE — TELEPHONE ENCOUNTER
OARRS reviewed. UDS: + for Amitriptyline, Seroquel, Wellbutrin, Norhydrocodone. Last seen: 6/2/2022.  Follow-up:   Future Appointments   Date Time Provider Ezra Macdonald   8/3/2022 12:45 PM GERARDO Jones - CNP [facility]   12/21/2022  8:20 AM GERARDO Garcia 87

## 2022-07-26 DIAGNOSIS — M46.1 SACROILIAC INFLAMMATION (HCC): ICD-10-CM

## 2022-07-26 DIAGNOSIS — M47.814 THORACIC SPONDYLOSIS: ICD-10-CM

## 2022-07-26 DIAGNOSIS — M47.812 CERVICAL SPONDYLOSIS: ICD-10-CM

## 2022-07-26 DIAGNOSIS — G89.4 CHRONIC PAIN SYNDROME: ICD-10-CM

## 2022-07-26 NOTE — TELEPHONE ENCOUNTER
Naresh Cox called requesting a refill on the following medications:  Requested Prescriptions     Pending Prescriptions Disp Refills    HYDROcodone-acetaminophen (NORCO) 5-325 MG per tablet 60 tablet 0     Sig: Take 1 tablet by mouth 2 times daily as needed for Pain for up to 30 days. Pharm[facility] verified: [facility]      Date of last visit: 6/9/2022  Date of next visit (if applicable): 1/9/5701

## 2022-07-28 RX ORDER — HYDROCODONE BITARTRATE AND ACETAMINOPHEN 5; 325 MG/1; MG/1
1 TABLET ORAL 2 TIMES DAILY PRN
Qty: 60 TABLET | Refills: 0 | Status: SHIPPED | OUTPATIENT
Start: 2022-07-28 | End: 2022-08-25 | Stop reason: SDUPTHER

## 2022-07-28 NOTE — TELEPHONE ENCOUNTER
OARRS reviewed. UDS: + for Quetiapine, Hydrocodone, Amitiptyline, Cotinine, Bupropion   Last seen: 6/2/2022.  Follow-up: 8/3/22  Future Appointments   Date Time Provider Ezra Renae   8/3/2022 12:45 PM GERARDO Gifford - CNP N [facility] [place]   12/21/2022  8:20 AM GERARDO Lopez - [address] [facility]

## 2022-08-08 ENCOUNTER — OFFICE VISIT (OUTPATIENT)
Dept: PHYSICAL MEDICINE AND REHAB | Age: 67
End: 2022-08-08
Payer: MEDICARE

## 2022-08-08 VITALS
SYSTOLIC BLOOD PRESSURE: 112 MMHG | BODY MASS INDEX: 26.82 KG/M2 | WEIGHT: 198 LBS | HEIGHT: 72 IN | DIASTOLIC BLOOD PRESSURE: 72 MMHG

## 2022-08-08 DIAGNOSIS — F11.90 CHRONIC, CONTINUOUS USE OF OPIOIDS: ICD-10-CM

## 2022-08-08 DIAGNOSIS — G89.4 CHRONIC PAIN SYNDROME: ICD-10-CM

## 2022-08-08 DIAGNOSIS — M47.814 THORACIC SPONDYLOSIS: ICD-10-CM

## 2022-08-08 DIAGNOSIS — G89.29 CHRONIC MYOFASCIAL PAIN: ICD-10-CM

## 2022-08-08 DIAGNOSIS — M54.2 NECK PAIN: ICD-10-CM

## 2022-08-08 DIAGNOSIS — M47.812 CERVICAL SPONDYLOSIS: Primary | ICD-10-CM

## 2022-08-08 DIAGNOSIS — M54.9 MID BACK PAIN: ICD-10-CM

## 2022-08-08 DIAGNOSIS — M79.18 CHRONIC MYOFASCIAL PAIN: ICD-10-CM

## 2022-08-08 DIAGNOSIS — Z98.1 HISTORY OF LUMBAR FUSION: ICD-10-CM

## 2022-08-08 PROCEDURE — 1123F ACP DISCUSS/DSCN MKR DOCD: CPT | Performed by: NURSE PRACTITIONER

## 2022-08-08 PROCEDURE — 99214 OFFICE O/P EST MOD 30 MIN: CPT | Performed by: NURSE PRACTITIONER

## 2022-08-08 ASSESSMENT — ENCOUNTER SYMPTOMS: BACK PAIN: 1

## 2022-08-08 NOTE — PROGRESS NOTES
[facility] [address]  Dept: [phone]  Dept Fax: [phone]: [phone]    Visit Date: 8/8/2022    Functionality Assessment/Goals Worksheet     On a scale of 0 (Does not Interfere) to 10 (Completely Interferes)     1. Which number describes how during the past week pain has interfered with       the following:  A. General Activity:  5  B. Mood: 4  C. Walking Ability:  5  D. Normal Work (Includes both work outside the home and housework):  7  E. Relations with Other People:   5  F. Sleep:   6  G. Enjoyment of Life:   4    2. Patient Prefers to Take their Pain Medications:     [x]  On a regular basis   [x]  Only when necessary    []  Does not take pain medications    3. What are the Patient's Goals/Expectations for Visiting Pain Management? []  Learn about my pain    [x]  Receive Medication   []  Physical Therapy     []  Treat Depression   [x]  Receive Injections    []  Treat Sleep   []  Deal with Anxiety and Stress   []  Treat Opoid Dependence/Addiction   []  Other:      HPI:   Scarlet Shane is a 78 y.o. male is here today for    Chief Complaint: Neck pain, back pain     HPI   2 month FU. And FU from trigger point injections from 6/9/2022. Reports that he received relief of muscle pain from trigger point injections good relief for about 1.5 weeks in neck. Still good relief lower in thoracic spine. Still pain mainly between shoulder blades- dull aching pain,. Up and down. Posterior neck pain remains controlled as he continues to recieve good relief from C-facet RFA. Not many headaches as well. Pain increases with bending, lifting, twisting , turning head, getting up and down, and housework or working at job. Pain medications remains effective       Medications reviewed. Patient denies side effects with medications.  Patient states he is taking medications as prescribed. Hedenies receiving pain medications from other sources. He denies any ER visits since last visit. Pain scale with out pain medications or at its worst is 5-7/10. Pain scale with pain medications or at its best is 2/10. Last dose of [place] was last night   Drug screen reviewed from 6/2/2022 and was appropriate  Pill count completed  today and WNL: Yes      The patienthas No Known Allergies. Subjective:      Review of Systems   Constitutional: Negative. Musculoskeletal:  Positive for arthralgias, back pain, myalgias, neck pain and neck stiffness. Negative for gait problem and joint swelling. Skin: Negative. Neurological:  Negative for weakness, numbness and headaches. Psychiatric/Behavioral: Negative. Objective:     Vitals:    08/08/22 0811   BP: 112/72   Weight: 198 lb (89.8 kg)   Height: 6' 0.05\" (1.83 m)       Physical Exam  Vitals and nursing note reviewed. Constitutional:       General: He is not in acute distress. Appearance: Normal appearance. He is well-developed. He is not diaphoretic. HENT:      Head: Normocephalic and atraumatic. Right Ear: External ear normal.      Left Ear: External ear normal.      Nose: Nose normal.      Mouth/Throat:      Mouth: Mucous membranes are moist.      Pharynx: No oropharyngeal exudate. Eyes:      General: No scleral icterus. Right eye: No discharge. Left eye: No discharge. Conjunctiva/sclera: Conjunctivae normal.      Pupils: Pupils are equal, round, and reactive to light. Neck:      Thyroid: No thyromegaly. Cardiovascular:      Rate and Rhythm: Normal rate and regular rhythm. Heart sounds: Normal heart sounds. No murmur heard. No friction rub. No gallop. Pulmonary:      Effort: Pulmonary effort is normal. No respiratory distress. Breath sounds: Normal breath sounds. No wheezing or rales. Chest:      Chest wall: No tenderness.    Abdominal:      General: Bowel sounds are normal. There is no distension. Palpations: Abdomen is soft. Tenderness: There is no abdominal tenderness. There is no guarding or rebound. Musculoskeletal:         General: Tenderness present. No swelling. Right shoulder: Tenderness present. Decreased range of motion. Left shoulder: Tenderness present. Decreased range of motion. Cervical back: Full passive range of motion without pain and normal range of motion. Swelling, rigidity, spasms, tenderness and bony tenderness present. No edema or erythema. Muscular tenderness present. Normal range of motion. Thoracic back: Spasms and tenderness present. Lumbar back: Tenderness and bony tenderness present. Decreased range of motion. Negative right straight leg raise test and negative left straight leg raise test.        Back:       Right hip: No tenderness. Left hip: No tenderness. Right knee: Normal range of motion. No tenderness. Left knee: Normal range of motion. No tenderness. Right lower leg: No swelling. No edema. Left lower leg: No swelling. No edema. Right ankle: No swelling. Left ankle: No swelling. Right foot: No swelling. Left foot: No swelling. Skin:     General: Skin is warm. Coloration: Skin is not pale. Findings: No erythema or rash. Neurological:      General: No focal deficit present. Mental Status: He is alert and oriented to person, place, and time. He is not disoriented. Cranial Nerves: No cranial nerve deficit. Sensory: Sensory deficit present. Motor: No atrophy or abnormal muscle tone. Coordination: Coordination normal.      Gait: Gait normal.      Deep Tendon Reflexes: Babinski sign absent on the right side. Babinski sign absent on the left side. Reflex Scores:       Tricep reflexes are 1+ on the right side and 1+ on the left side. Bicep reflexes are 1+ on the right side and 1+ on the left side.        Brachioradialis reflexes are 1+ on the right side and 1+ on the left side. Patellar reflexes are 1+ on the right side and 1+ on the left side. Achilles reflexes are 1+ on the right side and 1+ on the left side. Psychiatric:         Attention and Perception: Attention normal. He is attentive. Mood and Affect: Mood normal. Mood is not anxious or depressed. Affect is not labile, blunt, angry or inappropriate. Speech: Speech normal. He is communicative. Speech is not rapid and pressured, delayed, slurred or tangential.         Behavior: Behavior normal. Behavior is not agitated, slowed, aggressive, withdrawn, hyperactive or combative. Behavior is cooperative. Thought Content: Thought content normal. Thought content is not paranoid or delusional. Thought content does not include homicidal or suicidal ideation. Thought content does not include homicidal or suicidal plan. Cognition and Memory: Cognition normal. Memory is not impaired. He does not exhibit impaired recent memory or impaired remote memory. Judgment: Judgment is not impulsive or inappropriate. JIMENA  Patricks test  negative  Yeoman's  or Gaenslen's negative         Assessment:     1. Cervical spondylosis    2. Neck pain    3. Thoracic spondylosis    4. Mid back pain    5. Chronic myofascial pain    6. History of lumbar fusion    7. Chronic pain syndrome    8. Chronic, continuous use of opioids            Plan:      OARRS reviewed. Current MED: 10.00  Patient was offered naloxone for home. Discussed long term side effects of medications, tolerance, dependency and addiction. Previous UDS reviewed  UDS preformed today for compliance. Patient told can not receive any pain medications from any other source. No evidence of abuse, diversion or aberrant behavior.   Medications and/or procedures to improve function and quality of life- patient understanding with this and that may not be pain free  Discussed with patient about safe storage of medications at home  Discussed possible weaning of medication dosing dependent on treatment/procedure results. Discussed with patient about risks with procedure including infection, reaction to medication, increased pain, or bleeding. Procedure notes reviewed in detail  Continues to receive over 80% relief of neck pain and headaches from bilateral C-facet RFA  Good short term relief from trigger point injections. Discussed repeating if needing to   Continue Norco 5/325 BID prn- filled 7/28/2022   Is compliant     Meds. Prescribed:   No orders of the defined types were placed in this encounter. Return in about 2 months (around 10/8/2022), or if symptoms worsen or fail to improve.                Electronically signed by GERARDO Jaime CNP on8/8/2022 at 8:26 AM

## 2022-08-25 ENCOUNTER — TELEPHONE (OUTPATIENT)
Dept: PHYSICAL MEDICINE AND REHAB | Age: 67
End: 2022-08-25

## 2022-08-25 DIAGNOSIS — M46.1 SACROILIAC INFLAMMATION (HCC): ICD-10-CM

## 2022-08-25 DIAGNOSIS — M47.812 CERVICAL SPONDYLOSIS: Primary | ICD-10-CM

## 2022-08-25 DIAGNOSIS — M47.814 THORACIC SPONDYLOSIS: ICD-10-CM

## 2022-08-25 DIAGNOSIS — G89.4 CHRONIC PAIN SYNDROME: ICD-10-CM

## 2022-08-25 DIAGNOSIS — M47.812 CERVICAL SPONDYLOSIS: ICD-10-CM

## 2022-08-25 RX ORDER — HYDROCODONE BITARTRATE AND ACETAMINOPHEN 5; 325 MG/1; MG/1
1 TABLET ORAL 2 TIMES DAILY PRN
Qty: 14 TABLET | Refills: 0 | Status: SHIPPED | OUTPATIENT
Start: 2022-08-29 | End: 2022-09-07 | Stop reason: SDUPTHER

## 2022-08-25 NOTE — TELEPHONE ENCOUNTER
OARRS reviewed. UDS: + for  .hydrocodone, amitriptyline bupropion quetiapine . ETG/ETS. Will call and have repeat urine. Will you give a 1 week fill to results back? Setup in case for starting mon. 8/29. Let me know . Notif. Pt and out of town thru weekend and will come in mon. To do urine. Reiterated to not drink alcohol ith narcotics  Last seen: 8/8/2022.  Follow-up:   Future Appointments   Date Time Provider Ezra Macdonald   10/10/2022  8:15 AM GERARDO Worrell - CNP [facility]   12/21/2022  8:20 AM GERARDO Medellin

## 2022-09-07 DIAGNOSIS — M46.1 SACROILIAC INFLAMMATION (HCC): ICD-10-CM

## 2022-09-07 DIAGNOSIS — G89.4 CHRONIC PAIN SYNDROME: ICD-10-CM

## 2022-09-07 DIAGNOSIS — M47.814 THORACIC SPONDYLOSIS: ICD-10-CM

## 2022-09-07 DIAGNOSIS — M47.812 CERVICAL SPONDYLOSIS: ICD-10-CM

## 2022-09-07 RX ORDER — HYDROCODONE BITARTRATE AND ACETAMINOPHEN 5; 325 MG/1; MG/1
1 TABLET ORAL 2 TIMES DAILY PRN
Qty: 60 TABLET | Refills: 0 | Status: SHIPPED | OUTPATIENT
Start: 2022-09-07 | End: 2022-10-05 | Stop reason: SDUPTHER

## 2022-09-07 NOTE — TELEPHONE ENCOUNTER
Patient is calling in to check if his urine results from 8/29/2022 are back so [facility] Please advise.

## 2022-09-18 DIAGNOSIS — M19.90 ARTHRITIS: ICD-10-CM

## 2022-09-19 RX ORDER — ETODOLAC 400 MG/1
400 TABLET, EXTENDED RELEASE ORAL DAILY
Qty: 90 TABLET | Refills: 3 | Status: SHIPPED | OUTPATIENT
Start: 2022-09-19

## 2022-09-19 NOTE — TELEPHONE ENCOUNTER
Recent Visits  Date Type Provider Dept   05/31/22 Office Visit GERARDO Joel CNP [facility]   03/30/22 Office Visit GERARDO Joel CNP [facility]   12/20/21 Office Visit GERARDO Joel CNP [facility]   11/29/21 Office Visit GERARDO Joel CNP [facility]   08/25/21 Office Visit GERARDO Joel CNP [facility]   05/26/21 Office Visit GERARDO Joel CNP [facility]   Showing recent visits within past 540 days with a meds authorizing provider and meeting all other requirements  Future Appointments  Date Type Provider Dept   12/21/22 Appointment GERARDO Joel CNP [facility]   Showing future appointments within next 150 days with a meds authorizing provider and meeting all other requirements      Future Appointments   Date Time Provider Ezra Macdonald   10/10/2022  8:15 AM GERARDO Aguirre CNP [facility] [address]   12/21/2022  8:20 AM GERARDO Joel

## 2022-10-05 DIAGNOSIS — M47.812 CERVICAL SPONDYLOSIS: ICD-10-CM

## 2022-10-05 DIAGNOSIS — M46.1 SACROILIAC INFLAMMATION (HCC): ICD-10-CM

## 2022-10-05 DIAGNOSIS — M47.814 THORACIC SPONDYLOSIS: ICD-10-CM

## 2022-10-05 DIAGNOSIS — G89.4 CHRONIC PAIN SYNDROME: ICD-10-CM

## 2022-10-05 RX ORDER — HYDROCODONE BITARTRATE AND ACETAMINOPHEN 5; 325 MG/1; MG/1
1 TABLET ORAL 2 TIMES DAILY PRN
Qty: 60 TABLET | Refills: 0 | Status: SHIPPED | OUTPATIENT
Start: 2022-10-07 | End: 2022-11-02 | Stop reason: SDUPTHER

## 2022-10-05 NOTE — TELEPHONE ENCOUNTER
OARRS reviewed. UDS: + for  bupropion, amitriptyline, hydrocodone, quetiapine. Last seen: 8/8/2022.  Follow-up:   Future Appointments   Date Time Provider Ezra Renae   10/10/2022  8:15 AM GERARDO Davis - CNP [facility] [address] [address]   12/21/2022  8:20 AM GERARDO Poe

## 2022-10-10 ENCOUNTER — OFFICE VISIT (OUTPATIENT)
Dept: PHYSICAL MEDICINE AND REHAB | Age: 67
End: 2022-10-10
Payer: MEDICARE

## 2022-10-10 VITALS
BODY MASS INDEX: 26.82 KG/M2 | DIASTOLIC BLOOD PRESSURE: 70 MMHG | WEIGHT: 198 LBS | SYSTOLIC BLOOD PRESSURE: 120 MMHG | HEIGHT: 72 IN

## 2022-10-10 DIAGNOSIS — Z98.1 HISTORY OF LUMBAR FUSION: ICD-10-CM

## 2022-10-10 DIAGNOSIS — G89.4 CHRONIC PAIN SYNDROME: ICD-10-CM

## 2022-10-10 DIAGNOSIS — M79.18 MYOFASCIAL PAIN: ICD-10-CM

## 2022-10-10 DIAGNOSIS — M54.9 MID BACK PAIN: ICD-10-CM

## 2022-10-10 DIAGNOSIS — M79.18 CHRONIC MYOFASCIAL PAIN: ICD-10-CM

## 2022-10-10 DIAGNOSIS — M47.812 CERVICAL SPONDYLOSIS: Primary | ICD-10-CM

## 2022-10-10 DIAGNOSIS — G89.29 CHRONIC MYOFASCIAL PAIN: ICD-10-CM

## 2022-10-10 DIAGNOSIS — M54.2 NECK PAIN: ICD-10-CM

## 2022-10-10 DIAGNOSIS — M47.814 THORACIC SPONDYLOSIS: ICD-10-CM

## 2022-10-10 PROCEDURE — 1123F ACP DISCUSS/DSCN MKR DOCD: CPT | Performed by: NURSE PRACTITIONER

## 2022-10-10 PROCEDURE — 99214 OFFICE O/P EST MOD 30 MIN: CPT | Performed by: NURSE PRACTITIONER

## 2022-10-10 ASSESSMENT — ENCOUNTER SYMPTOMS: BACK PAIN: 1

## 2022-10-10 NOTE — PROGRESS NOTES
[address] [facility] [address]  Dept: [phone]  Dept Fax: [phone]: [phone]    Visit Date: 10/10/2022    Functionality Assessment/Goals Worksheet     On a scale of 0 (Does not Interfere) to 10 (Completely Interferes)     1. Which number describes how during the past week pain has interfered with       the following:  A. General Activity:  5  B. Mood: 6  C. Walking Ability:  5  D. Normal Work (Includes both work outside the home and housework):  7  E. Relations with Other People:   5  F. Sleep:   6  G. Enjoyment of Life:   6    2. Patient Prefers to Take their Pain Medications:     [x]  On a regular basis   [x]  Only when necessary    []  Does not take pain medications    3. What are the Patient's Goals/Expectations for Visiting Pain Management? []  Learn about my pain    [x]  Receive Medication   []  Physical Therapy     []  Treat Depression   [x]  Receive Injections    []  Treat Sleep   []  Deal with Anxiety and Stress   []  Treat Opoid Dependence/Addiction   []  Other:      HPI:   Alva Boggs is a 78 y.o. male is here today for    Chief Complaint: Neck pain    HPI   2 month FU. Main complaint is posterior neck pain radiating into shoulder blades- aching and dull pain and some tightness. Still relief from C-facet RFA but states that pain is slowly returning. Intermittent headaches. Pain increases with bending, lifting, twisting , reaching, pushing, pulling, turning head, getting up and down, and housework or working at job. Denies any radicular pain     Norco prn remains effective     Medications reviewed. Patient denies side effects with medications. Patient states he is taking medications as prescribed. Hedenies receiving pain medications from other sources. He denies any ER visits since last visit.     Pain scale with out pain medications or at its worst is 6/10. Pain scale with pain medications or at its best is 2/10. Last dose of [place] was today   Drug screen reviewed from 8/29/2022 and was appropriate  Pill count completed  today and WNL: Yes      The patienthas No Known Allergies. Subjective:      Review of Systems   Constitutional: Negative. Musculoskeletal:  Positive for arthralgias, back pain, myalgias, neck pain and neck stiffness. Negative for gait problem and joint swelling. Skin: Negative. Neurological:  Positive for headaches. Negative for weakness and numbness. Psychiatric/Behavioral: Negative. Objective:     Vitals:    10/10/22 0813   BP: 120/70   Weight: 198 lb (89.8 kg)   Height: 6' 0.05\" (1.83 m)       Physical Exam  Vitals and nursing note reviewed. Constitutional:       General: He is not in acute distress. Appearance: Normal appearance. He is well-developed. He is not diaphoretic. HENT:      Head: Normocephalic and atraumatic. Right Ear: External ear normal.      Left Ear: External ear normal.      Nose: Nose normal.      Mouth/Throat:      Mouth: Mucous membranes are moist.      Pharynx: No oropharyngeal exudate. Eyes:      General: No scleral icterus. Right eye: No discharge. Left eye: No discharge. Conjunctiva/sclera: Conjunctivae normal.      Pupils: Pupils are equal, round, and reactive to light. Neck:      Thyroid: No thyromegaly. Cardiovascular:      Rate and Rhythm: Normal rate and regular rhythm. Heart sounds: Normal heart sounds. No murmur heard. No friction rub. No gallop. Pulmonary:      Effort: Pulmonary effort is normal. No respiratory distress. Breath sounds: Normal breath sounds. No wheezing or rales. Chest:      Chest wall: No tenderness. Abdominal:      General: Bowel sounds are normal. There is no distension. Palpations: Abdomen is soft. Tenderness: There is no abdominal tenderness. There is no guarding or rebound. Musculoskeletal:         General: Tenderness present. No swelling. Right shoulder: Tenderness present. Decreased range of motion. Left shoulder: Tenderness present. Decreased range of motion. Cervical back: Full passive range of motion without pain and normal range of motion. Swelling, rigidity, spasms, tenderness and bony tenderness present. No edema or erythema. Muscular tenderness present. Normal range of motion. Thoracic back: Spasms and tenderness present. Lumbar back: Tenderness and bony tenderness present. Decreased range of motion. Negative right straight leg raise test and negative left straight leg raise test.        Back:       Right hip: No tenderness. Left hip: No tenderness. Right knee: Normal range of motion. No tenderness. Left knee: Normal range of motion. No tenderness. Right lower leg: No swelling. No edema. Left lower leg: No swelling. No edema. Right ankle: No swelling. Left ankle: No swelling. Right foot: No swelling. Left foot: No swelling. Skin:     General: Skin is warm. Coloration: Skin is not pale. Findings: No erythema or rash. Neurological:      General: No focal deficit present. Mental Status: He is alert and oriented to person, place, and time. He is not disoriented. Cranial Nerves: No cranial nerve deficit. Sensory: Sensory deficit present. Motor: No atrophy or abnormal muscle tone. Coordination: Coordination normal.      Gait: Gait normal.      Deep Tendon Reflexes: Babinski sign absent on the right side. Babinski sign absent on the left side. Reflex Scores:       Tricep reflexes are 1+ on the right side and 1+ on the left side. Bicep reflexes are 1+ on the right side and 1+ on the left side. Brachioradialis reflexes are 1+ on the right side and 1+ on the left side. Patellar reflexes are 1+ on the right side and 1+ on the left side.        Achilles reflexes are 1+ on the right side and 1+ on the left side. Psychiatric:         Attention and Perception: Attention normal. He is attentive. Mood and Affect: Mood normal. Mood is not anxious or depressed. Affect is not labile, blunt, angry or inappropriate. Speech: Speech normal. He is communicative. Speech is not rapid and pressured, delayed, slurred or tangential.         Behavior: Behavior normal. Behavior is not agitated, slowed, aggressive, withdrawn, hyperactive or combative. Behavior is cooperative. Thought Content: Thought content normal. Thought content is not paranoid or delusional. Thought content does not include homicidal or suicidal ideation. Thought content does not include homicidal or suicidal plan. Cognition and Memory: Cognition normal. Memory is not impaired. He does not exhibit impaired recent memory or impaired remote memory. Judgment: Judgment is not impulsive or inappropriate. JIMENA  Patricks test  negative  Yeoman's  or Gaenslen's negative       Assessment:     1. Cervical spondylosis    2. Neck pain    3. Thoracic spondylosis    4. Mid back pain    5. Chronic myofascial pain    6. History of lumbar fusion    7. Myofascial pain    8. Chronic pain syndrome            Plan:      OARRS reviewed. Current MED: 10.00  Patient was offered naloxone for home. Discussed long term side effects of medications, tolerance, dependency and addiction. Previous UDS reviewed  UDS preformed today for compliance. Patient told can not receive any pain medications from any other source. No evidence of abuse, diversion or aberrant behavior. Medications and/or procedures to improve function and quality of life- patient understanding with this and that may not be pain free  Discussed with patient about safe storage of medications at home  Discussed possible weaning of medication dosing dependent on treatment/procedure results.    Discussed with patient about risks with procedure including infection, reaction to medication, increased pain, or bleeding. Procedure notes reviewed in detail  Continues to receive over 80% relief of neck pain and headaches from bilateral C-facet RFA  Good short term relief from trigger point injections. Discussed repeating if needing to   Continue Norco 5/325 BID prn- filled 10/5/2022 and has plenty   Is compliant. UDS ordered     Meds. Prescribed:   No orders of the defined types were placed in this encounter. Return in about 2 months (around 12/10/2022), or if symptoms worsen or fail to improve, for follow up  for medications.                Electronically signed by GERARDO Friedman CNP on10/10/2022 at 8:29 AM

## 2022-10-27 DIAGNOSIS — E11.9 TYPE 2 DIABETES MELLITUS WITHOUT COMPLICATION, WITHOUT LONG-TERM CURRENT USE OF INSULIN (HCC): ICD-10-CM

## 2022-10-27 RX ORDER — SITAGLIPTIN 100 MG/1
TABLET, FILM COATED ORAL
Qty: 90 TABLET | Refills: 3 | Status: SHIPPED | OUTPATIENT
Start: 2022-10-27

## 2022-10-27 NOTE — TELEPHONE ENCOUNTER
Recent Visits  Date Type Provider Dept   05/31/22 Office Visit GERARDO Miller - CNP [facility]   03/30/22 Office Visit Ryan Be APRN - CNP [facility]   12/20/21 Office Visit Ryan Be, GERARDO - CNP [facility]   11/29/21 Office Visit GERARDO Miller - CNP [facility]   08/25/21 Office Visit GERARDO Miller - CNP [facility]   05/26/21 Office Visit GERARDO Miller - CNP [facility]   Showing recent visits within past 540 days with a meds authorizing provider and meeting all other requirements  Future Appointments  Date Type Provider Dept   12/21/22 Appointment GERARDO Miller CNP [facility]   Showing future appointments within next 150 days with a meds authorizing provider and meeting all other requirements     Future Appointments   Date Time Provider Ezra Macdonald   12/12/2022  8:15 AM GERARDO Mcgregor CNP [facility] [place] [place] [facility] [place] [facility]   12/21/2022  8:20 AM GERARDO Miller

## 2022-10-29 DIAGNOSIS — G47.00 INSOMNIA, UNSPECIFIED TYPE: ICD-10-CM

## 2022-10-29 DIAGNOSIS — E78.01 FAMILIAL HYPERCHOLESTEROLEMIA: ICD-10-CM

## 2022-10-29 DIAGNOSIS — E11.9 TYPE 2 DIABETES MELLITUS WITHOUT COMPLICATION, WITHOUT LONG-TERM CURRENT USE OF INSULIN (HCC): ICD-10-CM

## 2022-10-31 RX ORDER — ATORVASTATIN CALCIUM 40 MG/1
TABLET, FILM COATED ORAL
Qty: 90 TABLET | Refills: 3 | Status: SHIPPED | OUTPATIENT
Start: 2022-10-31

## 2022-10-31 RX ORDER — AMITRIPTYLINE HYDROCHLORIDE 50 MG/1
TABLET, FILM COATED ORAL
Qty: 90 TABLET | Refills: 3 | Status: SHIPPED | OUTPATIENT
Start: 2022-10-31

## 2022-11-02 DIAGNOSIS — M47.814 THORACIC SPONDYLOSIS: ICD-10-CM

## 2022-11-02 DIAGNOSIS — G89.4 CHRONIC PAIN SYNDROME: ICD-10-CM

## 2022-11-02 DIAGNOSIS — M47.812 CERVICAL SPONDYLOSIS: ICD-10-CM

## 2022-11-02 DIAGNOSIS — M46.1 SACROILIAC INFLAMMATION (HCC): ICD-10-CM

## 2022-11-02 RX ORDER — HYDROCODONE BITARTRATE AND ACETAMINOPHEN 5; 325 MG/1; MG/1
1 TABLET ORAL 2 TIMES DAILY PRN
Qty: 60 TABLET | Refills: 0 | Status: SHIPPED | OUTPATIENT
Start: 2022-11-06 | End: 2022-11-29 | Stop reason: SDUPTHER

## 2022-11-02 NOTE — TELEPHONE ENCOUNTER
OARRS reviewed. UDS: + for  Quetiapine, Hydrocodone. Amitriptyline and Bupropion present. Last seen: 10/10/2022.  Follow-up:   Future Appointments   Date Time Provider Ezra Macdonald   12/12/2022  8:15 AM GERARDO Davis - CNP [facility] [place] [facility]   12/21/2022  8:20 AM GERARDO Poe

## 2022-11-22 DIAGNOSIS — I10 ESSENTIAL HYPERTENSION: ICD-10-CM

## 2022-11-22 DIAGNOSIS — J44.9 CHRONIC OBSTRUCTIVE PULMONARY DISEASE, UNSPECIFIED COPD TYPE (HCC): ICD-10-CM

## 2022-11-22 NOTE — TELEPHONE ENCOUNTER
Recent Visits  Date Type Provider Dept   05/31/22 Office Visit GERARDO Magana CNP [facility]   03/30/22 Office Visit GERARDO Magana CNP [facility]   12/20/21 Office Visit GERARDO Magana CNP [facility]   11/29/21 Office Visit GERARDO Magana CNP [facility]   08/25/21 Office Visit GERARDO Magana CNP [facility]   Showing recent visits within past 540 days with a meds authorizing provider and meeting all other requirements  Future Appointments  Date Type Provider Dept   12/28/22 Appointment GERARDO Magana CNP [facility]   Showing future appointments within next 150 days with a meds authorizing provider and meeting all other requirements      Future Appointments   Date Time Provider Ezra Macdonald   12/12/2022  8:15 AM GERARDO Sorensen CNP [facility] [place] [facility]   12/28/2022 10:40 AM GERARDO Magana

## 2022-11-29 DIAGNOSIS — M47.812 CERVICAL SPONDYLOSIS: ICD-10-CM

## 2022-11-29 DIAGNOSIS — M47.814 THORACIC SPONDYLOSIS: ICD-10-CM

## 2022-11-29 DIAGNOSIS — G89.4 CHRONIC PAIN SYNDROME: ICD-10-CM

## 2022-11-29 DIAGNOSIS — M46.1 SACROILIAC INFLAMMATION (HCC): ICD-10-CM

## 2022-11-30 RX ORDER — HYDROCODONE BITARTRATE AND ACETAMINOPHEN 5; 325 MG/1; MG/1
1 TABLET ORAL 2 TIMES DAILY PRN
Qty: 60 TABLET | Refills: 0 | Status: SHIPPED | OUTPATIENT
Start: 2022-12-06 | End: 2023-01-05

## 2022-11-30 NOTE — TELEPHONE ENCOUNTER
OARRS reviewed. UDS: + for  Quetiapine, Hydrocodone, Amitriptyline, Bupropion. Last seen: 10/10/2022.  Follow-up: 12/12/2022

## 2022-12-12 ENCOUNTER — TELEPHONE (OUTPATIENT)
Dept: PHYSICAL MEDICINE AND REHAB | Age: 67
End: 2022-12-12

## 2022-12-12 ENCOUNTER — OFFICE VISIT (OUTPATIENT)
Dept: PHYSICAL MEDICINE AND REHAB | Age: 67
End: 2022-12-12
Payer: MEDICARE

## 2022-12-12 VITALS
WEIGHT: 198 LBS | SYSTOLIC BLOOD PRESSURE: 122 MMHG | DIASTOLIC BLOOD PRESSURE: 62 MMHG | HEIGHT: 72 IN | BODY MASS INDEX: 26.82 KG/M2

## 2022-12-12 DIAGNOSIS — Z98.1 HISTORY OF LUMBAR FUSION: ICD-10-CM

## 2022-12-12 DIAGNOSIS — G89.4 CHRONIC PAIN SYNDROME: ICD-10-CM

## 2022-12-12 DIAGNOSIS — M79.18 MYOFASCIAL PAIN: ICD-10-CM

## 2022-12-12 DIAGNOSIS — M54.9 MID BACK PAIN: ICD-10-CM

## 2022-12-12 DIAGNOSIS — M54.2 NECK PAIN: ICD-10-CM

## 2022-12-12 DIAGNOSIS — M47.814 THORACIC SPONDYLOSIS: ICD-10-CM

## 2022-12-12 DIAGNOSIS — M47.812 CERVICAL SPONDYLOSIS: Primary | ICD-10-CM

## 2022-12-12 PROCEDURE — 99214 OFFICE O/P EST MOD 30 MIN: CPT | Performed by: NURSE PRACTITIONER

## 2022-12-12 PROCEDURE — 3078F DIAST BP <80 MM HG: CPT | Performed by: NURSE PRACTITIONER

## 2022-12-12 PROCEDURE — 1123F ACP DISCUSS/DSCN MKR DOCD: CPT | Performed by: NURSE PRACTITIONER

## 2022-12-12 PROCEDURE — 3074F SYST BP LT 130 MM HG: CPT | Performed by: NURSE PRACTITIONER

## 2022-12-12 ASSESSMENT — ENCOUNTER SYMPTOMS
GASTROINTESTINAL NEGATIVE: 1
RESPIRATORY NEGATIVE: 1
BACK PAIN: 1

## 2022-12-12 NOTE — PROGRESS NOTES
[address] [place] [address] [facility]  Dept: [phone]  Dept Fax: [phone]: [phone]    Visit Date: 12/12/2022    Functionality Assessment/Goals Worksheet     On a scale of 0 (Does not Interfere) to 10 (Completely Interferes)     1. Which number describes how during the past week pain has interfered with       the following:  A. General Activity:  5  B. Mood: 5  C. Walking Ability:  6  D. Normal Work (Includes both work outside the home and housework):  7  E. Relations with Other People:   5  F. Sleep:   5  G. Enjoyment of Life:   6    2. Patient Prefers to Take their Pain Medications:     []  On a regular basis   []  Only when necessary    []  Does not take pain medications    3. What are the Patient's Goals/Expectations for Visiting Pain Management? []  Learn about my pain    []  Receive Medication   []  Physical Therapy     []  Treat Depression   []  Receive Injections    []  Treat Sleep   []  Deal with Anxiety and Stress   []  Treat Opoid Dependence/Addiction   []  Other:      HPI:   Ramila Alejandro is a 78 y.o. male is here today for    Chief Complaint: Neck pain    HPI   2 month FU. Patient reports to posterior neck pain radiating across shoulder blades. Feels that it has been increasing has effects from previous C-facet RFA is wearing off. Dull sharp pain and constantly aching. Worse with activity. States gets associated daily headaches. Pain increases with bending, lifting, turning torso, reaching, pushing, pulling, turning head, getting up and down, and housework or working at job. Norco prn remains effective     Medications reviewed. Patient denies side effects with medications. Patient states he is taking medications as prescribed. Hedenies receiving pain medications from other sources. He denies any ER visits since last visit.     Pain scale with out pain medications or at its worst is 7/10. Pain scale with pain medications or at its best is 4/10. Last dose of [place] was today   Drug screen reviewed from 10/10/2022 and was appropriate  Pill count completed  today and WNL: Yes      The patienthas No Known Allergies. Subjective:      Review of Systems   Constitutional: Negative. Respiratory: Negative. Tobacco use    Cardiovascular: Negative. Gastrointestinal: Negative. Genitourinary: Negative. Musculoskeletal:  Positive for arthralgias, back pain, myalgias, neck pain and neck stiffness. Negative for gait problem and joint swelling. Skin: Negative. Neurological:  Positive for headaches. Negative for weakness and numbness. Psychiatric/Behavioral: Negative. Objective:     Vitals:    12/12/22 0818   BP: 122/62   Weight: 198 lb (89.8 kg)   Height: 6' 0.05\" (1.83 m)       Physical Exam  Vitals and nursing note reviewed. Constitutional:       General: He is not in acute distress. Appearance: Normal appearance. He is well-developed. He is not diaphoretic. HENT:      Head: Normocephalic and atraumatic. Right Ear: External ear normal.      Left Ear: External ear normal.      Nose: Nose normal.      Mouth/Throat:      Mouth: Mucous membranes are moist.      Pharynx: No oropharyngeal exudate. Eyes:      General: No scleral icterus. Right eye: No discharge. Left eye: No discharge. Conjunctiva/sclera: Conjunctivae normal.      Pupils: Pupils are equal, round, and reactive to light. Neck:      Thyroid: No thyromegaly. Cardiovascular:      Rate and Rhythm: Normal rate and regular rhythm. Heart sounds: Normal heart sounds. No murmur heard. No friction rub. No gallop. Pulmonary:      Effort: Pulmonary effort is normal. No respiratory distress. Breath sounds: Normal breath sounds. No wheezing or rales. Chest:      Chest wall: No tenderness.    Abdominal:      General: Bowel sounds are normal. There is no distension. Palpations: Abdomen is soft. Tenderness: There is no abdominal tenderness. There is no guarding or rebound. Musculoskeletal:         General: Tenderness present. No swelling. Right shoulder: Tenderness present. Decreased range of motion. Left shoulder: Tenderness present. Decreased range of motion. Cervical back: Full passive range of motion without pain and normal range of motion. Swelling, rigidity, spasms, tenderness and bony tenderness present. No edema or erythema. Muscular tenderness present. Normal range of motion. Thoracic back: Spasms and tenderness present. Lumbar back: Tenderness and bony tenderness present. Decreased range of motion. Negative right straight leg raise test and negative left straight leg raise test.        Back:       Right hip: No tenderness. Left hip: No tenderness. Right knee: Normal range of motion. No tenderness. Left knee: Normal range of motion. No tenderness. Right lower leg: No swelling. No edema. Left lower leg: No swelling. No edema. Right ankle: No swelling. Left ankle: No swelling. Right foot: No swelling. Left foot: No swelling. Skin:     General: Skin is warm. Coloration: Skin is not pale. Findings: No erythema or rash. Neurological:      General: No focal deficit present. Mental Status: He is alert and oriented to person, place, and time. He is not disoriented. Cranial Nerves: No cranial nerve deficit. Sensory: Sensory deficit present. Motor: No atrophy or abnormal muscle tone. Coordination: Coordination normal.      Gait: Gait normal.      Deep Tendon Reflexes: Babinski sign absent on the right side. Babinski sign absent on the left side. Reflex Scores:       Tricep reflexes are 1+ on the right side and 1+ on the left side. Bicep reflexes are 1+ on the right side and 1+ on the left side.        Brachioradialis reflexes are 1+ on the right side and 1+ on the left side. Patellar reflexes are 1+ on the right side and 1+ on the left side. Achilles reflexes are 1+ on the right side and 1+ on the left side. Psychiatric:         Attention and Perception: Attention normal. He is attentive. Mood and Affect: Mood normal. Mood is not anxious or depressed. Affect is not labile, blunt, angry or inappropriate. Speech: Speech normal. He is communicative. Speech is not rapid and pressured, delayed, slurred or tangential.         Behavior: Behavior normal. Behavior is not agitated, slowed, aggressive, withdrawn, hyperactive or combative. Behavior is cooperative. Thought Content: Thought content normal. Thought content is not paranoid or delusional. Thought content does not include homicidal or suicidal ideation. Thought content does not include homicidal or suicidal plan. Cognition and Memory: Cognition normal. Memory is not impaired. He does not exhibit impaired recent memory or impaired remote memory. Judgment: Judgment is not impulsive or inappropriate. JIMENA  Patricks test  negative  Yeoman's  or Gaenslen's negative       Assessment:     1. Cervical spondylosis    2. Neck pain    3. Thoracic spondylosis    4. Mid back pain    5. Myofascial pain    6. History of lumbar fusion    7. Chronic pain syndrome            Plan:      OARRS reviewed. Current MED: 10.00  Patient was offered naloxone for home. Discussed long term side effects of medications, tolerance, dependency and addiction. Previous UDS reviewed  UDS preformed today for compliance. Patient told can not receive any pain medications from any other source. No evidence of abuse, diversion or aberrant behavior.   Medications and/or procedures to improve function and quality of life- patient understanding with this and that may not be pain free  Discussed with patient about safe storage of medications at home  Discussed possible weaning of medication dosing dependent on treatment/procedure results. Discussed with patient about risks with procedure including infection, reaction to medication, increased pain, or bleeding. Procedure notes reviewed in detail  Plan Bilateral C-facet RFA @ C4-5 and C5-6 for longer term pain relief. Procedure and risks discussed in detail with patient. Received over 80% relief of neck pain from previous C-facet RFA for over 7 months  If patient is on blood thinners will need approval to hold yes: On 81 mg of aspirin  Continue Norco 5/325 BID prn- filled 12/6/2022. Is compliant. Meds. Prescribed:   No orders of the defined types were placed in this encounter. Return for Bilateral C-facet RFA @ C4-5 and C5-6. , follow up after procedure.                Electronically signed by GERARDO Gary CNP on12/12/2022 at 8:32 AM

## 2022-12-28 ENCOUNTER — OFFICE VISIT (OUTPATIENT)
Dept: FAMILY MEDICINE CLINIC | Age: 67
End: 2022-12-28
Payer: MEDICARE

## 2022-12-28 ENCOUNTER — CLINICAL DOCUMENTATION (OUTPATIENT)
Dept: SPIRITUAL SERVICES | Age: 67
End: 2022-12-28

## 2022-12-28 ENCOUNTER — TELEPHONE (OUTPATIENT)
Dept: FAMILY MEDICINE CLINIC | Age: 67
End: 2022-12-28

## 2022-12-28 VITALS
TEMPERATURE: 98.1 F | RESPIRATION RATE: 13 BRPM | SYSTOLIC BLOOD PRESSURE: 124 MMHG | BODY MASS INDEX: 27.09 KG/M2 | HEART RATE: 82 BPM | WEIGHT: 200 LBS | HEIGHT: 72 IN | DIASTOLIC BLOOD PRESSURE: 66 MMHG | OXYGEN SATURATION: 92 %

## 2022-12-28 DIAGNOSIS — Z13.31 DEPRESSION SCREENING NEGATIVE: ICD-10-CM

## 2022-12-28 DIAGNOSIS — I10 PRIMARY HYPERTENSION: ICD-10-CM

## 2022-12-28 DIAGNOSIS — M54.50 LUMBAR PAIN: ICD-10-CM

## 2022-12-28 DIAGNOSIS — E04.1 NODULE OF RIGHT LOBE OF THYROID GLAND: ICD-10-CM

## 2022-12-28 DIAGNOSIS — R80.9 MICROALBUMINURIA: ICD-10-CM

## 2022-12-28 DIAGNOSIS — E04.1 LEFT THYROID NODULE: ICD-10-CM

## 2022-12-28 DIAGNOSIS — Z00.00 MEDICARE ANNUAL WELLNESS VISIT, SUBSEQUENT: Primary | ICD-10-CM

## 2022-12-28 DIAGNOSIS — Z23 NEEDS FLU SHOT: ICD-10-CM

## 2022-12-28 DIAGNOSIS — Z23 IMMUNIZATION DUE: ICD-10-CM

## 2022-12-28 DIAGNOSIS — Z72.0 TOBACCO ABUSE: ICD-10-CM

## 2022-12-28 LAB
CREATININE, URINE: 25.3 MG/DL
HBA1C MFR BLD: 7.1 % (ref 4.3–5.7)
MICROALB/CREAT RATIO POC: ABNORMAL MG/G
MICROALBUMIN UR-MCNC: < 1.2 MG/DL
MICROALBUMIN/CREAT UR-RTO: 10 MG/L
MICROALBUMIN/CREAT UR-RTO: 47 MG/G (ref 0–30)
POC CREATININE: 10 MG/DL

## 2022-12-28 PROCEDURE — G0009 ADMIN PNEUMOCOCCAL VACCINE: HCPCS | Performed by: NURSE PRACTITIONER

## 2022-12-28 PROCEDURE — 99213 OFFICE O/P EST LOW 20 MIN: CPT | Performed by: NURSE PRACTITIONER

## 2022-12-28 PROCEDURE — G0444 DEPRESSION SCREEN ANNUAL: HCPCS | Performed by: NURSE PRACTITIONER

## 2022-12-28 PROCEDURE — G0439 PPPS, SUBSEQ VISIT: HCPCS | Performed by: NURSE PRACTITIONER

## 2022-12-28 PROCEDURE — 1123F ACP DISCUSS/DSCN MKR DOCD: CPT | Performed by: NURSE PRACTITIONER

## 2022-12-28 PROCEDURE — G0008 ADMIN INFLUENZA VIRUS VAC: HCPCS | Performed by: NURSE PRACTITIONER

## 2022-12-28 PROCEDURE — 3078F DIAST BP <80 MM HG: CPT | Performed by: NURSE PRACTITIONER

## 2022-12-28 PROCEDURE — 90694 VACC AIIV4 NO PRSRV 0.5ML IM: CPT | Performed by: NURSE PRACTITIONER

## 2022-12-28 PROCEDURE — 90677 PCV20 VACCINE IM: CPT | Performed by: NURSE PRACTITIONER

## 2022-12-28 PROCEDURE — 3074F SYST BP LT 130 MM HG: CPT | Performed by: NURSE PRACTITIONER

## 2022-12-28 SDOH — ECONOMIC STABILITY: FOOD INSECURITY: WITHIN THE PAST 12 MONTHS, THE FOOD YOU BOUGHT JUST DIDN'T LAST AND YOU DIDN'T HAVE MONEY TO GET MORE.: NEVER TRUE

## 2022-12-28 SDOH — ECONOMIC STABILITY: FOOD INSECURITY: WITHIN THE PAST 12 MONTHS, YOU WORRIED THAT YOUR FOOD WOULD RUN OUT BEFORE YOU GOT MONEY TO BUY MORE.: NEVER TRUE

## 2022-12-28 ASSESSMENT — PATIENT HEALTH QUESTIONNAIRE - PHQ9
SUM OF ALL RESPONSES TO PHQ QUESTIONS 1-9: 2
SUM OF ALL RESPONSES TO PHQ QUESTIONS 1-9: 2
SUM OF ALL RESPONSES TO PHQ9 QUESTIONS 1 & 2: 2
1. LITTLE INTEREST OR PLEASURE IN DOING THINGS: 0
2. FEELING DOWN, DEPRESSED OR HOPELESS: 2
SUM OF ALL RESPONSES TO PHQ QUESTIONS 1-9: 2
DEPRESSION UNABLE TO ASSESS: FUNCTIONAL CAPACITY MOTIVATION LIMITS ACCURACY
SUM OF ALL RESPONSES TO PHQ QUESTIONS 1-9: 2

## 2022-12-28 ASSESSMENT — LIFESTYLE VARIABLES
HOW MANY STANDARD DRINKS CONTAINING ALCOHOL DO YOU HAVE ON A TYPICAL DAY: PATIENT DOES NOT DRINK
HOW OFTEN DO YOU HAVE A DRINK CONTAINING ALCOHOL: NEVER

## 2022-12-28 ASSESSMENT — SOCIAL DETERMINANTS OF HEALTH (SDOH): HOW HARD IS IT FOR YOU TO PAY FOR THE VERY BASICS LIKE FOOD, HOUSING, MEDICAL CARE, AND HEATING?: NOT HARD AT ALL

## 2022-12-28 NOTE — ACP (ADVANCE CARE PLANNING)
Advance Care Planning   Ambulatory ACP Specialist Patient Outreach    Date:  12/28/2022  ACP Specialist:  PHIL Guerra    Outreach call to patient in follow-up to ACP Specialist referral from: GEARRDO Mcmahon CNP    [x] PCP  [] Provider   [] Ambulatory Care Management [] Other for Reason:    [x] Advance Directive Assistance  [] Code Status Discussion  [] Complete Portable DNR Order  [] Discuss Goals of Care  [] Complete POST/MOST  [x] Early ACP Decision-Making  [] Other    Date Referral Received: 12/28/22    Today's Outreach:  [x] First   [] Second  [] Third                               Third outreach made by []  phone  [] email []   Emulatehart     Intervention:  [x] Spoke with Patient  [x] Left VM requesting return call      Outcome: ACP Specialist left message for patient and sent [employer] message regarding referral.    Next Step:   [x] ACP scheduled conversation  [x] Outreach again in one week               [x] Email / Mail [address]  [x] Email / Mail Advance Directive            [] Close Referral. Routing closure to referring provider/staff and to ACP Specialist . [] Closure Letter mailed to Patient with Invitation to Contact ACP Specialist if/when ready. Thank you for this referral.    Addendum:  Patient returned call and ACP Specialist scheduled an ACP Conversation for 1/12/23 at [facility].  Staff emailed ACP materials to patient and AD documents.

## 2022-12-28 NOTE — PROGRESS NOTES
Vaccine Information Sheet, \"Influenza - Inactivated\"  given to Maria Luisa Sanchez, or parent/legal guardian of  Maria Luisa Sanchez and verbalized understanding. Patient responses:    Have you ever had a reaction to a flu vaccine? No  Do you have an allergy to eggs, neomycin or polymixin? No  Do you have an allergy to Thimerosal, contact lens solution, or Merthiolate? No  Have you ever had [place] Syndrome? No  Do you have any current illness? No  Do you have a temperature above 100 degrees? No  Are you pregnant? Na  If pregnant, permission obtained from physician? No  Do you have an active neurological disorder? No      Flu vaccine given per order. Please see immunization tab.

## 2022-12-28 NOTE — PROGRESS NOTES
Immunization(s) given during visit:    Immunizations Administered       Name Date Dose Route    Influenza, FLUAD, (age 72 y+), Adjuvanted, 0.5mL 12/28/2022 0.5 mL Intramuscular    Site: Deltoid- Left    Lot: [id number]    NDC: 18376-558-49    Pneumococcal conjugate PCV20, PF (Prevnar 20) 12/28/2022 0.5 mL Intramuscular    Site: Deltoid- Left    Lot: [id number]    NDC: 1723-9747-18

## 2022-12-28 NOTE — PROGRESS NOTES
Medicare Annual Wellness Visit    Tasia Calvillo is here for Medicare AWV (No concerns )    Assessment & Plan       Encounter Diagnoses   Name Primary? Medicare annual wellness visit, subsequent Yes    Depression screening negative     Microalbuminuria     Tobacco abuse     Immunization due     Lumbar pain     Left thyroid nodule     Nodule of right lobe of thyroid gland     Needs flu shot     Primary hypertension        T2D worsening slightly, maintain better sugar control with decreasing carb intake     Monitor thyroid nodules for stability     HTN controlled    Declines intervention for lumbar pain   Orders Placed This Encounter   Procedures    Low Dose Chest CT--pulmonologist/radiologist use only    US THYROID    Influenza, FLUAD, (age 72 y+), IM, Preservative Free, 0.5 mL    Pneumococcal, PCV20, PREVNAR 20, (age 25 yrs+), IM, PF    Microalbumin / Creatinine Urine Ratio    Lipid Panel    [facility] Referral to ACP Clinical Specialist    POCT glycosylated hemoglobin (Hb A1C)    POCT microalbumin    DME Order for [place] as OP     Recommendations for Preventive Services Due: see orders and patient instructions/AVS.  Recommended screening schedule for the next 5-10 years is provided to the patient in written form: see Patient Instructions/AVS.     No follow-ups on file. Subjective   The following acute and/or chronic problems were also addressed today:  Diabetes Type 2    Glucose control:   Does patient check blood glucoses at home? Yes  Report of hypoglycemia: no  Lab Results   Component Value Date    LABA1C 7.1 (H) 12/28/2022     No results found for: EAG    Symptoms  Polyuria, Polydipsia or Polyphagia? No  Chest Pain, SOB, or Palpitations? -  No  New Vision complaints? No  Paresthesias of the extremities? No    Medications  Current medication were reviewed. Compliant with medications? yes  Medication side effects? No  On ACE-I or ARB? Yes  On antiplatelet therapy? Yes  On Statin?   Yes    Last Diabetic Eye Exam: 2022    Exercise  Exercise? No  Wt Readings from Last 3 Encounters:   12/28/22 200 lb (90.7 kg)   12/12/22 198 lb (89.8 kg)   10/10/22 198 lb (89.8 kg)       Diet discipline?:  Low salt, fat, sugar diet? Yes    Pt uses a can for ambulation due to chronic lumbar pain dn uses the gait to steady him when walking. Tasia Calvillo requires a cane due to impaired ambulation and for increased stability in order to participate in or complete daily living tasks of: personal cares, ambulating, dressing upper body, and dressing lower body. The patient is able to safely use the cane and the functional mobility deficit can be sufficiently resolved.        Lab Results   Component Value Date/Time     03/13/2022 07:20 PM    K 4.0 03/13/2022 07:20 PM    CL 97 03/13/2022 07:20 PM    CO2 23 03/13/2022 07:20 PM    BUN 10 03/13/2022 07:20 PM    CREATININE 10 12/28/2022 11:45 AM    CREATININE 1.0 03/13/2022 07:20 PM    GLUCOSE 161 03/13/2022 07:20 PM    GLUCOSE 141 06/20/2016 10:40 AM    CALCIUM 9.9 03/13/2022 07:20 PM         Lab Results   Component Value Date    WBC 14.3 (H) 03/13/2022    HGB 16.5 03/13/2022    HCT 48.1 03/13/2022    MCV 93.4 03/13/2022     03/13/2022        Lab Results   Component Value Date    CHOL 152 08/02/2021    CHOL 148 06/15/2020    CHOL 148 05/23/2019     Lab Results   Component Value Date    TRIG 145 08/02/2021    TRIG 284 (H) 06/15/2020    TRIG 170 05/23/2019     Lab Results   Component Value Date    HDL 43 08/02/2021    HDL 40 06/15/2020    HDL 34 05/23/2019     Lab Results   Component Value Date    LDLCALC 80 08/02/2021    LDLCALC 51 06/15/2020    [address] 80 05/23/2019     No results found for: LABVLDL, VLDL  Lab Results   Component Value Date    CHOLHDLRATIO 6.5 09/24/2013        Lab Results   Component Value Date    TSH 1.350 08/02/2021    T4FREE 1.20 10/09/2018      Blood pressure control:  BP Readings from Last 3 Encounters:   12/28/22 124/66   12/12/22 122/62   10/10/22 120/70       Lab Results   Component Value Date    LABMICR < 1.20 04/04/2017       Lab Results   Component Value Date    LDLCALC 80 08/02/2021          Pt has a thyroid nodule of left and right lobe. Had an u/s last year with FNA, due for repeat u/s,   Lab Results   Component Value Date    TSH 1.350 08/02/2021    T4FREE 1.20 10/09/2018        Patient's complete Health Risk Assessment and screening values have been reviewed and are found in Flowsheets. The following problems were reviewed today and where indicated follow up appointments were made and/or referrals ordered. Positive Risk Factor Screenings with Interventions:       Cognitive: Words recalled: 3 Words Recalled   Clock Drawing Test (CDT): (!) Abnormal   Total Score: 3   Total Score Interpretation: Normal Mini-Cog      Interventions:  Patient declines any further evaluation or treatment    Depression:  Depression Unable to Assess: Functional capacity motivation limits accuracy  PHQ-2 Score: 2  PHQ-9 Total Score: 2    Interpretation:   1-4 = minimal  5-9 = mild  10-14 = moderate  15-19 = moderately severe  20-27 = severe            Opioid Risk: (High risk score ?55) Opioid risk score: 64    Last PDMP Sam as Reviewed:  Review User Review Instant Review Result   VILMA CHE 12/12/2022  8:30 AM @   Reviewed PDMP [1]     Last Controlled Substance Monitoring Documentation      [address] Office Visit from 5/14/2015 in [address] The Prescription Monitoring Report for this patient was reviewed today. filed at 05/14/2015 1863   Periodic Controlled Substance Monitoring Possible medication side effects, risk of tolerance and/or dependence, and alternative treatments discussed, No signs of potential drug abuse or diversion identified., Medication contract signed today. , Random urine drug screen sent today.  filed at 05/14/2015 4314                 Weight and Activity:  Physical Activity: Insufficiently Active    Days of Exercise per Week: 3 days    Minutes of Exercise per Session: 40 min     On average, how many days per week do you engage in moderate to strenuous exercise (like a brisk walk)?: 3 days  Have you lost any weight without trying in the past 3 months?: No  Body mass index: (!) 27.08        Dentist Screen:  Have you seen the dentist within the past year?: (!) No    Intervention:  Patient comments: saw dentist this year         Advanced Directives:  Do you have a Living Will?: (!) No    Intervention:  has NO advanced directive  - referred to [place]: Discussed the patients choices for care and treatment in case of a health event that adversely affects decision-making abilities. Also discussed the patients long-term treatment options. Reviewed with the patient the appropriate state-specific advance directive documents. Reviewed the process of designating a competent adult as an Agent (or -in-fact) that could take make health care decisions for the patient if incompetent. Patient was asked to complete the declaration forms, if they have not already, either acknowledge the forms by a public notary or an eligible witness and provide a signed copy to the practice office. Time spent (minutes): 6       Tobacco Use:  Tobacco Use: High Risk    Smoking Tobacco Use: Every Day    Smokeless Tobacco Use: Never    Passive Exposure: Not on file     E-cigarette/Vaping       Questions Responses    E-cigarette/Vaping Use     Start Date     Passive Exposure     Quit Date     Counseling Given     Comments           Interventions:  Patient declined any further intervention or treatment      Tobacco Use Counseling: Patient was counseled on tobacco cessation. Based upon patient's motivation to change his behavior, the following plan was agreed upon: Patient is not ready to work toward tobacco cessation at this time.  Educational materials regarding tobacco cessation were provided. Provider spent 6 minutes counseling patient. CV Risk Counseling:  Patient was asked about his current diet and exercise habits, and personalized advice was provided regarding recommended lifestyle changes. Patient's individual cardiovascular disease risk factors, including advanced age (> 54 for men, > 72 for women), diabetes mellitus, dyslipidemia, hypertension, male gender, microalbuminuria/CKD, sedentary lifestyle, and smoking/tobacco exposure, were discussed, as well as the likely benefits of lifestyle changes. Based upon patient's motivation to change his behavior, the following plan was agreed upon to work toward lowering cardiovascular disease risk: low saturated fat, low cholesterol diet, at least 150 minutes of exercise/week, and tobacco cessation. Aspirin use for primary prevention of cardiovascular disease for men 45-79 and women 55-79: Indicated- continue daily aspirin. Educational materials for lifestyle changes were provided. Patient will follow-up in 6 month(s) with PCP. Provider spent 6 minutes counseling patient. Objective   Vitals:    12/28/22 1058   BP: 124/66   Pulse: 82   Resp: 13   Temp: 98.1 °F (36.7 °C)   SpO2: 92%   Weight: 200 lb (90.7 kg)   Height: 6' 0.05\" (1.83 m)      Body mass index is 27.09 kg/m².       General Appearance: alert and oriented to person, place and time, well-developed and well-nourished, in no acute distress  Skin: warm and dry, no rash or erythema  Pulmonary/Chest: clear to auscultation bilaterally- no wheezes, rales or rhonchi, normal air movement, no respiratory distress  Cardiovascular: normal rate, normal S1 and S2, no gallops, intact distal pulses, and no carotid bruits  Abdomen: soft, non-tender, non-distended, normal bowel sounds, no masses or organomegaly  Musculoskeletal: normal range of motion, no joint swelling, deformity or tenderness  Neurologic: gait and coordination normal and speech normal       No Known Allergies  Prior to Visit Medications    Medication Sig Taking? Authorizing Provider   HYDROcodone-acetaminophen (NORCO) 5-325 MG per tablet Take 1 tablet by mouth 2 times daily as needed for Pain for up to 30 days. Yes GERARDO Last CNP   metoprolol tartrate (LOPRESSOR) 25 MG tablet TAKE 1/2 TABLET BY MOUTH TWICE DAILY. HOLD IF HEART RATE LESS THAN 60. Yes GERARDO Gaffney CNP   INCRUSE ELLIPTA 62.5 MCG/INH AEPB INHALE 1 PUFF INTO THE LUNGS DAILY Yes GERARDO Gaffney CNP   atorvastatin (LIPITOR) 40 MG tablet TAKE 1 TABLET BY MOUTH DAILY Yes GERARDO Gaffney CNP   metFORMIN (GLUCOPHAGE) 1000 MG tablet TAKE 1 TABLET BY MOUTH TWICE DAILY WITH MEALS Yes GERARDO Gaffney CNP   amitriptyline (ELAVIL) 50 MG tablet TAKE 1/2 A TABLET BY MOUTH NIGHTLY.  Yes GERARDO Gaffney CNP   JANUVIA 100 MG tablet TAKE 1 TABLET BY MOUTH DAILY Yes GERARDO Mcdonald CNP   etodolac (LODINE XL) 400 MG extended release tablet TAKE 1 TABLET BY MOUTH DAILY Yes GERARDO Gaffney CNP   lisinopril (PRINIVIL;ZESTRIL) 2.5 MG tablet TAKE 1 TABLET BY MOUTH DAILY Yes Renato Carrillo DO   QUEtiapine (SEROQUEL) 200 MG tablet TAKE 1 TABLET BY MOUTH AT BEDTIME Yes Renato Carrillo DO   buPROPion (WELLBUTRIN XL) 150 MG extended release tablet TAKE 1 TABLET BY MOUTH EVERY MORNING Yes Renato Carrillo DO   pantoprazole (PROTONIX) 40 MG tablet TAKE 1 TABLET BY MOUTH DAILY Yes Renato Carrillo DO   albuterol sulfate  (90 Base) MCG/ACT inhaler INHALE 2 PUFFS INTO THE LUNGS FOUR TIMES DAILY AS NEEDED FOR WHEEZING Yes GERARDO Gaffney CNP   JARDIANCE 10 MG tablet TAKE 1 TABLET BY MOUTH DAILY Yes GERARDO Gaffney CNP   Handicap Placard MISC by Does not apply route Duration: 5 years Yes GERARDO Gaffney CNP   aspirin 81 MG chewable tablet TAKE 1 TABLET BY MOUTH DAILY Yes GERARDO Gaffney CNP   azelastine (ASTELIN) 0.1 % nasal spray SPRAY ONCE IN EACH NOSTRIL TWICE DAILY AS DIRECTED Yes Beata Smoker, APRN - CNP   Alcohol Swabs PADS 1 each by Does not apply route 2 times daily Yes Beata Smoker, APRN - CNP   diclofenac sodium (VOLTAREN) 1 % GEL Apply 2 g topically 2 times daily Yes Beata Smoker, APRN - CNP   Lancets MISC 1 each by Does not apply route 2 times daily Yes Beata Smoker, APRN - CNP   Elastic Bandages & Supports (B & B A-2 ATHLETIC SUPPORTER) MISC 1 Device by Does not apply route continuous prn (prn pain) Yes Beata Smoker, APRN - CNP   Lancet Device MISC 1 Device by Does not apply route once for 1 dose  Beata Smoker, APRN - CNP       CareTeam (Including outside providers/suppliers regularly involved in providing care):   Patient Care Team:  GERARDO Costello CNP as PCP - General (Family Nurse Practitioner)  Beata SmokerGERARDO CNP as PCP - [facility] Empaneled Provider     Reviewed and updated this visit:  Tobacco  Allergies  Meds  Problems  Med Hx  Surg Hx  Soc Hx  Fam Hx

## 2022-12-28 NOTE — PATIENT INSTRUCTIONS
Learning About Dental Care for Older Adults  Dental care for older adults: Overview  Dental care for older people is much the same as for younger adults. But older adults do have concerns that younger adults do not. Older adults may have problems with gum disease and decay on the roots of their teeth. They may need missing teeth replaced or broken fillings fixed. Or they may have dentures that need to be cared for. Some older adults may have trouble holding a toothbrush. You can help remind the person you are caring for to brush and floss their teeth or to clean their dentures. In some cases, you may need to do the brushing and other dental care tasks. People who have trouble using their hands or who have dementia may need this extra help. How can you help with dental care? Normal dental care  To keep the teeth and gums healthy:  Brush the teeth with fluoride toothpaste twice a day--in the morning and at night--and floss at least once a day. Plaque can quickly build up on the teeth of older adults. Watch for the signs of gum disease. These signs include gums that bleed after brushing or after eating hard foods, such as apples. See a dentist regularly. Many experts recommend checkups every 6 months. Keep the dentist up to date on any new medications the person is taking. Encourage a balanced diet that includes whole grains, vegetables, and fruits, and that is low in saturated fat and sodium. Encourage the person you're caring for not to use tobacco products. They can affect dental and general health. Many older adults have a fixed income and feel that they can't afford dental care. But most [facility] and [facility] have programs in which dentists help older adults by lowering fees. Contact your area's public health offices or  for information about dental care in your area.   Using a toothbrush  Older adults with arthritis sometimes have trouble brushing their teeth because they can't easily hold the toothbrush. Their hands and fingers may be stiff, painful, or weak. If this is the case, you can: Offer an electric toothbrush. Enlarge the handle of a non-electric toothbrush by wrapping a sponge, an elastic bandage, or adhesive tape around it. Push the toothbrush handle through a ball made of rubber or soft foam.  Make the handle longer and thicker by taping Popsicle sticks or tongue depressors to it. You may also be able to buy special toothbrushes, toothpaste dispensers, and floss holders. Your doctor may recommend a soft-bristle toothbrush if the person you care for bleeds easily. Bleeding can happen because of a health problem or from certain medicines. A toothpaste for sensitive teeth may help if the person you care for has sensitive teeth. How do you brush and floss someone's teeth? If the person you are caring for has a hard time cleaning their teeth on their own, you may need to brush and floss their teeth for them. It may be easiest to have the person sit and face away from you, and to sit or stand behind them. That way you can steady their head against your arm as you reach around to floss and brush their teeth. Choose a place that has good lighting and is comfortable for both of you. Before you begin, gather your supplies. You will need gloves, floss, a toothbrush, and a container to hold water if you are not near a sink. Wash and dry your hands well and put on gloves. Start by flossing:  Gently work a piece of floss between each of the teeth toward the gums. A plastic flossing tool may make this easier, and they are available at [facility]. Curve the floss around each tooth into a U-shape and gently slide it under the gum line. Move the floss firmly up and down several times to scrape off the plaque. After you've finished flossing, throw away the used floss and begin brushing:  Wet the brush and apply toothpaste. Place the brush at a 45-degree angle where the teeth meet the gums. Press firmly, and move the brush in small circles over the surface of the teeth. Be careful not to brush too hard. Vigorous brushing can make the gums pull away from the teeth and can scratch the tooth enamel. Brush all surfaces of the teeth, on the tongue side and on the cheek side. Pay special attention to the front teeth and all surfaces of the back teeth. Brush chewing surfaces with short back-and-forth strokes. After you've finished, help the person rinse the remaining toothpaste from their mouth. Where can you learn more? Go to http://www.woods.com/ and enter F944 to learn more about \"Learning About Dental Care for Older Adults. \"  Current as of: June 16, 2022               Content Version: 13.5  © 2006-2022 Healthwise, [employer]. Care instructions adapted under license by [facility] ([facility]). If you have questions about a medical condition or this instruction, always ask your healthcare professional. [place] [address] any warranty or liability for your use of this information. Advance Directives: Care Instructions  Overview  An advance directive is a legal way to state your wishes at the end of your life. It tells your family and your doctor what to do if you can't say what you want. There are two main types of advance directives. You can change them any time your wishes change. Living will. This form tells your family and your doctor your wishes about life support and other treatment. The form is also called a declaration. Medical power of . This form lets you name a person to make treatment decisions for you when you can't speak for yourself. This person is called a health care agent (health care proxy, health care surrogate). The form is also called a durable power of  for health care.   If you do not have an advance directive, decisions about your medical care may be made by a family member, or by a doctor or a  who doesn't know you.  It may help to think of an advance directive as a gift to the people who care for you. If you have one, they won't have to make tough decisions by themselves. For more information, including forms for your state, see the [address] website (www.caringinfo.org/planning/advance-directives/). Follow-up care is a key part of your treatment and safety. Be sure to make and go to all appointments, and call your doctor if you are having problems. It's also a good idea to know your test results and keep a list of the medicines you take. What should you include in an advance directive? Many states have a unique advance directive form. (It may ask you to address specific issues.) Or you might use a universal form that's approved by many states. If your form doesn't tell you what to address, it may be hard to know what to include in your advance directive. Use the questions below to help you get started. Who do you want to make decisions about your medical care if you are not able to? What life-support measures do you want if you have a serious illness that gets worse over time or can't be cured? What are you most afraid of that might happen? (Maybe you're afraid of having pain, losing your independence, or being kept alive by machines.)  Where would you prefer to die? (Your home? A hospital? A nursing home?)  Do you want to donate your organs when you die? Do you want certain Yazdanism practices performed before you die? When should you call for help? Be sure to contact your doctor if you have any questions. Where can you learn more? Go to http://www.pascual.com/ and enter R264 to learn more about \"Advance Directives: Care Instructions. \"  Current as of: June 16, 2022               Content Version: 13.5  © 6807-6391 Healthwise, Inc[employer]. Care instructions adapted under license by [facility] ([facility]).  If you have questions about a medical condition or this instruction, always ask your healthcare mireille. [address] any warranty or liability for your use of this information. Personalized Preventive Plan for Anisa Russian - 12/28/2022  Medicare offers a range of preventive health benefits. Some of the tests and screenings are paid in full while other may be subject to a deductible, co-insurance, and/or copay. Some of these benefits include a comprehensive review of your medical history including lifestyle, illnesses that may run in your family, and various assessments and screenings as appropriate. After reviewing your medical record and screening and assessments performed today your provider may have ordered immunizations, labs, imaging, and/or referrals for you. A list of these orders (if applicable) as well as your Preventive Care list are included within your After Visit Summary for your review. Other Preventive Recommendations:    A preventive eye exam performed by an eye specialist is recommended every 1-2 years to screen for glaucoma; cataracts, macular degeneration, and other eye disorders. A preventive dental visit is recommended every 6 months. Try to get at least 150 minutes of exercise per week or 10,000 steps per day on a pedometer . Order or download the FREE \"Exercise & Physical Activity: Your Everyday Guide\" from [employer]. Call 9-633.217.6920 or search [employer] online. You need 4248-0048 mg of calcium and 5695-4828 IU of vitamin D per day. It is possible to meet your calcium requirement with diet alone, but a vitamin D supplement is usually necessary to meet this goal.  When exposed to the sun, use a sunscreen that protects against both UVA and UVB radiation with an SPF of 30 or greater. Reapply every 2 to 3 hours or after sweating, drying off with a towel, or swimming. Always wear a seat belt when traveling in a car. Always wear a helmet when riding a bicycle or motorcycle.

## 2022-12-29 ENCOUNTER — TELEPHONE (OUTPATIENT)
Dept: FAMILY MEDICINE CLINIC | Age: 67
End: 2022-12-29

## 2022-12-29 DIAGNOSIS — Z72.0 TOBACCO ABUSE: ICD-10-CM

## 2022-12-29 DIAGNOSIS — Z12.2 SCREENING FOR LUNG CANCER: Primary | ICD-10-CM

## 2022-12-29 NOTE — TELEPHONE ENCOUNTER
Please have the provider check the order for the CT chest. It's been ordered for tobacco use. Either the test needs changed to a CT lung screening or the diagnosis needs changed such as a pulmonary nodule.  Please advise

## 2023-01-03 ENCOUNTER — HOSPITAL ENCOUNTER (OUTPATIENT)
Dept: CT IMAGING | Age: 68
Discharge: HOME | End: 2023-01-03
Payer: MEDICARE

## 2023-01-03 ENCOUNTER — HOSPITAL ENCOUNTER (OUTPATIENT)
Dept: ULTRASOUND IMAGING | Age: 68
Discharge: HOME | End: 2023-01-03
Payer: MEDICARE

## 2023-01-03 DIAGNOSIS — Z12.2 SCREENING FOR LUNG CANCER: ICD-10-CM

## 2023-01-03 DIAGNOSIS — E04.1 NODULE OF RIGHT LOBE OF THYROID GLAND: ICD-10-CM

## 2023-01-03 DIAGNOSIS — E04.1 LEFT THYROID NODULE: ICD-10-CM

## 2023-01-03 DIAGNOSIS — Z72.0 TOBACCO ABUSE: ICD-10-CM

## 2023-01-03 PROCEDURE — 76536 US EXAM OF HEAD AND NECK: CPT

## 2023-01-03 PROCEDURE — 71271 CT THORAX LUNG CANCER SCR C-: CPT

## 2023-01-04 ENCOUNTER — TELEPHONE (OUTPATIENT)
Dept: FAMILY MEDICINE CLINIC | Age: 68
End: 2023-01-04

## 2023-01-04 DIAGNOSIS — G89.4 CHRONIC PAIN SYNDROME: ICD-10-CM

## 2023-01-04 DIAGNOSIS — M47.812 CERVICAL SPONDYLOSIS: ICD-10-CM

## 2023-01-04 DIAGNOSIS — M46.1 SACROILIAC INFLAMMATION (HCC): ICD-10-CM

## 2023-01-04 DIAGNOSIS — M47.814 THORACIC SPONDYLOSIS: ICD-10-CM

## 2023-01-04 RX ORDER — HYDROCODONE BITARTRATE AND ACETAMINOPHEN 5; 325 MG/1; MG/1
1 TABLET ORAL 2 TIMES DAILY PRN
Qty: 60 TABLET | Refills: 0 | Status: SHIPPED | OUTPATIENT
Start: 2023-01-05 | End: 2023-02-04

## 2023-01-04 NOTE — TELEPHONE ENCOUNTER
OARRS reviewed. UDS: + for  Quetiapine, Hydrocodone, Amitriptyline, Bupropion. Last seen: 12/12/2022.  Follow-up: 2/6/2023

## 2023-01-04 NOTE — TELEPHONE ENCOUNTER
----- Message from GERARDO Sandy CNP sent at 1/4/2023  8:01 AM EST -----  Let pt know his chest ct does not identify any lung masses or nodules, it does identify some fibrous changes. Which are not new , he is on the inhalers which help to treat this.

## 2023-01-09 ENCOUNTER — CLINICAL DOCUMENTATION (OUTPATIENT)
Dept: SPIRITUAL SERVICES | Age: 68
End: 2023-01-09

## 2023-01-09 NOTE — ACP (ADVANCE CARE PLANNING)
Advance Care Planning   Ambulatory ACP Specialist Patient Outreach    Date:  1/9/2023  ACP Specialist:  PHIL De    Outreach call to patient in follow-up to ACP Specialist referral from: GERARDO Mcclain CNP    [x] PCP  [] Provider   [] Ambulatory Care Management [] Other for Reason:    [x] Advance Directive Assistance  [] Code Status Discussion  [] Complete Portable DNR Order  [] Discuss Goals of Care  [] Complete POST/MOST  [x] Early ACP Decision-Making  [] Other    Date Referral Received: 12/28/2022    Today's Outreach:  [] First   [] Second  [] Third                               Third outreach made by []  phone  [] email []   [employer]     Intervention:  [] Spoke with Patient  [x] Left VM requesting return call      Outcome:  ACP specialist placed a call to the pt to discuss pt's ACP visit appt time/date, in attempt to reschedule due to scheduling conflict. Pt did not answer, detailed VM left encouraging the pt to return this sw phone call. Next Step:   [] ACP scheduled conversation  [] Outreach again in one week               [] Email / Mail ACP Info Sheets  [] Email / Mail Advance Directive            [] Close Referral. Routing closure to referring provider/staff and to ACP Specialist . [] Closure Letter mailed to Patient with Invitation to Contact ACP Specialist if/when ready.     Thank you for this referral.

## 2023-01-12 ENCOUNTER — PREP FOR PROCEDURE (OUTPATIENT)
Dept: PHYSICAL MEDICINE AND REHAB | Age: 68
End: 2023-01-12

## 2023-01-12 ENCOUNTER — CLINICAL DOCUMENTATION (OUTPATIENT)
Dept: SPIRITUAL SERVICES | Age: 68
End: 2023-01-12

## 2023-01-12 NOTE — ACP (ADVANCE CARE PLANNING)
Advance Care Planning   Ambulatory ACP Specialist Patient Outreach    Date:  1/12/2023  ACP Specialist:  PHIL Ward    Outreach call to patient in follow-up to ACP Specialist referral from: GERARDO Marks CNP    [x] PCP  [] Provider   [] Ambulatory Care Management [] Other for Reason:    [x] Advance Directive Assistance  [] Code Status Discussion  [] Complete Portable DNR Order  [] Discuss Goals of Care  [] Complete POST/MOST  [x] Early ACP Decision-Making  [] Other    Date Referral Received: 12/28/22    Today's Outreach:  [] First   [] Second  [x] Third                               Third outreach made by []  phone  [] email []   MyChart     Intervention:  [x] Spoke with Patient  [] Left VM requesting return call      Outcome: ACP Specialist called patient today for scheduled ACP conversation and patient had trouble finding email sent on 12/28/22 with AD documents. Staff sent email again and verified email address with patient. Patient still could not see email. Staff offered to send AD documents through mail and rescheduled ACP conversation for 1/26/23. Advance Care Planning   Healthcare Decision Maker:    Primary Decision Maker: Joy Lemons Child - [phone]    Secondary Decision Maker: Kimi Elise  Child - [phone]    Click here to complete Healthcare Decision Makers including selection of the Healthcare Decision Maker Relationship (ie \"Primary\"). Today we discussed patient's children as HCDM's and patient was encouraged to complete HCPOA. Next Step:   [x] ACP scheduled conversation  [] Outreach again in one week               [x] Email / Mail ACP Info Sheets  [x] Email / Mail Advance Directive            [] Close Referral. Routing closure to referring provider/staff and to ACP Specialist . [] Closure Letter mailed to Patient with Invitation to Contact ACP Specialist if/when ready.     Thank you for this referral.

## 2023-01-16 ENCOUNTER — HOSPITAL ENCOUNTER (OUTPATIENT)
Age: 68
Setting detail: OUTPATIENT SURGERY
Discharge: HOME | End: 2023-01-16
Attending: PAIN MEDICINE | Admitting: PAIN MEDICINE
Payer: MEDICARE

## 2023-01-16 ENCOUNTER — ANESTHESIA (OUTPATIENT)
Dept: OPERATING ROOM | Age: 68
End: 2023-01-16
Payer: MEDICARE

## 2023-01-16 ENCOUNTER — ANESTHESIA EVENT (OUTPATIENT)
Dept: OPERATING ROOM | Age: 68
End: 2023-01-16
Payer: MEDICARE

## 2023-01-16 ENCOUNTER — APPOINTMENT (OUTPATIENT)
Dept: GENERAL RADIOLOGY | Age: 68
End: 2023-01-16
Attending: PAIN MEDICINE
Payer: MEDICARE

## 2023-01-16 VITALS
DIASTOLIC BLOOD PRESSURE: 73 MMHG | BODY MASS INDEX: 24.95 KG/M2 | OXYGEN SATURATION: 92 % | WEIGHT: 194.4 LBS | RESPIRATION RATE: 16 BRPM | HEART RATE: 88 BPM | HEIGHT: 74 IN | TEMPERATURE: 98 F | SYSTOLIC BLOOD PRESSURE: 121 MMHG

## 2023-01-16 LAB — GLUCOSE BLD-MCNC: 146 MG/DL (ref 70–108)

## 2023-01-16 PROCEDURE — 64633 DESTROY CERV/THOR FACET JNT: CPT | Performed by: PAIN MEDICINE

## 2023-01-16 PROCEDURE — 82948 REAGENT STRIP/BLOOD GLUCOSE: CPT

## 2023-01-16 PROCEDURE — 6360000002 HC RX W HCPCS: Performed by: NURSE ANESTHETIST, CERTIFIED REGISTERED

## 2023-01-16 PROCEDURE — 2580000003 HC RX 258: Performed by: NURSE ANESTHETIST, CERTIFIED REGISTERED

## 2023-01-16 PROCEDURE — 7100000010 HC PHASE II RECOVERY - FIRST 15 MIN: Performed by: PAIN MEDICINE

## 2023-01-16 PROCEDURE — 3700000001 HC ADD 15 MINUTES (ANESTHESIA): Performed by: PAIN MEDICINE

## 2023-01-16 PROCEDURE — 3700000000 HC ANESTHESIA ATTENDED CARE: Performed by: PAIN MEDICINE

## 2023-01-16 PROCEDURE — 2500000003 HC RX 250 WO HCPCS: Performed by: PAIN MEDICINE

## 2023-01-16 PROCEDURE — 3600000057 HC PAIN LEVEL 4 ADDL 15 MIN: Performed by: PAIN MEDICINE

## 2023-01-16 PROCEDURE — 64634 DESTROY C/TH FACET JNT ADDL: CPT | Performed by: PAIN MEDICINE

## 2023-01-16 PROCEDURE — 2500000003 HC RX 250 WO HCPCS: Performed by: NURSE ANESTHETIST, CERTIFIED REGISTERED

## 2023-01-16 PROCEDURE — 2709999900 HC NON-CHARGEABLE SUPPLY: Performed by: PAIN MEDICINE

## 2023-01-16 PROCEDURE — 7100000011 HC PHASE II RECOVERY - ADDTL 15 MIN: Performed by: PAIN MEDICINE

## 2023-01-16 PROCEDURE — 3209999900 FLUORO FOR SURGICAL PROCEDURES

## 2023-01-16 PROCEDURE — 3600000056 HC PAIN LEVEL 4 BASE: Performed by: PAIN MEDICINE

## 2023-01-16 RX ORDER — SODIUM CHLORIDE 9 MG/ML
INJECTION, SOLUTION INTRAVENOUS CONTINUOUS PRN
Status: DISCONTINUED | OUTPATIENT
Start: 2023-01-16 | End: 2023-01-16 | Stop reason: SDUPTHER

## 2023-01-16 RX ORDER — LIDOCAINE HYDROCHLORIDE 10 MG/ML
INJECTION, SOLUTION INFILTRATION; PERINEURAL PRN
Status: DISCONTINUED | OUTPATIENT
Start: 2023-01-16 | End: 2023-01-16 | Stop reason: SDUPTHER

## 2023-01-16 RX ORDER — FENTANYL CITRATE 50 UG/ML
INJECTION, SOLUTION INTRAMUSCULAR; INTRAVENOUS PRN
Status: DISCONTINUED | OUTPATIENT
Start: 2023-01-16 | End: 2023-01-16 | Stop reason: SDUPTHER

## 2023-01-16 RX ORDER — LIDOCAINE HYDROCHLORIDE 10 MG/ML
INJECTION, SOLUTION EPIDURAL; INFILTRATION; INTRACAUDAL; PERINEURAL PRN
Status: DISCONTINUED | OUTPATIENT
Start: 2023-01-16 | End: 2023-01-16 | Stop reason: ALTCHOICE

## 2023-01-16 RX ORDER — BUPIVACAINE HYDROCHLORIDE 2.5 MG/ML
INJECTION, SOLUTION EPIDURAL; INFILTRATION; INTRACAUDAL PRN
Status: DISCONTINUED | OUTPATIENT
Start: 2023-01-16 | End: 2023-01-16 | Stop reason: ALTCHOICE

## 2023-01-16 RX ORDER — PROPOFOL 10 MG/ML
INJECTION, EMULSION INTRAVENOUS PRN
Status: DISCONTINUED | OUTPATIENT
Start: 2023-01-16 | End: 2023-01-16 | Stop reason: SDUPTHER

## 2023-01-16 RX ADMIN — FENTANYL CITRATE 50 MCG: 50 INJECTION, SOLUTION INTRAMUSCULAR; INTRAVENOUS at 13:35

## 2023-01-16 RX ADMIN — PROPOFOL 50 MG: 10 INJECTION, EMULSION INTRAVENOUS at 13:54

## 2023-01-16 RX ADMIN — FENTANYL CITRATE 50 MCG: 50 INJECTION, SOLUTION INTRAMUSCULAR; INTRAVENOUS at 13:39

## 2023-01-16 RX ADMIN — SODIUM CHLORIDE: 9 INJECTION, SOLUTION INTRAVENOUS at 13:34

## 2023-01-16 RX ADMIN — LIDOCAINE HYDROCHLORIDE 50 MG: 10 INJECTION, SOLUTION INFILTRATION; PERINEURAL at 13:54

## 2023-01-16 ASSESSMENT — LIFESTYLE VARIABLES: SMOKING_STATUS: 1

## 2023-01-16 ASSESSMENT — PAIN SCALES - GENERAL: PAINLEVEL_OUTOF10: 9

## 2023-01-16 ASSESSMENT — PAIN DESCRIPTION - LOCATION: LOCATION: NECK

## 2023-01-16 NOTE — H&P
H&P    Patient reports to posterior neck pain radiating across shoulder blades. Feels that it has been increasing has effects from previous C-facet RFA is wearing off. Dull sharp pain and constantly aching. Worse with activity. States gets associated daily headaches. Pain increases with bending, lifting, turning torso, reaching, pushing, pulling, turning head, getting up and down, and housework or working at job. Norco prn remains effective      Medications reviewed. Patient denies side effects with medications. Patient states he is taking medications as prescribed. Hedenies receiving pain medications from other sources. He denies any ER visits since last visit. Pain scale with out pain medications or at its worst is 7/10. Pain scale with pain medications or at its best is 4/10. Last dose of Wells was today   Drug screen reviewed from 10/10/2022 and was appropriate  Pill count completed  today and WNL: Yes        The patienthas No Known Allergies. Subjective:      Review of Systems   Constitutional: Negative. Respiratory: Negative. Tobacco use    Cardiovascular: Negative. Gastrointestinal: Negative. Genitourinary: Negative. Musculoskeletal:  Positive for arthralgias, back pain, myalgias, neck pain and neck stiffness. Negative for gait problem and joint swelling. Skin: Negative. Neurological:  Positive for headaches. Negative for weakness and numbness. Psychiatric/Behavioral: Negative. Objective:      Vitals       Vitals:     12/12/22 0818   BP: 122/62   Weight: 198 lb (89.8 kg)   Height: 6' 0.05\" (1.83 m)            Physical Exam  Vitals and nursing note reviewed. Constitutional:       General: He is not in acute distress. Appearance: Normal appearance. He is well-developed. He is not diaphoretic. HENT:      Head: Normocephalic and atraumatic.       Right Ear: External ear normal.      Left Ear: External ear normal.      Nose: Nose normal. Mouth/Throat:      Mouth: Mucous membranes are moist.      Pharynx: No oropharyngeal exudate.   Eyes:      General: No scleral icterus.        Right eye: No discharge.         Left eye: No discharge.      Conjunctiva/sclera: Conjunctivae normal.      Pupils: Pupils are equal, round, and reactive to light.   Neck:      Thyroid: No thyromegaly.   Cardiovascular:      Rate and Rhythm: Normal rate and regular rhythm.      Heart sounds: Normal heart sounds. No murmur heard.    No friction rub. No gallop.   Pulmonary:      Effort: Pulmonary effort is normal. No respiratory distress.      Breath sounds: Normal breath sounds. No wheezing or rales.   Chest:      Chest wall: No tenderness.   Abdominal:      General: Bowel sounds are normal. There is no distension.      Palpations: Abdomen is soft.      Tenderness: There is no abdominal tenderness. There is no guarding or rebound.   Musculoskeletal:         General: Tenderness present. No swelling.      Right shoulder: Tenderness present. Decreased range of motion.      Left shoulder: Tenderness present. Decreased range of motion.      Cervical back: Full passive range of motion without pain and normal range of motion. Swelling, rigidity, spasms, tenderness and bony tenderness present. No edema or erythema. Muscular tenderness present. Normal range of motion.      Thoracic back: Spasms and tenderness present.      Lumbar back: Tenderness and bony tenderness present. Decreased range of motion. Negative right straight leg raise test and negative left straight leg raise test.        Back:     Right hip: No tenderness.      Left hip: No tenderness.      Right knee: Normal range of motion. No tenderness.      Left knee: Normal range of motion. No tenderness.      Right lower leg: No swelling. No edema.      Left lower leg: No swelling. No edema.      Right ankle: No swelling.      Left ankle: No swelling.      Right foot: No swelling.      Left foot: No swelling.   Skin:      General: Skin is warm. Coloration: Skin is not pale. Findings: No erythema or rash. Neurological:      General: No focal deficit present. Mental Status: He is alert and oriented to person, place, and time. He is not disoriented. Cranial Nerves: No cranial nerve deficit. Sensory: Sensory deficit present. Motor: No atrophy or abnormal muscle tone. Coordination: Coordination normal.      Gait: Gait normal.      Deep Tendon Reflexes: Babinski sign absent on the right side. Babinski sign absent on the left side. Reflex Scores:       Tricep reflexes are 1+ on the right side and 1+ on the left side. Bicep reflexes are 1+ on the right side and 1+ on the left side. Brachioradialis reflexes are 1+ on the right side and 1+ on the left side. Patellar reflexes are 1+ on the right side and 1+ on the left side. Achilles reflexes are 1+ on the right side and 1+ on the left side. Psychiatric:         Attention and Perception: Attention normal. He is attentive. Mood and Affect: Mood normal. Mood is not anxious or depressed. Affect is not labile, blunt, angry or inappropriate. Speech: Speech normal. He is communicative. Speech is not rapid and pressured, delayed, slurred or tangential.         Behavior: Behavior normal. Behavior is not agitated, slowed, aggressive, withdrawn, hyperactive or combative. Behavior is cooperative. Thought Content: Thought content normal. Thought content is not paranoid or delusional. Thought content does not include homicidal or suicidal ideation. Thought content does not include homicidal or suicidal plan. Cognition and Memory: Cognition normal. Memory is not impaired. He does not exhibit impaired recent memory or impaired remote memory. Judgment: Judgment is not impulsive or inappropriate. JIMENA  Patricks test  negative  Yeoman's  or Gaenslen's negative     Assessment:      1.  Cervical spondylosis    2. Neck pain    3. Thoracic spondylosis    4. Mid back pain    5. Myofascial pain    6. History of lumbar fusion    7. Chronic pain syndrome       Plan:      OARRS reviewed. Current MED: 10.00  Patient was offered naloxone for home. Discussed long term side effects of medications, tolerance, dependency and addiction. Previous UDS reviewed  UDS preformed today for compliance. Patient told can not receive any pain medications from any other source. No evidence of abuse, diversion or aberrant behavior. Medications and/or procedures to improve function and quality of life- patient understanding with this and that may not be pain free  Discussed with patient about safe storage of medications at home  Discussed possible weaning of medication dosing dependent on treatment/procedure results. Discussed with patient about risks with procedure including infection, reaction to medication, increased pain, or bleeding. Procedure notes reviewed in detail  Plan Bilateral C-facet RFA @ C4-5 and C5-6 for longer term pain relief. Procedure and risks discussed in detail with patient. Received over 80% relief of neck pain from previous C-facet RFA for over 7 months  If patient is on blood thinners will need approval to hold yes: On 81 mg of aspirin  Continue Norco 5/325 BID prn- filled 12/6/2022. Is compliant. Return for Bilateral C-facet RFA @ C4-5 and C5-6. , follow up after procedure.

## 2023-01-16 NOTE — H&P
[facility]  History and Physical Update    Pt Name: Tanya Santiago  MRN: [id number]  YOB: 1955  Date of evaluation: 1/16/2023      I have examined the patient and reviewed the H&P/Consult and there are no changes to the patient or plans.         Electronically signed by Meron Chaney MD on 1/16/2023 at 1:03 PM

## 2023-01-16 NOTE — PROGRESS NOTES
1402-Patient to Phase II via cart. Report received from [facility]. Patient drowsy but responsive. Vitals obtained and stable. Respirations even and unlabored on room air. Patient denies pain, nausea, numbness and tingling. Patient able to move all extremities. No drainage noted at injection sites. Patient instructed to stay in bed. Instructed on call light use. 1405-Patient provided with snack and drink. Denies needs. Call light in reach. 1420-IV removed with no complications. Patient getting dressed at bedside. Ride notified of pickup. 1430-Patient meets discharge criteria. Discharged in stable condition with responsible . All belongings given to patient. Patient ambulated to car with assistance from RN. Patient tolerated well.

## 2023-01-16 NOTE — ANESTHESIA PRE PROCEDURE
Department of Anesthesiology  Preprocedure Note       Name:  Arnie Wolfe   Age:  78 y.o.  :  1955                                          MRN:  [id number]         Date:  2023      Surgeon: Annabel Napoles):  Dane Cardoza MD    Procedure: Procedure(s):  Bilateral facet radiofrequency ablation cervical  4-5,  5-6    Medications prior to admission:   Prior to Admission medications    Medication Sig Start Date End Date Taking? Authorizing Provider   HYDROcodone-acetaminophen (NORCO) 5-325 MG per tablet Take 1 tablet by mouth 2 times daily as needed for Pain for up to 30 days. 23  GERARDO Braun CNP   metoprolol tartrate (LOPRESSOR) 25 MG tablet TAKE 1/2 TABLET BY MOUTH TWICE DAILY. HOLD IF HEART RATE LESS THAN 60. 22   GERARDO Bruno CNP   INCRUSE ELLIPTA 62.5 MCG/INH AEPB INHALE 1 PUFF INTO THE LUNGS DAILY 22   GERARDO Bruno CNP   atorvastatin (LIPITOR) 40 MG tablet TAKE 1 TABLET BY MOUTH DAILY 10/31/22   GERARDO Bruno CNP   metFORMIN (GLUCOPHAGE) 1000 MG tablet TAKE 1 TABLET BY MOUTH TWICE DAILY WITH MEALS 10/31/22   GERARDO Bruno CNP   amitriptyline (ELAVIL) 50 MG tablet TAKE 1/2 A TABLET BY MOUTH NIGHTLY.  10/31/22   GERARDO Bruno CNP   JANUVIA 100 MG tablet TAKE 1 TABLET BY MOUTH DAILY 10/27/22   GERARDO Judd CNP   etodolac (LODINE XL) 400 MG extended release tablet TAKE 1 TABLET BY MOUTH DAILY 22   GERARDO Bruno CNP   lisinopril (PRINIVIL;ZESTRIL) 2.5 MG tablet TAKE 1 TABLET BY MOUTH DAILY 22   Jules Joe,    QUEtiapine (SEROQUEL) 200 MG tablet TAKE 1 TABLET BY MOUTH AT BEDTIME 22   Jules Joe, DO   buPROPion (WELLBUTRIN XL) 150 MG extended release tablet TAKE 1 TABLET BY MOUTH EVERY MORNING 22   Jules Joe,    pantoprazole (PROTONIX) 40 MG tablet TAKE 1 TABLET BY MOUTH DAILY 22   Jules Joe DO   albuterol sulfate  (90 Base) MCG/ACT inhaler INHALE 2 PUFFS INTO THE LUNGS FOUR TIMES DAILY AS NEEDED FOR WHEEZING 4/22/22   GERARDO Chaparro CNP   JARDIANCE 10 MG tablet TAKE 1 TABLET BY MOUTH DAILY 2/21/22   GERARDO Chaparro CNP   Handicap Placard MISC by Does not apply route Duration: 5 years 11/30/21   GERARDO Chaparro CNP   aspirin 81 MG chewable tablet TAKE 1 TABLET BY MOUTH DAILY 11/29/21   GERARDO Chaparro CNP   azelastine (ASTELIN) 0.1 % nasal spray SPRAY ONCE IN EACH NOSTRIL TWICE DAILY AS DIRECTED 11/29/21   GERARDO Chaparro CNP   Alcohol Swabs PADS 1 each by Does not apply route 2 times daily 11/29/21   GERARDO Chaparro CNP   diclofenac sodium (VOLTAREN) 1 % GEL Apply 2 g topically 2 times daily 5/26/21   GERARDO Chaparro CNP   Lancet Device MISC 1 Device by Does not apply route once for 1 dose 4/10/19 11/29/21  GERARDO Chaparro CNP   Lancets MISC 1 each by Does not apply route 2 times daily 4/10/19   GERARDO Chaparro CNP   Elastic Bandages & Supports (B & B A-2 ATHLETIC SUPPORTER) MISC 1 Device by Does not apply route continuous prn (prn pain) 5/23/18   GERARDO Chaparro CNP       Current medications:    No current outpatient medications on file.     No current facility-administered medications for this visit.       Allergies:  No Known Allergies    Problem List:    Patient Active Problem List   Diagnosis Code   • Allergic rhinitis J30.9   • Osteoarthritis M19.90   • Depression F32.A   • GERD (gastroesophageal reflux disease) K21.9   • Hypertension I10   • COPD (chronic obstructive pulmonary disease) J44.9   • Smoker F17.200   • Cervical spondylosis M47.812   • Diabetes mellitus (HCC) E11.9   • Hydrocele N43.3       Past Medical History:        Diagnosis Date   • Abnormal liver enzymes    • Alcohol abuse    • Allergic rhinitis    • Chronic back pain    • Colonic polyp    • COPD (chronic obstructive pulmonary disease) (HCC)    • Depression    • Diabetes mellitus (HCC)    • DJD (degenerative joint  disease) of cervical spine     DJD (degenerative joint disease) of lumbar spine     SP SURGERY    GERD (gastroesophageal reflux disease)     Hyperlipidemia     Hypertension     Osteoarthritis     Osteoarthritis     Smoker        Past Surgical History:        Procedure Laterality Date    BACK SURGERY  2008    X 3    [facility]    CHOLECYSTECTOMY      COLONOSCOPY  6/2012    repeat 6/2017- no precancerous  polyps    EKG 12-LEAD  4/6/2015         FACET JOINT INJECTION Bilateral 7/7/2020    BILATERAL C-FACET MBB @ C4-5,C5-6 and C6-7 performed by Kam Brady MD at [address] [facility] INJECTION Bilateral 8/6/2020    bilateral C-facet MBB # 2 @ C4-5, C5-6 and C6-7 performed by Kam Brady MD at [address] [facility] INJECTION Bilateral 5/10/2022    Bilateral C-facet RFA @ C4-5 and C5-6 performed by Kam Brady MD at [address] [facility]    Bilateral Inguinal     LUMBAR SPINE SURGERY  2006, 2007, 2008    fusion 2008    PAIN MANAGEMENT PROCEDURE Right 9/4/2020    Bilateral C-facet RFA @ C4-5, C5-6. RIGHT SIDE FIRST.  performed by Kam Brady MD at [place] [address] Left 10/9/2020    Left C-facet RFA @ C4-5, and C5-6 performed by Kam Brady MD at [place] [address] Right 3/25/2021    Right C-facet RFA @ C4-5, C5-6 performed by Kam Brady MD at [place] [address] Left 4/29/2021    Left C-facet RFA @ C4-5, C5-6 performed by Kam Brady MD at [address] [facility] Left 7/31/2018    LEFT HYDROCELECTOMY performed by Kimmy Sibley MD at [address] INJECTION  11/4/2021     THYROID CYST ASPIRATION AND OR INJECTION 11/4/2021 Adolfo Watson Davina Dominguez MD [facility]       Social History:    Social History     Tobacco Use    Smoking status: Every Day     Packs/day: 0.75     Years: 38.00     Pack years: 28.50     Types: Cigarettes     Start date: 11/19/2012    Smokeless tobacco: Never   Substance Use Topics    Alcohol use: Yes     Alcohol/week: 0.0 standard drinks     Comment: occasional                                Ready to quit: Not Answered  Counseling given: Not Answered      Vital Signs (Current): There were no vitals filed for this visit. BP Readings from Last 3 Encounters:   12/28/22 124/66   12/12/22 122/62   10/10/22 120/70       NPO Status:                                                                                 BMI:   Wt Readings from Last 3 Encounters:   12/28/22 200 lb (90.7 kg)   12/12/22 198 lb (89.8 kg)   10/10/22 198 lb (89.8 kg)     There is no height or weight on file to calculate BMI.    CBC:   Lab Results   Component Value Date/Time    WBC 14.3 03/13/2022 07:20 PM    RBC 5.15 03/13/2022 07:20 PM    HGB 16.5 03/13/2022 07:20 PM    HCT 48.1 03/13/2022 07:20 PM    MCV 93.4 03/13/2022 07:20 PM    RDW 13.8 05/24/2018 11:25 AM     03/13/2022 07:20 PM       CMP:   Lab Results   Component Value Date/Time     03/13/2022 07:20 PM    K 4.0 03/13/2022 07:20 PM    CL 97 03/13/2022 07:20 PM    CO2 23 03/13/2022 07:20 PM    BUN 10 03/13/2022 07:20 PM    CREATININE 10 12/28/2022 11:45 AM    CREATININE 1.0 03/13/2022 07:20 PM    LABGLOM 75 03/13/2022 07:20 PM    GLUCOSE 161 03/13/2022 07:20 PM    GLUCOSE 141 06/20/2016 10:40 AM    PROT 8.9 03/13/2022 07:20 PM    PROT 7.7 09/24/2013 12:00 AM    CALCIUM 9.9 03/13/2022 07:20 PM    BILITOT 0.6 03/13/2022 07:20 PM    ALKPHOS 135 03/13/2022 07:20 PM    AST 15 03/13/2022 07:20 PM    ALT 19 03/13/2022 07:20 PM       POC Tests:   No results for input(s): POCGLU, POCNA, POCK, POCCL, POCBUN, POCHEMO, POCHCT in the last 72 hours.     Coags: No results found for: PROTIME, INR, APTT    HCG (If Applicable): No results found for: PREGTESTUR, PREGSERUM, HCG, HCGQUANT     ABGs: No results found for: PHART, PO2ART, IWQ4PCH, JFX7REX, BEART, T9FYODKB     Type & Screen (If Applicable):  Lab Results   Component Value Date    LABRH POS 04/06/2015       Drug/Infectious Status (If Applicable):  Lab Results   Component Value Date/Time    HEPCAB Negative 07/23/2018 08:50 AM       COVID-19 Screening (If Applicable):   Lab Results   Component Value Date/Time    COVID19 Not Detected 12/31/2020 12:35 PM         Anesthesia Evaluation  Patient summary reviewed no history of anesthetic complications:   Airway: Mallampati: II  TM distance: >3 FB   Neck ROM: full  Mouth opening: > = 3 FB   Dental:          Pulmonary: breath sounds clear to auscultation  (+) COPD:  current smoker    (-) recent URI                           Cardiovascular:    (+) hypertension:, hyperlipidemia        Rhythm: regular  Rate: normal                    Neuro/Psych:   (+) neuromuscular disease:, depression/anxiety             GI/Hepatic/Renal:   (+) GERD:,           Endo/Other:    (+) Diabetes, : arthritis: OA., .                 Abdominal:             Vascular: negative vascular ROS. Other Findings:             Anesthesia Plan      MAC     ASA 3       Induction: intravenous. Anesthetic plan and risks discussed with patient.       Plan discussed with CRNA and surgical team.                    Tianna Newberry MD   1/16/2023

## 2023-01-16 NOTE — ANESTHESIA POSTPROCEDURE EVALUATION
Department of Anesthesiology  Postprocedure Note    Patient: Ingrid Miles  MRN: [id number]  YOB: 1955  Date of evaluation: 1/16/2023      Procedure Summary     Date: 01/16/23 Room / Location: [facility] OR 03 / [address] [facility]    Anesthesia Start: 5485 Anesthesia Stop: 2155    Procedure: Bilateral facet radiofrequency ablation cervical  4-5,  5-6 (Bilateral: Neck) Diagnosis:       Cervical spondylosis      (Cervical Spondylosis)    Surgeons: Shelia Oshea MD Responsible Provider: Montez Skiff, MD    Anesthesia Type: MAC ASA Status: 3          Anesthesia Type: MAC    Bhaskar Phase I:      Bhaskar Phase II: Bhaskar Score: 9      Anesthesia Post Evaluation    Patient location during evaluation: PACU  Patient participation: complete - patient participated  Level of consciousness: awake and alert  Airway patency: patent  Nausea & Vomiting: no nausea  Complications: no  Cardiovascular status: blood pressure returned to baseline and hemodynamically stable  Respiratory status: acceptable and spontaneous ventilation  Hydration status: euvolemic

## 2023-01-16 NOTE — OP NOTE
Pre-Procedure Note    Patient Name: Carlos Alberto Vuong   YOB: 1955  Medical Record Number: [id number]  Date: 1/16/23     Indication:  Neck pain    Consent: On file. Vital Signs:   Vitals:    01/16/23 1324   BP: (!) 152/88   Pulse: 85   Resp: 20   Temp: 97.3 °F (36.3 °C)   SpO2: 90%       Past Medical History:   has a past medical history of Abnormal liver enzymes, Alcohol abuse, Allergic rhinitis, Chronic back pain, Colonic polyp, COPD (chronic obstructive pulmonary disease) ([facility] [address] Depression, Diabetes mellitus ([facility] [address] DJD (degenerative joint disease) of cervical spine, DJD (degenerative joint disease) of lumbar spine, GERD (gastroesophageal reflux disease), Hyperlipidemia, Hypertension, Osteoarthritis, Osteoarthritis, and Smoker. Past Surgical History:   has a past surgical history that includes back surgery (2008); Inguinal hernia repair (2002); hernia repair; Cholecystectomy; Colonoscopy (6/2012); EKG 12 Lead (4/6/2015); Lumbar spine surgery (2006, 2007, 2008); Cardiac catheterization (3/2015); pr excision hydrocele unilateral (Left, 7/31/2018); Facet joint injection (Bilateral, 7/7/2020); Facet joint injection (Bilateral, 8/6/2020); Pain management procedure (Right, 9/4/2020); Pain management procedure (Left, 10/9/2020); Pain management procedure (Right, 3/25/2021); Pain management procedure (Left, 4/29/2021); US ASP/INJ THYROID CYST (11/4/2021); and Facet joint injection (Bilateral, 5/10/2022). Pre-Sedation Documentation and Exam:   Vital signs have been reviewed (see flow sheet for vitals). Sedation/ Anesthesia :  MAC. Patient is an appropriate candidate for plan of sedation: yes         Preoperative Diagnosis:  C-spondylosis     Post-Op Dx: as above     Procedure Performed:  Radiofrequency abalation of median branchs at the levels of C4-5 and C5-6 bilateral   under fluoroscopic guidance      Indication for the Procedure:   The patient had history of chronic neck pain that is not responding well to the conservative treatment. Patient's pain is mostly axial in nature. Pain is interfering with the activities of daily living. Physical examination revealed facet tenderness and facet loading is positive. The patient had undergone cervical  facet joint injections with pain relief that lasted for only a short period of time and confirmatory median branch block gave patient pain relief of 100 % . Hence we decided to do radiofrequency abalation of median branches for long term pain relief. The procedure and risks were discussed with the patient and an informed consent was obtained. Procedure:     A meaningful communication was kept up with the patient throughout  the procedure. Patient is placed in prone position and skin over the back was prepped and draped in sterile manner. Then using fluoroscopy the waist of facet joint complex of the vertebra was observed and the view was optimized . The skin and deep tissues posterior were infiltrated with 10 ml of 1% xylocaine The RF canula with the 10 mm active tip was introduced through the skin wheal under fluoroscopy guidance such that the tip of the needle lies in the groove of the waist of facet joint complex. Then a lateral view of cervical  spine was obtained to make sure the tip of needle is in the centroid of the articular pillar.  hen electric impedence was checked to make sure it is acceptable. Then a sensory stimulus was applied at 50 Hz up to 1 volt and concordant pain symptoms were reproduced. Then a motor stimulus was applied at 2 Hz up to 2 volts and no motor stimulation was seen in upper extremities. Some multifidus stimulus was seen. Then after negative aspiration and 0.25%  Marcaine 4 cc  was injected through the needle in divided doses. Then an  lesion was done at 80 degrees centigrade for 90 seconds.   EBL-0     Patient tolerated the procedure well and was discharged home in stable condition                Electronically signed by Harman Adams MD on 1/16/23 at 1:35 PM EST

## 2023-01-25 ENCOUNTER — CLINICAL DOCUMENTATION (OUTPATIENT)
Dept: SPIRITUAL SERVICES | Age: 68
End: 2023-01-25

## 2023-01-25 NOTE — ACP (ADVANCE CARE PLANNING)
Advance Care Planning   Ambulatory ACP Specialist Patient Outreach    Date:  1/25/2023  ACP Specialist:  PHIL Peng    Outreach call to patient in follow-up to ACP Specialist referral from: GERARDO Moreno CNP    [x] PCP  [] Provider   [] Ambulatory Care Management [] Other for Reason:    [x] Advance Directive Assistance  [] Code Status Discussion  [] Complete Portable DNR Order  [] Discuss Goals of Care  [] Complete POST/MOST  [x] Early ACP Decision-Making  [] Other    Date Referral Received: 12/28/2022    Today's Outreach:  [] First   [] Second  [] Third       [x] Reminder call           Third outreach made by []  phone  [] email []   zealot networkt     Intervention:  [] Spoke with Patient  [x] Left VM requesting return call      Outcome: ACP specialist made a reminder call to pt for his scheduled acp conversation appt for tomorrow 01/26 at 9am. Pt did not answer this call, which resulted in a vm left encouraging the pt to return this call if he needs to cancel/reschedule this appt. Next Step:   [] ACP scheduled conversation  [] Outreach again in one week               [] Email / Mail ACP Info Sheets  [] Email / Mail Advance Directive            [] Close Referral. Routing closure to referring provider/staff and to ACP Specialist . [] Closure Letter mailed to Patient with Invitation to Contact ACP Specialist if/when ready.     Thank you for this referral.    Advance Care Planning

## 2023-01-26 ENCOUNTER — CLINICAL DOCUMENTATION (OUTPATIENT)
Dept: SPIRITUAL SERVICES | Age: 68
End: 2023-01-26

## 2023-01-26 NOTE — ACP (ADVANCE CARE PLANNING)
Advance Care Planning     Advance Care Planning Clinical Specialist  Conversation Note      Date of ACP Conversation: 1/26/2023    Conversation Conducted with: Patient with Decision Making Capacity    ACP Clinical Specialist: PHIL Mantilla  Healthcare Decision Maker:     Current Designated Healthcare Decision Maker:     Primary Decision Maker: Kat Camarena - Child - [phone]    Secondary Decision Maker: Ani Flores - Child - [phone]  Click here to complete Healthcare Decision Makers including section of the Healthcare Decision Maker Relationship (ie \"Primary\")  Today we completed the ACP conversation, nomination of pt's HCDM, along with completing pt AD docs. Care Preferences    Hospitalization: \"If your health worsens and it becomes clear that your chance of recovery is unlikely, what would your preference be regarding hospitalization? \"    Choice:  [] The patient wants hospitalization. [x] The patient prefers comfort-focused treatment without hospitalization. Ventilation: \"If you were in your present state of health and suddenly became very ill and were unable to breathe on your own, what would your preference be about the use of a ventilator (breathing machine) if it were available to you? \"      If the patient would desire the use of ventilator (breathing machine), answer \"yes\". If not, \"no\":  Pt states that he would like to leave this decision up to his children. \"If your health worsens and it becomes clear that your chance of recovery is unlikely, what would your preference be about the use of a ventilator (breathing machine) if it were available to you? \"     Would the patient desire the use of ventilator (breathing machine)?: No      Resuscitation  \"CPR works best to restart the heart when there is a sudden event, like a heart attack, in someone who is otherwise healthy.  Unfortunately, CPR does not typically restart the heart for people who have serious health conditions or who are very sick. \"    \"In the event your heart stopped as a result of an underlying serious health condition, would you want attempts to be made to restart your heart (answer \"yes\" for attempt to resuscitate) or would you prefer a natural death (answer \"no\" for do not attempt to resuscitate)? \"  Pt states that he would like to be saved if it will save his life and he would be able to obtain that quality of life, if not than he would prefer a natural death. [x] Yes   [] No   Educated Patient / Nhung Ward regarding differences between Advance Directives and portable DNR orders. Length of ACP Conversation in minutes:  45 mins    Conversation Outcomes:  [x] ACP discussion completed  [] Existing advance directive reviewed with patient; no changes to patient's previously recorded wishes  [x] New Advance Directive completed  [] Portable Do Not Rescitate prepared for Provider review and signature  [] POLST/POST/MOLST/MOST prepared for Provider review and signature      Follow-up plan:    [] Schedule follow-up conversation to continue planning  [] Referred individual to Provider for additional questions/concerns   [x] Advised patient/agent/surrogate to review completed ACP document and update if needed with changes in condition, patient preferences or care setting    [x] This note routed to one or more involved healthcare providers    ACP conversation has been completed. Pt was able to make his wants/needs known, along with nominating his HCDM's. Pt has selected his son Lottie Gomez to be his primary agent and his daughter Audrey Brown to be his secondary agent. Pt did completed these AD by hand during this conversation. Pt states that he will get these AD notarized & will provide the office with a copy at his next appt. At this time, no further assistance is needed from ACP and the referral can be closed.

## 2023-02-01 DIAGNOSIS — M47.812 CERVICAL SPONDYLOSIS: ICD-10-CM

## 2023-02-01 DIAGNOSIS — M46.1 SACROILIAC INFLAMMATION (HCC): ICD-10-CM

## 2023-02-01 DIAGNOSIS — M47.814 THORACIC SPONDYLOSIS: ICD-10-CM

## 2023-02-01 DIAGNOSIS — G89.4 CHRONIC PAIN SYNDROME: ICD-10-CM

## 2023-02-02 RX ORDER — HYDROCODONE BITARTRATE AND ACETAMINOPHEN 5; 325 MG/1; MG/1
1 TABLET ORAL 2 TIMES DAILY PRN
Qty: 60 TABLET | Refills: 0 | Status: SHIPPED | OUTPATIENT
Start: 2023-02-04 | End: 2023-03-06

## 2023-02-06 ENCOUNTER — OFFICE VISIT (OUTPATIENT)
Dept: PHYSICAL MEDICINE AND REHAB | Age: 68
End: 2023-02-06
Payer: MEDICARE

## 2023-02-06 VITALS
BODY MASS INDEX: 24.9 KG/M2 | WEIGHT: 194 LBS | HEIGHT: 74 IN | DIASTOLIC BLOOD PRESSURE: 72 MMHG | SYSTOLIC BLOOD PRESSURE: 120 MMHG

## 2023-02-06 DIAGNOSIS — M54.9 MID BACK PAIN: ICD-10-CM

## 2023-02-06 DIAGNOSIS — M79.18 MYOFASCIAL PAIN: ICD-10-CM

## 2023-02-06 DIAGNOSIS — Z98.1 HISTORY OF LUMBAR FUSION: ICD-10-CM

## 2023-02-06 DIAGNOSIS — M46.1 SACROILIAC INFLAMMATION (HCC): ICD-10-CM

## 2023-02-06 DIAGNOSIS — G89.4 CHRONIC PAIN SYNDROME: ICD-10-CM

## 2023-02-06 DIAGNOSIS — M54.2 NECK PAIN: ICD-10-CM

## 2023-02-06 DIAGNOSIS — M47.814 THORACIC SPONDYLOSIS: ICD-10-CM

## 2023-02-06 DIAGNOSIS — M47.812 CERVICAL SPONDYLOSIS: Primary | ICD-10-CM

## 2023-02-06 DIAGNOSIS — J42 CHRONIC BRONCHITIS, UNSPECIFIED CHRONIC BRONCHITIS TYPE (HCC): ICD-10-CM

## 2023-02-06 PROCEDURE — 3074F SYST BP LT 130 MM HG: CPT | Performed by: NURSE PRACTITIONER

## 2023-02-06 PROCEDURE — 99214 OFFICE O/P EST MOD 30 MIN: CPT | Performed by: NURSE PRACTITIONER

## 2023-02-06 PROCEDURE — 1123F ACP DISCUSS/DSCN MKR DOCD: CPT | Performed by: NURSE PRACTITIONER

## 2023-02-06 PROCEDURE — 3078F DIAST BP <80 MM HG: CPT | Performed by: NURSE PRACTITIONER

## 2023-02-06 RX ORDER — ALBUTEROL SULFATE 90 UG/1
AEROSOL, METERED RESPIRATORY (INHALATION)
Qty: 54 G | Refills: 3 | Status: SHIPPED | OUTPATIENT
Start: 2023-02-06

## 2023-02-06 ASSESSMENT — ENCOUNTER SYMPTOMS
RESPIRATORY NEGATIVE: 1
GASTROINTESTINAL NEGATIVE: 1
BACK PAIN: 1

## 2023-02-06 NOTE — PROGRESS NOTES
[facility] [address]  Dept: [phone]  Dept Fax: [phone]: [phone]    Visit Date: 2/6/2023    Functionality Assessment/Goals Worksheet     On a scale of 0 (Does not Interfere) to 10 (Completely Interferes)     1. Which number describes how during the past week pain has interfered with       the following:  A. General Activity:  5  B. Mood: 4  C. Walking Ability:  6  D. Normal Work (Includes both work outside the home and housework):  7  E. Relations with Other People:   4  F. Sleep:   6  G. Enjoyment of Life:   5    2. Patient Prefers to Take their Pain Medications:     []  On a regular basis   [x]  Only when necessary    []  Does not take pain medications    3. What are the Patient's Goals/Expectations for Visiting Pain Management? []  Learn about my pain    [x]  Receive Medication   []  Physical Therapy     []  Treat Depression   [x]  Receive Injections    []  Treat Sleep   []  Deal with Anxiety and Stress   []  Treat Opoid Dependence/Addiction   []  Other:        HPI:   Reuben Trujillo is a 78 y.o. male is here today for    Chief Complaint: Neck pain     HPI   FU from bilateral C-facet RFA from 1/16/2023. Initially had sore ness for up to 5 days after this procedure and now states that he is receiving 80% to 85% relief of neck pain since C-facet RFA. Able to perform more activity with less pain \"I am able to stand and do my entire dishes now without taking a rest break which I had to do before\". Headaches hae been minimal since procedure. Taking less prn Norco since procedure. Continues to have  pain between shoulder blades aching. Sometimes stabbing and spasms. Pain increases with bending, lifting, twisting , turning head, walking, standing, getting up and down, and housework or working at job. Medications reviewed.  Patient denies side effects with medications. Patient states he is taking medications as prescribed. Hedenies receiving pain medications from other sources. He denies any ER visits since last visit. Pain scale with out pain medications or at its worst is 5-7/10. Pain scale with pain medications or at its best is 1/10. Last dose of Norco was today   Drug screen reviewed from 12/12/2022 and was appropriate  Pill count completed  today and WNL: Yes      The patienthas No Known Allergies. Subjective:      Review of Systems   Constitutional: Negative. Respiratory: Negative. Tobacco use    Cardiovascular: Negative. Gastrointestinal: Negative. Genitourinary: Negative. Musculoskeletal:  Positive for arthralgias, back pain, myalgias, neck pain and neck stiffness. Negative for gait problem and joint swelling. Skin: Negative. Neurological:  Negative for weakness, numbness and headaches. Psychiatric/Behavioral: Negative. Objective:     Vitals:    02/06/23 0824   BP: 120/72   Weight: 194 lb (88 kg)   Height: 6' 1.5\" (1.867 m)       Physical Exam  Vitals and nursing note reviewed. Constitutional:       General: He is not in acute distress. Appearance: Normal appearance. He is well-developed. He is not diaphoretic. HENT:      Head: Normocephalic and atraumatic. Right Ear: External ear normal.      Left Ear: External ear normal.      Nose: Nose normal.      Mouth/Throat:      Mouth: Mucous membranes are moist.      Pharynx: No oropharyngeal exudate. Eyes:      General: No scleral icterus. Right eye: No discharge. Left eye: No discharge. Conjunctiva/sclera: Conjunctivae normal.      Pupils: Pupils are equal, round, and reactive to light. Neck:      Thyroid: No thyromegaly. Cardiovascular:      Rate and Rhythm: Normal rate and regular rhythm. Heart sounds: Normal heart sounds. No murmur heard. No friction rub. No gallop.    Pulmonary:      Effort: Pulmonary effort is normal. No respiratory distress. Breath sounds: Normal breath sounds. No wheezing or rales. Chest:      Chest wall: No tenderness. Abdominal:      General: Bowel sounds are normal. There is no distension. Palpations: Abdomen is soft. Tenderness: There is no abdominal tenderness. There is no guarding or rebound. Musculoskeletal:         General: Tenderness present. No swelling. Right shoulder: Tenderness present. Decreased range of motion. Left shoulder: Tenderness present. Decreased range of motion. Cervical back: Full passive range of motion without pain and normal range of motion. Swelling, rigidity, spasms, tenderness and bony tenderness present. No edema or erythema. Muscular tenderness present. Normal range of motion. Thoracic back: Spasms and tenderness present. Lumbar back: Tenderness and bony tenderness present. Decreased range of motion. Negative right straight leg raise test and negative left straight leg raise test.        Back:       Right hip: No tenderness. Left hip: No tenderness. Right knee: Normal range of motion. No tenderness. Left knee: Normal range of motion. No tenderness. Right lower leg: No swelling. No edema. Left lower leg: No swelling. No edema. Right ankle: No swelling. Left ankle: No swelling. Right foot: No swelling. Left foot: No swelling. Skin:     General: Skin is warm. Coloration: Skin is not pale. Findings: No erythema or rash. Neurological:      General: No focal deficit present. Mental Status: He is alert and oriented to person, place, and time. He is not disoriented. Cranial Nerves: No cranial nerve deficit. Sensory: Sensory deficit present. Motor: Weakness present. No atrophy or abnormal muscle tone. Coordination: Coordination normal.      Gait: Gait normal.      Deep Tendon Reflexes: Babinski sign absent on the right side.  Babinski sign absent on the left side. Reflex Scores:       Tricep reflexes are 1+ on the right side and 1+ on the left side. Bicep reflexes are 1+ on the right side and 1+ on the left side. Brachioradialis reflexes are 1+ on the right side and 1+ on the left side. Patellar reflexes are 1+ on the right side and 1+ on the left side. Achilles reflexes are 1+ on the right side and 1+ on the left side. Psychiatric:         Attention and Perception: Attention normal. He is attentive. Mood and Affect: Mood normal. Mood is not anxious or depressed. Affect is not labile, blunt, angry or inappropriate. Speech: Speech normal. He is communicative. Speech is not rapid and pressured, delayed, slurred or tangential.         Behavior: Behavior normal. Behavior is not agitated, slowed, aggressive, withdrawn, hyperactive or combative. Behavior is cooperative. Thought Content: Thought content normal. Thought content is not paranoid or delusional. Thought content does not include homicidal or suicidal ideation. Thought content does not include homicidal or suicidal plan. Cognition and Memory: Cognition normal. Memory is not impaired. He does not exhibit impaired recent memory or impaired remote memory. Judgment: Judgment is not impulsive or inappropriate. JIMENA  Patricks test  negative  Yeoman's  or Gaenslen's negative       Assessment:     1. Cervical spondylosis    2. Neck pain    3. Thoracic spondylosis    4. Mid back pain    5. History of lumbar fusion    6. Myofascial pain    7. Sacroiliac inflammation ([facility] [address]    8. Chronic pain syndrome            Plan:      OARRS reviewed. Current MED: 10.00  Patient was offered naloxone for home. Discussed long term side effects of medications, tolerance, dependency and addiction. Previous UDS reviewed  UDS preformed today for compliance. Patient told can not receive any pain medications from any other source.   No evidence of abuse, diversion or aberrant behavior. Medications and/or procedures to improve function and quality of life- patient understanding with this and that may not be pain free  Discussed with patient about safe storage of medications at home  Discussed possible weaning of medication dosing dependent on treatment/procedure results. Discussed with patient about risks with procedure including infection, reaction to medication, Procedure notes reviewed in detail  Receiving 80% relief of neck pain from C-facet RFA. Able to do more and tolerate more activity   Only short term relief from trigger point injections   Continue Norco 5/325 BID prn- filled filled 2/4/2023. Is compliant. Pain is tolerable with procedures and medications     Meds. Prescribed:   No orders of the defined types were placed in this encounter. Return in about 2 months (around 4/6/2023), or if symptoms worsen or fail to improve, for follow up  for medications.                Electronically signed by GERARDO Zuluaga CNP on2/6/2023 at 8:47 AM

## 2023-02-06 NOTE — TELEPHONE ENCOUNTER
Future Appointments   Date Time Provider Ezra Renae   4/6/2023  8:45 AM GERARDO Allred CNP [facility] [address]   6/28/2023 10:40 AM GERARDO Briceno CNP [facility] [address]

## 2023-02-16 DIAGNOSIS — E11.65 UNCONTROLLED TYPE 2 DIABETES MELLITUS WITH HYPERGLYCEMIA (HCC): ICD-10-CM

## 2023-02-16 RX ORDER — EMPAGLIFLOZIN 10 MG/1
TABLET, FILM COATED ORAL
Qty: 90 TABLET | Refills: 3 | Status: SHIPPED | OUTPATIENT
Start: 2023-02-16

## 2023-02-16 NOTE — TELEPHONE ENCOUNTER
Recent Visits  Date Type Provider Dept   12/28/22 Office Visit GERARDO Slater CNP [facility]   05/31/22 Office Visit GERARDO Slater CNP [facility]   03/30/22 Office Visit GERARDO Slater CNP [facility]   12/20/21 Office Visit GERARDO Slater CNP [facility]   11/29/21 Office Visit GERARDO Slater CNP [facility]   08/25/21 Office Visit GERARDO Slater CNP [facility]   Showing recent visits within past 540 days with a meds authorizing provider and meeting all other requirements  Future Appointments  Date Type Provider Dept   06/28/23 Appointment GERARDO Slater CNP [facility]   Showing future appointments within next 150 days with a meds authorizing provider and meeting all other requirements

## 2023-03-01 DIAGNOSIS — M46.1 SACROILIAC INFLAMMATION (HCC): ICD-10-CM

## 2023-03-01 DIAGNOSIS — G89.4 CHRONIC PAIN SYNDROME: ICD-10-CM

## 2023-03-01 DIAGNOSIS — M47.812 CERVICAL SPONDYLOSIS: ICD-10-CM

## 2023-03-01 DIAGNOSIS — M47.814 THORACIC SPONDYLOSIS: ICD-10-CM

## 2023-03-01 RX ORDER — HYDROCODONE BITARTRATE AND ACETAMINOPHEN 5; 325 MG/1; MG/1
1 TABLET ORAL 2 TIMES DAILY PRN
Qty: 60 TABLET | Refills: 0 | Status: SHIPPED | OUTPATIENT
Start: 2023-03-06 | End: 2023-04-05

## 2023-03-01 NOTE — TELEPHONE ENCOUNTER
OARRS reviewed. UDS: + for  Seroquel,Hydrocodone, Amitriptyline, Bupropion   Last seen: 2/6/2023.  Follow-up:   Future Appointments   Date Time Provider Ezra Renae   4/6/2023  8:45 AM GERARDO Menchaca CNP [facility] [address] [address]   6/28/2023 10:40 AM GERARDO Purvis CNP [facility] [address]

## 2023-03-01 NOTE — TELEPHONE ENCOUNTER
Dilan Pappas called requesting a refill on the following medications:  Requested Prescriptions     Pending Prescriptions Disp Refills    HYDROcodone-acetaminophen (NORCO) 5-325 MG per tablet 60 tablet 0     Sig: Take 1 tablet by mouth 2 times daily as needed for Pain for up to 30 days.     Pharmacy verified: [address]  [address]      Date of last visit: 2/06/2023  Date of next visit (if applicable): 4/6/2023

## 2023-03-30 DIAGNOSIS — G89.4 CHRONIC PAIN SYNDROME: ICD-10-CM

## 2023-03-30 DIAGNOSIS — M47.812 CERVICAL SPONDYLOSIS: ICD-10-CM

## 2023-03-30 DIAGNOSIS — M47.814 THORACIC SPONDYLOSIS: ICD-10-CM

## 2023-03-30 DIAGNOSIS — M46.1 SACROILIAC INFLAMMATION (HCC): ICD-10-CM

## 2023-03-30 RX ORDER — HYDROCODONE BITARTRATE AND ACETAMINOPHEN 5; 325 MG/1; MG/1
1 TABLET ORAL 2 TIMES DAILY PRN
Qty: 60 TABLET | Refills: 0 | Status: SHIPPED | OUTPATIENT
Start: 2023-04-05 | End: 2023-05-05

## 2023-03-30 NOTE — TELEPHONE ENCOUNTER
OARRS reviewed. UDS: + for  Quetiapine, Hydrocodone, Amitriptyline, Bupropion. Last seen: 2/6/2023.  Follow-up: 4/6/2023

## 2023-04-06 ENCOUNTER — OFFICE VISIT (OUTPATIENT)
Dept: PHYSICAL MEDICINE AND REHAB | Age: 68
End: 2023-04-06
Payer: MEDICARE

## 2023-04-06 VITALS
BODY MASS INDEX: 24.9 KG/M2 | DIASTOLIC BLOOD PRESSURE: 80 MMHG | WEIGHT: 194 LBS | HEIGHT: 74 IN | SYSTOLIC BLOOD PRESSURE: 120 MMHG

## 2023-04-06 DIAGNOSIS — M54.2 NECK PAIN: ICD-10-CM

## 2023-04-06 DIAGNOSIS — G89.4 CHRONIC PAIN SYNDROME: ICD-10-CM

## 2023-04-06 DIAGNOSIS — F11.90 CHRONIC, CONTINUOUS USE OF OPIOIDS: ICD-10-CM

## 2023-04-06 DIAGNOSIS — M47.812 CERVICAL SPONDYLOSIS: Primary | ICD-10-CM

## 2023-04-06 DIAGNOSIS — M79.18 MYOFASCIAL PAIN: ICD-10-CM

## 2023-04-06 DIAGNOSIS — Z98.1 HISTORY OF LUMBAR FUSION: ICD-10-CM

## 2023-04-06 DIAGNOSIS — M54.9 MID BACK PAIN: ICD-10-CM

## 2023-04-06 DIAGNOSIS — M47.814 THORACIC SPONDYLOSIS: ICD-10-CM

## 2023-04-06 PROCEDURE — 3074F SYST BP LT 130 MM HG: CPT | Performed by: NURSE PRACTITIONER

## 2023-04-06 PROCEDURE — 3079F DIAST BP 80-89 MM HG: CPT | Performed by: NURSE PRACTITIONER

## 2023-04-06 PROCEDURE — 1123F ACP DISCUSS/DSCN MKR DOCD: CPT | Performed by: NURSE PRACTITIONER

## 2023-04-06 PROCEDURE — 99214 OFFICE O/P EST MOD 30 MIN: CPT | Performed by: NURSE PRACTITIONER

## 2023-04-06 ASSESSMENT — ENCOUNTER SYMPTOMS
RESPIRATORY NEGATIVE: 1
GASTROINTESTINAL NEGATIVE: 1
BACK PAIN: 1

## 2023-04-06 NOTE — PROGRESS NOTES
[facility] [address]  Dept: [phone]  Dept Fax: [phone]: [phone]    Visit Date: 4/6/2023    Functionality Assessment/Goals Worksheet     On a scale of 0 (Does not Interfere) to 10 (Completely Interferes)     1. Which number describes how during the past week pain has interfered with       the following:  A. General Activity:  7  B. Mood: 6  C. Walking Ability:  7  D. Normal Work (Includes both work outside the home and housework):  8  E. Relations with Other People:   5  F. Sleep:   7  G. Enjoyment of Life:   6    2. Patient Prefers to Take their Pain Medications:     []  On a regular basis   []  Only when necessary    []  Does not take pain medications    3. What are the Patient's Goals/Expectations for Visiting Pain Management? []  Learn about my pain    [x]  Receive Medication   []  Physical Therapy     []  Treat Depression   [x]  Receive Injections    []  Treat Sleep   []  Deal with Anxiety and Stress   []  Treat Opoid Dependence/Addiction   []  Other:        HPI:   Adriel Jacobs is a 78 y.o. male is here today for    Chief Complaint: Neck pain, low back pain     HPI   2 month FU. Continues to have posterior neck pain - aching pain and goes between shoulder blades sometimes dull and throbbing. Continues good relief from C-facet RFA as overall pain has been tolerable with medications and procedures. Not having much headaches lately. Continues to have pain in low back- aching dull pain constant. Medications continues to help. Able to get around and tolerate daily activities. Pain increases with bending, lifting, twisting , turning head, standing, getting up and down, and housework or working at job. Prior Injections:   bilateral C-facet RFA from 1/16/2023      Medications reviewed. Patient denies side effects with medications.  Patient

## 2023-04-25 DIAGNOSIS — K21.9 GASTROESOPHAGEAL REFLUX DISEASE WITHOUT ESOPHAGITIS: ICD-10-CM

## 2023-04-25 DIAGNOSIS — Z72.0 TOBACCO ABUSE: ICD-10-CM

## 2023-04-25 DIAGNOSIS — I10 ESSENTIAL HYPERTENSION: ICD-10-CM

## 2023-04-25 DIAGNOSIS — G47.00 INSOMNIA, UNSPECIFIED TYPE: ICD-10-CM

## 2023-04-25 RX ORDER — QUETIAPINE FUMARATE 200 MG/1
TABLET, FILM COATED ORAL
Qty: 90 TABLET | Refills: 3 | Status: SHIPPED | OUTPATIENT
Start: 2023-04-25

## 2023-04-25 RX ORDER — PANTOPRAZOLE SODIUM 40 MG/1
TABLET, DELAYED RELEASE ORAL
Qty: 90 TABLET | Refills: 3 | Status: SHIPPED | OUTPATIENT
Start: 2023-04-25

## 2023-04-25 RX ORDER — LISINOPRIL 2.5 MG/1
2.5 TABLET ORAL DAILY
Qty: 90 TABLET | Refills: 3 | Status: SHIPPED | OUTPATIENT
Start: 2023-04-25

## 2023-04-25 RX ORDER — BUPROPION HYDROCHLORIDE 150 MG/1
150 TABLET ORAL EVERY MORNING
Qty: 90 TABLET | Refills: 3 | Status: SHIPPED | OUTPATIENT
Start: 2023-04-25

## 2023-04-25 NOTE — TELEPHONE ENCOUNTER
Recent Visits  Date Type Provider Dept   12/28/22 Office Visit GERARDO Mcintosh CNP [facility]   05/31/22 Office Visit GERARDO Mcintosh CNP [facility]   03/30/22 Office Visit GERARDO Mcintosh CNP [facility]   12/20/21 Office Visit GERARDO Mcintosh CNP [facility]   11/29/21 Office Visit GERARDO Mcintosh CNP [facility]   Showing recent visits within past 540 days with a meds authorizing provider and meeting all other requirements  Future Appointments  Date Type Provider Dept   06/28/23 Appointment GERARDO Mcintosh CNP [facility]   Showing future appointments within next 150 days with a meds authorizing provider and meeting all other requirements       Future Appointments   Date Time Provider Ezra Macdonald   6/6/2023  9:00 AM GERARDO Yen CNP [facility]   6/28/2023 10:40 AM GERARDO Mcintosh

## 2023-05-03 DIAGNOSIS — M47.812 CERVICAL SPONDYLOSIS: ICD-10-CM

## 2023-05-03 DIAGNOSIS — G89.4 CHRONIC PAIN SYNDROME: ICD-10-CM

## 2023-05-03 DIAGNOSIS — M46.1 SACROILIAC INFLAMMATION (HCC): ICD-10-CM

## 2023-05-03 DIAGNOSIS — M47.814 THORACIC SPONDYLOSIS: ICD-10-CM

## 2023-05-04 RX ORDER — HYDROCODONE BITARTRATE AND ACETAMINOPHEN 5; 325 MG/1; MG/1
1 TABLET ORAL 2 TIMES DAILY PRN
Qty: 60 TABLET | Refills: 0 | Status: SHIPPED | OUTPATIENT
Start: 2023-05-05 | End: 2023-06-04

## 2023-05-04 NOTE — TELEPHONE ENCOUNTER
OARRS reviewed. UDS: + for  Quetiapine, Hydrocodone, Amitriptyline, Bupropion. Last seen: 4/6/2023.  Follow-up: 6/6/2023

## 2023-05-11 NOTE — PATIENT INSTRUCTIONS
Refill request completed per protocol.      Medication(s) Requested: Cyclobenzaprine  Last office visit: 11/30/2022  Next office visit: 5/31/2023  Labs:      ---- OV 11/30/2022 ----  On medication list    
What is lung cancer screening? Lung cancer screening is a way in which doctors check the lungs for early signs of cancer in people who have no symptoms of lung cancer. A low-dose CT scan uses much less radiation than a normal CT scan and shows a more detailed image of the lungs than a standard X-ray. The goal of lung cancer screening is to find cancer early, before it has a chance to grow, spread, or cause problems. One large study found that smokers who were screened with low-dose CT scans were less likely to die of lung cancer than those who were screened with standard X-ray. Below is a summary of the things you need to know regarding screening for lung cancer with low-dose computed tomography (LDCT). This is a screening program that involves routine annual screening with LDCT studies of the lung. The LDCTs are done using low-dose radiation that is not thought to increase your cancer risk. If you have other serious medical conditions (other cancers, congestive heart failure) that limit your life expectancy to less than 10 years, you should not undergo lung cancer screening with LDCT. The chance is 20%-60% that the LDCT result will show abnormalities. This would require additional testing which could include repeat imaging or even invasive procedures. Most (about 95%) of \"abnormal\" LDCT results are false in the sense that no lung cancer is ultimately found. Additionally, some (about 10%) of the cancers found would not affect your life expectancy, even if undetected and untreated. If you are still smoking, the single most important thing that you can do to reduce your risk of dying of lung cancer is to quit. For this screening to be covered by Medicare and most other insurers, strict criteria must be met. If you do not meet these criteria, but still wish to undergo LDCT testing, you will be required to sign a waiver indicating your willingness to pay for the scan.
No indicators present

## 2023-05-25 DIAGNOSIS — J44.9 CHRONIC OBSTRUCTIVE PULMONARY DISEASE, UNSPECIFIED COPD TYPE (HCC): ICD-10-CM

## 2023-05-25 RX ORDER — UMECLIDINIUM 62.5 UG/1
AEROSOL, POWDER ORAL
Qty: 90 EACH | Refills: 1 | Status: SHIPPED | OUTPATIENT
Start: 2023-05-25

## 2023-05-25 NOTE — TELEPHONE ENCOUNTER
SUBJECTIVE:      Patient ID: Vibha Israel is a 51 y.o. female.    Chief Complaint: Diabetes (f/u, med refill) and Hypothyroidism    Vibha is here for routine follow up for diabetes, hypothyroidism and anemia.  She has lost 30 lbs since her last visit.  She has been eating healthier and trying to be more active.  She does not have plan for daily exercise but does walk more than she has previously.  Her anemia is stable. She has not seen GYN but states that her last period was not as heavy. She has not seen GI for colonoscopy.    Diabetes   She presents for her follow-up diabetic visit. She has type 2 diabetes mellitus. Her disease course has been fluctuating. Pertinent negatives for hypoglycemia include no confusion, dizziness, headaches, nervousness/anxiousness, pallor or tremors. Pertinent negatives for diabetes include no blurred vision, no chest pain, no fatigue, no foot paresthesias, no foot ulcerations, no polydipsia, no polyphagia, no polyuria, no visual change, no weakness and no weight loss. There are no hypoglycemic complications. Symptoms are stable. There are no diabetic complications. Risk factors for coronary artery disease include diabetes mellitus, dyslipidemia, obesity and sedentary lifestyle. Current diabetic treatment includes diet and oral agent (triple therapy) (and GLP-1). She is compliant with treatment some of the time. Her weight is decreasing steadily. She is following a generally healthy diet. Meal planning includes avoidance of concentrated sweets. She has not had a previous visit with a dietitian. She participates in exercise intermittently. An ACE inhibitor/angiotensin II receptor blocker is being taken. She does not see a podiatrist.Eye exam is not current.   Thyroid Problem   Presents for follow-up visit. Symptoms include menstrual problem. Patient reports no anxiety, cold intolerance, constipation, depressed mood, diaphoresis, diarrhea, dry skin, fatigue, hair loss, heat  Recent Visits  Date Type Provider Dept   12/28/22 Office Visit GERARDO Walker CNP [facility]   05/31/22 Office Visit GERARDO Walker CNP [facility]   03/30/22 Office Visit GERARDO Walker CNP [facility]   12/20/21 Office Visit GERARDO Walker CNP [facility]   Showing recent visits within past 540 days with a meds authorizing provider and meeting all other requirements  Future Appointments  Date Type Provider Dept   06/28/23 Appointment GERARDO Walker CNP [facility]   Showing future appointments within next 150 days with a meds authorizing provider and meeting all other requirements        Future Appointments   Date Time Provider Ezra Macdonald   6/6/2023  9:00 AM GERARDO Randle CNP [facility]   6/28/2023 10:40 AM GERARDO Walker intolerance, hoarse voice, leg swelling, nail problem, palpitations, tremors, visual change, weight gain or weight loss. The symptoms have been stable.       Past Surgical History:   Procedure Laterality Date    ABLATION COLPOCLESIS       SECTION       Family History   Problem Relation Age of Onset    Hypertension Mother     Cancer Father     Rectal cancer Father     Lung cancer Father       Social History     Socioeconomic History    Marital status:      Spouse name: Not on file    Number of children: Not on file    Years of education: Not on file    Highest education level: Not on file   Occupational History    Not on file   Social Needs    Financial resource strain: Not on file    Food insecurity:     Worry: Not on file     Inability: Not on file    Transportation needs:     Medical: Not on file     Non-medical: Not on file   Tobacco Use    Smoking status: Never Smoker    Smokeless tobacco: Never Used   Substance and Sexual Activity    Alcohol use: Yes    Drug use: No    Sexual activity: Yes     Partners: Male   Lifestyle    Physical activity:     Days per week: Not on file     Minutes per session: Not on file    Stress: Not on file   Relationships    Social connections:     Talks on phone: Not on file     Gets together: Not on file     Attends Restoration service: Not on file     Active member of club or organization: Not on file     Attends meetings of clubs or organizations: Not on file     Relationship status: Not on file   Other Topics Concern    Not on file   Social History Narrative    Not on file     Current Outpatient Medications   Medication Sig Dispense Refill    dulaglutide (TRULICITY) 1.5 mg/0.5 mL PnIj Inject 1.5 mg into the skin once a week. 12 Syringe 0    empagliflozin (JARDIANCE) 25 mg Tab Take 1 tablet by mouth once daily. 90 tablet 0    escitalopram oxalate (LEXAPRO) 20 MG tablet Take 1 tablet (20 mg total) by mouth every evening. 90 tablet 0     iron,carbon,gluc-FA-B12-C-dss (FERRALET 90 DUAL/CITRANATAL BLOOM) 90-1-12-50 mg-mg-mcg-mg Tab Take 1 tablet by mouth 2 (two) times daily. 180 tablet 1    lancets Misc 1 each by Misc.(Non-Drug; Combo Route) route 2 (two) times daily as needed. 100 each 3    lisinopril 10 MG tablet Take 1 tablet (10 mg total) by mouth once daily. 90 tablet 0    metFORMIN (GLUCOPHAGE) 1000 MG tablet Take 1 tablet (1,000 mg total) by mouth 2 (two) times daily. 180 tablet 0    pioglitazone (ACTOS) 15 MG tablet Take 1 tablet (15 mg total) by mouth once daily. 90 tablet 0    pravastatin (PRAVACHOL) 10 MG tablet Take 1 tablet (10 mg total) by mouth nightly. at bedtime. 90 tablet 0    SYNTHROID 112 mcg tablet Take 1 tablet (112 mcg total) by mouth once daily. 90 tablet 0    traZODone (DESYREL) 50 MG tablet Take 1 tablet (50 mg total) by mouth nightly as needed. 90 tablet 0    VITAMIN D2 50,000 unit capsule TAKE ONE CAPSULE BY MOUTH ONCE EVERY 7 DAYS 12 capsule 1    blood-glucose meter,continuous (DEXCOM G6 ) Misc 1 each by Misc.(Non-Drug; Combo Route) route Daily. 1 each 0    blood-glucose sensor (DEXCOM G6 SENSOR) Manda 1 Device by Misc.(Non-Drug; Combo Route) route Daily. 10 each 3    blood-glucose transmitter (DEXCOM G6 TRANSMITTER) Manda 1 each by Misc.(Non-Drug; Combo Route) route Daily. 1 Device 0     No current facility-administered medications for this visit.      Review of patient's allergies indicates:  No Known Allergies   Past Medical History:   Diagnosis Date    Diabetes mellitus, type 2     Hyperthyroidism     Microcytic anemia     Mood swings     Psoriasis      Past Surgical History:   Procedure Laterality Date    ABLATION COLPOCLESIS       SECTION         Review of Systems   Constitutional: Negative for activity change, appetite change, chills, diaphoresis, fatigue, fever, unexpected weight change, weight gain and weight loss.   HENT: Negative for congestion, hoarse voice, nosebleeds,  "rhinorrhea, sore throat and voice change.    Eyes: Negative for blurred vision, pain, discharge and visual disturbance.   Respiratory: Negative for apnea, cough, shortness of breath and wheezing.    Cardiovascular: Negative for chest pain, palpitations and leg swelling.   Gastrointestinal: Negative for abdominal pain, constipation, diarrhea, nausea and vomiting.   Endocrine: Negative for cold intolerance, heat intolerance, polydipsia, polyphagia and polyuria.   Genitourinary: Positive for menstrual problem. Negative for difficulty urinating, dysuria, frequency, urgency and vaginal discharge.   Musculoskeletal: Negative for arthralgias, gait problem and myalgias.   Skin: Negative for color change, pallor and rash.   Allergic/Immunologic: Negative for immunocompromised state.   Neurological: Negative for dizziness, tremors, syncope, weakness, numbness and headaches.   Hematological: Negative for adenopathy. Does not bruise/bleed easily.   Psychiatric/Behavioral: Negative for confusion, dysphoric mood, self-injury, sleep disturbance and suicidal ideas. The patient is not nervous/anxious.       OBJECTIVE:      Vitals:    01/09/20 0827   BP: 126/64   Pulse: 70   SpO2: 99%   Weight: (!) 136.4 kg (300 lb 12.8 oz)   Height: 5' 5.5" (1.664 m)     Physical Exam   Constitutional: She is oriented to person, place, and time. She appears well-developed and well-nourished. No distress.   HENT:   Head: Normocephalic and atraumatic.   Right Ear: Tympanic membrane, external ear and ear canal normal.   Left Ear: Tympanic membrane, external ear and ear canal normal.   Nose: Nose normal. No mucosal edema or rhinorrhea.   Mouth/Throat: Uvula is midline and oropharynx is clear and moist. No oropharyngeal exudate, posterior oropharyngeal edema or posterior oropharyngeal erythema.   Eyes: Pupils are equal, round, and reactive to light. Conjunctivae, EOM and lids are normal. Right eye exhibits no discharge. Left eye exhibits no discharge. No " scleral icterus.   Neck: Normal range of motion. Neck supple. Carotid bruit is not present. No tracheal deviation present. No thyromegaly present.   Cardiovascular: Normal rate, regular rhythm, normal heart sounds and intact distal pulses. Exam reveals no gallop and no friction rub.   No murmur heard.  Pulses:       Dorsalis pedis pulses are 2+ on the right side, and 2+ on the left side.        Posterior tibial pulses are 2+ on the right side, and 2+ on the left side.   Pulmonary/Chest: Effort normal and breath sounds normal. No stridor. No respiratory distress. She has no wheezes. She has no rales.   Abdominal: Soft. Bowel sounds are normal. She exhibits no distension and no mass. There is no hepatosplenomegaly. There is no tenderness. There is no rigidity, no rebound, no guarding and no CVA tenderness.   Musculoskeletal: Normal range of motion. She exhibits no edema.        Right foot: There is no deformity.        Left foot: There is no deformity.   Feet:   Right Foot:   Protective Sensation: 10 sites tested. 10 sites sensed.   Skin Integrity: Negative for ulcer, skin breakdown or warmth.   Left Foot:   Protective Sensation: 10 sites tested. 10 sites sensed.   Skin Integrity: Negative for ulcer, skin breakdown or warmth.   Lymphadenopathy:     She has no cervical adenopathy.   Neurological: She is alert and oriented to person, place, and time.   Skin: Skin is warm, dry and intact. Capillary refill takes less than 2 seconds. No rash noted. She is not diaphoretic. No erythema.   Psychiatric: She has a normal mood and affect. Her behavior is normal. Judgment and thought content normal. She expresses no suicidal plans.   Vitals reviewed.     Assessment:       1. Diabetes mellitus type 2 in obese    2. Microcytic anemia    3. Acquired hypothyroidism    4. Unspecified vitamin D deficiency    5. Emotional instability    6. Primary insomnia    7. Need for pneumococcal vaccination    8. Encounter for screening colonoscopy         Plan:       Diabetes mellitus type 2 in obese   Improving; continue current meds, add exercise and continue weight loss  -     blood-glucose meter,continuous (DEXCOM G6 ) Misc; 1 each by Misc.(Non-Drug; Combo Route) route Daily.  Dispense: 1 each; Refill: 0  -     blood-glucose sensor (DEXCOM G6 SENSOR) Manda; 1 Device by Misc.(Non-Drug; Combo Route) route Daily.  Dispense: 10 each; Refill: 3  -     blood-glucose transmitter (DEXCOM G6 TRANSMITTER) Manda; 1 each by Misc.(Non-Drug; Combo Route) route Daily.  Dispense: 1 Device; Refill: 0  -     Comprehensive metabolic panel; Future; Expected date: 04/01/2020  -     Lipid panel; Future; Expected date: 04/01/2020  -     Urinalysis; Future; Expected date: 04/01/2020  -     Hemoglobin A1c; Future; Expected date: 04/01/2020  -     Microalbumin/creatinine urine ratio; Future; Expected date: 04/01/2020  -     Ambulatory Referral to Ophthalmology  -     Foot Exam Performed  -     empagliflozin (JARDIANCE) 25 mg Tab; Take 1 tablet by mouth once daily.  Dispense: 90 tablet; Refill: 0  -     lisinopril 10 MG tablet; Take 1 tablet (10 mg total) by mouth once daily.  Dispense: 90 tablet; Refill: 0  -     metFORMIN (GLUCOPHAGE) 1000 MG tablet; Take 1 tablet (1,000 mg total) by mouth 2 (two) times daily.  Dispense: 180 tablet; Refill: 0  -     pioglitazone (ACTOS) 15 MG tablet; Take 1 tablet (15 mg total) by mouth once daily.  Dispense: 90 tablet; Refill: 0  -     pravastatin (PRAVACHOL) 10 MG tablet; Take 1 tablet (10 mg total) by mouth nightly. at bedtime.  Dispense: 90 tablet; Refill: 0  -     dulaglutide (TRULICITY) 1.5 mg/0.5 mL PnIj; Inject 1.5 mg into the skin once a week.  Dispense: 12 Syringe; Refill: 0    Microcytic anemia   Stable; continue iron supplements; follow up with GYN and with GI  -     CBC auto differential; Future; Expected date: 04/01/2020  -     Ferritin; Future; Expected date: 04/01/2020  -     Iron; Future; Expected date:  04/01/2020    Acquired hypothyroidism   Stable; continue current medications.  -     TSH; Future; Expected date: 04/01/2020  -     SYNTHROID 112 mcg tablet; Take 1 tablet (112 mcg total) by mouth once daily.  Dispense: 90 tablet; Refill: 0    Unspecified vitamin D deficiency  -     Vitamin D; Future; Expected date: 04/01/2020    Emotional instability   Stable; continue current medications.  -     escitalopram oxalate (LEXAPRO) 20 MG tablet; Take 1 tablet (20 mg total) by mouth every evening.  Dispense: 90 tablet; Refill: 0    Primary insomnia   Stable; continue current medications.  -     traZODone (DESYREL) 50 MG tablet; Take 1 tablet (50 mg total) by mouth nightly as needed.  Dispense: 90 tablet; Refill: 0    Need for pneumococcal vaccination  -     (In Office Administered) Pneumococcal Polysaccharide Vaccine (23 Valent) (SQ/IM)    Encounter for screening colonoscopy  -     Ambulatory referral to Gastroenterology        Follow up in about 3 months (around 4/9/2020), or if symptoms worsen or fail to improve, for diabetes; anemia.      1/9/2020 RENATA Simpson, FNP

## 2023-05-31 DIAGNOSIS — G89.4 CHRONIC PAIN SYNDROME: ICD-10-CM

## 2023-05-31 DIAGNOSIS — M47.812 CERVICAL SPONDYLOSIS: ICD-10-CM

## 2023-05-31 DIAGNOSIS — M46.1 SACROILIAC INFLAMMATION (HCC): ICD-10-CM

## 2023-05-31 DIAGNOSIS — M47.814 THORACIC SPONDYLOSIS: ICD-10-CM

## 2023-05-31 RX ORDER — HYDROCODONE BITARTRATE AND ACETAMINOPHEN 5; 325 MG/1; MG/1
1 TABLET ORAL 2 TIMES DAILY PRN
Qty: 60 TABLET | Refills: 0 | Status: SHIPPED | OUTPATIENT
Start: 2023-06-04 | End: 2023-07-04

## 2023-05-31 NOTE — TELEPHONE ENCOUNTER
OARRS reviewed. UDS: + for  quetiapine, hydrocodone. Inconsistent for amitriptyline as of 4/6/2023. Last seen: 4/6/2023.  Follow-up:   Future Appointments   Date Time Provider Ezra Hernandezi   6/6/2023  9:00 AM GERARDO Mohan - CNP N [email or web address] - [facility]   6/28/2023 10:40 AM GERARDO Stephen - CNP [facility] - [facility]

## 2023-06-06 ENCOUNTER — OFFICE VISIT (OUTPATIENT)
Dept: PHYSICAL MEDICINE AND REHAB | Age: 68
End: 2023-06-06
Payer: MEDICARE

## 2023-06-06 VITALS
SYSTOLIC BLOOD PRESSURE: 120 MMHG | WEIGHT: 194 LBS | DIASTOLIC BLOOD PRESSURE: 66 MMHG | HEIGHT: 74 IN | BODY MASS INDEX: 24.9 KG/M2

## 2023-06-06 DIAGNOSIS — M79.18 CHRONIC MYOFASCIAL PAIN: ICD-10-CM

## 2023-06-06 DIAGNOSIS — G89.29 CHRONIC MYOFASCIAL PAIN: ICD-10-CM

## 2023-06-06 DIAGNOSIS — M47.814 THORACIC SPONDYLOSIS: ICD-10-CM

## 2023-06-06 DIAGNOSIS — G89.4 CHRONIC PAIN SYNDROME: ICD-10-CM

## 2023-06-06 DIAGNOSIS — M54.2 NECK PAIN: ICD-10-CM

## 2023-06-06 DIAGNOSIS — Z98.1 HISTORY OF LUMBAR FUSION: ICD-10-CM

## 2023-06-06 DIAGNOSIS — M54.9 MID BACK PAIN: ICD-10-CM

## 2023-06-06 DIAGNOSIS — M79.18 MYOFASCIAL PAIN: ICD-10-CM

## 2023-06-06 DIAGNOSIS — M47.812 CERVICAL SPONDYLOSIS: Primary | ICD-10-CM

## 2023-06-06 DIAGNOSIS — M46.1 SACROILIAC INFLAMMATION (HCC): ICD-10-CM

## 2023-06-06 PROCEDURE — 3074F SYST BP LT 130 MM HG: CPT | Performed by: NURSE PRACTITIONER

## 2023-06-06 PROCEDURE — 99214 OFFICE O/P EST MOD 30 MIN: CPT | Performed by: NURSE PRACTITIONER

## 2023-06-06 PROCEDURE — 3078F DIAST BP <80 MM HG: CPT | Performed by: NURSE PRACTITIONER

## 2023-06-06 PROCEDURE — 1123F ACP DISCUSS/DSCN MKR DOCD: CPT | Performed by: NURSE PRACTITIONER

## 2023-06-06 ASSESSMENT — ENCOUNTER SYMPTOMS
GASTROINTESTINAL NEGATIVE: 1
BACK PAIN: 1
RESPIRATORY NEGATIVE: 1

## 2023-06-06 NOTE — PROGRESS NOTES
pain medications from any other source. No evidence of abuse, diversion or aberrant behavior. Medications and/or procedures to improve function and quality of life- patient understanding with this and that may not be pain free  Discussed with patient about safe storage of medications at home  Discussed possible weaning of medication dosing dependent on treatment/procedure results. Discussed with patient about risks with procedure including infection, reaction to medication, increased pain, or bleeding. Procedure notes reviewed in detail   Continues to receive 80% relief of neck pain from C-facet RF. Discussed repeating when needing to   Only short term relief from trigger point injections   Continue Norco 5/325 BID prn- Just filled 6/4/2023  Pain is tolerable with procedures and medications. Is compliant     Meds. Prescribed:   No orders of the defined types were placed in this encounter. Return in about 10 weeks (around 8/15/2023), or if symptoms worsen or fail to improve, for follow up  for medications.                Electronically signed by GERARDO Tijerina CNP on6/6/2023 at 9:17 AM

## 2023-06-22 ENCOUNTER — TELEPHONE (OUTPATIENT)
Dept: FAMILY MEDICINE CLINIC | Age: 68
End: 2023-06-22

## 2023-06-22 ENCOUNTER — HOSPITAL ENCOUNTER (OUTPATIENT)
Age: 68
Discharge: HOME | End: 2023-06-22
Payer: MEDICARE

## 2023-06-22 DIAGNOSIS — I10 PRIMARY HYPERTENSION: ICD-10-CM

## 2023-06-22 LAB
CHOLEST SERPL-MCNC: 151 MG/DL (ref 100–199)
HDLC SERPL-MCNC: 46 MG/DL
LDLC SERPL CALC-MCNC: 69 MG/DL
PSA SERPL-MCNC: 0.35 NG/ML (ref 0–1)
TRIGL SERPL-MCNC: 179 MG/DL (ref 0–199)

## 2023-06-22 PROCEDURE — 80061 LIPID PANEL: CPT

## 2023-06-22 PROCEDURE — 84153 ASSAY OF PSA TOTAL: CPT

## 2023-06-22 PROCEDURE — 36415 COLL VENOUS BLD VENIPUNCTURE: CPT

## 2023-06-22 NOTE — TELEPHONE ENCOUNTER
Left message on answering machine requesting pt to call back at earliest convenience.      No signed HIPAA

## 2023-06-22 NOTE — TELEPHONE ENCOUNTER
----- Message from GERARDO Stein - CNP sent at 6/22/2023  2:23 PM EDT -----  Let pt know labs are normal

## 2023-06-28 ENCOUNTER — OFFICE VISIT (OUTPATIENT)
Dept: FAMILY MEDICINE CLINIC | Age: 68
End: 2023-06-28
Payer: MEDICARE

## 2023-06-28 VITALS
SYSTOLIC BLOOD PRESSURE: 128 MMHG | HEART RATE: 97 BPM | RESPIRATION RATE: 16 BRPM | BODY MASS INDEX: 24.41 KG/M2 | TEMPERATURE: 97.8 F | HEIGHT: 74 IN | OXYGEN SATURATION: 90 % | DIASTOLIC BLOOD PRESSURE: 74 MMHG | WEIGHT: 190.2 LBS

## 2023-06-28 DIAGNOSIS — E11.9 TYPE 2 DIABETES MELLITUS WITHOUT COMPLICATION, WITHOUT LONG-TERM CURRENT USE OF INSULIN (HCC): Primary | ICD-10-CM

## 2023-06-28 DIAGNOSIS — J44.9 CHRONIC OBSTRUCTIVE PULMONARY DISEASE, UNSPECIFIED COPD TYPE (HCC): ICD-10-CM

## 2023-06-28 DIAGNOSIS — G62.9 NEUROPATHY: ICD-10-CM

## 2023-06-28 DIAGNOSIS — K21.9 GASTROESOPHAGEAL REFLUX DISEASE WITHOUT ESOPHAGITIS: ICD-10-CM

## 2023-06-28 DIAGNOSIS — I10 PRIMARY HYPERTENSION: ICD-10-CM

## 2023-06-28 DIAGNOSIS — F17.200 SMOKER: ICD-10-CM

## 2023-06-28 LAB — HBA1C MFR BLD: 6.8 % (ref 4.3–5.7)

## 2023-06-28 PROCEDURE — 2028F FOOT EXAM PERFORMED: CPT | Performed by: NURSE PRACTITIONER

## 2023-06-28 PROCEDURE — 3044F HG A1C LEVEL LT 7.0%: CPT | Performed by: NURSE PRACTITIONER

## 2023-06-28 PROCEDURE — 3074F SYST BP LT 130 MM HG: CPT | Performed by: NURSE PRACTITIONER

## 2023-06-28 PROCEDURE — 3078F DIAST BP <80 MM HG: CPT | Performed by: NURSE PRACTITIONER

## 2023-06-28 PROCEDURE — 90471 IMMUNIZATION ADMIN: CPT | Performed by: NURSE PRACTITIONER

## 2023-06-28 PROCEDURE — 1123F ACP DISCUSS/DSCN MKR DOCD: CPT | Performed by: NURSE PRACTITIONER

## 2023-06-28 PROCEDURE — 99214 OFFICE O/P EST MOD 30 MIN: CPT | Performed by: NURSE PRACTITIONER

## 2023-06-28 PROCEDURE — 90715 TDAP VACCINE 7 YRS/> IM: CPT | Performed by: NURSE PRACTITIONER

## 2023-06-28 SDOH — ECONOMIC STABILITY: FOOD INSECURITY: WITHIN THE PAST 12 MONTHS, THE FOOD YOU BOUGHT JUST DIDN'T LAST AND YOU DIDN'T HAVE MONEY TO GET MORE.: NEVER TRUE

## 2023-06-28 SDOH — ECONOMIC STABILITY: FOOD INSECURITY: WITHIN THE PAST 12 MONTHS, YOU WORRIED THAT YOUR FOOD WOULD RUN OUT BEFORE YOU GOT MONEY TO BUY MORE.: NEVER TRUE

## 2023-06-28 SDOH — ECONOMIC STABILITY: INCOME INSECURITY: HOW HARD IS IT FOR YOU TO PAY FOR THE VERY BASICS LIKE FOOD, HOUSING, MEDICAL CARE, AND HEATING?: NOT HARD AT ALL

## 2023-06-28 SDOH — ECONOMIC STABILITY: HOUSING INSECURITY
IN THE LAST 12 MONTHS, WAS THERE A TIME WHEN YOU DID NOT HAVE A STEADY PLACE TO SLEEP OR SLEPT IN A SHELTER (INCLUDING NOW)?: NO

## 2023-06-28 ASSESSMENT — PATIENT HEALTH QUESTIONNAIRE - PHQ9
SUM OF ALL RESPONSES TO PHQ QUESTIONS 1-9: 0
1. LITTLE INTEREST OR PLEASURE IN DOING THINGS: 0
SUM OF ALL RESPONSES TO PHQ QUESTIONS 1-9: 0
SUM OF ALL RESPONSES TO PHQ9 QUESTIONS 1 & 2: 0
2. FEELING DOWN, DEPRESSED OR HOPELESS: 0

## 2023-06-28 ASSESSMENT — ENCOUNTER SYMPTOMS
BACK PAIN: 0
RESPIRATORY NEGATIVE: 1
RHINORRHEA: 0
SINUS PAIN: 0
GASTROINTESTINAL NEGATIVE: 1

## 2023-07-03 DIAGNOSIS — M47.812 CERVICAL SPONDYLOSIS: ICD-10-CM

## 2023-07-03 DIAGNOSIS — G89.4 CHRONIC PAIN SYNDROME: ICD-10-CM

## 2023-07-03 DIAGNOSIS — M47.814 THORACIC SPONDYLOSIS: ICD-10-CM

## 2023-07-03 DIAGNOSIS — M46.1 SACROILIAC INFLAMMATION (HCC): ICD-10-CM

## 2023-07-05 RX ORDER — HYDROCODONE BITARTRATE AND ACETAMINOPHEN 5; 325 MG/1; MG/1
1 TABLET ORAL 2 TIMES DAILY PRN
Qty: 60 TABLET | Refills: 0 | Status: SHIPPED | OUTPATIENT
Start: 2023-07-05 | End: 2023-08-04

## 2023-07-05 NOTE — TELEPHONE ENCOUNTER
OARRS reviewed. UDS: + for  Quetiapine, Hydrocodone, Amitriptyline, Bupropion. Last seen: 6/6/2023.  Follow-up: 8/15/2023

## 2023-08-02 DIAGNOSIS — M46.1 SACROILIAC INFLAMMATION (HCC): ICD-10-CM

## 2023-08-02 DIAGNOSIS — G89.4 CHRONIC PAIN SYNDROME: ICD-10-CM

## 2023-08-02 DIAGNOSIS — M47.814 THORACIC SPONDYLOSIS: ICD-10-CM

## 2023-08-02 DIAGNOSIS — M47.812 CERVICAL SPONDYLOSIS: ICD-10-CM

## 2023-08-03 RX ORDER — HYDROCODONE BITARTRATE AND ACETAMINOPHEN 5; 325 MG/1; MG/1
1 TABLET ORAL 2 TIMES DAILY PRN
Qty: 60 TABLET | Refills: 0 | Status: SHIPPED | OUTPATIENT
Start: 2023-08-04 | End: 2023-09-03

## 2023-08-03 NOTE — TELEPHONE ENCOUNTER
OARRS reviewed. UDS: + for  Quetiapine, Hydrocodone, Amitriptyline, Bupropion. Last seen: 6/6/2023.  Follow-up: 8/21/2023

## 2023-08-21 ENCOUNTER — OFFICE VISIT (OUTPATIENT)
Dept: PHYSICAL MEDICINE AND REHAB | Age: 68
End: 2023-08-21
Payer: MEDICARE

## 2023-08-21 VITALS
HEIGHT: 74 IN | DIASTOLIC BLOOD PRESSURE: 60 MMHG | SYSTOLIC BLOOD PRESSURE: 120 MMHG | BODY MASS INDEX: 24.38 KG/M2 | WEIGHT: 190 LBS | HEART RATE: 80 BPM

## 2023-08-21 DIAGNOSIS — M46.1 SACROILIAC INFLAMMATION (HCC): ICD-10-CM

## 2023-08-21 DIAGNOSIS — Z98.1 HISTORY OF LUMBAR FUSION: ICD-10-CM

## 2023-08-21 DIAGNOSIS — M54.2 NECK PAIN: ICD-10-CM

## 2023-08-21 DIAGNOSIS — G89.4 CHRONIC PAIN SYNDROME: ICD-10-CM

## 2023-08-21 DIAGNOSIS — M79.18 MYOFASCIAL PAIN: ICD-10-CM

## 2023-08-21 DIAGNOSIS — M47.814 THORACIC SPONDYLOSIS: ICD-10-CM

## 2023-08-21 DIAGNOSIS — M47.812 CERVICAL SPONDYLOSIS: Primary | ICD-10-CM

## 2023-08-21 PROCEDURE — 99214 OFFICE O/P EST MOD 30 MIN: CPT | Performed by: NURSE PRACTITIONER

## 2023-08-21 PROCEDURE — 3074F SYST BP LT 130 MM HG: CPT | Performed by: NURSE PRACTITIONER

## 2023-08-21 PROCEDURE — 3078F DIAST BP <80 MM HG: CPT | Performed by: NURSE PRACTITIONER

## 2023-08-21 PROCEDURE — 1123F ACP DISCUSS/DSCN MKR DOCD: CPT | Performed by: NURSE PRACTITIONER

## 2023-08-21 ASSESSMENT — ENCOUNTER SYMPTOMS
BACK PAIN: 1
RESPIRATORY NEGATIVE: 1
GASTROINTESTINAL NEGATIVE: 1

## 2023-08-21 NOTE — PROGRESS NOTES
last night   Drug screen reviewed from 6/6/2023 and was appropriate  Pill count completed  today and WNL: Yes      The patienthas No Known Allergies. Subjective:      Review of Systems   Constitutional: Negative. Respiratory: Negative. Tobacco use    Cardiovascular: Negative. Gastrointestinal: Negative. Genitourinary: Negative. Musculoskeletal:  Positive for arthralgias, back pain, myalgias, neck pain and neck stiffness. Negative for gait problem and joint swelling. Not using any assist devices    Skin: Negative. Neurological:  Positive for headaches. Negative for weakness and numbness. Psychiatric/Behavioral: Negative. Objective:     Vitals:    08/21/23 0814   BP: 120/60   Site: Left Upper Arm   Position: Sitting   Cuff Size: Medium Adult   Pulse: 80   Weight: 190 lb (86.2 kg)   Height: 6' 1.5\" (1.867 m)       Physical Exam  Vitals and nursing note reviewed. Constitutional:       General: He is not in acute distress. Appearance: Normal appearance. He is well-developed. He is not diaphoretic. HENT:      Head: Normocephalic and atraumatic. Right Ear: External ear normal.      Left Ear: External ear normal.      Nose: Nose normal.      Mouth/Throat:      Mouth: Mucous membranes are moist.      Pharynx: No oropharyngeal exudate. Eyes:      General: No scleral icterus. Right eye: No discharge. Left eye: No discharge. Conjunctiva/sclera: Conjunctivae normal.      Pupils: Pupils are equal, round, and reactive to light. Neck:      Thyroid: No thyromegaly. Cardiovascular:      Rate and Rhythm: Normal rate and regular rhythm. Heart sounds: Normal heart sounds. No murmur heard. No friction rub. No gallop. Pulmonary:      Effort: Pulmonary effort is normal. No respiratory distress. Breath sounds: Normal breath sounds. No wheezing or rales. Chest:      Chest wall: No tenderness.    Abdominal:      General: Bowel sounds are normal.

## 2023-08-30 DIAGNOSIS — M46.1 SACROILIAC INFLAMMATION (HCC): ICD-10-CM

## 2023-08-30 DIAGNOSIS — M47.812 CERVICAL SPONDYLOSIS: ICD-10-CM

## 2023-08-30 DIAGNOSIS — G89.4 CHRONIC PAIN SYNDROME: ICD-10-CM

## 2023-08-30 DIAGNOSIS — M47.814 THORACIC SPONDYLOSIS: ICD-10-CM

## 2023-08-30 RX ORDER — HYDROCODONE BITARTRATE AND ACETAMINOPHEN 5; 325 MG/1; MG/1
1 TABLET ORAL 2 TIMES DAILY PRN
Qty: 60 TABLET | Refills: 0 | Status: SHIPPED | OUTPATIENT
Start: 2023-09-03 | End: 2023-10-03

## 2023-08-30 NOTE — TELEPHONE ENCOUNTER
OARRS reviewed. UDS: + for  Quetiapine, Hydrocodone, Amitriptyline, Bupropion. Last seen: 8/21/2023.  Follow-up: 10/23/2023

## 2023-10-04 DIAGNOSIS — M46.1 SACROILIAC INFLAMMATION (HCC): ICD-10-CM

## 2023-10-04 DIAGNOSIS — M47.812 CERVICAL SPONDYLOSIS: ICD-10-CM

## 2023-10-04 DIAGNOSIS — G89.4 CHRONIC PAIN SYNDROME: ICD-10-CM

## 2023-10-04 DIAGNOSIS — M47.814 THORACIC SPONDYLOSIS: ICD-10-CM

## 2023-10-04 RX ORDER — HYDROCODONE BITARTRATE AND ACETAMINOPHEN 5; 325 MG/1; MG/1
1 TABLET ORAL 2 TIMES DAILY PRN
Qty: 60 TABLET | Refills: 0 | Status: SHIPPED | OUTPATIENT
Start: 2023-10-08 | End: 2023-11-07

## 2023-10-04 NOTE — TELEPHONE ENCOUNTER
[place] [facility] called requesting a refill on the following medications:  Requested Prescriptions     Pending Prescriptions Disp Refills    HYDROcodone-acetaminophen (NORCO) 5-325 MG per tablet 60 tablet 0     Sig: Take 1 tablet by mouth 2 times daily as needed for Pain for up to 30 days.      Pharmacy verified:  .pv  [id number] - [place], [facility] [phone]    Date of last visit: 08/21/2023  Date of next visit (if applicable): [id number]/95/[id number]

## 2023-10-04 NOTE — TELEPHONE ENCOUNTER
OARRS reviewed. UDS: + for  quetiapine and hydrocodone. Last seen: 8/21/2023.  Follow-up:   Future Appointments   Date Time Provider [address]   10/23/2023  8:30 AM Emmanuelle Farfan APRN - CNP N [facility]   12/28/2023 10:40 AM GERARDO Castellanos

## 2023-10-13 NOTE — DISCHARGE INSTRUCTIONS
Radha Barrera Patient Age: 71 year old  MESSAGE:   Patient calling - States for some reason her medications were being sent to [place], and wanted to be sure the office knew that her medications are to be sent to the [facility].  Also wanted Dr. Moon to know that one of her outside provider's, Dr. Cardenas passed away unexpectedly, and that she won't need Dr. Moon to take over any of those medications, but will have her PCP handle those.  FYI     WEIGHT AND HEIGHT:   Wt Readings from Last 1 Encounters:   06/23/23 47.8 kg (105 lb 6.4 oz)     Ht Readings from Last 1 Encounters:   06/23/23 5' 6\" (1.676 m)     BMI Readings from Last 1 Encounters:   06/23/23 17.01 kg/m²       ALLERGIES:  Hydromorphone, Lexapro, Sulfa antibiotics, Codeine, Tramadol, Penicillins, and Escitalopram  Current Outpatient Medications   Medication Sig Dispense Refill   • atorvastatin (LIPITOR) 80 MG tablet Take 1 tablet by mouth daily. 90 tablet 0   • pantoprazole (PROTONIX) 40 MG tablet One tablet by mouth twice daily until 6/30/23, then decrease to one tablet by mouth daily 60 tablet 11   • butalbital-APAP-caffeine (FIORICET) -40 MG per tablet Take by mouth every 8 hours as needed.     • metoPROLOL tartrate (LOPRESSOR) 25 MG tablet TAKE 1 TABLET BY MOUTH IN  THE MORNING AND 2 TABLETS  BY MOUTH AT NIGHT 270 tablet 3   • aspirin 81 MG chewable tablet Chew 81 mg by mouth daily.     • cetirizine (ZYRTEC) 10 MG tablet Take 10 mg by mouth daily.     • Lactobacillus (ACIDOPHILUS) 100 MG Cap      • topiramate (TOPAMAX) 100 MG tablet Take 1 tablet by mouth 2 times daily.     • venlafaxine XR (EFFEXOR XR) 75 MG 24 hr capsule Take 1 capsule by mouth every morning.       No current facility-administered medications for this visit.     PHARMACY to use: [facility]           Pharmacy preference(s) on file:   [facility] - ALYCIA Shearer - [facility] AT [address] [facility]  ANESTHESIA INSTRUCTIONS FOLLOWING SURGERY        Since you may experience some intermittent light-headedness for the next several hours, we suggest you plan on bed rest or quiet relaxation this evening. You must have a friend or relative stay with you tonight. Because of the sedation you have received, it is recommended that you do not drive a motor vehicle, operate any kind of machinery, or sign any contractual agreement for 24 hours following the procedure. You should not take alcoholic beverages tonight and only take sleeping medication that has been specifically prescribed for you by your physician. Call office [phone] if you have:  Temperature greater than 100.4  Persistent nausea and vomiting  Severe uncontrolled pain  Redness, tenderness, or signs of infection (pain, swelling, redness, odor or green/yellow discharge around the site)  Difficulty breathing, headache or visual disturbances  Hives  Persistent dizziness or light-headedness  Extreme fatigue  Any other questions or concerns you may have after discharge    In an emergency, call 911 or go to an Emergency Department at a nearby hospital    It is important to bring a complete, current list of your medications to any medical appointments or hospitalizations. REMINDER:   Carry a list of your medications and allergies with you at all times  Call your pharmacy at least 1 week in advance to refill prescriptions    Diet: Resume your usual diet. Good nutrition promotes healing. Increase fluid intake. Activity: Rest for 24 hrs then resume normal activity. HOME MEDICATIONS:      If on blood thinning products such as; Aspirin, NSAIDS, Plavix, Coumadin, Xarelto, Fish Oil, Multi-Vitamins or Herbal Supplements restart in 24 hours      Restart Metformin in 48 hours if you had procedure with dye. Restart Metformin in 24 hours if no dye used during procedure.         Education Materials Received: {yes/no:478381}  Belongings Returned: Sites  [facility]  [place] [address]  Phone: [phone] Fax: [phone]    [id number] - [place], [place] - [address]  [address] [address]  Phone: [phone] Fax: [phone]      CALL BACK INFO: Ok to leave response (including medical information) with family member or on answering machine  ROUTING: Patient's physician/staff        PCP: Diana Norwood MD         INS: Payor: [employer] / Plan: MEDICAREPPO / Product Type: MEDICARE   PATIENT ADDRESS:  [facility] [address] [facility] [address]   {yes/no:256571}          I understand and acknowledge receipt of the above instructions. Patient or Guardian Signature                                                         Date/Time                                                                                                                                            Physician's or R.N.'s Signature                                                                  Date/Time      The discharge instructions have been reviewed with the patient and/or Guardian. Patient and/or Guardian signed and retained a printed copy. Call office [phone] if you have:  Temperature greater than 100.4  Persistent nausea and vomiting  Severe uncontrolled pain  Redness, tenderness, or signs of infection (pain, swelling, redness, odor or green/yellow discharge around the site)  Difficulty breathing, headache or visual disturbances  Hives  Persistent dizziness or light-headedness  Extreme fatigue  Any other questions or concerns you may have after discharge    In an emergency, call 911 or go to an Emergency Department at a nearby hospital    It is important to bring a complete, current list of your medications to any medical appointments or hospitalizations. REMINDER:   Carry a list of your medications and allergies with you at all times  Call your pharmacy at least 1 week in advance to refill prescriptions    Diet: Resume your usual diet. Good nutrition promotes healing. Increase fluid intake. Activity: Rest for 24 hrs then resume normal activity. Education Materials Received: {yes/no:898003}  Belongings Returned: {yes/no:526366}          I understand and acknowledge receipt of the above instructions. Patient or Guardian Signature                                                         Date/Time                                                                                                                                            Physician's or R.N.'s Signature                                                                  Date/Time      The discharge instructions have been reviewed with the patient and/or Guardian. Patient and/or Guardian signed and retained a printed copy.

## 2023-10-20 DIAGNOSIS — E11.9 TYPE 2 DIABETES MELLITUS WITHOUT COMPLICATION, WITHOUT LONG-TERM CURRENT USE OF INSULIN (HCC): ICD-10-CM

## 2023-10-20 DIAGNOSIS — M19.90 ARTHRITIS: ICD-10-CM

## 2023-10-20 DIAGNOSIS — E78.01 FAMILIAL HYPERCHOLESTEROLEMIA: ICD-10-CM

## 2023-10-20 RX ORDER — ATORVASTATIN CALCIUM 40 MG/1
TABLET, FILM COATED ORAL
Qty: 90 TABLET | Refills: 3 | Status: SHIPPED | OUTPATIENT
Start: 2023-10-20

## 2023-10-20 RX ORDER — ETODOLAC 400 MG/1
400 TABLET, EXTENDED RELEASE ORAL DAILY
Qty: 90 TABLET | Refills: 3 | Status: SHIPPED | OUTPATIENT
Start: 2023-10-20

## 2023-10-20 NOTE — TELEPHONE ENCOUNTER
Future Appointments   Date Time Provider [address]   10/31/2023  8:15 AM Ian Dsouza, GERARDO - CNP N [facility] Pain P - [facility]   12/28/2023 10:40 AM GERARDO Gonzalez

## 2023-10-31 ENCOUNTER — TELEPHONE (OUTPATIENT)
Dept: PHYSICAL MEDICINE AND REHAB | Age: 68
End: 2023-10-31

## 2023-10-31 ENCOUNTER — OFFICE VISIT (OUTPATIENT)
Dept: PHYSICAL MEDICINE AND REHAB | Age: 68
End: 2023-10-31
Payer: MEDICARE

## 2023-10-31 VITALS — SYSTOLIC BLOOD PRESSURE: 122 MMHG | DIASTOLIC BLOOD PRESSURE: 70 MMHG | HEART RATE: 88 BPM | OXYGEN SATURATION: 91 %

## 2023-10-31 DIAGNOSIS — G89.29 CHRONIC MYOFASCIAL PAIN: ICD-10-CM

## 2023-10-31 DIAGNOSIS — M47.812 CERVICAL SPONDYLOSIS: Primary | ICD-10-CM

## 2023-10-31 DIAGNOSIS — M79.18 CHRONIC MYOFASCIAL PAIN: ICD-10-CM

## 2023-10-31 DIAGNOSIS — M54.9 MID BACK PAIN: ICD-10-CM

## 2023-10-31 DIAGNOSIS — M54.2 NECK PAIN: ICD-10-CM

## 2023-10-31 DIAGNOSIS — Z98.1 HISTORY OF LUMBAR FUSION: ICD-10-CM

## 2023-10-31 DIAGNOSIS — M47.814 THORACIC SPONDYLOSIS: ICD-10-CM

## 2023-10-31 DIAGNOSIS — G89.4 CHRONIC PAIN SYNDROME: ICD-10-CM

## 2023-10-31 DIAGNOSIS — M79.18 MYOFASCIAL PAIN: ICD-10-CM

## 2023-10-31 DIAGNOSIS — M46.1 SACROILIAC INFLAMMATION (HCC): ICD-10-CM

## 2023-10-31 PROCEDURE — 1123F ACP DISCUSS/DSCN MKR DOCD: CPT | Performed by: NURSE PRACTITIONER

## 2023-10-31 PROCEDURE — 3078F DIAST BP <80 MM HG: CPT | Performed by: NURSE PRACTITIONER

## 2023-10-31 PROCEDURE — 3074F SYST BP LT 130 MM HG: CPT | Performed by: NURSE PRACTITIONER

## 2023-10-31 PROCEDURE — 99214 OFFICE O/P EST MOD 30 MIN: CPT | Performed by: NURSE PRACTITIONER

## 2023-10-31 RX ORDER — HYDROCODONE BITARTRATE AND ACETAMINOPHEN 5; 325 MG/1; MG/1
1 TABLET ORAL 2 TIMES DAILY PRN
Qty: 60 TABLET | Refills: 0 | Status: SHIPPED | OUTPATIENT
Start: 2023-11-05 | End: 2023-12-05

## 2023-10-31 ASSESSMENT — ENCOUNTER SYMPTOMS
BACK PAIN: 1
GASTROINTESTINAL NEGATIVE: 1
RESPIRATORY NEGATIVE: 1

## 2023-11-16 NOTE — DISCHARGE INSTRUCTIONS
ANESTHESIA INSTRUCTIONS FOLLOWING SURGERY        Since you may experience some intermittent light-headedness for the next several hours, we suggest you plan on bed rest or quiet relaxation this evening. You must have a friend or relative stay with you tonight. Because of the sedation you have received, it is recommended that you do not drive a motor vehicle, operate any kind of machinery, or sign any contractual agreement for 24 hours following the procedure. You should not take alcoholic beverages tonight and only take sleeping medication that has been specifically prescribed for you by your physician. Call office [phone] if you have:  Temperature greater than 100.4  Persistent nausea and vomiting  Severe uncontrolled pain  Redness, tenderness, or signs of infection (pain, swelling, redness, odor or green/yellow discharge around the site)  Difficulty breathing, headache or visual disturbances  Hives  Persistent dizziness or light-headedness  Extreme fatigue  Any other questions or concerns you may have after discharge    In an emergency, call 911 or go to an Emergency Department at a nearby hospital    It is important to bring a complete, current list of your medications to any medical appointments or hospitalizations. REMINDER:   Carry a list of your medications and allergies with you at all times  Call your pharmacy at least 1 week in advance to refill prescriptions    Diet: Resume your usual diet. Good nutrition promotes healing. Increase fluid intake. Activity: Rest for 24 hrs then resume normal activity. HOME MEDICATIONS:      If on blood thinning products such as; Aspirin, NSAIDS, Plavix, Coumadin, Xarelto, Fish Oil, Multi-Vitamins or Herbal Supplements restart in 24 hours      Restart Metformin in 48 hours if you had procedure with dye. Restart Metformin in 24 hours if no dye used during procedure.         Education Materials Received: {yes/no:163152}  Belongings Returned:

## 2023-11-21 ENCOUNTER — ANESTHESIA EVENT (OUTPATIENT)
Dept: OPERATING ROOM | Age: 68
End: 2023-11-21
Payer: MEDICARE

## 2023-11-21 ENCOUNTER — HOSPITAL ENCOUNTER (OUTPATIENT)
Age: 68
Setting detail: OUTPATIENT SURGERY
Discharge: HOME | End: 2023-11-21
Attending: PAIN MEDICINE | Admitting: PAIN MEDICINE
Payer: MEDICARE

## 2023-11-21 ENCOUNTER — APPOINTMENT (OUTPATIENT)
Dept: GENERAL RADIOLOGY | Age: 68
End: 2023-11-21
Attending: PAIN MEDICINE
Payer: MEDICARE

## 2023-11-21 ENCOUNTER — ANESTHESIA (OUTPATIENT)
Dept: OPERATING ROOM | Age: 68
End: 2023-11-21
Payer: MEDICARE

## 2023-11-21 VITALS
BODY MASS INDEX: 25.58 KG/M2 | SYSTOLIC BLOOD PRESSURE: 144 MMHG | RESPIRATION RATE: 16 BRPM | HEIGHT: 73 IN | TEMPERATURE: 97.5 F | DIASTOLIC BLOOD PRESSURE: 76 MMHG | WEIGHT: 193 LBS | OXYGEN SATURATION: 90 % | HEART RATE: 79 BPM

## 2023-11-21 LAB — GLUCOSE BLD STRIP.AUTO-MCNC: 167 MG/DL (ref 70–108)

## 2023-11-21 PROCEDURE — 6360000002 HC RX W HCPCS: Performed by: PAIN MEDICINE

## 2023-11-21 PROCEDURE — 64634 DESTROY C/TH FACET JNT ADDL: CPT | Performed by: PAIN MEDICINE

## 2023-11-21 PROCEDURE — 2709999900 HC NON-CHARGEABLE SUPPLY: Performed by: PAIN MEDICINE

## 2023-11-21 PROCEDURE — 6370000000 HC RX 637 (ALT 250 FOR IP)

## 2023-11-21 PROCEDURE — 7100000011 HC PHASE II RECOVERY - ADDTL 15 MIN: Performed by: PAIN MEDICINE

## 2023-11-21 PROCEDURE — 6360000002 HC RX W HCPCS

## 2023-11-21 PROCEDURE — 3600000057 HC PAIN LEVEL 4 ADDL 15 MIN: Performed by: PAIN MEDICINE

## 2023-11-21 PROCEDURE — 3700000001 HC ADD 15 MINUTES (ANESTHESIA): Performed by: PAIN MEDICINE

## 2023-11-21 PROCEDURE — 82948 REAGENT STRIP/BLOOD GLUCOSE: CPT

## 2023-11-21 PROCEDURE — 2580000003 HC RX 258

## 2023-11-21 PROCEDURE — 7100000010 HC PHASE II RECOVERY - FIRST 15 MIN: Performed by: PAIN MEDICINE

## 2023-11-21 PROCEDURE — 2500000003 HC RX 250 WO HCPCS: Performed by: PAIN MEDICINE

## 2023-11-21 PROCEDURE — 64633 DESTROY CERV/THOR FACET JNT: CPT | Performed by: PAIN MEDICINE

## 2023-11-21 PROCEDURE — 3700000000 HC ANESTHESIA ATTENDED CARE: Performed by: PAIN MEDICINE

## 2023-11-21 PROCEDURE — 3600000056 HC PAIN LEVEL 4 BASE: Performed by: PAIN MEDICINE

## 2023-11-21 RX ORDER — SODIUM CHLORIDE 9 MG/ML
INJECTION, SOLUTION INTRAVENOUS CONTINUOUS PRN
Status: DISCONTINUED | OUTPATIENT
Start: 2023-11-21 | End: 2023-11-21 | Stop reason: SDUPTHER

## 2023-11-21 RX ORDER — FENTANYL CITRATE 50 UG/ML
INJECTION, SOLUTION INTRAMUSCULAR; INTRAVENOUS PRN
Status: DISCONTINUED | OUTPATIENT
Start: 2023-11-21 | End: 2023-11-21 | Stop reason: SDUPTHER

## 2023-11-21 RX ORDER — LIDOCAINE HYDROCHLORIDE 10 MG/ML
INJECTION, SOLUTION EPIDURAL; INFILTRATION; INTRACAUDAL; PERINEURAL PRN
Status: DISCONTINUED | OUTPATIENT
Start: 2023-11-21 | End: 2023-11-21 | Stop reason: ALTCHOICE

## 2023-11-21 RX ORDER — BUPIVACAINE HYDROCHLORIDE 5 MG/ML
INJECTION, SOLUTION PERINEURAL PRN
Status: DISCONTINUED | OUTPATIENT
Start: 2023-11-21 | End: 2023-11-21 | Stop reason: ALTCHOICE

## 2023-11-21 RX ORDER — IPRATROPIUM BROMIDE AND ALBUTEROL SULFATE 2.5; .5 MG/3ML; MG/3ML
1 SOLUTION RESPIRATORY (INHALATION) ONCE
Status: COMPLETED | OUTPATIENT
Start: 2023-11-21 | End: 2023-11-21

## 2023-11-21 RX ORDER — IPRATROPIUM BROMIDE AND ALBUTEROL SULFATE 2.5; .5 MG/3ML; MG/3ML
SOLUTION RESPIRATORY (INHALATION)
Status: COMPLETED
Start: 2023-11-21 | End: 2023-11-21

## 2023-11-21 RX ORDER — PROPOFOL 10 MG/ML
INJECTION, EMULSION INTRAVENOUS PRN
Status: DISCONTINUED | OUTPATIENT
Start: 2023-11-21 | End: 2023-11-21 | Stop reason: SDUPTHER

## 2023-11-21 RX ADMIN — IPRATROPIUM BROMIDE AND ALBUTEROL SULFATE 1 DOSE: 2.5; .5 SOLUTION RESPIRATORY (INHALATION) at 08:22

## 2023-11-21 RX ADMIN — SODIUM CHLORIDE: 9 INJECTION, SOLUTION INTRAVENOUS at 08:44

## 2023-11-21 RX ADMIN — IPRATROPIUM BROMIDE AND ALBUTEROL SULFATE 1 DOSE: .5; 3 SOLUTION RESPIRATORY (INHALATION) at 08:22

## 2023-11-21 RX ADMIN — PROPOFOL 50 MG: 10 INJECTION, EMULSION INTRAVENOUS at 08:53

## 2023-11-21 RX ADMIN — FENTANYL CITRATE 50 MCG: 50 INJECTION, SOLUTION INTRAMUSCULAR; INTRAVENOUS at 08:50

## 2023-11-21 RX ADMIN — FENTANYL CITRATE 50 MCG: 50 INJECTION, SOLUTION INTRAMUSCULAR; INTRAVENOUS at 08:46

## 2023-11-21 ASSESSMENT — COPD QUESTIONNAIRES: CAT_SEVERITY: SEVERE

## 2023-11-21 ASSESSMENT — PAIN DESCRIPTION - DESCRIPTORS: DESCRIPTORS: ACHING

## 2023-11-21 ASSESSMENT — PAIN - FUNCTIONAL ASSESSMENT: PAIN_FUNCTIONAL_ASSESSMENT: 0-10

## 2023-11-21 NOTE — H&P
H&P    Patient with complaints of posterior neck pain radiating between shoulder blades aching and sometimes sharp. Increases with ROM and turning head. States headaches in bilatera; temples have been becoming more frequent and sometimes waking patient up from sleep. Patient feels that relief from previous C-facet RFA has completely worn off. Continues to have pain in low back- aching dull pain   Denies any radicular pain      Current pain medications remain effective decreasing pain to a tolerable level. Pain increases with bending, lifting, twisting , turning head, walking, standing, getting up and down, and housework or working at job. Prior Injections:  bilateral C-facet RFA from 1/16/2023- 85% relief for 7-8 months         Medications reviewed. Patient denies side effects with medications. Patient states he is taking medications as prescribed. Hedenies receiving pain medications from other sources. He denies any ER visits since last visit. Pain scale with out pain medications or at its worst is 8/10. Pain scale with pain medications or at its best is 4/10. Last dose of [place] was today   Drug screen reviewed from 8/21/2023 and was appropriate  Pill count completed  today and WNL: Yes        The patienthas No Known Allergies. Subjective:      Review of Systems   Constitutional: Negative. Respiratory: Negative. Tobacco use    Cardiovascular: Negative. Gastrointestinal: Negative. Genitourinary: Negative. Musculoskeletal:  Positive for arthralgias, back pain, myalgias, neck pain and neck stiffness. Negative for gait problem and joint swelling. Not using any assist devices    Skin: Negative. Neurological:  Positive for headaches. Negative for weakness and numbness. Psychiatric/Behavioral: Negative.            Objective:      Vitals       Vitals:     10/31/23 0816   BP: 122/70   Site: Right Upper Arm   Position: Sitting   Cuff Size: Medium Adult   Pulse: 88

## 2023-11-21 NOTE — OP NOTE
patient had history of chronic neck pain that is not responding well to the conservative treatment. Patient's pain is mostly axial in nature. Pain is interfering with the activities of daily living. Physical examination revealed facet tenderness and facet loading is positive. The patient had undergone cervical  facet joint injections with pain relief that lasted for only a short period of time and confirmatory median branch block gave patient pain relief of 85 % . Hence we decided to do radiofrequency abalation of median branches for long term pain relief. The procedure and risks were discussed with the patient and an informed consent was obtained. Procedure:      A meaningful communication was kept up with the patient throughout  the procedure. Patient is placed in prone position and skin over the back was prepped and draped in sterile manner. Then using fluoroscopy the waist of facet joint complex of the vertebra was observed and the view was optimized . The skin and deep tissues posterior were infiltrated with 10 ml of 1% xylocaine. The RF canula with the 10 mm active tip was introduced through the skin wheal under fluoroscopy guidance such that the tip of the needle lies in the groove of the waist of facet joint complex. Then a lateral view of cervical  spine was obtained to make sure the tip of needle is in the centroid of the articular pillar.  hen electric impedence was checked to make sure it is acceptable. Then a sensory stimulus was applied at 50 Hz up to 1 volt and concordant pain symptoms were reproduced. Then a motor stimulus was applied at 2 Hz up to 2 volts and no motor stimulation was seen in upper extremities. Some multifidus stimulus was seen. Then after negative aspiration 0.5%  Marcaine 4 cc was injected through the needle in divided doses. Then an lesion was done at 80 degrees centigrade for 90 seconds.    EBL-0      Patient tolerated the procedure well and was discharged home in

## 2023-11-21 NOTE — PROGRESS NOTES
0902-Patient to Phase II via cart. [facility]. Patient drowsy but responsive. Vitals obtained and stable. Respirations even and unlabored on room air. Patient denies pain, nausea, numbness and tingling. Patient able to move all extremities. No drainage noted at injection sites. Patient instructed to stay in bed. Instructed on call light use.  0905-Patient provided with snack and drink. Denies needs. Call light in reach. Ride notified of patient in recovery. 0915-IV removed with no complications. Patient getting dressed at bedside. 0925-Patient meets discharge criteria. Discharged in stable condition with responsible . All belongings given to patient. Patient ambulated to car with assistance from RN. Patient tolerated well.

## 2023-11-21 NOTE — ANESTHESIA POSTPROCEDURE EVALUATION
Department of Anesthesiology  Postprocedure Note    Patient: Geoff Canales  MRN: [id number]  YOB: 1955  Date of evaluation: 11/21/2023      Procedure Summary     Date: 11/21/23 Room / Location: [facility] OR 03 / [facility]    Anesthesia Start: Lorie Sine Anesthesia Stop: 0901    Procedure: Bilateral Cervical 4-5, 5-6 facet Radiofrequency Ablation (Bilateral: Neck) Diagnosis:       Cervical spondylosis      (Cervical spondylosis [X34.750])    Surgeons: Kp Hewitt MD Responsible Provider: Idell Phoenix, DO    Anesthesia Type: MAC ASA Status: 3          Anesthesia Type: No value filed.     Bhaskar Phase I:      Bhaskar Phase II: Bhaskar Score: 9      Anesthesia Post Evaluation    Patient location during evaluation: bedside  Patient participation: complete - patient participated  Level of consciousness: awake  Airway patency: patent  Nausea & Vomiting: no vomiting and no nausea  Cardiovascular status: hemodynamically stable  Respiratory status: acceptable  Hydration status: stable  Pain management: adequate

## 2023-11-21 NOTE — PROGRESS NOTES
9197 Patient vitals assessed, pulse ox initially 78% on room air. Patient encouraged to cough and deep breathe, oxygen up to 85%-90%. Lung sounds assessed, expiratory wheeze right side noted once. Dr. Antonietta Brown notified. Patient states he took his rescue inhaler this morning, but currently does not feel short of breath or like he is working any harder to breathe. Patient has a history of COPD and still smoke 1 pack of cigarettes per day. 1973 Dr. Antonietta Brown at bedside to assess. Verbal order for Duoneb given. 0038 Duoneb treatment applied to patient. 9250 Patient states he has a pulse ox at home and is able to monitor his oxygen. Patient educated on when he needs to go to ER and how to monitor. [id number] Breathing treatment done. Patient on continuous pulse ox. 2407 Dr. Antonietta Brown at bedside to reassess. 200 Dr. No Sessions at bedside to assess. Updated on patient condition.

## 2023-11-28 ENCOUNTER — TELEPHONE (OUTPATIENT)
Dept: FAMILY MEDICINE CLINIC | Age: 68
End: 2023-11-28

## 2023-11-28 ENCOUNTER — OFFICE VISIT (OUTPATIENT)
Dept: FAMILY MEDICINE CLINIC | Age: 68
End: 2023-11-28
Payer: MEDICARE

## 2023-11-28 ENCOUNTER — HOSPITAL ENCOUNTER (OUTPATIENT)
Dept: GENERAL RADIOLOGY | Age: 68
Discharge: HOME | End: 2023-11-28
Payer: MEDICARE

## 2023-11-28 ENCOUNTER — HOSPITAL ENCOUNTER (OUTPATIENT)
Age: 68
Discharge: HOME | End: 2023-11-28
Payer: MEDICARE

## 2023-11-28 VITALS
DIASTOLIC BLOOD PRESSURE: 84 MMHG | BODY MASS INDEX: 25.5 KG/M2 | HEART RATE: 83 BPM | HEIGHT: 73 IN | RESPIRATION RATE: 18 BRPM | OXYGEN SATURATION: 93 % | WEIGHT: 192.4 LBS | SYSTOLIC BLOOD PRESSURE: 142 MMHG | TEMPERATURE: 97.8 F

## 2023-11-28 DIAGNOSIS — R09.02 HYPOXIA: ICD-10-CM

## 2023-11-28 DIAGNOSIS — J44.1 COPD EXACERBATION (HCC): ICD-10-CM

## 2023-11-28 DIAGNOSIS — R06.02 SOB (SHORTNESS OF BREATH) ON EXERTION: ICD-10-CM

## 2023-11-28 DIAGNOSIS — J44.9 CHRONIC OBSTRUCTIVE PULMONARY DISEASE, UNSPECIFIED COPD TYPE (HCC): ICD-10-CM

## 2023-11-28 DIAGNOSIS — R09.02 HYPOXIA: Primary | ICD-10-CM

## 2023-11-28 DIAGNOSIS — J84.112 IDIOPATHIC DIFFUSE INTERSTITIAL PULMONARY FIBROSIS (HCC): ICD-10-CM

## 2023-11-28 DIAGNOSIS — Z72.0 TOBACCO ABUSE: ICD-10-CM

## 2023-11-28 DIAGNOSIS — R68.3 CLUBBING OF NAILS: ICD-10-CM

## 2023-11-28 LAB
BASOPHILS ABSOLUTE: 0.1 THOU/MM3 (ref 0–0.1)
BASOPHILS NFR BLD AUTO: 0.7 %
DEPRECATED RDW RBC AUTO: 49.1 FL (ref 35–45)
EOSINOPHIL NFR BLD AUTO: 1.8 %
EOSINOPHILS ABSOLUTE: 0.2 THOU/MM3 (ref 0–0.4)
ERYTHROCYTE [DISTWIDTH] IN BLOOD BY AUTOMATED COUNT: 13.6 % (ref 11.5–14.5)
HCT VFR BLD AUTO: 54.8 % (ref 42–52)
HGB BLD-MCNC: 18 GM/DL (ref 14–18)
IMM GRANULOCYTES # BLD AUTO: 0.05 THOU/MM3 (ref 0–0.07)
IMM GRANULOCYTES NFR BLD AUTO: 0.4 %
LYMPHOCYTES ABSOLUTE: 3.1 THOU/MM3 (ref 1–4.8)
LYMPHOCYTES NFR BLD AUTO: 26.6 %
MCH RBC QN AUTO: 31.9 PG (ref 26–33)
MCHC RBC AUTO-ENTMCNC: 32.8 GM/DL (ref 32.2–35.5)
MCV RBC AUTO: 97 FL (ref 80–94)
MONOCYTES ABSOLUTE: 0.7 THOU/MM3 (ref 0.4–1.3)
MONOCYTES NFR BLD AUTO: 5.7 %
NEUTROPHILS NFR BLD AUTO: 64.8 %
NRBC BLD AUTO-RTO: 0 /100 WBC
NT-PROBNP SERPL IA-MCNC: 41.1 PG/ML (ref 0–124)
PLATELET # BLD AUTO: 294 THOU/MM3 (ref 130–400)
PMV BLD AUTO: 9.1 FL (ref 9.4–12.4)
RBC # BLD AUTO: 5.65 MILL/MM3 (ref 4.7–6.1)
SEGMENTED NEUTROPHILS ABSOLUTE COUNT: 7.6 THOU/MM3 (ref 1.8–7.7)
WBC # BLD AUTO: 11.8 THOU/MM3 (ref 4.8–10.8)

## 2023-11-28 PROCEDURE — 71046 X-RAY EXAM CHEST 2 VIEWS: CPT

## 2023-11-28 PROCEDURE — 83880 ASSAY OF NATRIURETIC PEPTIDE: CPT

## 2023-11-28 PROCEDURE — 1123F ACP DISCUSS/DSCN MKR DOCD: CPT | Performed by: NURSE PRACTITIONER

## 2023-11-28 PROCEDURE — 85025 COMPLETE CBC W/AUTO DIFF WBC: CPT

## 2023-11-28 PROCEDURE — 36415 COLL VENOUS BLD VENIPUNCTURE: CPT

## 2023-11-28 PROCEDURE — 3077F SYST BP >= 140 MM HG: CPT | Performed by: NURSE PRACTITIONER

## 2023-11-28 PROCEDURE — 99215 OFFICE O/P EST HI 40 MIN: CPT | Performed by: NURSE PRACTITIONER

## 2023-11-28 PROCEDURE — 3079F DIAST BP 80-89 MM HG: CPT | Performed by: NURSE PRACTITIONER

## 2023-11-28 RX ORDER — GUAIFENESIN 600 MG/1
600 TABLET, EXTENDED RELEASE ORAL 2 TIMES DAILY
Qty: 30 TABLET | Refills: 0 | Status: SHIPPED | OUTPATIENT
Start: 2023-11-28 | End: 2023-12-13

## 2023-11-28 RX ORDER — FLUTICASONE FUROATE, UMECLIDINIUM BROMIDE AND VILANTEROL TRIFENATATE 100; 62.5; 25 UG/1; UG/1; UG/1
1 POWDER RESPIRATORY (INHALATION) DAILY
Qty: 1 EACH | Refills: 4 | Status: SHIPPED | OUTPATIENT
Start: 2023-11-28

## 2023-11-28 RX ORDER — ALBUTEROL SULFATE 2.5 MG/3ML
2.5 SOLUTION RESPIRATORY (INHALATION) 4 TIMES DAILY PRN
Qty: 120 EACH | Refills: 3 | Status: SHIPPED | OUTPATIENT
Start: 2023-11-28

## 2023-11-28 RX ORDER — AMOXICILLIN AND CLAVULANATE POTASSIUM 875; 125 MG/1; MG/1
1 TABLET, FILM COATED ORAL 2 TIMES DAILY
Qty: 20 TABLET | Refills: 0 | Status: SHIPPED | OUTPATIENT
Start: 2023-11-28 | End: 2023-12-08

## 2023-11-28 RX ORDER — ALBUTEROL SULFATE 2.5 MG/3ML
2.5 SOLUTION RESPIRATORY (INHALATION) ONCE
Status: SHIPPED | OUTPATIENT
Start: 2023-11-28

## 2023-11-28 RX ORDER — PREDNISONE 20 MG/1
TABLET ORAL
Qty: 18 TABLET | Refills: 0 | Status: SHIPPED | OUTPATIENT
Start: 2023-11-28

## 2023-11-28 ASSESSMENT — ENCOUNTER SYMPTOMS
COUGH: 1
CHEST TIGHTNESS: 0
CHOKING: 0
SHORTNESS OF BREATH: 1
WHEEZING: 0

## 2023-11-28 NOTE — PROGRESS NOTES
tablet; 1 tab po tid for 3 days 1 tab po bid for 3 days 1 tab po once day for 3 days  Dispense: 18 tablet; Refill: 0  ER precautions given    Pt in agreement with plan   No follow-ups on file. Reccommended tobaccocessation options including pharmacologic methods, counseled great than 3 minutesduring this visit:  Yes[]  No  []       Patient given educational materials -see patient instructions. Discussed use, benefit, and side effects of prescribedmedications. All patient questions answered. Pt voiced understanding. Reviewedhealth maintenance. Instructed to continue current medications, diet and exercise. Patient agreed with treatment plan. Follow up as directed.        Electronicallysigned by GERARDO Forrester CNP on 11/28/2023 at 12:28 PM
Inadequate protein-energy intake

## 2023-11-28 NOTE — TELEPHONE ENCOUNTER
----- Message from GERARDO Alvarez CNP sent at 11/28/2023  5:03 PM EST -----  Let pt know his chest x-ray is negative for pneumonia, continue with meds ordered at visit today, call pt and see if [facility] has set up his oxygen today

## 2023-11-29 NOTE — TELEPHONE ENCOUNTER
Future Appointments   Date Time Provider [address]   12/12/2023  8:00 AM Jyotsna Farfan APRN - CNP N [facility]   12/13/2023  9:40 AM GERARDO Molina [address] [facility]   12/20/2023  5:30 PM [facility] PULMONARY FUNCTION ROOM 1 [facility] PFT [facility]   12/27/2023 11:20 AM Gustavo Steele MD [address] [facility]   12/28/2023 10:40 AM GERARDO Molina

## 2023-12-04 DIAGNOSIS — M46.1 SACROILIAC INFLAMMATION (HCC): ICD-10-CM

## 2023-12-04 DIAGNOSIS — G89.4 CHRONIC PAIN SYNDROME: ICD-10-CM

## 2023-12-04 DIAGNOSIS — M47.814 THORACIC SPONDYLOSIS: ICD-10-CM

## 2023-12-04 DIAGNOSIS — M47.812 CERVICAL SPONDYLOSIS: ICD-10-CM

## 2023-12-05 RX ORDER — HYDROCODONE BITARTRATE AND ACETAMINOPHEN 5; 325 MG/1; MG/1
1 TABLET ORAL 2 TIMES DAILY PRN
Qty: 60 TABLET | Refills: 0 | Status: SHIPPED | OUTPATIENT
Start: 2023-12-05 | End: 2024-01-04

## 2023-12-06 DIAGNOSIS — J44.9 CHRONIC OBSTRUCTIVE PULMONARY DISEASE, UNSPECIFIED COPD TYPE (HCC): ICD-10-CM

## 2023-12-06 RX ORDER — UMECLIDINIUM 62.5 UG/1
AEROSOL, POWDER ORAL
Qty: 90 EACH | Refills: 1 | Status: SHIPPED | OUTPATIENT
Start: 2023-12-06

## 2023-12-06 NOTE — TELEPHONE ENCOUNTER
Recent Visits  Date Type Provider Dept   11/28/23 Office Visit GERARDO Alvarez CNP [facility]   06/28/23 Office Visit GERARDO Alvarez CNP [facility]   12/28/22 Office Visit Eric Her APRN - CNP [facility]   Showing recent visits within past 540 days with a meds authorizing provider and meeting all other requirements  Future Appointments  Date Type Provider Dept   12/13/23 Appointment GERARDO Alvarez CNP [facility]   12/28/23 Appointment Eric Her APRN - CNP [facility]   Showing future appointments within next 150 days with a meds authorizing provider and meeting all other requirements

## 2023-12-12 ENCOUNTER — OFFICE VISIT (OUTPATIENT)
Dept: PHYSICAL MEDICINE AND REHAB | Age: 68
End: 2023-12-12
Payer: MEDICARE

## 2023-12-12 VITALS
WEIGHT: 192 LBS | BODY MASS INDEX: 25.45 KG/M2 | HEIGHT: 73 IN | SYSTOLIC BLOOD PRESSURE: 138 MMHG | DIASTOLIC BLOOD PRESSURE: 70 MMHG

## 2023-12-12 DIAGNOSIS — M46.1 SACROILIAC INFLAMMATION (HCC): ICD-10-CM

## 2023-12-12 DIAGNOSIS — M47.812 CERVICAL SPONDYLOSIS: Primary | ICD-10-CM

## 2023-12-12 DIAGNOSIS — M54.9 MID BACK PAIN: ICD-10-CM

## 2023-12-12 DIAGNOSIS — M79.18 MYOFASCIAL PAIN: ICD-10-CM

## 2023-12-12 DIAGNOSIS — G89.29 CHRONIC BILATERAL LOW BACK PAIN WITHOUT SCIATICA: ICD-10-CM

## 2023-12-12 DIAGNOSIS — Z98.1 HISTORY OF LUMBAR FUSION: ICD-10-CM

## 2023-12-12 DIAGNOSIS — M47.814 THORACIC SPONDYLOSIS: ICD-10-CM

## 2023-12-12 DIAGNOSIS — G89.4 CHRONIC PAIN SYNDROME: ICD-10-CM

## 2023-12-12 DIAGNOSIS — M54.2 NECK PAIN: ICD-10-CM

## 2023-12-12 DIAGNOSIS — M54.50 CHRONIC BILATERAL LOW BACK PAIN WITHOUT SCIATICA: ICD-10-CM

## 2023-12-12 DIAGNOSIS — M47.816 SPONDYLOSIS OF LUMBAR REGION WITHOUT MYELOPATHY OR RADICULOPATHY: ICD-10-CM

## 2023-12-12 PROCEDURE — 1123F ACP DISCUSS/DSCN MKR DOCD: CPT | Performed by: NURSE PRACTITIONER

## 2023-12-12 PROCEDURE — 3078F DIAST BP <80 MM HG: CPT | Performed by: NURSE PRACTITIONER

## 2023-12-12 PROCEDURE — 99214 OFFICE O/P EST MOD 30 MIN: CPT | Performed by: NURSE PRACTITIONER

## 2023-12-12 PROCEDURE — 3075F SYST BP GE 130 - 139MM HG: CPT | Performed by: NURSE PRACTITIONER

## 2023-12-12 ASSESSMENT — ENCOUNTER SYMPTOMS
SHORTNESS OF BREATH: 1
GASTROINTESTINAL NEGATIVE: 1
BACK PAIN: 1
COUGH: 1

## 2023-12-12 NOTE — PROGRESS NOTES
Functionality Assessment/Goals Worksheet     On a scale of 0 (Does not Interfere) to 10 (Completely Interferes)     1. Which number describes how during the past week pain has interfered with           the following:  A. General Activity:  7  B. Mood: 8  C. Walking Ability:  6  D. Normal Work (Includes both work outside the home and housework):  8  E. Relations with Other People:   6  F. Sleep:   6  G. Enjoyment of Life:   6    2. Patient Prefers to Take their Pain Medications:     [x]  On a regular basis   []  Only when necessary    []  Does not take pain medications    3. What are the Patient's Goals/Expectations for Visiting Pain Management? [x]  Learn about my pain    []  Receive Medication   []  Physical Therapy     []  Treat Depression   []  Receive Injections    []  Treat Sleep   []  Deal with Anxiety and Stress   []  Treat Opoid Dependence/Addiction   []  Other:         HPI:   Elissa Coley is a 80 y.o. male is here today for    Chief Complaint: Neck pain    HPI   FU from bilateral C-facet RFA @ C-4 and C5-6 completed by Dr. Carlo Dorsey on 11/21/2023. Patient reports that he is receiving about 80% relief of neck pain from this RFA. Overall pain is improved. States still some soreness and tenderness more noticeable on left side of neck. States that headaches are improved and not intense and less frequent and does not have one today. Continues to have pain in low back- aching dull pain   Denies any radicular pain     Pain medications remains effective in decreasing pain. Patient feels that pain is tolerable with medications and procedures. Pain increases with bending, lifting, twisting , turning head, getting up and down, and housework or working at job. Patient is now on 3 liters of oxygen per nasal cannula.  States trying to quit smoking     Prior Injections:  bilateral C-facet RFA from 1/16/2023- 85% relief for 7-8 months     bilateral C-facet RFA @ C-4 and C5-6 completed by Dr. Carlo Dorsey

## 2023-12-13 ENCOUNTER — HOSPITAL ENCOUNTER (EMERGENCY)
Age: 68
Discharge: HOME | End: 2023-12-13
Payer: MEDICARE

## 2023-12-13 ENCOUNTER — OFFICE VISIT (OUTPATIENT)
Dept: FAMILY MEDICINE CLINIC | Age: 68
End: 2023-12-13
Payer: MEDICARE

## 2023-12-13 ENCOUNTER — HOSPITAL ENCOUNTER (OUTPATIENT)
Dept: CT IMAGING | Age: 68
Discharge: HOME | End: 2023-12-13
Payer: MEDICARE

## 2023-12-13 VITALS
WEIGHT: 192.2 LBS | HEIGHT: 73 IN | DIASTOLIC BLOOD PRESSURE: 88 MMHG | SYSTOLIC BLOOD PRESSURE: 136 MMHG | RESPIRATION RATE: 18 BRPM | OXYGEN SATURATION: 91 % | BODY MASS INDEX: 25.47 KG/M2 | TEMPERATURE: 97.5 F | HEART RATE: 79 BPM

## 2023-12-13 VITALS
HEIGHT: 73 IN | HEART RATE: 88 BPM | BODY MASS INDEX: 25.45 KG/M2 | WEIGHT: 192 LBS | RESPIRATION RATE: 16 BRPM | OXYGEN SATURATION: 93 % | TEMPERATURE: 97.3 F | SYSTOLIC BLOOD PRESSURE: 151 MMHG | DIASTOLIC BLOOD PRESSURE: 83 MMHG

## 2023-12-13 DIAGNOSIS — R09.02 HYPOXIA: Primary | ICD-10-CM

## 2023-12-13 DIAGNOSIS — R07.89 POSTERIOR CHEST PAIN: ICD-10-CM

## 2023-12-13 DIAGNOSIS — R09.02 HYPOXIA: ICD-10-CM

## 2023-12-13 DIAGNOSIS — R06.02 SOB (SHORTNESS OF BREATH) ON EXERTION: ICD-10-CM

## 2023-12-13 DIAGNOSIS — T80.1XXA IV INFILTRATION, INITIAL ENCOUNTER: Primary | ICD-10-CM

## 2023-12-13 DIAGNOSIS — Z99.81 OXYGEN DEPENDENT: ICD-10-CM

## 2023-12-13 DIAGNOSIS — Z72.0 TOBACCO ABUSE: ICD-10-CM

## 2023-12-13 LAB — POC CREATININE WHOLE BLOOD: 0.9 MG/DL (ref 0.5–1.2)

## 2023-12-13 PROCEDURE — 99213 OFFICE O/P EST LOW 20 MIN: CPT

## 2023-12-13 PROCEDURE — G0008 ADMIN INFLUENZA VIRUS VAC: HCPCS | Performed by: NURSE PRACTITIONER

## 2023-12-13 PROCEDURE — 1123F ACP DISCUSS/DSCN MKR DOCD: CPT | Performed by: NURSE PRACTITIONER

## 2023-12-13 PROCEDURE — 90694 VACC AIIV4 NO PRSRV 0.5ML IM: CPT | Performed by: NURSE PRACTITIONER

## 2023-12-13 PROCEDURE — 3079F DIAST BP 80-89 MM HG: CPT | Performed by: NURSE PRACTITIONER

## 2023-12-13 PROCEDURE — 6360000004 HC RX CONTRAST MEDICATION: Performed by: NURSE PRACTITIONER

## 2023-12-13 PROCEDURE — 3075F SYST BP GE 130 - 139MM HG: CPT | Performed by: NURSE PRACTITIONER

## 2023-12-13 PROCEDURE — 82565 ASSAY OF CREATININE: CPT

## 2023-12-13 PROCEDURE — 99215 OFFICE O/P EST HI 40 MIN: CPT | Performed by: NURSE PRACTITIONER

## 2023-12-13 PROCEDURE — 71275 CT ANGIOGRAPHY CHEST: CPT

## 2023-12-13 RX ORDER — AZITHROMYCIN 250 MG/1
250 TABLET, FILM COATED ORAL SEE ADMIN INSTRUCTIONS
Qty: 6 TABLET | Refills: 0 | Status: SHIPPED | OUTPATIENT
Start: 2023-12-13 | End: 2023-12-13

## 2023-12-13 RX ORDER — VARENICLINE TARTRATE 1 MG/1
1 TABLET, FILM COATED ORAL 2 TIMES DAILY
Qty: 60 TABLET | Refills: 3 | Status: SHIPPED | OUTPATIENT
Start: 2023-12-13 | End: 2023-12-13

## 2023-12-13 RX ORDER — VARENICLINE TARTRATE 0.5 (11)-1
1 KIT ORAL DAILY
Qty: 90 EACH | Refills: 0 | Status: SHIPPED | OUTPATIENT
Start: 2023-12-13

## 2023-12-13 RX ORDER — AMOXICILLIN AND CLAVULANATE POTASSIUM 875; 125 MG/1; MG/1
1 TABLET, FILM COATED ORAL 2 TIMES DAILY
Qty: 20 TABLET | Refills: 0 | Status: SHIPPED | OUTPATIENT
Start: 2023-12-13 | End: 2023-12-13

## 2023-12-13 RX ORDER — VARENICLINE TARTRATE 1 MG/1
1 TABLET, FILM COATED ORAL 2 TIMES DAILY
Qty: 180 TABLET | OUTPATIENT
Start: 2023-12-13

## 2023-12-13 RX ADMIN — IOPAMIDOL 80 ML: 755 INJECTION, SOLUTION INTRAVENOUS at 11:17

## 2023-12-13 ASSESSMENT — ENCOUNTER SYMPTOMS
RHINORRHEA: 0
SORE THROAT: 0
SINUS PRESSURE: 0
COUGH: 1
GASTROINTESTINAL NEGATIVE: 1
TROUBLE SWALLOWING: 0
PHOTOPHOBIA: 0
SHORTNESS OF BREATH: 1
WHEEZING: 0
SINUS PAIN: 0
CHEST TIGHTNESS: 0
CHOKING: 0

## 2023-12-13 ASSESSMENT — PAIN - FUNCTIONAL ASSESSMENT: PAIN_FUNCTIONAL_ASSESSMENT: NONE - DENIES PAIN

## 2023-12-13 NOTE — ED TRIAGE NOTES
TOroom 1 c/o right antecubital swelling  pt has coban and a 2x2 in place stating \" I had a CT scan of my lungs done today about 1200 and they knew it was swelling when I left.   It got worse and moved down at home while I was sitting in my chair\"

## 2023-12-13 NOTE — TELEPHONE ENCOUNTER
Let pt know his CTA of chest was negative for a pulmonary embolus, it is suggestive of a pneumonia of the right lung, I am going to send in 2 atb for him to take to treat this, also continue with the inhalers and nebulizer treatments. Call for worsening symptoms.  F/u with PFT and pulmonary appt as scheduled.  Keep hydrated well and continue with the oxygen.

## 2023-12-13 NOTE — PROGRESS NOTES
Vaccine Information Sheet, \"Influenza - Inactivated\"  given to Aaron Santiago, or parent/legal guardian of  Aaron Santiago and verbalized understanding. Patient responses:    Have you ever had a reaction to a flu vaccine? No  Do you have an allergy to eggs, neomycin or polymixin? No  Do you have an allergy to Thimerosal, contact lens solution, or Merthiolate? No  Have you ever had Guillian [place] Syndrome? No  Do you have any current illness? No  Do you have a temperature above 100 degrees? No  Are you pregnant? No  If pregnant, permission obtained from physician? No  Do you have an active neurological disorder? No      Flu vaccine given per order. Please see immunization tab.

## 2023-12-13 NOTE — PROGRESS NOTES
Immunization(s) given during visit:    Immunizations Administered       Name Date Dose Route    Influenza, FLUAD, (age 72 y+), Adjuvanted, 0.5mL 12/13/2023 0.5 mL Intramuscular    Site: Deltoid- Left    Lot: [id number]    NDC: 68807-712-09            Most recent Vaccine Information Sheet dated 8.6.21 given to pt

## 2023-12-13 NOTE — ED NOTES
CT SCAN  dept contacted per H Daniel CNP and pt given copy. Copy to clerical for EHR scan.      Clara Griggs RN  12/13/23 1152

## 2023-12-13 NOTE — PROGRESS NOTES
[address] [facility] MEDICINE  [address] [place] [address] [address] [place] [address]  Dept: [phone]  Dept Fax: [phone]  Loc: [phone]    Thad Alvares is a 80 y.o. Rafi Peat presents today for his medical conditions/complaints as noted below. Enma Stephenson c/o of Follow-up (2 week follow up  )      :     HPI    Pt here to f/u on hypoxia, oxygen dependent at present with 3 liters of oxygen. Pulse ox ranging from 89%-93% with activity and rest with 3 liters of  O2 per N/C  Pt continues to smoke close to 1ppd, has tried an failed patches, gum and wellbutrin, will try chantix, decline referral to tobacco cessation clinic. Pt admits to  posterior chest pain, denies left arm N/T or diaphoresis   Never had  PE in past,   Reviewed CT of lung from 1/2023     Lab Results   Component Value Date    WBC 11.8 (H) 11/28/2023    HGB 18.0 11/28/2023    HCT 54.8 (H) 11/28/2023    MCV 97.0 (H) 11/28/2023     11/28/2023        Lab Results   Component Value Date/Time     03/13/2022 07:20 PM    K 4.0 03/13/2022 07:20 PM    CL 97 03/13/2022 07:20 PM    CO2 23 03/13/2022 07:20 PM    BUN 10 03/13/2022 07:20 PM    CREATININE 10 12/28/2022 11:45 AM    CREATININE 1.0 03/13/2022 07:20 PM    GLUCOSE 161 03/13/2022 07:20 PM    GLUCOSE 141 06/20/2016 10:40 AM    CALCIUM 9.9 03/13/2022 07:20 PM    LABGLOM 75 03/13/2022 07:20 PM          Pt denies PE, BNP 41.1 11.28.2023     XR CHEST (2 VW)  Narrative: PROCEDURE: XR CHEST (2 VW)    CLINICAL INFORMATION: Hypoxia, SOB (shortness of breath) on exertion, Chronic obstructive pulmonary disease, unspecified COPD type ([address]), COPD exacerbation ([address]). COMPARISON: Radiograph 3/13/2022, CT 01/03/2023    TECHNIQUE: 2 PA and one lateral view of the chest were obtained. FINDINGS:   Fusion hardware is in the cervical spine. Prominent interstitial markings are seen throughout the lungs bilaterally. This is likely related to fibrotic changes.     The

## 2023-12-13 NOTE — ED TRIAGE NOTES
Pt placed ion O2 at 3l/min/ nc \" that's what I use at home all the time but I didn't bring my tank with me\"

## 2023-12-19 ENCOUNTER — TELEPHONE (OUTPATIENT)
Dept: FAMILY MEDICINE CLINIC | Age: 68
End: 2023-12-19

## 2023-12-19 NOTE — TELEPHONE ENCOUNTER
Hello, you have a appointment with Eric Orozco on 12/27/2023 she will not be in office this day and we need to get this reschedule. Please call [phone]

## 2023-12-24 DIAGNOSIS — G47.00 INSOMNIA, UNSPECIFIED TYPE: ICD-10-CM

## 2023-12-26 RX ORDER — AMITRIPTYLINE HYDROCHLORIDE 50 MG/1
25 TABLET, FILM COATED ORAL NIGHTLY
Qty: 90 TABLET | Refills: 3 | Status: SHIPPED | OUTPATIENT
Start: 2023-12-26

## 2023-12-26 NOTE — TELEPHONE ENCOUNTER
Recent Visits  Date Type Provider Dept   12/13/23 Office Visit Doug MaryGERARDO - [place] [facility]   11/28/23 Office Visit Doug HernandezGERARDO CNP [facility]   06/28/23 Office Visit Doug HernandezGERARDO - [place] [facility]   12/28/22 Office Visit Eric Stallworth APRN - CNP [facility]   Showing recent visits within past 540 days with a meds authorizing provider and meeting all other requirements  Future Appointments  Date Type Provider Dept   01/10/24 Appointment Eric Stallworth, APRN - CNP [facility]   Showing future appointments within next 150 days with a meds authorizing provider and meeting all other requirements

## 2023-12-27 ENCOUNTER — OFFICE VISIT (OUTPATIENT)
Dept: PULMONOLOGY | Age: 68
End: 2023-12-27
Payer: MEDICARE

## 2023-12-27 VITALS
DIASTOLIC BLOOD PRESSURE: 62 MMHG | HEART RATE: 78 BPM | OXYGEN SATURATION: 92 % | SYSTOLIC BLOOD PRESSURE: 116 MMHG | WEIGHT: 197.4 LBS | HEIGHT: 73 IN | TEMPERATURE: 97.7 F | BODY MASS INDEX: 26.16 KG/M2

## 2023-12-27 DIAGNOSIS — F17.210 CIGARETTE SMOKER MOTIVATED TO QUIT: ICD-10-CM

## 2023-12-27 DIAGNOSIS — J84.10 COMBINED PULMONARY FIBROSIS AND EMPHYSEMA (CPFE) (HCC): Primary | ICD-10-CM

## 2023-12-27 DIAGNOSIS — J84.10 GRANULOMATOUS LUNG DISEASE (HCC): ICD-10-CM

## 2023-12-27 DIAGNOSIS — R68.3 CLUBBING OF FINGERS: ICD-10-CM

## 2023-12-27 DIAGNOSIS — R09.02 HYPOXEMIA: ICD-10-CM

## 2023-12-27 DIAGNOSIS — J96.11 CHRONIC RESPIRATORY FAILURE WITH HYPOXIA (HCC): ICD-10-CM

## 2023-12-27 DIAGNOSIS — J43.9 COMBINED PULMONARY FIBROSIS AND EMPHYSEMA (CPFE) (HCC): Primary | ICD-10-CM

## 2023-12-27 DIAGNOSIS — J84.9 ILD (INTERSTITIAL LUNG DISEASE) (HCC): ICD-10-CM

## 2023-12-27 DIAGNOSIS — R06.09 DOE (DYSPNEA ON EXERTION): ICD-10-CM

## 2023-12-27 DIAGNOSIS — J43.2 CENTRILOBULAR EMPHYSEMA (HCC): ICD-10-CM

## 2023-12-27 PROCEDURE — 1123F ACP DISCUSS/DSCN MKR DOCD: CPT | Performed by: INTERNAL MEDICINE

## 2023-12-27 PROCEDURE — 3078F DIAST BP <80 MM HG: CPT | Performed by: INTERNAL MEDICINE

## 2023-12-27 PROCEDURE — 3074F SYST BP LT 130 MM HG: CPT | Performed by: INTERNAL MEDICINE

## 2023-12-27 PROCEDURE — 99204 OFFICE O/P NEW MOD 45 MIN: CPT | Performed by: INTERNAL MEDICINE

## 2023-12-27 NOTE — PROGRESS NOTES
SUBJECTIVE:  Sotero Martinez is a 16 m.o. male here accompanied by grandmother for Fever (103.2 this morning ) and Nasal Congestion    HPI  16 mo with 1 day of fever up to 104.  A little nasal discharge.  No cough, vomiting, or diarrhea.  Started  yesterday.  Drinking fluids well.    Sotero's allergies, medications, history, and problem list were updated as appropriate.    Review of Systems   A comprehensive review of symptoms was completed and negative except as noted above.    OBJECTIVE:  Vital signs  Vitals:    07/06/23 1051   Temp: (!) 102.2 °F (39 °C)   TempSrc: Temporal   Weight: 10.4 kg (23 lb 0.3 oz)        Physical Exam  Constitutional:       General: He is active.      Comments: No distress   HENT:      Right Ear: Tympanic membrane normal.      Left Ear: Tympanic membrane normal.      Nose: Nose normal.      Mouth/Throat:      Mouth: Mucous membranes are moist.      Pharynx: Oropharynx is clear. Posterior oropharyngeal erythema (mild) present. No oropharyngeal exudate.   Eyes:      Conjunctiva/sclera: Conjunctivae normal.      Pupils: Pupils are equal, round, and reactive to light.   Cardiovascular:      Rate and Rhythm: Normal rate and regular rhythm.      Heart sounds: S1 normal and S2 normal. No murmur heard.  Pulmonary:      Effort: Pulmonary effort is normal.      Breath sounds: Normal breath sounds.   Abdominal:      General: Bowel sounds are normal.      Palpations: Abdomen is soft. There is no mass.      Tenderness: There is no abdominal tenderness.   Skin:     General: Skin is warm.      Findings: No rash.   Neurological:      Mental Status: He is alert.      Comments: Non-focal        ASSESSMENT/PLAN:  Sotero was seen today for fever and nasal congestion.    Diagnoses and all orders for this visit:    Fever, unspecified fever cause  -     ibuprofen 20 mg/mL oral liquid 104 mg  -     POCT Strep A, Molecular  -     POCT COVID-19 Rapid Screening  -     POCT Influenza A/B Molecular         Recent Results  (from the past 24 hour(s))   POCT Strep A, Molecular    Collection Time: 07/06/23 11:35 AM   Result Value Ref Range    Molecular Strep A, POC Negative Negative     Acceptable Yes    POCT Influenza A/B Molecular    Collection Time: 07/06/23 11:35 AM   Result Value Ref Range    POC Molecular Influenza A Ag Negative Negative, Not Reported    POC Molecular Influenza B Ag Negative Negative, Not Reported     Acceptable Yes    POCT COVID-19 Rapid Screening    Collection Time: 07/06/23 11:36 AM   Result Value Ref Range    POC Rapid COVID Negative Negative     Acceptable Yes        Symptomatic measures  Call with any new or worsening problems  Follow up as needed         Follow Up:  No follow-ups on file.       CHEST HIGH RESOLUTION    Echo (TTE) complete (PRN contrast/bubble/strain/3D)    [facility] [address] Medication Mgmt (Smoking Cessation - Clinical Pharmacy) [facility]      5. Chronic respiratory failure with hypoxia (HCC)  CT CHEST HIGH RESOLUTION    Echo (TTE) complete (PRN contrast/bubble/strain/3D)    6 Minute Walk Test      6. ALVAREZ (dyspnea on exertion)  Echo (TTE) complete (PRN contrast/bubble/strain/3D)      7. Centrilobular emphysema (HCC)  Alpha-1-Antitrypsin      8. Granulomatous lung disease ([address])        9. Clubbing of fingers          Clinical presentation consistent with combined pulmonary fibrosis and emphysema with strong possibility that his occupation () contributed to the interstitial component however CT scans of the chest were much better 40 years ago did not reveal such as extensive interstitial component. Unfortunately given his condition will not improve as he continues smoking and his prognosis is poor due to his severe reduction in diffusion capacity he would be a candidate for lung transplant evaluation however he does not qualify if for advanced COPD care due to his persisting smoking        Plan:      Orders Placed This Encounter   Procedures    CT CHEST HIGH RESOLUTION     Standing Status:   Future     Standing Expiration Date:   12/27/2024    DAVE Screen With Reflex     Standing Status:   Future     Standing Expiration Date:   12/27/2024    C-Reactive Protein     Standing Status:   Future     Standing Expiration Date:   12/27/2024    Alpha-1-Antitrypsin     Please do Reid-1-Anti Trypsin oropharyngeal swab test in the office today with available test kit. Standing Status:   Future     Standing Expiration Date:   12/27/2024    Hepatic Function Panel     Standing Status:   Future     Standing Expiration Date:   12/27/2024    [address] [place] [address] Medication [facility] (Smoking Cessation - Clinical Pharmacy) [facility]     Referral Priority:   Routine     Referral Type:    Other     Referral Reason:   Specialty

## 2023-12-29 DIAGNOSIS — I10 ESSENTIAL HYPERTENSION: ICD-10-CM

## 2023-12-29 DIAGNOSIS — G47.00 INSOMNIA, UNSPECIFIED TYPE: ICD-10-CM

## 2024-01-02 RX ORDER — QUETIAPINE FUMARATE 200 MG/1
TABLET, FILM COATED ORAL
Qty: 90 TABLET | Refills: 3 | Status: SHIPPED | OUTPATIENT
Start: 2024-01-02

## 2024-01-02 NOTE — TELEPHONE ENCOUNTER
Recent Visits  Date Type Provider Dept   12/13/23 Office Visit Eric Orozco APRN - CNP [facility]   11/28/23 Office Visit Eric Orozco APRN - CNP [facility]   06/28/23 Office Visit Eric Orozco APRN - CNP [facility]   12/28/22 Office Visit Eric Orozco APRN - CNP [facility]   Showing recent visits within past 540 days with a meds authorizing provider and meeting all other requirements  Future Appointments  Date Type Provider Dept   01/10/24 Appointment Eric Orozco APRN - CNP [facility]   Showing future appointments within next 150 days with a meds authorizing provider and meeting all other requirements

## 2024-01-03 ENCOUNTER — TELEPHONE (OUTPATIENT)
Dept: PHARMACY | Age: 69
End: 2024-01-03

## 2024-01-03 DIAGNOSIS — G89.4 CHRONIC PAIN SYNDROME: ICD-10-CM

## 2024-01-03 DIAGNOSIS — M47.812 CERVICAL SPONDYLOSIS: ICD-10-CM

## 2024-01-03 DIAGNOSIS — M46.1 SACROILIAC INFLAMMATION (HCC): ICD-10-CM

## 2024-01-03 DIAGNOSIS — M47.814 THORACIC SPONDYLOSIS: ICD-10-CM

## 2024-01-03 RX ORDER — HYDROCODONE BITARTRATE AND ACETAMINOPHEN 5; 325 MG/1; MG/1
1 TABLET ORAL 2 TIMES DAILY PRN
Qty: 60 TABLET | Refills: 0 | Status: SHIPPED | OUTPATIENT
Start: 2024-01-04 | End: 2024-02-03

## 2024-01-03 NOTE — TELEPHONE ENCOUNTER
OARRS reviewed. UDS: + for  quetiapine and hydrocodone. Amitriptyline and bupropion are present.  Last seen: 12/12/2023. Follow-up: 2/12/2024

## 2024-01-07 SDOH — HEALTH STABILITY: PHYSICAL HEALTH: ON AVERAGE, HOW MANY MINUTES DO YOU ENGAGE IN EXERCISE AT THIS LEVEL?: 10 MIN

## 2024-01-07 SDOH — HEALTH STABILITY: PHYSICAL HEALTH: ON AVERAGE, HOW MANY DAYS PER WEEK DO YOU ENGAGE IN MODERATE TO STRENUOUS EXERCISE (LIKE A BRISK WALK)?: 0 DAYS

## 2024-01-07 ASSESSMENT — PATIENT HEALTH QUESTIONNAIRE - PHQ9
1. LITTLE INTEREST OR PLEASURE IN DOING THINGS: 1
SUM OF ALL RESPONSES TO PHQ9 QUESTIONS 1 & 2: 2
2. FEELING DOWN, DEPRESSED OR HOPELESS: 1
SUM OF ALL RESPONSES TO PHQ QUESTIONS 1-9: 2

## 2024-01-07 ASSESSMENT — LIFESTYLE VARIABLES
HOW OFTEN DO YOU HAVE A DRINK CONTAINING ALCOHOL: 3
HOW OFTEN DO YOU HAVE SIX OR MORE DRINKS ON ONE OCCASION: 1
HOW OFTEN DURING THE LAST YEAR HAVE YOU HAD A FEELING OF GUILT OR REMORSE AFTER DRINKING: 0
HOW OFTEN DURING THE LAST YEAR HAVE YOU BEEN UNABLE TO REMEMBER WHAT HAPPENED THE NIGHT BEFORE BECAUSE YOU HAD BEEN DRINKING: NEVER
HOW OFTEN DURING THE LAST YEAR HAVE YOU FOUND THAT YOU WERE NOT ABLE TO STOP DRINKING ONCE YOU HAD STARTED: NEVER
HOW OFTEN DURING THE LAST YEAR HAVE YOU FAILED TO DO WHAT WAS NORMALLY EXPECTED FROM YOU BECAUSE OF DRINKING: 0
HOW OFTEN DURING THE LAST YEAR HAVE YOU HAD A FEELING OF GUILT OR REMORSE AFTER DRINKING: NEVER
HOW MANY STANDARD DRINKS CONTAINING ALCOHOL DO YOU HAVE ON A TYPICAL DAY: 2
HOW OFTEN DURING THE LAST YEAR HAVE YOU NEEDED AN ALCOHOLIC DRINK FIRST THING IN THE MORNING TO GET YOURSELF GOING AFTER A NIGHT OF HEAVY DRINKING: 0
HOW OFTEN DURING THE LAST YEAR HAVE YOU BEEN UNABLE TO REMEMBER WHAT HAPPENED THE NIGHT BEFORE BECAUSE YOU HAD BEEN DRINKING: 0
HOW MANY STANDARD DRINKS CONTAINING ALCOHOL DO YOU HAVE ON A TYPICAL DAY: 3 OR 4
HAVE YOU OR SOMEONE ELSE BEEN INJURED AS A RESULT OF YOUR DRINKING: NO
HAVE YOU OR SOMEONE ELSE BEEN INJURED AS A RESULT OF YOUR DRINKING: 0
HAS A RELATIVE, FRIEND, DOCTOR, OR ANOTHER HEALTH PROFESSIONAL EXPRESSED CONCERN ABOUT YOUR DRINKING OR SUGGESTED YOU CUT DOWN: 4
HOW OFTEN DURING THE LAST YEAR HAVE YOU FOUND THAT YOU WERE NOT ABLE TO STOP DRINKING ONCE YOU HAD STARTED: 0
HOW OFTEN DURING THE LAST YEAR HAVE YOU FAILED TO DO WHAT WAS NORMALLY EXPECTED FROM YOU BECAUSE OF DRINKING: NEVER
HAS A RELATIVE, FRIEND, DOCTOR, OR ANOTHER HEALTH PROFESSIONAL EXPRESSED CONCERN ABOUT YOUR DRINKING OR SUGGESTED YOU CUT DOWN: YES, DURING THE PAST YEAR
HOW OFTEN DURING THE LAST YEAR HAVE YOU NEEDED AN ALCOHOLIC DRINK FIRST THING IN THE MORNING TO GET YOURSELF GOING AFTER A NIGHT OF HEAVY DRINKING: NEVER
HOW OFTEN DO YOU HAVE A DRINK CONTAINING ALCOHOL: 2-4 TIMES A MONTH

## 2024-01-10 ENCOUNTER — OFFICE VISIT (OUTPATIENT)
Dept: FAMILY MEDICINE CLINIC | Age: 69
End: 2024-01-10
Payer: MEDICARE

## 2024-01-10 VITALS
HEART RATE: 65 BPM | SYSTOLIC BLOOD PRESSURE: 116 MMHG | WEIGHT: 194.6 LBS | RESPIRATION RATE: 18 BRPM | OXYGEN SATURATION: 96 % | DIASTOLIC BLOOD PRESSURE: 68 MMHG | TEMPERATURE: 97.6 F | HEIGHT: 73 IN | BODY MASS INDEX: 25.79 KG/M2

## 2024-01-10 DIAGNOSIS — G47.00 INSOMNIA, UNSPECIFIED TYPE: ICD-10-CM

## 2024-01-10 DIAGNOSIS — J84.9 INTERSTITIAL LUNG DISEASE (HCC): ICD-10-CM

## 2024-01-10 DIAGNOSIS — Z99.81 OXYGEN DEPENDENT: ICD-10-CM

## 2024-01-10 DIAGNOSIS — E11.9 TYPE 2 DIABETES MELLITUS WITHOUT COMPLICATION, WITHOUT LONG-TERM CURRENT USE OF INSULIN (HCC): ICD-10-CM

## 2024-01-10 DIAGNOSIS — Z87.891 PERSONAL HISTORY OF TOBACCO USE: ICD-10-CM

## 2024-01-10 DIAGNOSIS — Z00.00 MEDICARE ANNUAL WELLNESS VISIT, SUBSEQUENT: Primary | ICD-10-CM

## 2024-01-10 DIAGNOSIS — E11.65 UNCONTROLLED TYPE 2 DIABETES MELLITUS WITH HYPERGLYCEMIA (HCC): ICD-10-CM

## 2024-01-10 DIAGNOSIS — H61.91 LESION OF SKIN OF RIGHT EAR: ICD-10-CM

## 2024-01-10 DIAGNOSIS — J84.10 PULMONARY FIBROSIS (HCC): ICD-10-CM

## 2024-01-10 DIAGNOSIS — J44.9 CHRONIC OBSTRUCTIVE PULMONARY DISEASE, UNSPECIFIED COPD TYPE (HCC): ICD-10-CM

## 2024-01-10 LAB — HBA1C MFR BLD: 8.2 % (ref 4.3–5.7)

## 2024-01-10 PROCEDURE — 3078F DIAST BP <80 MM HG: CPT | Performed by: NURSE PRACTITIONER

## 2024-01-10 PROCEDURE — G0296 VISIT TO DETERM LDCT ELIG: HCPCS | Performed by: NURSE PRACTITIONER

## 2024-01-10 PROCEDURE — 1123F ACP DISCUSS/DSCN MKR DOCD: CPT | Performed by: NURSE PRACTITIONER

## 2024-01-10 PROCEDURE — 99214 OFFICE O/P EST MOD 30 MIN: CPT | Performed by: NURSE PRACTITIONER

## 2024-01-10 PROCEDURE — 3052F HG A1C>EQUAL 8.0%<EQUAL 9.0%: CPT | Performed by: NURSE PRACTITIONER

## 2024-01-10 PROCEDURE — 3074F SYST BP LT 130 MM HG: CPT | Performed by: NURSE PRACTITIONER

## 2024-01-10 PROCEDURE — G0439 PPPS, SUBSEQ VISIT: HCPCS | Performed by: NURSE PRACTITIONER

## 2024-01-10 ASSESSMENT — PATIENT HEALTH QUESTIONNAIRE - PHQ9
SUM OF ALL RESPONSES TO PHQ QUESTIONS 1-9: 7
SUM OF ALL RESPONSES TO PHQ QUESTIONS 1-9: 7
7. TROUBLE CONCENTRATING ON THINGS, SUCH AS READING THE NEWSPAPER OR WATCHING TELEVISION: 1
2. FEELING DOWN, DEPRESSED OR HOPELESS: 2
4. FEELING TIRED OR HAVING LITTLE ENERGY: 1
SUM OF ALL RESPONSES TO PHQ9 QUESTIONS 1 & 2: 3
10. IF YOU CHECKED OFF ANY PROBLEMS, HOW DIFFICULT HAVE THESE PROBLEMS MADE IT FOR YOU TO DO YOUR WORK, TAKE CARE OF THINGS AT HOME, OR GET ALONG WITH OTHER PEOPLE: 0
3. TROUBLE FALLING OR STAYING ASLEEP: 2
1. LITTLE INTEREST OR PLEASURE IN DOING THINGS: 1
SUM OF ALL RESPONSES TO PHQ QUESTIONS 1-9: 7
5. POOR APPETITE OR OVEREATING: 0
8. MOVING OR SPEAKING SO SLOWLY THAT OTHER PEOPLE COULD HAVE NOTICED. OR THE OPPOSITE, BEING SO FIGETY OR RESTLESS THAT YOU HAVE BEEN MOVING AROUND A LOT MORE THAN USUAL: 0
6. FEELING BAD ABOUT YOURSELF - OR THAT YOU ARE A FAILURE OR HAVE LET YOURSELF OR YOUR FAMILY DOWN: 0
9. THOUGHTS THAT YOU WOULD BE BETTER OFF DEAD, OR OF HURTING YOURSELF: 0
SUM OF ALL RESPONSES TO PHQ QUESTIONS 1-9: 7

## 2024-01-10 NOTE — PROGRESS NOTES
Medicare Annual Wellness Visit    Dilan Pappas is here for Medicare AWV (No concerns )    Assessment & Plan   Medicare annual wellness visit, subsequent  Personal history of tobacco use  -     TN VISIT TO DISCUSS LUNG CA SCREEN W LDCT  Chronic obstructive pulmonary disease, unspecified COPD type (HCC)  -continue with oxygen, pulmonary and trelegy/albuterol  Pulmonary fibrosis (HCC)  Interstitial lung disease (HCC)  Oxygen dependent  Insomnia, unspecified type  Type 2 diabetes mellitus without complication, without long-term current use of insulin (HCC)  -worseneing increase jardiance to 25 mg daily continue with januvia and metformin follow a diabetic diet   -     SITagliptin (JANUVIA) 100 MG tablet; Take 1 tablet by mouth daily, Disp-90 tablet, R-4Normal  Uncontrolled type 2 diabetes mellitus with hyperglycemia (HCC)  -     empagliflozin (JARDIANCE) 25 MG tablet; Take 1 tablet by mouth daily, Disp-90 tablet, R-4Normal  Lesion of skin of right ear  -     JO - Damian Colón MD, Dermatology, [place]    Recommendations for Preventive Services Due: see orders and patient instructions/AVS.  Recommended screening schedule for the next 5-10 years is provided to the patient in written form: see Patient Instructions/AVS.     Return in about 3 months (around 4/10/2024) for A1C and check up .     Subjective   The following acute and/or chronic problems were also addressed today:        COPD/Pulmonary Fibrosis  -controlled with trelegy/albuterol  -reviewed pulmonary note   -has upcoming lung scan  -using smoking cessation meds has decreased to 3 cigarettes a day down from 1 ppd    Not sleeping well at hs, only sleeps a few hours at a time  -taking elavil 25 mg   -will increase to 50 mg     T2DM  -Am FBS at 150  -taking januvia 100 mg, jardiance 10 mg and metformin 100 mg bid   -  Hemoglobin A1C   Date Value Ref Range Status   01/10/2024 8.2 (H) 4.3 - 5.7 % Final     Comment:     Performed at [facility] Office under CLIA #

## 2024-01-15 DIAGNOSIS — Z83.49 FAMILY HISTORY OF ALPHA 1 ANTITRYPSIN DEFICIENCY: Primary | ICD-10-CM

## 2024-01-17 ENCOUNTER — TELEPHONE (OUTPATIENT)
Dept: PULMONOLOGY | Age: 69
End: 2024-01-17

## 2024-01-17 NOTE — TELEPHONE ENCOUNTER
----- Message from Tyrese Steele MD sent at 1/15/2024 11:13 AM EST -----  I have the results, please bring patient to discuss it.  Order for A1AT serum level placed in epic

## 2024-01-18 ENCOUNTER — HOSPITAL ENCOUNTER (OUTPATIENT)
Dept: CT IMAGING | Age: 69
Discharge: HOME | End: 2024-01-18
Attending: INTERNAL MEDICINE
Payer: MEDICARE

## 2024-01-18 ENCOUNTER — HOSPITAL ENCOUNTER (OUTPATIENT)
Age: 69
Discharge: HOME | End: 2024-01-20
Attending: INTERNAL MEDICINE
Payer: MEDICARE

## 2024-01-18 VITALS
SYSTOLIC BLOOD PRESSURE: 116 MMHG | DIASTOLIC BLOOD PRESSURE: 68 MMHG | BODY MASS INDEX: 25.71 KG/M2 | WEIGHT: 194 LBS | HEIGHT: 73 IN

## 2024-01-18 DIAGNOSIS — J84.9 ILD (INTERSTITIAL LUNG DISEASE) (HCC): ICD-10-CM

## 2024-01-18 DIAGNOSIS — R09.02 HYPOXEMIA: ICD-10-CM

## 2024-01-18 DIAGNOSIS — F17.210 CIGARETTE SMOKER MOTIVATED TO QUIT: ICD-10-CM

## 2024-01-18 DIAGNOSIS — J96.11 CHRONIC RESPIRATORY FAILURE WITH HYPOXIA (HCC): ICD-10-CM

## 2024-01-18 DIAGNOSIS — R06.09 DOE (DYSPNEA ON EXERTION): ICD-10-CM

## 2024-01-18 LAB
ECHO AO ASC DIAM: 3.1 CM
ECHO AO ASCENDING AORTA INDEX: 1.46 CM/M2
ECHO AV CUSP MM: 2.2 CM
ECHO AV PEAK GRADIENT: 6 MMHG
ECHO AV PEAK VELOCITY: 1.2 M/S
ECHO AV VELOCITY RATIO: 0.83
ECHO BSA: 2.13 M2
ECHO LA AREA 2C: 15.7 CM2
ECHO LA AREA 4C: 14.6 CM2
ECHO LA DIAMETER INDEX: 1.37 CM/M2
ECHO LA DIAMETER: 2.9 CM
ECHO LA MAJOR AXIS: 5.5 CM
ECHO LA MINOR AXIS: 5.5 CM
ECHO LA VOL BP: 33 ML (ref 18–58)
ECHO LA VOL MOD A2C: 37 ML (ref 18–58)
ECHO LA VOL MOD A4C: 30 ML (ref 18–58)
ECHO LA VOL/BSA BIPLANE: 16 ML/M2 (ref 16–34)
ECHO LA VOLUME INDEX MOD A2C: 17 ML/M2 (ref 16–34)
ECHO LA VOLUME INDEX MOD A4C: 14 ML/M2 (ref 16–34)
ECHO LV E' LATERAL VELOCITY: 9 CM/S
ECHO LV E' SEPTAL VELOCITY: 7 CM/S
ECHO LV FRACTIONAL SHORTENING: 31 % (ref 28–44)
ECHO LV INTERNAL DIMENSION DIASTOLE INDEX: 2.31 CM/M2
ECHO LV INTERNAL DIMENSION DIASTOLIC: 4.9 CM (ref 4.2–5.9)
ECHO LV INTERNAL DIMENSION SYSTOLIC INDEX: 1.6 CM/M2
ECHO LV INTERNAL DIMENSION SYSTOLIC: 3.4 CM
ECHO LV ISOVOLUMETRIC RELAXATION TIME (IVRT): 74 MS
ECHO LV IVSD: 1 CM (ref 0.6–1)
ECHO LV MASS 2D: 176 G (ref 88–224)
ECHO LV MASS INDEX 2D: 83 G/M2 (ref 49–115)
ECHO LV POSTERIOR WALL DIASTOLIC: 1 CM (ref 0.6–1)
ECHO LV RELATIVE WALL THICKNESS RATIO: 0.41
ECHO LVOT PEAK GRADIENT: 4 MMHG
ECHO LVOT PEAK VELOCITY: 1 M/S
ECHO MV A VELOCITY: 0.83 M/S
ECHO MV E DECELERATION TIME (DT): 310 MS
ECHO MV E VELOCITY: 0.48 M/S
ECHO MV E/A RATIO: 0.58
ECHO MV E/E' LATERAL: 5.33
ECHO MV E/E' RATIO (AVERAGED): 6.1
ECHO PV MAX VELOCITY: 0.9 M/S
ECHO PV PEAK GRADIENT: 3 MMHG
ECHO RV INTERNAL DIMENSION: 2.7 CM
ECHO RV TAPSE: 2.3 CM (ref 1.7–?)
ECHO TV E WAVE: 0.4 M/S
ECHO TV REGURGITANT MAX VELOCITY: 2.29 M/S
ECHO TV REGURGITANT PEAK GRADIENT: 21 MMHG

## 2024-01-18 PROCEDURE — 93306 TTE W/DOPPLER COMPLETE: CPT

## 2024-01-18 PROCEDURE — 71250 CT THORAX DX C-: CPT

## 2024-01-24 ENCOUNTER — HOSPITAL ENCOUNTER (OUTPATIENT)
Dept: PHARMACY | Age: 69
Setting detail: THERAPIES SERIES
Discharge: HOME | End: 2024-01-24
Payer: MEDICARE

## 2024-01-24 ENCOUNTER — HOSPITAL ENCOUNTER (OUTPATIENT)
Age: 69
Discharge: HOME | End: 2024-01-24
Payer: MEDICARE

## 2024-01-24 DIAGNOSIS — J84.9 ILD (INTERSTITIAL LUNG DISEASE) (HCC): ICD-10-CM

## 2024-01-24 LAB
ALBUMIN SERPL BCG-MCNC: 4.5 G/DL (ref 3.5–5.1)
ALP SERPL-CCNC: 93 U/L (ref 38–126)
ALT SERPL W/O P-5'-P-CCNC: 45 U/L (ref 11–66)
AST SERPL-CCNC: 28 U/L (ref 5–40)
BILIRUB CONJ SERPL-MCNC: < 0.2 MG/DL (ref 0–0.3)
BILIRUB SERPL-MCNC: 0.5 MG/DL (ref 0.3–1.2)
CRP SERPL-MCNC: < 0.3 MG/DL (ref 0–1)
PROT SERPL-MCNC: 7.9 G/DL (ref 6.1–8)

## 2024-01-24 PROCEDURE — 86140 C-REACTIVE PROTEIN: CPT

## 2024-01-24 PROCEDURE — 80076 HEPATIC FUNCTION PANEL: CPT

## 2024-01-24 PROCEDURE — 86038 ANTINUCLEAR ANTIBODIES: CPT

## 2024-01-24 PROCEDURE — 99212 OFFICE O/P EST SF 10 MIN: CPT | Performed by: PHARMACIST

## 2024-01-24 PROCEDURE — 36415 COLL VENOUS BLD VENIPUNCTURE: CPT

## 2024-01-24 NOTE — PROGRESS NOTES
Medication Management   [facility]  Smoking Cessation Clinic  [phone] (phone)  [phone] (fax)    Dilan Pappas is a 68 y.o. male has been referred to our service by Dr. Steele for Smoking Cessation Counseling and Medication Management per Consult Agreement.  Patient acknowledges working in consult agreement with clinical pharmacist and referring physician.     Pt reports they are ready and motivated to quit.      History:  Family/friends for support: yes, lives alone; kids support him quitting, kids do smoke but not in his house.   Career: on disability  Home environment/smoke free zones in house: Yes - bedroom  Past Medical history/diagnosis reviewed:  Yes  Medication list reviewed: Yes    Past Medical History:      Past Medical History:   Diagnosis Date    Abnormal liver enzymes     Alcohol abuse     Allergic rhinitis     Chronic back pain     Colonic polyp     COPD (chronic obstructive pulmonary disease) (HCC)     Depression     Diabetes mellitus (HCC)     DJD (degenerative joint disease) of cervical spine     DJD (degenerative joint disease) of lumbar spine     SP SURGERY    GERD (gastroesophageal reflux disease)     Hyperlipidemia     Hypertension     Neuropathy 6/28/2023    Osteoarthritis     Osteoarthritis     Smoker      Last cigarette was on Monday at 6pm.    Scaling:  Importance level: 10  Confidence level: 10      1 PPD in Nov, started chantix in mid Dec, as of Jan 1st, was at 0.5 PPD, now has been whittling them down.     Used to have first cig in 5 min as of last week, not now smoking. Last ciga on Mon at 6pm.    Tobacco use:  Current use:  #/packs per day: 0 PPD (was previously at 1 PPD a few weeks ago,  Age started: 9   Years used: 59  Pack years: 59  How many times in the past have you made a serious attempt to quit smoking? 2  What was the longest period of time you were able to quit smoking? 3 months  When was your most recent quit attempt and for how long were you quit? Late

## 2024-01-26 LAB — NUCLEAR IGG SER QL IA: NORMAL

## 2024-01-30 NOTE — PROGRESS NOTES
Neck Circumference -     Mallampati -     Lung Nodule Screening     [x] Qualifies    [] Does not qualify   [] Declined    [] Completed

## 2024-01-31 ENCOUNTER — OFFICE VISIT (OUTPATIENT)
Dept: PULMONOLOGY | Age: 69
End: 2024-01-31
Payer: MEDICARE

## 2024-01-31 ENCOUNTER — HOSPITAL ENCOUNTER (OUTPATIENT)
Dept: PHARMACY | Age: 69
Setting detail: THERAPIES SERIES
Discharge: HOME | End: 2024-01-31
Payer: MEDICARE

## 2024-01-31 VITALS
HEIGHT: 73 IN | OXYGEN SATURATION: 92 % | WEIGHT: 198.8 LBS | DIASTOLIC BLOOD PRESSURE: 60 MMHG | SYSTOLIC BLOOD PRESSURE: 110 MMHG | TEMPERATURE: 98 F | BODY MASS INDEX: 26.35 KG/M2 | HEART RATE: 85 BPM

## 2024-01-31 DIAGNOSIS — Z87.891 PERSONAL HISTORY OF SMOKING: ICD-10-CM

## 2024-01-31 DIAGNOSIS — E88.01 ALPHA-1-ANTITRYPSIN DEFICIENCY (HCC): ICD-10-CM

## 2024-01-31 DIAGNOSIS — J84.10 COMBINED PULMONARY FIBROSIS AND EMPHYSEMA (CPFE) (HCC): Primary | ICD-10-CM

## 2024-01-31 DIAGNOSIS — J43.9 COMBINED PULMONARY FIBROSIS AND EMPHYSEMA (CPFE) (HCC): Primary | ICD-10-CM

## 2024-01-31 DIAGNOSIS — J96.11 CHRONIC RESPIRATORY FAILURE WITH HYPOXIA (HCC): ICD-10-CM

## 2024-01-31 PROCEDURE — 3074F SYST BP LT 130 MM HG: CPT | Performed by: INTERNAL MEDICINE

## 2024-01-31 PROCEDURE — 99211 OFF/OP EST MAY X REQ PHY/QHP: CPT | Performed by: PHARMACIST

## 2024-01-31 PROCEDURE — 99213 OFFICE O/P EST LOW 20 MIN: CPT | Performed by: INTERNAL MEDICINE

## 2024-01-31 PROCEDURE — 3078F DIAST BP <80 MM HG: CPT | Performed by: INTERNAL MEDICINE

## 2024-01-31 PROCEDURE — 1123F ACP DISCUSS/DSCN MKR DOCD: CPT | Performed by: INTERNAL MEDICINE

## 2024-01-31 ASSESSMENT — ENCOUNTER SYMPTOMS
COUGH: 1
ABDOMINAL DISTENTION: 0
VOICE CHANGE: 0
ABDOMINAL PAIN: 0
CHEST TIGHTNESS: 1
BACK PAIN: 1
TROUBLE SWALLOWING: 0
WHEEZING: 1
SHORTNESS OF BREATH: 1

## 2024-01-31 NOTE — PROGRESS NOTES
Subjective:      Patient ID: Dilan Pappas is a 68 y.o. male.      CC: Respiratory failure    HPI      Quit smoking beginning of January  (4 weeks ago) but had one cigarette 1/24/24, started on Chantix  A1AT MZ.  TTE: normal biventricular function.  HRCT chest: \"moderate emphysema, Stable appearance of chronic interstitial disease with fibrotic changes\"  DAVE reflex: Negative  CRP: < 0.3  Supplemental oxygem 1L at rst and 4L with activity.        Previous notes  Referred by Eric Orozco CNP for pulmonary evaluation.  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 Negative for unexpected weight change.   HENT:  Negative for trouble swallowing and voice change.    Eyes:  Negative for visual disturbance.   Respiratory:  Positive for cough, chest tightness, shortness of breath and wheezing.    Cardiovascular:  Positive for chest pain and palpitations.   Gastrointestinal:  Negative for abdominal distention and abdominal pain.   Endocrine: Negative.    Genitourinary: Negative.    Musculoskeletal:  Positive for arthralgias, back pain and gait problem.   Allergic/Immunologic: Negative for immunocompromised state.   Neurological:  Positive for weakness. Negative for speech difficulty.   Hematological:  Negative for adenopathy. Does not bruise/bleed easily.   Psychiatric/Behavioral: Negative.

## 2024-01-31 NOTE — PROGRESS NOTES
Medication Management   [facility]  Smoking Cessation Clinic  [phone] (phone)  [phone] (fax)  Dilan Pappas          1955  Eric Orozco APRN - [place]    Dilan Pappas is a 68 y.o. male who presents today for visit 2-.      Scaling:  Importance level:  10 (previous level was 10 )  Confidence level: 10 (previous level was 10 )        Has the patient smoked ANY cigarettes (even 1 puff) in the last 7 days?: Yes  If yes, how much? 1 cigarette     Baseline PPD: 1 PPD  Current PPD: 0 PPD  Smoking Reduction Percent: 100%  Quit date: 24 - did have one cigarette on 24, was bored.    Pharmacological Agent used: Chantix -    and the Med/NRT was started on  med Dec      Compliant with regimen: Yes    Medication Adverse Effects:  none      PLAN    Discussed the following:      Session #4  Went over pages 8-15 in smoking cessation booklet  Physical and psychological dependence associated with smoking and potential withdrawal symptoms  Discuss how and when body changes/heals after quitting smoking  Difference between lapse and relapse  Provide list of support groups/websites    Congratulated patient on his success!  Pt shares that he purchased some OTC Nicotine gum on his own, using approx 2 per day. Educated pt on proper use.    Patient's goals for quitting: saving money, improved health, would like to get off of O2.    Next appointment:   via phone    Dilan Pappas verbalized understanding of the above information and denied further questions or concerns.    The total time spent with the pt was 30 minutes.    For Pharmacy Admin Tracking Only    Intervention Detail: Adherence Monitorin  Total # of Interventions Recommended: 1  Total # of Interventions Accepted: 1  Time Spent (min): 20       
no

## 2024-02-05 DIAGNOSIS — M47.814 THORACIC SPONDYLOSIS: ICD-10-CM

## 2024-02-05 DIAGNOSIS — G89.4 CHRONIC PAIN SYNDROME: ICD-10-CM

## 2024-02-05 DIAGNOSIS — M46.1 SACROILIAC INFLAMMATION (HCC): ICD-10-CM

## 2024-02-05 DIAGNOSIS — M47.812 CERVICAL SPONDYLOSIS: ICD-10-CM

## 2024-02-05 RX ORDER — HYDROCODONE BITARTRATE AND ACETAMINOPHEN 5; 325 MG/1; MG/1
1 TABLET ORAL 2 TIMES DAILY PRN
Qty: 60 TABLET | Refills: 0 | Status: SHIPPED | OUTPATIENT
Start: 2024-02-05 | End: 2024-03-06

## 2024-02-05 NOTE — TELEPHONE ENCOUNTER
OARRS reviewed. UDS: + for  .bupropion, amitriptyline, hydrocodone, quetiapine   Last seen: 12/12/2023. Follow-up:   Future Appointments   Date Time Provider Department Center   2/7/2024 10:30 AM Leena Rosas RCP [facility] MED MGMT [facility] [place]   2/12/2024  8:45 AM Devon Farfan APRN - CNP [facility] [place]   4/10/2024  8:20 AM Eric Orozco APRN - CNP [facility] [place]   5/1/2024 10:00 AM Tyrese Steele MD N Pulm Med [facility]

## 2024-02-07 ENCOUNTER — HOSPITAL ENCOUNTER (OUTPATIENT)
Dept: PHARMACY | Age: 69
Setting detail: THERAPIES SERIES
Discharge: HOME | End: 2024-02-07

## 2024-02-07 NOTE — PROGRESS NOTES
Medication Management   [facility]  Smoking Cessation Clinic  [phone] (phone)  [phone] (fax)      Patient called at this time to follow up on smoking cessation, week 5/12.      Encounter completed via telephone.     Have you smoked any, even 1 puff, in the last 7 days? No    Baseline PPD: 1  Current PPD: 0  Smoking Reduction Percent from initial baseline: 100%, quit on 1/23/24    Pharmacological Agent used: Chantix -    and the med/NRT was started in mid December.    Compliant with NRT and/or med regiment?  Yes  Any side effects or problems? no    Scaling  Importance: 10 (previously was 10)  Confidence: 10 (previously was 10)    How have you been feeling overall since quitting/cutting back? \"Nothing different yet but when I smell other peoples cigarette smoke I think it really stinks\".      Plan:  Pt remains smoke free and I congratulated pt on this.  PT still taking chantix as prescribed and no problems per pt.  Encouraged pt to keep up the great work.     Pt had no questions at this time.   Total time spent on phone with pt:  5 minutes    No charges dropped due to appt done over the phone.     Next appointment 2/21/24 at 1015am via phone.

## 2024-02-12 ENCOUNTER — OFFICE VISIT (OUTPATIENT)
Dept: PHYSICAL MEDICINE AND REHAB | Age: 69
End: 2024-02-12
Payer: MEDICARE

## 2024-02-12 VITALS
HEIGHT: 73 IN | DIASTOLIC BLOOD PRESSURE: 70 MMHG | SYSTOLIC BLOOD PRESSURE: 130 MMHG | BODY MASS INDEX: 26.24 KG/M2 | WEIGHT: 198 LBS

## 2024-02-12 DIAGNOSIS — M46.1 SACROILIAC INFLAMMATION (HCC): ICD-10-CM

## 2024-02-12 DIAGNOSIS — Z98.1 HISTORY OF LUMBAR FUSION: ICD-10-CM

## 2024-02-12 DIAGNOSIS — M54.9 MID BACK PAIN: ICD-10-CM

## 2024-02-12 DIAGNOSIS — M47.816 SPONDYLOSIS OF LUMBAR REGION WITHOUT MYELOPATHY OR RADICULOPATHY: ICD-10-CM

## 2024-02-12 DIAGNOSIS — M79.18 MYOFASCIAL PAIN: ICD-10-CM

## 2024-02-12 DIAGNOSIS — M47.812 CERVICAL SPONDYLOSIS: Primary | ICD-10-CM

## 2024-02-12 DIAGNOSIS — M54.50 CHRONIC BILATERAL LOW BACK PAIN WITHOUT SCIATICA: ICD-10-CM

## 2024-02-12 DIAGNOSIS — M54.2 NECK PAIN: ICD-10-CM

## 2024-02-12 DIAGNOSIS — M47.814 THORACIC SPONDYLOSIS: ICD-10-CM

## 2024-02-12 DIAGNOSIS — G89.29 CHRONIC BILATERAL LOW BACK PAIN WITHOUT SCIATICA: ICD-10-CM

## 2024-02-12 DIAGNOSIS — G89.4 CHRONIC PAIN SYNDROME: ICD-10-CM

## 2024-02-12 PROCEDURE — 1123F ACP DISCUSS/DSCN MKR DOCD: CPT | Performed by: NURSE PRACTITIONER

## 2024-02-12 PROCEDURE — 3078F DIAST BP <80 MM HG: CPT | Performed by: NURSE PRACTITIONER

## 2024-02-12 PROCEDURE — 3075F SYST BP GE 130 - 139MM HG: CPT | Performed by: NURSE PRACTITIONER

## 2024-02-12 PROCEDURE — 99214 OFFICE O/P EST MOD 30 MIN: CPT | Performed by: NURSE PRACTITIONER

## 2024-02-12 NOTE — PROGRESS NOTES
Functionality Assessment/Goals Worksheet     On a scale of 0 (Does not Interfere) to 10 (Completely Interferes)     1.  Which number describes how during the past week pain has interfered with           the following:  A.  General Activity:  8  B.  Mood: 6  C.  Walking Ability:  7  D.  Normal Work (Includes both work outside the home and housework):  9  E.  Relations with Other People:   6  F.  Sleep:   7  G.  Enjoyment of Life:   6    2.  Patient Prefers to Take their Pain Medications:     [x]  On a regular basis   []  Only when necessary    []  Does not take pain medications    3.  What are the Patient's Goals/Expectations for Visiting Pain Management?     [x]  Learn about my pain    []  Receive Medication   [x]  Physical Therapy     []  Treat Depression   []  Receive Injections    []  Treat Sleep   []  Deal with Anxiety and Stress   []  Treat Opoid Dependence/Addiction   []  Other:         HPI:   Dilan Pappas is a 68 y.o. male is here today for    Chief Complaint: Neck pain    HPI   2 month FU. Continues to have posterior neck pain worse in left side- aching with numbing feelsing, has soreness.   Headaches have remained manageable.   Still receiving good relief from C-facet RFA.     Denies any radicular pain.   Norco prn remains effective in decreasing pain to a tolerable level  Pain increases with bending, lifting, twisting , turning head, getting up and down, and housework or working at job.    Patient on 1 liter of oxygen at rest and up to 4 liters with activity     Prior Injections:  bilateral C-facet RFA from 1/16/2023- 85% relief for 7-8 months      bilateral C-facet RFA @ C-4 and C5-6 completed by Dr. Ramirez on 11/21/2023 80% relief of neck pain     Radiology:  CT of cervical spine:  FINDINGS:  There is been interval ACDF bridging C4-C7. There is no evidence of hardware failure. There is incorporation of the intervertebral fusion devices. No fracture is evident. The craniocervical junction appears

## 2024-02-21 ENCOUNTER — HOSPITAL ENCOUNTER (OUTPATIENT)
Dept: PHARMACY | Age: 69
Setting detail: THERAPIES SERIES
Discharge: HOME | End: 2024-02-21

## 2024-02-21 NOTE — PROGRESS NOTES
Medication Management   [facility]  Smoking Cessation Clinic  [phone] (phone)  [phone] (fax)      Patient called at this time to follow up on smoking cessation, week .      Encounter completed via telephone.     Have you smoked any, even 1 puff, in the last 7 days? No    Baseline PPD: 1  Current PPD: 0  Smoking Reduction Percent from initial baseline: 100%  Quit on 23      Plan:  Congratulated on his success!   Avoid cigarettes, do not smoke, avoid triggers.  Continue Chantix x 12 weeks total. Pt states that he stopped using Nicotine gum.     Pt had no questions at this time.   Total time spent on phone with pt:  10 minutes      Next appointment 3/20 via phone.    Lety Malcolm, PharmD, BCPS, CACP, CTTS       For Pharmacy Admin Tracking Only    Intervention Detail: Adherence Monitorin  Total # of Interventions Recommended: 1  Total # of Interventions Accepted: 1  Time Spent (min): 10

## 2024-03-04 DIAGNOSIS — M46.1 SACROILIAC INFLAMMATION (HCC): ICD-10-CM

## 2024-03-04 DIAGNOSIS — M47.812 CERVICAL SPONDYLOSIS: ICD-10-CM

## 2024-03-04 DIAGNOSIS — M47.814 THORACIC SPONDYLOSIS: ICD-10-CM

## 2024-03-04 DIAGNOSIS — G89.4 CHRONIC PAIN SYNDROME: ICD-10-CM

## 2024-03-04 RX ORDER — HYDROCODONE BITARTRATE AND ACETAMINOPHEN 5; 325 MG/1; MG/1
1 TABLET ORAL 2 TIMES DAILY PRN
Qty: 60 TABLET | Refills: 0 | Status: SHIPPED | OUTPATIENT
Start: 2024-03-04 | End: 2024-04-03

## 2024-03-04 NOTE — TELEPHONE ENCOUNTER
OARRS reviewed. UDS: + for Seroquel, Hydrocodone, Amitriptyline, Wellbutrin  Last seen: 2/12/2024. Follow-up:   Future Appointments   Date Time Provider Department Center   3/20/2024 10:00 AM Leena Rosas RCP [facility] MED MGMT [facility] [place]   4/10/2024  8:20 AM Eric Orozco APRN - CNP Fam Med [facility] [place]   4/15/2024  8:15 AM Devon Farfan APRN - CNP [facility] [place]   5/1/2024 10:00 AM Tyrese Steele MD N Pulm Med [facility]

## 2024-03-20 ENCOUNTER — HOSPITAL ENCOUNTER (OUTPATIENT)
Dept: PHARMACY | Age: 69
Setting detail: THERAPIES SERIES
Discharge: HOME | End: 2024-03-20

## 2024-03-20 NOTE — PROGRESS NOTES
Medication Management   [facility]  Smoking Cessation Clinic  [phone] (phone)  [phone] (fax)      Patient called at this time to follow up on smoking cessation, week .        Encounter completed via telephone.      Have you smoked any, even 1 puff, in the last 7 days? No     Baseline PPD: 1  Current PPD: 0  Smoking Reduction Percent from initial baseline: 100%  Quit on 23    Currently using Chantix, finishing up his 12 weeks.     How have you been feeling overall since quitting/cutting back? Feeling good    Withdrawal symptoms?: no      Plan:  Congratulated on his success!      Pt had no questions at this time.   Total time spent on phone with pt:  5 minutes      Next appointment  via phone for 3 mo f/u     Lety Malcolm, PharmD, BCPS, CACP, CTTS       For Pharmacy Admin Tracking Only    Intervention Detail: Adherence Monitorin  Total # of Interventions Recommended: 1  Total # of Interventions Accepted: 1  Time Spent (min): 5

## 2024-04-01 DIAGNOSIS — E11.65 UNCONTROLLED TYPE 2 DIABETES MELLITUS WITH HYPERGLYCEMIA (HCC): ICD-10-CM

## 2024-04-01 DIAGNOSIS — J44.9 CHRONIC OBSTRUCTIVE PULMONARY DISEASE, UNSPECIFIED COPD TYPE (HCC): ICD-10-CM

## 2024-04-01 DIAGNOSIS — E11.9 TYPE 2 DIABETES MELLITUS WITHOUT COMPLICATION, WITHOUT LONG-TERM CURRENT USE OF INSULIN (HCC): ICD-10-CM

## 2024-04-01 DIAGNOSIS — J84.112 IDIOPATHIC DIFFUSE INTERSTITIAL PULMONARY FIBROSIS (HCC): ICD-10-CM

## 2024-04-01 RX ORDER — FLUTICASONE FUROATE, UMECLIDINIUM BROMIDE AND VILANTEROL TRIFENATATE 100; 62.5; 25 UG/1; UG/1; UG/1
1 POWDER RESPIRATORY (INHALATION) DAILY
Qty: 1 EACH | Refills: 5 | Status: SHIPPED | OUTPATIENT
Start: 2024-04-01

## 2024-04-01 RX ORDER — ALBUTEROL SULFATE 2.5 MG/3ML
SOLUTION RESPIRATORY (INHALATION)
Qty: 90 EACH | Refills: 3 | Status: SHIPPED | OUTPATIENT
Start: 2024-04-01

## 2024-04-01 RX ORDER — EMPAGLIFLOZIN 10 MG/1
TABLET, FILM COATED ORAL
Qty: 90 TABLET | Refills: 3 | OUTPATIENT
Start: 2024-04-01

## 2024-04-01 RX ORDER — SITAGLIPTIN 100 MG/1
100 TABLET, FILM COATED ORAL DAILY
Qty: 90 TABLET | Refills: 4 | Status: SHIPPED | OUTPATIENT
Start: 2024-04-01

## 2024-04-01 NOTE — TELEPHONE ENCOUNTER
Recent Visits  Date Type Provider Dept   01/10/24 Office Visit Pam Eric, APRN - CNP [facility]   12/13/23 Office Visit Pam Eric, APRN - CNP [facility]   11/28/23 Office Visit Pam Eric, APRN - CNP [facility]   06/28/23 Office Visit Pam Eric, APRN - CNP [facility]   12/28/22 Office Visit Pam Eric, APRN - CNP [facility]   Showing recent visits within past 540 days with a meds authorizing provider and meeting all other requirements  Future Appointments  Date Type Provider Dept   04/10/24 Appointment Pam Eric, APRN - CNP [facility]   Showing future appointments within next 150 days with a meds authorizing provider and meeting all other requirements

## 2024-04-03 DIAGNOSIS — M47.814 THORACIC SPONDYLOSIS: ICD-10-CM

## 2024-04-03 DIAGNOSIS — M46.1 SACROILIAC INFLAMMATION (HCC): ICD-10-CM

## 2024-04-03 DIAGNOSIS — M47.812 CERVICAL SPONDYLOSIS: ICD-10-CM

## 2024-04-03 DIAGNOSIS — G89.4 CHRONIC PAIN SYNDROME: ICD-10-CM

## 2024-04-03 RX ORDER — HYDROCODONE BITARTRATE AND ACETAMINOPHEN 5; 325 MG/1; MG/1
1 TABLET ORAL 2 TIMES DAILY PRN
Qty: 60 TABLET | Refills: 0 | Status: SHIPPED | OUTPATIENT
Start: 2024-04-04 | End: 2024-05-04

## 2024-04-03 NOTE — TELEPHONE ENCOUNTER
OARRS reviewed. UDS: + for  quetiapine and hydrocodone. Amitriptyline, cotinine, bupropion is present.  Last seen: 2/12/2024. Follow-up: 4/15/24

## 2024-04-10 ENCOUNTER — OFFICE VISIT (OUTPATIENT)
Dept: FAMILY MEDICINE CLINIC | Age: 69
End: 2024-04-10
Payer: MEDICARE

## 2024-04-10 VITALS
HEIGHT: 73 IN | TEMPERATURE: 97.9 F | RESPIRATION RATE: 16 BRPM | WEIGHT: 199 LBS | HEART RATE: 65 BPM | OXYGEN SATURATION: 95 % | SYSTOLIC BLOOD PRESSURE: 132 MMHG | BODY MASS INDEX: 26.37 KG/M2 | DIASTOLIC BLOOD PRESSURE: 72 MMHG

## 2024-04-10 DIAGNOSIS — Z13.29 SCREENING FOR THYROID DISORDER: ICD-10-CM

## 2024-04-10 DIAGNOSIS — E11.9 TYPE 2 DIABETES MELLITUS WITHOUT COMPLICATION, WITHOUT LONG-TERM CURRENT USE OF INSULIN (HCC): ICD-10-CM

## 2024-04-10 DIAGNOSIS — I10 ESSENTIAL HYPERTENSION: ICD-10-CM

## 2024-04-10 DIAGNOSIS — M54.2 CERVICALGIA: ICD-10-CM

## 2024-04-10 DIAGNOSIS — K21.9 GASTROESOPHAGEAL REFLUX DISEASE WITHOUT ESOPHAGITIS: ICD-10-CM

## 2024-04-10 DIAGNOSIS — J44.9 CHRONIC OBSTRUCTIVE PULMONARY DISEASE, UNSPECIFIED COPD TYPE (HCC): ICD-10-CM

## 2024-04-10 DIAGNOSIS — M54.12 CERVICAL RADICULOPATHY: Primary | ICD-10-CM

## 2024-04-10 DIAGNOSIS — G62.9 NEUROPATHY: ICD-10-CM

## 2024-04-10 DIAGNOSIS — I10 PRIMARY HYPERTENSION: ICD-10-CM

## 2024-04-10 DIAGNOSIS — J84.9 INTERSTITIAL LUNG DISEASE (HCC): ICD-10-CM

## 2024-04-10 DIAGNOSIS — G47.00 INSOMNIA, UNSPECIFIED TYPE: ICD-10-CM

## 2024-04-10 DIAGNOSIS — Z99.81 OXYGEN DEPENDENT: ICD-10-CM

## 2024-04-10 LAB
HBA1C MFR BLD: 7.8 % (ref 4.3–5.7)
MICROALB/CREAT RATIO POC: < 30 MG/G
MICROALBUMIN/CREAT UR-RTO: 80 MG/L
POC CREATININE, URINE: 300 MG/DL

## 2024-04-10 PROCEDURE — 3078F DIAST BP <80 MM HG: CPT | Performed by: NURSE PRACTITIONER

## 2024-04-10 PROCEDURE — 3051F HG A1C>EQUAL 7.0%<8.0%: CPT | Performed by: NURSE PRACTITIONER

## 2024-04-10 PROCEDURE — 99214 OFFICE O/P EST MOD 30 MIN: CPT | Performed by: NURSE PRACTITIONER

## 2024-04-10 PROCEDURE — 3075F SYST BP GE 130 - 139MM HG: CPT | Performed by: NURSE PRACTITIONER

## 2024-04-10 PROCEDURE — 1123F ACP DISCUSS/DSCN MKR DOCD: CPT | Performed by: NURSE PRACTITIONER

## 2024-04-10 RX ORDER — AMITRIPTYLINE HYDROCHLORIDE 50 MG/1
50 TABLET, FILM COATED ORAL NIGHTLY
Qty: 90 TABLET | Refills: 4 | Status: SHIPPED | OUTPATIENT
Start: 2024-04-10

## 2024-04-10 RX ORDER — GLUCOSAMINE HCL/CHONDROITIN SU 500-400 MG
CAPSULE ORAL
Qty: 100 STRIP | Refills: 4 | Status: SHIPPED | OUTPATIENT
Start: 2024-04-10

## 2024-04-10 RX ORDER — LISINOPRIL 2.5 MG/1
2.5 TABLET ORAL DAILY
Qty: 90 TABLET | Refills: 3 | Status: SHIPPED | OUTPATIENT
Start: 2024-04-10

## 2024-04-10 ASSESSMENT — ENCOUNTER SYMPTOMS: RESPIRATORY NEGATIVE: 1

## 2024-04-10 NOTE — PROGRESS NOTES
Refill: 3    11. Cervicalgia  -#1    12. Screening for thyroid disorder    - TSH With Reflex Ft4; Future  Pt in agreement with plan    Orders Placed This Encounter    MRI CERVICAL SPINE WO CONTRAST     Standing Status:   Future     Standing Expiration Date:   4/10/2025    XR CERVICAL SPINE (4-5 VIEWS)     Standing Status:   Future     Standing Expiration Date:   4/10/2025     Order Specific Question:   Reason for exam:     Answer:   neck pain, cervicalgia    Lipid Panel     Standing Status:   Future     Standing Expiration Date:   4/10/2025     Order Specific Question:   Is Patient Fasting?/# of Hours     Answer:   10-12 Hrs    TSH With Reflex Ft4     Standing Status:   Future     Standing Expiration Date:   4/10/2025    POCT glycosylated hemoglobin (Hb A1C)     Ordered by an unspecified provider      POCT microalbumin     Ordered by an unspecified provider      EMG     Standing Status:   Future     Standing Expiration Date:   6/9/2024     Order Specific Question:   Which body part?     Answer:   neck    amitriptyline (ELAVIL) 50 MG tablet     Sig: Take 1 tablet by mouth nightly     Dispense:  90 tablet     Refill:  4    lisinopril (PRINIVIL;ZESTRIL) 2.5 MG tablet     Sig: Take 1 tablet by mouth daily     Dispense:  90 tablet     Refill:  3        Return in about 6 months (around 10/10/2024) for a1c and med refill.    Reccommended tobaccocessation options including pharmacologic methods, counseled great than 3 minutesduring this visit:  Yes[]  No  []       Patient given educational materials -see patient instructions.  Discussed use, benefit, and side effects of prescribedmedications.  All patient questions answered.  Pt voiced understanding. Reviewedhealth maintenance.  Instructed to continue current medications, diet and exercise.Patient agreed with treatment plan. Follow up as directed.       Electronicallysigned by GERARDO Chaparro CNP on 4/10/2024 at 8:49 AM

## 2024-04-18 ENCOUNTER — HOSPITAL ENCOUNTER (OUTPATIENT)
Dept: MRI IMAGING | Age: 69
Discharge: HOME | End: 2024-04-18
Payer: MEDICARE

## 2024-04-18 DIAGNOSIS — M54.12 CERVICAL RADICULOPATHY: ICD-10-CM

## 2024-04-18 PROCEDURE — 72141 MRI NECK SPINE W/O DYE: CPT

## 2024-04-19 ENCOUNTER — TELEPHONE (OUTPATIENT)
Dept: FAMILY MEDICINE CLINIC | Age: 69
End: 2024-04-19

## 2024-04-19 NOTE — TELEPHONE ENCOUNTER
Pt informed  and understanding their results with no further questions at this time.     Pt states that he is already in pain management and getting shots in his neck. As been going for 3 years     The told the pt it is just arthritis

## 2024-04-19 NOTE — TELEPHONE ENCOUNTER
----- Message from GERARDO Chaparro - CNP sent at 4/18/2024  5:05 PM EDT -----  Let pt know his MRI f cervical region identifies   1. Bone marrow edema associated with facet degenerative changes on the left at C3-4 level. This is the level above the fusion. This can be a source of pain. There is mild spinal canal stenosis and moderate severity left foraminal stenosis at this level.  2. Moderate severity left foraminal stenosis at C4-5 level.    In light of these findings I recommend PM as his next step, if agreeable I will place the order

## 2024-04-24 ENCOUNTER — HOSPITAL ENCOUNTER (OUTPATIENT)
Dept: PHARMACY | Age: 69
Setting detail: THERAPIES SERIES
Discharge: HOME | End: 2024-04-24

## 2024-04-24 NOTE — PROGRESS NOTES
Medication Management   [facility]  Sm[facility]essation [facility]  [phone] (phone)  [phone] (fax)      Patient called at this time to follow up on smoking cessation.     Encounter completed via telephone.     Have you smoked any, even 1 puff, in the last 7 days? No  If no, pt encouraged to keep up the good work and to use their coping mechanisms to help with the cravings.     Baseline PPD: 1  Current PPD: 0  Smoking Reduction Percent from initial baseline: 100%, quit on 1/23/24.  Pt has been smoke free for 3 months.     Pharmacological Agent used: Chantix - pt states he is almost out of chantix and is just finishing that up.  .    Compliant with NRT and/or med regiment?  Yes  Any side effects or problems? no    How have you been feeling overall since quitting/cutting back? Breathing better and less coughing    Plan:  Pt remains smoke free and I congratulated pt on his success.  I explained to pt that this is the last call of program but he can call us anytime if problems/questions about smoking cessation.     Pt had no questions at this time.   Total time spent on phone with pt:  5 minutes    No charges dropped due to appt done over the phone.

## 2024-04-28 DIAGNOSIS — K21.9 GASTROESOPHAGEAL REFLUX DISEASE WITHOUT ESOPHAGITIS: ICD-10-CM

## 2024-04-28 DIAGNOSIS — Z72.0 TOBACCO ABUSE: ICD-10-CM

## 2024-04-29 ENCOUNTER — HOSPITAL ENCOUNTER (OUTPATIENT)
Dept: GENERAL RADIOLOGY | Age: 69
Discharge: HOME | End: 2024-04-29
Payer: MEDICARE

## 2024-04-29 ENCOUNTER — TELEPHONE (OUTPATIENT)
Dept: FAMILY MEDICINE CLINIC | Age: 69
End: 2024-04-29

## 2024-04-29 ENCOUNTER — HOSPITAL ENCOUNTER (OUTPATIENT)
Age: 69
Discharge: HOME | End: 2024-04-29
Payer: MEDICARE

## 2024-04-29 DIAGNOSIS — E11.9 TYPE 2 DIABETES MELLITUS WITHOUT COMPLICATION, WITHOUT LONG-TERM CURRENT USE OF INSULIN (HCC): ICD-10-CM

## 2024-04-29 DIAGNOSIS — M54.12 CERVICAL RADICULOPATHY: ICD-10-CM

## 2024-04-29 DIAGNOSIS — I10 PRIMARY HYPERTENSION: ICD-10-CM

## 2024-04-29 DIAGNOSIS — E88.01 ALPHA-1-ANTITRYPSIN DEFICIENCY (HCC): ICD-10-CM

## 2024-04-29 DIAGNOSIS — Z13.29 SCREENING FOR THYROID DISORDER: ICD-10-CM

## 2024-04-29 LAB
A1AT SERPL-MCNC: 193 MG/DL (ref 90–200)
CHOLEST SERPL-MCNC: 160 MG/DL (ref 100–199)
HDLC SERPL-MCNC: 52 MG/DL
LDLC SERPL CALC-MCNC: 80 MG/DL
TRIGL SERPL-MCNC: 139 MG/DL (ref 0–199)
TSH SERPL DL<=0.005 MIU/L-ACNC: 2.03 UIU/ML (ref 0.4–4.2)

## 2024-04-29 PROCEDURE — 36415 COLL VENOUS BLD VENIPUNCTURE: CPT

## 2024-04-29 PROCEDURE — 72050 X-RAY EXAM NECK SPINE 4/5VWS: CPT

## 2024-04-29 PROCEDURE — 80061 LIPID PANEL: CPT

## 2024-04-29 PROCEDURE — 82103 ALPHA-1-ANTITRYPSIN TOTAL: CPT

## 2024-04-29 PROCEDURE — 84443 ASSAY THYROID STIM HORMONE: CPT

## 2024-04-29 RX ORDER — PANTOPRAZOLE SODIUM 40 MG/1
TABLET, DELAYED RELEASE ORAL
Qty: 90 TABLET | Refills: 3 | Status: SHIPPED | OUTPATIENT
Start: 2024-04-29

## 2024-04-29 RX ORDER — VARENICLINE TARTRATE 1 MG/1
1 TABLET, FILM COATED ORAL 2 TIMES DAILY
Qty: 60 TABLET | Refills: 3 | Status: SHIPPED | OUTPATIENT
Start: 2024-04-29

## 2024-04-29 NOTE — TELEPHONE ENCOUNTER
Recent Visits  Date Type Provider Dept   04/10/24 Office Visit Orozco, Jan, APRN - CNP [facility]   01/10/24 Office Visit PamEric, APRN - CNP [facility]   12/13/23 Office Visit Orozco, Jan, APRN - CNP [facility]   11/28/23 Office Visit Orozco, Jan, APRN - CNP [facility]   06/28/23 Office Visit PamEric, APRN - CNP [facility]   12/28/22 Office Visit Orozco, Jan, GERARDO - CNP [facility]   Showing recent visits within past 540 days with a meds authorizing provider and meeting all other requirements  Future Appointments  No visits were found meeting these conditions.  Showing future appointments within next 150 days with a meds authorizing provider and meeting all other requirements

## 2024-04-29 NOTE — TELEPHONE ENCOUNTER
----- Message from GERARDO Chaparro - CNP sent at 4/29/2024 12:18 PM EDT -----  Let pt know  lipid panel is stable, continue current meds

## 2024-04-30 ENCOUNTER — TELEPHONE (OUTPATIENT)
Dept: FAMILY MEDICINE CLINIC | Age: 69
End: 2024-04-30

## 2024-04-30 NOTE — TELEPHONE ENCOUNTER
----- Message from GERARDO Chaparro - CNP sent at 4/29/2024  4:58 PM EDT -----  Let pt know TSH is normal

## 2024-05-01 ENCOUNTER — OFFICE VISIT (OUTPATIENT)
Dept: PULMONOLOGY | Age: 69
End: 2024-05-01
Payer: MEDICARE

## 2024-05-01 VITALS
SYSTOLIC BLOOD PRESSURE: 122 MMHG | TEMPERATURE: 98 F | HEART RATE: 73 BPM | OXYGEN SATURATION: 92 % | DIASTOLIC BLOOD PRESSURE: 80 MMHG | BODY MASS INDEX: 27.59 KG/M2 | WEIGHT: 203.4 LBS

## 2024-05-01 DIAGNOSIS — J44.9 CHRONIC OBSTRUCTIVE PULMONARY DISEASE, UNSPECIFIED COPD TYPE (HCC): ICD-10-CM

## 2024-05-01 DIAGNOSIS — Z87.891 PERSONAL HISTORY OF SMOKING: ICD-10-CM

## 2024-05-01 DIAGNOSIS — J84.10 COMBINED PULMONARY FIBROSIS AND EMPHYSEMA (CPFE) (HCC): Primary | ICD-10-CM

## 2024-05-01 DIAGNOSIS — M47.814 THORACIC SPONDYLOSIS: ICD-10-CM

## 2024-05-01 DIAGNOSIS — G89.4 CHRONIC PAIN SYNDROME: ICD-10-CM

## 2024-05-01 DIAGNOSIS — J84.112 IDIOPATHIC DIFFUSE INTERSTITIAL PULMONARY FIBROSIS (HCC): ICD-10-CM

## 2024-05-01 DIAGNOSIS — M47.812 CERVICAL SPONDYLOSIS: ICD-10-CM

## 2024-05-01 DIAGNOSIS — J42 CHRONIC BRONCHITIS, UNSPECIFIED CHRONIC BRONCHITIS TYPE (HCC): ICD-10-CM

## 2024-05-01 DIAGNOSIS — M46.1 SACROILIAC INFLAMMATION (HCC): ICD-10-CM

## 2024-05-01 DIAGNOSIS — J43.9 COMBINED PULMONARY FIBROSIS AND EMPHYSEMA (CPFE) (HCC): Primary | ICD-10-CM

## 2024-05-01 PROCEDURE — 3079F DIAST BP 80-89 MM HG: CPT | Performed by: INTERNAL MEDICINE

## 2024-05-01 PROCEDURE — 99214 OFFICE O/P EST MOD 30 MIN: CPT | Performed by: INTERNAL MEDICINE

## 2024-05-01 PROCEDURE — 1123F ACP DISCUSS/DSCN MKR DOCD: CPT | Performed by: INTERNAL MEDICINE

## 2024-05-01 PROCEDURE — 3074F SYST BP LT 130 MM HG: CPT | Performed by: INTERNAL MEDICINE

## 2024-05-01 RX ORDER — ALBUTEROL SULFATE 90 UG/1
AEROSOL, METERED RESPIRATORY (INHALATION)
Qty: 54 G | Refills: 3 | Status: SHIPPED | OUTPATIENT
Start: 2024-05-01

## 2024-05-01 RX ORDER — ALBUTEROL SULFATE 2.5 MG/3ML
SOLUTION RESPIRATORY (INHALATION)
Qty: 90 EACH | Refills: 3 | Status: SHIPPED | OUTPATIENT
Start: 2024-05-01

## 2024-05-01 RX ORDER — FLUTICASONE FUROATE, UMECLIDINIUM BROMIDE AND VILANTEROL TRIFENATATE 100; 62.5; 25 UG/1; UG/1; UG/1
1 POWDER RESPIRATORY (INHALATION) DAILY
Qty: 1 EACH | Refills: 5 | Status: SHIPPED | OUTPATIENT
Start: 2024-05-01

## 2024-05-01 ASSESSMENT — ENCOUNTER SYMPTOMS
CHEST TIGHTNESS: 1
SHORTNESS OF BREATH: 1
BACK PAIN: 1
VOICE CHANGE: 0
TROUBLE SWALLOWING: 0
ABDOMINAL PAIN: 0
ABDOMINAL DISTENTION: 0
COUGH: 1
WHEEZING: 1

## 2024-05-01 NOTE — PROGRESS NOTES
Neck Circumference -   16  Mallampati - 1    Lung Nodule Screening     [x] Qualifies    [] Does not qualify   [] Declined    [] Completed  
LPM via nasal cannula and titrated to 4 LPM via nasal cannula. At the end of the test Dilan Pappas's oxygen saturation remained at 95% on 4 LPM with exertion. He is mobile in the home and requires oxygen as outlined above. Patient ambulated a total of 1118 feet with oxygen. Resting Dyspnea/Alicia score was 4  and 5  upon completion of the walk. Resting heart rate was  78 bpm and 88 bpm upon completing the walk.    Nasal Oxygen order:  4 lpm to be used with:  Rest: Yes.  Walking: Yes.   Sleep: Yes.   POC flow: No.  Continuous flow: Yes.    DME Medical Necessity Documentation    Dilan was seen in the office on 5/1/2024 for the diagnosis COPD. I am prescribing oxygen because the diagnosis and testing requires the patient to have oxygen in the home. his condition will improve or be benefited by oxygen use. The patient is able to perform good mobility in a home setting and therefore does require the use of a portable oxygen system.          PFTs:          Assessment:       Diagnosis Orders   1. Combined pulmonary fibrosis and emphysema (CPFE) ([facility])  CT CHEST HIGH RESOLUTION    Spirometry With Bronchodilator    71105 - MD Diffusing Capacity      2. Personal history of smoking  CT CHEST HIGH RESOLUTION    Spirometry With Bronchodilator    86314 - MD Diffusing Capacity      3. Chronic bronchitis, unspecified chronic bronchitis type ([facility])  albuterol sulfate HFA (PROVENTIL;VENTOLIN;PROAIR) 108 (90 Base) MCG/ACT inhaler                Plan:       Orders Placed This Encounter   Procedures    CT CHEST HIGH RESOLUTION     Standing Status:   Future     Standing Expiration Date:   5/1/2025     Order Specific Question:   Reason for exam:     Answer:   Emphysema, pulmonary fibrosis    Spirometry With Bronchodilator     Standing Status:   Future     Standing Expiration Date:   5/1/2025    03555 - MD Diffusing Capacity     Standing Status:   Future     Standing Expiration Date:   5/1/2025      Orders Placed This Encounter   Medications

## 2024-05-02 RX ORDER — HYDROCODONE BITARTRATE AND ACETAMINOPHEN 5; 325 MG/1; MG/1
1 TABLET ORAL 2 TIMES DAILY PRN
Qty: 60 TABLET | Refills: 0 | OUTPATIENT
Start: 2024-05-04 | End: 2024-06-03

## 2024-05-02 NOTE — TELEPHONE ENCOUNTER
This patient needs a FU appointment scheduled as he canceled appointment last month. I refused prescription at this time please schedule appointment and then  set up enough medication to get to that follow up appointment

## 2024-05-02 NOTE — TELEPHONE ENCOUNTER
OARRS reviewed. UDS: + for  quetiapine and hydrocodone. Amitriptyline, cotinine, bupropion is present.  Last seen: 2/12/2024. Follow-up: none

## 2024-05-03 RX ORDER — HYDROCODONE BITARTRATE AND ACETAMINOPHEN 5; 325 MG/1; MG/1
1 TABLET ORAL 2 TIMES DAILY PRN
Qty: 14 TABLET | Refills: 0 | Status: SHIPPED | OUTPATIENT
Start: 2024-05-04 | End: 2024-05-11

## 2024-05-03 NOTE — TELEPHONE ENCOUNTER
Called pt. And LVM of denial of norco and needs aptm first then will resend enough script till aptm as per Ash Garcia CNP.

## 2024-05-08 ENCOUNTER — OFFICE VISIT (OUTPATIENT)
Dept: PHYSICAL MEDICINE AND REHAB | Age: 69
End: 2024-05-08
Payer: MEDICARE

## 2024-05-08 ENCOUNTER — TELEPHONE (OUTPATIENT)
Dept: PHYSICAL MEDICINE AND REHAB | Age: 69
End: 2024-05-08

## 2024-05-08 VITALS
HEIGHT: 72 IN | SYSTOLIC BLOOD PRESSURE: 104 MMHG | WEIGHT: 203.48 LBS | BODY MASS INDEX: 27.56 KG/M2 | DIASTOLIC BLOOD PRESSURE: 60 MMHG

## 2024-05-08 DIAGNOSIS — G89.29 CHRONIC BILATERAL LOW BACK PAIN WITHOUT SCIATICA: ICD-10-CM

## 2024-05-08 DIAGNOSIS — M46.1 SACROILIAC INFLAMMATION (HCC): ICD-10-CM

## 2024-05-08 DIAGNOSIS — Z98.1 HISTORY OF LUMBAR FUSION: ICD-10-CM

## 2024-05-08 DIAGNOSIS — M47.816 SPONDYLOSIS OF LUMBAR REGION WITHOUT MYELOPATHY OR RADICULOPATHY: ICD-10-CM

## 2024-05-08 DIAGNOSIS — M54.2 NECK PAIN: ICD-10-CM

## 2024-05-08 DIAGNOSIS — M54.50 CHRONIC BILATERAL LOW BACK PAIN WITHOUT SCIATICA: ICD-10-CM

## 2024-05-08 DIAGNOSIS — G89.4 CHRONIC PAIN SYNDROME: ICD-10-CM

## 2024-05-08 DIAGNOSIS — M47.814 THORACIC SPONDYLOSIS: ICD-10-CM

## 2024-05-08 DIAGNOSIS — M47.812 CERVICAL SPONDYLOSIS: Primary | ICD-10-CM

## 2024-05-08 DIAGNOSIS — M47.812 FACET ARTHROPATHY, CERVICAL: ICD-10-CM

## 2024-05-08 PROCEDURE — 3074F SYST BP LT 130 MM HG: CPT | Performed by: NURSE PRACTITIONER

## 2024-05-08 PROCEDURE — 1123F ACP DISCUSS/DSCN MKR DOCD: CPT | Performed by: NURSE PRACTITIONER

## 2024-05-08 PROCEDURE — 99214 OFFICE O/P EST MOD 30 MIN: CPT | Performed by: NURSE PRACTITIONER

## 2024-05-08 PROCEDURE — 3078F DIAST BP <80 MM HG: CPT | Performed by: NURSE PRACTITIONER

## 2024-05-08 RX ORDER — HYDROCODONE BITARTRATE AND ACETAMINOPHEN 5; 325 MG/1; MG/1
1 TABLET ORAL EVERY 8 HOURS PRN
Qty: 90 TABLET | Refills: 0 | Status: SHIPPED | OUTPATIENT
Start: 2024-05-10 | End: 2024-06-09

## 2024-05-08 ASSESSMENT — ENCOUNTER SYMPTOMS
COUGH: 1
GASTROINTESTINAL NEGATIVE: 1
SHORTNESS OF BREATH: 1
BACK PAIN: 1

## 2024-05-08 NOTE — PROGRESS NOTES
[facility] [place] [facility]  [facility]  [address]  [facility] [address]  Dept: [phone]  Dept Fax: [phone]  Loc: [phone]    Visit Date: 5/8/2024    Functionality Assessment/Goals Worksheet     On a scale of 0 (Does not Interfere) to 10 (Completely Interferes)     1.  Which number describes how during the past week pain has interfered with       the following:  A.  General Activity:  8  B.  Mood: 6  C.  Walking Ability:  7  D.  Normal Work (Includes both work outside the home and housework):  9  E.  Relations with Other People:   6  F.  Sleep:   7  G.  Enjoyment of Life:   8    2.  Patient Prefers to Take their Pain Medications:     []  On a regular basis   [x]  Only when necessary    []  Does not take pain medications    3.  What are the Patient's Goals/Expectations for Visiting Pain Management?     []  Learn about my pain    [x]  Receive Medication   []  Physical Therapy     []  Treat Depression   [x]  Receive Injections    []  Treat Sleep   []  Deal with Anxiety and Stress   []  Treat Opoid Dependence/Addiction   []  Other:        HPI:   Dilan Pappas is a 68 y.o. male is here today for    Chief Complaint: Neck pain     HPI   3 month FU. Patient rescheduled appointment from last month. Radha is here with compliant of pain in posterior neck at base of skull and higher than before and above previous C-facet RFA. Aching and sharp pain with activity and if still is dull. Guadarrama s have pain lower in neck but not as bad higher.   Has some numbness in neck but none in arms no radicular pain.     Is getting associated headaches     Continues Norco prn which helps decrease pain but states is not lasting as much as pain has been a little worse.   Pain increases with bending, lifting, twisting , turning head, walking, standing, getting up and down, and housework or working at job.      Is on 1-3 liters of oxygen per nasal cannula

## 2024-06-05 ENCOUNTER — HOSPITAL ENCOUNTER (OUTPATIENT)
Age: 69
Setting detail: OUTPATIENT SURGERY
Discharge: HOME | End: 2024-06-05
Attending: PAIN MEDICINE | Admitting: PAIN MEDICINE
Payer: MEDICARE

## 2024-06-05 ENCOUNTER — APPOINTMENT (OUTPATIENT)
Dept: GENERAL RADIOLOGY | Age: 69
End: 2024-06-05
Attending: PAIN MEDICINE
Payer: MEDICARE

## 2024-06-05 VITALS
TEMPERATURE: 98.3 F | WEIGHT: 192.2 LBS | BODY MASS INDEX: 25.47 KG/M2 | RESPIRATION RATE: 16 BRPM | HEIGHT: 73 IN | OXYGEN SATURATION: 93 % | DIASTOLIC BLOOD PRESSURE: 92 MMHG | SYSTOLIC BLOOD PRESSURE: 145 MMHG | HEART RATE: 82 BPM

## 2024-06-05 DIAGNOSIS — M47.812 FACET ARTHROPATHY, CERVICAL: ICD-10-CM

## 2024-06-05 DIAGNOSIS — M54.2 NECK PAIN: ICD-10-CM

## 2024-06-05 DIAGNOSIS — M47.814 THORACIC SPONDYLOSIS: ICD-10-CM

## 2024-06-05 DIAGNOSIS — M47.812 CERVICAL SPONDYLOSIS: ICD-10-CM

## 2024-06-05 DIAGNOSIS — M46.1 SACROILIAC INFLAMMATION (HCC): ICD-10-CM

## 2024-06-05 DIAGNOSIS — G89.4 CHRONIC PAIN SYNDROME: ICD-10-CM

## 2024-06-05 LAB — GLUCOSE BLD STRIP.AUTO-MCNC: 195 MG/DL (ref 70–108)

## 2024-06-05 PROCEDURE — 99152 MOD SED SAME PHYS/QHP 5/>YRS: CPT | Performed by: PAIN MEDICINE

## 2024-06-05 PROCEDURE — 3600000055 HC PAIN LEVEL 3 ADDL 15 MIN: Performed by: PAIN MEDICINE

## 2024-06-05 PROCEDURE — 64490 INJ PARAVERT F JNT C/T 1 LEV: CPT | Performed by: PAIN MEDICINE

## 2024-06-05 PROCEDURE — 2709999900 HC NON-CHARGEABLE SUPPLY: Performed by: PAIN MEDICINE

## 2024-06-05 PROCEDURE — 82948 REAGENT STRIP/BLOOD GLUCOSE: CPT

## 2024-06-05 PROCEDURE — 7100000010 HC PHASE II RECOVERY - FIRST 15 MIN: Performed by: PAIN MEDICINE

## 2024-06-05 PROCEDURE — 3600000054 HC PAIN LEVEL 3 BASE: Performed by: PAIN MEDICINE

## 2024-06-05 PROCEDURE — 2500000003 HC RX 250 WO HCPCS: Performed by: PAIN MEDICINE

## 2024-06-05 PROCEDURE — 64491 INJ PARAVERT F JNT C/T 2 LEV: CPT | Performed by: PAIN MEDICINE

## 2024-06-05 PROCEDURE — 6360000002 HC RX W HCPCS: Performed by: PAIN MEDICINE

## 2024-06-05 PROCEDURE — 7100000011 HC PHASE II RECOVERY - ADDTL 15 MIN: Performed by: PAIN MEDICINE

## 2024-06-05 RX ORDER — MIDAZOLAM HYDROCHLORIDE 1 MG/ML
INJECTION INTRAMUSCULAR; INTRAVENOUS PRN
Status: DISCONTINUED | OUTPATIENT
Start: 2024-06-05 | End: 2024-06-05 | Stop reason: ALTCHOICE

## 2024-06-05 RX ORDER — BUPIVACAINE HYDROCHLORIDE 5 MG/ML
INJECTION, SOLUTION PERINEURAL PRN
Status: DISCONTINUED | OUTPATIENT
Start: 2024-06-05 | End: 2024-06-05 | Stop reason: ALTCHOICE

## 2024-06-05 RX ORDER — FENTANYL CITRATE 50 UG/ML
INJECTION, SOLUTION INTRAMUSCULAR; INTRAVENOUS PRN
Status: DISCONTINUED | OUTPATIENT
Start: 2024-06-05 | End: 2024-06-05 | Stop reason: ALTCHOICE

## 2024-06-05 RX ORDER — HYDROCODONE BITARTRATE AND ACETAMINOPHEN 5; 325 MG/1; MG/1
1 TABLET ORAL EVERY 8 HOURS PRN
Qty: 90 TABLET | Refills: 0 | Status: SHIPPED | OUTPATIENT
Start: 2024-06-09 | End: 2024-07-09

## 2024-06-05 RX ORDER — LIDOCAINE HYDROCHLORIDE 20 MG/ML
INJECTION, SOLUTION INFILTRATION; PERINEURAL PRN
Status: DISCONTINUED | OUTPATIENT
Start: 2024-06-05 | End: 2024-06-05 | Stop reason: ALTCHOICE

## 2024-06-05 ASSESSMENT — PAIN - FUNCTIONAL ASSESSMENT
PAIN_FUNCTIONAL_ASSESSMENT: 0-10
PAIN_FUNCTIONAL_ASSESSMENT: 0-10

## 2024-06-05 NOTE — H&P
H&P      Patient  with compliant of pain in posterior neck at base of skull and higher than before and above previous C-facet RFA. Aching and sharp pain with activity and if still is dull. Guadarrama s have pain lower in neck but not as bad higher.   Has some numbness in neck but none in arms no radicular pain.      Is getting associated headaches      Continues Norco prn which helps decrease pain but states is not lasting as much as pain has been a little worse.   Pain increases with bending, lifting, twisting , turning head, walking, standing, getting up and down, and housework or working at job.        Is on 1-3 liters of oxygen per nasal cannula depending on movement. Is wearing a heavy oxygen tank on his back         Prior Injections:  bilateral C-facet RFA from 1/16/2023- 85% relief for 7-8 months      bilateral C-facet RFA @ C-4-5 and C5-6 completed by Dr. Ramirez on 11/21/2023 80% relief of neck pain      Radiology:  Cervical MRI      FINDINGS:     The patient has had a prior anterior cervical fusion and discectomy at the C4-C7 levels. This is a solid osseous fusion.     The cervical vertebral bodies are normally aligned. There is bone marrow edema on the left associated with the facet joint at the C3-4 level. This is the level above the fusion. There are no other sites of bone marrow edema. No suspicious osseous lesions   are present.  The facet joints are normally aligned.     The cervical spinal cord is of normal caliber and signal intensity.  The visualized aspects of the posterior fossa are normal.     On the axial images, at C2-C3, there are left-sided facet degenerative changes. There is no spinal canal stenosis. There is no foraminal stenosis.     At C3-C4, there is bone marrow edema on the left associated with facet degenerative changes. There is a small posterior disc ossify complex. There is mild spinal canal stenosis. There is moderate severity left foraminal stenosis. This is the level above   the

## 2024-06-05 NOTE — PRE SEDATION
[facility]  Pre-Sedation/Analgesia History & Physical    Pt Name: Dilan Pappas  MRN: [id number]  YOB: 1955  Provider Performing Procedure: Damian Ryan MD  Primary Care Physician: Eric Orozco, APRN - CNP    PRE-PROCEDURE   DNR-CCA/DNR-CC : No  Brief History/Pre-Procedure Diagnosis:      Radha is here with compliant of pain in posterior neck at base of skull and higher than before and above previous C-facet RFA. Aching and sharp pain with activity and if still is dull. Guadarrama s have pain lower in neck but not as bad higher.   Has some numbness in neck but none in arms no radicular pain.      Is getting associated headaches      Continues Norco prn which helps decrease pain but states is not lasting as much as pain has been a little worse.   Pain increases with bending, lifting, twisting , turning head, walking, standing, getting up and down, and housework or working at job.        Is on 1-3 liters of oxygen per nasal cannula depending on movement. Is wearing a heavy oxygen tank on his back         Prior Injections:  bilateral C-facet RFA from 1/16/2023- 85% relief for 7-8 months      bilateral C-facet RFA @ C-4-5 and C5-6 completed by Dr. Ramirez on 11/21/2023 80% relief of neck pain      Radiology:  Cervical MRI      FINDINGS:     The patient has had a prior anterior cervical fusion and discectomy at the C4-C7 levels. This is a solid osseous fusion.     The cervical vertebral bodies are normally aligned. There is bone marrow edema on the left associated with the facet joint at the C3-4 level. This is the level above the fusion. There are no other sites of bone marrow edema. No suspicious osseous lesions   are present.  The facet joints are normally aligned.     The cervical spinal cord is of normal caliber and signal intensity.  The visualized aspects of the posterior fossa are normal.     On the axial images, at C2-C3, there are left-sided facet degenerative changes. There is

## 2024-06-05 NOTE — PROGRESS NOTES
1038 Received in Phase 2 . Awake responsive to verbal stimuli.  Airway patent. O2 sat 90% O2 applied at 1L/ nasal cannula  O2 increases to 95% with O2  Injection site clean and dry.  Rates pain left neck #6 on arrival.  1045 Drink and snack given.  1102 Assisted to sit at side of cart to get dressed  1110 Discharged home via private car without complaint WITH HOME O2 connected at 1L/ nasal cannula.

## 2024-06-05 NOTE — OP NOTE
Pre-Procedure Note    Patient Name: Dilan Pappas   YOB: 1955  Medical Record Number: [id number]  Date: 6/5/24    Indication:  Neck pain    Consent: On file.    Vital Signs:   Vitals:    06/05/24 1020   BP: (!) 156/85   Pulse: 78   Resp: 15   Temp:    SpO2: 95%       Past Medical History:   has a past medical history of Abnormal liver enzymes, Alcohol abuse, Allergic rhinitis, Chronic back pain, Colonic polyp, COPD (chronic obstructive pulmonary disease) (HCC), Depression, Diabetes mellitus (HCC), DJD (degenerative joint disease) of cervical spine, DJD (degenerative joint disease) of lumbar spine, GERD (gastroesophageal reflux disease), Hyperlipidemia, Hypertension, Neuropathy, Osteoarthritis, Osteoarthritis, and Smoker.    Past Surgical History:   has a past surgical history that includes back surgery (2008); Inguinal hernia repair (2002); hernia repair; Cholecystectomy; Colonoscopy (6/2012); EKG 12 Lead (4/6/2015); Lumbar spine surgery (2006, 2007, 2008); Cardiac catheterization (3/2015); pr excision hydrocele unilateral (Left, 7/31/2018); Facet joint injection (Bilateral, 7/7/2020); Facet joint injection (Bilateral, 8/6/2020); Pain management procedure (Right, 9/4/2020); Pain management procedure (Left, 10/9/2020); Pain management procedure (Right, 3/25/2021); Pain management procedure (Left, 4/29/2021); US ASP/INJ THYROID CYST (11/4/2021); Facet joint injection (Bilateral, 5/10/2022); Pain management procedure (Bilateral, 1/16/2023); and Pain management procedure (Bilateral, 11/21/2023).    Pre-Sedation Documentation and Exam:   Vital signs have been reviewed (see flow sheet for vitals).     Sedation/ Anesthesia Plan:   IV sedation      Patient is an appropriate candidate for plan of sedation: yes      Preoperative Diagnosis: C-spondylosis    Post-Op Dx: as above    Procedure Performed : Diagnostic / Confirmatory Median Branch Blocks at the levels of C2-3 and C3-4 bilateral under fluoroscopic

## 2024-06-05 NOTE — POST SEDATION
IV sedation was used during the procedure:  - Moderate intravenous conscious sedation was supervised by Dr. Ramirez  - The patient was independently monitored by a Registered Nurse assigned to the procedure room  - Monitoring included automated blood pressure, continuous EKG, and continuous pulse oximetry  - The detailed conscious record is permanently stored in the Hospital Information System  - The following is the conscious sedation record:  Start Time: 1016  End Time : 1038  Duration: 15 minutes   Medications Administered: 1 mg Versed, 100 mcg Fentanyl  Complications: None  EBL-0

## 2024-06-05 NOTE — TELEPHONE ENCOUNTER
OARRS reviewed. UDS: + for  Bupropion, Quetiapine, Amitriptyline, Hydrocodone.   Last seen: 5/8/2024. Follow-up: 6/12/2024

## 2024-06-12 ENCOUNTER — TELEPHONE (OUTPATIENT)
Dept: PHYSICAL MEDICINE AND REHAB | Age: 69
End: 2024-06-12

## 2024-06-12 ENCOUNTER — OFFICE VISIT (OUTPATIENT)
Dept: PHYSICAL MEDICINE AND REHAB | Age: 69
End: 2024-06-12
Payer: MEDICARE

## 2024-06-12 VITALS
BODY MASS INDEX: 25.45 KG/M2 | HEIGHT: 73 IN | SYSTOLIC BLOOD PRESSURE: 128 MMHG | DIASTOLIC BLOOD PRESSURE: 60 MMHG | WEIGHT: 192 LBS

## 2024-06-12 DIAGNOSIS — G89.4 CHRONIC PAIN SYNDROME: ICD-10-CM

## 2024-06-12 DIAGNOSIS — M47.814 THORACIC SPONDYLOSIS: ICD-10-CM

## 2024-06-12 DIAGNOSIS — M47.816 SPONDYLOSIS OF LUMBAR REGION WITHOUT MYELOPATHY OR RADICULOPATHY: ICD-10-CM

## 2024-06-12 DIAGNOSIS — G89.29 CHRONIC BILATERAL LOW BACK PAIN WITHOUT SCIATICA: ICD-10-CM

## 2024-06-12 DIAGNOSIS — M47.812 FACET ARTHROPATHY, CERVICAL: ICD-10-CM

## 2024-06-12 DIAGNOSIS — M54.2 NECK PAIN: ICD-10-CM

## 2024-06-12 DIAGNOSIS — M47.812 CERVICAL SPONDYLOSIS: Primary | ICD-10-CM

## 2024-06-12 DIAGNOSIS — M54.50 CHRONIC BILATERAL LOW BACK PAIN WITHOUT SCIATICA: ICD-10-CM

## 2024-06-12 DIAGNOSIS — Z98.1 HISTORY OF LUMBAR FUSION: ICD-10-CM

## 2024-06-12 PROCEDURE — 1123F ACP DISCUSS/DSCN MKR DOCD: CPT | Performed by: NURSE PRACTITIONER

## 2024-06-12 PROCEDURE — 3074F SYST BP LT 130 MM HG: CPT | Performed by: NURSE PRACTITIONER

## 2024-06-12 PROCEDURE — 99214 OFFICE O/P EST MOD 30 MIN: CPT | Performed by: NURSE PRACTITIONER

## 2024-06-12 PROCEDURE — 3078F DIAST BP <80 MM HG: CPT | Performed by: NURSE PRACTITIONER

## 2024-06-12 ASSESSMENT — ENCOUNTER SYMPTOMS
COUGH: 1
BACK PAIN: 1
GASTROINTESTINAL NEGATIVE: 1
SHORTNESS OF BREATH: 1

## 2024-06-12 NOTE — PROGRESS NOTES
Functionality Assessment/Goals Worksheet     On a scale of 0 (Does not Interfere) to 10 (Completely Interferes)     1.  Which number describes how during the past week pain has interfered with           the following:  A.  General Activity:  7  B.  Mood: 6  C.  Walking Ability:  6  D.  Normal Work (Includes both work outside the home and housework):  8  E.  Relations with Other People:   5  F.  Sleep:   7  G.  Enjoyment of Life:   6    2.  Patient Prefers to Take their Pain Medications:     [x]  On a regular basis   []  Only when necessary    []  Does not take pain medications    3.  What are the Patient's Goals/Expectations for Visiting Pain Management?     [x]  Learn about my pain    [x]  Receive Medication   [x]  Physical Therapy     []  Treat Depression   [x]  Receive Injections    []  Treat Sleep   []  Deal with Anxiety and Stress   []  Treat Opoid Dependence/Addiction   []  Other:       HPI:   Dilan Pappas is a 69 y.o. male is here today for    Chief Complaint: Neck pain     HPI   Follow up from bilateral C-facet MBB # 1 @ C2-3 and C3-4 completed by Dr. Ramirez on 6/5/2024. Dilan reports that he received about 80% relief for about 5 full full days. States he was abl to turn his head better and had relief of headaches as well. Pain returned yesterday back to the level it was prior to procedure in posterior neck base of skull- sharp, aching and tender pain.      Denies any radicular pain   Norco prn continues to help decrease pain to a tolerable level.   Pain increases with bending, lifting, twisting , turning head, walking, getting up and down, and housework or working at job.    Is not wearing his oxygen today but is supposed to be on 1-3 liters.   Prior Injections:  bilateral C-facet RFA from 1/16/2023- 85% relief for 7-8 months      bilateral C-facet RFA @ C-4-5 and C5-6 completed by Dr. Ramirez on 11/21/2023 80% relief of neck pain      bilateral C-facet MBB # 1 @ C2-3 and C3-4 completed by Dr. Ramirez on

## 2024-06-27 DIAGNOSIS — M47.812 CERVICAL SPONDYLOSIS: Primary | ICD-10-CM

## 2024-07-02 NOTE — DISCHARGE INSTRUCTIONS
ANESTHESIA INSTRUCTIONS FOLLOWING SURGERY        Since you may experience some intermittent light-headedness for the next several hours, we suggest you plan on bed rest or quiet relaxation this evening. You must have a friend or relative stay with you tonight.    Because of the sedation you have received, it is recommended that you do not drive a motor vehicle, operate any kind of machinery, or sign any contractual agreement for 24 hours following the procedure.    You should not take alcoholic beverages tonight and only take sleeping medication that has been specifically prescribed for you by your physician.  Call office [phone] if you have:  Temperature greater than 100.4  Persistent nausea and vomiting  Severe uncontrolled pain  Redness, tenderness, or signs of infection (pain, swelling, redness, odor or green/yellow discharge around the site)  Difficulty breathing, headache or visual disturbances  Hives  Persistent dizziness or light-headedness  Extreme fatigue  Any other questions or concerns you may have after discharge    In an emergency, call 911 or go to an Emergency Department at a nearby hospital    It is important to bring a complete, current list of your medications to any medical appointments or hospitalizations.    REMINDER:   Carry a list of your medications and allergies with you at all times  Call your pharmacy at least 1 week in advance to refill prescriptions    Diet: Resume your usual diet. Good nutrition promotes healing. Increase fluid intake.     Activity: Rest for 24 hrs then resume normal activity.    HOME MEDICATIONS:      If on blood thinning products such as; Aspirin, NSAIDS, Plavix, Coumadin, Xarelto, Fish Oil, Multi-Vitamins or Herbal Supplements restart in 24 hours      Restart Metformin in 48 hours if you had procedure with dye.      Restart Metformin in 24 hours if no dye used during procedure.        Education Materials Received: {yes/no:925649}  Belongings Returned:

## 2024-07-10 ENCOUNTER — HOSPITAL ENCOUNTER (OUTPATIENT)
Age: 69
Setting detail: OUTPATIENT SURGERY
Discharge: HOME | End: 2024-07-10
Attending: PAIN MEDICINE | Admitting: PAIN MEDICINE
Payer: MEDICARE

## 2024-07-10 ENCOUNTER — APPOINTMENT (OUTPATIENT)
Dept: GENERAL RADIOLOGY | Age: 69
End: 2024-07-10
Attending: PAIN MEDICINE
Payer: MEDICARE

## 2024-07-10 VITALS
BODY MASS INDEX: 27.43 KG/M2 | HEART RATE: 69 BPM | SYSTOLIC BLOOD PRESSURE: 139 MMHG | RESPIRATION RATE: 16 BRPM | OXYGEN SATURATION: 91 % | WEIGHT: 207 LBS | HEIGHT: 73 IN | TEMPERATURE: 97.9 F | DIASTOLIC BLOOD PRESSURE: 80 MMHG

## 2024-07-10 LAB — GLUCOSE BLD STRIP.AUTO-MCNC: 176 MG/DL (ref 70–108)

## 2024-07-10 PROCEDURE — 3600000057 HC PAIN LEVEL 4 ADDL 15 MIN: Performed by: PAIN MEDICINE

## 2024-07-10 PROCEDURE — 99152 MOD SED SAME PHYS/QHP 5/>YRS: CPT | Performed by: PAIN MEDICINE

## 2024-07-10 PROCEDURE — 7100000011 HC PHASE II RECOVERY - ADDTL 15 MIN: Performed by: PAIN MEDICINE

## 2024-07-10 PROCEDURE — 64490 INJ PARAVERT F JNT C/T 1 LEV: CPT | Performed by: PAIN MEDICINE

## 2024-07-10 PROCEDURE — 64491 INJ PARAVERT F JNT C/T 2 LEV: CPT | Performed by: PAIN MEDICINE

## 2024-07-10 PROCEDURE — 6360000002 HC RX W HCPCS: Performed by: PAIN MEDICINE

## 2024-07-10 PROCEDURE — 7100000010 HC PHASE II RECOVERY - FIRST 15 MIN: Performed by: PAIN MEDICINE

## 2024-07-10 PROCEDURE — 82948 REAGENT STRIP/BLOOD GLUCOSE: CPT

## 2024-07-10 PROCEDURE — 2709999900 HC NON-CHARGEABLE SUPPLY: Performed by: PAIN MEDICINE

## 2024-07-10 PROCEDURE — 3600000056 HC PAIN LEVEL 4 BASE: Performed by: PAIN MEDICINE

## 2024-07-10 PROCEDURE — 2500000003 HC RX 250 WO HCPCS: Performed by: PAIN MEDICINE

## 2024-07-10 RX ORDER — LIDOCAINE HYDROCHLORIDE 20 MG/ML
INJECTION, SOLUTION INFILTRATION; PERINEURAL PRN
Status: DISCONTINUED | OUTPATIENT
Start: 2024-07-10 | End: 2024-07-10 | Stop reason: ALTCHOICE

## 2024-07-10 RX ORDER — MIDAZOLAM HYDROCHLORIDE 1 MG/ML
INJECTION INTRAMUSCULAR; INTRAVENOUS PRN
Status: DISCONTINUED | OUTPATIENT
Start: 2024-07-10 | End: 2024-07-10 | Stop reason: ALTCHOICE

## 2024-07-10 RX ORDER — FENTANYL CITRATE 50 UG/ML
INJECTION, SOLUTION INTRAMUSCULAR; INTRAVENOUS PRN
Status: DISCONTINUED | OUTPATIENT
Start: 2024-07-10 | End: 2024-07-10 | Stop reason: ALTCHOICE

## 2024-07-10 RX ORDER — BUPIVACAINE HYDROCHLORIDE 5 MG/ML
INJECTION, SOLUTION PERINEURAL PRN
Status: DISCONTINUED | OUTPATIENT
Start: 2024-07-10 | End: 2024-07-10 | Stop reason: ALTCHOICE

## 2024-07-10 ASSESSMENT — PAIN - FUNCTIONAL ASSESSMENT
PAIN_FUNCTIONAL_ASSESSMENT: NONE - DENIES PAIN
PAIN_FUNCTIONAL_ASSESSMENT: 0-10

## 2024-07-10 ASSESSMENT — PAIN DESCRIPTION - DESCRIPTORS: DESCRIPTORS: ACHING

## 2024-07-10 NOTE — POST SEDATION
IV sedation was used during the procedure:  - Moderate intravenous conscious sedation was supervised by Dr. Ramirez  - The patient was independently monitored by a Registered Nurse assigned to the procedure room  - Monitoring included automated blood pressure, continuous EKG, and continuous pulse oximetry  - The detailed conscious record is permanently stored in the Hospital Information System  - The following is the conscious sedation record:  Start Time: 0813  End Time : 0829  Duration: 15 minutes   Medications Administered: 1 mg Versed, 50 mcg Fentanyl  Complications:none  EBL-0

## 2024-07-10 NOTE — PROGRESS NOTES
0830-Patient to Phase II via cart. Report received from Megan MYERS. Patient drowsy but responsive.Vitals obtained and stable. Respirations even and unlabored on 2L NC. Patient on Home O2. Patient denies pain, nausea, numbness and tingling. Patient able to move all extremities. No drainage noted at injection sites. Patient instructed to stay in bed. Instructed on call light use.  0835-Patient provided with snack and drink. Denies needs. Call light in reach.  0845-Patient IV removed with no complications. Patient getting dressed at bedside.  Ride notified of pickup.  0855-Patient meets discharge criteria.  Discharged in stable condition with responsible . All belongings given to patient. Patient ambulated to car with assistance from RN. Patient tolerated well.

## 2024-07-10 NOTE — OP NOTE
Pre-Procedure Note    Patient Name: Dilan Pappas   YOB: 1955  Medical Record Number: [id number]  Date: 7/10/24    Indication:  Neck pain    Consent: On file.    Vital Signs:   Vitals:    07/10/24 0728   BP: 137/78   Pulse: 65   Resp: 16   Temp: 97.1 °F (36.2 °C)   SpO2: 92%       Past Medical History:   has a past medical history of Abnormal liver enzymes, Alcohol abuse, Allergic rhinitis, Chronic back pain, Colonic polyp, COPD (chronic obstructive pulmonary disease) (HCC), Depression, Diabetes mellitus (HCC), DJD (degenerative joint disease) of cervical spine, DJD (degenerative joint disease) of lumbar spine, GERD (gastroesophageal reflux disease), Hyperlipidemia, Hypertension, Neuropathy, Osteoarthritis, Osteoarthritis, and Smoker.    Past Surgical History:   has a past surgical history that includes back surgery (2008); Inguinal hernia repair (2002); hernia repair; Cholecystectomy; Colonoscopy (6/2012); EKG 12 Lead (4/6/2015); Lumbar spine surgery (2006, 2007, 2008); Cardiac catheterization (3/2015); pr excision hydrocele unilateral (Left, 7/31/2018); Facet joint injection (Bilateral, 7/7/2020); Facet joint injection (Bilateral, 8/6/2020); Pain management procedure (Right, 9/4/2020); Pain management procedure (Left, 10/9/2020); Pain management procedure (Right, 3/25/2021); Pain management procedure (Left, 4/29/2021); US ASP/INJ THYROID CYST (11/4/2021); Facet joint injection (Bilateral, 5/10/2022); Pain management procedure (Bilateral, 1/16/2023); Pain management procedure (Bilateral, 11/21/2023); and Facet joint injection (N/A, 6/5/2024).    Pre-Sedation Documentation and Exam:   Vital signs have been reviewed (see flow sheet for vitals).     Sedation/ Anesthesia Plan:   IV sedation      Patient is an appropriate candidate for plan of sedation: yes      Preoperative Diagnosis: C-spondylosis     Post-Op Dx: as above     Procedure Performed : Diagnostic / Confirmatory Median Branch Blocks at the

## 2024-07-10 NOTE — H&P
H&P      Patient had  bilateral C-facet MBB # 1 @ C2-3 and C3-4 completed  on 6/5/2024. Dilan reports that he received about 80% relief for about 5 full full days. States he was abl to turn his head better and had relief of headaches as well. Pain returned yesterday back to the level it was prior to procedure in posterior neck base of skull- sharp, aching and tender pain.       Denies any radicular pain   Norco prn continues to help decrease pain to a tolerable level.   Pain increases with bending, lifting, twisting , turning head, walking, getting up and down, and housework or working at job.     Is not wearing his oxygen today but is supposed to be on 1-3 liters.   Prior Injections:  bilateral C-facet RFA from 1/16/2023- 85% relief for 7-8 months      bilateral C-facet RFA @ C-4-5 and C5-6 completed by Dr. Ramirez on 11/21/2023 80% relief of neck pain       bilateral C-facet MBB # 1 @ C2-3 and C3-4 completed by Dr. Ramirez on 6/5/2024 80% relief form 5 days   Radiology:  Cervical MRI      FINDINGS:     The patient has had a prior anterior cervical fusion and discectomy at the C4-C7 levels. This is a solid osseous fusion.     The cervical vertebral bodies are normally aligned. There is bone marrow edema on the left associated with the facet joint at the C3-4 level. This is the level above the fusion. There are no other sites of bone marrow edema. No suspicious osseous lesions   are present.  The facet joints are normally aligned.     The cervical spinal cord is of normal caliber and signal intensity.  The visualized aspects of the posterior fossa are normal.     On the axial images, at C2-C3, there are left-sided facet degenerative changes. There is no spinal canal stenosis. There is no foraminal stenosis.     At C3-C4, there is bone marrow edema on the left associated with facet degenerative changes. There is a small posterior disc ossify complex. There is mild spinal canal stenosis. There is moderate severity left

## 2024-07-10 NOTE — PRE SEDATION
[facility]– [place]  Pre-Sedation/Analgesia History & Physical    Pt Name: Dilan Pappas  MRN: [id number]  YOB: 1955  Provider Performing Procedure: Damian Ryan MD  Primary Care Physician: Eric Orozco, APRN - CNP    PRE-PROCEDURE   DNR-CCA/DNR-CC : No  Brief History/Pre-Procedure Diagnosis:      bilateral C-facet MBB # 1 @ C2-3 and C3-4 completed by Dr. Ramirez on 6/5/2024. Dilan reports that he received about 80% relief for about 5 full full days. States he was abl to turn his head better and had relief of headaches as well. Pain returned yesterday back to the level it was prior to procedure in posterior neck base of skull- sharp, aching and tender pain.       Denies any radicular pain   Norco prn continues to help decrease pain to a tolerable level.   Pain increases with bending, lifting, twisting , turning head, walking, getting up and down, and housework or working at job.     Is not wearing his oxygen today but is supposed to be on 1-3 liters.   Prior Injections:  bilateral C-facet RFA from 1/16/2023- 85% relief for 7-8 months      bilateral C-facet RFA @ C-4-5 and C5-6 completed by Dr. Ramirez on 11/21/2023 80% relief of neck pain       bilateral C-facet MBB # 1 @ C2-3 and C3-4 completed by Dr. Ramirez on 6/5/2024 80% relief form 5 days   Radiology:  Cervical MRI      FINDINGS:     The patient has had a prior anterior cervical fusion and discectomy at the C4-C7 levels. This is a solid osseous fusion.     The cervical vertebral bodies are normally aligned. There is bone marrow edema on the left associated with the facet joint at the C3-4 level. This is the level above the fusion. There are no other sites of bone marrow edema. No suspicious osseous lesions   are present.  The facet joints are normally aligned.     The cervical spinal cord is of normal caliber and signal intensity.  The visualized aspects of the posterior fossa are normal.     On the axial images, at C2-C3, there

## 2024-07-16 DIAGNOSIS — E11.9 TYPE 2 DIABETES MELLITUS WITHOUT COMPLICATION, WITHOUT LONG-TERM CURRENT USE OF INSULIN (HCC): ICD-10-CM

## 2024-07-16 RX ORDER — LANCETS 33 GAUGE
EACH MISCELLANEOUS
Qty: 100 EACH | Refills: 3 | Status: SHIPPED | OUTPATIENT
Start: 2024-07-16

## 2024-07-16 NOTE — TELEPHONE ENCOUNTER
Recent Visits  Date Type Provider Dept   04/10/24 Office Visit PamEric, APRN - CNP [facility]   01/10/24 Office Visit Pam Eric, APRN - CNP [facility]   12/13/23 Office Visit Pam Eric, APRN - CNP [facility]   11/28/23 Office Visit Pam Eric, APRN - CNP [facility]   06/28/23 Office Visit Pam Eric, APRN - CNP [facility]   Showing recent visits within past 540 days with a meds authorizing provider and meeting all other requirements  Future Appointments  Date Type Provider Dept   10/10/24 Appointment Pam Eric, APRN - CNP [facility]   Showing future appointments within next 150 days with a meds authorizing provider and meeting all other requirements

## 2024-07-24 ENCOUNTER — OFFICE VISIT (OUTPATIENT)
Dept: PHYSICAL MEDICINE AND REHAB | Age: 69
End: 2024-07-24
Payer: MEDICARE

## 2024-07-24 ENCOUNTER — TELEPHONE (OUTPATIENT)
Dept: PHYSICAL MEDICINE AND REHAB | Age: 69
End: 2024-07-24

## 2024-07-24 VITALS
BODY MASS INDEX: 27.43 KG/M2 | DIASTOLIC BLOOD PRESSURE: 68 MMHG | WEIGHT: 207 LBS | HEIGHT: 73 IN | SYSTOLIC BLOOD PRESSURE: 122 MMHG

## 2024-07-24 DIAGNOSIS — M47.812 CERVICAL SPONDYLOSIS: Primary | ICD-10-CM

## 2024-07-24 DIAGNOSIS — M54.2 NECK PAIN: ICD-10-CM

## 2024-07-24 DIAGNOSIS — M54.50 CHRONIC BILATERAL LOW BACK PAIN WITHOUT SCIATICA: ICD-10-CM

## 2024-07-24 DIAGNOSIS — M47.816 SPONDYLOSIS OF LUMBAR REGION WITHOUT MYELOPATHY OR RADICULOPATHY: ICD-10-CM

## 2024-07-24 DIAGNOSIS — G89.29 CHRONIC BILATERAL LOW BACK PAIN WITHOUT SCIATICA: ICD-10-CM

## 2024-07-24 DIAGNOSIS — Z98.1 HISTORY OF LUMBAR FUSION: ICD-10-CM

## 2024-07-24 DIAGNOSIS — G89.4 CHRONIC PAIN SYNDROME: ICD-10-CM

## 2024-07-24 DIAGNOSIS — M47.814 THORACIC SPONDYLOSIS: ICD-10-CM

## 2024-07-24 DIAGNOSIS — M47.812 FACET ARTHROPATHY, CERVICAL: ICD-10-CM

## 2024-07-24 PROCEDURE — 99214 OFFICE O/P EST MOD 30 MIN: CPT | Performed by: NURSE PRACTITIONER

## 2024-07-24 PROCEDURE — 3074F SYST BP LT 130 MM HG: CPT | Performed by: NURSE PRACTITIONER

## 2024-07-24 PROCEDURE — 3078F DIAST BP <80 MM HG: CPT | Performed by: NURSE PRACTITIONER

## 2024-07-24 PROCEDURE — 1123F ACP DISCUSS/DSCN MKR DOCD: CPT | Performed by: NURSE PRACTITIONER

## 2024-07-24 ASSESSMENT — ENCOUNTER SYMPTOMS
GASTROINTESTINAL NEGATIVE: 1
SHORTNESS OF BREATH: 1
COUGH: 1
BACK PAIN: 1

## 2024-07-24 NOTE — PROGRESS NOTES
Functionality Assessment/Goals Worksheet     On a scale of 0 (Does not Interfere) to 10 (Completely Interferes)     1.  Which number describes how during the past week pain has interfered with           the following:  A.  General Activity:  8  B.  Mood: 7  C.  Walking Ability:  7  D.  Normal Work (Includes both work outside the home and housework):  9  E.  Relations with Other People:   6  F.  Sleep:   7  G.  Enjoyment of Life:   6    2.  Patient Prefers to Take their Pain Medications:     [x]  On a regular basis   []  Only when necessary    []  Does not take pain medications    3.  What are the Patient's Goals/Expectations for Visiting Pain Management?     [x]  Learn about my pain    [x]  Receive Medication   [x]  Physical Therapy     []  Treat Depression   []  Receive Injections    []  Treat Sleep   []  Deal with Anxiety and Stress   []  Treat Opoid Dependence/Addiction   []  Other:       HPI:   Dilan Pappas is a 69 y.o. male is here today for    Chief Complaint: Neck pain     HPI   Follow up from bilateral C-facet MBB # 2 @ C2-3 and C3-4 completed by Dr. Ramirez on 7/10/2024. Patient states that he received 80% relief of neck pain and headaches for 2 full days from this procedure. States pain was tolerable and minimal for those 2 days \"I could not even feel the pain\". Pain has gradually returned back to the level it was prior to procedure.. Pain is in posterior neck at base of skull- constant aching dull pain. Pain will radiate into back of head and up into top of head. Has pain in lower neck across shoulder blades but worse at the top.     Denies any numbness or tingling     States pain has been elevated being out of [place]. States taking Ibuprofen prn, lidocaine ointment    Pain increases with bending, lifting, twisting , turning head, walking, standing, and housework or working at job.    Remains on oxygen at this time states 3 liters. Gets short of breath with exertion.         Prior Injections:  bilateral

## 2024-07-27 DIAGNOSIS — Z72.0 TOBACCO ABUSE: ICD-10-CM

## 2024-07-29 RX ORDER — VARENICLINE TARTRATE 1 MG/1
1 TABLET, FILM COATED ORAL 2 TIMES DAILY
Qty: 60 TABLET | Refills: 3 | Status: SHIPPED | OUTPATIENT
Start: 2024-07-29

## 2024-08-01 ENCOUNTER — HOSPITAL ENCOUNTER (OUTPATIENT)
Dept: CT IMAGING | Age: 69
Discharge: HOME | End: 2024-08-01
Attending: INTERNAL MEDICINE
Payer: MEDICARE

## 2024-08-01 ENCOUNTER — HOSPITAL ENCOUNTER (OUTPATIENT)
Dept: PULMONOLOGY | Age: 69
Discharge: HOME | End: 2024-08-01
Attending: INTERNAL MEDICINE
Payer: MEDICARE

## 2024-08-01 DIAGNOSIS — J84.10 COMBINED PULMONARY FIBROSIS AND EMPHYSEMA (CPFE) (HCC): ICD-10-CM

## 2024-08-01 DIAGNOSIS — Z87.891 PERSONAL HISTORY OF SMOKING: ICD-10-CM

## 2024-08-01 DIAGNOSIS — J43.9 COMBINED PULMONARY FIBROSIS AND EMPHYSEMA (CPFE) (HCC): ICD-10-CM

## 2024-08-01 PROCEDURE — 71250 CT THORAX DX C-: CPT

## 2024-08-01 PROCEDURE — 94060 EVALUATION OF WHEEZING: CPT

## 2024-08-09 NOTE — PROGRESS NOTES
[facility]    Patient: WAYLON CRAWFORD, 69 y.o.   : 1955      Patient of Dr. Steele     Assessment/Plan   1. Combined pulmonary fibrosis and emphysema (CPFE) ([facility])  -Repeat HRCT and Spirometry with diffusion in 1 year to continue to monitor for progression of lung fibrosis. At this time fibrosis appears stable.   -Continue supplemental oxygen 1 lpm at rest and 3 lpm with exertion  -Continue albuterol sulfate HFA (PROVENTIL;VENTOLIN;PROAIR) 108 (90 Base) MCG/ACT inhaler; 2 inh four times a day as needed for SOB/wheezing  Dispense: 54 g; Refill: 5  -Continue fluticasone-umeclidin-vilant (TRELEGY ELLIPTA) 100-62.5-25 MCG/ACT AEPB inhaler; Inhale 1 puff into the lungs daily  Dispense: 3 each; Refill: 3  - CT CHEST HIGH RESOLUTION; Future  - Spirometry W/ Diffusion Capacity; Future    2. Stage 1 mild COPD by GOLD classification ([facility])  -Stable, treat and monitor as noted above  - albuterol sulfate HFA (PROVENTIL;VENTOLIN;PROAIR) 108 (90 Base) MCG/ACT inhaler; 2 inh four times a day as needed for SOB/wheezing  Dispense: 54 g; Refill: 5  - fluticasone-umeclidin-vilant (TRELEGY ELLIPTA) 100-62.5-25 MCG/ACT AEPB inhaler; Inhale 1 puff into the lungs daily  Dispense: 3 each; Refill: 3    3. Bronchiectasis without complication ([facility])  -Begin guaiFENesin (MUCINEX) 600 MG extended release tablet; Take 1 tablet by mouth 2 times daily  Dispense: 60 tablet; Refill: 11  -Begin sodium chloride, Inhalant, 3 % nebulizer solution; Take 4 mLs by nebulization as needed for Other (Use every 4 hours as needed for thick sputum)  Dispense: 120 mL; Refill: 5    4. Former smoker  -complete one more HRCT if stable fibrosis will resume annual lung screenings for cancer       Advised patient to call office with any changes, questions, or concerns regarding respiratory status or issues with prescribed medications    Return in about 6 months (around 2025) for COPD med check.     Subjective

## 2024-08-12 ENCOUNTER — OFFICE VISIT (OUTPATIENT)
Dept: PULMONOLOGY | Age: 69
End: 2024-08-12
Payer: MEDICARE

## 2024-08-12 VITALS
WEIGHT: 198.4 LBS | DIASTOLIC BLOOD PRESSURE: 60 MMHG | SYSTOLIC BLOOD PRESSURE: 132 MMHG | HEIGHT: 73 IN | HEART RATE: 74 BPM | OXYGEN SATURATION: 96 % | TEMPERATURE: 98 F | BODY MASS INDEX: 26.29 KG/M2

## 2024-08-12 DIAGNOSIS — J47.9 BRONCHIECTASIS WITHOUT COMPLICATION (HCC): ICD-10-CM

## 2024-08-12 DIAGNOSIS — J84.10 COMBINED PULMONARY FIBROSIS AND EMPHYSEMA (CPFE) (HCC): Primary | ICD-10-CM

## 2024-08-12 DIAGNOSIS — Z87.891 FORMER SMOKER: ICD-10-CM

## 2024-08-12 DIAGNOSIS — J43.9 COMBINED PULMONARY FIBROSIS AND EMPHYSEMA (CPFE) (HCC): Primary | ICD-10-CM

## 2024-08-12 DIAGNOSIS — J44.9 STAGE 1 MILD COPD BY GOLD CLASSIFICATION (HCC): ICD-10-CM

## 2024-08-12 PROCEDURE — 3075F SYST BP GE 130 - 139MM HG: CPT

## 2024-08-12 PROCEDURE — 99214 OFFICE O/P EST MOD 30 MIN: CPT

## 2024-08-12 PROCEDURE — 3078F DIAST BP <80 MM HG: CPT

## 2024-08-12 PROCEDURE — 1123F ACP DISCUSS/DSCN MKR DOCD: CPT

## 2024-08-12 RX ORDER — SODIUM CHLORIDE FOR INHALATION 3 %
4 VIAL, NEBULIZER (ML) INHALATION PRN
Qty: 120 ML | Refills: 5 | Status: SHIPPED | OUTPATIENT
Start: 2024-08-12

## 2024-08-12 RX ORDER — GUAIFENESIN 600 MG/1
600 TABLET, EXTENDED RELEASE ORAL 2 TIMES DAILY
Qty: 60 TABLET | Refills: 11 | Status: SHIPPED | OUTPATIENT
Start: 2024-08-12 | End: 2025-08-12

## 2024-08-12 RX ORDER — ALBUTEROL SULFATE 90 UG/1
AEROSOL, METERED RESPIRATORY (INHALATION)
Qty: 54 G | Refills: 5 | Status: SHIPPED | OUTPATIENT
Start: 2024-08-12

## 2024-08-12 RX ORDER — FLUTICASONE FUROATE, UMECLIDINIUM BROMIDE AND VILANTEROL TRIFENATATE 100; 62.5; 25 UG/1; UG/1; UG/1
1 POWDER RESPIRATORY (INHALATION) DAILY
Qty: 3 EACH | Refills: 3 | Status: SHIPPED | OUTPATIENT
Start: 2024-08-12

## 2024-08-12 ASSESSMENT — ENCOUNTER SYMPTOMS
WHEEZING: 0
CHEST TIGHTNESS: 0
SINUS PAIN: 0
COUGH: 1
RHINORRHEA: 0
SHORTNESS OF BREATH: 1
SINUS PRESSURE: 0

## 2024-08-19 ENCOUNTER — HOSPITAL ENCOUNTER (OUTPATIENT)
Age: 69
Setting detail: OUTPATIENT SURGERY
Discharge: HOME | End: 2024-08-19
Attending: PAIN MEDICINE | Admitting: PAIN MEDICINE
Payer: MEDICARE

## 2024-08-19 ENCOUNTER — APPOINTMENT (OUTPATIENT)
Dept: GENERAL RADIOLOGY | Age: 69
End: 2024-08-19
Attending: PAIN MEDICINE
Payer: MEDICARE

## 2024-08-19 VITALS
WEIGHT: 196 LBS | HEIGHT: 73 IN | RESPIRATION RATE: 16 BRPM | OXYGEN SATURATION: 94 % | DIASTOLIC BLOOD PRESSURE: 70 MMHG | HEART RATE: 84 BPM | BODY MASS INDEX: 25.98 KG/M2 | TEMPERATURE: 98.5 F | SYSTOLIC BLOOD PRESSURE: 135 MMHG

## 2024-08-19 LAB — GLUCOSE BLD STRIP.AUTO-MCNC: 157 MG/DL (ref 70–108)

## 2024-08-19 PROCEDURE — 2709999900 HC NON-CHARGEABLE SUPPLY: Performed by: PAIN MEDICINE

## 2024-08-19 PROCEDURE — 2500000003 HC RX 250 WO HCPCS: Performed by: PAIN MEDICINE

## 2024-08-19 PROCEDURE — 7100000011 HC PHASE II RECOVERY - ADDTL 15 MIN: Performed by: PAIN MEDICINE

## 2024-08-19 PROCEDURE — 99152 MOD SED SAME PHYS/QHP 5/>YRS: CPT | Performed by: PAIN MEDICINE

## 2024-08-19 PROCEDURE — 3600000057 HC PAIN LEVEL 4 ADDL 15 MIN: Performed by: PAIN MEDICINE

## 2024-08-19 PROCEDURE — 6360000002 HC RX W HCPCS: Performed by: PAIN MEDICINE

## 2024-08-19 PROCEDURE — 64634 DESTROY C/TH FACET JNT ADDL: CPT | Performed by: PAIN MEDICINE

## 2024-08-19 PROCEDURE — 64633 DESTROY CERV/THOR FACET JNT: CPT | Performed by: PAIN MEDICINE

## 2024-08-19 PROCEDURE — 7100000010 HC PHASE II RECOVERY - FIRST 15 MIN: Performed by: PAIN MEDICINE

## 2024-08-19 PROCEDURE — 99153 MOD SED SAME PHYS/QHP EA: CPT | Performed by: PAIN MEDICINE

## 2024-08-19 PROCEDURE — 3600000056 HC PAIN LEVEL 4 BASE: Performed by: PAIN MEDICINE

## 2024-08-19 PROCEDURE — 82948 REAGENT STRIP/BLOOD GLUCOSE: CPT

## 2024-08-19 RX ORDER — MIDAZOLAM HYDROCHLORIDE 1 MG/ML
INJECTION INTRAMUSCULAR; INTRAVENOUS PRN
Status: DISCONTINUED | OUTPATIENT
Start: 2024-08-19 | End: 2024-08-19 | Stop reason: ALTCHOICE

## 2024-08-19 RX ORDER — BUPIVACAINE HYDROCHLORIDE 5 MG/ML
INJECTION, SOLUTION PERINEURAL PRN
Status: DISCONTINUED | OUTPATIENT
Start: 2024-08-19 | End: 2024-08-19 | Stop reason: ALTCHOICE

## 2024-08-19 RX ORDER — FENTANYL CITRATE 50 UG/ML
INJECTION, SOLUTION INTRAMUSCULAR; INTRAVENOUS PRN
Status: DISCONTINUED | OUTPATIENT
Start: 2024-08-19 | End: 2024-08-19 | Stop reason: ALTCHOICE

## 2024-08-19 RX ORDER — LIDOCAINE HYDROCHLORIDE 20 MG/ML
INJECTION, SOLUTION INFILTRATION; PERINEURAL PRN
Status: DISCONTINUED | OUTPATIENT
Start: 2024-08-19 | End: 2024-08-19 | Stop reason: ALTCHOICE

## 2024-08-19 ASSESSMENT — PAIN - FUNCTIONAL ASSESSMENT
PAIN_FUNCTIONAL_ASSESSMENT: 0-10
PAIN_FUNCTIONAL_ASSESSMENT: NONE - DENIES PAIN

## 2024-08-19 NOTE — H&P
H&P    Patient had  bilateral C-facet MBB # 2 @ C2-3 and C3-4 completed by Dr. Ramirez on 7/10/2024. Patient states that he received 80% relief of neck pain and headaches for 2 full days from this procedure. States pain was tolerable and minimal for those 2 days \"I could not even feel the pain\". Pain has gradually returned back to the level it was prior to procedure.. Pain is in posterior neck at base of skull- constant aching dull pain. Pain will radiate into back of head and up into top of head. Has pain in lower neck across shoulder blades but worse at the top.      Denies any numbness or tingling      States pain has been elevated being out of [place]. States taking Ibuprofen prn, lidocaine ointment    Pain increases with bending, lifting, twisting , turning head, walking, standing, and housework or working at job.     Remains on oxygen at this time states 3 liters. Gets short of breath with exertion.            Prior Injections:  bilateral C-facet RFA from 1/16/2023- 85% relief for 7-8 months      bilateral C-facet RFA @ C-4-5 and C5-6 completed by Dr. Ramirez on 11/21/2023 80% relief of neck pain      bilateral C-facet MBB # 1 @ C2-3 and C3-4 completed by Dr. Ramirez on 6/5/2024 80% relief form 5 days      bilateral C-facet MBB # 2 @ C2-3 and C3-4 completed by Dr. Ramirez on 7/10/2024  received 80% relief of neck pain and headaches for 2 full days     Radiology:  Cervical MRI      FINDINGS:     The patient has had a prior anterior cervical fusion and discectomy at the C4-C7 levels. This is a solid osseous fusion.     The cervical vertebral bodies are normally aligned. There is bone marrow edema on the left associated with the facet joint at the C3-4 level. This is the level above the fusion. There are no other sites of bone marrow edema. No suspicious osseous lesions   are present.  The facet joints are normally aligned.     The cervical spinal cord is of normal caliber and signal intensity.  The visualized aspects

## 2024-08-19 NOTE — PRE SEDATION
ear normal.      Nose: Nose normal.      Mouth/Throat:      Mouth: Mucous membranes are moist.      Pharynx: No oropharyngeal exudate.   Eyes:      General: No scleral icterus.        Right eye: No discharge.         Left eye: No discharge.      Conjunctiva/sclera: Conjunctivae normal.      Pupils: Pupils are equal, round, and reactive to light.   Neck:      Thyroid: No thyromegaly.   Cardiovascular:      Rate and Rhythm: Normal rate and regular rhythm.      Pulses: Normal pulses.      Heart sounds: Normal heart sounds. No murmur heard.     No friction rub. No gallop.   Pulmonary:      Effort: Pulmonary effort is normal. No respiratory distress.      Breath sounds: No wheezing or rales.      Comments: Is on 3 liters of oxygen per nasal cannula   Chest:      Chest wall: No tenderness.   Abdominal:      General: Bowel sounds are normal. There is no distension.      Palpations: Abdomen is soft.      Tenderness: There is no abdominal tenderness. There is no guarding or rebound.   Musculoskeletal:         General: Tenderness present. No swelling.      Right shoulder: Tenderness present. Decreased range of motion.      Left shoulder: Tenderness present. Decreased range of motion.      Cervical back: Rigidity, spasms, tenderness and bony tenderness present. No swelling, edema or erythema. Pain with movement and muscular tenderness present. Decreased range of motion.      Thoracic back: Tenderness present. No spasms.      Lumbar back: Tenderness and bony tenderness present. Normal range of motion. Negative right straight leg raise test and negative left straight leg raise test.        Back:       Right hip: No tenderness.      Left hip: No tenderness.      Right knee: Normal range of motion. No tenderness.      Left knee: Normal range of motion. No tenderness.      Right lower leg: No swelling. No edema.      Left lower leg: No swelling. No edema.      Right ankle: No swelling.      Left ankle: No swelling.      Right foot:

## 2024-08-19 NOTE — OP NOTE
Pre-Procedure Note    Patient Name: Dilan Pappas   YOB: 1955  Medical Record Number: [id number]  Date: 8/19/24     Indication:  Neck pain    Consent: On file.    Vital Signs:   Vitals:    08/19/24 0948   BP: 133/82   Pulse: 80   Resp: 20   Temp:    SpO2: 98%       Past Medical History:   has a past medical history of Abnormal liver enzymes, Alcohol abuse, Allergic rhinitis, Chronic back pain, Colonic polyp, COPD (chronic obstructive pulmonary disease) (HCC), Depression, Diabetes mellitus (HCC), DJD (degenerative joint disease) of cervical spine, DJD (degenerative joint disease) of lumbar spine, GERD (gastroesophageal reflux disease), Hyperlipidemia, Hypertension, Neuropathy, Osteoarthritis, Osteoarthritis, and Smoker.    Past Surgical History:   has a past surgical history that includes back surgery (2008); Inguinal hernia repair (2002); hernia repair; Cholecystectomy; Colonoscopy (6/2012); EKG 12 Lead (4/6/2015); Lumbar spine surgery (2006, 2007, 2008); Cardiac catheterization (3/2015); pr excision hydrocele unilateral (Left, 7/31/2018); Facet joint injection (Bilateral, 7/7/2020); Facet joint injection (Bilateral, 8/6/2020); Pain management procedure (Right, 9/4/2020); Pain management procedure (Left, 10/9/2020); Pain management procedure (Right, 3/25/2021); Pain management procedure (Left, 4/29/2021); US ASP/INJ THYROID CYST (11/4/2021); Facet joint injection (Bilateral, 5/10/2022); Pain management procedure (Bilateral, 1/16/2023); Pain management procedure (Bilateral, 11/21/2023); Facet joint injection (N/A, 6/5/2024); and Facet joint injection (Bilateral, 7/10/2024).    Pre-Sedation Documentation and Exam:   Vital signs have been reviewed (see flow sheet for vitals).     Sedation/ Anesthesia :  IV sedation.    Patient is an appropriate candidate for plan of sedation: yes      Preoperative Diagnosis:  C-spondylosis    Post-Op Dx: as above    Procedure Performed:  Radiofrequency abalation of median

## 2024-08-19 NOTE — POST SEDATION
IV sedation was used during the procedure:  - Moderate intravenous conscious sedation was supervised by Dr. Ramirez  - The patient was independently monitored by a Registered Nurse assigned to the procedure room  - Monitoring included automated blood pressure, continuous EKG, and continuous pulse oximetry  - The detailed conscious record is permanently stored in the Hospital Information System  - The following is the conscious sedation record:  Start Time: 0940  End Time : 1010  Duration: 15 minutes   Medications Administered: 2 mg Versed, 100 mcg Fentanyl  Complications: none  EBL-0

## 2024-08-19 NOTE — PROGRESS NOTES
1011-Patient to Phase II via cart. Report received from Tati MYERS. Patient drowsy but responsive.Vitals obtained and stable. Respirations even and unlabored on 3 LNC. (Home O2.) Patient denies pain, nausea, numbness and tingling. Patient able to move all extremities. No drainage noted at injection sites. Patient instructed to stay in bed. Instructed on call light use.  1015-Patient provided with snack and drink. Patient denies needs. Call light in reach.  1030-IV removed with no complications. Patient getting dressed at bedside. Ride notified of pickup.  1040-Patient meets discharge criteria.  Discharged in stable condition with responsible . All belongings given to patient. Patient ambulated to car with assistance from RN. Patient tolerated well.

## 2024-09-10 ENCOUNTER — OFFICE VISIT (OUTPATIENT)
Dept: PHYSICAL MEDICINE AND REHAB | Age: 69
End: 2024-09-10
Payer: MEDICARE

## 2024-09-10 VITALS
DIASTOLIC BLOOD PRESSURE: 64 MMHG | SYSTOLIC BLOOD PRESSURE: 128 MMHG | HEIGHT: 73 IN | BODY MASS INDEX: 25.98 KG/M2 | WEIGHT: 196 LBS

## 2024-09-10 DIAGNOSIS — M54.50 CHRONIC BILATERAL LOW BACK PAIN WITHOUT SCIATICA: ICD-10-CM

## 2024-09-10 DIAGNOSIS — G89.4 CHRONIC PAIN SYNDROME: ICD-10-CM

## 2024-09-10 DIAGNOSIS — Z98.1 HISTORY OF LUMBAR FUSION: ICD-10-CM

## 2024-09-10 DIAGNOSIS — M79.18 MYOFASCIAL PAIN: ICD-10-CM

## 2024-09-10 DIAGNOSIS — G89.29 CHRONIC BILATERAL LOW BACK PAIN WITHOUT SCIATICA: ICD-10-CM

## 2024-09-10 DIAGNOSIS — M47.816 SPONDYLOSIS OF LUMBAR REGION WITHOUT MYELOPATHY OR RADICULOPATHY: ICD-10-CM

## 2024-09-10 DIAGNOSIS — M54.2 NECK PAIN: ICD-10-CM

## 2024-09-10 DIAGNOSIS — M47.812 FACET ARTHROPATHY, CERVICAL: ICD-10-CM

## 2024-09-10 DIAGNOSIS — M47.814 THORACIC SPONDYLOSIS: ICD-10-CM

## 2024-09-10 DIAGNOSIS — M47.812 CERVICAL SPONDYLOSIS: Primary | ICD-10-CM

## 2024-09-10 PROCEDURE — 3074F SYST BP LT 130 MM HG: CPT | Performed by: NURSE PRACTITIONER

## 2024-09-10 PROCEDURE — 99214 OFFICE O/P EST MOD 30 MIN: CPT | Performed by: NURSE PRACTITIONER

## 2024-09-10 PROCEDURE — 1123F ACP DISCUSS/DSCN MKR DOCD: CPT | Performed by: NURSE PRACTITIONER

## 2024-09-10 PROCEDURE — 3078F DIAST BP <80 MM HG: CPT | Performed by: NURSE PRACTITIONER

## 2024-09-10 ASSESSMENT — ENCOUNTER SYMPTOMS
COUGH: 1
BACK PAIN: 1
SHORTNESS OF BREATH: 1
GASTROINTESTINAL NEGATIVE: 1

## 2024-10-04 SDOH — ECONOMIC STABILITY: FOOD INSECURITY: WITHIN THE PAST 12 MONTHS, THE FOOD YOU BOUGHT JUST DIDN'T LAST AND YOU DIDN'T HAVE MONEY TO GET MORE.: OFTEN TRUE

## 2024-10-04 SDOH — ECONOMIC STABILITY: TRANSPORTATION INSECURITY
IN THE PAST 12 MONTHS, HAS LACK OF TRANSPORTATION KEPT YOU FROM MEETINGS, WORK, OR FROM GETTING THINGS NEEDED FOR DAILY LIVING?: NO

## 2024-10-04 SDOH — ECONOMIC STABILITY: FOOD INSECURITY: WITHIN THE PAST 12 MONTHS, YOU WORRIED THAT YOUR FOOD WOULD RUN OUT BEFORE YOU GOT MONEY TO BUY MORE.: OFTEN TRUE

## 2024-10-04 SDOH — ECONOMIC STABILITY: INCOME INSECURITY: HOW HARD IS IT FOR YOU TO PAY FOR THE VERY BASICS LIKE FOOD, HOUSING, MEDICAL CARE, AND HEATING?: HARD

## 2024-10-07 ENCOUNTER — OFFICE VISIT (OUTPATIENT)
Dept: FAMILY MEDICINE CLINIC | Age: 69
End: 2024-10-07
Payer: MEDICARE

## 2024-10-07 VITALS
WEIGHT: 196.8 LBS | TEMPERATURE: 98.3 F | OXYGEN SATURATION: 92 % | RESPIRATION RATE: 16 BRPM | HEART RATE: 83 BPM | HEIGHT: 73 IN | BODY MASS INDEX: 26.08 KG/M2 | SYSTOLIC BLOOD PRESSURE: 132 MMHG | DIASTOLIC BLOOD PRESSURE: 76 MMHG

## 2024-10-07 DIAGNOSIS — R35.1 NOCTURIA: ICD-10-CM

## 2024-10-07 DIAGNOSIS — J44.9 CHRONIC OBSTRUCTIVE PULMONARY DISEASE, UNSPECIFIED COPD TYPE (HCC): Primary | ICD-10-CM

## 2024-10-07 DIAGNOSIS — Z99.81 OXYGEN DEPENDENT: ICD-10-CM

## 2024-10-07 DIAGNOSIS — J84.9 INTERSTITIAL LUNG DISEASE (HCC): ICD-10-CM

## 2024-10-07 DIAGNOSIS — E04.1 THYROID NODULE: ICD-10-CM

## 2024-10-07 DIAGNOSIS — K21.9 GASTROESOPHAGEAL REFLUX DISEASE WITHOUT ESOPHAGITIS: ICD-10-CM

## 2024-10-07 DIAGNOSIS — E11.9 TYPE 2 DIABETES MELLITUS WITHOUT COMPLICATION, WITHOUT LONG-TERM CURRENT USE OF INSULIN (HCC): ICD-10-CM

## 2024-10-07 DIAGNOSIS — J84.10 PULMONARY FIBROSIS (HCC): ICD-10-CM

## 2024-10-07 DIAGNOSIS — F32.4 MAJOR DEPRESSIVE DISORDER WITH SINGLE EPISODE, IN PARTIAL REMISSION (HCC): ICD-10-CM

## 2024-10-07 DIAGNOSIS — M47.812 CERVICAL SPONDYLOSIS: ICD-10-CM

## 2024-10-07 DIAGNOSIS — I10 PRIMARY HYPERTENSION: ICD-10-CM

## 2024-10-07 DIAGNOSIS — M15.0 PRIMARY OSTEOARTHRITIS INVOLVING MULTIPLE JOINTS: ICD-10-CM

## 2024-10-07 DIAGNOSIS — J30.1 NON-SEASONAL ALLERGIC RHINITIS DUE TO POLLEN: ICD-10-CM

## 2024-10-07 DIAGNOSIS — Z12.5 SCREENING FOR PROSTATE CANCER: ICD-10-CM

## 2024-10-07 DIAGNOSIS — Z23 IMMUNIZATION DUE: ICD-10-CM

## 2024-10-07 DIAGNOSIS — G62.9 NEUROPATHY: ICD-10-CM

## 2024-10-07 DIAGNOSIS — E78.01 FAMILIAL HYPERCHOLESTEROLEMIA: ICD-10-CM

## 2024-10-07 LAB — HBA1C MFR BLD: 8.3 % (ref 4.3–5.7)

## 2024-10-07 PROCEDURE — G0008 ADMIN INFLUENZA VIRUS VAC: HCPCS | Performed by: NURSE PRACTITIONER

## 2024-10-07 PROCEDURE — 90653 IIV ADJUVANT VACCINE IM: CPT | Performed by: NURSE PRACTITIONER

## 2024-10-07 PROCEDURE — 1123F ACP DISCUSS/DSCN MKR DOCD: CPT | Performed by: NURSE PRACTITIONER

## 2024-10-07 PROCEDURE — 3075F SYST BP GE 130 - 139MM HG: CPT | Performed by: NURSE PRACTITIONER

## 2024-10-07 PROCEDURE — 3052F HG A1C>EQUAL 8.0%<EQUAL 9.0%: CPT | Performed by: NURSE PRACTITIONER

## 2024-10-07 PROCEDURE — 99214 OFFICE O/P EST MOD 30 MIN: CPT | Performed by: NURSE PRACTITIONER

## 2024-10-07 PROCEDURE — 3078F DIAST BP <80 MM HG: CPT | Performed by: NURSE PRACTITIONER

## 2024-10-07 RX ORDER — ATORVASTATIN CALCIUM 40 MG/1
TABLET, FILM COATED ORAL
Qty: 90 TABLET | Refills: 3 | Status: SHIPPED | OUTPATIENT
Start: 2024-10-07

## 2024-10-07 RX ORDER — GABAPENTIN 100 MG/1
100 CAPSULE ORAL 2 TIMES DAILY
Qty: 180 CAPSULE | Refills: 1 | Status: SHIPPED | OUTPATIENT
Start: 2024-10-07 | End: 2025-04-05

## 2024-10-07 RX ORDER — PIOGLITAZONEHYDROCHLORIDE 15 MG/1
15 TABLET ORAL DAILY
Qty: 90 TABLET | Refills: 4 | Status: SHIPPED | OUTPATIENT
Start: 2024-10-07

## 2024-10-07 ASSESSMENT — ENCOUNTER SYMPTOMS
SHORTNESS OF BREATH: 0
CHOKING: 0
RHINORRHEA: 0
WHEEZING: 0
GASTROINTESTINAL NEGATIVE: 1
SORE THROAT: 0
CHEST TIGHTNESS: 0
SINUS PAIN: 0
BACK PAIN: 1
COUGH: 1
SINUS PRESSURE: 0

## 2024-10-07 NOTE — PROGRESS NOTES
Immunization(s) given during visit:    Immunizations Administered       Name Date Dose Route    Influenza, FLUAD, (age 65 y+), IM, Trivalent PF, 0.5mL 10/7/2024 0.5 mL Intramuscular    Site: Deltoid- Left    Lot: [id number]    NDC: 03583-170-10            Most recent Vaccine Information Sheet dated 8/6/21 given to pt  
Vaccine Information Sheet, \"Influenza - Inactivated\"  given to Dilan Pappas, or parent/legal guardian of  Dilan Pappas and verbalized understanding.    Patient responses:    Have you ever had a reaction to a flu vaccine? No  Do you have an allergy to eggs, neomycin or polymixin?  No  Do you have an allergy to Thimerosal, contact lens solution, or Merthiolate? No  Have you ever had Guillian Durham Syndrome?  No  Do you have any current illness?  No  Do you have a temperature above 100 degrees? No  Are you pregnant? No  If pregnant, permission obtained from physician? No  Do you have an active neurological disorder? No      Flu vaccine given per order. Please see immunization tab.  
  Skin:     General: Skin is warm and dry.      Capillary Refill: Capillary refill takes less than 2 seconds.   Neurological:      Mental Status: He is alert.   Psychiatric:         Attention and Perception: Attention normal.         Mood and Affect: Affect is labile.         Speech: Speech normal.         Behavior: Behavior normal. Behavior is cooperative.         Thought Content: Thought content does not include homicidal or suicidal plan.         Cognition and Memory: Cognition normal.         Judgment: Judgment normal.          /Plan:     Assessment & Plan     1. Chronic obstructive pulmonary disease, unspecified COPD type ([facility])  Pulmonary managing     2. Major depressive disorder with single episode, in partial remission ([facility])  At goal  Continue current meds    3. Non-seasonal allergic rhinitis due to pollen  Controlled with astelin    4. Primary osteoarthritis involving multiple joints  PM managing  Tylenol for pain     5. Gastroesophageal reflux disease without esophagitis  GERD diet  Controlled with PPI     6. Primary hypertension  At [facility] continue lisinopril 2.5 mg daily     7. Type 2 diabetes mellitus without complication, without long-term current use of insulin ([facility])  Not at goal worse  Maxed out on DPP4- metformin and SGLT-2 discussed GLP-12 pt declines will tart actos TZD, no Hx of CHF   - atorvastatin (LIPITOR) 40 MG tablet; TAKE 1 TABLET BY MOUTH DAILY  Dispense: 90 tablet; Refill: 3  - metFORMIN (GLUCOPHAGE) 1000 MG tablet; Take 1 tablet by mouth 2 times daily (with meals)  Dispense: 180 tablet; Refill: 4  -  DIABETES FOOT EXAM  - CBC with Auto Differential; Future  - Comprehensive Metabolic Panel; Future  - pioglitazone (ACTOS) 15 MG tablet; Take 1 tablet by mouth daily  Dispense: 90 tablet; Refill: 4  Diabetic diet       9. Neuropathy  Not controlled start gabapentin   Likely due to worsening A1C   - gabapentin (NEURONTIN) 100 MG capsule; Take 1 capsule by mouth 2 times daily for 180 days.

## 2024-10-07 NOTE — PATIENT INSTRUCTIONS
[place] [employer] Resources*  (Call [employer]/211 if need more resources.)        Home Delivered Meals:  [employer] Delivered Meals:  Phone: 1-294.848.8945  [facility] ([place] only):  Phone: [phone]  Moms Meals:  What they offer: Nutritious refrigerated meals. Available to individuals with Medicare advantage plans, Medicaid plans, Long-Term services and supports (LTSS) programs and self-pay at $6.99 a meal.  Renal-Friendly, pureed and Gluten free at $7.99 a meal plus flat rate shipping.   Phone: 1-958.673.8460  Website: https://www.Blaze.io    Senior Nutrition program:  Phone: 1-707.429.3415  [employer] Home Delivered Meals (From [place]):  What they offer: Meals for seniors, disabled, recovering from an injury or illness on [place] and [facility]. Both privately and through homecare services like [facility] and [facility].   Phone: 1-420.734.6274  Website: https://[employer].Project 10K    Other Food Resources:  MOW (Meals on Wheels) Contact Area Agency on Aging   Phone: 255.231.3547    [facility]- Nutrition Program   What they offer: Nutrition education and nutritious foods for Pregnant women, women who just had a baby, breastfeeding moms, infants and children up to the age of 5.   Phone: 750.704.3616   Website: https://www.[employer].org  [facility]- Provides Meals  What they offer: Provide food boxes to those in need monthly.  Phone: 507.809.6076  Website: [email or web address].[email or web address]/ministries/[facility]  [employer]-Food/ Clothing   What they offer:  Provides a 3-day supply of food for individuals once a month.   Phone: 200.259.1582  Website: https://Mob[employer].org/author/wilacup1  [employer]-Meal Site   Phone: 300.126.8567  [employer]- Meal Site  Phone: 1-831.952.1998  [employer]:  What they offer: Provides continental breakfast and hot lunch   Phone: 724-911-3986  Website: https://odbread.org

## 2024-10-08 ENCOUNTER — CARE COORDINATION (OUTPATIENT)
Dept: CARE COORDINATION | Age: 69
End: 2024-10-08

## 2024-10-08 NOTE — CARE COORDINATION
SW initiated referral for food insecurity. Patient reported that he receives $30 in stamps. He was receptive to food pantry list. SW emailed list to patient. SW will follow up.

## 2024-10-10 ENCOUNTER — OFFICE VISIT (OUTPATIENT)
Dept: PHYSICAL MEDICINE AND REHAB | Age: 69
End: 2024-10-10
Payer: MEDICARE

## 2024-10-10 VITALS
BODY MASS INDEX: 25.98 KG/M2 | WEIGHT: 196 LBS | HEIGHT: 73 IN | SYSTOLIC BLOOD PRESSURE: 130 MMHG | DIASTOLIC BLOOD PRESSURE: 64 MMHG

## 2024-10-10 DIAGNOSIS — M47.812 FACET ARTHROPATHY, CERVICAL: ICD-10-CM

## 2024-10-10 DIAGNOSIS — G89.4 CHRONIC PAIN SYNDROME: ICD-10-CM

## 2024-10-10 DIAGNOSIS — M79.18 MYOFASCIAL PAIN: ICD-10-CM

## 2024-10-10 DIAGNOSIS — M47.812 CERVICAL SPONDYLOSIS: Primary | ICD-10-CM

## 2024-10-10 DIAGNOSIS — M48.02 CERVICAL SPINAL STENOSIS: ICD-10-CM

## 2024-10-10 DIAGNOSIS — Z98.1 HISTORY OF LUMBAR FUSION: ICD-10-CM

## 2024-10-10 DIAGNOSIS — M47.816 SPONDYLOSIS OF LUMBAR REGION WITHOUT MYELOPATHY OR RADICULOPATHY: ICD-10-CM

## 2024-10-10 DIAGNOSIS — M54.2 NECK PAIN: ICD-10-CM

## 2024-10-10 DIAGNOSIS — M54.12 CERVICAL RADICULITIS: ICD-10-CM

## 2024-10-10 DIAGNOSIS — G89.29 CHRONIC BILATERAL LOW BACK PAIN WITHOUT SCIATICA: ICD-10-CM

## 2024-10-10 DIAGNOSIS — Z98.1 HISTORY OF FUSION OF CERVICAL SPINE: ICD-10-CM

## 2024-10-10 DIAGNOSIS — M54.50 CHRONIC BILATERAL LOW BACK PAIN WITHOUT SCIATICA: ICD-10-CM

## 2024-10-10 PROCEDURE — 3078F DIAST BP <80 MM HG: CPT | Performed by: NURSE PRACTITIONER

## 2024-10-10 PROCEDURE — 3075F SYST BP GE 130 - 139MM HG: CPT | Performed by: NURSE PRACTITIONER

## 2024-10-10 PROCEDURE — 99214 OFFICE O/P EST MOD 30 MIN: CPT | Performed by: NURSE PRACTITIONER

## 2024-10-10 PROCEDURE — 1123F ACP DISCUSS/DSCN MKR DOCD: CPT | Performed by: NURSE PRACTITIONER

## 2024-10-10 ASSESSMENT — ENCOUNTER SYMPTOMS
SHORTNESS OF BREATH: 1
COUGH: 1
GASTROINTESTINAL NEGATIVE: 1
BACK PAIN: 1

## 2024-10-10 NOTE — PROGRESS NOTES
labile, blunt, angry or inappropriate.         Speech: Speech normal. He is communicative. Speech is not rapid and pressured, delayed, slurred or tangential.         Behavior: Behavior normal. Behavior is not agitated, slowed, aggressive, withdrawn, hyperactive or combative. Behavior is cooperative.         Thought Content: Thought content normal. Thought content is not paranoid or delusional. Thought content does not include homicidal or suicidal ideation. Thought content does not include homicidal or suicidal plan.         Cognition and Memory: Cognition normal. Memory is not impaired. He does not exhibit impaired recent memory or impaired remote memory.         Judgment: Judgment is not impulsive or inappropriate.       JIMENA  Patricks test  negative  Yeoman's  or Gaenslen's negative       Assessment:     1. Cervical spondylosis    2. Facet arthropathy, cervical    3. Neck pain    4. Cervical radiculitis    5. Cervical spinal stenosis    6. History of fusion of cervical spine    7. Spondylosis of lumbar region without myelopathy or radiculopathy    8. Chronic bilateral low back pain without sciatica    9. History of lumbar fusion    10. Chronic pain syndrome    11. Myofascial pain         Assessment & Plan   Plan:      OARRS reviewed. Current MED: 0  Patient was not offered naloxone for home.   Discussed long term side effects of medications, tolerance, dependency and addiction.  Previous UDS reviewed  UDS preformed today for compliance.  Patient told can not receive any pain medications from any other source.  No evidence of abuse, diversion or aberrant behavior.  Medications and/or procedures to improve function and quality of life- patient understanding with this and that may not be pain free  Discussed with patient about safe storage of medications at home  Discussed possible weaning of medication dosing dependent on treatment/procedure results.    Discussed with patient about risks with procedure including

## 2024-10-15 ENCOUNTER — HOSPITAL ENCOUNTER (OUTPATIENT)
Dept: ULTRASOUND IMAGING | Age: 69
Discharge: HOME | End: 2024-10-15
Payer: MEDICARE

## 2024-10-15 ENCOUNTER — TELEPHONE (OUTPATIENT)
Dept: FAMILY MEDICINE CLINIC | Age: 69
End: 2024-10-15

## 2024-10-15 DIAGNOSIS — E04.1 THYROID NODULE: ICD-10-CM

## 2024-10-15 PROCEDURE — 76536 US EXAM OF HEAD AND NECK: CPT

## 2024-10-15 NOTE — TELEPHONE ENCOUNTER
----- Message from GERARDO Chaparro - CNP sent at 10/15/2024  4:35 PM EDT -----  Let pt know his u/s shows the right sided lymph node is smaller than last year the mid gland nodule has not grown in size all good news, recommend 1 yr f/u will contact him when it gets closer to time  to schedule.     1. Tiny benign nodule in the right lobe mid gland.  2. Small lymph node on the right side of neck, smaller than on previous study  dated 1/3/2023..   3. Repeat ultrasound scan in 1 year's time would be helpful for follow-up.

## 2024-10-18 ENCOUNTER — HOSPITAL ENCOUNTER (OUTPATIENT)
Dept: PHYSICAL THERAPY | Age: 69
Setting detail: THERAPIES SERIES
Discharge: HOME | End: 2024-10-18
Payer: MEDICARE

## 2024-10-18 PROCEDURE — 97162 PT EVAL MOD COMPLEX 30 MIN: CPT

## 2024-10-18 NOTE — PROGRESS NOTES
Physical Therapy    * PLEASE SIGN, DATE AND TIME CERTIFICATION BELOW AND RETURN TO [facility] OUTPATIENT REHABILITATION (FAX #: [phone]).  ATTEST/CO-SIGN IF ACCESSING [facility]IA [facility].  THANK YOU.**    I certify that I have examined the patient below and determined that Physical Medicine and Rehabilitation service is necessary and that I approve the established plan of care for up to 90 days or as specifically noted.  Attestation, signature or co-signature of physician indicates approval of certification requirements.    ________________________ ____________  Physician Signature   Date   [facility]  PHYSICAL THERAPY  [x] EVALUATION  [] DAILY NOTE (LAND) [] DAILY NOTE (AQUATIC ) [] PROGRESS NOTE [] DISCHARGE NOTE    [x] OUTPATIENT REHABILITATION [facility]   [] [facility]    [] [facility]   [] [facility]    Date: 10/18/2024  Patient Name:  Dilan Pappas  : 1955  MRN: [id number]  CSN: [id number]    Referring Practitioner Devon Farfan, APRN - CNP [id number]      Diagnosis Spondylosis without myelopathy or radiculopathy, cervical region  Cervicalgia  Radiculopathy, cervical region  Spinal stenosis, cervical region  Arthrodesis status  Low back pain, unspecified  Other chronic pain   Treatment Diagnosis M54.2  Neck Pain  R29.3 Abnormal Posture  M62.81 Generalized Muscle Weakness   Date of Evaluation 10/18/24   Additional Pertinent History Dilan Pappas has a past medical history of Abnormal liver enzymes, Alcohol abuse, Allergic rhinitis, Chronic back pain, Colonic polyp, COPD (chronic obstructive pulmonary disease) (HCC), Depression, Diabetes mellitus (HCC), DJD (degenerative joint disease) of cervical spine, DJD (degenerative joint disease) of lumbar spine, GERD (gastroesophageal reflux disease), Hyperlipidemia, Hypertension, Neuropathy, Osteoarthritis, Osteoarthritis, and Smoker.  he has a past surgical history that includes back surgery

## 2024-10-22 ENCOUNTER — CARE COORDINATION (OUTPATIENT)
Dept: CARE COORDINATION | Age: 69
End: 2024-10-22

## 2024-10-30 ENCOUNTER — HOSPITAL ENCOUNTER (OUTPATIENT)
Dept: PHYSICAL THERAPY | Age: 69
Setting detail: THERAPIES SERIES
Discharge: HOME | End: 2024-10-30
Payer: MEDICARE

## 2024-10-30 PROCEDURE — 97110 THERAPEUTIC EXERCISES: CPT

## 2024-10-30 PROCEDURE — 97140 MANUAL THERAPY 1/> REGIONS: CPT

## 2024-10-30 NOTE — PROGRESS NOTES
retraction, pectoral stretching  Med[employer] Access Code: [email or web address]   []  No new Education completed  []  Reviewed Prior HEP      [x]  Patient verbalized and/or demonstrated understanding of education provided.  []  Patient unable to verbalize and/or demonstrate understanding of education provided.  Will continue education.  []  Barriers to learning:     PLAN:  Treatment Recommendations: Strengthening, Range of Motion, Neuromuscular Re-education, Manual Therapy - Soft Tissue Mobilization, Manual Therapy - Joint Manipulation, Pain Management, Home Exercise Program, Patient Education, Self-Care Education and Training, Positioning, Integrative Dry Needling, Aquatics, and Modalities    []  Plan of care initiated.  Plan to see patient 2 times per week for 6 weeks to address the treatment planned outlined above.  [x]  Continue with current plan of care  []  Modify plan of care as follows:    []  Hold pending physician visit  []  Discharge    Time In 802   Time Out 845   Timed Code Minutes: 43   Total Treatment Time: 43     Electronically Signed by: Martha Huizar, PT

## 2024-11-01 ENCOUNTER — HOSPITAL ENCOUNTER (OUTPATIENT)
Dept: PHYSICAL THERAPY | Age: 69
Setting detail: THERAPIES SERIES
Discharge: HOME | End: 2024-11-01
Payer: MEDICARE

## 2024-11-01 PROCEDURE — 97140 MANUAL THERAPY 1/> REGIONS: CPT

## 2024-11-01 PROCEDURE — 97110 THERAPEUTIC EXERCISES: CPT

## 2024-11-01 NOTE — PROGRESS NOTES
[facility]  PHYSICAL THERAPY  [] EVALUATION  [x] DAILY NOTE (LAND) [] DAILY NOTE (AQUATIC ) [] PROGRESS NOTE [] DISCHARGE NOTE    [x] OUTPATIENT REHABILITATION [facility]   [] [facility]    [] [facility]   [] [facility]    Date: 2024  Patient Name:  Dilan Pappas  : 1955  MRN: [id number]  [facility]: [id number]    Referring Practitioner Devon Farfan, APRN - CNP [id number]      Diagnosis Radiculopathy, cervical region  Cervicalgia  Abnormal posture  Muscle weakness (generalized)  Spondylosis without myelopathy or radiculopathy, cervical region  Spinal stenosis, cervical region  Arthrodesis status  Low back pain, unspecified  Other chronic pain   Treatment Diagnosis M54.2  Neck Pain  R29.3 Abnormal Posture  M62.81 Generalized Muscle Weakness   Date of Evaluation 10/18/24   Additional Pertinent History Dilan Pappas has a past medical history of Abnormal liver enzymes, Alcohol abuse, Allergic rhinitis, Chronic back pain, Colonic polyp, COPD (chronic obstructive pulmonary disease) (HCC), Depression, Diabetes mellitus (HCC), DJD (degenerative joint disease) of cervical spine, DJD (degenerative joint disease) of lumbar spine, GERD (gastroesophageal reflux disease), Hyperlipidemia, Hypertension, Neuropathy, Osteoarthritis, Osteoarthritis, and Smoker.  he has a past surgical history that includes back surgery (); Inguinal hernia repair (); hernia repair; Cholecystectomy; Colonoscopy (2012); EKG 12 Lead (2015); Lumbar spine surgery (, , ); Cardiac catheterization (3/2015); pr excision hydrocele unilateral (Left, 2018); Facet joint injection (Bilateral, 2020); Facet joint injection (Bilateral, 2020); Pain management procedure (Right, 2020); Pain management procedure (Left, 10/9/2020); Pain management procedure (Right, 3/25/2021); Pain management procedure (Left, 2021); US ASP/INJ THYROID CYST (2021);

## 2024-11-08 DIAGNOSIS — Z72.0 TOBACCO ABUSE: ICD-10-CM

## 2024-11-08 RX ORDER — BUPROPION HYDROCHLORIDE 150 MG/1
150 TABLET ORAL EVERY MORNING
Qty: 90 TABLET | Refills: 3 | Status: SHIPPED | OUTPATIENT
Start: 2024-11-08

## 2024-11-08 NOTE — TELEPHONE ENCOUNTER
Recent Visits  Date Type Provider Dept   10/07/24 Office Visit Pam Eric, APRN - CNP [facility]   04/10/24 Office Visit Pam Eric, APRN - CNP [facility]   01/10/24 Office Visit Pam Eric, APRN - CNP [facility]   12/13/23 Office Visit Pam Eric, APRN - CNP [facility]   11/28/23 Office Visit Eric Orozco APRN - CNP [facility]   06/28/23 Office Visit Pam Eric, APRN - CNP [facility]   Showing recent visits within past 540 days with a meds authorizing provider and meeting all other requirements  Future Appointments  Date Type Provider Dept   01/08/25 Appointment Eric Orozco APRN - CNP [facility]   Showing future appointments within next 150 days with a meds authorizing provider and meeting all other requirements

## 2024-11-13 ENCOUNTER — HOSPITAL ENCOUNTER (OUTPATIENT)
Dept: PHYSICAL THERAPY | Age: 69
Setting detail: THERAPIES SERIES
Discharge: HOME | End: 2024-11-13
Payer: MEDICARE

## 2024-11-13 ENCOUNTER — OFFICE VISIT (OUTPATIENT)
Dept: PHYSICAL MEDICINE AND REHAB | Age: 69
End: 2024-11-13
Payer: MEDICARE

## 2024-11-13 VITALS
HEIGHT: 73 IN | DIASTOLIC BLOOD PRESSURE: 62 MMHG | SYSTOLIC BLOOD PRESSURE: 112 MMHG | WEIGHT: 195.99 LBS | BODY MASS INDEX: 25.98 KG/M2

## 2024-11-13 DIAGNOSIS — M54.50 CHRONIC BILATERAL LOW BACK PAIN WITHOUT SCIATICA: ICD-10-CM

## 2024-11-13 DIAGNOSIS — G89.4 CHRONIC PAIN SYNDROME: ICD-10-CM

## 2024-11-13 DIAGNOSIS — M79.18 MYOFASCIAL PAIN: ICD-10-CM

## 2024-11-13 DIAGNOSIS — M48.02 CERVICAL SPINAL STENOSIS: ICD-10-CM

## 2024-11-13 DIAGNOSIS — M47.816 SPONDYLOSIS OF LUMBAR REGION WITHOUT MYELOPATHY OR RADICULOPATHY: ICD-10-CM

## 2024-11-13 DIAGNOSIS — G89.29 CHRONIC BILATERAL LOW BACK PAIN WITHOUT SCIATICA: ICD-10-CM

## 2024-11-13 DIAGNOSIS — Z98.1 HISTORY OF FUSION OF CERVICAL SPINE: ICD-10-CM

## 2024-11-13 DIAGNOSIS — M47.812 CERVICAL SPONDYLOSIS: ICD-10-CM

## 2024-11-13 DIAGNOSIS — M47.812 FACET ARTHROPATHY, CERVICAL: ICD-10-CM

## 2024-11-13 DIAGNOSIS — M54.2 NECK PAIN: ICD-10-CM

## 2024-11-13 DIAGNOSIS — Z98.1 HISTORY OF LUMBAR FUSION: ICD-10-CM

## 2024-11-13 DIAGNOSIS — M54.12 CERVICAL RADICULITIS: Primary | ICD-10-CM

## 2024-11-13 PROCEDURE — 1123F ACP DISCUSS/DSCN MKR DOCD: CPT | Performed by: NURSE PRACTITIONER

## 2024-11-13 PROCEDURE — 3074F SYST BP LT 130 MM HG: CPT | Performed by: NURSE PRACTITIONER

## 2024-11-13 PROCEDURE — 3078F DIAST BP <80 MM HG: CPT | Performed by: NURSE PRACTITIONER

## 2024-11-13 PROCEDURE — 1159F MED LIST DOCD IN RCRD: CPT | Performed by: NURSE PRACTITIONER

## 2024-11-13 PROCEDURE — 99214 OFFICE O/P EST MOD 30 MIN: CPT | Performed by: NURSE PRACTITIONER

## 2024-11-13 PROCEDURE — 1125F AMNT PAIN NOTED PAIN PRSNT: CPT | Performed by: NURSE PRACTITIONER

## 2024-11-13 PROCEDURE — 97110 THERAPEUTIC EXERCISES: CPT

## 2024-11-13 ASSESSMENT — ENCOUNTER SYMPTOMS
BACK PAIN: 1
SHORTNESS OF BREATH: 1
GASTROINTESTINAL NEGATIVE: 1
COUGH: 1

## 2024-11-13 NOTE — PROGRESS NOTES
[facility] [place] [facility]  [facility]  [address]  [place] [address]  Dept: [phone]  Dept Fax: [phone]  Loc: [phone]    Visit Date: 11/13/2024    Functionality Assessment/Goals Worksheet     On a scale of 0 (Does not Interfere) to 10 (Completely Interferes)     1.  Which number describes how during the past week pain has interfered with       the following:  A.  General Activity:  7  B.  Mood: 6  C.  Walking Ability:  7  D.  Normal Work (Includes both work outside the home and housework):  9  E.  Relations with Other People:   5  F.  Sleep:   7  G.  Enjoyment of Life:   8    2.  Patient Prefers to Take their Pain Medications:     [x]  On a regular basis   []  Only when necessary    []  Does not take pain medications    3.  What are the Patient's Goals/Expectations for Visiting Pain Management?     [x]  Learn about my pain    []  Receive Medication   [x]  Physical Therapy     []  Treat Depression   []  Receive Injections    []  Treat Sleep   []  Deal with Anxiety and Stress   []  Treat Opoid Dependence/Addiction   []  Other:        HPI:   Dilan Pappas is a 69 y.o. male is here today for    Chief Complaint: Neck pain, left arm pain     HPI   1 month follow up to reevaluate. Dilan continue sto have pain in his posterior neck worse at base of head and states radiates down bilaterally radiating across shoulder blades and into left upper arm and is now radiating farther down his arm to his mid forearm. Pain in his neck is dull aching pain and tingling in left arm and some numbness in his fingers.     He did follow up with Dr. Barraza's team at [facility] since last visit and another cervical MRI was ordered and he is getting this completed on 11/21/2024. He has a follow up with his surgeon after. We discussed a TFCESI but will wait after evaluation and plan from his surgeon.   He is upset I will not give him pain medication anymore

## 2024-11-13 NOTE — PROGRESS NOTES
x3 each             Standing:       Pectoral stretch with cane AAROM shoulder extension behind back 10x 5 sec       Doorway pec stretch/ corner stretch 10x 5 sec      Tennis Ball in Pillow Case self massage (wall) per middle and lower trapezius region 5 min after instruction              Skeletal model used to explain basic anatomy of cervical spine including various tissue such as intervertebral disc, facet joints, and intervertebral foramen.  Picture used to show basic anatomy of sub-occipital muscles and the basis of chin tuck exercise and sub-occipital release in supine.             Specific Interventions Next Treatment: US/IFC Combo (estim was not approved by insurance, ultrasound was not requested), UBE retro, scap strengthening, UT, levator, suboccipital stretching    Activity/Treatment Tolerance:  [x]  Patient tolerated treatment well  []  Patient limited by fatigue  []  Patient limited by pain   []  Patient limited by medical complications  []  Other:     Assessment:  Patient has no showed for previous 2 visits.  Requests to be placed on hold until after 11/26/24 follow up from MRI on 11/26/24.     GOALS:  Patient Goal: Decrease pain    Short Term Goals: deferred to LTG     Long Term Goals: 6 weeks  Patient will improve AROM of cervical rotation 50 degrees  bilaterally for ease driving.   Patient will demonstrate good postural awareness with 2 verbal cues during therapy session to carry over to ADL performance.  Patient will improve postural strength to 4/5 for symptom control and tolerance to sitting and standing.   Patient will report reduced pain level to 5/10 to tolerate sitting, standing, and lying down longer durations.   Patient will report 25% reduction in frequency of headaches to improve concentration and tolerance to reading.  Patient will be independent and compliant with HEP daily to achieve above goals.        Patient Education:   [x]  HEP/Education Completed: HEP handout issued for postural

## 2024-11-15 ENCOUNTER — APPOINTMENT (OUTPATIENT)
Dept: PHYSICAL THERAPY | Age: 69
End: 2024-11-15
Payer: MEDICARE

## 2024-11-20 ENCOUNTER — APPOINTMENT (OUTPATIENT)
Dept: PHYSICAL THERAPY | Age: 69
End: 2024-11-20
Payer: MEDICARE

## 2024-11-21 ENCOUNTER — HOSPITAL ENCOUNTER (OUTPATIENT)
Dept: MRI IMAGING | Age: 69
Discharge: HOME | End: 2024-11-21
Payer: MEDICARE

## 2024-11-21 DIAGNOSIS — M54.12 RADICULOPATHY, CERVICAL REGION: ICD-10-CM

## 2024-11-21 PROCEDURE — 72141 MRI NECK SPINE W/O DYE: CPT

## 2024-11-22 ENCOUNTER — APPOINTMENT (OUTPATIENT)
Dept: PHYSICAL THERAPY | Age: 69
End: 2024-11-22
Payer: MEDICARE

## 2024-11-25 DIAGNOSIS — M19.90 ARTHRITIS: ICD-10-CM

## 2024-11-25 RX ORDER — ETODOLAC 400 MG/1
400 TABLET, EXTENDED RELEASE ORAL DAILY
Qty: 90 TABLET | Refills: 3 | Status: SHIPPED | OUTPATIENT
Start: 2024-11-25

## 2024-12-02 ENCOUNTER — TELEPHONE (OUTPATIENT)
Dept: FAMILY MEDICINE CLINIC | Age: 69
End: 2024-12-02

## 2024-12-02 DIAGNOSIS — Z82.49 FAMILY HISTORY OF BRAIN ANEURYSM: Primary | ICD-10-CM

## 2024-12-02 NOTE — TELEPHONE ENCOUNTER
Uncle had one in his judy.   The brother that passed away had 2 in his brain and one had ruptured.   The other bother has/had one in his brain and they fixed it

## 2024-12-02 NOTE — TELEPHONE ENCOUNTER
Pt called in and said that his uncle  from an aneurysm, his brother also passed from an aneurysm and his other brother just had a procedure on an aneurysm  would like to know if he should be worried about having one

## 2024-12-02 NOTE — TELEPHONE ENCOUNTER
Need more info some aneurysm can be hereditary, what kind of aneurysm are we talking about? Brain or aorta, I can order appropriate testing after I have more info

## 2024-12-02 NOTE — TELEPHONE ENCOUNTER
Left message on answering machine. Requested pt to call back at [phone], at their earliest convenience.

## 2024-12-20 ENCOUNTER — HOSPITAL ENCOUNTER (OUTPATIENT)
Dept: CT IMAGING | Age: 69
Discharge: HOME | End: 2024-12-20
Payer: MEDICARE

## 2024-12-20 ENCOUNTER — TELEPHONE (OUTPATIENT)
Dept: FAMILY MEDICINE CLINIC | Age: 69
End: 2024-12-20

## 2024-12-20 DIAGNOSIS — Z82.49 FAMILY HISTORY OF BRAIN ANEURYSM: ICD-10-CM

## 2024-12-20 LAB — POC CREATININE WHOLE BLOOD: 1.2 MG/DL (ref 0.5–1.2)

## 2024-12-20 PROCEDURE — 82565 ASSAY OF CREATININE: CPT

## 2024-12-20 PROCEDURE — 6360000004 HC RX CONTRAST MEDICATION: Performed by: NURSE PRACTITIONER

## 2024-12-20 PROCEDURE — 70496 CT ANGIOGRAPHY HEAD: CPT

## 2024-12-20 RX ORDER — IOPAMIDOL 755 MG/ML
80 INJECTION, SOLUTION INTRAVASCULAR
Status: COMPLETED | OUTPATIENT
Start: 2024-12-20 | End: 2024-12-20

## 2024-12-20 RX ADMIN — IOPAMIDOL 80 ML: 755 INJECTION, SOLUTION INTRAVENOUS at 09:08

## 2024-12-20 NOTE — TELEPHONE ENCOUNTER
----- Message from GERARDO Sullivan CNP sent at 12/20/2024  2:21 PM EST -----  Cta of head with no significant stenosis

## 2024-12-22 NOTE — CARE COORDINATION
Confirmed patient received list of food pantries. No other issues to be addressed. SW signing off.    hide

## 2024-12-30 ENCOUNTER — HOSPITAL ENCOUNTER (OUTPATIENT)
Dept: GENERAL RADIOLOGY | Age: 69
Discharge: HOME | End: 2024-12-30
Payer: MEDICARE

## 2024-12-30 ENCOUNTER — HOSPITAL ENCOUNTER (OUTPATIENT)
Age: 69
Discharge: HOME | End: 2024-12-30
Payer: MEDICARE

## 2024-12-30 DIAGNOSIS — Z01.811 PRE-OP CHEST EXAM: ICD-10-CM

## 2024-12-30 LAB
ALBUMIN SERPL BCG-MCNC: 4.5 G/DL (ref 3.5–5.1)
ANION GAP SERPL CALC-SCNC: 16 MEQ/L (ref 8–16)
BASOPHILS ABSOLUTE: 0.1 THOU/MM3 (ref 0–0.1)
BASOPHILS NFR BLD AUTO: 0.9 %
BUN SERPL-MCNC: 14 MG/DL (ref 7–22)
CALCIUM SERPL-MCNC: 10.2 MG/DL (ref 8.5–10.5)
CHLORIDE SERPL-SCNC: 97 MEQ/L (ref 98–111)
CO2 SERPL-SCNC: 23 MEQ/L (ref 23–33)
CREAT SERPL-MCNC: 1.1 MG/DL (ref 0.4–1.2)
DEPRECATED MEAN GLUCOSE BLD GHB EST-ACNC: 204 MG/DL (ref 70–126)
DEPRECATED RDW RBC AUTO: 46.3 FL (ref 35–45)
EKG ATRIAL RATE: 79 BPM
EKG P AXIS: 56 DEGREES
EKG P-R INTERVAL: 166 MS
EKG Q-T INTERVAL: 354 MS
EKG QRS DURATION: 96 MS
EKG QTC CALCULATION (BAZETT): 405 MS
EKG R AXIS: 50 DEGREES
EKG T AXIS: 38 DEGREES
EKG VENTRICULAR RATE: 79 BPM
EOSINOPHIL NFR BLD AUTO: 2.7 %
EOSINOPHILS ABSOLUTE: 0.2 THOU/MM3 (ref 0–0.4)
ERYTHROCYTE [DISTWIDTH] IN BLOOD BY AUTOMATED COUNT: 13.4 % (ref 11.5–14.5)
GFR SERPL CREATININE-BSD FRML MDRD: 73 ML/MIN/1.73M2
GLUCOSE SERPL-MCNC: 160 MG/DL (ref 70–108)
HBA1C MFR BLD HPLC: 8.8 % (ref 4.4–6.4)
HCT VFR BLD AUTO: 52.2 % (ref 42–52)
HGB BLD-MCNC: 17.4 GM/DL (ref 14–18)
IMM GRANULOCYTES # BLD AUTO: 0.06 THOU/MM3 (ref 0–0.07)
IMM GRANULOCYTES NFR BLD AUTO: 0.7 %
LYMPHOCYTES ABSOLUTE: 2.2 THOU/MM3 (ref 1–4.8)
LYMPHOCYTES NFR BLD AUTO: 25.1 %
MCH RBC QN AUTO: 31.4 PG (ref 26–33)
MCHC RBC AUTO-ENTMCNC: 33.3 GM/DL (ref 32.2–35.5)
MCV RBC AUTO: 94.1 FL (ref 80–94)
MONOCYTES ABSOLUTE: 0.7 THOU/MM3 (ref 0.4–1.3)
MONOCYTES NFR BLD AUTO: 8.1 %
NEUTROPHILS ABSOLUTE: 5.6 THOU/MM3 (ref 1.8–7.7)
NEUTROPHILS NFR BLD AUTO: 62.5 %
NRBC BLD AUTO-RTO: 0 /100 WBC
PLATELET # BLD AUTO: 223 THOU/MM3 (ref 130–400)
PMV BLD AUTO: 9 FL (ref 9.4–12.4)
POTASSIUM SERPL-SCNC: 4.1 MEQ/L (ref 3.5–5.2)
PREALB SERPL-MCNC: 24.9 MG/DL (ref 20–40)
RBC # BLD AUTO: 5.55 MILL/MM3 (ref 4.7–6.1)
SODIUM SERPL-SCNC: 136 MEQ/L (ref 135–145)
WBC # BLD AUTO: 8.9 THOU/MM3 (ref 4.8–10.8)

## 2024-12-30 PROCEDURE — 82040 ASSAY OF SERUM ALBUMIN: CPT

## 2024-12-30 PROCEDURE — 85025 COMPLETE CBC W/AUTO DIFF WBC: CPT

## 2024-12-30 PROCEDURE — 87641 MR-STAPH DNA AMP PROBE: CPT

## 2024-12-30 PROCEDURE — 93005 ELECTROCARDIOGRAM TRACING: CPT | Performed by: ORTHOPAEDIC SURGERY

## 2024-12-30 PROCEDURE — 80048 BASIC METABOLIC PNL TOTAL CA: CPT

## 2024-12-30 PROCEDURE — 71046 X-RAY EXAM CHEST 2 VIEWS: CPT

## 2024-12-30 PROCEDURE — 36415 COLL VENOUS BLD VENIPUNCTURE: CPT

## 2024-12-30 PROCEDURE — 83036 HEMOGLOBIN GLYCOSYLATED A1C: CPT

## 2024-12-30 PROCEDURE — 93010 ELECTROCARDIOGRAM REPORT: CPT | Performed by: INTERNAL MEDICINE

## 2024-12-30 PROCEDURE — 84134 ASSAY OF PREALBUMIN: CPT

## 2024-12-31 LAB — MRSA DNA SPEC QL NAA+PROBE: NEGATIVE

## 2025-01-05 SDOH — HEALTH STABILITY: PHYSICAL HEALTH: ON AVERAGE, HOW MANY DAYS PER WEEK DO YOU ENGAGE IN MODERATE TO STRENUOUS EXERCISE (LIKE A BRISK WALK)?: 2 DAYS

## 2025-01-05 SDOH — HEALTH STABILITY: PHYSICAL HEALTH: ON AVERAGE, HOW MANY MINUTES DO YOU ENGAGE IN EXERCISE AT THIS LEVEL?: 10 MIN

## 2025-01-05 ASSESSMENT — LIFESTYLE VARIABLES
HOW OFTEN DURING THE LAST YEAR HAVE YOU BEEN UNABLE TO REMEMBER WHAT HAPPENED THE NIGHT BEFORE BECAUSE YOU HAD BEEN DRINKING: NEVER
HOW OFTEN DURING THE LAST YEAR HAVE YOU FAILED TO DO WHAT WAS NORMALLY EXPECTED FROM YOU BECAUSE OF DRINKING: NEVER
HAS A RELATIVE, FRIEND, DOCTOR, OR ANOTHER HEALTH PROFESSIONAL EXPRESSED CONCERN ABOUT YOUR DRINKING OR SUGGESTED YOU CUT DOWN: NO
HAVE YOU OR SOMEONE ELSE BEEN INJURED AS A RESULT OF YOUR DRINKING: NO
HOW OFTEN DURING THE LAST YEAR HAVE YOU FOUND THAT YOU WERE NOT ABLE TO STOP DRINKING ONCE YOU HAD STARTED: NEVER
HOW OFTEN DURING THE LAST YEAR HAVE YOU BEEN UNABLE TO REMEMBER WHAT HAPPENED THE NIGHT BEFORE BECAUSE YOU HAD BEEN DRINKING: NEVER
HOW OFTEN DURING THE LAST YEAR HAVE YOU FAILED TO DO WHAT WAS NORMALLY EXPECTED FROM YOU BECAUSE OF DRINKING: NEVER
HOW OFTEN DURING THE LAST YEAR HAVE YOU FOUND THAT YOU WERE NOT ABLE TO STOP DRINKING ONCE YOU HAD STARTED: NEVER
HOW OFTEN DURING THE LAST YEAR HAVE YOU NEEDED AN ALCOHOLIC DRINK FIRST THING IN THE MORNING TO GET YOURSELF GOING AFTER A NIGHT OF HEAVY DRINKING: NEVER
HOW MANY STANDARD DRINKS CONTAINING ALCOHOL DO YOU HAVE ON A TYPICAL DAY: 1 OR 2
HOW OFTEN DURING THE LAST YEAR HAVE YOU HAD A FEELING OF GUILT OR REMORSE AFTER DRINKING: NEVER
HOW OFTEN DO YOU HAVE A DRINK CONTAINING ALCOHOL: 2-4 TIMES A MONTH
HOW OFTEN DURING THE LAST YEAR HAVE YOU NEEDED AN ALCOHOLIC DRINK FIRST THING IN THE MORNING TO GET YOURSELF GOING AFTER A NIGHT OF HEAVY DRINKING: NEVER
HOW OFTEN DURING THE LAST YEAR HAVE YOU HAD A FEELING OF GUILT OR REMORSE AFTER DRINKING: NEVER
HOW MANY STANDARD DRINKS CONTAINING ALCOHOL DO YOU HAVE ON A TYPICAL DAY: 1
HOW OFTEN DO YOU HAVE SIX OR MORE DRINKS ON ONE OCCASION: 2
HAS A RELATIVE, FRIEND, DOCTOR, OR ANOTHER HEALTH PROFESSIONAL EXPRESSED CONCERN ABOUT YOUR DRINKING OR SUGGESTED YOU CUT DOWN: NO
HAVE YOU OR SOMEONE ELSE BEEN INJURED AS A RESULT OF YOUR DRINKING: NO
HOW OFTEN DO YOU HAVE A DRINK CONTAINING ALCOHOL: 3

## 2025-01-05 ASSESSMENT — PATIENT HEALTH QUESTIONNAIRE - PHQ9
SUM OF ALL RESPONSES TO PHQ QUESTIONS 1-9: 7
3. TROUBLE FALLING OR STAYING ASLEEP: MORE THAN HALF THE DAYS
SUM OF ALL RESPONSES TO PHQ QUESTIONS 1-9: 7
2. FEELING DOWN, DEPRESSED OR HOPELESS: SEVERAL DAYS
10. IF YOU CHECKED OFF ANY PROBLEMS, HOW DIFFICULT HAVE THESE PROBLEMS MADE IT FOR YOU TO DO YOUR WORK, TAKE CARE OF THINGS AT HOME, OR GET ALONG WITH OTHER PEOPLE: NOT DIFFICULT AT ALL
SUM OF ALL RESPONSES TO PHQ QUESTIONS 1-9: 7
6. FEELING BAD ABOUT YOURSELF - OR THAT YOU ARE A FAILURE OR HAVE LET YOURSELF OR YOUR FAMILY DOWN: NOT AT ALL
SUM OF ALL RESPONSES TO PHQ9 QUESTIONS 1 & 2: 2
5. POOR APPETITE OR OVEREATING: NOT AT ALL
SUM OF ALL RESPONSES TO PHQ QUESTIONS 1-9: 7
7. TROUBLE CONCENTRATING ON THINGS, SUCH AS READING THE NEWSPAPER OR WATCHING TELEVISION: SEVERAL DAYS
9. THOUGHTS THAT YOU WOULD BE BETTER OFF DEAD, OR OF HURTING YOURSELF: NOT AT ALL
8. MOVING OR SPEAKING SO SLOWLY THAT OTHER PEOPLE COULD HAVE NOTICED. OR THE OPPOSITE, BEING SO FIGETY OR RESTLESS THAT YOU HAVE BEEN MOVING AROUND A LOT MORE THAN USUAL: NOT AT ALL
1. LITTLE INTEREST OR PLEASURE IN DOING THINGS: SEVERAL DAYS
4. FEELING TIRED OR HAVING LITTLE ENERGY: MORE THAN HALF THE DAYS

## 2025-01-06 ENCOUNTER — TELEPHONE (OUTPATIENT)
Dept: FAMILY MEDICINE CLINIC | Age: 70
End: 2025-01-06

## 2025-01-06 ENCOUNTER — HOSPITAL ENCOUNTER (OUTPATIENT)
Age: 70
Discharge: HOME | End: 2025-01-06
Payer: MEDICARE

## 2025-01-06 DIAGNOSIS — E11.9 TYPE 2 DIABETES MELLITUS WITHOUT COMPLICATION, WITHOUT LONG-TERM CURRENT USE OF INSULIN (HCC): ICD-10-CM

## 2025-01-06 DIAGNOSIS — Z12.5 SCREENING FOR PROSTATE CANCER: ICD-10-CM

## 2025-01-06 DIAGNOSIS — R35.1 NOCTURIA: ICD-10-CM

## 2025-01-06 DIAGNOSIS — E11.59 TYPE 2 DIABETES MELLITUS WITH OTHER CIRCULATORY COMPLICATION, WITHOUT LONG-TERM CURRENT USE OF INSULIN (HCC): Primary | ICD-10-CM

## 2025-01-06 DIAGNOSIS — G62.9 NEUROPATHY: ICD-10-CM

## 2025-01-06 LAB
ALBUMIN SERPL BCG-MCNC: 4.3 G/DL (ref 3.5–5.1)
ALP SERPL-CCNC: 110 U/L (ref 38–126)
ALT SERPL W/O P-5'-P-CCNC: 19 U/L (ref 11–66)
ANION GAP SERPL CALC-SCNC: 13 MEQ/L (ref 8–16)
AST SERPL-CCNC: 15 U/L (ref 5–40)
BASOPHILS ABSOLUTE: 0 THOU/MM3 (ref 0–0.1)
BASOPHILS NFR BLD AUTO: 0.6 %
BILIRUB SERPL-MCNC: 0.4 MG/DL (ref 0.3–1.2)
BUN SERPL-MCNC: 12 MG/DL (ref 7–22)
CALCIUM SERPL-MCNC: 10 MG/DL (ref 8.5–10.5)
CHLORIDE SERPL-SCNC: 102 MEQ/L (ref 98–111)
CO2 SERPL-SCNC: 26 MEQ/L (ref 23–33)
CREAT SERPL-MCNC: 1.1 MG/DL (ref 0.4–1.2)
DEPRECATED RDW RBC AUTO: 46.8 FL (ref 35–45)
EOSINOPHIL NFR BLD AUTO: 2.6 %
EOSINOPHILS ABSOLUTE: 0.2 THOU/MM3 (ref 0–0.4)
ERYTHROCYTE [DISTWIDTH] IN BLOOD BY AUTOMATED COUNT: 13.5 % (ref 11.5–14.5)
GFR SERPL CREATININE-BSD FRML MDRD: 73 ML/MIN/1.73M2
GLUCOSE SERPL-MCNC: 209 MG/DL (ref 70–108)
HCT VFR BLD AUTO: 51.2 % (ref 42–52)
HGB BLD-MCNC: 16.6 GM/DL (ref 14–18)
IMM GRANULOCYTES # BLD AUTO: 0.05 THOU/MM3 (ref 0–0.07)
IMM GRANULOCYTES NFR BLD AUTO: 0.6 %
LYMPHOCYTES ABSOLUTE: 1.6 THOU/MM3 (ref 1–4.8)
LYMPHOCYTES NFR BLD AUTO: 19.8 %
MCH RBC QN AUTO: 30.7 PG (ref 26–33)
MCHC RBC AUTO-ENTMCNC: 32.4 GM/DL (ref 32.2–35.5)
MCV RBC AUTO: 94.8 FL (ref 80–94)
MONOCYTES ABSOLUTE: 0.5 THOU/MM3 (ref 0.4–1.3)
MONOCYTES NFR BLD AUTO: 6.7 %
NEUTROPHILS ABSOLUTE: 5.6 THOU/MM3 (ref 1.8–7.7)
NEUTROPHILS NFR BLD AUTO: 69.7 %
NRBC BLD AUTO-RTO: 0 /100 WBC
PLATELET # BLD AUTO: 197 THOU/MM3 (ref 130–400)
PMV BLD AUTO: 8.9 FL (ref 9.4–12.4)
POTASSIUM SERPL-SCNC: 4.7 MEQ/L (ref 3.5–5.2)
PROT SERPL-MCNC: 7.9 G/DL (ref 6.1–8)
PSA SERPL-MCNC: 0.32 NG/ML (ref 0–1)
RBC # BLD AUTO: 5.4 MILL/MM3 (ref 4.7–6.1)
SODIUM SERPL-SCNC: 141 MEQ/L (ref 135–145)
WBC # BLD AUTO: 8 THOU/MM3 (ref 4.8–10.8)

## 2025-01-06 PROCEDURE — 85025 COMPLETE CBC W/AUTO DIFF WBC: CPT

## 2025-01-06 PROCEDURE — 80053 COMPREHEN METABOLIC PANEL: CPT

## 2025-01-06 PROCEDURE — 36415 COLL VENOUS BLD VENIPUNCTURE: CPT

## 2025-01-06 PROCEDURE — 84153 ASSAY OF PSA TOTAL: CPT

## 2025-01-06 NOTE — TELEPHONE ENCOUNTER
----- Message from GERARDO Chaparro CNP sent at 1/6/2025  1:33 PM EST -----  Pt FBS very elevated at 209, verify he is taking jardiance 25 mg, actos 15 mg, and metformin 1000 mg bid, and januvia 100 mg daily, also call labs and see if they can an A1c on his current labs.

## 2025-01-06 NOTE — TELEPHONE ENCOUNTER
Left message on answering machine. Requested pt to call back at [phone], at their earliest convenience.

## 2025-01-06 NOTE — TELEPHONE ENCOUNTER
Pt confirmed he is taking all medications as directed. The jardiance, actos, metformin and januvia. I called the lab and they can add the A1C, just need the order in please. Thank you.

## 2025-01-08 ENCOUNTER — OFFICE VISIT (OUTPATIENT)
Dept: FAMILY MEDICINE CLINIC | Age: 70
End: 2025-01-08

## 2025-01-08 VITALS
SYSTOLIC BLOOD PRESSURE: 128 MMHG | OXYGEN SATURATION: 91 % | BODY MASS INDEX: 26.69 KG/M2 | RESPIRATION RATE: 16 BRPM | WEIGHT: 201.4 LBS | HEIGHT: 73 IN | DIASTOLIC BLOOD PRESSURE: 68 MMHG | TEMPERATURE: 98.1 F | HEART RATE: 71 BPM

## 2025-01-08 DIAGNOSIS — Z99.81 OXYGEN DEPENDENT: ICD-10-CM

## 2025-01-08 DIAGNOSIS — G47.00 INSOMNIA, UNSPECIFIED TYPE: ICD-10-CM

## 2025-01-08 DIAGNOSIS — Z00.00 MEDICARE ANNUAL WELLNESS VISIT, SUBSEQUENT: ICD-10-CM

## 2025-01-08 DIAGNOSIS — K21.9 GASTROESOPHAGEAL REFLUX DISEASE WITHOUT ESOPHAGITIS: ICD-10-CM

## 2025-01-08 DIAGNOSIS — F32.4 MAJOR DEPRESSIVE DISORDER WITH SINGLE EPISODE, IN PARTIAL REMISSION (HCC): ICD-10-CM

## 2025-01-08 DIAGNOSIS — Z13.6 SCREENING FOR CARDIOVASCULAR CONDITION: ICD-10-CM

## 2025-01-08 DIAGNOSIS — G62.9 NEUROPATHY: ICD-10-CM

## 2025-01-08 DIAGNOSIS — J30.1 NON-SEASONAL ALLERGIC RHINITIS DUE TO POLLEN: ICD-10-CM

## 2025-01-08 DIAGNOSIS — Z00.01 ENCOUNTER FOR GENERAL ADULT MEDICAL EXAMINATION WITH ABNORMAL FINDINGS: Primary | ICD-10-CM

## 2025-01-08 DIAGNOSIS — J44.9 CHRONIC OBSTRUCTIVE PULMONARY DISEASE, UNSPECIFIED COPD TYPE (HCC): ICD-10-CM

## 2025-01-08 DIAGNOSIS — J84.112 IDIOPATHIC DIFFUSE INTERSTITIAL PULMONARY FIBROSIS (HCC): ICD-10-CM

## 2025-01-08 DIAGNOSIS — E11.9 TYPE 2 DIABETES MELLITUS WITHOUT COMPLICATION, WITHOUT LONG-TERM CURRENT USE OF INSULIN (HCC): ICD-10-CM

## 2025-01-08 DIAGNOSIS — I10 PRIMARY HYPERTENSION: ICD-10-CM

## 2025-01-08 DIAGNOSIS — J84.10 PULMONARY FIBROSIS (HCC): ICD-10-CM

## 2025-01-08 PROBLEM — J84.9 INTERSTITIAL LUNG DISEASE (HCC): Status: RESOLVED | Noted: 2024-01-10 | Resolved: 2025-01-08

## 2025-01-08 RX ORDER — PIOGLITAZONE 30 MG/1
30 TABLET ORAL DAILY
Qty: 90 TABLET | Refills: 1 | Status: SHIPPED | OUTPATIENT
Start: 2025-01-08

## 2025-01-08 RX ORDER — GABAPENTIN 300 MG/1
300 CAPSULE ORAL 2 TIMES DAILY
Qty: 180 CAPSULE | Refills: 1 | Status: SHIPPED | OUTPATIENT
Start: 2025-01-08 | End: 2025-07-07

## 2025-01-08 RX ORDER — AMITRIPTYLINE HYDROCHLORIDE 50 MG/1
50 TABLET ORAL NIGHTLY
Qty: 90 TABLET | Refills: 4 | Status: SHIPPED | OUTPATIENT
Start: 2025-01-08

## 2025-01-08 SDOH — ECONOMIC STABILITY: FOOD INSECURITY: WITHIN THE PAST 12 MONTHS, THE FOOD YOU BOUGHT JUST DIDN'T LAST AND YOU DIDN'T HAVE MONEY TO GET MORE.: NEVER TRUE

## 2025-01-08 SDOH — ECONOMIC STABILITY: FOOD INSECURITY: WITHIN THE PAST 12 MONTHS, YOU WORRIED THAT YOUR FOOD WOULD RUN OUT BEFORE YOU GOT MONEY TO BUY MORE.: NEVER TRUE

## 2025-01-08 NOTE — PROGRESS NOTES
Medicare Annual Wellness Visit    Dilan Pappas is here for Medicare AWV (No concerns )    Assessment & Plan   Encounter for general adult medical examination with abnormal findings  Medicare annual wellness visit, subsequent  -     [facility] Referral to [facility] Clinical Specialist  Screening for cardiovascular condition  -     [place] Intensive Behavior Counseling for Cardiovascular Diseases, 8-15 minutes []  Gastroesophageal reflux disease without esophagitis  At goal continue PPI and gERD diet   T2DM without long term use of insulin   Assessment & Plan:   Chronic, not at goal (unstable), improving will adjust meds at today's visit  actos increased to 30 mg daily continue other oral diabetic meds  Orders:  -     SITagliptin (JANUVIA) 100 MG tablet; Take 1 tablet by mouth daily, Disp-90 tablet, R-4Normal  -     pioglitazone (ACTOS) 30 MG tablet; Take 1 tablet by mouth daily, Disp-90 tablet, R-1Normal  Non-seasonal allergic rhinitis due to pollen  Controlled with current meds  Primary hypertension  At goal continue with cardiology   Chronic obstructive pulmonary disease, unspecified COPD type (HCC)  Stable continue with pulmonary and inhalers trelegy daliresp and albuterol and oxygen   Major depressive disorder with single episode, in partial remission (HCC)  At goal continues  Pulmonary Fibrosis C)  Assessment & Plan:   Monitored by specialist- no acute findings meriting change in the plan  Oxygen dependent  Idiopathic diffuse interstitial pulmonary fibrosis (HCC)  Assessment & Plan:   Monitored by specialist- no acute findings meriting change in the plan  Neuropathy  -     gabapen  Not at goal increase gabapentin to 300 mg bid   times daily for 180 days. Intended supply: 90 days, Disp-180 capsule, R-1Normal  Insomnia, unspecified type  -     amitriptyline (ELAVIL) 50 MG tablet; Take 1 tablet by mouth nightly, Disp-90 tablet, R-4Normal  Controlled     Pt in agreement with plan   Pt with chronic co morbidities requiring

## 2025-01-08 NOTE — PATIENT INSTRUCTIONS
taking.  Encourage a balanced diet that includes whole grains, vegetables, and fruits, and that is low in saturated fat and sodium.  Encourage the person you're caring for not to use tobacco products. They can affect dental and general health.  Many older adults have a fixed income and feel that they can't afford dental care. But most towns and cities have programs in which dentists help older adults by lowering fees. Contact your area's public health offices or  for information about dental care in your area.  Using a toothbrush  Older adults with arthritis sometimes have trouble brushing their teeth because they can't easily hold the toothbrush. Their hands and fingers may be stiff, painful, or weak. If this is the case, you can:  Offer an electric toothbrush.  Enlarge the handle of a non-electric toothbrush by wrapping a sponge, an elastic bandage, or adhesive tape around it.  Push the toothbrush handle through a ball made of rubber or soft foam.  Make the handle longer and thicker by taping Popsicle sticks or tongue depressors to it.  You may also be able to buy special toothbrushes, toothpaste dispensers, and floss holders.  Your doctor may recommend a soft-bristle toothbrush if the person you care for bleeds easily. Bleeding can happen because of a health problem or from certain medicines.  A toothpaste for sensitive teeth may help if the person you care for has sensitive teeth.  How do you brush and floss someone's teeth?  If the person you are caring for has a hard time cleaning their teeth on their own, you may need to brush and floss their teeth for them. It may be easiest to have the person sit and face away from you, and to sit or stand behind them. That way you can steady their head against your arm as you reach around to floss and brush their teeth. Choose a place that has good lighting and is comfortable for both of you.  Before you begin, gather your supplies. You will need gloves,

## 2025-01-09 ENCOUNTER — OFFICE VISIT (OUTPATIENT)
Dept: FAMILY MEDICINE CLINIC | Age: 70
End: 2025-01-09

## 2025-01-09 ENCOUNTER — CLINICAL DOCUMENTATION (OUTPATIENT)
Dept: SPIRITUAL SERVICES | Age: 70
End: 2025-01-09

## 2025-01-09 VITALS
HEART RATE: 76 BPM | OXYGEN SATURATION: 91 % | TEMPERATURE: 97.5 F | RESPIRATION RATE: 18 BRPM | HEIGHT: 73 IN | DIASTOLIC BLOOD PRESSURE: 66 MMHG | SYSTOLIC BLOOD PRESSURE: 124 MMHG | WEIGHT: 201 LBS | BODY MASS INDEX: 26.64 KG/M2

## 2025-01-09 DIAGNOSIS — M50.90 CERVICAL NECK PAIN WITH EVIDENCE OF DISC DISEASE: ICD-10-CM

## 2025-01-09 DIAGNOSIS — J44.9 CHRONIC OBSTRUCTIVE PULMONARY DISEASE, UNSPECIFIED COPD TYPE (HCC): ICD-10-CM

## 2025-01-09 DIAGNOSIS — J96.11 CHRONIC RESPIRATORY FAILURE WITH HYPOXIA: ICD-10-CM

## 2025-01-09 DIAGNOSIS — E11.9 TYPE 2 DIABETES MELLITUS WITHOUT COMPLICATION, WITHOUT LONG-TERM CURRENT USE OF INSULIN (HCC): ICD-10-CM

## 2025-01-09 DIAGNOSIS — Z99.81 OXYGEN DEPENDENT: ICD-10-CM

## 2025-01-09 DIAGNOSIS — Z01.818 PREOPERATIVE CLEARANCE: Primary | ICD-10-CM

## 2025-01-09 DIAGNOSIS — E88.01 ALPHA-1-ANTITRYPSIN DEFICIENCY (HCC): ICD-10-CM

## 2025-01-09 DIAGNOSIS — R73.9 HYPERGLYCEMIA: ICD-10-CM

## 2025-01-09 DIAGNOSIS — R26.81 UNSTEADY GAIT WHEN WALKING: ICD-10-CM

## 2025-01-09 DIAGNOSIS — R91.8 LUNG INFILTRATE: ICD-10-CM

## 2025-01-09 RX ORDER — LEVOFLOXACIN 500 MG/1
500 TABLET, FILM COATED ORAL DAILY
Qty: 7 TABLET | Refills: 0 | Status: SHIPPED | OUTPATIENT
Start: 2025-01-09 | End: 2025-01-16

## 2025-01-09 NOTE — PROGRESS NOTES
albuterol (PROVENTIL) (2.5 MG/3ML) 0.083% nebulizer solution USE 3 ML BY NEBULIZATION FOUR TIMES DAILY AS NEEDED FOR WHEEZING 90 each 3    pantoprazole (PROTONIX) 40 MG tablet TAKE 1 TABLET BY MOUTH DAILY 90 tablet 3    lisinopril (PRINIVIL;ZESTRIL) 2.5 MG tablet Take 1 tablet by mouth daily 90 tablet 3    blood glucose monitor strips Test one times a day dispense freestyle strips 100 strip 4    blood glucose monitor supplies Alcohol Swabs, Lancets,  Test one times a day Freestyle meter and strips 1 each 0    empagliflozin (JARDIANCE) 25 MG tablet Take 1 tablet by mouth daily 90 tablet 4    metoprolol tartrate (LOPRESSOR) 25 MG tablet TAKE 1/2 TABLET BY MOUTH TWICE DAILY HOLD IF HEART RATE LESS THAN 60 90 tablet 3    QUEtiapine (SEROQUEL) 200 MG tablet TAKE 1 TABLET BY MOUTH AT BEDTIME 90 tablet 3    OXYGEN Inhale into the lungs 3L/MIN/NC CONT      Nebulizers (COMPRESSOR/NEBULIZER) MISC 1 each by Does not apply route in the morning and at bedtime 1 each 3    Handicap Placard MISC by Does not apply route Duration: 5 years 1 each 0    aspirin 81 MG chewable tablet TAKE 1 TABLET BY MOUTH DAILY 90 tablet 3    azelastine (ASTELIN) 0.1 % nasal spray SPRAY ONCE IN EACH NOSTRIL TWICE DAILY AS DIRECTED 1 each 3    Alcohol Swabs PADS 1 each by Does not apply route 2 times daily 100 each 3    Elastic Bandages & Supports (B & B A-2 ATHLETIC SUPPORTER) MISC 1 Device by Does not apply route continuous prn (prn pain) 1 each 0     No current facility-administered medications for this visit.       he has not been taking systemic glucocorticoid this past year    CARDIAC RISK FACTORS  he does not have a history of UA or MI within that past 30 days  he does not have decompensated CHF or significant valvular disease  he does not have a history of significant cardiac arrhythmia  he denies chest pain, exertional dyspnea, orthopnea, palpitations or LE edema.    he can walk up 1 flight of stairs or 8 steps without stopping (4 METS)    he

## 2025-01-09 NOTE — ACP (ADVANCE CARE PLANNING)
Advance Care Planning   Ambulatory ACP Specialist Patient Outreach    Date:  1/9/2025    ACP Specialist:  Nelsy Colin    Outreach call to patient in follow-up to ACP Specialist referral from:Eric Orozco APRN - CNP    [x] PCP  [] Provider   [] Ambulatory Care Management [] Other     For:                  [x] Advance Directive Assistance              [x] Complete Portable DNR order              [x] Complete POST/POLST/MOST              [x] Code Status Discussion             [x] Discuss Goals of Care             [x] Early ACP Decision-Making              [] Other (Specify)    Date Referral Received: 1/8/2025    Next Step:   [x] ACP scheduled conversation  [] Outreach again in one week               [x] Email / Mail ACP Info Sheets  [x] Email / Mail Advance Directive   [] Closing referral.  Routing closure to referring provider/staff and to ACP Specialist .    [] Closure letter mailed to patient with invitation to contact ACP Specialist if / when ready.   [] Other (Specify here):       [x] At this time, Healthcare Decision Maker Is:         Primary Decision Maker: Suhas Pappas - Child - [phone]    Primary Decision Maker: Jolynn Pappas - Child - [phone]      [] Primary agent named in scanned advance directive.    [x] Legal Next of Kin.     [] Unable to determine legal decision maker at this time.    Outreaches:       [x] 1st -  Date:  1/9/2025               Intervention:  [] Spoke with Patient   [x] Left Voice mail [x] Email / Mail    [] MyChart  [] Other (Specify) :     Outcomes:  Writer attempted ACP outreach to the one number listed for both - patient's home and mobile ([phone]) - no answer.  Writer left voicemail requesting return call including call back number.  ACP outreach sent to patient's email address on file with a copy of [place] [employer] forms and ACP information sheets \"What is Advance Care Planning\" and \"How to Choose a Health Care Agent\" (ACP Coordinator contact information

## 2025-01-13 NOTE — PROGRESS NOTES
note that portions of this note may have been completed with a voice recognition program. Efforts were made to edit the dictation but occasionally words are mis-transcribed)

## 2025-01-14 ENCOUNTER — OFFICE VISIT (OUTPATIENT)
Dept: PULMONOLOGY | Age: 70
End: 2025-01-14
Payer: MEDICARE

## 2025-01-14 ENCOUNTER — CLINICAL DOCUMENTATION (OUTPATIENT)
Dept: SPIRITUAL SERVICES | Age: 70
End: 2025-01-14

## 2025-01-14 VITALS
TEMPERATURE: 98.9 F | SYSTOLIC BLOOD PRESSURE: 122 MMHG | DIASTOLIC BLOOD PRESSURE: 64 MMHG | HEART RATE: 84 BPM | WEIGHT: 205 LBS | BODY MASS INDEX: 27.17 KG/M2 | HEIGHT: 73 IN | OXYGEN SATURATION: 95 %

## 2025-01-14 DIAGNOSIS — J84.10 COMBINED PULMONARY FIBROSIS AND EMPHYSEMA (CPFE) (HCC): ICD-10-CM

## 2025-01-14 DIAGNOSIS — J44.9 STAGE 1 MILD COPD BY GOLD CLASSIFICATION (HCC): Primary | ICD-10-CM

## 2025-01-14 DIAGNOSIS — J96.11 CHRONIC RESPIRATORY FAILURE WITH HYPOXIA: ICD-10-CM

## 2025-01-14 DIAGNOSIS — J43.9 COMBINED PULMONARY FIBROSIS AND EMPHYSEMA (CPFE) (HCC): ICD-10-CM

## 2025-01-14 DIAGNOSIS — Z01.811 PREOP RESPIRATORY EXAM: ICD-10-CM

## 2025-01-14 DIAGNOSIS — J47.9 BRONCHIECTASIS WITHOUT COMPLICATION (HCC): ICD-10-CM

## 2025-01-14 DIAGNOSIS — Z87.891 FORMER SMOKER: ICD-10-CM

## 2025-01-14 PROCEDURE — 3078F DIAST BP <80 MM HG: CPT

## 2025-01-14 PROCEDURE — 1123F ACP DISCUSS/DSCN MKR DOCD: CPT

## 2025-01-14 PROCEDURE — 99214 OFFICE O/P EST MOD 30 MIN: CPT

## 2025-01-14 PROCEDURE — 3074F SYST BP LT 130 MM HG: CPT

## 2025-01-14 PROCEDURE — 1159F MED LIST DOCD IN RCRD: CPT

## 2025-01-14 ASSESSMENT — ENCOUNTER SYMPTOMS
SINUS PAIN: 0
CHEST TIGHTNESS: 0
RHINORRHEA: 0
SHORTNESS OF BREATH: 1
WHEEZING: 1
COUGH: 1
SINUS PRESSURE: 0

## 2025-01-14 NOTE — ACP (ADVANCE CARE PLANNING)
Advance Care Planning    Advance Care Planning Clinical Specialist  Conversation Note    Date of ACP Conversation: 1/14/2025    ACP Clinical Specialist: HECTOR Abraham    Referral Date:01/08/2025    Received by: PCP    Conversation Conducted with: Patient with decision making capacity      Current Designated Decision Maker/s:    Primary Decision Maker: Suhas Pappas - Child - [phone]    Secondary Decision Maker: Jolynn Pappas - Child - [phone]      Care Preferences Communicated    Code Status:  If the patient's heart were to stop beating, in their present state of health, the patient's CPR Preference: Yes, attempt CPR and reviewed and pt wishes to remain FULL CODE at this time.    If their health worsens and it becomes clear that the chance of recovery is unlikely, the patient's CPR Preference: No, allow a natural death (No CPR) and pt shared he would not want cpr to continue if in a terminal, irreversible condition as defined in the living will.       Ventilator:  If the patient, in their present state of health, suddenly became very ill and unable to breathe on their own, the patient desires the use of a ventilator (intubation).    If their health worsens and it becomes clear that the chance of recovery is unlikely,  Pt shared he would not want long-term, \"permanent\", ventilator use.     [x] Yes  [] No   Educated Patient / Decision Maker regarding differences between advance directives and portable DNR orders.    Conversation Summary:   Met with pt to offer ACP support. Explored pt understanding and offered ACP education. Reviewed pt wishes for naming HCDM. Assisted pt with completing HCPOA where he named son and daughter as healthcare agents; see outline above. Reviewed pt understanding of the living will and educated pt this is for documenting future healthcare decisions (clarified this is not about estate planning). Care preferences were reviewed as outlined above. Pt does wish to remain a FULL CODE

## 2025-01-14 NOTE — PROGRESS NOTES
PAT VISIT  Appointment reminder call given  Date:  1/16  Arrival time: 13:30 and location; [facility]   Bring Drivers license and insurance  Bring Medications in original bottles  Please be ready to give Urine Sample  If possible bring caregiver for appointment  Take am medications with water unless you are holding any for surgery  Appointment may last 2 hours

## 2025-01-15 ENCOUNTER — ANESTHESIA EVENT (OUTPATIENT)
Dept: OPERATING ROOM | Age: 70
End: 2025-01-15
Payer: MEDICARE

## 2025-01-15 ENCOUNTER — TELEPHONE (OUTPATIENT)
Dept: FAMILY MEDICINE CLINIC | Age: 70
End: 2025-01-15

## 2025-01-15 DIAGNOSIS — G47.00 INSOMNIA, UNSPECIFIED TYPE: ICD-10-CM

## 2025-01-15 DIAGNOSIS — I10 ESSENTIAL HYPERTENSION: ICD-10-CM

## 2025-01-15 RX ORDER — QUETIAPINE FUMARATE 200 MG/1
TABLET, FILM COATED ORAL
Qty: 90 TABLET | Refills: 3 | Status: SHIPPED | OUTPATIENT
Start: 2025-01-15 | End: 2025-01-15 | Stop reason: SDUPTHER

## 2025-01-15 RX ORDER — METOPROLOL TARTRATE 25 MG/1
TABLET, FILM COATED ORAL
Qty: 90 TABLET | Refills: 3 | Status: SHIPPED | OUTPATIENT
Start: 2025-01-15

## 2025-01-15 NOTE — TELEPHONE ENCOUNTER
Recent Visits  Date Type Provider Dept   01/09/25 Office Visit Orozco, Jan, GERARDO - CNP [facility]   01/08/25 Office Visit Orozco, Jan, GERARDO - CNP [facility]   10/07/24 Office Visit Roozco, Jan, GERARDO - CNP [facility]   04/10/24 Office Visit Orozco, Jan, GERARDO - CNP [facility]   01/10/24 Office Visit Orozco, JanGERARDO - CNP [facility]   12/13/23 Office Visit Eric OrozcoGERARDO - CNP [facility]   11/28/23 Office Visit Eric Orozco, APRN - CNP [facility]   Showing recent visits within past 540 days with a meds authorizing provider and meeting all other requirements  Future Appointments  Date Type Provider Dept   04/09/25 Appointment Orozco, Jan, APRN - CNP [facility]   Showing future appointments within next 150 days with a meds authorizing provider and meeting all other requirements

## 2025-01-15 NOTE — TELEPHONE ENCOUNTER
Is pt confusing this with another med? Or has someone else ordered it for him In the past such as Pulmonary?  I can order trazodone for hm to sleep if he would like

## 2025-01-15 NOTE — TELEPHONE ENCOUNTER
Recent Visits  Date Type Provider Dept   01/09/25 Office Visit Orozco, Jan, GERARDO - CNP [facility]   01/08/25 Office Visit Orozco, Jan, GERARDO - CNP [facility]   10/07/24 Office Visit Orozco, Jan, GERARDO - CNP [facility]   04/10/24 Office Visit Orozco, Jan, GERARDO - CNP [facility]   01/10/24 Office Visit Orozco, JanGERARDO - CNP [facility]   12/13/23 Office Visit Eric OrozcoGERARDO - CNP [facility]   11/28/23 Office Visit Eric Orozco, APRN - CNP [facility]   Showing recent visits within past 540 days with a meds authorizing provider and meeting all other requirements  Future Appointments  Date Type Provider Dept   04/09/25 Appointment Orozco, Jan, APRN - CNP [facility]   Showing future appointments within next 150 days with a meds authorizing provider and meeting all other requirements

## 2025-01-15 NOTE — PROGRESS NOTES
Sent FYI for high risk pulomonary  Dr Duncan from anesthesia ok to proceed. With understanding  He may need post op vent.l

## 2025-01-15 NOTE — TELEPHONE ENCOUNTER
Patient was talking about the Quetiapine 200 mg, has refills at the pharmacy I advised he call them and ask before giving us a call back.     No trazodone/klonopin is needed, patient was confused. Had him spell the medication for me

## 2025-01-15 NOTE — TELEPHONE ENCOUNTER
Pt called in stating he is needing Klonopin sent in for help sleeping. I however did not see this on him medication list to pend.         Please advise thank you

## 2025-01-16 RX ORDER — QUETIAPINE FUMARATE 200 MG/1
TABLET, FILM COATED ORAL
Qty: 90 TABLET | Refills: 3 | Status: SHIPPED | OUTPATIENT
Start: 2025-01-16

## 2025-01-16 NOTE — TELEPHONE ENCOUNTER
Recent Visits  Date Type Provider Dept   01/09/25 Office Visit Orozco, Jan, GEARRDO - CNP [facility]   01/08/25 Office Visit Orozco, Jan, GERARDO - CNP [facility]   10/07/24 Office Visit Orozco, Jan, GERARDO - CNP [facility]   04/10/24 Office Visit Orozco, Jan, GERARDO - CNP [facility]   01/10/24 Office Visit Orozco, JanGERARDO - CNP [facility]   12/13/23 Office Visit Eric OrozcoGERARDO - CNP [facility]   11/28/23 Office Visit Eric Orozco, APRN - CNP [facility]   Showing recent visits within past 540 days with a meds authorizing provider and meeting all other requirements  Future Appointments  Date Type Provider Dept   04/09/25 Appointment Orozco, Jan, APRN - CNP [facility]   Showing future appointments within next 150 days with a meds authorizing provider and meeting all other requirements

## 2025-01-22 ENCOUNTER — HOSPITAL ENCOUNTER (OUTPATIENT)
Age: 70
Discharge: HOME | End: 2025-01-23
Attending: ORTHOPAEDIC SURGERY | Admitting: ORTHOPAEDIC SURGERY
Payer: MEDICARE

## 2025-01-22 ENCOUNTER — ANESTHESIA (OUTPATIENT)
Dept: OPERATING ROOM | Age: 70
End: 2025-01-22
Payer: MEDICARE

## 2025-01-22 ENCOUNTER — APPOINTMENT (OUTPATIENT)
Dept: GENERAL RADIOLOGY | Age: 70
End: 2025-01-22
Attending: ORTHOPAEDIC SURGERY
Payer: MEDICARE

## 2025-01-22 DIAGNOSIS — Z98.1 S/P CERVICAL SPINAL FUSION: Primary | ICD-10-CM

## 2025-01-22 PROBLEM — M48.02 SPINAL STENOSIS OF CERVICAL REGION WITH RADICULOPATHY: Status: ACTIVE | Noted: 2025-01-22

## 2025-01-22 PROBLEM — M54.12 SPINAL STENOSIS OF CERVICAL REGION WITH RADICULOPATHY: Status: ACTIVE | Noted: 2025-01-22

## 2025-01-22 LAB
ABO GROUP BLD: NORMAL
GLUCOSE BLD STRIP.AUTO-MCNC: 168 MG/DL (ref 70–108)
GLUCOSE BLD STRIP.AUTO-MCNC: 208 MG/DL (ref 70–108)
IAT IGG-SP REAG SERPL QL: NORMAL
POTASSIUM SERPL-SCNC: 4 MEQ/L (ref 3.5–5.2)
RH BLD: NORMAL

## 2025-01-22 PROCEDURE — 3700000000 HC ANESTHESIA ATTENDED CARE: Performed by: ORTHOPAEDIC SURGERY

## 2025-01-22 PROCEDURE — 2709999900 HC NON-CHARGEABLE SUPPLY: Performed by: ORTHOPAEDIC SURGERY

## 2025-01-22 PROCEDURE — 6370000000 HC RX 637 (ALT 250 FOR IP): Performed by: PHYSICIAN ASSISTANT

## 2025-01-22 PROCEDURE — 7100000000 HC PACU RECOVERY - FIRST 15 MIN: Performed by: ORTHOPAEDIC SURGERY

## 2025-01-22 PROCEDURE — 2720000010 HC SURG SUPPLY STERILE: Performed by: ORTHOPAEDIC SURGERY

## 2025-01-22 PROCEDURE — 6360000002 HC RX W HCPCS: Performed by: ORTHOPAEDIC SURGERY

## 2025-01-22 PROCEDURE — 2580000003 HC RX 258: Performed by: PHYSICIAN ASSISTANT

## 2025-01-22 PROCEDURE — 6360000002 HC RX W HCPCS: Performed by: ANESTHESIOLOGY

## 2025-01-22 PROCEDURE — 6360000002 HC RX W HCPCS

## 2025-01-22 PROCEDURE — 86901 BLOOD TYPING SEROLOGIC RH(D): CPT

## 2025-01-22 PROCEDURE — 6360000002 HC RX W HCPCS: Performed by: PHYSICIAN ASSISTANT

## 2025-01-22 PROCEDURE — 3600000014 HC SURGERY LEVEL 4 ADDTL 15MIN: Performed by: ORTHOPAEDIC SURGERY

## 2025-01-22 PROCEDURE — 3600000004 HC SURGERY LEVEL 4 BASE: Performed by: ORTHOPAEDIC SURGERY

## 2025-01-22 PROCEDURE — L0120 CERV FLEX N/ADJ FOAM PRE OTS: HCPCS | Performed by: ORTHOPAEDIC SURGERY

## 2025-01-22 PROCEDURE — 36415 COLL VENOUS BLD VENIPUNCTURE: CPT

## 2025-01-22 PROCEDURE — C1713 ANCHOR/SCREW BN/BN,TIS/BN: HCPCS | Performed by: ORTHOPAEDIC SURGERY

## 2025-01-22 PROCEDURE — 72020 X-RAY EXAM OF SPINE 1 VIEW: CPT

## 2025-01-22 PROCEDURE — 86885 COOMBS TEST INDIRECT QUAL: CPT

## 2025-01-22 PROCEDURE — 6370000000 HC RX 637 (ALT 250 FOR IP): Performed by: NURSE ANESTHETIST, CERTIFIED REGISTERED

## 2025-01-22 PROCEDURE — 94640 AIRWAY INHALATION TREATMENT: CPT

## 2025-01-22 PROCEDURE — 86900 BLOOD TYPING SEROLOGIC ABO: CPT

## 2025-01-22 PROCEDURE — 84132 ASSAY OF SERUM POTASSIUM: CPT

## 2025-01-22 PROCEDURE — 82948 REAGENT STRIP/BLOOD GLUCOSE: CPT

## 2025-01-22 PROCEDURE — 2500000003 HC RX 250 WO HCPCS: Performed by: PHYSICIAN ASSISTANT

## 2025-01-22 PROCEDURE — 6370000000 HC RX 637 (ALT 250 FOR IP): Performed by: ANESTHESIOLOGY

## 2025-01-22 PROCEDURE — 3700000001 HC ADD 15 MINUTES (ANESTHESIA): Performed by: ORTHOPAEDIC SURGERY

## 2025-01-22 PROCEDURE — 6360000002 HC RX W HCPCS: Performed by: NURSE ANESTHETIST, CERTIFIED REGISTERED

## 2025-01-22 PROCEDURE — C1889 IMPLANT/INSERT DEVICE, NOC: HCPCS | Performed by: ORTHOPAEDIC SURGERY

## 2025-01-22 PROCEDURE — 7100000010 HC PHASE II RECOVERY - FIRST 15 MIN: Performed by: ORTHOPAEDIC SURGERY

## 2025-01-22 PROCEDURE — 7100000001 HC PACU RECOVERY - ADDTL 15 MIN: Performed by: ORTHOPAEDIC SURGERY

## 2025-01-22 PROCEDURE — 7100000011 HC PHASE II RECOVERY - ADDTL 15 MIN: Performed by: ORTHOPAEDIC SURGERY

## 2025-01-22 PROCEDURE — 2500000003 HC RX 250 WO HCPCS: Performed by: NURSE ANESTHETIST, CERTIFIED REGISTERED

## 2025-01-22 DEVICE — 3.5MM VARIABLE SCREW, SELF DRILLING, 14MM
Type: IMPLANTABLE DEVICE | Site: NECK | Status: FUNCTIONAL
Brand: SHORELINE ACS

## 2025-01-22 DEVICE — NO PROFILE SPACER, 16X14X8MM, 7 DEGREE, 3D
Type: IMPLANTABLE DEVICE | Site: NECK | Status: FUNCTIONAL
Brand: 3D PRINTED INTERBODY SYSTEMS

## 2025-01-22 DEVICE — 8-12MM, NO PROFILE LOCKING COVER
Type: IMPLANTABLE DEVICE | Site: NECK | Status: FUNCTIONAL
Brand: SHORELINE ACS

## 2025-01-22 DEVICE — GRAFT BNE MED: Type: IMPLANTABLE DEVICE | Site: NECK | Status: FUNCTIONAL

## 2025-01-22 DEVICE — IMPLANTABLE DEVICE: Type: IMPLANTABLE DEVICE | Site: NECK | Status: FUNCTIONAL

## 2025-01-22 RX ORDER — ACETAMINOPHEN 325 MG/1
650 TABLET ORAL EVERY 6 HOURS
Status: DISCONTINUED | OUTPATIENT
Start: 2025-01-22 | End: 2025-01-23 | Stop reason: HOSPADM

## 2025-01-22 RX ORDER — GUAIFENESIN 600 MG/1
600 TABLET, EXTENDED RELEASE ORAL 2 TIMES DAILY
Status: DISCONTINUED | OUTPATIENT
Start: 2025-01-22 | End: 2025-01-23 | Stop reason: HOSPADM

## 2025-01-22 RX ORDER — BUDESONIDE AND FORMOTEROL FUMARATE DIHYDRATE 80; 4.5 UG/1; UG/1
2 AEROSOL RESPIRATORY (INHALATION)
Status: DISCONTINUED | OUTPATIENT
Start: 2025-01-22 | End: 2025-01-23 | Stop reason: HOSPADM

## 2025-01-22 RX ORDER — FENTANYL CITRATE 50 UG/ML
50 INJECTION, SOLUTION INTRAMUSCULAR; INTRAVENOUS EVERY 5 MIN PRN
Status: DISCONTINUED | OUTPATIENT
Start: 2025-01-22 | End: 2025-01-22 | Stop reason: HOSPADM

## 2025-01-22 RX ORDER — SODIUM CHLORIDE 0.9 % (FLUSH) 0.9 %
5-40 SYRINGE (ML) INJECTION PRN
Status: DISCONTINUED | OUTPATIENT
Start: 2025-01-22 | End: 2025-01-22 | Stop reason: HOSPADM

## 2025-01-22 RX ORDER — ONDANSETRON 4 MG/1
4 TABLET, ORALLY DISINTEGRATING ORAL EVERY 8 HOURS PRN
Status: DISCONTINUED | OUTPATIENT
Start: 2025-01-22 | End: 2025-01-23 | Stop reason: HOSPADM

## 2025-01-22 RX ORDER — SODIUM CHLORIDE 0.9 % (FLUSH) 0.9 %
5-40 SYRINGE (ML) INJECTION PRN
Status: DISCONTINUED | OUTPATIENT
Start: 2025-01-22 | End: 2025-01-23 | Stop reason: HOSPADM

## 2025-01-22 RX ORDER — PROPOFOL 10 MG/ML
INJECTION, EMULSION INTRAVENOUS
Status: DISCONTINUED | OUTPATIENT
Start: 2025-01-22 | End: 2025-01-22 | Stop reason: SDUPTHER

## 2025-01-22 RX ORDER — AZELASTINE 1 MG/ML
1 SPRAY, METERED NASAL 2 TIMES DAILY
Status: DISCONTINUED | OUTPATIENT
Start: 2025-01-22 | End: 2025-01-22 | Stop reason: CLARIF

## 2025-01-22 RX ORDER — EPHEDRINE SULFATE/0.9% NACL/PF 25 MG/5 ML
SYRINGE (ML) INTRAVENOUS
Status: DISCONTINUED | OUTPATIENT
Start: 2025-01-22 | End: 2025-01-22 | Stop reason: SDUPTHER

## 2025-01-22 RX ORDER — BUPROPION HYDROCHLORIDE 150 MG/1
150 TABLET ORAL EVERY MORNING
Status: DISCONTINUED | OUTPATIENT
Start: 2025-01-23 | End: 2025-01-23 | Stop reason: HOSPADM

## 2025-01-22 RX ORDER — ALBUTEROL SULFATE 0.83 MG/ML
2.5 SOLUTION RESPIRATORY (INHALATION) EVERY 6 HOURS PRN
Status: DISCONTINUED | OUTPATIENT
Start: 2025-01-22 | End: 2025-01-23 | Stop reason: HOSPADM

## 2025-01-22 RX ORDER — LIDOCAINE HYDROCHLORIDE 20 MG/ML
INJECTION, SOLUTION INTRAVENOUS
Status: DISCONTINUED | OUTPATIENT
Start: 2025-01-22 | End: 2025-01-22 | Stop reason: SDUPTHER

## 2025-01-22 RX ORDER — SODIUM CHLORIDE 9 MG/ML
INJECTION, SOLUTION INTRAVENOUS CONTINUOUS
Status: DISCONTINUED | OUTPATIENT
Start: 2025-01-22 | End: 2025-01-22 | Stop reason: HOSPADM

## 2025-01-22 RX ORDER — GABAPENTIN 300 MG/1
300 CAPSULE ORAL 2 TIMES DAILY
Status: DISCONTINUED | OUTPATIENT
Start: 2025-01-22 | End: 2025-01-23 | Stop reason: HOSPADM

## 2025-01-22 RX ORDER — ATORVASTATIN CALCIUM 40 MG/1
40 TABLET, FILM COATED ORAL DAILY
Status: DISCONTINUED | OUTPATIENT
Start: 2025-01-22 | End: 2025-01-23 | Stop reason: HOSPADM

## 2025-01-22 RX ORDER — FENTANYL CITRATE 50 UG/ML
INJECTION, SOLUTION INTRAMUSCULAR; INTRAVENOUS
Status: DISCONTINUED | OUTPATIENT
Start: 2025-01-22 | End: 2025-01-22 | Stop reason: SDUPTHER

## 2025-01-22 RX ORDER — SODIUM CHLORIDE 0.9 % (FLUSH) 0.9 %
5-40 SYRINGE (ML) INJECTION EVERY 12 HOURS SCHEDULED
Status: DISCONTINUED | OUTPATIENT
Start: 2025-01-22 | End: 2025-01-22 | Stop reason: HOSPADM

## 2025-01-22 RX ORDER — SODIUM CHLORIDE 9 MG/ML
INJECTION, SOLUTION INTRAVENOUS PRN
Status: DISCONTINUED | OUTPATIENT
Start: 2025-01-22 | End: 2025-01-23 | Stop reason: HOSPADM

## 2025-01-22 RX ORDER — PANTOPRAZOLE SODIUM 40 MG/1
40 TABLET, DELAYED RELEASE ORAL
Status: DISCONTINUED | OUTPATIENT
Start: 2025-01-23 | End: 2025-01-23 | Stop reason: HOSPADM

## 2025-01-22 RX ORDER — ALBUTEROL SULFATE 90 UG/1
2 INHALANT RESPIRATORY (INHALATION) EVERY 4 HOURS PRN
Status: DISCONTINUED | OUTPATIENT
Start: 2025-01-22 | End: 2025-01-23 | Stop reason: HOSPADM

## 2025-01-22 RX ORDER — LIDOCAINE HYDROCHLORIDE AND EPINEPHRINE 10; 10 MG/ML; UG/ML
INJECTION, SOLUTION INFILTRATION; PERINEURAL PRN
Status: DISCONTINUED | OUTPATIENT
Start: 2025-01-22 | End: 2025-01-22 | Stop reason: ALTCHOICE

## 2025-01-22 RX ORDER — ONDANSETRON 2 MG/ML
4 INJECTION INTRAMUSCULAR; INTRAVENOUS EVERY 6 HOURS PRN
Status: DISCONTINUED | OUTPATIENT
Start: 2025-01-22 | End: 2025-01-23 | Stop reason: HOSPADM

## 2025-01-22 RX ORDER — DEXAMETHASONE SODIUM PHOSPHATE 10 MG/ML
INJECTION, EMULSION INTRAMUSCULAR; INTRAVENOUS
Status: DISCONTINUED | OUTPATIENT
Start: 2025-01-22 | End: 2025-01-22 | Stop reason: SDUPTHER

## 2025-01-22 RX ORDER — LISINOPRIL 2.5 MG/1
2.5 TABLET ORAL DAILY
Status: DISCONTINUED | OUTPATIENT
Start: 2025-01-22 | End: 2025-01-23 | Stop reason: HOSPADM

## 2025-01-22 RX ORDER — POLYETHYLENE GLYCOL 3350 17 G/17G
17 POWDER, FOR SOLUTION ORAL DAILY
Status: DISCONTINUED | OUTPATIENT
Start: 2025-01-22 | End: 2025-01-23 | Stop reason: HOSPADM

## 2025-01-22 RX ORDER — METOPROLOL TARTRATE 25 MG/1
25 TABLET, FILM COATED ORAL 2 TIMES DAILY
Status: DISCONTINUED | OUTPATIENT
Start: 2025-01-22 | End: 2025-01-23 | Stop reason: HOSPADM

## 2025-01-22 RX ORDER — FENTANYL CITRATE 50 UG/ML
INJECTION, SOLUTION INTRAMUSCULAR; INTRAVENOUS
Status: COMPLETED
Start: 2025-01-22 | End: 2025-01-22

## 2025-01-22 RX ORDER — ONDANSETRON 2 MG/ML
INJECTION INTRAMUSCULAR; INTRAVENOUS
Status: DISCONTINUED | OUTPATIENT
Start: 2025-01-22 | End: 2025-01-22 | Stop reason: SDUPTHER

## 2025-01-22 RX ORDER — CYCLOBENZAPRINE HCL 10 MG
10 TABLET ORAL 3 TIMES DAILY PRN
Status: DISCONTINUED | OUTPATIENT
Start: 2025-01-22 | End: 2025-01-23 | Stop reason: HOSPADM

## 2025-01-22 RX ORDER — PIOGLITAZONE 30 MG/1
30 TABLET ORAL DAILY
Status: DISCONTINUED | OUTPATIENT
Start: 2025-01-22 | End: 2025-01-23 | Stop reason: HOSPADM

## 2025-01-22 RX ORDER — SODIUM CHLORIDE 9 MG/ML
INJECTION, SOLUTION INTRAVENOUS CONTINUOUS
Status: DISCONTINUED | OUTPATIENT
Start: 2025-01-22 | End: 2025-01-23 | Stop reason: HOSPADM

## 2025-01-22 RX ORDER — AMITRIPTYLINE HYDROCHLORIDE 50 MG/1
50 TABLET ORAL NIGHTLY
Status: DISCONTINUED | OUTPATIENT
Start: 2025-01-22 | End: 2025-01-23 | Stop reason: HOSPADM

## 2025-01-22 RX ORDER — ALOGLIPTIN 25 MG/1
25 TABLET, FILM COATED ORAL DAILY
Status: DISCONTINUED | OUTPATIENT
Start: 2025-01-22 | End: 2025-01-23 | Stop reason: HOSPADM

## 2025-01-22 RX ORDER — MIDAZOLAM HYDROCHLORIDE 1 MG/ML
INJECTION, SOLUTION INTRAMUSCULAR; INTRAVENOUS
Status: DISCONTINUED | OUTPATIENT
Start: 2025-01-22 | End: 2025-01-22 | Stop reason: SDUPTHER

## 2025-01-22 RX ORDER — LIDOCAINE HYDROCHLORIDE 40 MG/ML
SOLUTION TOPICAL
Status: DISCONTINUED | OUTPATIENT
Start: 2025-01-22 | End: 2025-01-22 | Stop reason: SDUPTHER

## 2025-01-22 RX ORDER — ROCURONIUM BROMIDE 10 MG/ML
INJECTION, SOLUTION INTRAVENOUS
Status: DISCONTINUED | OUTPATIENT
Start: 2025-01-22 | End: 2025-01-22 | Stop reason: SDUPTHER

## 2025-01-22 RX ORDER — OXYCODONE HYDROCHLORIDE 5 MG/1
10 TABLET ORAL EVERY 4 HOURS PRN
Status: DISCONTINUED | OUTPATIENT
Start: 2025-01-22 | End: 2025-01-23 | Stop reason: HOSPADM

## 2025-01-22 RX ORDER — QUETIAPINE FUMARATE 100 MG/1
200 TABLET, FILM COATED ORAL NIGHTLY
Status: DISCONTINUED | OUTPATIENT
Start: 2025-01-22 | End: 2025-01-23 | Stop reason: HOSPADM

## 2025-01-22 RX ORDER — SENNA AND DOCUSATE SODIUM 50; 8.6 MG/1; MG/1
1 TABLET, FILM COATED ORAL 2 TIMES DAILY
Status: DISCONTINUED | OUTPATIENT
Start: 2025-01-22 | End: 2025-01-23 | Stop reason: HOSPADM

## 2025-01-22 RX ORDER — MINERAL OIL AND WHITE PETROLATUM 150; 830 MG/G; MG/G
OINTMENT OPHTHALMIC
Status: DISCONTINUED | OUTPATIENT
Start: 2025-01-22 | End: 2025-01-22 | Stop reason: SDUPTHER

## 2025-01-22 RX ORDER — OXYCODONE HYDROCHLORIDE 5 MG/1
5 TABLET ORAL EVERY 4 HOURS PRN
Status: DISCONTINUED | OUTPATIENT
Start: 2025-01-22 | End: 2025-01-23 | Stop reason: HOSPADM

## 2025-01-22 RX ORDER — SODIUM CHLORIDE 0.9 % (FLUSH) 0.9 %
5-40 SYRINGE (ML) INJECTION EVERY 12 HOURS SCHEDULED
Status: DISCONTINUED | OUTPATIENT
Start: 2025-01-22 | End: 2025-01-23 | Stop reason: HOSPADM

## 2025-01-22 RX ORDER — DEXAMETHASONE SODIUM PHOSPHATE 4 MG/ML
8 INJECTION, SOLUTION INTRA-ARTICULAR; INTRALESIONAL; INTRAMUSCULAR; INTRAVENOUS; SOFT TISSUE EVERY 8 HOURS
Status: COMPLETED | OUTPATIENT
Start: 2025-01-22 | End: 2025-01-23

## 2025-01-22 RX ADMIN — PHENYLEPHRINE HYDROCHLORIDE 200 MCG: 10 INJECTION INTRAVENOUS at 09:55

## 2025-01-22 RX ADMIN — METOPROLOL TARTRATE 25 MG: 25 TABLET, FILM COATED ORAL at 21:17

## 2025-01-22 RX ADMIN — PHENYLEPHRINE HYDROCHLORIDE 200 MCG: 10 INJECTION INTRAVENOUS at 10:41

## 2025-01-22 RX ADMIN — GUAIFENESIN 600 MG: 600 TABLET ORAL at 21:17

## 2025-01-22 RX ADMIN — EPHEDRINE SULFATE 5 MG: 5 INJECTION INTRAVENOUS at 09:36

## 2025-01-22 RX ADMIN — LISINOPRIL 2.5 MG: 2.5 TABLET ORAL at 19:23

## 2025-01-22 RX ADMIN — MINERAL OIL AND WHITE PETROLATUM 2 APPLICATOR: 150; 830 OINTMENT OPHTHALMIC at 09:30

## 2025-01-22 RX ADMIN — FENTANYL CITRATE 50 MCG: 50 INJECTION, SOLUTION INTRAMUSCULAR; INTRAVENOUS at 11:25

## 2025-01-22 RX ADMIN — METFORMIN HYDROCHLORIDE 1000 MG: 500 TABLET ORAL at 19:23

## 2025-01-22 RX ADMIN — DEXAMETHASONE SODIUM PHOSPHATE 8 MG: 4 INJECTION, SOLUTION INTRA-ARTICULAR; INTRALESIONAL; INTRAMUSCULAR; INTRAVENOUS; SOFT TISSUE at 21:17

## 2025-01-22 RX ADMIN — PHENYLEPHRINE HYDROCHLORIDE 200 MCG: 10 INJECTION INTRAVENOUS at 09:35

## 2025-01-22 RX ADMIN — ATORVASTATIN CALCIUM 40 MG: 40 TABLET, FILM COATED ORAL at 19:23

## 2025-01-22 RX ADMIN — AMITRIPTYLINE HYDROCHLORIDE 50 MG: 50 TABLET ORAL at 21:17

## 2025-01-22 RX ADMIN — FENTANYL CITRATE 50 MCG: 50 INJECTION, SOLUTION INTRAMUSCULAR; INTRAVENOUS at 11:30

## 2025-01-22 RX ADMIN — FENTANYL CITRATE 100 MCG: 50 INJECTION INTRAMUSCULAR; INTRAVENOUS at 09:20

## 2025-01-22 RX ADMIN — PHENYLEPHRINE HYDROCHLORIDE 200 MCG: 10 INJECTION INTRAVENOUS at 10:31

## 2025-01-22 RX ADMIN — LIDOCAINE HYDROCHLORIDE 50 MG: 20 INJECTION, SOLUTION INTRAVENOUS at 09:21

## 2025-01-22 RX ADMIN — PHENYLEPHRINE HYDROCHLORIDE 200 MCG: 10 INJECTION INTRAVENOUS at 09:42

## 2025-01-22 RX ADMIN — QUETIAPINE FUMARATE 200 MG: 100 TABLET ORAL at 21:17

## 2025-01-22 RX ADMIN — SODIUM CHLORIDE: 9 INJECTION, SOLUTION INTRAVENOUS at 10:42

## 2025-01-22 RX ADMIN — DEXAMETHASONE SODIUM PHOSPHATE 10 MG: 10 INJECTION, EMULSION INTRAMUSCULAR; INTRAVENOUS at 09:30

## 2025-01-22 RX ADMIN — SUGAMMADEX 200 MG: 100 INJECTION, SOLUTION INTRAVENOUS at 10:51

## 2025-01-22 RX ADMIN — OXYCODONE 10 MG: 5 TABLET ORAL at 21:16

## 2025-01-22 RX ADMIN — ACETAMINOPHEN 650 MG: 325 TABLET ORAL at 19:21

## 2025-01-22 RX ADMIN — TRANEXAMIC ACID 1000 MG: 100 INJECTION, SOLUTION INTRAVENOUS at 09:43

## 2025-01-22 RX ADMIN — ALOGLIPTIN 25 MG: 25 TABLET, FILM COATED ORAL at 19:23

## 2025-01-22 RX ADMIN — EPHEDRINE SULFATE 10 MG: 5 INJECTION INTRAVENOUS at 09:32

## 2025-01-22 RX ADMIN — PHENYLEPHRINE HYDROCHLORIDE 200 MCG: 10 INJECTION INTRAVENOUS at 10:28

## 2025-01-22 RX ADMIN — FENTANYL CITRATE 50 MCG: 50 INJECTION INTRAMUSCULAR; INTRAVENOUS at 10:41

## 2025-01-22 RX ADMIN — PHENYLEPHRINE HYDROCHLORIDE 200 MCG: 10 INJECTION INTRAVENOUS at 10:36

## 2025-01-22 RX ADMIN — EPHEDRINE SULFATE 5 MG: 5 INJECTION INTRAVENOUS at 10:36

## 2025-01-22 RX ADMIN — PHENYLEPHRINE HYDROCHLORIDE 200 MCG: 10 INJECTION INTRAVENOUS at 09:39

## 2025-01-22 RX ADMIN — ONDANSETRON 4 MG: 2 INJECTION INTRAMUSCULAR; INTRAVENOUS at 09:30

## 2025-01-22 RX ADMIN — OXYCODONE 10 MG: 5 TABLET ORAL at 15:23

## 2025-01-22 RX ADMIN — FENTANYL CITRATE 50 MCG: 50 INJECTION INTRAMUSCULAR; INTRAVENOUS at 10:19

## 2025-01-22 RX ADMIN — Medication 4 UNITS: at 07:59

## 2025-01-22 RX ADMIN — LIDOCAINE HYDROCHLORIDE 4 ML: 40 SOLUTION TOPICAL at 09:23

## 2025-01-22 RX ADMIN — PROPOFOL 200 MG: 10 INJECTION, EMULSION INTRAVENOUS at 09:21

## 2025-01-22 RX ADMIN — SODIUM CHLORIDE, PRESERVATIVE FREE 10 ML: 5 INJECTION INTRAVENOUS at 21:15

## 2025-01-22 RX ADMIN — EPHEDRINE SULFATE 10 MG: 5 INJECTION INTRAVENOUS at 10:33

## 2025-01-22 RX ADMIN — MIDAZOLAM 2 MG: 1 INJECTION INTRAMUSCULAR; INTRAVENOUS at 09:18

## 2025-01-22 RX ADMIN — FENTANYL CITRATE 50 MCG: 50 INJECTION INTRAMUSCULAR; INTRAVENOUS at 10:32

## 2025-01-22 RX ADMIN — BUDESONIDE AND FORMOTEROL FUMARATE DIHYDRATE 2 PUFF: 80; 4.5 AEROSOL RESPIRATORY (INHALATION) at 22:11

## 2025-01-22 RX ADMIN — ROCURONIUM BROMIDE 30 MG: 10 INJECTION INTRAVENOUS at 09:54

## 2025-01-22 RX ADMIN — EPHEDRINE SULFATE 10 MG: 5 INJECTION INTRAVENOUS at 10:30

## 2025-01-22 RX ADMIN — SODIUM CHLORIDE: 9 INJECTION, SOLUTION INTRAVENOUS at 07:39

## 2025-01-22 RX ADMIN — SENNOSIDES, DOCUSATE SODIUM 1 TABLET: 50; 8.6 TABLET, FILM COATED ORAL at 21:16

## 2025-01-22 RX ADMIN — CEFAZOLIN 2000 MG: 2 INJECTION, POWDER, FOR SOLUTION INTRAVENOUS at 21:16

## 2025-01-22 RX ADMIN — WATER 2000 MG: 1 INJECTION INTRAMUSCULAR; INTRAVENOUS; SUBCUTANEOUS at 09:41

## 2025-01-22 RX ADMIN — Medication 4.8 MG: at 21:32

## 2025-01-22 RX ADMIN — PIOGLITAZONE 30 MG: 30 TABLET ORAL at 19:23

## 2025-01-22 RX ADMIN — EPHEDRINE SULFATE 10 MG: 5 INJECTION INTRAVENOUS at 09:38

## 2025-01-22 RX ADMIN — GABAPENTIN 300 MG: 300 CAPSULE ORAL at 21:17

## 2025-01-22 RX ADMIN — PROPOFOL 50 MCG/KG/MIN: 10 INJECTION, EMULSION INTRAVENOUS at 09:50

## 2025-01-22 RX ADMIN — PHENYLEPHRINE HYDROCHLORIDE 200 MCG: 10 INJECTION INTRAVENOUS at 09:46

## 2025-01-22 RX ADMIN — ROCURONIUM BROMIDE 50 MG: 10 INJECTION INTRAVENOUS at 09:21

## 2025-01-22 ASSESSMENT — PAIN DESCRIPTION - ORIENTATION
ORIENTATION: POSTERIOR
ORIENTATION: POSTERIOR

## 2025-01-22 ASSESSMENT — PAIN DESCRIPTION - LOCATION
LOCATION: HEAD;NECK
LOCATION: THROAT
LOCATION: NECK;THROAT
LOCATION: THROAT
LOCATION: NECK
LOCATION: NECK
LOCATION: THROAT

## 2025-01-22 ASSESSMENT — PAIN DESCRIPTION - PAIN TYPE
TYPE: SURGICAL PAIN
TYPE: SURGICAL PAIN

## 2025-01-22 ASSESSMENT — PAIN SCALES - GENERAL
PAINLEVEL_OUTOF10: 4
PAINLEVEL_OUTOF10: 5
PAINLEVEL_OUTOF10: 2
PAINLEVEL_OUTOF10: 8
PAINLEVEL_OUTOF10: 5
PAINLEVEL_OUTOF10: 5
PAINLEVEL_OUTOF10: 7
PAINLEVEL_OUTOF10: 5
PAINLEVEL_OUTOF10: 7
PAINLEVEL_OUTOF10: 5
PAINLEVEL_OUTOF10: 7

## 2025-01-22 ASSESSMENT — LIFESTYLE VARIABLES: SMOKING_STATUS: 1

## 2025-01-22 ASSESSMENT — PAIN DESCRIPTION - DESCRIPTORS
DESCRIPTORS: ACHING

## 2025-01-22 ASSESSMENT — PAIN SCALES - WONG BAKER
WONGBAKER_NUMERICALRESPONSE: HURTS LITTLE MORE
WONGBAKER_NUMERICALRESPONSE: HURTS LITTLE MORE

## 2025-01-22 ASSESSMENT — PAIN DESCRIPTION - ONSET: ONSET: ON-GOING

## 2025-01-22 ASSESSMENT — PAIN DESCRIPTION - FREQUENCY: FREQUENCY: CONTINUOUS

## 2025-01-22 ASSESSMENT — PAIN - FUNCTIONAL ASSESSMENT: PAIN_FUNCTIONAL_ASSESSMENT: ACTIVITIES ARE NOT PREVENTED

## 2025-01-22 NOTE — H&P
I have reviewed the history and physical and examined the patient.  I find no relevant changes.  I have reviewed with the patient and/or family members, during the preoperative office visit the risks, benefits, and alternatives to the procedure.    Lion Barraza MD

## 2025-01-22 NOTE — PROGRESS NOTES
Pt returned to SDS room 6. Vitals and assessment as charted. 0.9 infusing, to count from PACU. Pt has crackers and water. Family at the bedside. Pt and family verbalized understanding of discharge criteria and call light use. Call light in reach.

## 2025-01-22 NOTE — OP NOTE
[place] [id number]                            OPERATIVE REPORT      PATIENT NAME: WAYLON CRAWFORD               : 1955  MED REC NO: [id number]                       ROOM: [facility]                                               (General) POOL                                               RM    ACCOUNT NO: [id number]                       ADMIT DATE: 2025  PROVIDER: Lion Barraza MD      DATE OF PROCEDURE:  2025    SURGEON:  Lion Barraza MD    PREOPERATIVE DIAGNOSES:    1. Cervical spondylosis and disk herniation at level of C3-C4.  2. Left-sided neck and shoulder pain with a cervical myelopathy.  3. Status post previous anterior cervical discectomy and fusion, C4 through C7 in year 2016 under our care.    POSTOPERATIVE DIAGNOSES:    1. Cervical spondylosis and disk herniation at level of C3-C4.  2. Left-sided neck and shoulder pain with a cervical myelopathy.  3. Status post previous anterior cervical discectomy and fusion, C4 through C7 in year  under our care.    PROCEDURES:    1. Anterior cervical diskectomy of level C3-C4 with complete uncus decompression and clearance of canal stenosis for level of C3-C4.  2. Anterior cervical interbody fusion of level of C3-C4.  3. Insertion of interbody cage fixation, the [facility] [employer] system, 8 x 16 x 40 mm size at level of C3-C4, fixed with two 14 mm screws.  4. Use of allograft bone grafting, 2 mL of FiberX by Xtant and 2.5 mL of OsteoFactor by ProteiOS growth factor for the C3-C4 fusion.    ASSISTANT:  Juan Manuel Schaefer PA-C, who has assisted with the patient positioning, prepping, draping, surgical retraction, decompressive diskectomy, interbody fusion and cage fixation, wound closure, patient transfer, and all aspects of operation.    ANESTHESIA:  General.    BLOOD LOSS:  20 mL.    DRAINS:  Daryl Medina.    COMPLICATIONS:  Zero.    INDICATIONS:  The patient is 69 years  on to fit very well and was opened to the field sterilely.  I used one of these interbody cages and packed this with use of a DBM and bone growth factor mixture, so that the level of C3-C4 was well supported and contained adequate amounts of bone for fusion at level of C3-C4.  This was seated at the appropriate depth and then we placed 2 screws, 1 upgoing in C3 and 1 downgoing in C4, each of them measuring 40 mm in length from the variable angle and self drilling system.  These screws set very well maintaining very good bony purchase, and then the final cap was placed across the anterior aspect of this cage which fit very well from the Telx system, a titanium cage which locked the screws into place.  Irrigation was complete.  Wound suctioned dry.  Confirmation of no soft tissue entrapment was made, and then we irrigated very thoroughly and took x-rays in the room.  These showed well-placed grafting and cage fixation across level of C3-C4.  We finished irrigating, suctioned dry, placed a JENNIFER drain and closed in layers, and finished the dressing application and then an Aspen collar was applied.  We then awakened the patient and took him back to recovery postextubation without complication or difficulty.  My first assistant has also been Juan Manuel Schaefer PA-C, as described with no complications.          ABENA HOLLINS MD      D:  01/22/2025 10:58:10     T:  01/22/2025 14:47:16     [facility]/S  Job #:  [id number]     Doc#:  [id number]

## 2025-01-22 NOTE — ANESTHESIA POSTPROCEDURE EVALUATION
Department of Anesthesiology  Postprocedure Note    Patient: Dilan Pappas  MRN: [id number]  YOB: 1955  Date of evaluation: 1/22/2025    Procedure Summary       Date: 01/22/25 Room / Location: [facility] OR  / [facility] OR    Anesthesia Start: 0915 Anesthesia Stop: 1107    Procedure: ACDF C3-C4 (Neck) Diagnosis:       Cervical stenosis of spine      (Cervical stenosis of spine [M48.02])    Surgeons: Lion Barraza MD Responsible Provider: Scott Mayer MD    Anesthesia Type: general ASA Status: 3            Anesthesia Type: No value filed.    Bhaskar Phase I: Bhaskar Score: 9    Bahskar Phase II:      Anesthesia Post Evaluation    Patient location during evaluation: PACU  Patient participation: complete - patient participated  Level of consciousness: awake and alert  Airway patency: patent  Nausea & Vomiting: no nausea and no vomiting  Cardiovascular status: hemodynamically stable  Respiratory status: acceptable  Hydration status: euvolemic  Pain management: adequate    [facility]  POST-ANESTHESIA NOTE       Name:  Dilan Pappas                                         Age:  69 y.o.  MRN:  [id number]      Last Vitals:  BP (!) 157/82   Pulse 70   Temp 97.5 °F (36.4 °C) (Temporal)   Resp 16   SpO2 96%   Patient Vitals for the past 4 hrs:   BP Temp Temp src Pulse Resp SpO2   01/22/25 1315 (!) 157/82 -- -- 70 16 96 %   01/22/25 1230 (!) 145/86 -- -- 63 16 95 %   01/22/25 1200 107/83 97.5 °F (36.4 °C) Temporal 62 16 93 %   01/22/25 1150 (!) 141/87 -- -- 61 16 94 %   01/22/25 1145 133/78 -- -- 57 17 97 %   01/22/25 1140 128/79 97.1 °F (36.2 °C) Temporal 57 14 94 %   01/22/25 1135 127/70 -- -- 55 14 94 %   01/22/25 1130 130/69 -- -- 59 16 96 %   01/22/25 1125 132/75 -- -- 62 14 94 %   01/22/25 1120 124/75 -- -- 62 16 93 %   01/22/25 1115 122/73 -- -- 62 16 93 %   01/22/25 1110 121/75 -- -- 70 18 94 %   01/22/25 1105 137/73 97.1 °F (36.2 °C) Temporal 64 16 97 %       Level of Consciousness:

## 2025-01-22 NOTE — PROGRESS NOTES
Pt admitted to  7 via bed from same day surgery.     Complaints: None.      IV normal saline infusing into the hand left, condition patent and no redness at a rate of 100 mls/ hour with about 600 mls in the bag still. IV site free of s/s of infection or infiltration. Vital signs obtained. Assessment and data collection initiated.     Two nurse skin assessment performed by Ladonna RN and Kim RN. Oriented to room.     Explained patients right to have family, representative or physician notified of their admission.  Patient has Declined for physician to be notified.  Patient has Declined for family/representative to be notified.    The patient is interested in [facility]’s meds to beds program?:  No    Policies and procedures for  explained. All questions answered with no further questions at this time. Fall prevention and safety brochure discussed with patient.  Bed alarm on. Call light in reach.

## 2025-01-22 NOTE — PROGRESS NOTES
Pharmacy Note    Dilan Pappas was ordered azelastine (ASTELIN) 0.1% nasal spray. Per the [facility] Formulary Committee Policy, this medication is non-formulary and not stocked by pharmacy for the reason indicated below. The medication can be reordered at discharge.     Medications that lack necessity during an acute hospital stay:      -  nasal antihistamines    Thank you,  Selam Bryson [facility]  1/22/2025, 5:42 PM

## 2025-01-22 NOTE — PROGRESS NOTES
Per [facility] approved formulary policy.     SGLT2's are only formulary with the indication of CKD or CHF therefore:    Please note that the  Empagliflozin (Jardiance) is non-formulary with indications of type 2 diabetes and has been discontinued while inpatient. If you feel the patient needs to continue their home therapy during the inpatient stay, the patient may bring their medication bottle for verification and administration pursuant to our home medication use policy.      Please contact the pharmacy with any questions or concerns.  Thank you.  Selam Bryson RPH, RPallison/PharmD 1/22/2025 5:40 PM

## 2025-01-22 NOTE — PROGRESS NOTES
Admitted to Same Day Surgery and oriented to the unit. Patient verbalized approval for first name, last initial and physician name on the unit white board. Fall band on patient.

## 2025-01-22 NOTE — BRIEF OP NOTE
Brief Postoperative Note      Patient: Dilan Pappas  YOB: 1955  MRN: [id number]    Date of Procedure: 1/22/2025    Pre-Op Diagnosis Codes:      * Cervical stenosis of spine [M48.02]    Post-Op Diagnosis: Same       Procedure(s):  ACDF C3-C4    Surgeon(s):  Lion Barraza MD    Assistant:  Juan Manuel Schaefer PAC    Anesthesia: General    Estimated Blood Loss (mL): 20 ml     Complications: None    Specimens:   * No specimens in log *    Implants:  Implant Name Type Inv. Item Serial No.  Lot No. LRB No. Used Action   GRAFT BNE MED - [id number]  GRAFT BNE MED [id number] [employer]OLOG[employer]  N/A 1 Implanted   GRAFT BONE FIBER 2 CC DEMINERALIZED CORTICAL INFLUX - [id number]  GRAFT BONE FIBER 2 CC DEMINERALIZED CORTICAL INFLUX  [id number]  N/A 1 Implanted   SPACER SPNL 7 DEG [id number] MM 3D NO PROF - [id number]  SPACER SPNL 7 DEG [id number] MM 3D NO PROF  [employer] [facility] [id number] N/A 1 Implanted         Drains: * No LDAs found *    Findings:  Infection Present At Time Of Surgery (PATOS) (choose all levels that have infection present):  No infection present  Other Findings: same    Electronically signed by Lion Barraza MD on 1/22/2025 at 10:49 AM

## 2025-01-22 NOTE — ANESTHESIA PRE PROCEDURE
Date/Time    WBC 8.0 01/06/2025 10:32 AM    RBC 5.40 01/06/2025 10:32 AM    HGB 16.6 01/06/2025 10:32 AM    HGB 15.2 11/09/2011 11:05 AM    HCT 51.2 01/06/2025 10:32 AM    MCV 94.8 01/06/2025 10:32 AM    RDW 13.8 05/24/2018 11:25 AM     01/06/2025 10:32 AM       CMP:   Lab Results   Component Value Date/Time     01/06/2025 10:32 AM    K 4.0 01/22/2025 07:15 AM    K 4.0 03/13/2022 07:20 PM     01/06/2025 10:32 AM    CO2 26 01/06/2025 10:32 AM    BUN 12 01/06/2025 10:32 AM    CREATININE 1.1 01/06/2025 10:32 AM    LABGLOM 73 01/06/2025 10:32 AM    LABGLOM 75 03/13/2022 07:20 PM    GLUCOSE 209 01/06/2025 10:32 AM    GLUCOSE 141 06/20/2016 10:40 AM    CALCIUM 10.0 01/06/2025 10:32 AM    BILITOT 0.4 01/06/2025 10:32 AM    ALKPHOS 110 01/06/2025 10:32 AM    AST 15 01/06/2025 10:32 AM    ALT 19 01/06/2025 10:32 AM       POC Tests:   Recent Labs     01/22/25  0747   POCGLU 208*       Coags: No results found for: \"PROTIME\", \"INR\", \"APTT\"    HCG (If Applicable): No results found for: \"PREGTESTUR\", \"PREGSERUM\", \"HCG\", \"HCGQUANT\"     ABGs: No results found for: \"PHART\", \"PO2ART\", \"AOL3OWT\", \"XWH1LMA\", \"BEART\", \"F2KQKPFI\"     Type & Screen (If Applicable):  No results found for: \"ABORH\", \"LABANTI\"    Drug/Infectious Status (If Applicable):  Lab Results   Component Value Date/Time    HEPCAB Negative 07/23/2018 08:50 AM       COVID-19 Screening (If Applicable):   Lab Results   Component Value Date/Time    COVID19 Not Detected 12/31/2020 12:35 PM           Anesthesia Evaluation  Patient summary reviewed   no history of anesthetic complications:   Airway: Mallampati: II  TM distance: >3 FB   Neck ROM: full  Mouth opening: > = 3 FB   Dental:    (+) edentulous      Pulmonary:   (+)  COPD:      decreased breath sounds    current smoker                           Cardiovascular:    (+) hypertension:      ECG reviewed      Echocardiogram reviewed                  Neuro/Psych:   (+) neuromuscular disease:, psychiatric

## 2025-01-22 NOTE — H&P
[address] [address]                           HISTORY & PHYSICAL      PATIENT NAME: WAYLON CRAWFORD               : 1955  MED REC NO: [id number]                       ROOM: [facility]                                               (General) POOL                                               RM    ACCOUNT NO: [id number]                       ADMIT DATE: 2025  PROVIDER: Roberto Carlos Pimentel PA-C      PRIMARY CARE PHYSICIAN:  Roberto Carlos Khan CNP    This is a patient of Dr. Lion Barraza, who will be undergoing surgical intervention at [facility] on the date of 2025 which will include an ACDF at C3-C4.    CHIEF COMPLAINT:  Left-sided cervical pain and left shoulder pain.    HISTORY OF PRESENT ILLNESS:  The patient is a 69-year-old male who presented to our office today with significant symptoms of left-sided cervical pain that radiates to the left shoulder, which has been overall chronic in nature but worse over the past 3 years.  He failed to improve despite multiple conservative treatments including recently in the form of physical therapy as well as a pain management approach with epidural and facet injections.  He is a patient who is known to our service who underwent a previous ACDF C4-C7 completed in 2016, overall did well. With his failed conservative treatment, he was worked up with an MRI which demonstrated adjacent spinal canal stenosis at C3-C4 to his previous C4-C7 ACDF. With his failed conservative treatment he elected to proceed with surgical intervention and was surgically cleared by his PCP, Roberto Carlos Khan CNP, as well as pulmonologist Dr. Steele to undergo surgery.    ALLERGIES:  NO KNOWN DRUG ALLERGIES.      PAST MEDICAL HISTORY:  Significant for hypertension, diabetes, depression, GERD, COPD, chronic respiratory failure with hypoxia requiring nasal cannula, pulmonary fibrosis with emphysema,  COPD.    CURRENT MEDICATIONS:  Include ProAir,  Lodine , aspirin which has been held, Trelegy, Seroquel, Lopressor, Protonix, Lipitor, Zanaflex, lisinopril, metformin which has been held, Elavil, and Jardiance which has been held.    PAST SURGICAL HISTORY:  Significant for 4 back surgeries, most recently completed by Dr. Barraza in 2015, is now fused from L3 to S1, previous ACDF C4-C7 in June 2016.    SOCIAL HISTORY:  He currently lives in a private apartment.  He is a former smoker, quit 1 year ago.    REVIEW OF SYSTEMS:  GENERAL: Denies fever, fatigue, or chills.  RESPIRATORY: Admits to shortness of breath, wheezing, and chronic cough.  CARDIOVASCULAR: Denies chest pain, palpitations, or abnormal heart rate.  GASTROINTESTINAL:  Denies nausea, vomiting, diarrhea.  GENITOURINARY: Denies dysuria or bladder incontinence.  MUSCULOSKELETAL:  Significant for cervical pain and left shoulder pain.  Denies low back pain, muscle pain, joint pain, joint swelling.  NEUROLOGIC: Denies fainting, blackouts, or headaches.    PHYSICAL EXAMINATION:  VITAL SIGNS: Most recently dated 11/26/2024 demonstrates height 6 feet 1 inch, weight 205 pounds, BMI 27.05.  GENERAL:  The patient is calm, cooperative, alert and oriented x3, sitting on exam room chair, in no acute distress.  NECK:  Examination of his cervical spine demonstrates well-healed left anterior cervical scar.    HEENT:  He is currently wearing a portable nasal cannula.    EXTREMITIES:  Examination of his bilateral upper extremities reveals skin is warm, dry, and intact with no open wounds or lesions.  Pulses are +2 at the wrists bilaterally.  Sensation is intact to light touch.  Mosher sign negative bilaterally.  Strength is overall maintained at 5/5 in bilateral , finger extension, wrist extension, elbow flexion, elbow extension, and shoulder abduction.  Sensation is intact to light touch.    IMAGING:  MRI cervical spine completed 11/21/2024 at [facility]

## 2025-01-23 VITALS
TEMPERATURE: 97.5 F | HEART RATE: 72 BPM | OXYGEN SATURATION: 92 % | SYSTOLIC BLOOD PRESSURE: 133 MMHG | DIASTOLIC BLOOD PRESSURE: 73 MMHG | RESPIRATION RATE: 17 BRPM

## 2025-01-23 LAB
BASOPHILS ABSOLUTE: 0 THOU/MM3 (ref 0–0.1)
BASOPHILS NFR BLD AUTO: 0.1 %
DEPRECATED RDW RBC AUTO: 44.2 FL (ref 35–45)
EOSINOPHIL NFR BLD AUTO: 0 %
EOSINOPHILS ABSOLUTE: 0 THOU/MM3 (ref 0–0.4)
ERYTHROCYTE [DISTWIDTH] IN BLOOD BY AUTOMATED COUNT: 12.8 % (ref 11.5–14.5)
HCT VFR BLD AUTO: 43.4 % (ref 42–52)
HGB BLD-MCNC: 14.1 GM/DL (ref 14–18)
IMM GRANULOCYTES # BLD AUTO: 0.07 THOU/MM3 (ref 0–0.07)
IMM GRANULOCYTES NFR BLD AUTO: 0.6 %
LYMPHOCYTES ABSOLUTE: 1.2 THOU/MM3 (ref 1–4.8)
LYMPHOCYTES NFR BLD AUTO: 10.9 %
MCH RBC QN AUTO: 30.8 PG (ref 26–33)
MCHC RBC AUTO-ENTMCNC: 32.5 GM/DL (ref 32.2–35.5)
MCV RBC AUTO: 94.8 FL (ref 80–94)
MONOCYTES ABSOLUTE: 0.3 THOU/MM3 (ref 0.4–1.3)
MONOCYTES NFR BLD AUTO: 2.7 %
NEUTROPHILS ABSOLUTE: 9.3 THOU/MM3 (ref 1.8–7.7)
NEUTROPHILS NFR BLD AUTO: 85.7 %
NRBC BLD AUTO-RTO: 0 /100 WBC
PLATELET # BLD AUTO: 223 THOU/MM3 (ref 130–400)
PMV BLD AUTO: 9.1 FL (ref 9.4–12.4)
RBC # BLD AUTO: 4.58 MILL/MM3 (ref 4.7–6.1)
WBC # BLD AUTO: 10.9 THOU/MM3 (ref 4.8–10.8)

## 2025-01-23 PROCEDURE — 2580000003 HC RX 258: Performed by: PHYSICIAN ASSISTANT

## 2025-01-23 PROCEDURE — 96376 TX/PRO/DX INJ SAME DRUG ADON: CPT

## 2025-01-23 PROCEDURE — 2700000000 HC OXYGEN THERAPY PER DAY

## 2025-01-23 PROCEDURE — 97162 PT EVAL MOD COMPLEX 30 MIN: CPT

## 2025-01-23 PROCEDURE — 96375 TX/PRO/DX INJ NEW DRUG ADDON: CPT

## 2025-01-23 PROCEDURE — 85025 COMPLETE CBC W/AUTO DIFF WBC: CPT

## 2025-01-23 PROCEDURE — 6370000000 HC RX 637 (ALT 250 FOR IP): Performed by: PHYSICIAN ASSISTANT

## 2025-01-23 PROCEDURE — 2500000003 HC RX 250 WO HCPCS: Performed by: PHYSICIAN ASSISTANT

## 2025-01-23 PROCEDURE — 36415 COLL VENOUS BLD VENIPUNCTURE: CPT

## 2025-01-23 PROCEDURE — 96374 THER/PROPH/DIAG INJ IV PUSH: CPT

## 2025-01-23 PROCEDURE — 97110 THERAPEUTIC EXERCISES: CPT

## 2025-01-23 PROCEDURE — 6360000002 HC RX W HCPCS: Performed by: PHYSICIAN ASSISTANT

## 2025-01-23 PROCEDURE — 97166 OT EVAL MOD COMPLEX 45 MIN: CPT

## 2025-01-23 PROCEDURE — 97535 SELF CARE MNGMENT TRAINING: CPT

## 2025-01-23 PROCEDURE — 94640 AIRWAY INHALATION TREATMENT: CPT

## 2025-01-23 RX ORDER — OXYCODONE HYDROCHLORIDE 5 MG/1
5 TABLET ORAL EVERY 4 HOURS PRN
Qty: 42 TABLET | Refills: 0 | Status: SHIPPED | OUTPATIENT
Start: 2025-01-23 | End: 2025-01-30

## 2025-01-23 RX ORDER — CYCLOBENZAPRINE HCL 10 MG
10 TABLET ORAL 3 TIMES DAILY PRN
Qty: 50 TABLET | Refills: 0 | Status: SHIPPED | OUTPATIENT
Start: 2025-01-23 | End: 2025-02-02

## 2025-01-23 RX ADMIN — GABAPENTIN 300 MG: 300 CAPSULE ORAL at 10:31

## 2025-01-23 RX ADMIN — PIOGLITAZONE 30 MG: 30 TABLET ORAL at 10:32

## 2025-01-23 RX ADMIN — SODIUM CHLORIDE: 9 INJECTION, SOLUTION INTRAVENOUS at 11:05

## 2025-01-23 RX ADMIN — BUDESONIDE AND FORMOTEROL FUMARATE DIHYDRATE 2 PUFF: 80; 4.5 AEROSOL RESPIRATORY (INHALATION) at 07:44

## 2025-01-23 RX ADMIN — CEFAZOLIN 2000 MG: 2 INJECTION, POWDER, FOR SOLUTION INTRAVENOUS at 04:20

## 2025-01-23 RX ADMIN — SODIUM CHLORIDE, PRESERVATIVE FREE 10 ML: 5 INJECTION INTRAVENOUS at 10:31

## 2025-01-23 RX ADMIN — DEXAMETHASONE SODIUM PHOSPHATE 8 MG: 4 INJECTION, SOLUTION INTRA-ARTICULAR; INTRALESIONAL; INTRAMUSCULAR; INTRAVENOUS; SOFT TISSUE at 13:41

## 2025-01-23 RX ADMIN — PANTOPRAZOLE SODIUM 40 MG: 40 TABLET, DELAYED RELEASE ORAL at 06:49

## 2025-01-23 RX ADMIN — GUAIFENESIN 600 MG: 600 TABLET ORAL at 10:31

## 2025-01-23 RX ADMIN — POLYETHYLENE GLYCOL 3350 17 G: 17 POWDER, FOR SOLUTION ORAL at 10:32

## 2025-01-23 RX ADMIN — DEXAMETHASONE SODIUM PHOSPHATE 8 MG: 4 INJECTION, SOLUTION INTRA-ARTICULAR; INTRALESIONAL; INTRAMUSCULAR; INTRAVENOUS; SOFT TISSUE at 04:20

## 2025-01-23 RX ADMIN — ATORVASTATIN CALCIUM 40 MG: 40 TABLET, FILM COATED ORAL at 10:31

## 2025-01-23 RX ADMIN — OXYCODONE 10 MG: 5 TABLET ORAL at 06:49

## 2025-01-23 RX ADMIN — OXYCODONE 10 MG: 5 TABLET ORAL at 15:11

## 2025-01-23 RX ADMIN — SENNOSIDES, DOCUSATE SODIUM 1 TABLET: 50; 8.6 TABLET, FILM COATED ORAL at 10:33

## 2025-01-23 RX ADMIN — ACETAMINOPHEN 650 MG: 325 TABLET ORAL at 04:19

## 2025-01-23 RX ADMIN — TIOTROPIUM BROMIDE INHALATION SPRAY 2 PUFF: 3.12 SPRAY, METERED RESPIRATORY (INHALATION) at 07:44

## 2025-01-23 RX ADMIN — ALOGLIPTIN 25 MG: 25 TABLET, FILM COATED ORAL at 10:31

## 2025-01-23 RX ADMIN — ACETAMINOPHEN 650 MG: 325 TABLET ORAL at 13:41

## 2025-01-23 RX ADMIN — BUPROPION HYDROCHLORIDE 150 MG: 150 TABLET, EXTENDED RELEASE ORAL at 10:31

## 2025-01-23 RX ADMIN — HYDROMORPHONE HYDROCHLORIDE 0.5 MG: 1 INJECTION, SOLUTION INTRAMUSCULAR; INTRAVENOUS; SUBCUTANEOUS at 04:19

## 2025-01-23 RX ADMIN — METFORMIN HYDROCHLORIDE 1000 MG: 500 TABLET ORAL at 10:32

## 2025-01-23 ASSESSMENT — PAIN DESCRIPTION - ORIENTATION
ORIENTATION: RIGHT;LEFT
ORIENTATION: UPPER
ORIENTATION: RIGHT;LEFT
ORIENTATION: UPPER

## 2025-01-23 ASSESSMENT — PAIN DESCRIPTION - LOCATION
LOCATION: NECK
LOCATION: NECK;HEAD
LOCATION: HEAD
LOCATION: HEAD;NECK

## 2025-01-23 ASSESSMENT — PAIN SCALES - GENERAL
PAINLEVEL_OUTOF10: 8
PAINLEVEL_OUTOF10: 5
PAINLEVEL_OUTOF10: 8
PAINLEVEL_OUTOF10: 7

## 2025-01-23 ASSESSMENT — PAIN DESCRIPTION - DESCRIPTORS
DESCRIPTORS: ACHING;SORE
DESCRIPTORS: ACHING
DESCRIPTORS: ACHING;SORE
DESCRIPTORS: ACHING

## 2025-01-23 NOTE — PLAN OF CARE
Problem: Chronic Conditions and Co-morbidities  Goal: Patient's chronic conditions and co-morbidity symptoms are monitored and maintained or improved  Outcome: Progressing  Flowsheets (Taken 1/22/2025 2108)  Care Plan - Patient's Chronic Conditions and Co-Morbidity Symptoms are Monitored and Maintained or Improved: Monitor and assess patient's chronic conditions and comorbid symptoms for stability, deterioration, or improvement     Problem: Discharge Planning  Goal: Discharge to home or other facility with appropriate resources  Outcome: Progressing  Flowsheets (Taken 1/22/2025 2108)  Discharge to home or other facility with appropriate resources: Identify barriers to discharge with patient and caregiver     Problem: Pain  Goal: Verbalizes/displays adequate comfort level or baseline comfort level  Outcome: Progressing     Problem: Safety - Adult  Goal: Free from fall injury  Outcome: Progressing

## 2025-01-23 NOTE — PROGRESS NOTES
Occupational Therapy  [facility]  INPATIENT OCCUPATIONAL THERAPY  [facility] ORTHOPEDICS 7K  EVALUATION      Discharge Recommendations: Home with assist PRN  Equipment Recommendations: No      Time In: 0800  Time Out: 0832  Timed Code Treatment Minutes: 24 Minutes  Minutes: 32          Date: 2025  Patient Name: Dilan Pappas,   Gender: male      MRN: [id number]  : 1955  (69 y.o.)  Referring Practitioner: Roberto Carlos Pimentel PA-C  Diagnosis: Spinal stenosis of cervical region with radiculopathy  Additional Pertinent Hx: 68 yo M who underwent Anterior cervical diskectomy of level C3-C4 with complete uncus decompression and clearance of canal stenosis for level of C3-C4 and Anterior cervical interbody fusion of level of C3-C4 on  with Dr Barraza.    Restrictions/Precautions:  Restrictions/Precautions: Fall Risk, Surgical Protocols  Required Braces or Orthoses  Cervical: c-collar    Subjective  Chart Reviewed: Yes, Orders, Progress Notes, History and Physical, Operative Notes  Patient assessed for rehabilitation services?: Yes  Family / Caregiver Present: No       Pain: 5/10: neck    Vitals: Vitals not assessed per clinical judgement, see nursing flowsheet    Social/Functional History:  Lives With: Alone  Type of Home: Apartment  Home Layout: One level  Home Access: Level entry  Home Equipment: Cane, Walker - Rolling   Bathroom Shower/Tub: Tub/Shower unit  Bathroom Toilet: Standard  Bathroom Equipment: Grab bars in shower, Toilet raiser       Prior Level of Assist for ADLs: Independent  Prior Level of Assist for Homemaking: Independent  Homemaking Responsibilities: Yes  Prior Level of Assist for Transfers: Independent  Prior Level of Assist for Ambulation: Independent household ambulator, with or without device  Has the patient had two or more falls in the past year or any fall with injury in the past year?: No    Active : Yes     Additional Comments: Pt reports IND with ADLs and IADLs. Pt's

## 2025-01-23 NOTE — PROGRESS NOTES
[facility]  INPATIENT PHYSICAL THERAPY  EVALUATION  [facility] ORTHOPEDICS 7K - [id number]-06/006-A    Discharge Recommendations: Home with Home health PT, Patient would benefit from continued therapy after discharge  Equipment Recommendations:    monitor for needs             Time In: 1051  Time Out: 1108  Timed Code Treatment Minutes: 8 Minutes  Minutes: 17          Date: 2025  Patient Name: Dilan Pappas,  Gender:  male        MRN: [id number]  : 1955  (69 y.o.)      Referring Practitioner: Roberto Carlos Pimentel PA-C  Diagnosis: Spinal stenosis of cervical region with radiculopathy  Additional Pertinent Hx: HISTORY OF PRESENT ILLNESS:  The patient is a 69-year-old male who presented to our office today with significant symptoms of left-sided cervical pain that radiates to the left shoulder, which has been overall chronic in nature but worse over the past 3 years.  He failed to improve despite multiple conservative treatments including recently in the form of physical therapy as well as a pain management approach with epidural and facet injections.  He is a patient who is known to our service who underwent a previous ACDF C4-C7 completed in 2016, overall did well. With his failed conservative treatment, he was worked up with an MRI which demonstrated adjacent spinal canal stenosis at C3-C4 to his previous C4-C7 ACDF. With his failed conservative treatment he elected to proceed with surgical intervention and was surgically cleared by his PCP, Roberto Carlos Khan CNP, as well as pulmonologist Dr. Steele to undergo surgery.  Pt is s/p ACDF C3-C4 by Dr. BARBARA Barraza on 25.     Restrictions/Precautions:  Restrictions/Precautions: Fall Risk, Surgical Protocols            Required Braces or Orthoses?: Yes  Cervical: c-collar      Subjective:  Chart Reviewed: Yes  Patient assessed for rehabilitation services?: Yes  Subjective: Pt sitting up in recliner and RN present noting BP as low and recommended return to

## 2025-01-23 NOTE — CARE COORDINATION
Case Management Assessment Initial Evaluation    Date/Time of Evaluation: 1/23/2025 7:22 AM  Assessment Completed by: Isabella Monae RN    If patient is discharged prior to next notation, then this note serves as note for discharge by case management.    Patient Name: Dilan Pappas                   YOB: 1955  Diagnosis: Cervical stenosis of spine [M48.02]  Spinal stenosis of cervical region with radiculopathy [M48.02, M54.12]                   Date / Time: 1/22/2025  5:56 AM  Location: [address] [place] [address]     Patient Admission Status: Outpatient in a bed   Readmission Risk Low 0-14, Mod 15-19), High > 20: No data recorded  Current PCP: Eric Orozco APRN - CNP  Health Care Decision Makers:   Primary Decision Maker: Suhas Pappas - Child - [phone]    Secondary Decision Maker: Jolynn Pappas - Child - [phone]    Additional Case Management Notes: planned surgery with Dr Barraza    Barrier to discharge: low B/P 88/48, IVF, cervical collar care, PT/OT to see.    Procedures: 1/22  1. Anterior cervical diskectomy of level C3-C4 with complete uncus decompression and clearance of canal stenosis for level of C3-C4.  2. Anterior cervical interbody fusion of level of C3-C4.  3. Insertion of interbody cage fixation, the SeaSpine Platina system, 8 x 16 x 40 mm size at level of C3-C4, fixed with two 14 mm screws.  4. Use of allograft bone grafting, 2 mL of FiberX by Xtant and 2.5 mL of OsteoFactor by ProteiOS growth factor for the C3-C4 fusion with insertion of one JENNIFER drain  Imaging: na    Patient Goals/Plan/Treatment Preferences: Met with Dilan; he lives home alone in one-level apartment on [address] [place]. His grandson will be staying with him, has transportation, insurance, HCDM listed. He has home O2 thru [facility] at 3L/min NC with activity and 1L/min at rest.  Pt received HH list pta, agrees to [facility] or whoever will accept his insurance in [facility].    11:14 AM  Zuri Ashraf declined this pt.

## 2025-01-23 NOTE — PROGRESS NOTES
Department of Orthopedic Surgery  Spine Service  Juan Manuel Schaefer PA-C Progress Note        Subjective:    1/23/25  Dilan is resting in bed. Doing overall well. Increased sputum production. Feels improvement of the left neck and shoulder pain. Swallowing and drinking well. Plan home today with HH    Vitals  VITALS:  BP (!) 108/58   Pulse 62   Temp 97.4 °F (36.3 °C) (Oral)   Resp 16   SpO2 93%   24HR INTAKE/OUTPUT:    Intake/Output Summary (Last 24 hours) at 1/23/2025 0659  Last data filed at 1/23/2025 0507  Gross per 24 hour   Intake 1810 ml   Output 2385 ml   Net -575 ml     URINARY CATHETER OUTPUT ([place]):     DRAIN/TUBE OUTPUT:  Closed/Suction Drain Anterior Neck Bulb-Output (ml): 40 ml      PHYSICAL EXAM:    Orientation:  alert and oriented to person, place and time    Incision:  dressing in place, clean, dry, intact    Upper Extremity Motor :   Deltoid:  5/5  Biceps:  5/5  Triceps:  5/5  Wrist Flexion:  5/5  Wrist Extension:  5/5  Finger Flexion:  5/5  Finger Extension:  5/5    Upper Motor Neuron Signs:  None  Upper Extremity Sensory:  Intact C3-T1 distribution  Flatus:  negative    ABNORMAL EXAM FINDINGS:  none    LABS:    HgB:    Lab Results   Component Value Date/Time    HGB 14.1 01/23/2025 03:43 AM    HGB 15.2 11/09/2011 11:05 AM         ASSESSMENT AND PLAN:    Post operative day 1     1:  Monitor labs and drain output  2:  Activity Level:  OOB as tolerated  3:  Pain Control:  Controlled  4:  Discharge Planning:  Home today with HH for drain management.

## 2025-01-23 NOTE — PLAN OF CARE
Problem: Chronic Conditions and Co-morbidities  Goal: Patient's chronic conditions and co-morbidity symptoms are monitored and maintained or improved  Outcome: Adequate for Discharge     Problem: Discharge Planning  Goal: Discharge to home or other facility with appropriate resources  Outcome: Adequate for Discharge     Problem: Pain  Goal: Verbalizes/displays adequate comfort level or baseline comfort level  Outcome: Adequate for Discharge     Problem: Safety - Adult  Goal: Free from fall injury  Outcome: Adequate for Discharge     Problem: Respiratory - Adult  Goal: Achieves optimal ventilation and oxygenation  1/23/2025 1812 by Raina Burdick RN  Outcome: Adequate for Discharge  1/23/2025 0749 by Peyton Maya RCP  Outcome: Progressing   ..Care plan reviewed with patient.  Patient  verbalize understanding of the plan of care and contribute to goal setting.

## 2025-01-24 ENCOUNTER — TELEPHONE (OUTPATIENT)
Dept: FAMILY MEDICINE CLINIC | Age: 70
End: 2025-01-24

## 2025-01-24 NOTE — TELEPHONE ENCOUNTER
[facility] Transitions Initial Follow Up Call    Outreach made within 2 business days of discharge: Yes    Patient: Dilan Pappas Patient : 1955   MRN: [id number]  Reason for Admission: There are no discharge diagnoses documented for the most recent discharge.  Discharge Date: 25       Spoke with: Dilan    Discharge department/facility: [facility] Interactive Patient Contact:  Was patient able to fill all prescriptions: Yes  Was patient instructed to bring all medications to the follow-up visit: Yes  Is patient taking all medications as directed in the discharge summary? Yes  Does patient understand their discharge instructions: Yes  Does patient have questions or concerns that need addressed prior to 7 day follow up office visit: no    Scheduled appointment with PCP within 7-14 days    Follow Up  Future Appointments   Date Time Provider Department Center   2025 11:20 AM Eric Orozco APRN - CNP [facility] Med [facility] DEP   2025  8:20 AM Eric Orozco APRN - CNP [facility] DEP   2025  9:00 AM Tao Campbell APRN - CNP N Pulm Med P - [place]   2025  8:00 AM STR CT IMAGING RM1 STR CT SCAN STR Rad/Card   2025  8:30 AM STR PULMONARY FUNCTION ROOM 1 [facility]   2025  9:00 AM Tao Campbell APRN - CNP N Pulm Med P - [place]       Jessica Lucas MA

## 2025-01-24 NOTE — TELEPHONE ENCOUNTER
[facility] Transitions Initial Follow Up Call    Outreach made within 2 business days of discharge: Yes    Patient: Dilan Pappas Patient : 1955   MRN: [id number]  Reason for Admission: There are no discharge diagnoses documented for the most recent discharge.  Discharge Date: 25       Spoke with: EDMUNDO for Dilan    Discharge department/facility: [facility] Interactive Patient Contact:  Was patient able to fill all prescriptions:   Was patient instructed to bring all medications to the follow-up visit:   Is patient taking all medications as directed in the discharge summary?   Does patient understand their discharge instructions:   Does patient have questions or concerns that need addressed prior to 7 day follow up office visit:     Scheduled appointment with PCP within 7-14 days    Follow Up  Future Appointments   Date Time Provider Department Center   2025 11:20 AM Eric Orozco APRN - CNP [facility] Med [facility] DE[facility]   2025  8:20 AM Eric Orozco APRN - CNP [facility] DEP   2025  9:00 AM Tao Campbell APRN - CNP N Pulm Med P - [place]   2025  8:00 AM STR CT IMAGING RM1 [facility] [place] [facility] CT SCAN STR Rad/Card   2025  8:30 AM STR PULMONARY FUNCTION ROOM 1 [facility] [place] [facility]   2025  9:00 AM Tao Campbell APRN - CNP N Pulm Med P - [place]       Jessica Lucas MA

## 2025-01-30 ENCOUNTER — OFFICE VISIT (OUTPATIENT)
Dept: FAMILY MEDICINE CLINIC | Age: 70
End: 2025-01-30

## 2025-01-30 VITALS
DIASTOLIC BLOOD PRESSURE: 78 MMHG | SYSTOLIC BLOOD PRESSURE: 134 MMHG | BODY MASS INDEX: 25.31 KG/M2 | HEIGHT: 74 IN | WEIGHT: 197.2 LBS | RESPIRATION RATE: 18 BRPM | OXYGEN SATURATION: 91 % | HEART RATE: 73 BPM | TEMPERATURE: 98.3 F

## 2025-01-30 DIAGNOSIS — M54.12 SPINAL STENOSIS OF CERVICAL REGION WITH RADICULOPATHY: ICD-10-CM

## 2025-01-30 DIAGNOSIS — Z09 HOSPITAL DISCHARGE FOLLOW-UP: Primary | ICD-10-CM

## 2025-01-30 DIAGNOSIS — Z98.1 S/P CERVICAL SPINAL FUSION: ICD-10-CM

## 2025-01-30 DIAGNOSIS — M48.02 SPINAL STENOSIS OF CERVICAL REGION WITH RADICULOPATHY: ICD-10-CM

## 2025-01-30 DIAGNOSIS — E11.9 TYPE 2 DIABETES MELLITUS WITHOUT COMPLICATION, WITHOUT LONG-TERM CURRENT USE OF INSULIN (HCC): ICD-10-CM

## 2025-01-30 DIAGNOSIS — Z99.81 OXYGEN DEPENDENT: ICD-10-CM

## 2025-01-30 LAB
CREAT UR-MCNC: 158.6 MG/DL
MICROALBUMIN UR-MCNC: < 1.2 MG/DL
MICROALBUMIN/CREAT RATIO PNL UR: 8 MG/G (ref 0–30)

## 2025-01-30 NOTE — PROGRESS NOTES
Post-Discharge Transitional Care  Follow Up      Dilan Pappas   YOB: 1955    Date of Office Visit:  1/30/2025  Date of Hospital Admission: 1/22/25  Date of Hospital Discharge: 1/23/25  Risk of hospital readmission (high >=14%. Medium >=10%) :No data recorded    Care management risk score Rising risk (score 2-5) and Complex Care (Scores >=6): No Risk Score On File     Non face to face  following discharge, date last encounter closed (first attempt may have been earlier): 01/24/2025    Call initiated 2 business days of discharge: Yes    ASSESSMENT/PLAN:   Hospital discharge follow-up  -     CA DISCHARGE MEDS RECONCILED W/ CURRENT OUTPATIENT MED LIST  S/P cervical spinal fusion  Keep cervical collar in place  Keep ortho f/u next week   Spinal stenosis of cervical region with radiculopathy  Type 2 diabetes mellitus without complication, without long-term current use of insulin (HCC)  Not controlled pt agrees to initiate GLP-1 risks and benefits and sE discussed  No hx of pancreatitis or MEN or thyroid cancer  Discussed possibility of hypoglylcemia if develops will stop actos and monitor   -     Albumin/Creatinine Ratio, Urine; Future  -     Tirzepatide 2.5 MG/0.5ML SOAJ; Inject 2.5 mg into the skin every 7 days, Disp-2 mL, R-2Normal  Continue all orals for now  Low carb low sugar diet   Oxygen dependent  Controlled  No concerning findings today   Pt doing well     Medical Decision Making: high complexity  Return if symptoms worsen or fail to improve.    On this date 1/30/2025 I have spent 24 minutes reviewing previous notes, test results and face to face with the patient discussing the diagnosis and importance of compliance with the treatment plan as well as documenting on the day of the visit.       Subjective:   HPI:  Follow up of Hospital problems/diagnosis(es): s/o cervical fusion    Inpatient course: Discharge summary reviewed- see chart.    DATE:       01/22/2025 `123/2025  PROVIDER:Roberto Carlos

## 2025-01-31 DIAGNOSIS — E11.65 UNCONTROLLED TYPE 2 DIABETES MELLITUS WITH HYPERGLYCEMIA (HCC): ICD-10-CM

## 2025-02-10 ENCOUNTER — TELEPHONE (OUTPATIENT)
Dept: FAMILY MEDICINE CLINIC | Age: 70
End: 2025-02-10

## 2025-02-10 NOTE — TELEPHONE ENCOUNTER
Future Appointments   Date Time Provider Department Center   2/11/2025 10:40 AM Eric Orozco APRN - CNP [facility] DEP   4/9/2025  8:20 AM Eric Orozco APRN - CNP [facility] DEP   4/16/2025  9:00 AM Tao Campbell APRN - CNP N Pulm Med [facility] - [place]   8/5/2025  8:00 AM STR CT IMAGING RM1 STR CT SCAN STR Rad/Card   8/5/2025  8:30 AM STR PULMONARY FUNCTION ROOM 1 STR PFT [facility]   8/12/2025  9:00 AM Tao Campbell APRN - CNP N Pulm Med P - [place]

## 2025-02-10 NOTE — TELEPHONE ENCOUNTER
Pt called in and stated that he started coughing up brown mucous on Saturday. He is unsure why he his mucous he is coughing up is so dark.   \" It looked like it had a little red in it but I'm not sure because I'm colorblind\"     Would like to know if something can be sent in. His ribs are starting to hurt from coughing so much.

## 2025-02-11 ENCOUNTER — HOSPITAL ENCOUNTER (OUTPATIENT)
Age: 70
Discharge: HOME | End: 2025-02-11
Payer: MEDICARE

## 2025-02-11 ENCOUNTER — TELEPHONE (OUTPATIENT)
Dept: FAMILY MEDICINE CLINIC | Age: 70
End: 2025-02-11

## 2025-02-11 ENCOUNTER — OFFICE VISIT (OUTPATIENT)
Dept: FAMILY MEDICINE CLINIC | Age: 70
End: 2025-02-11
Payer: MEDICARE

## 2025-02-11 ENCOUNTER — HOSPITAL ENCOUNTER (OUTPATIENT)
Dept: CT IMAGING | Age: 70
Discharge: HOME | End: 2025-02-11
Payer: MEDICARE

## 2025-02-11 VITALS
BODY MASS INDEX: 24.51 KG/M2 | DIASTOLIC BLOOD PRESSURE: 76 MMHG | WEIGHT: 191 LBS | SYSTOLIC BLOOD PRESSURE: 138 MMHG | RESPIRATION RATE: 20 BRPM | HEART RATE: 83 BPM | HEIGHT: 74 IN | TEMPERATURE: 98.2 F | OXYGEN SATURATION: 90 %

## 2025-02-11 DIAGNOSIS — E78.01 FAMILIAL HYPERCHOLESTEROLEMIA: ICD-10-CM

## 2025-02-11 DIAGNOSIS — R09.02 HYPOXIA: ICD-10-CM

## 2025-02-11 DIAGNOSIS — J18.1 CONSOLIDATION OF LEFT UPPER LOBE OF LUNG (HCC): ICD-10-CM

## 2025-02-11 DIAGNOSIS — Z99.81 OXYGEN DEPENDENT: ICD-10-CM

## 2025-02-11 DIAGNOSIS — J44.9 CHRONIC OBSTRUCTIVE PULMONARY DISEASE, UNSPECIFIED COPD TYPE (HCC): ICD-10-CM

## 2025-02-11 DIAGNOSIS — J18.9 PNEUMONIA OF LEFT UPPER LOBE DUE TO INFECTIOUS ORGANISM: Primary | ICD-10-CM

## 2025-02-11 DIAGNOSIS — R06.02 SOB (SHORTNESS OF BREATH) ON EXERTION: ICD-10-CM

## 2025-02-11 DIAGNOSIS — Z98.890 HISTORY OF RECENT SURGERY: ICD-10-CM

## 2025-02-11 DIAGNOSIS — R04.2 HEMOPTYSIS: ICD-10-CM

## 2025-02-11 LAB
D DIMER PPP IA.FEU-MCNC: 1561 NG/ML FEU (ref 0–500)
POC CREATININE WHOLE BLOOD: 1.1 MG/DL (ref 0.5–1.2)

## 2025-02-11 PROCEDURE — 36415 COLL VENOUS BLD VENIPUNCTURE: CPT

## 2025-02-11 PROCEDURE — 1123F ACP DISCUSS/DSCN MKR DOCD: CPT | Performed by: NURSE PRACTITIONER

## 2025-02-11 PROCEDURE — 99215 OFFICE O/P EST HI 40 MIN: CPT | Performed by: NURSE PRACTITIONER

## 2025-02-11 PROCEDURE — 1159F MED LIST DOCD IN RCRD: CPT | Performed by: NURSE PRACTITIONER

## 2025-02-11 PROCEDURE — 71275 CT ANGIOGRAPHY CHEST: CPT

## 2025-02-11 PROCEDURE — 3075F SYST BP GE 130 - 139MM HG: CPT | Performed by: NURSE PRACTITIONER

## 2025-02-11 PROCEDURE — 3078F DIAST BP <80 MM HG: CPT | Performed by: NURSE PRACTITIONER

## 2025-02-11 PROCEDURE — 85379 FIBRIN DEGRADATION QUANT: CPT

## 2025-02-11 PROCEDURE — 82565 ASSAY OF CREATININE: CPT

## 2025-02-11 PROCEDURE — 1160F RVW MEDS BY RX/DR IN RCRD: CPT | Performed by: NURSE PRACTITIONER

## 2025-02-11 PROCEDURE — 6360000004 HC RX CONTRAST MEDICATION: Performed by: NURSE PRACTITIONER

## 2025-02-11 RX ORDER — IOPAMIDOL 755 MG/ML
80 INJECTION, SOLUTION INTRAVASCULAR
Status: COMPLETED | OUTPATIENT
Start: 2025-02-11 | End: 2025-02-11

## 2025-02-11 RX ORDER — PREDNISONE 20 MG/1
TABLET ORAL
Qty: 18 TABLET | Refills: 0 | Status: SHIPPED | OUTPATIENT
Start: 2025-02-11

## 2025-02-11 RX ORDER — ATORVASTATIN CALCIUM 40 MG/1
TABLET, FILM COATED ORAL
Qty: 90 TABLET | Refills: 3 | Status: SHIPPED | OUTPATIENT
Start: 2025-02-11

## 2025-02-11 RX ADMIN — IOPAMIDOL 80 ML: 755 INJECTION, SOLUTION INTRAVENOUS at 12:25

## 2025-02-11 ASSESSMENT — ENCOUNTER SYMPTOMS
SINUS PRESSURE: 0
COUGH: 1
SINUS PAIN: 0
RHINORRHEA: 0
CHOKING: 0
SHORTNESS OF BREATH: 1
CHEST TIGHTNESS: 1
WHEEZING: 0

## 2025-02-11 NOTE — PROGRESS NOTES
respiratory distress or retractions. He is not intubated.      Breath sounds: Examination of the right-upper field reveals decreased breath sounds. Examination of the right-lower field reveals decreased breath sounds. Decreased breath sounds present. No wheezing or rhonchi.   Abdominal:      General: Abdomen is flat. Bowel sounds are normal. There is no distension.      Palpations: Abdomen is soft.   Musculoskeletal:      Right lower leg: No edema.      Left lower leg: No edema.   Skin:     General: Skin is warm and dry.      Capillary Refill: Capillary refill takes less than 2 seconds.   Neurological:      General: No focal deficit present.          /Plan:     Assessment & Plan     1. Hemoptysis  R/o PE due to recent surgery vs new lung mass or lesion   Await CTA chest   - CTA CHEST W WO CONTRAST; Future  - D-Dimer, Quantitative; Future    2. SOB (shortness of breath) on exertion  -#1, vs worsening COPD  No red flag symptoms today  VSS reassuring in office   - CTA CHEST W WO CONTRAST; Future  - D-Dimer, Quantitative; Future    3. Pneumonia  Augmentin [prednisone ordered  Abnormal CTA of chest concern for malignancy PET scan ordered to better evaluate   3. Chronic obstructive pulmonary disease, unspecified COPD type (HCC)  -#1, #2  Continue trelegy     4. Familial hypercholesterolemia  Stable   Low fat low cholesterol diet   - atorvastatin (LIPITOR) 40 MG tablet; TAKE 1 TABLET BY MOUTH DAILY  Dispense: 90 tablet; Refill: 3    5. Hypoxia  -#1, #2 vs pneumonia   - CTA CHEST W WO CONTRAST; Future negative for PE but + for new consolidation question if infectious or malignant PET scan ordered   - D-Dimer, Quantitative; Future  ER precautions given     6. History of recent surgery  -#1, #2, #5  - CTA CHEST W WO CONTRAST; Future  - D-Dimer, Quantitative; Future    7. Oxygen dependent    -#1, #2    Pt in agreement with plan   High level of decision making noted during visit       Orders Placed This Encounter    CTA CHEST W

## 2025-02-11 NOTE — TELEPHONE ENCOUNTER
----- Message from GERARDO Chaparro - CNP sent at 2/11/2025  4:35 PM EST -----  Let pt know his CTA of chest is negative for a PE, but it does identify a left upper lobe consolidation unclear as to if it is a infection/inflammation vs a lung malignancy.  I am going to order an atb for him along with a course of steroids and tx as if pneumonia but also going to order a PET scan which will better identify the consolidation and characterize it if it is a malignancy.  Also give him ER precaution in the meantime for low oxygen levels not improving to normal range or increased SOB.

## 2025-02-11 NOTE — TELEPHONE ENCOUNTER
Patient informed and verbalized understanding. No questions at this time, patient was notified of ER precautions. Transferred  to central scheduling to schedule

## 2025-02-11 NOTE — TELEPHONE ENCOUNTER
Future Appointments   Date Time Provider Department Center   2/27/2025  7:30 AM STR PET IMAGING ROOM STRZ PET STR Rad/Card   4/9/2025  8:20 AM Eric Orozco APRN - CNP [facility] Med [facility]   4/16/2025  9:00 AM Tao Capmbell APRN - CNP N Pulm Med [facility] - [place]   8/5/2025  8:00 AM STR CT IMAGING RM1 STRZ CT SCAN STR Rad/Card   8/5/2025  8:30 AM STR PULMONARY FUNCTION ROOM 1 STRZ PFT [facility]   8/12/2025  9:00 AM Tao Campbell APRN - CNP N Pulm Med [facility] - [place]

## 2025-02-27 ENCOUNTER — HOSPITAL ENCOUNTER (OUTPATIENT)
Dept: PET IMAGING | Age: 70
Discharge: HOME | End: 2025-02-27
Payer: MEDICARE

## 2025-02-27 ENCOUNTER — TELEPHONE (OUTPATIENT)
Dept: FAMILY MEDICINE CLINIC | Age: 70
End: 2025-02-27

## 2025-02-27 DIAGNOSIS — J18.9 PNEUMONIA OF LEFT UPPER LOBE DUE TO INFECTIOUS ORGANISM: ICD-10-CM

## 2025-02-27 DIAGNOSIS — J18.1 CONSOLIDATION OF LEFT UPPER LOBE OF LUNG: ICD-10-CM

## 2025-02-27 DIAGNOSIS — R91.8 LUNG MASS: ICD-10-CM

## 2025-02-27 DIAGNOSIS — R04.2 HEMOPTYSIS: ICD-10-CM

## 2025-02-27 DIAGNOSIS — J38.3 VOCAL CORDS SWELLING: ICD-10-CM

## 2025-02-27 DIAGNOSIS — R49.0 HOARSENESS OF VOICE: ICD-10-CM

## 2025-02-27 DIAGNOSIS — R06.02 SOB (SHORTNESS OF BREATH) ON EXERTION: ICD-10-CM

## 2025-02-27 DIAGNOSIS — R13.19 ESOPHAGEAL DYSPHAGIA: Primary | ICD-10-CM

## 2025-02-27 PROCEDURE — 78815 PET IMAGE W/CT SKULL-THIGH: CPT

## 2025-02-27 PROCEDURE — A9609 HC RX DIAGNOSTIC RADIOPHARMACEUTICAL: HCPCS | Performed by: NURSE PRACTITIONER

## 2025-02-27 PROCEDURE — 3430000000 HC RX DIAGNOSTIC RADIOPHARMACEUTICAL: Performed by: NURSE PRACTITIONER

## 2025-02-27 RX ORDER — FLUDEOXYGLUCOSE F 18 200 MCI/ML
10.4 INJECTION, SOLUTION INTRAVENOUS
Status: COMPLETED | OUTPATIENT
Start: 2025-02-27 | End: 2025-02-27

## 2025-02-27 RX ADMIN — FLUDEOXYGLUCOSE F 18 10.4 MILLICURIE: 200 INJECTION, SOLUTION INTRAVENOUS at 07:34

## 2025-02-27 NOTE — TELEPHONE ENCOUNTER
----- Message from GERARDO Chaparro CNP sent at 2/27/2025 10:39 AM EST -----  Spoke with pt on the phone regarding results of PET scan. there are a few areas of concern and pt likely needs a biopsy of lung lesion, has an appt with Pulmonary but not until April.   #1 Please call Pulmonary and see how quickly they can see him.  Let them know about the left lung lesion and and he also has a  left vocal cord thickening  -#2 I placed a GI referral as pt has been having difficulty with swallowing and needs seen ASAP, also let them know about the lung lesion and vocal cord thickening   Pt is aware of the above next steps, let him know when these appt have been scheduled. . Thanks

## 2025-02-27 NOTE — TELEPHONE ENCOUNTER
----- Message from GERARDO Chaparro CNP sent at 2/27/2025 10:39 AM EST -----  Spoke with pt on the phone regarding results of PET scan. there are a few areas of concern and pt likely needs a biopsy of lung lesion, has an appt with Pulmonary but not until April.   #1 Please call Pulmonary and see how quickly they can see him.  Let them know about the left lung lesion and and he also has a  left vocal cord thickening  -#2 I placed a GI referral as pt has been having difficulty with swallowing and needs seen ASAP, also let them know about the lung lesion and vocal cord thickening   Pt is aware of the above next steps, let him know when these appt have been scheduled. . Thanks   
Phoned [facility] spoke with Maria Guadalupe  she will call back with an appt time    Phoned [facility] spoke with Umm she transferred me to new pt scheduling-Shelly pt hasn't been seen since 2011  LMOM to return call referral faxed to [phone]      
See encounter dated 2.27.25    Appointment scheduled and pt informed   
28

## 2025-02-27 NOTE — TELEPHONE ENCOUNTER
Called and spoke to Emily at [facility] and she has him scheduled for 03.06.25 at 2:00 PM.     Spoke to Roxie at [facility] and she has him scheduled for 10:30 AM on 2.28.25    Pt is informed of both appointments and agreeable with no further questions.   Referral, PET CT and Labs were faxed to Roxie at [facility] while we were on the phone, through epic so that I could make sure she received them before we hung up.

## 2025-03-05 ENCOUNTER — TRANSCRIBE ORDERS (OUTPATIENT)
Dept: ADMINISTRATIVE | Age: 70
End: 2025-03-05

## 2025-03-05 DIAGNOSIS — R13.14 DYSPHAGIA, PHARYNGOESOPHAGEAL PHASE: Primary | ICD-10-CM

## 2025-03-06 ENCOUNTER — OFFICE VISIT (OUTPATIENT)
Dept: PULMONOLOGY | Age: 70
End: 2025-03-06
Payer: MEDICARE

## 2025-03-06 VITALS
WEIGHT: 187.4 LBS | BODY MASS INDEX: 24.05 KG/M2 | HEIGHT: 74 IN | TEMPERATURE: 97.8 F | SYSTOLIC BLOOD PRESSURE: 130 MMHG | OXYGEN SATURATION: 96 % | DIASTOLIC BLOOD PRESSURE: 70 MMHG | HEART RATE: 84 BPM

## 2025-03-06 DIAGNOSIS — J84.10 COMBINED PULMONARY FIBROSIS AND EMPHYSEMA (CPFE) (HCC): ICD-10-CM

## 2025-03-06 DIAGNOSIS — Q31.8 VOCAL CORD ANOMALY: ICD-10-CM

## 2025-03-06 DIAGNOSIS — J98.4 CAVITARY LESION OF LUNG: Primary | ICD-10-CM

## 2025-03-06 DIAGNOSIS — J96.11 CHRONIC RESPIRATORY FAILURE WITH HYPOXIA (HCC): ICD-10-CM

## 2025-03-06 DIAGNOSIS — J43.9 COMBINED PULMONARY FIBROSIS AND EMPHYSEMA (CPFE) (HCC): ICD-10-CM

## 2025-03-06 PROCEDURE — 1123F ACP DISCUSS/DSCN MKR DOCD: CPT | Performed by: INTERNAL MEDICINE

## 2025-03-06 PROCEDURE — 99214 OFFICE O/P EST MOD 30 MIN: CPT | Performed by: INTERNAL MEDICINE

## 2025-03-06 PROCEDURE — 3075F SYST BP GE 130 - 139MM HG: CPT | Performed by: INTERNAL MEDICINE

## 2025-03-06 PROCEDURE — 3078F DIAST BP <80 MM HG: CPT | Performed by: INTERNAL MEDICINE

## 2025-03-06 PROCEDURE — 1159F MED LIST DOCD IN RCRD: CPT | Performed by: INTERNAL MEDICINE

## 2025-03-06 ASSESSMENT — ENCOUNTER SYMPTOMS
ABDOMINAL PAIN: 0
BACK PAIN: 1
COUGH: 0
ABDOMINAL DISTENTION: 0
VOICE CHANGE: 0
WHEEZING: 0
TROUBLE SWALLOWING: 0
CHEST TIGHTNESS: 0
SHORTNESS OF BREATH: 1

## 2025-03-06 NOTE — PROGRESS NOTES
Neck Circumference -   16  Mallampati -  1    Lung Nodule Screening     [x] Qualifies    [] Does not qualify   [] Declined    [] Completed

## 2025-03-06 NOTE — PROGRESS NOTES
Subjective:      Patient ID: Dilan Pappas is a 69 y.o. male.      CC: Respiratory failure    HPI      Dilan was evaluated by Colin Orozco CNP for hemoptysis, CT scan of the chest revealed a irregular consolidation in the left upper lobe with cavitary changes, radiologist suggested this is an inflammatory or infectious process however cannot rule out malignancy, it was chosen to proceed with PET/CT completed on 2/27/2025: 3.1 cm cavitary lesion in the left upper lobe has a peripheral maximum SUV of 6.7, no FDG avid mediastinal, hilar or axillary lymphadenopathy, FDG avid thickening of the left vocal cord suspicious for malignancy    Hemoptysis was short-lived and is completely resolved, generally denies fever, chills, malaise, denies any changes in his respiratory condition    He was given course of antibiotics.    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      Previous notes  Quit smoking beginning of January  (4 weeks ago) but had one cigarette 1/24/24, started on Chantix  A1AT MZ.  A1AT lvel 193 mg/dl  TTE: normal biventricular function.  HRCT chest: \"moderate emphysema, Stable appearance of chronic interstitial disease with fibrotic changes\"  DAVE reflex: Negative  CRP: < 0.3  Supplemental oxygem 1L at rst and 3L with activity.        Referred by Eric Orozco CNP for pulmonary evaluation.  68-year-old heavy smoker whose respiratory condition is slowly and steadily deteriorating recently started on supplemental

## 2025-03-13 ENCOUNTER — OFFICE VISIT (OUTPATIENT)
Dept: ENT CLINIC | Age: 70
End: 2025-03-13
Payer: MEDICARE

## 2025-03-13 ENCOUNTER — TELEPHONE (OUTPATIENT)
Dept: FAMILY MEDICINE CLINIC | Age: 70
End: 2025-03-13

## 2025-03-13 ENCOUNTER — PREP FOR PROCEDURE (OUTPATIENT)
Dept: ENT CLINIC | Age: 70
End: 2025-03-13

## 2025-03-13 ENCOUNTER — TELEPHONE (OUTPATIENT)
Dept: PULMONOLOGY | Age: 70
End: 2025-03-13

## 2025-03-13 VITALS
HEIGHT: 74 IN | DIASTOLIC BLOOD PRESSURE: 60 MMHG | RESPIRATION RATE: 16 BRPM | SYSTOLIC BLOOD PRESSURE: 116 MMHG | TEMPERATURE: 98 F | HEART RATE: 72 BPM | BODY MASS INDEX: 24.06 KG/M2 | OXYGEN SATURATION: 94 % | WEIGHT: 187.5 LBS

## 2025-03-13 DIAGNOSIS — J38.3 LESION OF VOCAL FOLD: ICD-10-CM

## 2025-03-13 DIAGNOSIS — R49.0 DYSPHONIA: ICD-10-CM

## 2025-03-13 DIAGNOSIS — J38.3 LESION OF VOCAL FOLD: Primary | ICD-10-CM

## 2025-03-13 PROCEDURE — 1123F ACP DISCUSS/DSCN MKR DOCD: CPT | Performed by: OTOLARYNGOLOGY

## 2025-03-13 PROCEDURE — 31575 DIAGNOSTIC LARYNGOSCOPY: CPT | Performed by: OTOLARYNGOLOGY

## 2025-03-13 PROCEDURE — 3078F DIAST BP <80 MM HG: CPT | Performed by: OTOLARYNGOLOGY

## 2025-03-13 PROCEDURE — 99203 OFFICE O/P NEW LOW 30 MIN: CPT | Performed by: OTOLARYNGOLOGY

## 2025-03-13 PROCEDURE — 1159F MED LIST DOCD IN RCRD: CPT | Performed by: OTOLARYNGOLOGY

## 2025-03-13 PROCEDURE — 3074F SYST BP LT 130 MM HG: CPT | Performed by: OTOLARYNGOLOGY

## 2025-03-13 RX ORDER — OXYCODONE AND ACETAMINOPHEN 5; 325 MG/1; MG/1
TABLET ORAL
COMMUNITY
Start: 2025-03-04

## 2025-03-13 NOTE — TELEPHONE ENCOUNTER
Spoke with Dr Steele's office, they got the request this morning to schedule a pre-op clearance with pt and will be calling the pt this morning to schedule an appt.     Future Appointments   Date Time Provider Department Center   3/24/2025  4:00 PM Eric Orozco APRN - CNP [facility]am Med [facility] DEP   3/25/2025  8:45 AM Estefanía Sanchez, SLP STRZ SPEECH [facility] HOD   3/25/2025  8:45 AM STR RADIOLOGIST FLUORO/NM STRZ RAD STR Rad/Card   3/25/2025  8:45 AM STR FLUORO ROOM 2 STRZ RAD STR Rad/Card   3/26/2025  4:00 PM STR CT IMAGING RM1  OP EXPRESS STRZ OUT EXP STR Rad/Card   4/3/2025 10:40 AM Tyrese Steele MD N Pulm Med P - [place]   4/8/2025 10:45 AM Jim Unger MD N ENT [facility] - [place]   4/9/2025  8:20 AM Eric Orozco APRN - CNP Fam Med [facility] DEP   4/14/2025  9:00 AM Tao Campbell APRN - CNP N Pulm Med [facility] - [place]   8/5/2025  8:00 AM STR CT IMAGING RM1 STRZ CT SCAN STR Rad/Card   8/5/2025  8:30 AM STR PULMONARY FUNCTION ROOM 1 STRZ PFT [facility]   8/12/2025  9:00 AM Tao Campbell APRN - CNP [facility]

## 2025-03-13 NOTE — TELEPHONE ENCOUNTER
Patient is being scheduled for a procedure with Dr. Unger and we are requesting clearance from Jan.    DOS: 04/02/2025  Procedure: micro direct laryngoscopy with biopsy  Meds to hold:  Jardiance x 3 days, Metformin x 2 days, Tirzepatide x 1 week, Januvia prior to surgery, Lisinopril held the day of surgery.    Patients CBC, CMP, CXR, EKG are all current per anesthesia guidelines.  No specific form is needed. Pre op clearance can be noted on this PE or an OV if your office requires patient to be seen prior to surgery.     Please advise by 03/26/2025.  Thank you!

## 2025-03-13 NOTE — TELEPHONE ENCOUNTER
Patient is being scheduled for a procedure with Dr. Unger and we are requesting clearance from Pulmonary    DOS: 04/02/2025  Procedure: micro direct laryngoscopy with biopsy  Meds to hold:  Jardiance x 3 days, Metformin x 2 days, Tirzepatide x 1 week, Januvia prior to surgery, Lisinopril held the day of surgery.    Patients CBC, CMP, CXR, EKG are all current per anesthesia guidelines.  No specific form is needed. Pre op clearance can be noted on this PE or an OV if your office requires patient to be seen prior to surgery.     Please advise by 03/26/2025.  Thank you!

## 2025-03-13 NOTE — TELEPHONE ENCOUNTER
Pt will need an office visit and he will also need to get pulmonary clearance, please set that appt up for him. Thanks

## 2025-03-13 NOTE — TELEPHONE ENCOUNTER
Pt informed and scheduled for pre-op    Future Appointments   Date Time Provider Department Center   3/24/2025  4:00 PM Eric Orozco APRN - CNP Fam Med [facility] DEP   3/25/2025  8:45 AM Estefanía Sanchez, SLP STRZ SPEECH [place]   3/25/2025  8:45 AM STR RADIOLOGIST FLUORO/NM STRZ RAD STR Rad/Card   3/25/2025  8:45 AM STR FLUORO ROOM 2 STRZ RAD STR Rad/Card   3/26/2025  4:00 PM STR CT IMAGING RM1  OP EXPRESS STRZ OUT EXP STR Rad/Card   4/3/2025 10:40 AM Tyrese Steele MD N Pulm Med [facility] - [place]   4/8/2025 10:45 AM Jim Unger MD N ENT [facility] - [place]   4/9/2025  8:20 AM Eric Orozco APRN - CNP Fam Med [facility] DEP   4/14/2025  9:00 AM Tao Campbell APRN - CNP N Pulm Med [facility] - [place]   8/5/2025  8:00 AM STR CT IMAGING RM1 STRZ CT SCAN STR Rad/Card   8/5/2025  8:30 AM STR PULMONARY FUNCTION ROOM 1 STRZ PFT [facility]   8/12/2025  9:00 AM Tao Campbell APRN - CNP N Pulm Med [facility] - [place]

## 2025-03-13 NOTE — PROGRESS NOTES
[facility] [place] [facility]  [facility] EAR, NOSE AND THROAT  [address]  [address]  [facility] [address]  Dept: [phone]  Dept Fax: [phone]  Loc: [phone]    Dilan Pappas is a 69 y.o. male who was referred byTyrese Steele,* for:  Chief Complaint   Patient presents with    New Patient     New patient here for left vocal cord suspicious for malignancy. Patient stated that he has been dealing with hoarseness for the last year. He also stated that he has trouble swallowing his food.   .    HPI:     Dilan Pappas is a 69 y.o. male who presents today for possible left vocal fold lesion. CT scan of the chest revealed a irregular consolidation in the left upper lobe with cavitary changes, radiologist suggested this is an inflammatory or infectious process however cannot rule out malignancy, it was chosen to proceed with PET/CT completed on 2/27/2025: 3.1 cm cavitary lesion in the left upper lobe has a peripheral maximum SUV of 6.7, no FDG avid mediastinal, hilar or axillary lymphadenopathy, FDG avid thickening of the left vocal cord suspicious for malignancy.     History:     Allergies   Allergen Reactions    Seasonal Other (See Comments)     Runny nose     Current Outpatient Medications   Medication Sig Dispense Refill    oxyCODONE-acetaminophen (PERCOCET) 5-325 MG per tablet TAKE 1 TABLET BY MOUTH EVERY 4 HOURS FOR 7 DAYS AS NEEDED FOR PAIN      amoxicillin-clavulanate (AUGMENTIN) 875-125 MG per tablet Take 1 tablet by mouth 2 times daily for 14 days 28 tablet 0    atorvastatin (LIPITOR) 40 MG tablet TAKE 1 TABLET BY MOUTH DAILY 90 tablet 3    predniSONE (DELTASONE) 20 MG tablet 1 tab po tid for 3 days 1 tab po bid for 3 days 1 tab po once day for 3 days 18 tablet 0    empagliflozin (JARDIANCE) 25 MG tablet Take 1 tablet by mouth daily 90 tablet 4    Tirzepatide 2.5 MG/0.5ML SOAJ Inject 2.5 mg into the skin every 7 days 2 mL 2    QUEtiapine (SEROQUEL) 200 MG tablet TAKE

## 2025-03-14 NOTE — TELEPHONE ENCOUNTER
Pt is needing pulmonary clearance for surgery, do you need to see him in office, please advise thank you

## 2025-03-24 ENCOUNTER — OFFICE VISIT (OUTPATIENT)
Dept: FAMILY MEDICINE CLINIC | Age: 70
End: 2025-03-24
Payer: MEDICARE

## 2025-03-24 VITALS
SYSTOLIC BLOOD PRESSURE: 114 MMHG | TEMPERATURE: 98.1 F | WEIGHT: 183.8 LBS | HEIGHT: 74 IN | DIASTOLIC BLOOD PRESSURE: 66 MMHG | BODY MASS INDEX: 23.59 KG/M2 | OXYGEN SATURATION: 95 % | HEART RATE: 58 BPM

## 2025-03-24 DIAGNOSIS — E11.9 TYPE 2 DIABETES MELLITUS WITHOUT COMPLICATION, WITHOUT LONG-TERM CURRENT USE OF INSULIN: ICD-10-CM

## 2025-03-24 DIAGNOSIS — Z99.81 OXYGEN DEPENDENT: ICD-10-CM

## 2025-03-24 DIAGNOSIS — J84.112 IDIOPATHIC DIFFUSE INTERSTITIAL PULMONARY FIBROSIS (HCC): ICD-10-CM

## 2025-03-24 DIAGNOSIS — J44.9 CHRONIC OBSTRUCTIVE PULMONARY DISEASE, UNSPECIFIED COPD TYPE (HCC): ICD-10-CM

## 2025-03-24 DIAGNOSIS — Z01.818 PRE-OP EXAM: Primary | ICD-10-CM

## 2025-03-24 DIAGNOSIS — R13.19 ESOPHAGEAL DYSPHAGIA: ICD-10-CM

## 2025-03-24 DIAGNOSIS — I10 PRIMARY HYPERTENSION: ICD-10-CM

## 2025-03-24 DIAGNOSIS — K21.9 GASTROESOPHAGEAL REFLUX DISEASE WITHOUT ESOPHAGITIS: ICD-10-CM

## 2025-03-24 DIAGNOSIS — G62.9 NEUROPATHY: ICD-10-CM

## 2025-03-24 LAB — HBA1C MFR BLD: 8.1 % (ref 4.3–5.7)

## 2025-03-24 PROCEDURE — 1160F RVW MEDS BY RX/DR IN RCRD: CPT | Performed by: NURSE PRACTITIONER

## 2025-03-24 PROCEDURE — 3078F DIAST BP <80 MM HG: CPT | Performed by: NURSE PRACTITIONER

## 2025-03-24 PROCEDURE — 3052F HG A1C>EQUAL 8.0%<EQUAL 9.0%: CPT | Performed by: NURSE PRACTITIONER

## 2025-03-24 PROCEDURE — 1159F MED LIST DOCD IN RCRD: CPT | Performed by: NURSE PRACTITIONER

## 2025-03-24 PROCEDURE — 3074F SYST BP LT 130 MM HG: CPT | Performed by: NURSE PRACTITIONER

## 2025-03-24 PROCEDURE — 83036 HEMOGLOBIN GLYCOSYLATED A1C: CPT | Performed by: NURSE PRACTITIONER

## 2025-03-24 PROCEDURE — 99215 OFFICE O/P EST HI 40 MIN: CPT | Performed by: NURSE PRACTITIONER

## 2025-03-24 PROCEDURE — 1123F ACP DISCUSS/DSCN MKR DOCD: CPT | Performed by: NURSE PRACTITIONER

## 2025-03-24 RX ORDER — TIRZEPATIDE 5 MG/.5ML
5 INJECTION, SOLUTION SUBCUTANEOUS WEEKLY
Qty: 6 ML | Refills: 2 | Status: SHIPPED | OUTPATIENT
Start: 2025-03-24

## 2025-03-24 RX ORDER — PANTOPRAZOLE SODIUM 40 MG/1
TABLET, DELAYED RELEASE ORAL
Qty: 90 TABLET | Refills: 4 | Status: SHIPPED | OUTPATIENT
Start: 2025-03-24

## 2025-03-24 NOTE — PROGRESS NOTES
one times a day Freestyle meter and strips 1 each 0    OXYGEN Inhale into the lungs 3L/MIN/NC CONT      Nebulizers (COMPRESSOR/NEBULIZER) MISC 1 each by Does not apply route in the morning and at bedtime 1 each 3    Handicap Placard MISC by Does not apply route Duration: 5 years 1 each 0    azelastine (ASTELIN) 0.1 % nasal spray SPRAY ONCE IN EACH NOSTRIL TWICE DAILY AS DIRECTED 1 each 3    Alcohol Swabs PADS 1 each by Does not apply route 2 times daily 100 each 3    Elastic Bandages & Supports (B & B A-2 ATHLETIC SUPPORTER) MISC 1 Device by Does not apply route continuous prn (prn pain) 1 each 0     No current facility-administered medications for this visit.       he has not been taking systemic glucocorticoid this past year    CARDIAC RISK FACTORS  he does not have a history of UA or MI within that past 30 days  he does not have decompensated CHF or significant valvular disease  he does not have a history of significant cardiac arrhythmia  he denies chest pain, does admit to exertional dyspnea, no  orthopnea, palpitations or LE edema.    he can walk up 1 flight of stairs or 8 steps without stopping (4 METS)    he does not have a history of CAD  he does not have a history of CHF  he does not have a history of CVA or TIA  he does not have a history of DM requiring insulin  he does not have a history of Renal insufficiency (Cr > 2.0)    EKG reveals sinus rhythm with a rate of 79.  There are not Q waves and are not ST segment changes.    http://circ.ahajournals.org/content/[email or web address]/17/[email or web address]/F2.large.jpg    PREOPERATIVE REVIEW OF SYSTEMS:  he does not have a history of MRSA or VRE.  he does not have a history of seizures.  he does not have a history of phlebitis or blood clots in arms or legs.  he does not have a history of sleep apnea.  he  does not have a history of snoring loudly enough to be heard through a closed door.      General/Constitutional:  Negative for fever, chills, fatigue, recent weight gain or

## 2025-03-25 ENCOUNTER — HOSPITAL ENCOUNTER (OUTPATIENT)
Dept: GENERAL RADIOLOGY | Age: 70
Discharge: HOME | End: 2025-03-25
Payer: MEDICARE

## 2025-03-25 ENCOUNTER — HOSPITAL ENCOUNTER (OUTPATIENT)
Age: 70
Discharge: HOME | End: 2025-03-25
Payer: MEDICARE

## 2025-03-25 ENCOUNTER — TELEPHONE (OUTPATIENT)
Dept: FAMILY MEDICINE CLINIC | Age: 70
End: 2025-03-25

## 2025-03-25 DIAGNOSIS — R13.14 DYSPHAGIA, PHARYNGOESOPHAGEAL PHASE: ICD-10-CM

## 2025-03-25 DIAGNOSIS — I10 PRIMARY HYPERTENSION: ICD-10-CM

## 2025-03-25 DIAGNOSIS — Z01.818 PRE-OP EXAM: ICD-10-CM

## 2025-03-25 DIAGNOSIS — E11.9 TYPE 2 DIABETES MELLITUS WITHOUT COMPLICATION, WITHOUT LONG-TERM CURRENT USE OF INSULIN: ICD-10-CM

## 2025-03-25 LAB
ALBUMIN SERPL BCG-MCNC: 4.2 G/DL (ref 3.4–4.9)
ALP SERPL-CCNC: 102 U/L (ref 40–129)
ALT SERPL W/O P-5'-P-CCNC: 18 U/L (ref 10–50)
ANION GAP SERPL CALC-SCNC: 15 MEQ/L (ref 8–16)
APTT PPP: 31.3 SECONDS (ref 22–38)
AST SERPL-CCNC: 21 U/L (ref 10–50)
BASOPHILS ABSOLUTE: 0.1 THOU/MM3 (ref 0–0.1)
BASOPHILS NFR BLD AUTO: 0.5 %
BILIRUB SERPL-MCNC: 0.3 MG/DL (ref 0.3–1.2)
BUN SERPL-MCNC: 14 MG/DL (ref 8–23)
CALCIUM SERPL-MCNC: 10.1 MG/DL (ref 8.8–10.2)
CHLORIDE SERPL-SCNC: 99 MEQ/L (ref 98–111)
CO2 SERPL-SCNC: 25 MEQ/L (ref 22–29)
CREAT SERPL-MCNC: 0.9 MG/DL (ref 0.7–1.2)
DEPRECATED RDW RBC AUTO: 47.8 FL (ref 35–45)
EOSINOPHIL NFR BLD AUTO: 1.5 %
EOSINOPHILS ABSOLUTE: 0.2 THOU/MM3 (ref 0–0.4)
ERYTHROCYTE [DISTWIDTH] IN BLOOD BY AUTOMATED COUNT: 14.1 % (ref 11.5–14.5)
GFR SERPL CREATININE-BSD FRML MDRD: > 90 ML/MIN/1.73M2
GLUCOSE SERPL-MCNC: 126 MG/DL (ref 74–109)
HCT VFR BLD AUTO: 46.6 % (ref 42–52)
HGB BLD-MCNC: 14.9 GM/DL (ref 14–18)
IMM GRANULOCYTES # BLD AUTO: 0.1 THOU/MM3 (ref 0–0.07)
IMM GRANULOCYTES NFR BLD AUTO: 0.9 %
INR PPP: 0.97 (ref 0.85–1.13)
LYMPHOCYTES ABSOLUTE: 1.6 THOU/MM3 (ref 1–4.8)
LYMPHOCYTES NFR BLD AUTO: 14.6 %
MCH RBC QN AUTO: 29.9 PG (ref 26–33)
MCHC RBC AUTO-ENTMCNC: 32 GM/DL (ref 32.2–35.5)
MCV RBC AUTO: 93.6 FL (ref 80–94)
MONOCYTES ABSOLUTE: 0.6 THOU/MM3 (ref 0.4–1.3)
MONOCYTES NFR BLD AUTO: 5.7 %
NEUTROPHILS ABSOLUTE: 8.7 THOU/MM3 (ref 1.8–7.7)
NEUTROPHILS NFR BLD AUTO: 76.8 %
NRBC BLD AUTO-RTO: 0 /100 WBC
PLATELET # BLD AUTO: 238 THOU/MM3 (ref 130–400)
PMV BLD AUTO: 8.8 FL (ref 9.4–12.4)
POTASSIUM SERPL-SCNC: 4.4 MEQ/L (ref 3.5–5.2)
PROT SERPL-MCNC: 7.9 G/DL (ref 6.4–8.3)
RBC # BLD AUTO: 4.98 MILL/MM3 (ref 4.7–6.1)
SODIUM SERPL-SCNC: 139 MEQ/L (ref 135–145)
WBC # BLD AUTO: 11.3 THOU/MM3 (ref 4.8–10.8)

## 2025-03-25 PROCEDURE — 85025 COMPLETE CBC W/AUTO DIFF WBC: CPT

## 2025-03-25 PROCEDURE — 85730 THROMBOPLASTIN TIME PARTIAL: CPT

## 2025-03-25 PROCEDURE — 2500000003 HC RX 250 WO HCPCS: Performed by: NURSE PRACTITIONER

## 2025-03-25 PROCEDURE — 80053 COMPREHEN METABOLIC PANEL: CPT

## 2025-03-25 PROCEDURE — 36415 COLL VENOUS BLD VENIPUNCTURE: CPT

## 2025-03-25 PROCEDURE — 85610 PROTHROMBIN TIME: CPT

## 2025-03-25 PROCEDURE — 74220 X-RAY XM ESOPHAGUS 1CNTRST: CPT

## 2025-03-25 PROCEDURE — 6370000000 HC RX 637 (ALT 250 FOR IP): Performed by: NURSE PRACTITIONER

## 2025-03-25 RX ADMIN — BARIUM SULFATE 1 TABLET: 700 TABLET ORAL at 10:47

## 2025-03-25 RX ADMIN — ANTACID/ANTIFLATULENT 1 EACH: 380; 550; 10; 10 GRANULE, EFFERVESCENT ORAL at 10:48

## 2025-03-25 RX ADMIN — BARIUM SULFATE 50 ML: 0.6 SUSPENSION ORAL at 10:47

## 2025-03-25 RX ADMIN — BARIUM SULFATE 140 ML: 980 POWDER, FOR SUSPENSION ORAL at 10:47

## 2025-03-25 NOTE — TELEPHONE ENCOUNTER
Let pt know I have reviewed his labs and he is an acceptable risk for surgery. We will contact ENT and let them know.

## 2025-03-25 NOTE — TELEPHONE ENCOUNTER
Pt informed and voiced understanding with no further questions at this time.       Called ENT office and let them know, she will relay the message to Dr Unger.

## 2025-03-26 ENCOUNTER — HOSPITAL ENCOUNTER (OUTPATIENT)
Dept: CT IMAGING | Age: 70
Discharge: HOME | End: 2025-03-26
Attending: INTERNAL MEDICINE
Payer: MEDICARE

## 2025-03-26 ENCOUNTER — RESULTS FOLLOW-UP (OUTPATIENT)
Dept: PULMONOLOGY | Age: 70
End: 2025-03-26

## 2025-03-26 DIAGNOSIS — J98.4 CAVITARY LESION OF LUNG: ICD-10-CM

## 2025-03-26 PROCEDURE — 71250 CT THORAX DX C-: CPT

## 2025-03-26 RX ORDER — HYDROCODONE BITARTRATE AND ACETAMINOPHEN 5; 325 MG/1; MG/1
1 TABLET ORAL EVERY 4 HOURS PRN
COMMUNITY
End: 2025-04-03 | Stop reason: ALTCHOICE

## 2025-03-26 NOTE — PROGRESS NOTES
Follow all instructions given by your physician  If Urology case Call [phone] the weekday before procedure to find out Arrival Time.  Do not eat or drink anything after midnight prior to surgery(includes water, chewing gum, mints and ice chips)  Sips of water am of surgery with allowed medications  May brush teeth   Do not smoke or chew tobacco, drink alcoholic beverages or use any illicit drugs for 24 hours prior to surgery  Bring insurance info and photo ID  Bring pertinent paperwork with you from Doctor or surgeons's office  Wear clean comfortable, loose-fitting clothing  No make-up, nail polish, jewelry, piercings, or contact lenses to be worn day of surgery  No glue on dentures morning of surgery; you will be asked to remove them for surgery. Case for glasses.  Shower the night before and the morning of surgery with cleansing soap provided or a liquid antibacterial soap, dry with new fresh clean towel after each shower, no lotions, creams or powder.  Clean sheets and pillowcase on bed night before surgery  Bring medications in original bottles, Bring rescue inhalers with you  Bring CPAP/BIPAP machine if you have one ( you may be charged if one is needed in recovery room )    Do you have a DNR?   Please Bring Healthcare Directive or Healthcare Power of  in so we can scan it into your chart.    Our pharmacy has a Meds to Beds program where they will deliver any new prescriptions you may have to your room before you leave. Our Pharmacy will clear it through your insurance; for example (same co pay). This enables you to take your new RX as soon as you need when you get home and avoids stop/wait delays on the way home.  Please have a form of payment with you and have someone designated as your Pharmacy contact with their phone # as you may not feel well or still be under the influence of anesthesia.    Please refer to the SSI-Surgical Site Infection Flyer you hopefully received in the mail-together we  can prevent infections; signs and symptoms reviewed.  When discharged be sure you understand how to care for your wound and that you have the Dr./office phone # to call if you have any concerns or questions about your wound.     needed at discharge and someone over 18 to stay with you for 24 hours overnight (surgery may be cancelled if you don't have this)  Report to \[facility]\"" on 2nd floor  If you would become ill prior to surgery, please call the surgeon  May have a visitor with you, we request that you limit to 2 visitors in pre-op area  Masks are recommended but not required, new masks at entrance desk  Call [phone] for any questions

## 2025-03-26 NOTE — PROGRESS NOTES
PAT call attempted, patient unavailable, left message to please call us back at your earliest convenience; [phone]

## 2025-04-02 ENCOUNTER — HOSPITAL ENCOUNTER (OUTPATIENT)
Age: 70
Setting detail: OUTPATIENT SURGERY
Discharge: HOME | End: 2025-04-02
Attending: OTOLARYNGOLOGY | Admitting: OTOLARYNGOLOGY
Payer: MEDICARE

## 2025-04-02 ENCOUNTER — ANESTHESIA (OUTPATIENT)
Dept: OPERATING ROOM | Age: 70
End: 2025-04-02
Payer: MEDICARE

## 2025-04-02 ENCOUNTER — ANESTHESIA EVENT (OUTPATIENT)
Dept: OPERATING ROOM | Age: 70
End: 2025-04-02
Payer: MEDICARE

## 2025-04-02 VITALS
DIASTOLIC BLOOD PRESSURE: 71 MMHG | WEIGHT: 183 LBS | TEMPERATURE: 97.4 F | RESPIRATION RATE: 20 BRPM | HEIGHT: 74 IN | BODY MASS INDEX: 23.49 KG/M2 | SYSTOLIC BLOOD PRESSURE: 132 MMHG | HEART RATE: 80 BPM | OXYGEN SATURATION: 94 %

## 2025-04-02 DIAGNOSIS — J38.3 LESION OF VOCAL FOLD: ICD-10-CM

## 2025-04-02 DIAGNOSIS — E11.9 TYPE 2 DIABETES MELLITUS WITHOUT COMPLICATION, WITHOUT LONG-TERM CURRENT USE OF INSULIN: ICD-10-CM

## 2025-04-02 LAB — GLUCOSE BLD STRIP.AUTO-MCNC: 132 MG/DL (ref 70–108)

## 2025-04-02 PROCEDURE — 6360000002 HC RX W HCPCS: Performed by: NURSE ANESTHETIST, CERTIFIED REGISTERED

## 2025-04-02 PROCEDURE — 2709999900 HC NON-CHARGEABLE SUPPLY: Performed by: OTOLARYNGOLOGY

## 2025-04-02 PROCEDURE — 82948 REAGENT STRIP/BLOOD GLUCOSE: CPT

## 2025-04-02 PROCEDURE — 88305 TISSUE EXAM BY PATHOLOGIST: CPT

## 2025-04-02 PROCEDURE — 6360000002 HC RX W HCPCS

## 2025-04-02 PROCEDURE — 3700000000 HC ANESTHESIA ATTENDED CARE: Performed by: OTOLARYNGOLOGY

## 2025-04-02 PROCEDURE — 7100000011 HC PHASE II RECOVERY - ADDTL 15 MIN: Performed by: OTOLARYNGOLOGY

## 2025-04-02 PROCEDURE — 7100000010 HC PHASE II RECOVERY - FIRST 15 MIN: Performed by: OTOLARYNGOLOGY

## 2025-04-02 PROCEDURE — 31536 LARYNGOSCOPY W/BX & OP SCOPE: CPT | Performed by: OTOLARYNGOLOGY

## 2025-04-02 PROCEDURE — 7100000001 HC PACU RECOVERY - ADDTL 15 MIN: Performed by: OTOLARYNGOLOGY

## 2025-04-02 PROCEDURE — 6360000002 HC RX W HCPCS: Performed by: ANESTHESIOLOGY

## 2025-04-02 PROCEDURE — 2720000010 HC SURG SUPPLY STERILE: Performed by: OTOLARYNGOLOGY

## 2025-04-02 PROCEDURE — 3600000014 HC SURGERY LEVEL 4 ADDTL 15MIN: Performed by: OTOLARYNGOLOGY

## 2025-04-02 PROCEDURE — 2500000003 HC RX 250 WO HCPCS: Performed by: NURSE ANESTHETIST, CERTIFIED REGISTERED

## 2025-04-02 PROCEDURE — 6370000000 HC RX 637 (ALT 250 FOR IP): Performed by: NURSE ANESTHETIST, CERTIFIED REGISTERED

## 2025-04-02 PROCEDURE — 3600000004 HC SURGERY LEVEL 4 BASE: Performed by: OTOLARYNGOLOGY

## 2025-04-02 PROCEDURE — 2500000003 HC RX 250 WO HCPCS: Performed by: OTOLARYNGOLOGY

## 2025-04-02 PROCEDURE — 3700000001 HC ADD 15 MINUTES (ANESTHESIA): Performed by: OTOLARYNGOLOGY

## 2025-04-02 PROCEDURE — 6370000000 HC RX 637 (ALT 250 FOR IP): Performed by: OTOLARYNGOLOGY

## 2025-04-02 PROCEDURE — 7100000000 HC PACU RECOVERY - FIRST 15 MIN: Performed by: OTOLARYNGOLOGY

## 2025-04-02 PROCEDURE — 6360000002 HC RX W HCPCS: Performed by: OTOLARYNGOLOGY

## 2025-04-02 PROCEDURE — 2580000003 HC RX 258: Performed by: OTOLARYNGOLOGY

## 2025-04-02 RX ORDER — PROPOFOL 10 MG/ML
INJECTION, EMULSION INTRAVENOUS
Status: DISCONTINUED | OUTPATIENT
Start: 2025-04-02 | End: 2025-04-02 | Stop reason: SDUPTHER

## 2025-04-02 RX ORDER — SODIUM CHLORIDE 9 MG/ML
INJECTION, SOLUTION INTRAVENOUS CONTINUOUS
Status: DISCONTINUED | OUTPATIENT
Start: 2025-04-02 | End: 2025-04-02 | Stop reason: HOSPADM

## 2025-04-02 RX ORDER — SODIUM CHLORIDE 0.9 % (FLUSH) 0.9 %
5-40 SYRINGE (ML) INJECTION EVERY 12 HOURS SCHEDULED
Status: DISCONTINUED | OUTPATIENT
Start: 2025-04-02 | End: 2025-04-02 | Stop reason: HOSPADM

## 2025-04-02 RX ORDER — SODIUM CHLORIDE 9 MG/ML
INJECTION, SOLUTION INTRAVENOUS PRN
Status: DISCONTINUED | OUTPATIENT
Start: 2025-04-02 | End: 2025-04-02 | Stop reason: HOSPADM

## 2025-04-02 RX ORDER — LIDOCAINE HYDROCHLORIDE 40 MG/ML
SOLUTION TOPICAL
Status: DISCONTINUED | OUTPATIENT
Start: 2025-04-02 | End: 2025-04-02 | Stop reason: SDUPTHER

## 2025-04-02 RX ORDER — HYDROCODONE BITARTRATE AND ACETAMINOPHEN 5; 325 MG/1; MG/1
1 TABLET ORAL ONCE
Refills: 0 | Status: COMPLETED | OUTPATIENT
Start: 2025-04-02 | End: 2025-04-02

## 2025-04-02 RX ORDER — LIDOCAINE HCL/PF 100 MG/5ML
SYRINGE (ML) INJECTION
Status: DISCONTINUED | OUTPATIENT
Start: 2025-04-02 | End: 2025-04-02 | Stop reason: SDUPTHER

## 2025-04-02 RX ORDER — SUCCINYLCHOLINE/SOD CL,ISO/PF 200MG/10ML
SYRINGE (ML) INTRAVENOUS
Status: DISCONTINUED | OUTPATIENT
Start: 2025-04-02 | End: 2025-04-02 | Stop reason: SDUPTHER

## 2025-04-02 RX ORDER — SODIUM CHLORIDE 0.9 % (FLUSH) 0.9 %
5-40 SYRINGE (ML) INJECTION PRN
Status: DISCONTINUED | OUTPATIENT
Start: 2025-04-02 | End: 2025-04-02 | Stop reason: HOSPADM

## 2025-04-02 RX ORDER — FENTANYL CITRATE 50 UG/ML
INJECTION, SOLUTION INTRAMUSCULAR; INTRAVENOUS
Status: DISCONTINUED | OUTPATIENT
Start: 2025-04-02 | End: 2025-04-02 | Stop reason: SDUPTHER

## 2025-04-02 RX ORDER — ROCURONIUM BROMIDE 10 MG/ML
INJECTION, SOLUTION INTRAVENOUS
Status: DISCONTINUED | OUTPATIENT
Start: 2025-04-02 | End: 2025-04-02 | Stop reason: SDUPTHER

## 2025-04-02 RX ORDER — DEXAMETHASONE SODIUM PHOSPHATE 4 MG/ML
INJECTION, SOLUTION INTRA-ARTICULAR; INTRALESIONAL; INTRAMUSCULAR; INTRAVENOUS; SOFT TISSUE
Status: DISCONTINUED | OUTPATIENT
Start: 2025-04-02 | End: 2025-04-02 | Stop reason: SDUPTHER

## 2025-04-02 RX ORDER — ONDANSETRON 2 MG/ML
INJECTION INTRAMUSCULAR; INTRAVENOUS
Status: DISCONTINUED | OUTPATIENT
Start: 2025-04-02 | End: 2025-04-02 | Stop reason: SDUPTHER

## 2025-04-02 RX ADMIN — Medication 140 MG: at 13:29

## 2025-04-02 RX ADMIN — HYDROMORPHONE HYDROCHLORIDE 0.5 MG: 1 INJECTION, SOLUTION INTRAMUSCULAR; INTRAVENOUS; SUBCUTANEOUS at 14:32

## 2025-04-02 RX ADMIN — ROCURONIUM BROMIDE 10 MG: 10 INJECTION, SOLUTION INTRAVENOUS at 14:02

## 2025-04-02 RX ADMIN — DEXAMETHASONE SODIUM PHOSPHATE 10 MG: 4 INJECTION, SOLUTION INTRAMUSCULAR; INTRAVENOUS at 13:33

## 2025-04-02 RX ADMIN — SODIUM CHLORIDE: 0.9 INJECTION, SOLUTION INTRAVENOUS at 11:32

## 2025-04-02 RX ADMIN — SUGAMMADEX 200 MG: 100 INJECTION, SOLUTION INTRAVENOUS at 14:16

## 2025-04-02 RX ADMIN — Medication 100 MG: at 13:29

## 2025-04-02 RX ADMIN — HYDROMORPHONE HYDROCHLORIDE 0.5 MG: 1 INJECTION, SOLUTION INTRAMUSCULAR; INTRAVENOUS; SUBCUTANEOUS at 14:37

## 2025-04-02 RX ADMIN — HYDROCODONE BITARTRATE AND ACETAMINOPHEN 1 TABLET: 5; 325 TABLET ORAL at 15:41

## 2025-04-02 RX ADMIN — ONDANSETRON 4 MG: 2 INJECTION, SOLUTION INTRAMUSCULAR; INTRAVENOUS at 14:16

## 2025-04-02 RX ADMIN — FENTANYL CITRATE 50 MCG: 50 INJECTION, SOLUTION INTRAMUSCULAR; INTRAVENOUS at 14:23

## 2025-04-02 RX ADMIN — WATER 2000 MG: 1 INJECTION INTRAMUSCULAR; INTRAVENOUS; SUBCUTANEOUS at 13:34

## 2025-04-02 RX ADMIN — PROPOFOL 30 MG: 10 INJECTION, EMULSION INTRAVENOUS at 13:32

## 2025-04-02 RX ADMIN — ROCURONIUM BROMIDE 30 MG: 10 INJECTION, SOLUTION INTRAVENOUS at 13:36

## 2025-04-02 RX ADMIN — FENTANYL CITRATE 50 MCG: 50 INJECTION, SOLUTION INTRAMUSCULAR; INTRAVENOUS at 13:34

## 2025-04-02 RX ADMIN — LIDOCAINE HYDROCHLORIDE 4 ML: 40 SOLUTION TOPICAL at 14:18

## 2025-04-02 ASSESSMENT — PAIN - FUNCTIONAL ASSESSMENT
PAIN_FUNCTIONAL_ASSESSMENT: 0-10
PAIN_FUNCTIONAL_ASSESSMENT: 0-10

## 2025-04-02 ASSESSMENT — LIFESTYLE VARIABLES: SMOKING_STATUS: 1

## 2025-04-02 ASSESSMENT — PAIN SCALES - GENERAL
PAINLEVEL_OUTOF10: 7
PAINLEVEL_OUTOF10: 4
PAINLEVEL_OUTOF10: 3
PAINLEVEL_OUTOF10: 7

## 2025-04-02 ASSESSMENT — COPD QUESTIONNAIRES: CAT_SEVERITY: SEVERE

## 2025-04-02 ASSESSMENT — PAIN DESCRIPTION - LOCATION: LOCATION: THROAT

## 2025-04-02 ASSESSMENT — PAIN DESCRIPTION - DESCRIPTORS: DESCRIPTORS: ACHING;DISCOMFORT

## 2025-04-02 NOTE — ANESTHESIA PRE PROCEDURE
Results   Component Value Date    ABORH A 01/22/2025       Drug/Infectious Status (If Applicable):  Lab Results   Component Value Date/Time    HEPCAB Negative 07/23/2018 08:50 AM       COVID-19 Screening (If Applicable):   Lab Results   Component Value Date/Time    COVID19 Not Detected 12/31/2020 12:35 PM           Anesthesia Evaluation  Patient summary reviewed   no history of anesthetic complications:   Airway: Mallampati: II  TM distance: >3 FB   Neck ROM: full  Mouth opening: > = 3 FB   Dental:    (+) edentulous      Pulmonary:   (+)  COPD: severe,      decreased breath sounds    current smoker                          ROS comment: Former smoker   Cardiovascular:    (+) hypertension:      ECG reviewed      Echocardiogram reviewed                  Neuro/Psych:   (+) neuromuscular disease:, psychiatric history:            GI/Hepatic/Renal:   (+) GERD:          Endo/Other:    (+) Diabetes.                 Abdominal:             Vascular:          Other Findings:             Anesthesia Plan      general     ASA 4       Induction: intravenous.    MIPS: Postoperative opioids intended and Prophylactic antiemetics administered.  Anesthetic plan and risks discussed with patient and child/children.      Plan discussed with CRNA and surgical team.                    PRATIK DOTSON MD   4/2/2025

## 2025-04-02 NOTE — PROGRESS NOTES
Pt admitted to [facility] room 18 and oriented to unit. SCD sleeves applied. Nares swabbed. Pt verbalized permission for first name, last initial and physicians name on white board. SDS board and discharge criteria explained, pt and family verbalized understanding. Pt denies thoughts of harming self or others. Call light in reach. Family at the bedside.  [place] [phone]

## 2025-04-02 NOTE — ANESTHESIA POSTPROCEDURE EVALUATION
Department of Anesthesiology  Postprocedure Note    Patient: Dilan Pappas  MRN: [id number]  YOB: 1955  Date of evaluation: 4/2/2025    Procedure Summary       Date: 04/02/25 Room / Location: [facility]  [facility]    Anesthesia Start: 1319 Anesthesia Stop: 1426    Procedure: Micro direct laryngoscopy with biopsy, vocal fold biposies Diagnosis:       Lesion of vocal fold      (Lesion of vocal fold [J38.3])    Surgeons: Jim Unger MD Responsible Provider: Scott Mayer MD    Anesthesia Type: general ASA Status: 4            Anesthesia Type: No value filed.    Bhaskar Phase I: Bhaskar Score: 9    Bhaskar Phase II:      Anesthesia Post Evaluation    Patient location during evaluation: PACU  Patient participation: complete - patient participated  Level of consciousness: awake  Airway patency: patent  Nausea & Vomiting: no nausea and no vomiting  Cardiovascular status: hemodynamically stable  Respiratory status: acceptable  Hydration status: stable  Pain management: adequate    No notable events documented.

## 2025-04-02 NOTE — PROGRESS NOTES
1423: Pt arrives to pacu, alert. Pt on 6LNC, respirations easy and unlabored. Pt c/o throat pain, fentanyl administered per crna. VSS    1428: Ice chips provided to patient, tolerated well    1432: Pt continues to state pain 7/10, 0.5mg of dilaudid administered    1437: No change in pain, 0.5mg of dilaudid given    1442: Pt resting off and on with eyes closed, easily awakens to voice. Noted improvement in pain    1457: Pt meets criteria for discharge from pacu, transported back to [facility] in stable condition. VSS

## 2025-04-02 NOTE — H&P
[facility] [place] [facility]  [facility] EAR, NOSE AND THROAT  [address]  [address]  [facility] [address]  Dept: [phone]  Dept Fax: [phone]  Loc: [phone]    Dilan Pappas is a 69 y.o. male who was referred byNo ref. provider found for:  No chief complaint on file.  .    HPI:   No change in history since last visit.    Dilan Pappas is a 69 y.o. male who presents today for possible left vocal fold lesion. CT scan of the chest revealed a irregular consolidation in the left upper lobe with cavitary changes, radiologist suggested this is an inflammatory or infectious process however cannot rule out malignancy, it was chosen to proceed with PET/CT completed on 2/27/2025: 3.1 cm cavitary lesion in the left upper lobe has a peripheral maximum SUV of 6.7, no FDG avid mediastinal, hilar or axillary lymphadenopathy, FDG avid thickening of the left vocal cord suspicious for malignancy.     History:     Allergies   Allergen Reactions    Seasonal Other (See Comments)     Runny nose     No current facility-administered medications for this encounter.     Current Outpatient Medications   Medication Sig Dispense Refill    HYDROcodone-acetaminophen (NORCO) 5-325 MG per tablet Take 1 tablet by mouth every 4 hours as needed for Pain.      pantoprazole (PROTONIX) 40 MG tablet TAKE 1 TABLET BY MOUTH DAILY 90 tablet 4    atorvastatin (LIPITOR) 40 MG tablet TAKE 1 TABLET BY MOUTH DAILY 90 tablet 3    empagliflozin (JARDIANCE) 25 MG tablet Take 1 tablet by mouth daily 90 tablet 4    QUEtiapine (SEROQUEL) 200 MG tablet TAKE 1 TABLET BY MOUTH AT BEDTIME 90 tablet 3    metoprolol tartrate (LOPRESSOR) 25 MG tablet TAKE 1/2 TABLET BY MOUTH TWICE DAILY, HOLD IF HEART RATE LESS THAN 60 90 tablet 3    gabapentin (NEURONTIN) 300 MG capsule Take 1 capsule by mouth 2 times daily for 180 days. Intended supply: 90 days 180 capsule 1    SITagliptin (JANUVIA) 100 MG tablet Take 1 tablet by mouth daily 90 tablet  inherent in voice recognition technology.**

## 2025-04-02 NOTE — OP NOTE
Operative Note      Patient: Dilan Pappas  YOB: 1955  MRN: [id number]    Date of Procedure: 4/2/2025    Pre-Op Diagnosis Codes:      * Lesion of vocal fold [J38.3]    Post-Op Diagnosis: Same       Procedure(s):  Micro direct laryngoscopy with biopsy, vocal fold biposies    Surgeon(s):  Jim Unger MD    Assistant:   * No surgical staff found *    Anesthesia: General    Estimated Blood Loss (mL): less than 50     Complications: None    Specimens:   ID Type Source Tests Collected by Time Destination   A : Anterior Comissure Biopsy Tissue Larynx SURGICAL PATHOLOGY Jim Unger MD 4/2/2025 1341    B : Left Vocal Fold Biopsy Tissue Vocal Cord SURGICAL PATHOLOGY Jim Unger MD 4/2/2025 1342        Implants:  * No implants in log *      Drains: * No LDAs found *    Findings:  Infection Present At Time Of Surgery (PATOS) (choose all levels that have infection present):  No infection present  Other Findings: left extensive vocal fold mass-biopsied.    Detailed Description of Procedure:     The patient was taken to the operating room and placed in the supine position.   General anesthesia was inducted. The patient was prepared and draped in routine fashion.    The Dedo laryngoscope with wet guaze to protect the upper gum was used to expose the larynx. [place] telescope was brought in to achieve optimal visualization of the vocal folds.  A fragile mass occupied the entire left vocal fold. Multiple biopsies from the mass and the anterior commissure were taken and sent for pathology examination. Hemostasis was achieved with pledgets soaked in epinephrine (1:10, 000), and suction Bovie on setting 20. Vocal folds were sprayed with LTA. The Dedo scope and the wet guaze were removed.  The patient was awakened, extubated , and transferred to the recovery room in stable condition.     Electronically signed by Jim Unger MD on 4/2/2025 at 2:35 PM

## 2025-04-03 ENCOUNTER — TELEPHONE (OUTPATIENT)
Dept: ENT CLINIC | Age: 70
End: 2025-04-03

## 2025-04-03 ENCOUNTER — OFFICE VISIT (OUTPATIENT)
Dept: PULMONOLOGY | Age: 70
End: 2025-04-03
Payer: MEDICARE

## 2025-04-03 VITALS
OXYGEN SATURATION: 93 % | SYSTOLIC BLOOD PRESSURE: 136 MMHG | DIASTOLIC BLOOD PRESSURE: 78 MMHG | HEIGHT: 73 IN | TEMPERATURE: 98.2 F | WEIGHT: 180.2 LBS | BODY MASS INDEX: 23.88 KG/M2 | HEART RATE: 94 BPM

## 2025-04-03 DIAGNOSIS — R91.8 PULMONARY INFILTRATES: Primary | ICD-10-CM

## 2025-04-03 DIAGNOSIS — G89.18 PAIN FOLLOWING SURGERY OR PROCEDURE: Primary | ICD-10-CM

## 2025-04-03 DIAGNOSIS — J38.3 VOCAL CORD MASS: ICD-10-CM

## 2025-04-03 DIAGNOSIS — R13.12 OROPHARYNGEAL DYSPHAGIA: ICD-10-CM

## 2025-04-03 DIAGNOSIS — T17.800A ASPIRATION INTO LOWER RESPIRATORY TRACT, INITIAL ENCOUNTER: ICD-10-CM

## 2025-04-03 DIAGNOSIS — J96.11 CHRONIC RESPIRATORY FAILURE WITH HYPOXIA: ICD-10-CM

## 2025-04-03 PROCEDURE — 1159F MED LIST DOCD IN RCRD: CPT | Performed by: INTERNAL MEDICINE

## 2025-04-03 PROCEDURE — 3078F DIAST BP <80 MM HG: CPT | Performed by: INTERNAL MEDICINE

## 2025-04-03 PROCEDURE — 1123F ACP DISCUSS/DSCN MKR DOCD: CPT | Performed by: INTERNAL MEDICINE

## 2025-04-03 PROCEDURE — 3075F SYST BP GE 130 - 139MM HG: CPT | Performed by: INTERNAL MEDICINE

## 2025-04-03 PROCEDURE — 99214 OFFICE O/P EST MOD 30 MIN: CPT | Performed by: INTERNAL MEDICINE

## 2025-04-03 RX ORDER — HYDROCODONE BITARTRATE AND ACETAMINOPHEN 5; 325 MG/1; MG/1
1 TABLET ORAL EVERY 8 HOURS PRN
Qty: 15 TABLET | Refills: 0 | Status: SHIPPED | OUTPATIENT
Start: 2025-04-03 | End: 2025-04-08

## 2025-04-03 ASSESSMENT — ENCOUNTER SYMPTOMS
COUGH: 0
GASTROINTESTINAL NEGATIVE: 1
CHEST TIGHTNESS: 0
SHORTNESS OF BREATH: 1
VOICE CHANGE: 0
BACK PAIN: 0
WHEEZING: 0
TROUBLE SWALLOWING: 0

## 2025-04-03 NOTE — TELEPHONE ENCOUNTER
I called patient to advise that Dr. Unger wanted him to continue voice rest for the next couple of days until Saturday he could start with light voice use. His grandson, Suhas, was with patient and speaking for him.  He said patient is asking for pain medication.     Pharmacy is [facility].

## 2025-04-03 NOTE — TELEPHONE ENCOUNTER
Patient grandson calling back. That medication was not prescribed on 03/26 it was documented in chart 03/26 by the nurse who called him for pre admission testing.  The bottle says it dispensed on 03/20 with a 7 day supply so he has been out of medication since Saturday. It was prescribed by Dr. Barraza (he's at [facility])

## 2025-04-03 NOTE — PROGRESS NOTES
Pt had surgery yesterday. He called asking for pain medications. Pt said he run out of Norco that was prescribed on 3/26/2025.

## 2025-04-03 NOTE — PROGRESS NOTES
Subjective:      Patient ID: Dilan Pappas is a 69 y.o. male.      CC: Respiratory failure    HPI        Dilan comes with his grandson, he is seen with voice rest, had a micro direct laryngoscopy with biopsy from the left vocal cord mass.    Dilan reports choking and gagging food when eating, recently completed esophagram revealed laryngeal penetration and tracheal aspiration of barium.    Follow-up CT scan of the chest completed  3/26/25 reveals the irregular consolidation in the left upper lobe is stable with no evolving changes for the better or worse comparing with previous examination, however there are new nodular infiltrates in the left lower lobe.      Previous notes    Dilan was evaluated by Colin Orozco CNP for hemoptysis, CT scan of the chest revealed a irregular consolidation in the left upper lobe with cavitary changes, radiologist suggested this is an inflammatory or infectious process however cannot rule out malignancy, it was chosen to proceed with PET/CT completed on 2/27/2025: 3.1 cm cavitary lesion in the left upper lobe has a peripheral maximum SUV of 6.7, no FDG avid mediastinal, hilar or axillary lymphadenopathy, FDG avid thickening of the left vocal cord suspicious for malignancy    Hemoptysis was short-lived and is completely resolved, generally denies fever, chills, malaise, denies any changes in his respiratory condition    He was given course of antibiotics.    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

## 2025-04-08 ENCOUNTER — HOSPITAL ENCOUNTER (OUTPATIENT)
Dept: CT IMAGING | Age: 70
Discharge: HOME | End: 2025-04-08
Attending: OTOLARYNGOLOGY
Payer: MEDICARE

## 2025-04-08 ENCOUNTER — OFFICE VISIT (OUTPATIENT)
Dept: ENT CLINIC | Age: 70
End: 2025-04-08

## 2025-04-08 ENCOUNTER — TELEPHONE (OUTPATIENT)
Dept: PULMONOLOGY | Age: 70
End: 2025-04-08

## 2025-04-08 VITALS
HEIGHT: 73 IN | SYSTOLIC BLOOD PRESSURE: 120 MMHG | OXYGEN SATURATION: 94 % | WEIGHT: 179.3 LBS | DIASTOLIC BLOOD PRESSURE: 66 MMHG | TEMPERATURE: 98 F | HEART RATE: 103 BPM | BODY MASS INDEX: 23.76 KG/M2

## 2025-04-08 DIAGNOSIS — C32.0 SQUAMOUS CELL CARCINOMA OF LEFT VOCAL CORD: Primary | ICD-10-CM

## 2025-04-08 DIAGNOSIS — C32.0 SQUAMOUS CELL CARCINOMA OF LEFT VOCAL CORD: ICD-10-CM

## 2025-04-08 PROCEDURE — 70491 CT SOFT TISSUE NECK W/DYE: CPT

## 2025-04-08 PROCEDURE — 99024 POSTOP FOLLOW-UP VISIT: CPT | Performed by: OTOLARYNGOLOGY

## 2025-04-08 PROCEDURE — 6360000004 HC RX CONTRAST MEDICATION: Performed by: OTOLARYNGOLOGY

## 2025-04-08 RX ORDER — IOPAMIDOL 755 MG/ML
80 INJECTION, SOLUTION INTRAVASCULAR
Status: COMPLETED | OUTPATIENT
Start: 2025-04-08 | End: 2025-04-08

## 2025-04-08 RX ADMIN — IOPAMIDOL 80 ML: 755 INJECTION, SOLUTION INTRAVENOUS at 10:58

## 2025-04-08 NOTE — PROGRESS NOTES
Subjective:    Pt is following up post operatively. No complaints on today's visit. BIOPSY OF LARYNX, ANTERIOR COMMISSURE   And VOCAL CORD BIOPSY, LEFT :  Necrotic, poorly differentiated squamous cell carcinoma.         Assessment and plan:  Pathology report results were reviewed with the patient.  High resolution Neck CT with contrast and 1 mm cuts to rule out cartilage invasion was ordered.   Needs for surgery/radiation/chemotherapy was discussed.

## 2025-04-08 NOTE — TELEPHONE ENCOUNTER
Patient just saw Ent today and they stated he has throat cancer and they are wanting him to get the lung bx scheduled. Please advise. Patient seen you on 4/3. And biopsy was discussed with Dilan at last visit and he would like to wait during the immediate post biopsy period to recuperate but he would like to get this scheduled.

## 2025-04-09 ENCOUNTER — OFFICE VISIT (OUTPATIENT)
Dept: FAMILY MEDICINE CLINIC | Age: 70
End: 2025-04-09
Payer: MEDICARE

## 2025-04-09 VITALS
WEIGHT: 180.2 LBS | SYSTOLIC BLOOD PRESSURE: 118 MMHG | HEART RATE: 83 BPM | TEMPERATURE: 97.8 F | RESPIRATION RATE: 22 BRPM | OXYGEN SATURATION: 90 % | DIASTOLIC BLOOD PRESSURE: 62 MMHG | BODY MASS INDEX: 23.88 KG/M2 | HEIGHT: 73 IN

## 2025-04-09 DIAGNOSIS — I10 PRIMARY HYPERTENSION: ICD-10-CM

## 2025-04-09 DIAGNOSIS — Z99.81 OXYGEN DEPENDENT: ICD-10-CM

## 2025-04-09 DIAGNOSIS — J44.9 CHRONIC OBSTRUCTIVE PULMONARY DISEASE, UNSPECIFIED COPD TYPE (HCC): ICD-10-CM

## 2025-04-09 DIAGNOSIS — K21.9 GASTROESOPHAGEAL REFLUX DISEASE WITHOUT ESOPHAGITIS: ICD-10-CM

## 2025-04-09 DIAGNOSIS — C32.0 SQUAMOUS CELL CARCINOMA OF LEFT VOCAL CORD: ICD-10-CM

## 2025-04-09 DIAGNOSIS — R91.1 LUNG NODULE SEEN ON IMAGING STUDY: ICD-10-CM

## 2025-04-09 DIAGNOSIS — R49.0 HOARSENESS: ICD-10-CM

## 2025-04-09 DIAGNOSIS — J84.112 IDIOPATHIC DIFFUSE INTERSTITIAL PULMONARY FIBROSIS (HCC): ICD-10-CM

## 2025-04-09 DIAGNOSIS — E11.9 TYPE 2 DIABETES MELLITUS WITHOUT COMPLICATION, WITHOUT LONG-TERM CURRENT USE OF INSULIN: Primary | ICD-10-CM

## 2025-04-09 DIAGNOSIS — R91.1 LUNG NODULE SEEN ON IMAGING STUDY: Primary | ICD-10-CM

## 2025-04-09 DIAGNOSIS — M48.02 SPINAL STENOSIS OF CERVICAL REGION WITH RADICULOPATHY: ICD-10-CM

## 2025-04-09 DIAGNOSIS — M54.12 SPINAL STENOSIS OF CERVICAL REGION WITH RADICULOPATHY: ICD-10-CM

## 2025-04-09 PROCEDURE — 3052F HG A1C>EQUAL 8.0%<EQUAL 9.0%: CPT | Performed by: NURSE PRACTITIONER

## 2025-04-09 PROCEDURE — 1160F RVW MEDS BY RX/DR IN RCRD: CPT | Performed by: NURSE PRACTITIONER

## 2025-04-09 PROCEDURE — 99214 OFFICE O/P EST MOD 30 MIN: CPT | Performed by: NURSE PRACTITIONER

## 2025-04-09 PROCEDURE — 3074F SYST BP LT 130 MM HG: CPT | Performed by: NURSE PRACTITIONER

## 2025-04-09 PROCEDURE — 1123F ACP DISCUSS/DSCN MKR DOCD: CPT | Performed by: NURSE PRACTITIONER

## 2025-04-09 PROCEDURE — 1159F MED LIST DOCD IN RCRD: CPT | Performed by: NURSE PRACTITIONER

## 2025-04-09 PROCEDURE — 3078F DIAST BP <80 MM HG: CPT | Performed by: NURSE PRACTITIONER

## 2025-04-09 ASSESSMENT — ENCOUNTER SYMPTOMS
CHOKING: 0
COUGH: 0
SHORTNESS OF BREATH: 1
VOICE CHANGE: 1
TROUBLE SWALLOWING: 1
CHEST TIGHTNESS: 0
WHEEZING: 0

## 2025-04-09 NOTE — PROGRESS NOTES
fluticasone-umeclidin-vilant (TRELEGY ELLIPTA) 100-62.5-25 MCG/ACT AEPB inhaler Inhale 1 puff into the lungs daily 3 each 3    Lancets (ONETOUCH DELICA PLUS [id number]) MISC TEST ONCE DAILY 100 each 3    albuterol (PROVENTIL) (2.5 MG/3ML) 0.083% nebulizer solution USE 3 ML BY NEBULIZATION FOUR TIMES DAILY AS NEEDED FOR WHEEZING 90 each 3    lisinopril (PRINIVIL;ZESTRIL) 2.5 MG tablet Take 1 tablet by mouth daily 90 tablet 3    blood glucose monitor strips Test one times a day dispense freestyle strips 100 strip 4    blood glucose monitor supplies Alcohol Swabs, Lancets,  Test one times a day Freestyle meter and strips 1 each 0    OXYGEN Inhale into the lungs 3L/MIN/NC CONT      Nebulizers (COMPRESSOR/NEBULIZER) MISC 1 each by Does not apply route in the morning and at bedtime 1 each 3    Handicap Placard MISC by Does not apply route Duration: 5 years 1 each 0    azelastine (ASTELIN) 0.1 % nasal spray SPRAY ONCE IN EACH NOSTRIL TWICE DAILY AS DIRECTED 1 each 3    Alcohol Swabs PADS 1 each by Does not apply route 2 times daily 100 each 3    Elastic Bandages & Supports (B & B A-2 ATHLETIC SUPPORTER) MISC 1 Device by Does not apply route continuous prn (prn pain) 1 each 0    predniSONE (DELTASONE) 20 MG tablet 1 tab po tid for 3 days 1 tab po bid for 3 days 1 tab po once day for 3 days (Patient not taking: Reported on 4/9/2025) 18 tablet 0     No current facility-administered medications for this visit.       Past Medical History:   Diagnosis Date    Abnormal liver enzymes     Alcohol abuse     Allergic rhinitis     Chronic back pain     Colonic polyp     COPD (chronic obstructive pulmonary disease) (HCC)     Depression     Diabetes mellitus (HCC)     DJD (degenerative joint disease) of cervical spine     DJD (degenerative joint disease) of lumbar spine     SP SURGERY    GERD (gastroesophageal reflux disease)     Hyperlipidemia     Hypertension     Neuropathy 06/28/2023    Osteoarthritis     Smoker       Past Surgical

## 2025-04-14 ENCOUNTER — TRANSCRIBE ORDERS (OUTPATIENT)
Dept: ADMINISTRATIVE | Age: 70
End: 2025-04-14

## 2025-04-14 DIAGNOSIS — T17.908A ASPIRATION INTO AIRWAY, INITIAL ENCOUNTER: Primary | ICD-10-CM

## 2025-04-14 DIAGNOSIS — J44.9 STAGE 1 MILD COPD BY GOLD CLASSIFICATION (HCC): ICD-10-CM

## 2025-04-14 DIAGNOSIS — J43.9 COMBINED PULMONARY FIBROSIS AND EMPHYSEMA (CPFE) (HCC): ICD-10-CM

## 2025-04-14 DIAGNOSIS — J84.10 COMBINED PULMONARY FIBROSIS AND EMPHYSEMA (CPFE) (HCC): ICD-10-CM

## 2025-04-14 RX ORDER — FLUTICASONE FUROATE, UMECLIDINIUM BROMIDE AND VILANTEROL TRIFENATATE 100; 62.5; 25 UG/1; UG/1; UG/1
1 POWDER RESPIRATORY (INHALATION) DAILY
Qty: 3 EACH | Refills: 3 | Status: SHIPPED | OUTPATIENT
Start: 2025-04-14

## 2025-04-20 DIAGNOSIS — C32.0 SQUAMOUS CELL CARCINOMA OF LEFT VOCAL CORD: Primary | ICD-10-CM

## 2025-04-22 ENCOUNTER — HOSPITAL ENCOUNTER (OUTPATIENT)
Dept: GENERAL RADIOLOGY | Age: 70
Discharge: HOME | End: 2025-04-22
Payer: MEDICARE

## 2025-04-22 DIAGNOSIS — R13.14 DYSPHAGIA, PHARYNGOESOPHAGEAL PHASE: ICD-10-CM

## 2025-04-22 PROCEDURE — 2500000003 HC RX 250 WO HCPCS: Performed by: NURSE PRACTITIONER

## 2025-04-22 PROCEDURE — 92611 MOTION FLUOROSCOPY/SWALLOW: CPT | Performed by: SPEECH-LANGUAGE PATHOLOGIST

## 2025-04-22 PROCEDURE — 74230 X-RAY XM SWLNG FUNCJ C+: CPT

## 2025-04-22 RX ADMIN — BARIUM SULFATE 30 ML: 0.81 POWDER, FOR SUSPENSION ORAL at 08:56

## 2025-04-22 RX ADMIN — BARIUM SULFATE 20 ML: 400 SUSPENSION ORAL at 08:56

## 2025-04-22 RX ADMIN — BARIUM SULFATE 10 ML: 400 PASTE ORAL at 08:56

## 2025-04-22 NOTE — THERAPY DISCHARGE
[facility]  SPEECH THERAPY  [facility] RADIOLOGY  Modified Barium Swallow    Discharge Recommendations: Outpatient Speech Therapy  DIET ORDER RECOMMENDATIONS AFTER EVALUATION: Regular diet with mildly thick liquids  Strategies:  Full Upright Position, Small Bite/Sip, Multiple Swallow, Pulmonary Monitoring, Alternate Solids and Liquids, Limit Distractions, and slow rate of intake.      SLP Individual Minutes  Time In: 0829  Time Out: 0920  Minutes: 51  Timed Code Treatment Minutes: 0 Minutes       Date: 2025  Patient Name: Dilan Pappas      CSN: [id number]   : 1955  (69 y.o.)  Gender: male   Referring Physician:  Gaby Mcpherson APRN-CNP  Diagnosis: Dysphagia, pharyngoesophageal phase [R13.14]    Precautions: On oxygen  History of Present Illness/Injury: Patient reports he has had a hoarse vocal quality for 8-9 months. Patient also reports foods stick in his throat when he swallows, which has been ongoing since 2024.  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  Patient is unsure of his course of treatment for the cancer - reports they are waiting for the results of the biopsy on the mass on his lung before they determine the best treatment.  Patient denies having pneumonia in the past, however, reports he was put on antibiotics several times in the past, with the most recent being a few weeks ago.  Patient also reports he had a neck fusion in 2025.  MBS to assess current swallow function for safest diet recommendation.    Patient  has a past medical history of Abnormal liver enzymes, Alcohol abuse, Allergic rhinitis, Chronic back pain, Colonic polyp, COPD (chronic obstructive pulmonary disease) (HCC), Depression, Diabetes mellitus (HCC), DJD (degenerative joint disease) of cervical spine, DJD (degenerative joint disease) of lumbar spine, GERD

## 2025-04-25 ENCOUNTER — APPOINTMENT (OUTPATIENT)
Dept: ENDOSCOPY | Age: 70
End: 2025-04-25
Attending: INTERNAL MEDICINE
Payer: MEDICARE

## 2025-04-25 ENCOUNTER — ANESTHESIA (OUTPATIENT)
Dept: ENDOSCOPY | Age: 70
End: 2025-04-25
Payer: MEDICARE

## 2025-04-25 ENCOUNTER — HOSPITAL ENCOUNTER (OUTPATIENT)
Age: 70
Setting detail: OUTPATIENT SURGERY
Discharge: HOME | End: 2025-04-25
Attending: INTERNAL MEDICINE | Admitting: INTERNAL MEDICINE
Payer: MEDICARE

## 2025-04-25 ENCOUNTER — ANESTHESIA EVENT (OUTPATIENT)
Dept: ENDOSCOPY | Age: 70
End: 2025-04-25
Payer: MEDICARE

## 2025-04-25 VITALS
TEMPERATURE: 97.5 F | HEIGHT: 73 IN | BODY MASS INDEX: 23.62 KG/M2 | DIASTOLIC BLOOD PRESSURE: 68 MMHG | SYSTOLIC BLOOD PRESSURE: 147 MMHG | RESPIRATION RATE: 18 BRPM | WEIGHT: 178.2 LBS | HEART RATE: 70 BPM | OXYGEN SATURATION: 95 %

## 2025-04-25 LAB — GLUCOSE BLD STRIP.AUTO-MCNC: 118 MG/DL (ref 70–108)

## 2025-04-25 PROCEDURE — 6360000002 HC RX W HCPCS: Performed by: NURSE ANESTHETIST, CERTIFIED REGISTERED

## 2025-04-25 PROCEDURE — 2720000010 HC SURG SUPPLY STERILE: Performed by: INTERNAL MEDICINE

## 2025-04-25 PROCEDURE — 3609017100 HC EGD: Performed by: INTERNAL MEDICINE

## 2025-04-25 PROCEDURE — 2580000003 HC RX 258: Performed by: INTERNAL MEDICINE

## 2025-04-25 PROCEDURE — 3700000000 HC ANESTHESIA ATTENDED CARE: Performed by: INTERNAL MEDICINE

## 2025-04-25 PROCEDURE — 82948 REAGENT STRIP/BLOOD GLUCOSE: CPT

## 2025-04-25 PROCEDURE — 3700000001 HC ADD 15 MINUTES (ANESTHESIA): Performed by: INTERNAL MEDICINE

## 2025-04-25 PROCEDURE — 7100000010 HC PHASE II RECOVERY - FIRST 15 MIN: Performed by: INTERNAL MEDICINE

## 2025-04-25 PROCEDURE — 7100000011 HC PHASE II RECOVERY - ADDTL 15 MIN: Performed by: INTERNAL MEDICINE

## 2025-04-25 RX ORDER — ENSIFENTRINE 3 MG/2.5ML
2.5 SUSPENSION RESPIRATORY (INHALATION) 2 TIMES DAILY
COMMUNITY
Start: 2025-03-17

## 2025-04-25 RX ORDER — SODIUM CHLORIDE 9 MG/ML
INJECTION, SOLUTION INTRAVENOUS CONTINUOUS
Status: DISCONTINUED | OUTPATIENT
Start: 2025-04-25 | End: 2025-04-25 | Stop reason: HOSPADM

## 2025-04-25 RX ADMIN — PROPOFOL 100 MG: 10 INJECTION, EMULSION INTRAVENOUS at 10:45

## 2025-04-25 RX ADMIN — SODIUM CHLORIDE: 0.9 INJECTION, SOLUTION INTRAVENOUS at 10:02

## 2025-04-25 ASSESSMENT — PAIN - FUNCTIONAL ASSESSMENT
PAIN_FUNCTIONAL_ASSESSMENT: NONE - DENIES PAIN
PAIN_FUNCTIONAL_ASSESSMENT: 0-10

## 2025-04-25 ASSESSMENT — PAIN DESCRIPTION - DESCRIPTORS: DESCRIPTORS: ACHING

## 2025-04-25 NOTE — ANESTHESIA PRE PROCEDURE
Department of Anesthesiology  Preprocedure Note       Name:  Dilan Pappas   Age:  69 y.o.  :  1955                                          MRN:  [id number]         Date:  2025      Surgeon: Surgeon(s):  Jose Maria Farmer MD    Procedure: Procedure(s):  EGD DILATATION    Medications prior to admission:   Prior to Admission medications    Medication Sig Start Date End Date Taking? Authorizing Provider   OHTUVAYRE 3 MG/2.5ML SUSP Inhale 2.5 mLs into the lungs 2 times daily 3/17/25  Yes Provider, MD Ekaterina   fluticasone-umeclidin-vilant (TRELEGY ELLIPTA) 100-62.5-25 MCG/ACT AEPB inhaler INHALE 1 PUFF INTO THE LUNGS DAILY 25  Yes Eric Orozco APRN - CNP   pantoprazole (PROTONIX) 40 MG tablet TAKE 1 TABLET BY MOUTH DAILY 3/24/25  Yes Eric Orozco APRN - CNP   atorvastatin (LIPITOR) 40 MG tablet TAKE 1 TABLET BY MOUTH DAILY 25  Yes Eric Orozco APRN - CNP   empagliflozin (JARDIANCE) 25 MG tablet Take 1 tablet by mouth daily 25  Yes Eric Orozco APRN - CNP   QUEtiapine (SEROQUEL) 200 MG tablet TAKE 1 TABLET BY MOUTH AT BEDTIME 25  Yes Eric Orozco APRN - CNP   metoprolol tartrate (LOPRESSOR) 25 MG tablet TAKE 1/2 TABLET BY MOUTH TWICE DAILY, HOLD IF HEART RATE LESS THAN 60 1/15/25  Yes Eric Orozco APRN - CNP   gabapentin (NEURONTIN) 300 MG capsule Take 1 capsule by mouth 2 times daily for 180 days. Intended supply: 90 days 25 Yes Eric Orozco APRN - CNP   SITagliptin (JANUVIA) 100 MG tablet Take 1 tablet by mouth daily 25  Yes Eric Orozco APRN - CNP   pioglitazone (ACTOS) 30 MG tablet Take 1 tablet by mouth daily 25  Yes Eric Orozco APRN - CNP   amitriptyline (ELAVIL) 50 MG tablet Take 1 tablet by mouth nightly 25  Yes Eric Orozco APRN - CNP   buPROPion (WELLBUTRIN XL) 150 MG extended release tablet Take 1 tablet by mouth every morning 24  Yes Renato Carrillo,    metFORMIN (GLUCOPHAGE) 1000 MG tablet Take 1 tablet by

## 2025-04-25 NOTE — PROGRESS NOTES
BRIEF H&P//fENDOSCOPY PREPROCEDURE REPORT     Patient: Dilan Pappas  : 1955  Acct#: [id number]    Date: 2025  Indications/Brief History:  Dysphagia.  Abn esophagus on imaging.   Anticipated Procedure: EGD with dil esophagus    ASA class:  3  Airway Adequate?    Yes___x__   No_______    Preprocedure Exam:   Neuro: Alert, oriented.   Heart:  Regular rate and rhythm   Lungs: Clear to ausculation anteriorly bilaterally, no evidence respiratory distress  Abdomen:  soft.  NT    PLAN:  EGD__x___   COLONOSCOPY ______     ERCP________    Jose Maria Farmer MD MD  2025, 10:31 AM

## 2025-04-25 NOTE — PROGRESS NOTES
ENDOSCOPY POSTPROCEDURE REPORT     PT NAME: Dilan Pappas  MEDICAL RECORD NUMBER: [id number]  YOB: 1955    PROCEDURE PERFORMED BY : Jose Maria Farmer MD    PROCEDURE TYPE:   EGD __x____   COLONOSCOPY _______   ERCP ________  MEDICATIONS USED :  VERSED _____   FENTANYL_____   PROPOFOL ___x___  Patient tolerated procedure well.  No apparent complications.   Estimated blood loss:  Nil  Specimens removed:  Yes_____  No___x___  Disposition of Specimens:  To Lab      BRIEF  FINDINGS:  Malignant appearing mass on left vocal cord.   2.  Two benign inlet columnar patches proximal esophagus.  3.Distal esophagus and EG jxn normal except small sliding HH noted intermittently.   4. O/w neg.  Esophageal dilation not performed due to findings and suspected oropharyngeal nature suspected with vocal cord mass.       PRELIMINARY PLAN:  1.Resume f/u with Onc physicians.   2. If PEG desired, please contact our office.       For complete findings and plan, see dictated report.     Jose Maria Farmer MD, MD  4/25/2025  10:55 AM

## 2025-04-25 NOTE — DISCHARGE INSTRUCTIONS
Resume meds and diet  Follow up with all physicians as directed  If your cancer doctor wants a gastric feeding tube placed for use during your treatment, he/she should simply contact our office.

## 2025-04-25 NOTE — ANESTHESIA POSTPROCEDURE EVALUATION
Department of Anesthesiology  Postprocedure Note    Patient: Dilan Pappas  MRN: [id number]  YOB: 1955  Date of evaluation: 4/25/2025    Procedure Summary       Date: 04/25/25 Room / Location: [place] [address] / [facility]    Anesthesia Start: 1039 Anesthesia Stop: 1100    Procedure: ESOPHAGOGASTRODUODENOSCOPY (Esophagus) Diagnosis:       Dysphagia, unspecified type      (Dysphagia, unspecified type [R13.10])    Surgeons: Jose Maria Farmer MD Responsible Provider: Keyur Wilson MD    Anesthesia Type: MAC ASA Status: 3            Anesthesia Type: No value filed.    Bhaskar Phase I: Bhaskar Score: 9    Bhaskar Phase II:      Anesthesia Post Evaluation    Patient location during evaluation: PACU  Patient participation: complete - patient participated  Level of consciousness: awake  Pain score: 0  Airway patency: patent  Nausea & Vomiting: no nausea and no vomiting  Cardiovascular status: blood pressure returned to baseline  Respiratory status: acceptable  Hydration status: stable        No notable events documented.

## 2025-04-25 NOTE — PROGRESS NOTES
Pt admitted to Endo department and admitted to Endo room 5  Plan of care reviewed with patient.   Call light within reach.   Bed in lowest position, locked, with one bed rail up.   Appropriate arm bands on patient.   Bathroom offered.   All questions and concerns of patient addressed.  Allergies confirmed with patient.    Name: Lindsey  Relationship to patient: Daughter in law  Phone number: [phone]

## 2025-04-25 NOTE — PROCEDURES
[address] [address]                             PROCEDURE NOTE      PATIENT NAME: WAYLON CRAWFORD               : 1955  MED REC NO: [id number]                       ROOM: [facility]  ACCOUNT NO: [id number]                       ADMIT DATE: 2025  PROVIDER: Jose Maria Farmer MD      DATE OF PROCEDURE:  2025    SURGEON:  Jose Maria Farmer MD    PROCEDURE TYPE:  Upper endoscopy.    INSTRUMENT:  Video upper endoscope.    MEDICATIONS:  Propofol.  See Anesthesia documentation report.    BRIEF HISTORY:  The patient is a 69-year-old with a history of recently diagnosed vocal cord cancer and question of lung cancer.  Also abnormal imaging of the esophagus.  Due to his overall history, upper endoscopy with esophageal dilatation was planned.  The procedure was discussed with the patient.  Following discussion, he expressed understanding and did wish to proceed.    DESCRIPTION OF PROCEDURE:  The patient arrived to [facility] Endoscopy Department as an outpatient.  He was placed on the appropriate monitoring devices.  He was placed in the left lateral decubitus position.  Sedation provided by nurse anesthetist using propofol.  Then, the Olympus video upper endoscope passed gently into the posterior pharyngeal region.  There was a large malignant-appearing mass lesion along the left vocal cord.  The airway was open.  Subsequently, the scope was very gently advanced under direct endoscopic visualization across the cricopharyngeal musculature and into the esophagus.  Within the proximal esophagus, there were 2 columnar inlet patches.  Endoscopic appearance completely benign, and otherwise, unremarkable upper, middle, and lower portions of the esophagus; all normal.  The squamocolumnar junction was discrete and intact, located at the level of the EG junction.  There was a very small sliding hiatal hernia noted.  The

## 2025-04-28 ENCOUNTER — PREP FOR PROCEDURE (OUTPATIENT)
Dept: ENT CLINIC | Age: 70
End: 2025-04-28

## 2025-04-28 ENCOUNTER — HOSPITAL ENCOUNTER (OUTPATIENT)
Dept: SPEECH THERAPY | Age: 70
Setting detail: THERAPIES SERIES
Discharge: HOME | End: 2025-04-28
Payer: MEDICARE

## 2025-04-28 PROCEDURE — 92610 EVALUATE SWALLOWING FUNCTION: CPT

## 2025-04-28 NOTE — H&P
BRIEF H&P//fENDOSCOPY PREPROCEDURE REPORT      Patient: Dilan Pappas                      : 1955                      Acct#: [id number]     Date: 2025  Indications/Brief History:  Dysphagia.  Abn esophagus on imaging.   Anticipated Procedure: EGD with dil esophagus     ASA class:  3  Airway Adequate?    Yes___x__   No_______     Preprocedure Exam:   Neuro: Alert, oriented.   Heart:  Regular rate and rhythm   Lungs: Clear to ausculation anteriorly bilaterally, no evidence respiratory distress  Abdomen:  soft.  NT     PLAN:  EGD__x___   COLONOSCOPY ______     ERCP________     Jose Maria Farmer MD MD  2025, 10:31 AM

## 2025-04-28 NOTE — PROGRESS NOTES
* PLEASE SIGN, DATE AND TIME CERTIFICATION BELOW AND RETURN TO [facility] OUTPATIENT REHABILITATION (FAX #: [phone]).  ATTEST/CO-SIGN IF ACCESSING VIA [employer].  THANK YOU.**    I certify that I have examined the patient below and determined that Physical Medicine and Rehabilitation service is necessary and that I approve the established plan of care for up to 90 days or as specifically noted.  Attestation, signature or co-signature of physician indicates approval of certification requirements.    ________________________ ____________  Physician Signature   Date   [facility]  SPEECH THERAPY  [x] CLINICAL SWALLOW EVALUATION  [] DAILY NOTE   [] PROGRESS NOTE [] DISCHARGE NOTE    [x] OUTPATIENT REHABILITATION [facility]   [] [facility] CARE [place]    [] [facility]   [] [facility]    Date: 2025  Patient Name:  Dilan Pappas  : 1955  MRN: [id number]  CSN: [id number]    Referring Practitioner Tyrese Steele MD [id number]      Diagnosis  Diagnoses       R91.8 (ICD-10-CM) - Pulmonary infiltrates           Treatment Diagnosis R13.10 Dysphagia, unspecified  R49.0 Dysphonia   Date of Evaluation 25   Additional Pertinent History Dilan Pappas has a past medical history of Abnormal liver enzymes, Alcohol abuse, Allergic rhinitis, Cancer ([facility]), Chronic back pain, Colonic polyp, COPD (chronic obstructive pulmonary disease) ([facility]), Depression, Diabetes mellitus ([facility]), DJD (degenerative joint disease) of cervical spine, DJD (degenerative joint disease) of lumbar spine, GERD (gastroesophageal reflux disease), Hyperlipidemia, Hypertension, Neuropathy, Osteoarthritis, and Smoker.  he has a past surgical history that includes back surgery (); Inguinal hernia repair (); hernia repair; Cholecystectomy; Colonoscopy (2012); EKG 12 Lead (2015); Lumbar spine surgery (, , ); Cardiac catheterization (2015); pr excision hydrocele

## 2025-04-29 ENCOUNTER — HOSPITAL ENCOUNTER (OUTPATIENT)
Dept: GENERAL RADIOLOGY | Age: 70
Discharge: HOME | End: 2025-04-29
Payer: MEDICARE

## 2025-04-29 ENCOUNTER — HOSPITAL ENCOUNTER (OUTPATIENT)
Dept: CT IMAGING | Age: 70
Discharge: HOME | End: 2025-04-29
Payer: MEDICARE

## 2025-04-29 VITALS
RESPIRATION RATE: 18 BRPM | DIASTOLIC BLOOD PRESSURE: 72 MMHG | WEIGHT: 178 LBS | TEMPERATURE: 97.1 F | OXYGEN SATURATION: 92 % | BODY MASS INDEX: 23.48 KG/M2 | SYSTOLIC BLOOD PRESSURE: 125 MMHG | HEART RATE: 76 BPM

## 2025-04-29 DIAGNOSIS — R91.1 LUNG NODULE SEEN ON IMAGING STUDY: ICD-10-CM

## 2025-04-29 LAB
DEPRECATED RDW RBC AUTO: 45.9 FL (ref 35–45)
ERYTHROCYTE [DISTWIDTH] IN BLOOD BY AUTOMATED COUNT: 14.2 % (ref 11.5–14.5)
HCT VFR BLD AUTO: 40.4 % (ref 42–52)
HGB BLD-MCNC: 13.2 GM/DL (ref 14–18)
MCH RBC QN AUTO: 29 PG (ref 26–33)
MCHC RBC AUTO-ENTMCNC: 32.7 GM/DL (ref 32.2–35.5)
MCV RBC AUTO: 88.8 FL (ref 80–94)
PLATELET # BLD AUTO: 324 THOU/MM3 (ref 130–400)
PMV BLD AUTO: 8.1 FL (ref 9.4–12.4)
RBC # BLD AUTO: 4.55 MILL/MM3 (ref 4.7–6.1)
WBC # BLD AUTO: 10.1 THOU/MM3 (ref 4.8–10.8)

## 2025-04-29 PROCEDURE — 99211 OFF/OP EST MAY X REQ PHY/QHP: CPT

## 2025-04-29 PROCEDURE — 71045 X-RAY EXAM CHEST 1 VIEW: CPT

## 2025-04-29 PROCEDURE — 6360000002 HC RX W HCPCS: Performed by: RADIOLOGY

## 2025-04-29 PROCEDURE — 6370000000 HC RX 637 (ALT 250 FOR IP): Performed by: RADIOLOGY

## 2025-04-29 PROCEDURE — 88305 TISSUE EXAM BY PATHOLOGIST: CPT

## 2025-04-29 PROCEDURE — 88334 PATH CONSLTJ SURG CYTO XM EA: CPT

## 2025-04-29 PROCEDURE — 2580000003 HC RX 258: Performed by: RADIOLOGY

## 2025-04-29 PROCEDURE — 88333 PATH CONSLTJ SURG CYTO XM 1: CPT

## 2025-04-29 PROCEDURE — 85027 COMPLETE CBC AUTOMATED: CPT

## 2025-04-29 PROCEDURE — 32408 CORE NDL BX LNG/MED PERQ: CPT

## 2025-04-29 RX ORDER — HYDROCODONE BITARTRATE AND ACETAMINOPHEN 5; 325 MG/1; MG/1
1 TABLET ORAL
Status: COMPLETED | OUTPATIENT
Start: 2025-04-29 | End: 2025-04-29

## 2025-04-29 RX ORDER — NEOMYCIN/BACITRACIN/POLYMYXINB 3.5-400-5K
OINTMENT (GRAM) TOPICAL PRN
Status: COMPLETED | OUTPATIENT
Start: 2025-04-29 | End: 2025-04-29

## 2025-04-29 RX ORDER — SODIUM CHLORIDE 450 MG/100ML
INJECTION, SOLUTION INTRAVENOUS CONTINUOUS
Status: DISCONTINUED | OUTPATIENT
Start: 2025-04-29 | End: 2025-04-30 | Stop reason: HOSPADM

## 2025-04-29 RX ORDER — MIDAZOLAM HYDROCHLORIDE 1 MG/ML
1 INJECTION, SOLUTION INTRAMUSCULAR; INTRAVENOUS ONCE
Status: DISCONTINUED | OUTPATIENT
Start: 2025-04-29 | End: 2025-04-30 | Stop reason: HOSPADM

## 2025-04-29 RX ORDER — FENTANYL CITRATE 50 UG/ML
50 INJECTION, SOLUTION INTRAMUSCULAR; INTRAVENOUS ONCE
Status: DISCONTINUED | OUTPATIENT
Start: 2025-04-29 | End: 2025-04-30 | Stop reason: HOSPADM

## 2025-04-29 RX ORDER — FENTANYL CITRATE 50 UG/ML
INJECTION, SOLUTION INTRAMUSCULAR; INTRAVENOUS PRN
Status: COMPLETED | OUTPATIENT
Start: 2025-04-29 | End: 2025-04-29

## 2025-04-29 RX ORDER — MIDAZOLAM HYDROCHLORIDE 1 MG/ML
INJECTION, SOLUTION INTRAMUSCULAR; INTRAVENOUS PRN
Status: COMPLETED | OUTPATIENT
Start: 2025-04-29 | End: 2025-04-29

## 2025-04-29 RX ADMIN — MIDAZOLAM 0.5 MG: 1 INJECTION INTRAMUSCULAR; INTRAVENOUS at 09:08

## 2025-04-29 RX ADMIN — FENTANYL CITRATE 25 MCG: 50 INJECTION, SOLUTION INTRAMUSCULAR; INTRAVENOUS at 09:08

## 2025-04-29 RX ADMIN — HYDROCODONE BITARTRATE AND ACETAMINOPHEN 1 TABLET: 5; 325 TABLET ORAL at 10:39

## 2025-04-29 RX ADMIN — BACITRACIN ZINC, NEOMYCIN SULFATE, AND POLYMYXIN B SULFATE 1 EACH: 400; 3.5; 5 OINTMENT TOPICAL at 09:28

## 2025-04-29 RX ADMIN — MIDAZOLAM 0.5 MG: 1 INJECTION INTRAMUSCULAR; INTRAVENOUS at 09:14

## 2025-04-29 RX ADMIN — FENTANYL CITRATE 25 MCG: 50 INJECTION, SOLUTION INTRAMUSCULAR; INTRAVENOUS at 09:14

## 2025-04-29 RX ADMIN — SODIUM CHLORIDE: 0.45 INJECTION, SOLUTION INTRAVENOUS at 07:51

## 2025-04-29 ASSESSMENT — PAIN DESCRIPTION - PAIN TYPE: TYPE: ACUTE PAIN

## 2025-04-29 ASSESSMENT — PAIN SCALES - GENERAL
PAINLEVEL_OUTOF10: 9
PAINLEVEL_OUTOF10: 0
PAINLEVEL_OUTOF10: 8
PAINLEVEL_OUTOF10: 8
PAINLEVEL_OUTOF10: 0
PAINLEVEL_OUTOF10: 9
PAINLEVEL_OUTOF10: 0

## 2025-04-29 ASSESSMENT — PAIN DESCRIPTION - ORIENTATION
ORIENTATION: LEFT
ORIENTATION: LEFT;OUTER

## 2025-04-29 ASSESSMENT — PAIN DESCRIPTION - DESCRIPTORS
DESCRIPTORS: ACHING
DESCRIPTORS: ACHING;CRAMPING
DESCRIPTORS: ACHING

## 2025-04-29 ASSESSMENT — PAIN DESCRIPTION - LOCATION
LOCATION: CHEST
LOCATION: CHEST

## 2025-04-29 ASSESSMENT — PAIN - FUNCTIONAL ASSESSMENT: PAIN_FUNCTIONAL_ASSESSMENT: 0-10

## 2025-04-29 NOTE — H&P
[facility]  Sedation/Analgesia History & Physical    Pt Name: Dilan Pappas  MRN: [id number]  YOB: 1955  Provider Performing Procedure: Agusto Medel MD, MD  Primary Care Physician: Eric Orozco APRN - CNP    Formulation and discussion of sedation / procedure plans, risks, benefits, side effects and alternatives with patient and/or responsible adult completed.    PRE-PROCEDURE   DNR-CCA/DNR-CC []Yes [x]No  Brief History/Pre-Procedure Diagnosis: lung nodule           MEDICAL HISTORY  []CAD/Valve  []Liver Disease  []Lung Disease []Diabetes  []Hypertension []Renal Disease  []Additional information:       has a past medical history of Abnormal liver enzymes, Alcohol abuse, Allergic rhinitis, Cancer (HCC), Chronic back pain, Colonic polyp, COPD (chronic obstructive pulmonary disease) (HCC), Depression, Diabetes mellitus (HCC), DJD (degenerative joint disease) of cervical spine, DJD (degenerative joint disease) of lumbar spine, GERD (gastroesophageal reflux disease), Hyperlipidemia, Hypertension, Neuropathy, Osteoarthritis, and Smoker.    SURGICAL HISTORY   has a past surgical history that includes back surgery (2008); Inguinal hernia repair (2002); hernia repair; Cholecystectomy; Colonoscopy (06/2012); EKG 12 Lead (04/06/2015); Lumbar spine surgery (2006, 2007, 2008); Cardiac catheterization (03/2015); pr excision hydrocele unilateral (Left, 07/31/2018); Facet joint injection (Bilateral, 07/07/2020); Facet joint injection (Bilateral, 08/06/2020); Pain management procedure (Right, 09/04/2020); Pain management procedure (Left, 10/09/2020); Pain management procedure (Right, 03/25/2021); Pain management procedure (Left, 04/29/2021); US ASP/INJ THYROID CYST (11/04/2021); Facet joint injection (Bilateral, 05/10/2022); Pain management procedure (Bilateral, 01/16/2023); Pain management procedure (Bilateral, 11/21/2023); Facet joint injection (N/A, 06/05/2024); Facet joint injection

## 2025-04-29 NOTE — PROGRESS NOTES
0835 Patient received in CT scanning for left lung biopsy pre-procedure assessment with family at bedside. Monitor applied.   0852 Dr. Medel here; spoke to patient. This procedure has been fully reviewed with the patient and written informed consent has been obtained.   0859 Patient assisted to CT table and pre scan started.  0911 Pathology called to CT area for procedure.   0914 Procedure started with Dr. Medel.  0917 Pathology arrived to CT.   [id number] Samples collected and given to pathologist for further review.   0926 Procedure completed; patient tolerated well. Post scan obtained.   0928 Bacitracin oint, band aid to site; no bleeding noted.  0931 Patient on cart; comfort ensured.  0934 Report called to Lexis MYERS and patient taken to OPN via transport with family at bedside. Pt alert and oriented x3; follows commands. Skin pink, warm, and dry. Respirations easy, regular, and nonlabored.

## 2025-04-29 NOTE — H&P
Formulation and discussion of sedation / procedure plans, risks, benefits, side effects and alternatives with patient and/or responsible adult completed.    History and Physical reviewed and unchanged.    Electronically signed by Agusto Medel MD on 4/29/25 at 8:57 AM EDT

## 2025-04-29 NOTE — OP NOTE
Department of Radiology  Post Procedure Progress Note      Pre-Procedure Diagnosis:  lung nodule     Procedure Performed:  CT guide biopsy     Anesthesia: local / versed and fentanyl    Findings: successful    Immediate Complications:  None    Estimated Blood Loss: minimal    SEE DICTATED PROCEDURE NOTE FOR COMPLETE DETAILS.    Agusto Medel MD   4/29/2025 9:29 AM

## 2025-04-29 NOTE — DISCHARGE INSTRUCTIONS
SEDATION/ANALGESIA INFORMATION HOME GOING ADVICE    Review the following information with the patient prior to the procedure.  Sedation/agalgesia is used during short medical procedures under controlled supervision.  The medication will produce a strong relaxation.  You will be able to hear, speak and follow instructions, but you memory and alertness will be decreased.  You will be able to swallow and breathe on your own.  During sedation/analgesia you blood pressure, hear and breathing will be watched closely.  After the procedure, you may not remember what was said or done.    Procedure: Ct lung biopsy       Date:   4/29/ 25      You may have the following effects from the medication.  Drowsiness, dizziness, sleepiness or confusion.  Difficulty remembering or delayed reaction times.  Loss of fine muscle control or difficulty with your balance especially while walking.  Difficulty focusing or blurred vision.  You may not be aware of slight changes in your behavior and/or your reaction time because of the medication used during the procedure.  Therefore you should follow these instructions.  Have someone responsible help you with your care.  Do not drive for 24 hours.  Do not operate equipment for 24 hours (lawnmowers, power tools, kitchen accessories, stove).  Do not drink any alcoholic beverages for a minimum of 24 hours.  Do not make important personal, legal or business decisions for 24 hours.  You may experience dizziness or lightheadedness.  Move slowly and carefully, do not make sudden position changes.  Drink extra amounts of fluids today.  Increase your diet as tolerated (unless you have received specific instructions from your doctor).  If you feel nauseated, continue with liquids until nausea is gone  Notify your physician if you have not urinated within 8 hours after the procedure.  Resume your medications unless otherwise instructed.  contact your physician if you have any questions or concerns.  I have

## 2025-04-29 NOTE — PROGRESS NOTES
0708 pt admitted to [facility] per ambulation for a ct lung biopsy the patient on oxygen at 4 l nc at home.   Sob with any exertion.  Pt son freda at bedside.   Pt states he was on asa and hasn't taken for 2 weeks.  Pt has hoarse voice.  States sore from  endo procedure 4 days ago.  Family states he also has a mass in his throat.  PT RIGHTS AND RESPONSIBILITIES OFFERED TO PT.  0745 LAB DRAWN AND SENT   0758 report given to aleksander, ir   0830 pt taken per cart to ct scan.   0935 pt return to floor. Alert. Resp even and easy . Remains on oxygen at 4 l nc.  Pt states some pain to left lateral chest area from procedure.  Bandaid dry and intact. No bleeding, swelling noted to area.   Family at bedside. Pt coughing up some blood in tissue.    0950 resting with no changes at present.   1015 pt states his pain is a little worse to left chest area noted.  Pt cough up some sputum.  No blood noted at this time.     1020 dr whatley called and updated on patient condition.  Order received.   1030 pt coughed up a small amount of blood tinge to tissue  quarter size scant   1100 pt states pain has decreased a little.   Pt taking pudding in at present.   1125 pt taken per cart to xray.  Void 275 ml urine per urinal. Cough up small amount of blood   1140 pt return to floor . Bandaid dry and intact no bleeding, sq, swelling noted   1200 pudding, coffee taken at present.    1235 pt had small amount of scant blood in tissue   1330 PT taken per cart to xray with transport .  Pt has not coughed up any blood the past hour  1338 jada from xray called.  Dr whatley has seen xray and patient okay to be discharge. No further xrays needed.   1355 bandaid remains dry and intact. No bleeding, swelling or crepitus noted.  Denies any new sob.   Sob with exertion as earlier.  O2 remains on.  Discharge instructions given to patient and son and daughter.  Verbalize understanding of home going instructions.  Pt has not had any blood tinge sputum for past hour.

## 2025-04-30 ENCOUNTER — TELEPHONE (OUTPATIENT)
Dept: PULMONOLOGY | Age: 70
End: 2025-04-30

## 2025-04-30 ENCOUNTER — RESULTS FOLLOW-UP (OUTPATIENT)
Dept: PULMONOLOGY | Age: 70
End: 2025-04-30

## 2025-04-30 ENCOUNTER — OFFICE VISIT (OUTPATIENT)
Dept: ENT CLINIC | Age: 70
End: 2025-04-30
Payer: MEDICARE

## 2025-04-30 VITALS
HEIGHT: 73 IN | RESPIRATION RATE: 18 BRPM | HEART RATE: 75 BPM | DIASTOLIC BLOOD PRESSURE: 64 MMHG | OXYGEN SATURATION: 91 % | BODY MASS INDEX: 22.98 KG/M2 | WEIGHT: 173.4 LBS | TEMPERATURE: 97.5 F | SYSTOLIC BLOOD PRESSURE: 120 MMHG

## 2025-04-30 DIAGNOSIS — C32.0 SQUAMOUS CELL CARCINOMA OF LEFT VOCAL CORD (HCC): Primary | ICD-10-CM

## 2025-04-30 PROCEDURE — 3074F SYST BP LT 130 MM HG: CPT | Performed by: OTOLARYNGOLOGY

## 2025-04-30 PROCEDURE — 99215 OFFICE O/P EST HI 40 MIN: CPT | Performed by: OTOLARYNGOLOGY

## 2025-04-30 PROCEDURE — 3078F DIAST BP <80 MM HG: CPT | Performed by: OTOLARYNGOLOGY

## 2025-04-30 PROCEDURE — 1123F ACP DISCUSS/DSCN MKR DOCD: CPT | Performed by: OTOLARYNGOLOGY

## 2025-04-30 PROCEDURE — 1159F MED LIST DOCD IN RCRD: CPT | Performed by: OTOLARYNGOLOGY

## 2025-04-30 NOTE — PROGRESS NOTES
6 mL 2    pantoprazole (PROTONIX) 40 MG tablet TAKE 1 TABLET BY MOUTH DAILY 90 tablet 4    atorvastatin (LIPITOR) 40 MG tablet TAKE 1 TABLET BY MOUTH DAILY 90 tablet 3    empagliflozin (JARDIANCE) 25 MG tablet Take 1 tablet by mouth daily 90 tablet 4    QUEtiapine (SEROQUEL) 200 MG tablet TAKE 1 TABLET BY MOUTH AT BEDTIME 90 tablet 3    metoprolol tartrate (LOPRESSOR) 25 MG tablet TAKE 1/2 TABLET BY MOUTH TWICE DAILY, HOLD IF HEART RATE LESS THAN 60 90 tablet 3    gabapentin (NEURONTIN) 300 MG capsule Take 1 capsule by mouth 2 times daily for 180 days. Intended supply: 90 days 180 capsule 1    SITagliptin (JANUVIA) 100 MG tablet Take 1 tablet by mouth daily 90 tablet 4    pioglitazone (ACTOS) 30 MG tablet Take 1 tablet by mouth daily 90 tablet 1    amitriptyline (ELAVIL) 50 MG tablet Take 1 tablet by mouth nightly 90 tablet 4    buPROPion (WELLBUTRIN XL) 150 MG extended release tablet Take 1 tablet by mouth every morning 90 tablet 3    metFORMIN (GLUCOPHAGE) 1000 MG tablet Take 1 tablet by mouth 2 times daily (with meals) 180 tablet 4    sodium chloride, Inhalant, 3 % nebulizer solution Take 4 mLs by nebulization as needed for Other (Use every 4 hours as needed for thick sputum) 120 mL 5    albuterol sulfate HFA (PROVENTIL;VENTOLIN;PROAIR) 108 (90 Base) MCG/ACT inhaler 2 inh four times a day as needed for SOB/wheezing 54 g 5    Lancets (ONETOUCH DELICA PLUS [id number]) MISC TEST ONCE DAILY 100 each 3    albuterol (PROVENTIL) (2.5 MG/3ML) 0.083% nebulizer solution USE 3 ML BY NEBULIZATION FOUR TIMES DAILY AS NEEDED FOR WHEEZING 90 each 3    lisinopril (PRINIVIL;ZESTRIL) 2.5 MG tablet Take 1 tablet by mouth daily 90 tablet 3    blood glucose monitor strips Test one times a day dispense freestyle strips 100 strip 4    blood glucose monitor supplies Alcohol Swabs, Lancets,  Test one times a day Freestyle meter and strips 1 each 0    OXYGEN Inhale into the lungs 3L/MIN/NC CONT      Nebulizers (COMPRESSOR/NEBULIZER) MISC 1

## 2025-04-30 NOTE — TELEPHONE ENCOUNTER
Please advise if patient is stable from a pulmonary standpoint to proceed with surgery. He is scheduled to follow up with Tao on 7/7/25.  Thank you!

## 2025-04-30 NOTE — TELEPHONE ENCOUNTER
Dilan is scheduled for surgery with Dr Rodriguez on 5/14/25. We are requesting pulmonary clearance.    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    Thank you!

## 2025-05-05 ENCOUNTER — TELEPHONE (OUTPATIENT)
Dept: PULMONOLOGY | Age: 70
End: 2025-05-05

## 2025-05-05 DIAGNOSIS — J98.4 CAVITARY LESION OF LUNG: Primary | ICD-10-CM

## 2025-05-05 DIAGNOSIS — C32.0 SQUAMOUS CELL CARCINOMA OF LEFT VOCAL CORD (HCC): ICD-10-CM

## 2025-05-05 DIAGNOSIS — R91.8 PULMONARY INFILTRATES: ICD-10-CM

## 2025-05-05 NOTE — TELEPHONE ENCOUNTER
Tao,   Can you review patients chart? He is scheduled to follow up with you in July. On patients imaging results, Ivette did not state patient needs to follow up ASAP, now he is wanting him to. Does patients appointment need moved up?   Please advise.   Thanks!

## 2025-05-05 NOTE — TELEPHONE ENCOUNTER
----- Message from Dr. Tyrese Steele MD sent at 5/1/2025  4:25 PM EDT -----      Dilan needs to be scheduled ASAP for F/U at the pulm clinic to review lung biopsy results  ----- Message -----  From: Eric Orozco APRN - CNP  Sent: 4/3/2025  12:43 PM EDT  To: Tyrese Steele MD    Hello Dr. Steele,    I have been following Dilan's case and imaging results. I do have concerns in light of the suspicious pulmonary nodule and waiting 3 months for f/u repeat imaging. I do note our concern of anesthesia due to his current pulmonary status.    Dilan did undergo anesthesia recently for his vocal cord biopsy and was noted he did well. Do you think there should be reconsideration to perform a lung biopsy of his suspicious nodule at this time?    Respectfully,  Eric CARRILLO CNP  ----- Message -----  From: Tyrese Steele MD  Sent: 4/3/2025  11:53 AM EDT  To: GERARDO Chaparro CNP

## 2025-05-06 NOTE — TELEPHONE ENCOUNTER
Subjective   Patient ID: Norma is a 6 year old female who is accompanied by:mother     Chief Complaint   Patient presents with   • Follow-up     c/o bilateral ear pain, fevers on and off   • Cough       HPI     Diagnosed with influenza B in urgent care 2 weeks ago.     At the follow up visit, bilateral cerumen impaction noted. Irrigation attempted without success. Advised to place wax softener drops in both ears and return to clear canals.    Coughing is improving. Vomiting has stopped. Right side jaw pain. Placed wax softener drops placed in both ears. Sleep disturbed.   Review of Systems   Constitutional: Negative.    HENT:        Right jaw pain noted when opening mouth   Eyes: Negative.    Respiratory: Negative.    Cardiovascular: Negative.    Gastrointestinal: Negative.    Genitourinary: Negative.    Musculoskeletal: Negative.    Skin: Negative.    Psychiatric/Behavioral: Negative.        Patient's medications, allergies, past medical, surgical, social and family histories were reviewed and updated as appropriate.    Objective   Vitals:/53   Pulse 110   Temp 99.1 °F (37.3 °C)   Resp 22   Ht 3' 7.9\" (1.115 m)   Wt 20 kg (44 lb 1.5 oz)   BMI 16.09 kg/m²   BSA 0.78 m²   Physical Exam  Vitals signs reviewed.   Constitutional:       General: She is active.   HENT:      Head: Normocephalic.      Right Ear: There is impacted cerumen.      Left Ear: There is impacted cerumen.      Nose: Nose normal.      Mouth/Throat:      Mouth: Mucous membranes are moist.      Pharynx: Oropharynx is clear. No posterior oropharyngeal erythema.   Eyes:      Extraocular Movements: Extraocular movements intact.      Pupils: Pupils are equal, round, and reactive to light.   Neck:      Musculoskeletal: Normal range of motion and neck supple.   Cardiovascular:      Rate and Rhythm: Normal rate and regular rhythm.      Pulses: Normal pulses.      Heart sounds: No murmur.   Pulmonary:      Effort: Pulmonary effort is normal.      Tao, if you want to place that order/ referral I can get it sent over to [facility], then you can call Maggie and let him know.   I am okay with calling the patient and discussing, or if you prefer to reach out to patient that is fine as well.     Thanks!       Breath sounds: Normal breath sounds.   Lymphadenopathy:      Cervical: No cervical adenopathy.   Skin:     General: Skin is warm.      Capillary Refill: Capillary refill takes less than 2 seconds.      Findings: No rash.   Neurological:      Mental Status: She is alert.   Psychiatric:         Mood and Affect: Mood normal.         Behavior: Behavior normal.         Thought Content: Thought content normal.         Judgment: Judgment normal.         Assessment   Problem List Items Addressed This Visit        Otologic    Bilateral impacted cerumen - Primary      Other Visit Diagnoses     Acute otitis externa of right ear, unspecified type            6 year old presents for follow up    Bilateral cerumen impaction  Mother placed wax softener drops in both ears since last visit  Removed large amount of cerumen from both canals with irrigation and curette.  Child tolerated procedure without problems.  TM's normal, scarring noted on right TM.  Right ear sensitive to manipulation.  Right canal slightly swelled.  Right jaw evaluated and normal.  Gums and teeth evaluated and normal.  Posterior pharynx non erythematous.  Possible right jaw pain from otitis externa.  Ofloxacin drops ordered for 7 days.    Still needs regular follow up appt with cardiologist, mother reminded.    Instructed to call if problem worsens or does not improve within the next week otherwise follow-up prn

## 2025-05-07 DIAGNOSIS — E11.9 TYPE 2 DIABETES MELLITUS WITHOUT COMPLICATION, WITHOUT LONG-TERM CURRENT USE OF INSULIN (HCC): ICD-10-CM

## 2025-05-07 NOTE — TELEPHONE ENCOUNTER
Tao,   I called and spoke to patient about this this morning and he is agreeable to robotic bronch I faxed referral this morning as well to [facility]. Patient is scheduled to follow up to go over results with you.   Thanks!

## 2025-05-08 RX ORDER — BLOOD SUGAR DIAGNOSTIC
STRIP MISCELLANEOUS
Qty: 100 STRIP | Refills: 4 | Status: SHIPPED | OUTPATIENT
Start: 2025-05-08

## 2025-05-08 NOTE — TELEPHONE ENCOUNTER
Recent Visits  Date Type Provider Dept   04/09/25 Office Visit Eric Orozco, APRN - CNP [facility]   03/24/25 Office Visit Eric Orozco, APRN - CNP [facility]   02/11/25 Office Visit Eric Orozco, APRN - CNP [facility]   01/30/25 Office Visit Eric Orozco, APRN - CNP [facility]   01/09/25 Office Visit Eric Orozco, APRN - CNP [facility]   01/08/25 Office Visit Eric Orozco, APRN - CNP [facility]   10/07/24 Office Visit Eric Orozco, APRN - CNP [facility]   04/10/24 Office Visit Eric Orozco, APRN - CNP [facility]   01/10/24 Office Visit Erci Orozco, APRN - CNP [facility]   12/13/23 Office Visit Eric Orozco, APRN - CNP [facility]   Showing recent visits within past 540 days with a meds authorizing provider and meeting all other requirements  Future Appointments  Date Type Provider Dept   07/09/25 Appointment Eric Orozco, APRN - CNP [facility]   Showing future appointments within next 150 days with a meds authorizing provider and meeting all other requirements

## 2025-05-12 ENCOUNTER — OFFICE VISIT (OUTPATIENT)
Dept: ENT CLINIC | Age: 70
End: 2025-05-12
Payer: MEDICARE

## 2025-05-12 VITALS
HEART RATE: 100 BPM | WEIGHT: 168.5 LBS | BODY MASS INDEX: 21.62 KG/M2 | SYSTOLIC BLOOD PRESSURE: 138 MMHG | OXYGEN SATURATION: 93 % | DIASTOLIC BLOOD PRESSURE: 64 MMHG | HEIGHT: 74 IN | TEMPERATURE: 97.4 F | RESPIRATION RATE: 20 BRPM

## 2025-05-12 DIAGNOSIS — J98.8 AIRWAY OBSTRUCTION: ICD-10-CM

## 2025-05-12 DIAGNOSIS — R49.0 DYSPHONIA: ICD-10-CM

## 2025-05-12 DIAGNOSIS — J38.3 LESION OF VOCAL FOLD: ICD-10-CM

## 2025-05-12 DIAGNOSIS — C32.0 SQUAMOUS CELL CARCINOMA OF LEFT VOCAL CORD (HCC): Primary | ICD-10-CM

## 2025-05-12 PROCEDURE — 1123F ACP DISCUSS/DSCN MKR DOCD: CPT | Performed by: OTOLARYNGOLOGY

## 2025-05-12 PROCEDURE — 99212 OFFICE O/P EST SF 10 MIN: CPT | Performed by: OTOLARYNGOLOGY

## 2025-05-12 PROCEDURE — 3075F SYST BP GE 130 - 139MM HG: CPT | Performed by: OTOLARYNGOLOGY

## 2025-05-12 PROCEDURE — 3078F DIAST BP <80 MM HG: CPT | Performed by: OTOLARYNGOLOGY

## 2025-05-13 ENCOUNTER — HOSPITAL ENCOUNTER (OUTPATIENT)
Dept: SPEECH THERAPY | Age: 70
Setting detail: THERAPIES SERIES
End: 2025-05-13
Payer: MEDICARE

## 2025-05-13 ENCOUNTER — TELEPHONE (OUTPATIENT)
Dept: ENT CLINIC | Age: 70
End: 2025-05-13

## 2025-05-13 NOTE — TELEPHONE ENCOUNTER
Dilan's son, Tai on [employer], called regarding the surgery date of 5/28/25. He is asking if there is any way to move the surgery to a later date? I happen to have a cancellation for the following week on 6/6/25. Please advise is it's okay to extend his surgery date another week?    Thank you!

## 2025-05-14 NOTE — PROGRESS NOTES
MISC 1 each by Does not apply route in the morning and at bedtime 1 each 3    Handicap Placard MISC by Does not apply route Duration: 5 years 1 each 0    azelastine (ASTELIN) 0.1 % nasal spray SPRAY ONCE IN EACH NOSTRIL TWICE DAILY AS DIRECTED 1 each 3    Alcohol Swabs PADS 1 each by Does not apply route 2 times daily 100 each 3    Elastic Bandages & Supports (B & B A-2 ATHLETIC SUPPORTER) MISC 1 Device by Does not apply route continuous prn (prn pain) 1 each 0    sodium chloride, Inhalant, 3 % nebulizer solution Take 4 mLs by nebulization as needed for Other (Use every 4 hours as needed for thick sputum) (Patient not taking: Reported on 5/12/2025) 120 mL 5     No current facility-administered medications for this visit.     Past Medical History:   Diagnosis Date    Abnormal liver enzymes     Alcohol abuse     Allergic rhinitis     Cancer (HCC)     left side of vocal cords    Chronic back pain     Colonic polyp     COPD (chronic obstructive pulmonary disease) (HCC)     Depression     Diabetes mellitus (HCC)     DJD (degenerative joint disease) of cervical spine     DJD (degenerative joint disease) of lumbar spine     SP SURGERY    GERD (gastroesophageal reflux disease)     Hyperlipidemia     Hypertension     Neuropathy 06/28/2023    Osteoarthritis     Smoker       Past Surgical History:   Procedure Laterality Date    BACK SURGERY  2008    X 3    CARDIAC CATHETERIZATION  03/2015    [facility]    CERVICAL FUSION N/A 01/22/2025    ACDF C3-C4 performed by Lion Barraza MD at [facility] OR    CHOLECYSTECTOMY      COLONOSCOPY  06/2012    repeat 6/2017- no precancerous  polyps    CT NEEDLE BIOPSY LUNG PERCUTANEOUS W IMAGING GUIDANCE  4/29/2025    CT NEEDLE BIOPSY LUNG PERCUTANEOUS W IMAGING GUIDANCE 4/29/2025 [facility] CT SCAN    EKG 12-LEAD  04/06/2015         FACET JOINT INJECTION Bilateral 07/07/2020    BILATERAL C-FACET MBB @ C4-5,C5-6 and C6-7 performed by Damian Ryan MD at [facility] OR    FACET JOINT INJECTION

## 2025-05-19 ENCOUNTER — HOSPITAL ENCOUNTER (OUTPATIENT)
Dept: SPEECH THERAPY | Age: 70
Setting detail: THERAPIES SERIES
Discharge: HOME | End: 2025-05-19
Payer: MEDICARE

## 2025-05-19 ENCOUNTER — TELEPHONE (OUTPATIENT)
Dept: ENT CLINIC | Age: 70
End: 2025-05-19

## 2025-05-19 PROCEDURE — 92526 ORAL FUNCTION THERAPY: CPT

## 2025-05-19 NOTE — TELEPHONE ENCOUNTER
Dr Farmer wants to know if it is okay to do the PEG tube prior to his surgery with you on 6/6/2025? Patient would not need to have an appointment with him prior.    Please advise Katie [phone] x [phone].

## 2025-05-19 NOTE — PROGRESS NOTES
stated age of 69. His voice and speech pattern are abnormal for the presence of marked raspiness, mild to moderate breathiness with normal articulation. I heard occasional throat clearing and coughing. On deep respiration there was a trace degree of stridor. His nasal cannula is in place and flowing.\"     The possibility of a conservation partial laryngectomy was discussed, contingent on the absence of cartilage involvement. The potential need for radiation and chemotherapy post-surgery was also addressed. A consultation with Dr. Mayer, a gastroenterologist, will be arranged to discuss the placement of a PEG tube to ensure adequate nutrition and facilitate healing. Proceed with the second biopsy and communicate the results promptly. If the chest tumor is confirmed as cancerous, a voice box-saving procedure may be considered.\"     Next ENT visit 05/27 to discuss ongoing plans.   Possible surgery scheduled 06/06/25      ST did briefly discuss PEG tube placement information; on appointment scheduled at this time.    ST also did briefly discuss partial laryngectomy; although patient with limited understanding and continues to state \"I just have to wait for my next Dr. Rodriguez appointment on 05/27.     Patient was agreeable to scheduling x1 additional appointment this week then another ST visit following ENT visit on 05/27.     Long Term Goals:  Time Frame for Long-Term Goals: 12 weeks    LONG-TERM GOAL #1: Patient will consume a regular diet with thin liquids without overt s/s of aspiration or change in pulmonary status to safely maintain adequate nutrition and hydration orally. ONGOING    LONG-TERM GOAL #2: Patient will improve EAT-10 score by a reduction of 8 to demonstrate improved perception of swallowing. ONGOING      RECOMMENDATIONS/ASSESSMENT:  Instrumental Evaluation: Instrumental evaluation not indicated at this time.  Diet Recommendations:  Regular diet with mildly thick liquids  Strategies:  Full Upright

## 2025-05-21 NOTE — TELEPHONE ENCOUNTER
Spoke to Dr Rodriguez. He said yes, it is okay to do the PEG tube prior to surgery with Dr Rodriguez. He would prefer to have it done before rather than after words.     Attempted to call Katie back. Got her voicemail, left detailed message regarding what Dr Rodriguez said and told her if she had any questions to call me back.

## 2025-05-23 ENCOUNTER — TELEPHONE (OUTPATIENT)
Dept: PHARMACY | Facility: CLINIC | Age: 70
End: 2025-05-23

## 2025-05-23 ENCOUNTER — HOSPITAL ENCOUNTER (OUTPATIENT)
Dept: SPEECH THERAPY | Age: 70
Setting detail: THERAPIES SERIES
Discharge: HOME | End: 2025-05-23
Payer: MEDICARE

## 2025-05-23 DIAGNOSIS — I10 ESSENTIAL HYPERTENSION: ICD-10-CM

## 2025-05-23 PROCEDURE — 92526 ORAL FUNCTION THERAPY: CPT

## 2025-05-23 RX ORDER — LISINOPRIL 2.5 MG/1
2.5 TABLET ORAL DAILY
Qty: 90 TABLET | Refills: 3 | Status: SHIPPED | OUTPATIENT
Start: 2025-05-23

## 2025-05-23 NOTE — PROGRESS NOTES
pain   []  Patient limited by other medical complications  []  Other:     Patient Education:   [x]  HEP/Education Completed: Plan of Care, Goals, rationale for swallowing therapy, review of diet level and need for mildly thick liquids, recommendation on where to purchase thickener, swallowing exercises, addressing voicing concerns in subsequent sessions as clinically indicated  []  No new Education completed  [x]  Reviewed Prior HEP      [x]  Patient verbalized and/or demonstrated understanding of education provided.  []  Patient unable to verbalize and/or demonstrate understanding of education provided.  Will continue education.  []  Barriers to learning:     PLAN:  Treatment Recommendations:     []  Plan of care initiated.  Plan to see patient 1-2 times per week for 12 weeks to address the treatment planned outlined above.  [x]  Continue with current plan of care  []  Modify plan of care as follows:    []  Hold pending physician visit  []  Discharge    Time In 0830   Time Out 0900   Timed Code Minutes: 0 min   Total Treatment Time: 30 min       Zulema Fair M.S. CCC-SLP [id number]

## 2025-05-23 NOTE — TELEPHONE ENCOUNTER
[facility] CLINICAL PHARMACY: ADHERENCE REVIEW  Identified care gap per Carlock: fills at [facility]: ACE/ARB, Diabetes, and Statin adherence    ASSESSMENT    ACE/ARB ADHERENCE    Insurance Records claims through 25 (Prior Year PDC = not reported; YTD PDC = FIRST FILL; Potential Fail Date: 25):   LISINOPRIL 2.5 MG TABLET last filled on 25 for 90 day supply. Next refill due: 25    Prescribed si tablet/capsule daily    Per Insurer Portal: last filled on 25 for 90 day supply.     Per [facility]:  initiating prescriber request  RX     BP Readings from Last 3 Encounters:   25 138/64   25 120/64   25 125/72     Estimated Creatinine Clearance: 85 mL/min (based on SCr of 0.9 mg/dL).  Lab Results   Component Value Date    CREATININE 0.9 2025     Lab Results   Component Value Date    K 4.4 2025     DIABETES ADHERENCE    Insurance Records claims through 25 (Prior Year PDC = not reported; YTD PDC = FIRST FILL):   PIOGLITAZONE 30MG TABS last filled on 25 for 90 day supply. Next refill due: 25  Prescribed si tablet/capsule daily  1RR    METFORMIN 1000MG TAB last filled on 25 for 90 day supply. Next refill due: 25  Prescribed si tablet/capsule twice daily with meals  3RR    JANUVIA 100MG TAB last filled on 25 for 90 day supply. Next refill due: 25  Prescribed si tablet/capsule daily  1RR    JARDIANCE 25MG TAB last filled on 25 for 90 day supply. Next refill due: 25  Prescribed si tablet/capsule daily  1RR    Per [facility]: will get  90 day supply ready to  since past due.    Lab Results   Component Value Date    LABA1C 8.1 (H) 2025    LABA1C 8.8 (H) 2024    LABA1C 8.3 (H) 10/07/2024     NOTE: A1c <9%    STATIN ADHERENCE    Insurance Records claims through 25 (Prior Year PDC = not reported; YTD PDC = FIRST FILL):  ATORVASTATIN 40MG TAB last filled on

## 2025-05-27 ENCOUNTER — HOSPITAL ENCOUNTER (OUTPATIENT)
Dept: SPEECH THERAPY | Age: 70
Setting detail: THERAPIES SERIES
Discharge: HOME | End: 2025-05-27
Payer: MEDICARE

## 2025-05-27 ENCOUNTER — OFFICE VISIT (OUTPATIENT)
Dept: ENT CLINIC | Age: 70
End: 2025-05-27
Payer: MEDICARE

## 2025-05-27 VITALS
HEART RATE: 77 BPM | SYSTOLIC BLOOD PRESSURE: 110 MMHG | RESPIRATION RATE: 16 BRPM | HEIGHT: 74 IN | OXYGEN SATURATION: 98 % | WEIGHT: 172.5 LBS | DIASTOLIC BLOOD PRESSURE: 70 MMHG | BODY MASS INDEX: 22.14 KG/M2 | TEMPERATURE: 97.9 F

## 2025-05-27 DIAGNOSIS — R49.0 DYSPHONIA: ICD-10-CM

## 2025-05-27 DIAGNOSIS — C32.0 SQUAMOUS CELL CARCINOMA OF LEFT VOCAL CORD (HCC): Primary | ICD-10-CM

## 2025-05-27 DIAGNOSIS — J38.3 LESION OF VOCAL FOLD: ICD-10-CM

## 2025-05-27 DIAGNOSIS — J43.9 PULMONARY EMPHYSEMA, UNSPECIFIED EMPHYSEMA TYPE (HCC): ICD-10-CM

## 2025-05-27 PROCEDURE — 1159F MED LIST DOCD IN RCRD: CPT | Performed by: OTOLARYNGOLOGY

## 2025-05-27 PROCEDURE — 99213 OFFICE O/P EST LOW 20 MIN: CPT | Performed by: OTOLARYNGOLOGY

## 2025-05-27 PROCEDURE — 92526 ORAL FUNCTION THERAPY: CPT

## 2025-05-27 PROCEDURE — 3078F DIAST BP <80 MM HG: CPT | Performed by: OTOLARYNGOLOGY

## 2025-05-27 PROCEDURE — 1123F ACP DISCUSS/DSCN MKR DOCD: CPT | Performed by: OTOLARYNGOLOGY

## 2025-05-27 PROCEDURE — 3074F SYST BP LT 130 MM HG: CPT | Performed by: OTOLARYNGOLOGY

## 2025-05-27 NOTE — PROGRESS NOTES
[facility]  SPEECH THERAPY  [] CLINICAL SWALLOW EVALUATION  [x] DAILY NOTE   [] PROGRESS NOTE [] DISCHARGE NOTE    [x] [facility] [place]   [] [facility]    [] [facility]   [] [facility]    Date: 2025  Patient Name:  Dilan Pappas  : 1955  MRN: [id number]  CSN: [id number]    Referring Practitioner Tyrese Steele MD [id number]      Diagnosis  Diagnoses       R91.8 (ICD-10-CM) - Pulmonary infiltrates           Treatment Diagnosis R13.10 Dysphagia, unspecified  R49.0 Dysphonia   Date of Evaluation 25   Additional Pertinent History Dilan Pappas has a past medical history of Abnormal liver enzymes, Alcohol abuse, Allergic rhinitis, Cancer (HCC), Chronic back pain, Colonic polyp, COPD (chronic obstructive pulmonary disease) (HCC), Depression, Diabetes mellitus (HCC), DJD (degenerative joint disease) of cervical spine, DJD (degenerative joint disease) of lumbar spine, GERD (gastroesophageal reflux disease), Hyperlipidemia, Hypertension, Neuropathy, Osteoarthritis, and Smoker.  he has a past surgical history that includes back surgery (); Inguinal hernia repair (); hernia repair; Cholecystectomy; Colonoscopy (2012); EKG 12 Lead (2015); Lumbar spine surgery (, , ); Cardiac catheterization (2015); pr excision hydrocele unilateral (Left, 2018); Facet joint injection (Bilateral, 2020); Facet joint injection (Bilateral, 2020); Pain management procedure (Right, 2020); Pain management procedure (Left, 10/09/2020); Pain management procedure (Right, 2021); Pain management procedure (Left, 2021); US ASP/INJ THYROID CYST (2021); Facet joint injection (Bilateral, 05/10/2022); Pain management procedure (Bilateral, 2023); Pain management procedure (Bilateral, 2023); Facet joint injection (N/A, 2024); Facet joint injection (Bilateral,

## 2025-05-27 NOTE — PROGRESS NOTES
each by Does not apply route 2 times daily 100 each 3    Elastic Bandages & Supports (B & B A-2 ATHLETIC SUPPORTER) MISC 1 Device by Does not apply route continuous prn (prn pain) 1 each 0     No current facility-administered medications for this visit.     Past Medical History:   Diagnosis Date    Abnormal liver enzymes     Alcohol abuse     Allergic rhinitis     Cancer (HCC)     left side of vocal cords    Chronic back pain     Colonic polyp     COPD (chronic obstructive pulmonary disease) (HCC)     Depression     Diabetes mellitus (HCC)     DJD (degenerative joint disease) of cervical spine     DJD (degenerative joint disease) of lumbar spine     SP SURGERY    GERD (gastroesophageal reflux disease)     Hyperlipidemia     Hypertension     Neuropathy 06/28/2023    Osteoarthritis     Smoker       Past Surgical History:   Procedure Laterality Date    BACK SURGERY  2008    X 3    [facility]    CERVICAL FUSION N/A 01/22/2025    ACDF C3-C4 performed by Lion Barraza MD at [facility] OR    CHOLECYSTECTOMY      COLONOSCOPY  06/2012    repeat 6/2017- no precancerous  polyps    CT NEEDLE BIOPSY LUNG PERCUTANEOUS W IMAGING GUIDANCE  4/29/2025    CT NEEDLE BIOPSY LUNG PERCUTANEOUS W IMAGING GUIDANCE 4/29/2025 [facility] CT SCAN    EKG 12-LEAD  04/06/2015         FACET JOINT INJECTION Bilateral 07/07/2020    BILATERAL C-FACET MBB @ C4-5,C5-6 and C6-7 performed by Damian Ryan MD at [facility] [place] OR    FACET JOINT INJECTION Bilateral 08/06/2020    bilateral C-facet MBB # 2 @ C4-5, C5-6 and C6-7 performed by Damian Ryan MD at [facility] OR    FACET JOINT INJECTION Bilateral 05/10/2022    Bilateral C-facet RFA @ C4-5 and C5-6 performed by Damian Ryan MD at [facility] [place] OR    FACET JOINT INJECTION N/A 06/05/2024    Cervical 2-3, 3-4 facet medial branch block performed by Damian Ryan MD at [facility] OR    FACET JOINT INJECTION Bilateral

## 2025-05-28 NOTE — PROGRESS NOTES
dog    Results   Lung Nodule Screening     [x] Qualifies    [] Does not qualify   [] Declined    [x] Completed     The USPSTF recommends annual screening for lung cancer with low-dose computed tomography (LDCT) in adults aged 50 to 80 years who have a 20 pack-year smoking history and currently smoke or have quit within the past 15 years. Screening should be discontinued once a person has not smoked for 15 years or develops a health problem that substantially limits life expectancy or the ability or willingness to have curative lung surgery.     Specimen: [id number]        Received:  [id number]   Status:  JUN Fabian Num: [id number]                              Collected: [id number]   Sp Type: Surgical                               Subm Doc:  Chris Serrano DO           Clinical Information:               Left vocal cord lesion; Left upper lobe nodular opacity;      Received one touch prep;      D: Received 20mL of red, bloody, cloudy, thin, unfixed fluid.   DIAGNOSIS:                            A. Left vocal cord, tumor, biopsy:        *  Invasive moderately differentiated squamous cell carcinoma.        *  P16: Negative.        *  See Comment.             B. Lung, left upper lobe, transbronchial biopsy:        *  Well to moderately differentiated squamous cell carcinoma.        *  P16: Negative.        *  See Comment.             C. Lung, left upper lobe, fine-needle aspiration (cell block):        *  Scant fragments of squamous cell carcinoma and blood.             D. Left upper lobe, Mini bronchoalveolar lavage, cell block:        *  Scant atypical squamous cells and blood.           Comment:      Immunohistochemical stains were performed on the tissue blocks \"A2\" and \"B\" with   appropriate     staining controls.  Tumor cells are positive for CK5/6 and P40 in both tissue blocks, and      are negative for TTF-1, CD45, S100 and HMB45 in tissue block \"A2\".  Immunostains support   the     about diagnosis.

## 2025-05-29 ENCOUNTER — OFFICE VISIT (OUTPATIENT)
Dept: PULMONOLOGY | Age: 70
End: 2025-05-29
Payer: MEDICARE

## 2025-05-29 ENCOUNTER — PREP FOR PROCEDURE (OUTPATIENT)
Dept: ENT CLINIC | Age: 70
End: 2025-05-29

## 2025-05-29 VITALS
HEART RATE: 94 BPM | OXYGEN SATURATION: 91 % | HEIGHT: 74 IN | BODY MASS INDEX: 22.07 KG/M2 | DIASTOLIC BLOOD PRESSURE: 66 MMHG | WEIGHT: 172 LBS | TEMPERATURE: 97.9 F | SYSTOLIC BLOOD PRESSURE: 116 MMHG

## 2025-05-29 DIAGNOSIS — Z87.891 FORMER SMOKER: ICD-10-CM

## 2025-05-29 DIAGNOSIS — J96.11 CHRONIC RESPIRATORY FAILURE WITH HYPOXIA (HCC): ICD-10-CM

## 2025-05-29 DIAGNOSIS — Z01.811 PREOP RESPIRATORY EXAM: ICD-10-CM

## 2025-05-29 DIAGNOSIS — C32.0 SQUAMOUS CELL CARCINOMA OF LEFT VOCAL CORD (HCC): ICD-10-CM

## 2025-05-29 DIAGNOSIS — C34.92 SQUAMOUS CELL CARCINOMA LUNG, LEFT (HCC): Primary | ICD-10-CM

## 2025-05-29 DIAGNOSIS — J47.9 BRONCHIECTASIS WITHOUT COMPLICATION (HCC): ICD-10-CM

## 2025-05-29 DIAGNOSIS — J44.9 STAGE 1 MILD COPD BY GOLD CLASSIFICATION (HCC): ICD-10-CM

## 2025-05-29 DIAGNOSIS — J43.9 COMBINED PULMONARY FIBROSIS AND EMPHYSEMA (CPFE) (HCC): ICD-10-CM

## 2025-05-29 DIAGNOSIS — J84.10 COMBINED PULMONARY FIBROSIS AND EMPHYSEMA (CPFE) (HCC): ICD-10-CM

## 2025-05-29 PROCEDURE — 3074F SYST BP LT 130 MM HG: CPT

## 2025-05-29 PROCEDURE — 1123F ACP DISCUSS/DSCN MKR DOCD: CPT

## 2025-05-29 PROCEDURE — 99215 OFFICE O/P EST HI 40 MIN: CPT

## 2025-05-29 PROCEDURE — 3078F DIAST BP <80 MM HG: CPT

## 2025-05-29 PROCEDURE — 1159F MED LIST DOCD IN RCRD: CPT

## 2025-05-29 ASSESSMENT — ENCOUNTER SYMPTOMS
SINUS PRESSURE: 0
RHINORRHEA: 0
WHEEZING: 0
SHORTNESS OF BREATH: 1
CHEST TIGHTNESS: 0
COUGH: 1
SINUS PAIN: 0

## 2025-05-30 ENCOUNTER — SOCIAL WORK (OUTPATIENT)
Dept: INFUSION THERAPY | Age: 70
End: 2025-05-30

## 2025-05-30 NOTE — PROGRESS NOTES
Oncology Social Work    Date: 5/30/2025  Time: 10:20 AM  Name: Dilan Pappas  MRN: [id number]     Contact Type: Distress Tool Follow-up    Note:   Situation: This staff called Dilan Pappas via phone support to introduce myself as the Oncology Social Worker.     Background: Dilan was referred to this staff by the Pulmonology Dept after a recent appointment here. This staff was calling to review the Distress Thermometer provided during their visit.    Coping Score 2    Areas of Concern Identified    Physical Concern: Changes in appetite/weight    Expressive Concern: Changes in appearance    Social Concern: None selected    Practical Concern: Taking care of myself and Treatment decisions    Existential Concern: None selected    Assessment: This staff had the opportunity to speak with Dilan. He seemed pleasant as we discussed the call's purpose was to educate about the services provided by the SW. He was struggling to express his thoughts as the words were difficult to speak (voice is very raspy). He isn't sure about the treatment plan but will be meeting with the radiation and infusion doctors next week   - The opportunity to review his filed Advance Directive was provided. All information is accurate at this time  - No community referrals were placed now because he is too early to know or understand what services he may need. Therefore, his referral services may be reconsidered should his needs regarding assistance change.    Recommendation: Follow-up will be initiated based on need.  provided my contact information and will remain available for support.        LIZETTE Chi, PHIL, ONCAvaniC, KRYSTYNA  Oncology Social Worker      Electronically signed by LIZETTE Chi LSW, ACHP-SW on 5/30/2025 at 10:20 AM

## 2025-06-03 PROBLEM — C34.12 PRIMARY MALIGNANT NEOPLASM OF LEFT UPPER LOBE OF LUNG (HCC): Status: ACTIVE | Noted: 2025-06-03

## 2025-06-03 RX ORDER — SODIUM CHLORIDE 0.9 % (FLUSH) 0.9 %
5-40 SYRINGE (ML) INJECTION EVERY 12 HOURS SCHEDULED
Status: DISCONTINUED | OUTPATIENT
Start: 2025-06-03 | End: 2025-06-05 | Stop reason: HOSPADM

## 2025-06-03 RX ORDER — SODIUM CHLORIDE 9 MG/ML
25 INJECTION, SOLUTION INTRAVENOUS PRN
Status: DISCONTINUED | OUTPATIENT
Start: 2025-06-03 | End: 2025-06-05 | Stop reason: HOSPADM

## 2025-06-03 RX ORDER — SODIUM CHLORIDE 9 MG/ML
INJECTION, SOLUTION INTRAVENOUS CONTINUOUS
Status: DISCONTINUED | OUTPATIENT
Start: 2025-06-03 | End: 2025-06-05 | Stop reason: HOSPADM

## 2025-06-03 RX ORDER — SODIUM CHLORIDE 0.9 % (FLUSH) 0.9 %
5-40 SYRINGE (ML) INJECTION PRN
Status: DISCONTINUED | OUTPATIENT
Start: 2025-06-03 | End: 2025-06-05 | Stop reason: HOSPADM

## 2025-06-04 ENCOUNTER — HOSPITAL ENCOUNTER (OUTPATIENT)
Dept: RADIATION ONCOLOGY | Age: 70
Discharge: HOME | End: 2025-06-04
Payer: MEDICARE

## 2025-06-04 VITALS
WEIGHT: 168 LBS | BODY MASS INDEX: 21.56 KG/M2 | DIASTOLIC BLOOD PRESSURE: 69 MMHG | SYSTOLIC BLOOD PRESSURE: 132 MMHG | OXYGEN SATURATION: 90 % | HEART RATE: 100 BPM | RESPIRATION RATE: 20 BRPM | HEIGHT: 74 IN | TEMPERATURE: 98 F

## 2025-06-04 DIAGNOSIS — C34.12 PRIMARY MALIGNANT NEOPLASM OF LEFT UPPER LOBE OF LUNG (HCC): Primary | ICD-10-CM

## 2025-06-04 PROCEDURE — 99202 OFFICE O/P NEW SF 15 MIN: CPT | Performed by: RADIOLOGY

## 2025-06-04 ASSESSMENT — ENCOUNTER SYMPTOMS
VOMITING: 0
APNEA: 0
SORE THROAT: 0
ABDOMINAL PAIN: 0
NAUSEA: 0
BLOOD IN STOOL: 0
WHEEZING: 0
CHEST TIGHTNESS: 0
TROUBLE SWALLOWING: 1
BACK PAIN: 1
COUGH: 1
SHORTNESS OF BREATH: 1

## 2025-06-04 ASSESSMENT — PAIN SCALES - GENERAL: PAINLEVEL_OUTOF10: 6

## 2025-06-04 ASSESSMENT — PAIN DESCRIPTION - LOCATION: LOCATION: NECK

## 2025-06-04 NOTE — PROGRESS NOTES
carcinoma.        *  P16: Negative.        *  See Comment.             C. Lung, left upper lobe, fine-needle aspiration (cell block):        *  Scant fragments of squamous cell carcinoma and blood.             D. Left upper lobe, Mini bronchoalveolar lavage, cell block:        *  Scant atypical squamous cells and blood.           Comment:      Immunohistochemical stains were performed on the tissue blocks \"A2\" and \"B\" with   appropriate     staining controls.  Tumor cells are positive for CK5/6 and P40 in both tissue blocks, and      are negative for TTF-1, CD45, S100 and HMB45 in tissue block \"A2\".  Immunostains support   the     about diagnosis.      6/3/2025 Initial Diagnosis    Primary malignant neoplasm of left upper lobe of lung (HCC)         Mr. Dilan Pappas is a 70 y.o. male with above mentioned oncologic history presenting today for initial consultation regarding the potential role for radiation therapy.    Review of Systems   Constitutional:  Positive for fatigue. Negative for activity change, appetite change and unexpected weight change.   HENT:  Positive for trouble swallowing. Negative for congestion and sore throat.    Respiratory:  Positive for cough and shortness of breath. Negative for apnea, chest tightness and wheezing.    Cardiovascular:  Negative for chest pain and leg swelling.   Gastrointestinal:  Negative for abdominal pain, blood in stool, nausea and vomiting.   Genitourinary:  Negative for dysuria, frequency and urgency.   Musculoskeletal:  Positive for back pain and neck pain.   Neurological:  Positive for dizziness and weakness. Negative for numbness and headaches.   Psychiatric/Behavioral:  Negative for confusion.        Advance Directives       Power of  Living Will ACP-Advance Directive ACP-Power of     Not on File Filed on 01/14/25 Filed Not on File            Chaperone: No    Mediport: no    Pacemaker/ICD: no    Previous XRT: no    PAIN: 6/10      All portions of

## 2025-06-05 ENCOUNTER — OFFICE VISIT (OUTPATIENT)
Dept: ONCOLOGY | Age: 70
End: 2025-06-05
Payer: MEDICARE

## 2025-06-05 ENCOUNTER — HOSPITAL ENCOUNTER (OUTPATIENT)
Age: 70
Setting detail: OUTPATIENT SURGERY
Discharge: HOME | DRG: 143 | End: 2025-06-05
Attending: INTERNAL MEDICINE | Admitting: INTERNAL MEDICINE
Payer: MEDICARE

## 2025-06-05 ENCOUNTER — CLINICAL DOCUMENTATION (OUTPATIENT)
Dept: CASE MANAGEMENT | Age: 70
End: 2025-06-05

## 2025-06-05 ENCOUNTER — ANESTHESIA (OUTPATIENT)
Dept: ENDOSCOPY | Age: 70
End: 2025-06-05
Payer: MEDICARE

## 2025-06-05 ENCOUNTER — APPOINTMENT (OUTPATIENT)
Dept: ENDOSCOPY | Age: 70
DRG: 143 | End: 2025-06-05
Attending: INTERNAL MEDICINE
Payer: MEDICARE

## 2025-06-05 ENCOUNTER — ANESTHESIA EVENT (OUTPATIENT)
Dept: OPERATING ROOM | Age: 70
End: 2025-06-05
Payer: MEDICARE

## 2025-06-05 ENCOUNTER — ANESTHESIA EVENT (OUTPATIENT)
Dept: ENDOSCOPY | Age: 70
End: 2025-06-05
Payer: MEDICARE

## 2025-06-05 ENCOUNTER — HOSPITAL ENCOUNTER (OUTPATIENT)
Dept: INFUSION THERAPY | Age: 70
Discharge: HOME | End: 2025-06-05
Payer: MEDICARE

## 2025-06-05 VITALS
TEMPERATURE: 97.1 F | HEIGHT: 74 IN | DIASTOLIC BLOOD PRESSURE: 69 MMHG | RESPIRATION RATE: 16 BRPM | OXYGEN SATURATION: 99 % | SYSTOLIC BLOOD PRESSURE: 107 MMHG | BODY MASS INDEX: 21.69 KG/M2 | HEART RATE: 66 BPM | WEIGHT: 169 LBS

## 2025-06-05 VITALS
DIASTOLIC BLOOD PRESSURE: 60 MMHG | OXYGEN SATURATION: 94 % | SYSTOLIC BLOOD PRESSURE: 113 MMHG | HEART RATE: 81 BPM | RESPIRATION RATE: 16 BRPM

## 2025-06-05 VITALS
BODY MASS INDEX: 21.53 KG/M2 | RESPIRATION RATE: 16 BRPM | OXYGEN SATURATION: 94 % | WEIGHT: 167.8 LBS | HEART RATE: 81 BPM | HEIGHT: 74 IN | DIASTOLIC BLOOD PRESSURE: 60 MMHG | SYSTOLIC BLOOD PRESSURE: 113 MMHG

## 2025-06-05 DIAGNOSIS — C34.92 SQUAMOUS CELL CARCINOMA OF LEFT LUNG (HCC): Primary | ICD-10-CM

## 2025-06-05 LAB — GLUCOSE BLD STRIP.AUTO-MCNC: 113 MG/DL (ref 70–108)

## 2025-06-05 PROCEDURE — 2580000003 HC RX 258: Performed by: INTERNAL MEDICINE

## 2025-06-05 PROCEDURE — 1125F AMNT PAIN NOTED PAIN PRSNT: CPT | Performed by: INTERNAL MEDICINE

## 2025-06-05 PROCEDURE — 6360000002 HC RX W HCPCS: Performed by: NURSE ANESTHETIST, CERTIFIED REGISTERED

## 2025-06-05 PROCEDURE — 3700000000 HC ANESTHESIA ATTENDED CARE: Performed by: INTERNAL MEDICINE

## 2025-06-05 PROCEDURE — 2709999900 HC NON-CHARGEABLE SUPPLY: Performed by: INTERNAL MEDICINE

## 2025-06-05 PROCEDURE — 99214 OFFICE O/P EST MOD 30 MIN: CPT | Performed by: INTERNAL MEDICINE

## 2025-06-05 PROCEDURE — 99211 OFF/OP EST MAY X REQ PHY/QHP: CPT

## 2025-06-05 PROCEDURE — 7100000010 HC PHASE II RECOVERY - FIRST 15 MIN: Performed by: INTERNAL MEDICINE

## 2025-06-05 PROCEDURE — 3E0G76Z INTRODUCTION OF NUTRITIONAL SUBSTANCE INTO UPPER GI, VIA NATURAL OR ARTIFICIAL OPENING: ICD-10-PCS | Performed by: INTERNAL MEDICINE

## 2025-06-05 PROCEDURE — 82948 REAGENT STRIP/BLOOD GLUCOSE: CPT

## 2025-06-05 PROCEDURE — 2500000003 HC RX 250 WO HCPCS: Performed by: INTERNAL MEDICINE

## 2025-06-05 PROCEDURE — 3700000001 HC ADD 15 MINUTES (ANESTHESIA): Performed by: INTERNAL MEDICINE

## 2025-06-05 PROCEDURE — 6360000002 HC RX W HCPCS: Performed by: INTERNAL MEDICINE

## 2025-06-05 PROCEDURE — 1123F ACP DISCUSS/DSCN MKR DOCD: CPT | Performed by: INTERNAL MEDICINE

## 2025-06-05 PROCEDURE — 3078F DIAST BP <80 MM HG: CPT | Performed by: INTERNAL MEDICINE

## 2025-06-05 PROCEDURE — 7100000011 HC PHASE II RECOVERY - ADDTL 15 MIN: Performed by: INTERNAL MEDICINE

## 2025-06-05 PROCEDURE — 0DH63UZ INSERTION OF FEEDING DEVICE INTO STOMACH, PERCUTANEOUS APPROACH: ICD-10-PCS | Performed by: INTERNAL MEDICINE

## 2025-06-05 PROCEDURE — 3074F SYST BP LT 130 MM HG: CPT | Performed by: INTERNAL MEDICINE

## 2025-06-05 PROCEDURE — 3609013300 HC EGD TUBE PLACEMENT: Performed by: INTERNAL MEDICINE

## 2025-06-05 RX ORDER — SODIUM CHLORIDE 0.9 % (FLUSH) 0.9 %
5-40 SYRINGE (ML) INJECTION EVERY 12 HOURS SCHEDULED
Status: CANCELLED | OUTPATIENT
Start: 2025-06-05

## 2025-06-05 RX ORDER — SODIUM CHLORIDE 9 MG/ML
25 INJECTION, SOLUTION INTRAVENOUS PRN
Status: CANCELLED | OUTPATIENT
Start: 2025-06-05

## 2025-06-05 RX ORDER — AMOXICILLIN AND CLAVULANATE POTASSIUM 500; 125 MG/1; MG/1
1 TABLET, FILM COATED ORAL 3 TIMES DAILY
Qty: 3 TABLET | Refills: 0 | Status: ON HOLD | OUTPATIENT
Start: 2025-06-05 | End: 2025-06-13 | Stop reason: HOSPADM

## 2025-06-05 RX ORDER — SODIUM CHLORIDE 0.9 % (FLUSH) 0.9 %
5-40 SYRINGE (ML) INJECTION PRN
Status: CANCELLED | OUTPATIENT
Start: 2025-06-05

## 2025-06-05 RX ORDER — PROPOFOL 10 MG/ML
INJECTION, EMULSION INTRAVENOUS
Status: DISCONTINUED | OUTPATIENT
Start: 2025-06-05 | End: 2025-06-05 | Stop reason: SDUPTHER

## 2025-06-05 RX ORDER — SODIUM CHLORIDE 9 MG/ML
INJECTION, SOLUTION INTRAVENOUS PRN
Status: CANCELLED | OUTPATIENT
Start: 2025-06-05

## 2025-06-05 RX ORDER — IPRATROPIUM BROMIDE AND ALBUTEROL SULFATE 2.5; .5 MG/3ML; MG/3ML
1 SOLUTION RESPIRATORY (INHALATION) EVERY 4 HOURS PRN
Status: CANCELLED | OUTPATIENT
Start: 2025-06-06

## 2025-06-05 RX ADMIN — PROPOFOL 50 MG: 10 INJECTION, EMULSION INTRAVENOUS at 14:03

## 2025-06-05 RX ADMIN — PROPOFOL 50 MG: 10 INJECTION, EMULSION INTRAVENOUS at 13:59

## 2025-06-05 RX ADMIN — PROPOFOL 50 MG: 10 INJECTION, EMULSION INTRAVENOUS at 13:55

## 2025-06-05 RX ADMIN — SODIUM CHLORIDE: 9 INJECTION, SOLUTION INTRAVENOUS at 12:45

## 2025-06-05 RX ADMIN — SODIUM CHLORIDE: 9 INJECTION, SOLUTION INTRAVENOUS at 13:48

## 2025-06-05 RX ADMIN — PROPOFOL 50 MG: 10 INJECTION, EMULSION INTRAVENOUS at 13:52

## 2025-06-05 RX ADMIN — PROPOFOL 50 MG: 10 INJECTION, EMULSION INTRAVENOUS at 14:08

## 2025-06-05 RX ADMIN — WATER 2000 MG: 1 INJECTION INTRAMUSCULAR; INTRAVENOUS; SUBCUTANEOUS at 13:48

## 2025-06-05 ASSESSMENT — PAIN DESCRIPTION - DESCRIPTORS: DESCRIPTORS: SORE

## 2025-06-05 ASSESSMENT — PAIN - FUNCTIONAL ASSESSMENT
PAIN_FUNCTIONAL_ASSESSMENT: NONE - DENIES PAIN
PAIN_FUNCTIONAL_ASSESSMENT: 0-10
PAIN_FUNCTIONAL_ASSESSMENT: NONE - DENIES PAIN

## 2025-06-05 NOTE — PATIENT INSTRUCTIONS
Orders Placed This Encounter   Procedures    PET CT SKULL BASE TO MID THIGH    CBC with Auto Differential    Hepatic Function Panel    POC PANEL BMP W/IOCA     Return in about 3 weeks (around 6/26/2025).MD+ labs+ review PET and pathology

## 2025-06-05 NOTE — PROGRESS NOTES
Pt admitted to Endo department and admitted to Endo room 11  Plan of care reviewed with patient.   Call light within reach.   Bed in lowest position, locked, with one bed rail up.   Appropriate arm bands on patient.   Bathroom offered.   All questions and concerns of patient addressed.  Allergies confirmed with patient.    Name: Suhas  Relationship to patient: Son and grandson  Phone number: [phone]

## 2025-06-05 NOTE — PROCEDURES
[address] [address]                             PROCEDURE NOTE      PATIENT NAME: WAYLON CRAWFORD               : 1955  MED REC NO: [id number]                       ROOM: [facility]  ACCOUNT NO: [id number]                       ADMIT DATE: 2025  PROVIDER: Lino Nguyen MD      DATE OF PROCEDURE:  2025    SURGEON:  Lino Nguyen MD    PROCEDURE:  Upper endoscopy.    INDICATION:  Malnutrition.    ANESTHESIA:  IV Diprivan.    Prior to procedure, risks, benefits, complications (including, but not limited to the following; bleeding, infection, perforation, emergency surgery, stroke, heart attack, death, possible anesthetic reaction, and the fact that the procedure is not 100% accurate or successful), indications, and alternatives of upper endoscopy and PEG tube were explained to the patient.  He understood and agreed to procedure.  All questions were answered.    DESCRIPTION OF PROCEDURE:  With the patient in supine position, GIF-180 forward-viewing video endoscope introduced without difficulty.  Esophagus was viewed.  EG junction at 42 cm.  Distal esophagitis noted.  Endoscope was advanced to proximal stomach.  Small amount of liquid was present.  Mild gastritis noted.  The endoscope was advanced along the greater curvature of the stomach in the antrum.  Gastritis noted.    Endoscope was advanced to the pylorus, 1st, 2nd, and 3rd portions of duodenum.  No abnormalities were noted.  The endoscope was retraced back to stomach and retroflexed.  Cardiac and fundus portions of the stomach were evaluated.  No other abnormalities were noted.  Endoscope was straightened.  Stomach was insufflated with air.  A site was chosen on the anterior abdominal wall where light could be seen.  The abdominal wall was prepped and draped in usual manner.  1% Xylocaine local anesthesia was used to anesthetize the skin and subcutaneous  tissue.  11 blade was used to make an incision in the skin and subcutaneous tissue.  A 14-gauge needle with plastic sheath was introduced in this area to visualize the stomach.  Needle was removed leaving sheath in place.  The wire was placed through the sheath, grasped with a snare, and delivered out through the mouth.  PEG tube was attached to this wire and delivered out through abdominal wall and secured with plastic stay.  This was 2 cm phill on the skin.  Endoscope was reintroduced through esophagus to stomach.  PEG was noted to be in proper position.  Endoscope was removed.  Procedure was terminated.  The patient tolerated the procedure well and was taken to GI lab recovery area.    IMPRESSION:    1. Distal esophagitis.  2. Gastritis.  3. PEG placed as described.  Pictures are pending.    PLAN:    1. The patient is on Protonix.  We will continue.  2. Can use the PEG tube as needed.  3. Instructed to use antibiotic ointment 2 times a day and change dressing as needed.  4. We will give 2 doses of Augmentin to use twice over the next 24 hours.  5. Estimated blood loss less than 10 mL.          SEAN WHITMAN MD      D:  06/05/2025 14:36:45     T:  06/05/2025 15:05:00     JENNY/INDY  Job #:  [id number]     Doc#:  [id number]

## 2025-06-05 NOTE — DISCHARGE INSTRUCTIONS
Check peg site every 3-4 hours:  replace dressing as needed.  Apply antibiotic ointment 2 times daily.

## 2025-06-05 NOTE — PROGRESS NOTES
Recovery mode. Patient denies discomfort. Dr. Nguyen discussed findings with patient and family. Discharge instructions provided and understanding verbalized.

## 2025-06-05 NOTE — BRIEF OP NOTE
Brief Postoperative Note      Patient: Dilan Pappas  YOB: 1955  MRN: [id number]    Date of Procedure: 6/5/2025    Pre-Op Diagnosis Codes:      * Dysphagia, unspecified type [R13.10]    Post-Op Diagnosis: Same       Procedure(s):  EGD W/ PEG    Surgeon(s):  Lino Nguyen MD    Assistant:  * No surgical staff found *    Anesthesia: Monitor Anesthesia Care    Estimated Blood Loss (mL): Minimal    Complications: None    Specimens:   * No specimens in log *    Implants:  * No implants in log *      Drains:   Gastrostomy/Enterostomy/Jejunostomy Tube Percutaneous Endoscopic Gastrostomy (PEG) LUQ 20 fr (Active)       Findings:  Infection Present At Time Of Surgery (PATOS) (choose all levels that have infection present):  No infection present  Other Findings: above    Electronically signed by Lino Nguyen MD on 6/5/2025 at 2:21 PM

## 2025-06-05 NOTE — OP NOTE
Operative Note      Patient: Dilan Pappas  YOB: 1955  MRN: [id number]    Date of Procedure: 6/5/2025    Pre-Op Diagnosis Codes:      * Dysphagia, unspecified type [R13.10]    Post-Op Diagnosis: Same       Procedure(s):  EGD W/ PEG    Surgeon(s):  Lino Nguyen MD    Assistant:   * No surgical staff found *    Anesthesia: Monitor Anesthesia Care    Estimated Blood Loss (mL): Minimal    Complications: None    Specimens:   * No specimens in log *    Implants:  * No implants in log *      Drains:   Gastrostomy/Enterostomy/Jejunostomy Tube Percutaneous Endoscopic Gastrostomy (PEG) LUQ 20 fr (Active)       Findings:  Infection Present At Time Of Surgery (PATOS) (choose all levels that have infection present):  No infection present  Other Findings: above    Detailed Description of Procedure:   dictated    Electronically signed by Lino Nguyen MD on 6/5/2025 at 2:22 PM

## 2025-06-05 NOTE — H&P
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  He has been seen by Otolaryngology.  They are planning surgical treatment.  He also has a history of lung cancer.  He does admit to reflux.    We are asked to do an EGD and PEG today.    PAST MEDICAL HISTORY:  As previously listed, unchanged.    MEDICATION:  As previously listed, unchanged.    ALLERGIES:  AS PREVIOUSLY LISTED, UNCHANGED.      FAMILY HISTORY:  As previously listed, unchanged.    REVIEW OF SYSTEMS:  As previously listed, unchanged.    PHYSICAL EXAMINATION:  GENERAL:  Awake, alert, oriented x3.  VITAL SIGNS:  The patient is afebrile, heart rate 83, respiratory rate 20, blood pressure 120/69, oxygen saturation 96%.  HEENT:  Normocephalic, atraumatic.  NECK:  Supple.  No JVD.  LUNGS:  Clear anteriorly.  HEART:  Regular rate.  ABDOMEN:  Soft, nontender.  RECTAL:  Not indicated.  EXTREMITIES:  Without edema.  NEUROLOGIC:  Awake, alert, and oriented x3.    IMPRESSION:  Cancer of the vocal cord.  Surgery is planned.    PLAN:  EGD with possible PEG tube placement.  I have ordered 2 g of Ancef to be given at the beginning of the procedure.          LINO NGUYEN MD      D:  2025 13:43:29     T:  2025 13:59:14     S/SANDORS  Job #:  [id number]     Doc#:  [id number]

## 2025-06-05 NOTE — PROGRESS NOTES
EGD with PEG tube placement complete, PEG tube marked at 2.5 cm at the skin. photos taken, pt tolerated procedure well. Scope Number  used.

## 2025-06-05 NOTE — ANESTHESIA POSTPROCEDURE EVALUATION
Department of Anesthesiology  Postprocedure Note    Patient: Dilan Pappas  MRN: [id number]  YOB: 1955  Date of evaluation: 6/5/2025    Procedure Summary       Date: 06/05/25 Room / Location: [place] [address] / [facility]    Anesthesia Start: 1348 Anesthesia Stop: 1411    Procedure: EGD W/ PEG Diagnosis:       Dysphagia, unspecified type      (Dysphagia, unspecified type [R13.10])    Surgeons: Lino Nguyen MD Responsible Provider: Arya Canada DO    Anesthesia Type: MAC ASA Status: 3            Anesthesia Type: No value filed.    Bhaskar Phase I: Bhaskar Score: 9    Bhaskar Phase II:      Anesthesia Post Evaluation    Patient location during evaluation: bedside  Patient participation: complete - patient participated  Level of consciousness: awake and alert  Airway patency: patent  Nausea & Vomiting: no vomiting and no nausea  Cardiovascular status: hemodynamically stable  Respiratory status: acceptable and room air  Hydration status: stable  Pain management: satisfactory to patient and adequate        No notable events documented.

## 2025-06-05 NOTE — PROGRESS NOTES
bilateral lungs. Mildly FDG avid alveolar and reticular opacities at the bilateral lung bases are likely infectious or inflammatory. A 3.1 cm cavitary lesion in the left upper lobe has a peripheral  maximum SUV of 6.7 (image 145). There are calcified mediastinal and hilar lymph nodes. There is no FDG avid mediastinal, hilar or axillary lymphadenopathy. Degenerative changes are present in the thoracic spine without evidence of hypermetabolic osseous metastatic disease.     Abdomen/pelvis: Physiologic activity is present in the liver, spleen, urinary collecting system and gastrointestinal tract. The gallbladder is surgically  absent. Minimal pneumobilia may be postsurgical. There is a small splenule at the splenic hilum. Atherosclerotic calcifications are present in the abdominal  aorta without evidence of aneurysm. The prostate gland is enlarged and contains calcifications. There are phleboliths in the pelvis. There is no FDG avid  mesenteric, retroperitoneal, pelvic or inguinal lymphadenopathy. Degenerative and surgical changes are present in the lumbar spine and pelvis without evidence of hypermetabolic osseous metastatic disease.    Impression:  FDG avid left upper lobe cavitary lesion suspicious for malignancy. Mildly FDG avid alveolar and reticular opacities in the bilateral lower lobes are  likely infectious or inflammatory. FDG avid thickening of the left vocal cord suspicious for malignancy.    PROCEDURES:  Partial laryngectomy 6/6/2025    PATHOLOGY:     4/2/2025  FINAL DIAGNOSIS:  A and B: Anterior commissure larynx and left focal cord, biopsies:  Necrotic, poorly differentiated squamous cell carcinoma.    4/29/25:  FINAL DIAGNOSIS:  Lung, left, core needle biopsies:  Rare atypical cells (see microscopic description).        5/12/2025 Biopsy    DIAGNOSIS:                            A. Left vocal cord, tumor, biopsy:        *  Invasive moderately differentiated squamous cell carcinoma.        *  P16:

## 2025-06-05 NOTE — ANESTHESIA PRE PROCEDURE
Department of Anesthesiology  Preprocedure Note       Name:  Dilan Pappas   Age:  70 y.o.  :  1955                                          MRN:  [id number]         Date:  2025      Surgeon: Surgeon(s):  Lino Nguyen MD    Procedure: Procedure(s):  EGD W/ PEG    Medications prior to admission:   Prior to Admission medications    Medication Sig Start Date End Date Taking? Authorizing Provider   lisinopril (PRINIVIL;ZESTRIL) 2.5 MG tablet TAKE 1 TABLET BY MOUTH DAILY 25   Renato Carrillo DO   OHTUVAYRE 3 MG/2.5ML SUSP Inhale 2.5 mLs into the lungs 2 times daily 3/17/25   Provider, MD Ekaterina   fluticasone-umeclidin-vilant (TRELEGY ELLIPTA) 100-62.5-25 MCG/ACT AEPB inhaler INHALE 1 PUFF INTO THE LUNGS DAILY 25   Eric Orozco APRN - CNP   Tirzepatide (MOUNJARO) 5 MG/0.5ML SOAJ Inject 5 mg into the skin once a week 3/24/25   Eric Orozco APRN - CNP   pantoprazole (PROTONIX) 40 MG tablet TAKE 1 TABLET BY MOUTH DAILY 3/24/25   Eric Orozco APRN - CNP   atorvastatin (LIPITOR) 40 MG tablet TAKE 1 TABLET BY MOUTH DAILY 25   Eric Orozco APRN - CNP   empagliflozin (JARDIANCE) 25 MG tablet Take 1 tablet by mouth daily 25   Eric Orozco APRN - CNP   QUEtiapine (SEROQUEL) 200 MG tablet TAKE 1 TABLET BY MOUTH AT BEDTIME 25   Eric Orozco APRN - CNP   metoprolol tartrate (LOPRESSOR) 25 MG tablet TAKE 1/2 TABLET BY MOUTH TWICE DAILY, HOLD IF HEART RATE LESS THAN 60 1/15/25   Eric Orozco APRN - CNP   gabapentin (NEURONTIN) 300 MG capsule Take 1 capsule by mouth 2 times daily for 180 days. 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    Primary malignant neoplasm of left upper lobe of lung (HCC) C34.12       Past Medical History:        Diagnosis Date    Abnormal liver enzymes     Alcohol abuse     Allergic rhinitis     Cancer (HCC)     left side of vocal cords    Chronic back pain     Colonic polyp     COPD (chronic obstructive pulmonary disease) (HCC)     Depression     Diabetes mellitus (HCC)     DJD (degenerative joint disease) of cervical spine     DJD (degenerative joint disease) of lumbar spine     SP SURGERY    Environmental and seasonal allergies     runny nose    GERD (gastroesophageal reflux disease)     Hyperlipidemia     Hypertension     Neuropathy 06/28/2023    Osteoarthritis     Smoker     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       Past Surgical History:        Procedure Laterality Date    BACK SURGERY  2008    X 3    CARDIAC CATHETERIZATION  03/2015    [facility]    CERVICAL FUSION N/A 01/22/2025    ACDF C3-C4 performed by Lion Barraza MD at [facility] OR    CHOLECYSTECTOMY      COLONOSCOPY  06/2012    repeat 6/2017- no precancerous  polyps    CT NEEDLE BIOPSY LUNG PERCUTANEOUS W IMAGING GUIDANCE  04/29/2025    CT NEEDLE BIOPSY LUNG PERCUTANEOUS W IMAGING GUIDANCE 4/29/2025 [facility] CT SCAN    EKG 12-LEAD  04/06/2015         FACET JOINT INJECTION Bilateral 07/07/2020    BILATERAL C-FACET MBB @ C4-5,C5-6 and C6-7 performed by Damian Ryan MD at [facility] OR    FACET JOINT INJECTION Bilateral 08/06/2020    bilateral C-facet MBB # 2 @ C4-5, C5-6 and C6-7 performed by Damian Ryan MD at [facility] OR    FACET JOINT INJECTION Bilateral 05/10/2022    Bilateral C-facet RFA @ C4-5 and C5-6 performed by Damian Ryan MD at [facility]

## 2025-06-06 ENCOUNTER — APPOINTMENT (OUTPATIENT)
Dept: GENERAL RADIOLOGY | Age: 70
DRG: 143 | End: 2025-06-06
Attending: OTOLARYNGOLOGY
Payer: MEDICARE

## 2025-06-06 ENCOUNTER — ANESTHESIA (OUTPATIENT)
Dept: OPERATING ROOM | Age: 70
End: 2025-06-06
Payer: MEDICARE

## 2025-06-06 ENCOUNTER — HOSPITAL ENCOUNTER (INPATIENT)
Age: 70
LOS: 10 days | Discharge: HOME HEALTH | DRG: 143 | End: 2025-06-16
Attending: OTOLARYNGOLOGY | Admitting: INTERNAL MEDICINE
Payer: MEDICARE

## 2025-06-06 DIAGNOSIS — E43 SEVERE MALNUTRITION: ICD-10-CM

## 2025-06-06 DIAGNOSIS — M48.02 SPINAL STENOSIS OF CERVICAL REGION WITH RADICULOPATHY: ICD-10-CM

## 2025-06-06 DIAGNOSIS — C32.0 SQUAMOUS CELL CARCINOMA OF LEFT VOCAL CORD (HCC): ICD-10-CM

## 2025-06-06 DIAGNOSIS — M54.12 SPINAL STENOSIS OF CERVICAL REGION WITH RADICULOPATHY: ICD-10-CM

## 2025-06-06 DIAGNOSIS — C34.12 PRIMARY MALIGNANT NEOPLASM OF LEFT UPPER LOBE OF LUNG (HCC): ICD-10-CM

## 2025-06-06 DIAGNOSIS — D72.829 LEUKOCYTOSIS, UNSPECIFIED TYPE: ICD-10-CM

## 2025-06-06 DIAGNOSIS — Z90.02 S/P LARYNGECTOMY: Primary | ICD-10-CM

## 2025-06-06 DIAGNOSIS — J96.21 ACUTE ON CHRONIC HYPOXIC RESPIRATORY FAILURE (HCC): ICD-10-CM

## 2025-06-06 PROBLEM — J98.8 AIRWAY COMPROMISE: Status: ACTIVE | Noted: 2025-06-06

## 2025-06-06 LAB
ANION GAP SERPL CALC-SCNC: 16 MEQ/L (ref 8–16)
BUN SERPL-MCNC: 9 MG/DL (ref 8–23)
CALCIUM SERPL-MCNC: 8.8 MG/DL (ref 8.8–10.2)
CHLORIDE SERPL-SCNC: 103 MEQ/L (ref 98–111)
CO2 SERPL-SCNC: 19 MEQ/L (ref 22–29)
CREAT SERPL-MCNC: 0.7 MG/DL (ref 0.7–1.2)
DEPRECATED RDW RBC AUTO: 46 FL (ref 35–45)
ERYTHROCYTE [DISTWIDTH] IN BLOOD BY AUTOMATED COUNT: 13.5 % (ref 11.5–14.5)
GFR SERPL CREATININE-BSD FRML MDRD: > 90 ML/MIN/1.73M2
GLUCOSE BLD STRIP.AUTO-MCNC: 105 MG/DL (ref 70–108)
GLUCOSE BLD STRIP.AUTO-MCNC: 134 MG/DL (ref 70–108)
GLUCOSE SERPL-MCNC: 160 MG/DL (ref 74–109)
HCT VFR BLD AUTO: 36.5 % (ref 42–52)
HGB BLD-MCNC: 11.3 GM/DL (ref 14–18)
MAGNESIUM SERPL-MCNC: 1.5 MG/DL (ref 1.6–2.6)
MCH RBC QN AUTO: 28.4 PG (ref 26–33)
MCHC RBC AUTO-ENTMCNC: 31 GM/DL (ref 32.2–35.5)
MCV RBC AUTO: 91.7 FL (ref 80–94)
PLATELET # BLD AUTO: 293 THOU/MM3 (ref 130–400)
PMV BLD AUTO: 9.6 FL (ref 9.4–12.4)
POTASSIUM SERPL-SCNC: 4.2 MEQ/L (ref 3.5–5.2)
RBC # BLD AUTO: 3.98 MILL/MM3 (ref 4.7–6.1)
SODIUM SERPL-SCNC: 138 MEQ/L (ref 135–145)
WBC # BLD AUTO: 14.1 THOU/MM3 (ref 4.8–10.8)

## 2025-06-06 PROCEDURE — 2500000003 HC RX 250 WO HCPCS: Performed by: NURSE ANESTHETIST, CERTIFIED REGISTERED

## 2025-06-06 PROCEDURE — 2709999900 HC NON-CHARGEABLE SUPPLY: Performed by: OTOLARYNGOLOGY

## 2025-06-06 PROCEDURE — 3600000019 HC SURGERY ROBOT ADDTL 15MIN: Performed by: OTOLARYNGOLOGY

## 2025-06-06 PROCEDURE — 7100000000 HC PACU RECOVERY - FIRST 15 MIN: Performed by: OTOLARYNGOLOGY

## 2025-06-06 PROCEDURE — APPNB45 APP NON BILLABLE 31-45 MINUTES: Performed by: NURSE PRACTITIONER

## 2025-06-06 PROCEDURE — 6370000000 HC RX 637 (ALT 250 FOR IP): Performed by: OTOLARYNGOLOGY

## 2025-06-06 PROCEDURE — 3700000001 HC ADD 15 MINUTES (ANESTHESIA): Performed by: OTOLARYNGOLOGY

## 2025-06-06 PROCEDURE — 83735 ASSAY OF MAGNESIUM: CPT

## 2025-06-06 PROCEDURE — 87077 CULTURE AEROBIC IDENTIFY: CPT

## 2025-06-06 PROCEDURE — 36415 COLL VENOUS BLD VENIPUNCTURE: CPT

## 2025-06-06 PROCEDURE — 85027 COMPLETE CBC AUTOMATED: CPT

## 2025-06-06 PROCEDURE — 6360000002 HC RX W HCPCS: Performed by: ANESTHESIOLOGY

## 2025-06-06 PROCEDURE — 2580000003 HC RX 258: Performed by: NURSE PRACTITIONER

## 2025-06-06 PROCEDURE — 0CBS8ZZ EXCISION OF LARYNX, VIA NATURAL OR ARTIFICIAL OPENING ENDOSCOPIC: ICD-10-PCS | Performed by: OTOLARYNGOLOGY

## 2025-06-06 PROCEDURE — 6360000002 HC RX W HCPCS: Performed by: NURSE ANESTHETIST, CERTIFIED REGISTERED

## 2025-06-06 PROCEDURE — 6360000002 HC RX W HCPCS: Performed by: NURSE PRACTITIONER

## 2025-06-06 PROCEDURE — 6360000002 HC RX W HCPCS: Performed by: OTOLARYNGOLOGY

## 2025-06-06 PROCEDURE — 7100000001 HC PACU RECOVERY - ADDTL 15 MIN: Performed by: OTOLARYNGOLOGY

## 2025-06-06 PROCEDURE — 80048 BASIC METABOLIC PNL TOTAL CA: CPT

## 2025-06-06 PROCEDURE — 71045 X-RAY EXAM CHEST 1 VIEW: CPT

## 2025-06-06 PROCEDURE — 3600000009 HC SURGERY ROBOT BASE: Performed by: OTOLARYNGOLOGY

## 2025-06-06 PROCEDURE — C1601 HC ENDOSCOPE, PULM, IMAGING/ILLUMINATION DEVICE: HCPCS | Performed by: OTOLARYNGOLOGY

## 2025-06-06 PROCEDURE — 2000000000 HC ICU R&B

## 2025-06-06 PROCEDURE — 3700000000 HC ANESTHESIA ATTENDED CARE: Performed by: OTOLARYNGOLOGY

## 2025-06-06 PROCEDURE — 2720000010 HC SURG SUPPLY STERILE: Performed by: OTOLARYNGOLOGY

## 2025-06-06 PROCEDURE — 6370000000 HC RX 637 (ALT 250 FOR IP)

## 2025-06-06 PROCEDURE — 94640 AIRWAY INHALATION TREATMENT: CPT

## 2025-06-06 PROCEDURE — 87070 CULTURE OTHR SPECIMN AEROBIC: CPT

## 2025-06-06 PROCEDURE — 87186 SC STD MICRODIL/AGAR DIL: CPT

## 2025-06-06 PROCEDURE — 6370000000 HC RX 637 (ALT 250 FOR IP): Performed by: NURSE ANESTHETIST, CERTIFIED REGISTERED

## 2025-06-06 PROCEDURE — 8E094CZ ROBOTIC ASSISTED PROCEDURE OF HEAD AND NECK REGION, PERCUTANEOUS ENDOSCOPIC APPROACH: ICD-10-PCS | Performed by: OTOLARYNGOLOGY

## 2025-06-06 PROCEDURE — 88305 TISSUE EXAM BY PATHOLOGIST: CPT

## 2025-06-06 PROCEDURE — 94761 N-INVAS EAR/PLS OXIMETRY MLT: CPT

## 2025-06-06 PROCEDURE — 82948 REAGENT STRIP/BLOOD GLUCOSE: CPT

## 2025-06-06 PROCEDURE — 2700000000 HC OXYGEN THERAPY PER DAY

## 2025-06-06 PROCEDURE — 87205 SMEAR GRAM STAIN: CPT

## 2025-06-06 PROCEDURE — S2900 ROBOTIC SURGICAL SYSTEM: HCPCS | Performed by: OTOLARYNGOLOGY

## 2025-06-06 PROCEDURE — 99222 1ST HOSP IP/OBS MODERATE 55: CPT | Performed by: INTERNAL MEDICINE

## 2025-06-06 PROCEDURE — 6360000002 HC RX W HCPCS

## 2025-06-06 PROCEDURE — 87102 FUNGUS ISOLATION CULTURE: CPT

## 2025-06-06 RX ORDER — MINERAL OIL, WHITE PETROLATUM .03; .94 G/G; G/G
OINTMENT OPHTHALMIC
Status: DISCONTINUED | OUTPATIENT
Start: 2025-06-06 | End: 2025-06-06 | Stop reason: SDUPTHER

## 2025-06-06 RX ORDER — INSULIN LISPRO 100 [IU]/ML
0-4 INJECTION, SOLUTION INTRAVENOUS; SUBCUTANEOUS
Status: DISCONTINUED | OUTPATIENT
Start: 2025-06-06 | End: 2025-06-16 | Stop reason: HOSPADM

## 2025-06-06 RX ORDER — ONDANSETRON 2 MG/ML
4 INJECTION INTRAMUSCULAR; INTRAVENOUS EVERY 6 HOURS PRN
Status: DISCONTINUED | OUTPATIENT
Start: 2025-06-06 | End: 2025-06-16 | Stop reason: HOSPADM

## 2025-06-06 RX ORDER — SODIUM CHLORIDE 0.9 % (FLUSH) 0.9 %
5-40 SYRINGE (ML) INJECTION EVERY 12 HOURS SCHEDULED
Status: DISCONTINUED | OUTPATIENT
Start: 2025-06-06 | End: 2025-06-06 | Stop reason: HOSPADM

## 2025-06-06 RX ORDER — SODIUM CHLORIDE 0.9 % (FLUSH) 0.9 %
5-40 SYRINGE (ML) INJECTION PRN
Status: DISCONTINUED | OUTPATIENT
Start: 2025-06-06 | End: 2025-06-06 | Stop reason: HOSPADM

## 2025-06-06 RX ORDER — EPINEPHRINE 1 MG/ML
INJECTION, SOLUTION, CONCENTRATE INTRAVENOUS PRN
Status: DISCONTINUED | OUTPATIENT
Start: 2025-06-06 | End: 2025-06-06 | Stop reason: ALTCHOICE

## 2025-06-06 RX ORDER — ROCURONIUM BROMIDE 10 MG/ML
INJECTION, SOLUTION INTRAVENOUS
Status: DISCONTINUED | OUTPATIENT
Start: 2025-06-06 | End: 2025-06-06 | Stop reason: SDUPTHER

## 2025-06-06 RX ORDER — ONDANSETRON 2 MG/ML
INJECTION INTRAMUSCULAR; INTRAVENOUS
Status: DISCONTINUED | OUTPATIENT
Start: 2025-06-06 | End: 2025-06-06 | Stop reason: SDUPTHER

## 2025-06-06 RX ORDER — SODIUM CHLORIDE 9 MG/ML
INJECTION, SOLUTION INTRAVENOUS PRN
Status: DISCONTINUED | OUTPATIENT
Start: 2025-06-06 | End: 2025-06-16 | Stop reason: HOSPADM

## 2025-06-06 RX ORDER — FENTANYL CITRATE 50 UG/ML
INJECTION, SOLUTION INTRAMUSCULAR; INTRAVENOUS
Status: DISCONTINUED | OUTPATIENT
Start: 2025-06-06 | End: 2025-06-06 | Stop reason: SDUPTHER

## 2025-06-06 RX ORDER — QUETIAPINE FUMARATE 100 MG/1
200 TABLET, FILM COATED ORAL NIGHTLY
Status: DISCONTINUED | OUTPATIENT
Start: 2025-06-06 | End: 2025-06-16 | Stop reason: HOSPADM

## 2025-06-06 RX ORDER — SODIUM CHLORIDE 0.9 % (FLUSH) 0.9 %
5-40 SYRINGE (ML) INJECTION PRN
Status: DISCONTINUED | OUTPATIENT
Start: 2025-06-06 | End: 2025-06-16 | Stop reason: HOSPADM

## 2025-06-06 RX ORDER — GLYCOPYRROLATE 0.2 MG/ML
INJECTION INTRAMUSCULAR; INTRAVENOUS
Status: DISCONTINUED | OUTPATIENT
Start: 2025-06-06 | End: 2025-06-06 | Stop reason: SDUPTHER

## 2025-06-06 RX ORDER — SODIUM CHLORIDE 0.9 % (FLUSH) 0.9 %
5-40 SYRINGE (ML) INJECTION EVERY 12 HOURS SCHEDULED
Status: DISCONTINUED | OUTPATIENT
Start: 2025-06-06 | End: 2025-06-16 | Stop reason: HOSPADM

## 2025-06-06 RX ORDER — POLYETHYLENE GLYCOL 3350 17 G/17G
17 POWDER, FOR SOLUTION ORAL DAILY
Status: DISCONTINUED | OUTPATIENT
Start: 2025-06-06 | End: 2025-06-16 | Stop reason: HOSPADM

## 2025-06-06 RX ORDER — IPRATROPIUM BROMIDE AND ALBUTEROL SULFATE 2.5; .5 MG/3ML; MG/3ML
1 SOLUTION RESPIRATORY (INHALATION) EVERY 4 HOURS PRN
Status: DISCONTINUED | OUTPATIENT
Start: 2025-06-06 | End: 2025-06-06 | Stop reason: HOSPADM

## 2025-06-06 RX ORDER — POTASSIUM CHLORIDE 7.45 MG/ML
10 INJECTION INTRAVENOUS PRN
Status: DISCONTINUED | OUTPATIENT
Start: 2025-06-06 | End: 2025-06-16 | Stop reason: HOSPADM

## 2025-06-06 RX ORDER — GABAPENTIN 300 MG/1
300 CAPSULE ORAL 2 TIMES DAILY
Status: DISCONTINUED | OUTPATIENT
Start: 2025-06-06 | End: 2025-06-16 | Stop reason: HOSPADM

## 2025-06-06 RX ORDER — EPHEDRINE SULFATE/0.9% NACL/PF 25 MG/5 ML
SYRINGE (ML) INTRAVENOUS
Status: DISCONTINUED | OUTPATIENT
Start: 2025-06-06 | End: 2025-06-06 | Stop reason: SDUPTHER

## 2025-06-06 RX ORDER — BISACODYL 10 MG
10 SUPPOSITORY, RECTAL RECTAL DAILY PRN
Status: DISCONTINUED | OUTPATIENT
Start: 2025-06-06 | End: 2025-06-16 | Stop reason: HOSPADM

## 2025-06-06 RX ORDER — PROPOFOL 10 MG/ML
INJECTION, EMULSION INTRAVENOUS
Status: DISCONTINUED | OUTPATIENT
Start: 2025-06-06 | End: 2025-06-06 | Stop reason: SDUPTHER

## 2025-06-06 RX ORDER — ATORVASTATIN CALCIUM 40 MG/1
40 TABLET, FILM COATED ORAL DAILY
Status: DISCONTINUED | OUTPATIENT
Start: 2025-06-06 | End: 2025-06-16 | Stop reason: HOSPADM

## 2025-06-06 RX ORDER — PANTOPRAZOLE SODIUM 40 MG/1
40 TABLET, DELAYED RELEASE ORAL
Status: DISCONTINUED | OUTPATIENT
Start: 2025-06-07 | End: 2025-06-16 | Stop reason: HOSPADM

## 2025-06-06 RX ORDER — TRANEXAMIC ACID 100 MG/ML
INJECTION, SOLUTION INTRAVENOUS
Status: DISCONTINUED | OUTPATIENT
Start: 2025-06-06 | End: 2025-06-06 | Stop reason: SDUPTHER

## 2025-06-06 RX ORDER — SODIUM CHLORIDE 9 MG/ML
INJECTION, SOLUTION INTRAVENOUS PRN
Status: DISCONTINUED | OUTPATIENT
Start: 2025-06-06 | End: 2025-06-06 | Stop reason: HOSPADM

## 2025-06-06 RX ORDER — POTASSIUM CHLORIDE 29.8 MG/ML
20 INJECTION INTRAVENOUS PRN
Status: DISCONTINUED | OUTPATIENT
Start: 2025-06-06 | End: 2025-06-16 | Stop reason: HOSPADM

## 2025-06-06 RX ORDER — AMITRIPTYLINE HYDROCHLORIDE 50 MG/1
50 TABLET ORAL NIGHTLY
Status: DISCONTINUED | OUTPATIENT
Start: 2025-06-06 | End: 2025-06-16 | Stop reason: HOSPADM

## 2025-06-06 RX ORDER — HYDRALAZINE HYDROCHLORIDE 20 MG/ML
10 INJECTION INTRAMUSCULAR; INTRAVENOUS
Status: DISCONTINUED | OUTPATIENT
Start: 2025-06-06 | End: 2025-06-06 | Stop reason: HOSPADM

## 2025-06-06 RX ORDER — OXYMETAZOLINE HYDROCHLORIDE 0.05 G/100ML
SPRAY NASAL PRN
Status: DISCONTINUED | OUTPATIENT
Start: 2025-06-06 | End: 2025-06-06 | Stop reason: ALTCHOICE

## 2025-06-06 RX ORDER — ALBUTEROL SULFATE 0.83 MG/ML
2.5 SOLUTION RESPIRATORY (INHALATION) EVERY 4 HOURS PRN
Status: DISCONTINUED | OUTPATIENT
Start: 2025-06-06 | End: 2025-06-16 | Stop reason: HOSPADM

## 2025-06-06 RX ORDER — LABETALOL HYDROCHLORIDE 5 MG/ML
10 INJECTION, SOLUTION INTRAVENOUS
Status: DISCONTINUED | OUTPATIENT
Start: 2025-06-06 | End: 2025-06-06 | Stop reason: HOSPADM

## 2025-06-06 RX ORDER — DEXAMETHASONE SODIUM PHOSPHATE 4 MG/ML
10 INJECTION, SOLUTION INTRA-ARTICULAR; INTRALESIONAL; INTRAMUSCULAR; INTRAVENOUS; SOFT TISSUE ONCE
Status: COMPLETED | OUTPATIENT
Start: 2025-06-06 | End: 2025-06-06

## 2025-06-06 RX ORDER — LISINOPRIL 2.5 MG/1
2.5 TABLET ORAL DAILY
Status: DISCONTINUED | OUTPATIENT
Start: 2025-06-06 | End: 2025-06-16 | Stop reason: HOSPADM

## 2025-06-06 RX ORDER — BUPROPION HYDROCHLORIDE 150 MG/1
150 TABLET ORAL EVERY MORNING
Status: DISCONTINUED | OUTPATIENT
Start: 2025-06-07 | End: 2025-06-16 | Stop reason: HOSPADM

## 2025-06-06 RX ORDER — OXYCODONE HYDROCHLORIDE 5 MG/1
10 TABLET ORAL EVERY 4 HOURS PRN
Refills: 0 | Status: DISCONTINUED | OUTPATIENT
Start: 2025-06-06 | End: 2025-06-16 | Stop reason: HOSPADM

## 2025-06-06 RX ORDER — ONDANSETRON 4 MG/1
4 TABLET, ORALLY DISINTEGRATING ORAL EVERY 8 HOURS PRN
Status: DISCONTINUED | OUTPATIENT
Start: 2025-06-06 | End: 2025-06-16 | Stop reason: HOSPADM

## 2025-06-06 RX ORDER — LIDOCAINE HYDROCHLORIDE 20 MG/ML
INJECTION, SOLUTION INTRAVENOUS
Status: DISCONTINUED | OUTPATIENT
Start: 2025-06-06 | End: 2025-06-06 | Stop reason: SDUPTHER

## 2025-06-06 RX ORDER — ONDANSETRON 2 MG/ML
4 INJECTION INTRAMUSCULAR; INTRAVENOUS
Status: DISCONTINUED | OUTPATIENT
Start: 2025-06-06 | End: 2025-06-06 | Stop reason: HOSPADM

## 2025-06-06 RX ORDER — METOPROLOL TARTRATE 25 MG/1
12.5 TABLET, FILM COATED ORAL 2 TIMES DAILY
Status: DISCONTINUED | OUTPATIENT
Start: 2025-06-06 | End: 2025-06-16 | Stop reason: HOSPADM

## 2025-06-06 RX ORDER — ALBUTEROL SULFATE 90 UG/1
2 INHALANT RESPIRATORY (INHALATION)
Status: DISCONTINUED | OUTPATIENT
Start: 2025-06-07 | End: 2025-06-08

## 2025-06-06 RX ORDER — ALBUTEROL SULFATE 90 UG/1
2 INHALANT RESPIRATORY (INHALATION)
Status: DISCONTINUED | OUTPATIENT
Start: 2025-06-06 | End: 2025-06-06

## 2025-06-06 RX ORDER — AMOXICILLIN AND CLAVULANATE POTASSIUM 500; 125 MG/1; MG/1
1 TABLET, FILM COATED ORAL EVERY 8 HOURS SCHEDULED
Status: DISCONTINUED | OUTPATIENT
Start: 2025-06-06 | End: 2025-06-08

## 2025-06-06 RX ORDER — NALOXONE HYDROCHLORIDE 0.4 MG/ML
INJECTION, SOLUTION INTRAMUSCULAR; INTRAVENOUS; SUBCUTANEOUS PRN
Status: DISCONTINUED | OUTPATIENT
Start: 2025-06-06 | End: 2025-06-06 | Stop reason: HOSPADM

## 2025-06-06 RX ORDER — MIDAZOLAM HYDROCHLORIDE 1 MG/ML
INJECTION, SOLUTION INTRAMUSCULAR; INTRAVENOUS
Status: DISCONTINUED | OUTPATIENT
Start: 2025-06-06 | End: 2025-06-06 | Stop reason: SDUPTHER

## 2025-06-06 RX ORDER — OXYCODONE HYDROCHLORIDE 5 MG/1
5 TABLET ORAL EVERY 4 HOURS PRN
Refills: 0 | Status: DISCONTINUED | OUTPATIENT
Start: 2025-06-06 | End: 2025-06-16 | Stop reason: HOSPADM

## 2025-06-06 RX ORDER — MAGNESIUM SULFATE IN WATER 40 MG/ML
2000 INJECTION, SOLUTION INTRAVENOUS PRN
Status: DISCONTINUED | OUTPATIENT
Start: 2025-06-06 | End: 2025-06-16 | Stop reason: HOSPADM

## 2025-06-06 RX ADMIN — HYDROMORPHONE HYDROCHLORIDE 0.5 MG: 1 INJECTION, SOLUTION INTRAMUSCULAR; INTRAVENOUS; SUBCUTANEOUS at 15:15

## 2025-06-06 RX ADMIN — Medication 80 MG: at 11:14

## 2025-06-06 RX ADMIN — FENTANYL CITRATE 50 MCG: 50 INJECTION INTRAMUSCULAR; INTRAVENOUS at 14:43

## 2025-06-06 RX ADMIN — FENTANYL CITRATE 50 MCG: 50 INJECTION INTRAMUSCULAR; INTRAVENOUS at 11:13

## 2025-06-06 RX ADMIN — FENTANYL CITRATE 50 MCG: 50 INJECTION INTRAMUSCULAR; INTRAVENOUS at 14:56

## 2025-06-06 RX ADMIN — HYDROMORPHONE HYDROCHLORIDE 0.5 MG: 1 INJECTION, SOLUTION INTRAMUSCULAR; INTRAVENOUS; SUBCUTANEOUS at 15:21

## 2025-06-06 RX ADMIN — ROCURONIUM BROMIDE 50 MG: 10 INJECTION, SOLUTION INTRAVENOUS at 11:15

## 2025-06-06 RX ADMIN — MINERAL OIL, WHITE PETROLATUM 1 ML: .03; .94 OINTMENT OPHTHALMIC at 11:25

## 2025-06-06 RX ADMIN — PIPERACILLIN SODIUM AND TAZOBACTAM SODIUM 3375 MG: 3; .375 INJECTION, POWDER, LYOPHILIZED, FOR SOLUTION INTRAVENOUS at 11:33

## 2025-06-06 RX ADMIN — FENTANYL CITRATE 50 MCG: 50 INJECTION INTRAMUSCULAR; INTRAVENOUS at 14:57

## 2025-06-06 RX ADMIN — HYDROMORPHONE HYDROCHLORIDE 0.5 MG: 1 INJECTION, SOLUTION INTRAMUSCULAR; INTRAVENOUS; SUBCUTANEOUS at 15:30

## 2025-06-06 RX ADMIN — QUETIAPINE FUMARATE 200 MG: 100 TABLET ORAL at 21:12

## 2025-06-06 RX ADMIN — HYDROMORPHONE HYDROCHLORIDE 0.5 MG: 1 INJECTION, SOLUTION INTRAMUSCULAR; INTRAVENOUS; SUBCUTANEOUS at 23:43

## 2025-06-06 RX ADMIN — SODIUM CHLORIDE: 9 INJECTION, SOLUTION INTRAVENOUS at 10:23

## 2025-06-06 RX ADMIN — PIPERACILLIN SODIUM AND TAZOBACTAM SODIUM 3375 MG: 3; .375 INJECTION, POWDER, LYOPHILIZED, FOR SOLUTION INTRAVENOUS at 13:32

## 2025-06-06 RX ADMIN — ALBUTEROL SULFATE 2 PUFF: 90 AEROSOL, METERED RESPIRATORY (INHALATION) at 20:05

## 2025-06-06 RX ADMIN — PROPOFOL 160 MG: 10 INJECTION, EMULSION INTRAVENOUS at 11:15

## 2025-06-06 RX ADMIN — GLYCOPYRROLATE 0.4 MG: 0.2 INJECTION INTRAMUSCULAR; INTRAVENOUS at 12:14

## 2025-06-06 RX ADMIN — EPHEDRINE SULFATE 12.5 MG: 5 INJECTION INTRAVENOUS at 12:19

## 2025-06-06 RX ADMIN — SODIUM CHLORIDE: 9 INJECTION, SOLUTION INTRAVENOUS at 11:39

## 2025-06-06 RX ADMIN — POLYETHYLENE GLYCOL 3350 17 G: 17 POWDER, FOR SOLUTION ORAL at 18:14

## 2025-06-06 RX ADMIN — METOPROLOL TARTRATE 12.5 MG: 25 TABLET, FILM COATED ORAL at 21:11

## 2025-06-06 RX ADMIN — FENTANYL CITRATE 100 MCG: 50 INJECTION INTRAMUSCULAR; INTRAVENOUS at 11:46

## 2025-06-06 RX ADMIN — LISINOPRIL 2.5 MG: 2.5 TABLET ORAL at 21:11

## 2025-06-06 RX ADMIN — ROCURONIUM BROMIDE 50 MG: 10 INJECTION, SOLUTION INTRAVENOUS at 11:57

## 2025-06-06 RX ADMIN — FENTANYL CITRATE 50 MCG: 50 INJECTION INTRAMUSCULAR; INTRAVENOUS at 11:27

## 2025-06-06 RX ADMIN — PROPOFOL 150 MCG/KG/MIN: 10 INJECTION, EMULSION INTRAVENOUS at 12:00

## 2025-06-06 RX ADMIN — EMPAGLIFLOZIN 25 MG: 10 TABLET, FILM COATED ORAL at 21:12

## 2025-06-06 RX ADMIN — SODIUM CHLORIDE: 9 INJECTION, SOLUTION INTRAVENOUS at 12:40

## 2025-06-06 RX ADMIN — OXYCODONE 5 MG: 5 TABLET ORAL at 18:25

## 2025-06-06 RX ADMIN — MAGNESIUM SULFATE HEPTAHYDRATE 2000 MG: 40 INJECTION, SOLUTION INTRAVENOUS at 23:42

## 2025-06-06 RX ADMIN — GABAPENTIN 300 MG: 300 CAPSULE ORAL at 21:11

## 2025-06-06 RX ADMIN — SUGAMMADEX 200 MG: 100 INJECTION, SOLUTION INTRAVENOUS at 14:05

## 2025-06-06 RX ADMIN — TRANEXAMIC ACID 1000 MG: 100 INJECTION, SOLUTION INTRAVENOUS at 14:26

## 2025-06-06 RX ADMIN — ONDANSETRON 4 MG: 2 INJECTION, SOLUTION INTRAMUSCULAR; INTRAVENOUS at 11:28

## 2025-06-06 RX ADMIN — AMOXICILLIN AND CLAVULANATE POTASSIUM 1 TABLET: 500; 125 TABLET, FILM COATED ORAL at 21:11

## 2025-06-06 RX ADMIN — HYDROMORPHONE HYDROCHLORIDE 0.5 MG: 1 INJECTION, SOLUTION INTRAMUSCULAR; INTRAVENOUS; SUBCUTANEOUS at 15:09

## 2025-06-06 RX ADMIN — DEXAMETHASONE SODIUM PHOSPHATE 10 MG: 4 INJECTION, SOLUTION INTRA-ARTICULAR; INTRALESIONAL; INTRAMUSCULAR; INTRAVENOUS; SOFT TISSUE at 10:31

## 2025-06-06 RX ADMIN — ATORVASTATIN CALCIUM 40 MG: 40 TABLET, FILM COATED ORAL at 21:11

## 2025-06-06 RX ADMIN — PROPOFOL 40 MG: 10 INJECTION, EMULSION INTRAVENOUS at 11:49

## 2025-06-06 RX ADMIN — HYDROMORPHONE HYDROCHLORIDE 0.5 MG: 1 INJECTION, SOLUTION INTRAMUSCULAR; INTRAVENOUS; SUBCUTANEOUS at 19:53

## 2025-06-06 RX ADMIN — EPHEDRINE SULFATE 12.5 MG: 5 INJECTION INTRAVENOUS at 12:33

## 2025-06-06 RX ADMIN — MIDAZOLAM 2 MG: 1 INJECTION INTRAMUSCULAR; INTRAVENOUS at 11:10

## 2025-06-06 RX ADMIN — AMITRIPTYLINE HYDROCHLORIDE 50 MG: 50 TABLET ORAL at 21:11

## 2025-06-06 ASSESSMENT — PAIN SCALES - GENERAL
PAINLEVEL_OUTOF10: 9
PAINLEVEL_OUTOF10: 6
PAINLEVEL_OUTOF10: 5
PAINLEVEL_OUTOF10: 6
PAINLEVEL_OUTOF10: 6
PAINLEVEL_OUTOF10: 7
PAINLEVEL_OUTOF10: 3
PAINLEVEL_OUTOF10: 2
PAINLEVEL_OUTOF10: 8

## 2025-06-06 ASSESSMENT — PAIN DESCRIPTION - LOCATION
LOCATION: THROAT

## 2025-06-06 ASSESSMENT — PAIN DESCRIPTION - ORIENTATION
ORIENTATION: INNER

## 2025-06-06 ASSESSMENT — PAIN DESCRIPTION - DESCRIPTORS
DESCRIPTORS: SORE

## 2025-06-06 ASSESSMENT — PAIN - FUNCTIONAL ASSESSMENT
PAIN_FUNCTIONAL_ASSESSMENT: 0-10
PAIN_FUNCTIONAL_ASSESSMENT: PREVENTS OR INTERFERES SOME ACTIVE ACTIVITIES AND ADLS

## 2025-06-06 NOTE — ANESTHESIA PRE PROCEDURE
decreased breath sounds    current smoker                           Cardiovascular:    (+) hypertension:      ECG reviewed      Echocardiogram reviewed                  Neuro/Psych:   (+) neuromuscular disease:, psychiatric history:            GI/Hepatic/Renal:   (+) GERD:          Endo/Other:    (+) Diabetes.                 Abdominal:             Vascular:          Other Findings:             Anesthesia Plan      general     ASA 3       Induction: intravenous.    MIPS: Postoperative opioids intended and Prophylactic antiemetics administered.  Anesthetic plan and risks discussed with patient and child/children.      Plan discussed with CRNA and surgical team.                    Keyur Wilson MD   6/6/2025

## 2025-06-06 NOTE — PROGRESS NOTES
Name: Dilan Pappas  : 1955  MRN: [id number]    Oncology Navigation- Initial Note:    Intake-  Contact Type: Medical Oncology  Son Suhas accompanied consultation    Diagnosis: Head/Neck- malignant & Lung -malignant  Surgery partial laryngectomy- scheduled 25 Dr. Rodriguez  Lung-plan SBRT     Home Disposition: Lives alone   -son Suhas & grandson live nearby provide assistance (has 6 children/dgtr occ helps)   -perform ADL/ cooks/drives-currently thickens liquids   -O2 @ 4L per n/c since     -quit smoking 1 year ago-started age 9 or 10 - pack daily   -quit drinking beer 1 year ago-was consuming 3 -25 oz daily    Patient needs and barriers to care: Coordination of Care, Knowledge deficit, and Symptom Management     Referral Source: Outpatient    Receptive to Advanced Care Planning/ Palliative Care:  deferred    Interventions-              Oncologist Plan of Care:                -MD discussed disease pathology & therapeutic treatment               -MD agree with Rad ONC for SBRT for lung               -keep MRI brain  & PET                  -F/U MD apt 25                               Education landon reiterated ONC POC     General Interventions: Introduce myself to patient & family as a nurse navigator- explained the role & process of nurse navigation. Welcome folder reviewed & given;                                                      including contact information.         Referrals: none     Biopsy site status: both sites squamous cell & p16 -      Continuum of Care: Diagnosis/Active Treatment    Notes: Landon following to assist & support as needed     Electronically signed by Marily Boss RN on 2025 at 10:54 AM

## 2025-06-07 PROBLEM — E43 SEVERE MALNUTRITION: Status: ACTIVE | Noted: 2025-06-06

## 2025-06-07 LAB
ANION GAP SERPL CALC-SCNC: 14 MEQ/L (ref 8–16)
BUN SERPL-MCNC: 8 MG/DL (ref 8–23)
CALCIUM SERPL-MCNC: 8.7 MG/DL (ref 8.8–10.2)
CHLORIDE SERPL-SCNC: 103 MEQ/L (ref 98–111)
CO2 SERPL-SCNC: 21 MEQ/L (ref 22–29)
CREAT SERPL-MCNC: 0.6 MG/DL (ref 0.7–1.2)
DEPRECATED RDW RBC AUTO: 45.7 FL (ref 35–45)
ERYTHROCYTE [DISTWIDTH] IN BLOOD BY AUTOMATED COUNT: 13.5 % (ref 11.5–14.5)
GFR SERPL CREATININE-BSD FRML MDRD: > 90 ML/MIN/1.73M2
GLUCOSE BLD STRIP.AUTO-MCNC: 104 MG/DL (ref 70–108)
GLUCOSE BLD STRIP.AUTO-MCNC: 123 MG/DL (ref 70–108)
GLUCOSE SERPL-MCNC: 110 MG/DL (ref 74–109)
HCT VFR BLD AUTO: 36.1 % (ref 42–52)
HGB BLD-MCNC: 11.2 GM/DL (ref 14–18)
MCH RBC QN AUTO: 28.6 PG (ref 26–33)
MCHC RBC AUTO-ENTMCNC: 31 GM/DL (ref 32.2–35.5)
MCV RBC AUTO: 92.3 FL (ref 80–94)
PLATELET # BLD AUTO: 280 THOU/MM3 (ref 130–400)
PMV BLD AUTO: 9.8 FL (ref 9.4–12.4)
POTASSIUM SERPL-SCNC: 4.3 MEQ/L (ref 3.5–5.2)
RBC # BLD AUTO: 3.91 MILL/MM3 (ref 4.7–6.1)
SODIUM SERPL-SCNC: 138 MEQ/L (ref 135–145)
WBC # BLD AUTO: 12.6 THOU/MM3 (ref 4.8–10.8)

## 2025-06-07 PROCEDURE — 51798 US URINE CAPACITY MEASURE: CPT

## 2025-06-07 PROCEDURE — 36415 COLL VENOUS BLD VENIPUNCTURE: CPT

## 2025-06-07 PROCEDURE — 6370000000 HC RX 637 (ALT 250 FOR IP)

## 2025-06-07 PROCEDURE — 6360000002 HC RX W HCPCS: Performed by: OTOLARYNGOLOGY

## 2025-06-07 PROCEDURE — 2000000000 HC ICU R&B

## 2025-06-07 PROCEDURE — 6370000000 HC RX 637 (ALT 250 FOR IP): Performed by: OTOLARYNGOLOGY

## 2025-06-07 PROCEDURE — 80048 BASIC METABOLIC PNL TOTAL CA: CPT

## 2025-06-07 PROCEDURE — 99233 SBSQ HOSP IP/OBS HIGH 50: CPT | Performed by: INTERNAL MEDICINE

## 2025-06-07 PROCEDURE — 51701 INSERT BLADDER CATHETER: CPT

## 2025-06-07 PROCEDURE — 94761 N-INVAS EAR/PLS OXIMETRY MLT: CPT

## 2025-06-07 PROCEDURE — 85027 COMPLETE CBC AUTOMATED: CPT

## 2025-06-07 PROCEDURE — 2500000003 HC RX 250 WO HCPCS: Performed by: OTOLARYNGOLOGY

## 2025-06-07 PROCEDURE — 94640 AIRWAY INHALATION TREATMENT: CPT

## 2025-06-07 PROCEDURE — 2700000000 HC OXYGEN THERAPY PER DAY

## 2025-06-07 PROCEDURE — 82948 REAGENT STRIP/BLOOD GLUCOSE: CPT

## 2025-06-07 RX ORDER — DEXTROSE MONOHYDRATE 100 MG/ML
INJECTION, SOLUTION INTRAVENOUS CONTINUOUS PRN
Status: DISCONTINUED | OUTPATIENT
Start: 2025-06-07 | End: 2025-06-16 | Stop reason: HOSPADM

## 2025-06-07 RX ORDER — GLUCAGON 1 MG/ML
1 KIT INJECTION PRN
Status: DISCONTINUED | OUTPATIENT
Start: 2025-06-07 | End: 2025-06-16 | Stop reason: HOSPADM

## 2025-06-07 RX ADMIN — POLYETHYLENE GLYCOL 3350 17 G: 17 POWDER, FOR SOLUTION ORAL at 09:17

## 2025-06-07 RX ADMIN — AMOXICILLIN AND CLAVULANATE POTASSIUM 1 TABLET: 500; 125 TABLET, FILM COATED ORAL at 14:40

## 2025-06-07 RX ADMIN — Medication 2 PUFF: at 20:16

## 2025-06-07 RX ADMIN — SODIUM CHLORIDE, PRESERVATIVE FREE 10 ML: 5 INJECTION INTRAVENOUS at 08:47

## 2025-06-07 RX ADMIN — HYDROMORPHONE HYDROCHLORIDE 0.5 MG: 1 INJECTION, SOLUTION INTRAMUSCULAR; INTRAVENOUS; SUBCUTANEOUS at 16:17

## 2025-06-07 RX ADMIN — AMITRIPTYLINE HYDROCHLORIDE 50 MG: 50 TABLET ORAL at 21:00

## 2025-06-07 RX ADMIN — EMPAGLIFLOZIN 25 MG: 10 TABLET, FILM COATED ORAL at 09:17

## 2025-06-07 RX ADMIN — ATORVASTATIN CALCIUM 40 MG: 40 TABLET, FILM COATED ORAL at 09:17

## 2025-06-07 RX ADMIN — METOPROLOL TARTRATE 12.5 MG: 25 TABLET, FILM COATED ORAL at 09:17

## 2025-06-07 RX ADMIN — HYDROMORPHONE HYDROCHLORIDE 0.5 MG: 1 INJECTION, SOLUTION INTRAMUSCULAR; INTRAVENOUS; SUBCUTANEOUS at 08:47

## 2025-06-07 RX ADMIN — OXYCODONE 10 MG: 5 TABLET ORAL at 11:22

## 2025-06-07 RX ADMIN — GABAPENTIN 300 MG: 300 CAPSULE ORAL at 20:59

## 2025-06-07 RX ADMIN — BUPROPION HYDROCHLORIDE 150 MG: 150 TABLET, EXTENDED RELEASE ORAL at 09:17

## 2025-06-07 RX ADMIN — Medication 2 PUFF: at 08:50

## 2025-06-07 RX ADMIN — OXYCODONE 10 MG: 5 TABLET ORAL at 19:00

## 2025-06-07 RX ADMIN — HYDROMORPHONE HYDROCHLORIDE 0.5 MG: 1 INJECTION, SOLUTION INTRAMUSCULAR; INTRAVENOUS; SUBCUTANEOUS at 20:59

## 2025-06-07 RX ADMIN — Medication 2 PUFF: at 13:16

## 2025-06-07 RX ADMIN — QUETIAPINE FUMARATE 200 MG: 100 TABLET ORAL at 20:59

## 2025-06-07 RX ADMIN — METOPROLOL TARTRATE 12.5 MG: 25 TABLET, FILM COATED ORAL at 20:59

## 2025-06-07 RX ADMIN — LISINOPRIL 2.5 MG: 2.5 TABLET ORAL at 09:17

## 2025-06-07 RX ADMIN — GABAPENTIN 300 MG: 300 CAPSULE ORAL at 09:17

## 2025-06-07 RX ADMIN — AMOXICILLIN AND CLAVULANATE POTASSIUM 1 TABLET: 500; 125 TABLET, FILM COATED ORAL at 06:00

## 2025-06-07 RX ADMIN — SODIUM CHLORIDE, PRESERVATIVE FREE 10 ML: 5 INJECTION INTRAVENOUS at 21:00

## 2025-06-07 RX ADMIN — AMOXICILLIN AND CLAVULANATE POTASSIUM 1 TABLET: 500; 125 TABLET, FILM COATED ORAL at 20:59

## 2025-06-07 RX ADMIN — OXYCODONE 10 MG: 5 TABLET ORAL at 04:20

## 2025-06-07 ASSESSMENT — PAIN SCALES - GENERAL
PAINLEVEL_OUTOF10: 9
PAINLEVEL_OUTOF10: 1
PAINLEVEL_OUTOF10: 7
PAINLEVEL_OUTOF10: 1
PAINLEVEL_OUTOF10: 4
PAINLEVEL_OUTOF10: 4
PAINLEVEL_OUTOF10: 8
PAINLEVEL_OUTOF10: 7
PAINLEVEL_OUTOF10: 7
PAINLEVEL_OUTOF10: 0
PAINLEVEL_OUTOF10: 4
PAINLEVEL_OUTOF10: 7
PAINLEVEL_OUTOF10: 9

## 2025-06-07 ASSESSMENT — PAIN DESCRIPTION - DESCRIPTORS
DESCRIPTORS: TENDER;SORE
DESCRIPTORS: SORE;TENDER

## 2025-06-07 ASSESSMENT — PAIN DESCRIPTION - LOCATION
LOCATION: THROAT

## 2025-06-07 ASSESSMENT — PAIN DESCRIPTION - FREQUENCY
FREQUENCY: CONTINUOUS

## 2025-06-07 ASSESSMENT — PAIN DESCRIPTION - ONSET
ONSET: ON-GOING

## 2025-06-07 ASSESSMENT — PAIN DESCRIPTION - PAIN TYPE
TYPE: SURGICAL PAIN
TYPE: SURGICAL PAIN

## 2025-06-07 NOTE — PLAN OF CARE
Problem: Respiratory - Adult  Goal: Achieves optimal ventilation and oxygenation  6/7/2025 1628 by Karla Bermeo RCP  Outcome: Progressing  6/7/2025 1050 by Lety Dougherty RN  Outcome: Progressing  Flowsheets (Taken 6/7/2025 0800)  Achieves optimal ventilation and oxygenation:   Assess for changes in respiratory status   Assess for changes in mentation and behavior   Position to facilitate oxygenation and minimize respiratory effort  6/7/2025 0538 by Patricia Bains RCP  Outcome: Progressing  Goal: Clear lung sounds  6/7/2025 1628 by Karla Bermeo RCP  Outcome: Progressing  6/7/2025 0538 by Patricia Bains RCP  Outcome: Progressing

## 2025-06-07 NOTE — PLAN OF CARE
Problem: Chronic Conditions and Co-morbidities  Goal: Patient's chronic conditions and co-morbidity symptoms are monitored and maintained or improved  6/7/2025 1050 by Lety Dougherty RN  Outcome: Progressing  Flowsheets (Taken 6/7/2025 0800)  Care Plan - Patient's Chronic Conditions and Co-Morbidity Symptoms are Monitored and Maintained or Improved: Monitor and assess patient's chronic conditions and comorbid symptoms for stability, deterioration, or improvement  6/7/2025 0124 by Anabela Parkinson RN  Outcome: Progressing  Flowsheets (Taken 6/6/2025 1940)  Care Plan - Patient's Chronic Conditions and Co-Morbidity Symptoms are Monitored and Maintained or Improved: Monitor and assess patient's chronic conditions and comorbid symptoms for stability, deterioration, or improvement     Problem: Discharge Planning  Goal: Discharge to home or other facility with appropriate resources  6/7/2025 1050 by Lety Dougherty RN  Outcome: Progressing  Flowsheets (Taken 6/7/2025 0800)  Discharge to home or other facility with appropriate resources: Identify barriers to discharge with patient and caregiver  6/7/2025 0124 by Anabela Parkinson RN  Outcome: Progressing  Flowsheets (Taken 6/6/2025 1940)  Discharge to home or other facility with appropriate resources: Identify barriers to discharge with patient and caregiver     Problem: Safety - Adult  Goal: Free from fall injury  6/7/2025 1050 by Lety Dougherty RN  Outcome: Progressing  Flowsheets (Taken 6/7/2025 0545 by Anabela Parkinson RN)  Free From Fall Injury: Instruct family/caregiver on patient safety  6/7/2025 0124 by Anabela Parkinson RN  Outcome: Progressing     Problem: Pain  Goal: Verbalizes/displays adequate comfort level or baseline comfort level  6/7/2025 1050 by Lety Dougherty RN  Outcome: Progressing  Flowsheets (Taken 6/7/2025 0800)  Verbalizes/displays adequate comfort level or baseline comfort level:   Encourage patient to monitor pain and request assistance   Assess pain

## 2025-06-07 NOTE — PLAN OF CARE
Problem: Respiratory - Adult  Goal: Achieves optimal ventilation and oxygenation  Outcome: Progressing   Remains on HFNC for heat and humidity post op tolerating well.   Problem: Respiratory - Adult  Goal: Clear lung sounds  Outcome: Progressing    Patient lung sounds are considered normal for their current lung condition. No signs of distress noted. Current treatment regimen appropriate      Patient mutually agreed on goals.

## 2025-06-07 NOTE — PLAN OF CARE
Problem: Chronic Conditions and Co-morbidities  Goal: Patient's chronic conditions and co-morbidity symptoms are monitored and maintained or improved  Outcome: Progressing  Flowsheets (Taken 6/6/2025 1940)  Care Plan - Patient's Chronic Conditions and Co-Morbidity Symptoms are Monitored and Maintained or Improved: Monitor and assess patient's chronic conditions and comorbid symptoms for stability, deterioration, or improvement     Problem: Discharge Planning  Goal: Discharge to home or other facility with appropriate resources  Outcome: Progressing  Flowsheets (Taken 6/6/2025 1940)  Discharge to home or other facility with appropriate resources: Identify barriers to discharge with patient and caregiver     Problem: Safety - Adult  Goal: Free from fall injury  Outcome: Progressing     Problem: Pain  Goal: Verbalizes/displays adequate comfort level or baseline comfort level  Outcome: Progressing  Flowsheets (Taken 6/6/2025 1930)  Verbalizes/displays adequate comfort level or baseline comfort level: Encourage patient to monitor pain and request assistance

## 2025-06-08 ENCOUNTER — APPOINTMENT (OUTPATIENT)
Dept: GENERAL RADIOLOGY | Age: 70
DRG: 143 | End: 2025-06-08
Attending: OTOLARYNGOLOGY
Payer: MEDICARE

## 2025-06-08 PROBLEM — J96.21 ACUTE ON CHRONIC HYPOXIC RESPIRATORY FAILURE (HCC): Status: ACTIVE | Noted: 2025-06-08

## 2025-06-08 PROBLEM — D72.829 LEUKOCYTOSIS: Status: ACTIVE | Noted: 2025-06-08

## 2025-06-08 PROBLEM — Z90.02 S/P LARYNGECTOMY: Status: ACTIVE | Noted: 2025-06-08

## 2025-06-08 LAB
ALBUMIN SERPL BCG-MCNC: 3.2 G/DL (ref 3.4–4.9)
ALP SERPL-CCNC: 120 U/L (ref 40–129)
ALT SERPL W/O P-5'-P-CCNC: 22 U/L (ref 10–50)
ANION GAP SERPL CALC-SCNC: 14 MEQ/L (ref 8–16)
AST SERPL-CCNC: 31 U/L (ref 10–50)
BACTERIA SPEC AEROBE CULT: NORMAL
BILIRUB CONJ SERPL-MCNC: < 0.1 MG/DL (ref 0–0.2)
BILIRUB SERPL-MCNC: 0.3 MG/DL (ref 0.3–1.2)
BUN SERPL-MCNC: 9 MG/DL (ref 8–23)
CALCIUM SERPL-MCNC: 8.9 MG/DL (ref 8.8–10.2)
CHLORIDE SERPL-SCNC: 99 MEQ/L (ref 98–111)
CO2 SERPL-SCNC: 23 MEQ/L (ref 22–29)
CREAT SERPL-MCNC: 0.6 MG/DL (ref 0.7–1.2)
DEPRECATED RDW RBC AUTO: 46.2 FL (ref 35–45)
ERYTHROCYTE [DISTWIDTH] IN BLOOD BY AUTOMATED COUNT: 13.8 % (ref 11.5–14.5)
GFR SERPL CREATININE-BSD FRML MDRD: > 90 ML/MIN/1.73M2
GLUCOSE BLD STRIP.AUTO-MCNC: 144 MG/DL (ref 70–108)
GLUCOSE BLD STRIP.AUTO-MCNC: 182 MG/DL (ref 70–108)
GLUCOSE BLD STRIP.AUTO-MCNC: 99 MG/DL (ref 70–108)
GLUCOSE SERPL-MCNC: 111 MG/DL (ref 74–109)
GRAM STN SPEC: NORMAL
HCT VFR BLD AUTO: 37.5 % (ref 42–52)
HGB BLD-MCNC: 11.8 GM/DL (ref 14–18)
MCH RBC QN AUTO: 28.6 PG (ref 26–33)
MCHC RBC AUTO-ENTMCNC: 31.5 GM/DL (ref 32.2–35.5)
MCV RBC AUTO: 91 FL (ref 80–94)
NT-PROBNP SERPL IA-MCNC: 242 PG/ML (ref 0–124)
PLATELET # BLD AUTO: 242 THOU/MM3 (ref 130–400)
PMV BLD AUTO: 10.1 FL (ref 9.4–12.4)
POTASSIUM SERPL-SCNC: 4.2 MEQ/L (ref 3.5–5.2)
PROCALCITONIN SERPL IA-MCNC: 0.12 NG/ML (ref 0.01–0.09)
PROT SERPL-MCNC: 7 G/DL (ref 6.4–8.3)
RBC # BLD AUTO: 4.12 MILL/MM3 (ref 4.7–6.1)
SODIUM SERPL-SCNC: 136 MEQ/L (ref 135–145)
WBC # BLD AUTO: 11.4 THOU/MM3 (ref 4.8–10.8)

## 2025-06-08 PROCEDURE — 6370000000 HC RX 637 (ALT 250 FOR IP): Performed by: OTOLARYNGOLOGY

## 2025-06-08 PROCEDURE — 6360000002 HC RX W HCPCS: Performed by: OTOLARYNGOLOGY

## 2025-06-08 PROCEDURE — 85027 COMPLETE CBC AUTOMATED: CPT

## 2025-06-08 PROCEDURE — 6360000002 HC RX W HCPCS: Performed by: INTERNAL MEDICINE

## 2025-06-08 PROCEDURE — 94640 AIRWAY INHALATION TREATMENT: CPT

## 2025-06-08 PROCEDURE — 82248 BILIRUBIN DIRECT: CPT

## 2025-06-08 PROCEDURE — 80053 COMPREHEN METABOLIC PANEL: CPT

## 2025-06-08 PROCEDURE — 2580000003 HC RX 258: Performed by: INTERNAL MEDICINE

## 2025-06-08 PROCEDURE — 99232 SBSQ HOSP IP/OBS MODERATE 35: CPT | Performed by: INTERNAL MEDICINE

## 2025-06-08 PROCEDURE — 99233 SBSQ HOSP IP/OBS HIGH 50: CPT | Performed by: INTERNAL MEDICINE

## 2025-06-08 PROCEDURE — 82948 REAGENT STRIP/BLOOD GLUCOSE: CPT

## 2025-06-08 PROCEDURE — 2060000000 HC ICU INTERMEDIATE R&B

## 2025-06-08 PROCEDURE — 84145 PROCALCITONIN (PCT): CPT

## 2025-06-08 PROCEDURE — 36415 COLL VENOUS BLD VENIPUNCTURE: CPT

## 2025-06-08 PROCEDURE — 6370000000 HC RX 637 (ALT 250 FOR IP)

## 2025-06-08 PROCEDURE — 71045 X-RAY EXAM CHEST 1 VIEW: CPT

## 2025-06-08 PROCEDURE — 94761 N-INVAS EAR/PLS OXIMETRY MLT: CPT

## 2025-06-08 PROCEDURE — 83880 ASSAY OF NATRIURETIC PEPTIDE: CPT

## 2025-06-08 PROCEDURE — 6370000000 HC RX 637 (ALT 250 FOR IP): Performed by: INTERNAL MEDICINE

## 2025-06-08 PROCEDURE — 2700000000 HC OXYGEN THERAPY PER DAY

## 2025-06-08 RX ORDER — LACTOBACILLUS RHAMNOSUS GG 10B CELL
1 CAPSULE ORAL 2 TIMES DAILY WITH MEALS
Status: DISCONTINUED | OUTPATIENT
Start: 2025-06-08 | End: 2025-06-16 | Stop reason: HOSPADM

## 2025-06-08 RX ORDER — ALBUTEROL SULFATE 90 UG/1
2 INHALANT RESPIRATORY (INHALATION) EVERY 4 HOURS PRN
Status: DISCONTINUED | OUTPATIENT
Start: 2025-06-08 | End: 2025-06-09

## 2025-06-08 RX ORDER — IPRATROPIUM BROMIDE AND ALBUTEROL SULFATE 2.5; .5 MG/3ML; MG/3ML
1 SOLUTION RESPIRATORY (INHALATION)
Status: DISCONTINUED | OUTPATIENT
Start: 2025-06-08 | End: 2025-06-09

## 2025-06-08 RX ADMIN — HYDROMORPHONE HYDROCHLORIDE 0.5 MG: 1 INJECTION, SOLUTION INTRAMUSCULAR; INTRAVENOUS; SUBCUTANEOUS at 18:35

## 2025-06-08 RX ADMIN — PANTOPRAZOLE SODIUM 40 MG: 40 TABLET, DELAYED RELEASE ORAL at 05:34

## 2025-06-08 RX ADMIN — AMOXICILLIN AND CLAVULANATE POTASSIUM 1 TABLET: 500; 125 TABLET, FILM COATED ORAL at 05:34

## 2025-06-08 RX ADMIN — POLYETHYLENE GLYCOL 3350 17 G: 17 POWDER, FOR SOLUTION ORAL at 08:16

## 2025-06-08 RX ADMIN — METOPROLOL TARTRATE 12.5 MG: 25 TABLET, FILM COATED ORAL at 08:17

## 2025-06-08 RX ADMIN — BUPROPION HYDROCHLORIDE 150 MG: 150 TABLET, EXTENDED RELEASE ORAL at 08:17

## 2025-06-08 RX ADMIN — Medication 1 CAPSULE: at 23:04

## 2025-06-08 RX ADMIN — GABAPENTIN 300 MG: 300 CAPSULE ORAL at 23:04

## 2025-06-08 RX ADMIN — AMOXICILLIN AND CLAVULANATE POTASSIUM 1 TABLET: 500; 125 TABLET, FILM COATED ORAL at 13:20

## 2025-06-08 RX ADMIN — HYDROMORPHONE HYDROCHLORIDE 0.5 MG: 1 INJECTION, SOLUTION INTRAMUSCULAR; INTRAVENOUS; SUBCUTANEOUS at 08:58

## 2025-06-08 RX ADMIN — AMITRIPTYLINE HYDROCHLORIDE 50 MG: 50 TABLET ORAL at 23:04

## 2025-06-08 RX ADMIN — HYDROMORPHONE HYDROCHLORIDE 0.5 MG: 1 INJECTION, SOLUTION INTRAMUSCULAR; INTRAVENOUS; SUBCUTANEOUS at 05:50

## 2025-06-08 RX ADMIN — Medication 2 PUFF: at 13:22

## 2025-06-08 RX ADMIN — AMPICILLIN SODIUM AND SULBACTAM SODIUM 3000 MG: 2; 1 INJECTION, POWDER, FOR SOLUTION INTRAMUSCULAR; INTRAVENOUS at 23:15

## 2025-06-08 RX ADMIN — INSULIN LISPRO 1 UNITS: 100 INJECTION, SOLUTION INTRAVENOUS; SUBCUTANEOUS at 23:04

## 2025-06-08 RX ADMIN — IPRATROPIUM BROMIDE AND ALBUTEROL SULFATE 1 DOSE: .5; 3 SOLUTION RESPIRATORY (INHALATION) at 21:45

## 2025-06-08 RX ADMIN — METOPROLOL TARTRATE 12.5 MG: 25 TABLET, FILM COATED ORAL at 23:04

## 2025-06-08 RX ADMIN — GABAPENTIN 300 MG: 300 CAPSULE ORAL at 08:18

## 2025-06-08 RX ADMIN — Medication 2 PUFF: at 08:57

## 2025-06-08 RX ADMIN — HYDROMORPHONE HYDROCHLORIDE 0.5 MG: 1 INJECTION, SOLUTION INTRAMUSCULAR; INTRAVENOUS; SUBCUTANEOUS at 13:17

## 2025-06-08 RX ADMIN — QUETIAPINE FUMARATE 200 MG: 100 TABLET ORAL at 23:04

## 2025-06-08 RX ADMIN — HYDROMORPHONE HYDROCHLORIDE 0.5 MG: 1 INJECTION, SOLUTION INTRAMUSCULAR; INTRAVENOUS; SUBCUTANEOUS at 21:09

## 2025-06-08 RX ADMIN — AMPICILLIN SODIUM AND SULBACTAM SODIUM 3000 MG: 2; 1 INJECTION, POWDER, FOR SOLUTION INTRAMUSCULAR; INTRAVENOUS at 18:26

## 2025-06-08 RX ADMIN — EMPAGLIFLOZIN 25 MG: 10 TABLET, FILM COATED ORAL at 08:16

## 2025-06-08 RX ADMIN — ATORVASTATIN CALCIUM 40 MG: 40 TABLET, FILM COATED ORAL at 08:17

## 2025-06-08 ASSESSMENT — PAIN DESCRIPTION - FREQUENCY
FREQUENCY: CONTINUOUS
FREQUENCY: CONTINUOUS

## 2025-06-08 ASSESSMENT — PAIN DESCRIPTION - DESCRIPTORS
DESCRIPTORS: ACHING;SHARP
DESCRIPTORS: SORE;TENDER
DESCRIPTORS: SORE;TENDER

## 2025-06-08 ASSESSMENT — PAIN DESCRIPTION - LOCATION
LOCATION: THROAT

## 2025-06-08 ASSESSMENT — PAIN SCALES - GENERAL
PAINLEVEL_OUTOF10: 8
PAINLEVEL_OUTOF10: 6
PAINLEVEL_OUTOF10: 3
PAINLEVEL_OUTOF10: 8
PAINLEVEL_OUTOF10: 3
PAINLEVEL_OUTOF10: 3
PAINLEVEL_OUTOF10: 0
PAINLEVEL_OUTOF10: 7
PAINLEVEL_OUTOF10: 8
PAINLEVEL_OUTOF10: 7

## 2025-06-08 ASSESSMENT — PAIN DESCRIPTION - ORIENTATION
ORIENTATION: INNER
ORIENTATION: INNER

## 2025-06-08 ASSESSMENT — PAIN DESCRIPTION - ONSET
ONSET: ON-GOING
ONSET: ON-GOING

## 2025-06-08 ASSESSMENT — PAIN DESCRIPTION - PAIN TYPE
TYPE: SURGICAL PAIN
TYPE: SURGICAL PAIN

## 2025-06-08 ASSESSMENT — PAIN SCALES - WONG BAKER
WONGBAKER_NUMERICALRESPONSE: NO HURT
WONGBAKER_NUMERICALRESPONSE: NO HURT

## 2025-06-08 ASSESSMENT — PAIN - FUNCTIONAL ASSESSMENT
PAIN_FUNCTIONAL_ASSESSMENT: PREVENTS OR INTERFERES SOME ACTIVE ACTIVITIES AND ADLS
PAIN_FUNCTIONAL_ASSESSMENT: PREVENTS OR INTERFERES SOME ACTIVE ACTIVITIES AND ADLS

## 2025-06-08 NOTE — PLAN OF CARE
Problem: Chronic Conditions and Co-morbidities  Goal: Patient's chronic conditions and co-morbidity symptoms are monitored and maintained or improved  Outcome: Progressing  Flowsheets (Taken 6/7/2025 1935)  Care Plan - Patient's Chronic Conditions and Co-Morbidity Symptoms are Monitored and Maintained or Improved: Monitor and assess patient's chronic conditions and comorbid symptoms for stability, deterioration, or improvement     Problem: Discharge Planning  Goal: Discharge to home or other facility with appropriate resources  Outcome: Progressing  Flowsheets (Taken 6/7/2025 1935)  Discharge to home or other facility with appropriate resources: Identify barriers to discharge with patient and caregiver     Problem: Safety - Adult  Goal: Free from fall injury  Outcome: Progressing  Flowsheets (Taken 6/8/2025 0047)  Free From Fall Injury: Instruct family/caregiver on patient safety     Problem: Pain  Goal: Verbalizes/displays adequate comfort level or baseline comfort level  Outcome: Progressing  Flowsheets (Taken 6/7/2025 2000)  Verbalizes/displays adequate comfort level or baseline comfort level: Encourage patient to monitor pain and request assistance     Problem: Nutrition Deficit:  Goal: Optimize nutritional status  Outcome: Progressing     Problem: Respiratory - Adult  Goal: Clear lung sounds  6/7/2025 2021 by Hayley Gutierrez RCP  Outcome: Progressing     Problem: Respiratory - Adult  Goal: Achieves optimal ventilation and oxygenation  6/7/2025 2021 by Hayley Gutierrez RCP  Outcome: Progressing  Flowsheets (Taken 6/7/2025 1935 by Anabela Parkinson RN)  Achieves optimal ventilation and oxygenation: Assess for changes in respiratory status

## 2025-06-08 NOTE — PLAN OF CARE
Hospitalist ICU Transfer Note      Patient: Dilan Pappas 70 y.o. male       Unit/Bed: 4D-07/007-A  Admission date: 6/6/2025   Date of service: 06/08/25       Hospital and ICU Course:   Dilan Pappas is a 70 y.o. male with PMHx of laryngeal cancer, squamous cell lung carcinoma, COPD on oxygen at baseline, HFpEF, nonobstructive CAD, hypertension, hyperlipidemia, DM type II, GERD, chronic back pain  who was admitted to the ICU on 6/6/2025 status post partial lateral vertical laryngectomy in setting of airway obstruction and severe dysphagia.  He recently has had a PEG tube placed on 6/5/2025.  He was managed with heated high flow nasal cannula while in the ICU.  He was doing well postoperatively so felt to be stable for transfer out of ICU to stepdown on 6/8.    Assessment/Problems:   Laryngeal cancer status post partial lateral vertical laryngectomy on 6/6  Pneumonia due to aspiration? Vs Pulm Vascular Congestion  Acute on Chronic Hypoxic Respiratory Failure, desaturating on his usual baseline when weaned.   COPD on 3 L chronically  Left squamous cell lung carcinoma recently diagnosed 5/12/2025  Dysphagia status post PEG tube placement 6/5/2025, completed 2 doses of Augmentin postoperatively  Leukocytosis improving postoperatively  NIDDM 2  Thyroid nodules, benign  Hypertension with relative hypotension  HFpEF, compensated  Nonobstructive coronary disease  Diabetic neuropathy  History of prior tobacco abuse    Changes to plan:  HHFNC at discretion of ENT  Discussed with ENT, will continue with Unasyn for post surgical prophylaxis due to exposure to tissue (Will stop Augmentin as this was only for 2 doses postoperative for his PEG tube)  Will add on lactobacillus for gut microbiota  We will start on DuoNeb and stop scheduled albuterol.  His home medication includes Trelegy.  Consider ICS nebs  CXR since there are bibasilar crackles.  No chest imaging since 6/6/25.  Check BNP and LFTs  Check Procal.  Follow up on

## 2025-06-08 NOTE — PLAN OF CARE
Problem: Respiratory - Adult  Goal: Clear lung sounds  6/8/2025 1451 by Jodie العراقي, RCP  Outcome: Progressing   Continue inhalers to improve breath sounds.  Pt agrees with plan of care

## 2025-06-08 NOTE — PLAN OF CARE
Problem: Respiratory - Adult  Goal: Achieves optimal ventilation and oxygenation  6/7/2025 2021 by Hayley Gutierrez RCP  Outcome: Progressing     Problem: Respiratory - Adult  Goal: Clear lung sounds  6/7/2025 2021 by Hayley Gutierrez RCP  Outcome: Progressing     Continue HFNC for airway humidification. Patient mutually agreed on goals.

## 2025-06-08 NOTE — ANESTHESIA POSTPROCEDURE EVALUATION
Department of Anesthesiology  Postprocedure Note    Patient: Dilan Pappas  MRN: [id number]  YOB: 1955  Date of evaluation: 6/8/2025    Procedure Summary       Date: 06/06/25 [facility] / 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    Bhaskar Phase I: Bhaskar Score: 9    Bhaskar Phase II:      Anesthesia Post Evaluation    Patient location during evaluation: ICU  Patient participation: complete - patient participated  Level of consciousness: awake and alert  Pain score: 7  Airway patency: patent  Nausea & Vomiting: no vomiting and no nausea  Cardiovascular status: hemodynamically stable  Respiratory status: high flow nasal cannula.  Hydration status: stable  Comments: Patient states that his pain control is not improving. Day shift nurse assessing pain and giving medication. Possible transfer to step down. Patient has capped IV fluids. High flow nasal cannula at 50 L/min and 41% o2 sating % and increased with deep breathing and coughing.   Pain management: inadequate        No notable events documented.

## 2025-06-08 NOTE — PLAN OF CARE
Problem: Respiratory - Adult  Goal: Achieves optimal ventilation and oxygenation  6/8/2025 0554 by Hayley Gutierrez RCP  Outcome: Progressing     Problem: Respiratory - Adult  Goal: Clear lung sounds  6/8/2025 0554 by Hayley Gutierrez RCP  Outcome: Progressing     Continue HFNC to provide airway humidification.  Patient mutually agreed on goals.

## 2025-06-09 LAB
ANION GAP SERPL CALC-SCNC: 16 MEQ/L (ref 8–16)
BACTERIA SPEC AEROBE CULT: ABNORMAL
BACTERIA SPEC AEROBE CULT: ABNORMAL
BUN SERPL-MCNC: 12 MG/DL (ref 8–23)
CALCIUM SERPL-MCNC: 10 MG/DL (ref 8.8–10.2)
CHLORIDE SERPL-SCNC: 98 MEQ/L (ref 98–111)
CO2 SERPL-SCNC: 24 MEQ/L (ref 22–29)
CREAT SERPL-MCNC: 0.6 MG/DL (ref 0.7–1.2)
DEPRECATED RDW RBC AUTO: 44.9 FL (ref 35–45)
ERYTHROCYTE [DISTWIDTH] IN BLOOD BY AUTOMATED COUNT: 13.8 % (ref 11.5–14.5)
GFR SERPL CREATININE-BSD FRML MDRD: > 90 ML/MIN/1.73M2
GLUCOSE BLD STRIP.AUTO-MCNC: 131 MG/DL (ref 70–108)
GLUCOSE BLD STRIP.AUTO-MCNC: 144 MG/DL (ref 70–108)
GLUCOSE BLD STRIP.AUTO-MCNC: 157 MG/DL (ref 70–108)
GLUCOSE SERPL-MCNC: 128 MG/DL (ref 74–109)
GRAM STN SPEC: ABNORMAL
HCT VFR BLD AUTO: 39.2 % (ref 42–52)
HGB BLD-MCNC: 12.9 GM/DL (ref 14–18)
MCH RBC QN AUTO: 29 PG (ref 26–33)
MCHC RBC AUTO-ENTMCNC: 32.9 GM/DL (ref 32.2–35.5)
MCV RBC AUTO: 88.1 FL (ref 80–94)
ORGANISM: ABNORMAL
PLATELET # BLD AUTO: 328 THOU/MM3 (ref 130–400)
PMV BLD AUTO: 9 FL (ref 9.4–12.4)
POTASSIUM SERPL-SCNC: 3.8 MEQ/L (ref 3.5–5.2)
RBC # BLD AUTO: 4.45 MILL/MM3 (ref 4.7–6.1)
SODIUM SERPL-SCNC: 138 MEQ/L (ref 135–145)
WBC # BLD AUTO: 13.3 THOU/MM3 (ref 4.8–10.8)

## 2025-06-09 PROCEDURE — 6360000002 HC RX W HCPCS: Performed by: OTOLARYNGOLOGY

## 2025-06-09 PROCEDURE — 97167 OT EVAL HIGH COMPLEX 60 MIN: CPT

## 2025-06-09 PROCEDURE — 94640 AIRWAY INHALATION TREATMENT: CPT

## 2025-06-09 PROCEDURE — 99232 SBSQ HOSP IP/OBS MODERATE 35: CPT | Performed by: STUDENT IN AN ORGANIZED HEALTH CARE EDUCATION/TRAINING PROGRAM

## 2025-06-09 PROCEDURE — 97162 PT EVAL MOD COMPLEX 30 MIN: CPT

## 2025-06-09 PROCEDURE — 6370000000 HC RX 637 (ALT 250 FOR IP): Performed by: STUDENT IN AN ORGANIZED HEALTH CARE EDUCATION/TRAINING PROGRAM

## 2025-06-09 PROCEDURE — 82948 REAGENT STRIP/BLOOD GLUCOSE: CPT

## 2025-06-09 PROCEDURE — 80048 BASIC METABOLIC PNL TOTAL CA: CPT

## 2025-06-09 PROCEDURE — 6360000002 HC RX W HCPCS: Performed by: INTERNAL MEDICINE

## 2025-06-09 PROCEDURE — 2060000000 HC ICU INTERMEDIATE R&B

## 2025-06-09 PROCEDURE — 6370000000 HC RX 637 (ALT 250 FOR IP): Performed by: INTERNAL MEDICINE

## 2025-06-09 PROCEDURE — 85027 COMPLETE CBC AUTOMATED: CPT

## 2025-06-09 PROCEDURE — 36415 COLL VENOUS BLD VENIPUNCTURE: CPT

## 2025-06-09 PROCEDURE — 99024 POSTOP FOLLOW-UP VISIT: CPT

## 2025-06-09 PROCEDURE — 97116 GAIT TRAINING THERAPY: CPT

## 2025-06-09 PROCEDURE — 2580000003 HC RX 258: Performed by: INTERNAL MEDICINE

## 2025-06-09 PROCEDURE — 2700000000 HC OXYGEN THERAPY PER DAY

## 2025-06-09 PROCEDURE — 2500000003 HC RX 250 WO HCPCS: Performed by: OTOLARYNGOLOGY

## 2025-06-09 PROCEDURE — 51798 US URINE CAPACITY MEASURE: CPT

## 2025-06-09 PROCEDURE — 97110 THERAPEUTIC EXERCISES: CPT

## 2025-06-09 PROCEDURE — 94761 N-INVAS EAR/PLS OXIMETRY MLT: CPT

## 2025-06-09 PROCEDURE — 6360000002 HC RX W HCPCS

## 2025-06-09 PROCEDURE — 6370000000 HC RX 637 (ALT 250 FOR IP): Performed by: OTOLARYNGOLOGY

## 2025-06-09 PROCEDURE — 97535 SELF CARE MNGMENT TRAINING: CPT

## 2025-06-09 PROCEDURE — 6370000000 HC RX 637 (ALT 250 FOR IP)

## 2025-06-09 RX ORDER — IPRATROPIUM BROMIDE AND ALBUTEROL SULFATE 2.5; .5 MG/3ML; MG/3ML
1 SOLUTION RESPIRATORY (INHALATION)
Status: DISCONTINUED | OUTPATIENT
Start: 2025-06-09 | End: 2025-06-09

## 2025-06-09 RX ORDER — CIPROFLOXACIN 2 MG/ML
400 INJECTION, SOLUTION INTRAVENOUS EVERY 12 HOURS
Status: DISCONTINUED | OUTPATIENT
Start: 2025-06-09 | End: 2025-06-14

## 2025-06-09 RX ORDER — ALBUTEROL SULFATE 90 UG/1
2 INHALANT RESPIRATORY (INHALATION) 2 TIMES DAILY
Status: DISCONTINUED | OUTPATIENT
Start: 2025-06-09 | End: 2025-06-15

## 2025-06-09 RX ORDER — IPRATROPIUM BROMIDE AND ALBUTEROL SULFATE 2.5; .5 MG/3ML; MG/3ML
1 SOLUTION RESPIRATORY (INHALATION) EVERY 4 HOURS PRN
Status: DISCONTINUED | OUTPATIENT
Start: 2025-06-09 | End: 2025-06-16 | Stop reason: HOSPADM

## 2025-06-09 RX ADMIN — HYDROMORPHONE HYDROCHLORIDE 0.5 MG: 1 INJECTION, SOLUTION INTRAMUSCULAR; INTRAVENOUS; SUBCUTANEOUS at 13:09

## 2025-06-09 RX ADMIN — Medication 1 CAPSULE: at 16:24

## 2025-06-09 RX ADMIN — ATORVASTATIN CALCIUM 40 MG: 40 TABLET, FILM COATED ORAL at 08:27

## 2025-06-09 RX ADMIN — HYDROMORPHONE HYDROCHLORIDE 0.5 MG: 1 INJECTION, SOLUTION INTRAMUSCULAR; INTRAVENOUS; SUBCUTANEOUS at 20:46

## 2025-06-09 RX ADMIN — HYDROMORPHONE HYDROCHLORIDE 0.5 MG: 1 INJECTION, SOLUTION INTRAMUSCULAR; INTRAVENOUS; SUBCUTANEOUS at 02:06

## 2025-06-09 RX ADMIN — SODIUM CHLORIDE, PRESERVATIVE FREE 10 ML: 5 INJECTION INTRAVENOUS at 08:25

## 2025-06-09 RX ADMIN — CIPROFLOXACIN 400 MG: 400 INJECTION, SOLUTION INTRAVENOUS at 17:55

## 2025-06-09 RX ADMIN — OXYCODONE 5 MG: 5 TABLET ORAL at 19:00

## 2025-06-09 RX ADMIN — AMPICILLIN SODIUM AND SULBACTAM SODIUM 3000 MG: 2; 1 INJECTION, POWDER, FOR SOLUTION INTRAMUSCULAR; INTRAVENOUS at 16:22

## 2025-06-09 RX ADMIN — BUPROPION HYDROCHLORIDE 150 MG: 150 TABLET, EXTENDED RELEASE ORAL at 08:27

## 2025-06-09 RX ADMIN — QUETIAPINE FUMARATE 200 MG: 100 TABLET ORAL at 20:41

## 2025-06-09 RX ADMIN — GABAPENTIN 300 MG: 300 CAPSULE ORAL at 08:27

## 2025-06-09 RX ADMIN — GABAPENTIN 300 MG: 300 CAPSULE ORAL at 20:41

## 2025-06-09 RX ADMIN — SODIUM CHLORIDE, PRESERVATIVE FREE 10 ML: 5 INJECTION INTRAVENOUS at 20:41

## 2025-06-09 RX ADMIN — AMITRIPTYLINE HYDROCHLORIDE 50 MG: 50 TABLET ORAL at 20:41

## 2025-06-09 RX ADMIN — ALBUTEROL SULFATE 2 PUFF: 90 AEROSOL, METERED RESPIRATORY (INHALATION) at 08:12

## 2025-06-09 RX ADMIN — HYDROMORPHONE HYDROCHLORIDE 0.5 MG: 1 INJECTION, SOLUTION INTRAMUSCULAR; INTRAVENOUS; SUBCUTANEOUS at 06:44

## 2025-06-09 RX ADMIN — AMPICILLIN SODIUM AND SULBACTAM SODIUM 3000 MG: 2; 1 INJECTION, POWDER, FOR SOLUTION INTRAMUSCULAR; INTRAVENOUS at 12:23

## 2025-06-09 RX ADMIN — AMPICILLIN SODIUM AND SULBACTAM SODIUM 3000 MG: 2; 1 INJECTION, POWDER, FOR SOLUTION INTRAMUSCULAR; INTRAVENOUS at 05:05

## 2025-06-09 RX ADMIN — Medication 1 CAPSULE: at 08:25

## 2025-06-09 RX ADMIN — ALBUTEROL SULFATE 2 PUFF: 90 AEROSOL, METERED RESPIRATORY (INHALATION) at 21:03

## 2025-06-09 ASSESSMENT — PAIN SCALES - GENERAL
PAINLEVEL_OUTOF10: 5
PAINLEVEL_OUTOF10: 7
PAINLEVEL_OUTOF10: 0
PAINLEVEL_OUTOF10: 10
PAINLEVEL_OUTOF10: 6
PAINLEVEL_OUTOF10: 8
PAINLEVEL_OUTOF10: 6

## 2025-06-09 ASSESSMENT — PAIN DESCRIPTION - LOCATION
LOCATION: THROAT

## 2025-06-09 ASSESSMENT — PAIN DESCRIPTION - ONSET
ONSET: ON-GOING
ONSET: ON-GOING

## 2025-06-09 ASSESSMENT — PAIN DESCRIPTION - DESCRIPTORS
DESCRIPTORS: SHARP
DESCRIPTORS: ACHING;DULL
DESCRIPTORS: ACHING;DULL
DESCRIPTORS: SHARP;ACHING
DESCRIPTORS: ACHING;SHARP

## 2025-06-09 ASSESSMENT — PAIN DESCRIPTION - ORIENTATION
ORIENTATION: INNER
ORIENTATION: INNER

## 2025-06-09 ASSESSMENT — PAIN - FUNCTIONAL ASSESSMENT
PAIN_FUNCTIONAL_ASSESSMENT: ACTIVITIES ARE NOT PREVENTED
PAIN_FUNCTIONAL_ASSESSMENT: ACTIVITIES ARE NOT PREVENTED

## 2025-06-09 ASSESSMENT — PAIN DESCRIPTION - FREQUENCY
FREQUENCY: CONTINUOUS
FREQUENCY: CONTINUOUS

## 2025-06-09 ASSESSMENT — PAIN DESCRIPTION - PAIN TYPE
TYPE: SURGICAL PAIN
TYPE: SURGICAL PAIN

## 2025-06-09 NOTE — PLAN OF CARE
Problem: Chronic Conditions and Co-morbidities  Goal: Patient's chronic conditions and co-morbidity symptoms are monitored and maintained or improved  Outcome: Progressing     Problem: Discharge Planning  Goal: Discharge to home or other facility with appropriate resources  Outcome: Progressing     Problem: Safety - Adult  Goal: Free from fall injury  Outcome: Progressing     Problem: Pain  Goal: Verbalizes/displays adequate comfort level or baseline comfort level  Outcome: Progressing     Problem: Respiratory - Adult  Goal: Achieves optimal ventilation and oxygenation  Outcome: Progressing  Goal: Clear lung sounds  6/8/2025 2149 by Bismark Naylor RCP  Outcome: Progressing     Problem: Nutrition Deficit:  Goal: Optimize nutritional status  Outcome: Progressing

## 2025-06-09 NOTE — PLAN OF CARE
Problem: Respiratory - Adult  Goal: Clear lung sounds  6/8/2025 2149 by Bismark Naylor RCP  Outcome: Progressing

## 2025-06-09 NOTE — CARE COORDINATION
Case Management Assessment Initial Evaluation    Date/Time of Evaluation: 6/9/2025 11:55 AM  Assessment Completed by: Agusto Lay RN    If patient is discharged prior to next notation, then this note serves as note for discharge by case management.    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) [C32.0]  Airway compromise [J98.8]                   Date / Time: 6/6/2025  9:09 AM  Location: [address] [place] [address]     Patient Admission Status: Inpatient   Readmission Risk Low 0-14, Mod 15-19), High > 20: Readmission Risk Score: 13.5    Current PCP: Eric Orozco APRN - CNP  Health Care Decision Makers:   Primary Decision Maker: Suhas Pappas - Child - [phone]    Secondary Decision Maker: MianJolynn - Child - [phone]    Additional Case Management Notes:   From ICU  Left Vocal Cord Carcinoma/Enterobacter Cloacae Neck Culture  PMH: Alcohol Abuse, COPD, Vocal Cord/Laryngeal/CHEPE-Lung Cancer, DM, Marijuana Use  IV Cipro  Lactobacillus  Oxygen 4L-8L new oxygen testing  Await Oxygen Weaning  Procedures:   6/5 EGD/PEG Placement-accidental removal 6/8 6/6 TORS Vertical Lateral Partial Laryngectomy, Bronchoscopy with Lavage  6/11 Suspension Laryngoscopy/Mapping Biopsies w Jet Ventilation  6/13 CXR/Right Lung Worsened  Patient Goals/Plan/Treatment Preferences[facility], has cane, still drives, son/STEVEN Dai, [facility] oxygen 1-3L ATC, cane, nebulizer, OP ST; therapy following; [facility] in past, current  [facility]; new [facility] (nsg, PT/OT/ST, aide); ambulates 5 feet w therapy; monitor oxygen needs           06/09/25 1150   Service Assessment   Patient Orientation Alert and Oriented   Cognition Alert   History Provided By Patient;Medical Record   Primary Caregiver Self   Accompanied By/Relationship none   Support Systems Family Members   Patient's Healthcare Decision Maker is: Named

## 2025-06-10 ENCOUNTER — APPOINTMENT (OUTPATIENT)
Dept: GENERAL RADIOLOGY | Age: 70
DRG: 143 | End: 2025-06-10
Attending: OTOLARYNGOLOGY
Payer: MEDICARE

## 2025-06-10 ENCOUNTER — ANESTHESIA EVENT (OUTPATIENT)
Dept: OPERATING ROOM | Age: 70
End: 2025-06-10
Payer: MEDICARE

## 2025-06-10 LAB
ANION GAP SERPL CALC-SCNC: 17 MEQ/L (ref 8–16)
BUN SERPL-MCNC: 9 MG/DL (ref 8–23)
CALCIUM SERPL-MCNC: 9.6 MG/DL (ref 8.8–10.2)
CHLORIDE SERPL-SCNC: 97 MEQ/L (ref 98–111)
CO2 SERPL-SCNC: 24 MEQ/L (ref 22–29)
CREAT SERPL-MCNC: 0.6 MG/DL (ref 0.7–1.2)
DEPRECATED RDW RBC AUTO: 45.6 FL (ref 35–45)
ERYTHROCYTE [DISTWIDTH] IN BLOOD BY AUTOMATED COUNT: 13.6 % (ref 11.5–14.5)
GFR SERPL CREATININE-BSD FRML MDRD: > 90 ML/MIN/1.73M2
GLUCOSE BLD STRIP.AUTO-MCNC: 135 MG/DL (ref 70–108)
GLUCOSE BLD STRIP.AUTO-MCNC: 186 MG/DL (ref 70–108)
GLUCOSE BLD STRIP.AUTO-MCNC: 286 MG/DL (ref 70–108)
GLUCOSE BLD STRIP.AUTO-MCNC: 295 MG/DL (ref 70–108)
GLUCOSE SERPL-MCNC: 122 MG/DL (ref 74–109)
HCT VFR BLD AUTO: 37.9 % (ref 42–52)
HGB BLD-MCNC: 11.9 GM/DL (ref 14–18)
MCH RBC QN AUTO: 28.6 PG (ref 26–33)
MCHC RBC AUTO-ENTMCNC: 31.4 GM/DL (ref 32.2–35.5)
MCV RBC AUTO: 91.1 FL (ref 80–94)
PLATELET # BLD AUTO: 291 THOU/MM3 (ref 130–400)
PMV BLD AUTO: 9.8 FL (ref 9.4–12.4)
POTASSIUM SERPL-SCNC: 3.8 MEQ/L (ref 3.5–5.2)
RBC # BLD AUTO: 4.16 MILL/MM3 (ref 4.7–6.1)
SODIUM SERPL-SCNC: 138 MEQ/L (ref 135–145)
WBC # BLD AUTO: 11.8 THOU/MM3 (ref 4.8–10.8)

## 2025-06-10 PROCEDURE — 6370000000 HC RX 637 (ALT 250 FOR IP): Performed by: OTOLARYNGOLOGY

## 2025-06-10 PROCEDURE — 6370000000 HC RX 637 (ALT 250 FOR IP): Performed by: STUDENT IN AN ORGANIZED HEALTH CARE EDUCATION/TRAINING PROGRAM

## 2025-06-10 PROCEDURE — 6370000000 HC RX 637 (ALT 250 FOR IP): Performed by: INTERNAL MEDICINE

## 2025-06-10 PROCEDURE — 94761 N-INVAS EAR/PLS OXIMETRY MLT: CPT

## 2025-06-10 PROCEDURE — 36415 COLL VENOUS BLD VENIPUNCTURE: CPT

## 2025-06-10 PROCEDURE — 2500000003 HC RX 250 WO HCPCS: Performed by: OTOLARYNGOLOGY

## 2025-06-10 PROCEDURE — 85027 COMPLETE CBC AUTOMATED: CPT

## 2025-06-10 PROCEDURE — 2060000000 HC ICU INTERMEDIATE R&B

## 2025-06-10 PROCEDURE — 2500000003 HC RX 250 WO HCPCS

## 2025-06-10 PROCEDURE — 6370000000 HC RX 637 (ALT 250 FOR IP)

## 2025-06-10 PROCEDURE — 97535 SELF CARE MNGMENT TRAINING: CPT

## 2025-06-10 PROCEDURE — 2700000000 HC OXYGEN THERAPY PER DAY

## 2025-06-10 PROCEDURE — 94640 AIRWAY INHALATION TREATMENT: CPT

## 2025-06-10 PROCEDURE — 82948 REAGENT STRIP/BLOOD GLUCOSE: CPT

## 2025-06-10 PROCEDURE — 92526 ORAL FUNCTION THERAPY: CPT

## 2025-06-10 PROCEDURE — 6360000002 HC RX W HCPCS

## 2025-06-10 PROCEDURE — 97530 THERAPEUTIC ACTIVITIES: CPT

## 2025-06-10 PROCEDURE — 99232 SBSQ HOSP IP/OBS MODERATE 35: CPT | Performed by: STUDENT IN AN ORGANIZED HEALTH CARE EDUCATION/TRAINING PROGRAM

## 2025-06-10 PROCEDURE — 99024 POSTOP FOLLOW-UP VISIT: CPT

## 2025-06-10 PROCEDURE — 92611 MOTION FLUOROSCOPY/SWALLOW: CPT

## 2025-06-10 PROCEDURE — 74230 X-RAY XM SWLNG FUNCJ C+: CPT

## 2025-06-10 PROCEDURE — 80048 BASIC METABOLIC PNL TOTAL CA: CPT

## 2025-06-10 RX ORDER — ACETAMINOPHEN 325 MG/1
650 TABLET ORAL EVERY 6 HOURS PRN
Status: DISCONTINUED | OUTPATIENT
Start: 2025-06-10 | End: 2025-06-16 | Stop reason: HOSPADM

## 2025-06-10 RX ADMIN — ALBUTEROL SULFATE 2 PUFF: 90 AEROSOL, METERED RESPIRATORY (INHALATION) at 17:02

## 2025-06-10 RX ADMIN — OXYCODONE 10 MG: 5 TABLET ORAL at 09:53

## 2025-06-10 RX ADMIN — GABAPENTIN 300 MG: 300 CAPSULE ORAL at 20:04

## 2025-06-10 RX ADMIN — CIPROFLOXACIN 400 MG: 400 INJECTION, SOLUTION INTRAVENOUS at 18:28

## 2025-06-10 RX ADMIN — SODIUM CHLORIDE, PRESERVATIVE FREE 10 ML: 5 INJECTION INTRAVENOUS at 09:57

## 2025-06-10 RX ADMIN — PANTOPRAZOLE SODIUM 40 MG: 40 TABLET, DELAYED RELEASE ORAL at 05:39

## 2025-06-10 RX ADMIN — QUETIAPINE FUMARATE 200 MG: 100 TABLET ORAL at 20:04

## 2025-06-10 RX ADMIN — ATORVASTATIN CALCIUM 40 MG: 40 TABLET, FILM COATED ORAL at 09:52

## 2025-06-10 RX ADMIN — GABAPENTIN 300 MG: 300 CAPSULE ORAL at 09:58

## 2025-06-10 RX ADMIN — INSULIN LISPRO 1 UNITS: 100 INJECTION, SOLUTION INTRAVENOUS; SUBCUTANEOUS at 13:15

## 2025-06-10 RX ADMIN — BUPROPION HYDROCHLORIDE 150 MG: 150 TABLET, EXTENDED RELEASE ORAL at 09:52

## 2025-06-10 RX ADMIN — BARIUM SULFATE 20 ML: 0.81 POWDER, FOR SUSPENSION ORAL at 12:04

## 2025-06-10 RX ADMIN — POLYETHYLENE GLYCOL 3350 17 G: 17 POWDER, FOR SOLUTION ORAL at 09:52

## 2025-06-10 RX ADMIN — CIPROFLOXACIN 400 MG: 400 INJECTION, SOLUTION INTRAVENOUS at 05:42

## 2025-06-10 RX ADMIN — Medication 1 CAPSULE: at 16:47

## 2025-06-10 RX ADMIN — INSULIN LISPRO 2 UNITS: 100 INJECTION, SOLUTION INTRAVENOUS; SUBCUTANEOUS at 16:46

## 2025-06-10 RX ADMIN — ALBUTEROL SULFATE 2 PUFF: 90 AEROSOL, METERED RESPIRATORY (INHALATION) at 09:46

## 2025-06-10 RX ADMIN — SODIUM CHLORIDE, PRESERVATIVE FREE 10 ML: 5 INJECTION INTRAVENOUS at 20:05

## 2025-06-10 RX ADMIN — Medication 1 CAPSULE: at 09:52

## 2025-06-10 RX ADMIN — INSULIN LISPRO 2 UNITS: 100 INJECTION, SOLUTION INTRAVENOUS; SUBCUTANEOUS at 20:05

## 2025-06-10 RX ADMIN — OXYCODONE 5 MG: 5 TABLET ORAL at 03:34

## 2025-06-10 RX ADMIN — AMITRIPTYLINE HYDROCHLORIDE 50 MG: 50 TABLET ORAL at 20:04

## 2025-06-10 RX ADMIN — OXYCODONE 10 MG: 5 TABLET ORAL at 16:51

## 2025-06-10 RX ADMIN — BARIUM SULFATE 10 ML: 400 PASTE ORAL at 12:05

## 2025-06-10 ASSESSMENT — PAIN DESCRIPTION - LOCATION
LOCATION: THROAT
LOCATION: NECK
LOCATION: THROAT
LOCATION: HEAD

## 2025-06-10 ASSESSMENT — PAIN DESCRIPTION - DESCRIPTORS
DESCRIPTORS: ACHING;DULL
DESCRIPTORS: SHARP
DESCRIPTORS: SHARP

## 2025-06-10 ASSESSMENT — PAIN DESCRIPTION - FREQUENCY: FREQUENCY: CONTINUOUS

## 2025-06-10 ASSESSMENT — PAIN DESCRIPTION - PAIN TYPE
TYPE: SURGICAL PAIN
TYPE: SURGICAL PAIN

## 2025-06-10 ASSESSMENT — PAIN SCALES - GENERAL
PAINLEVEL_OUTOF10: 7
PAINLEVEL_OUTOF10: 7
PAINLEVEL_OUTOF10: 8
PAINLEVEL_OUTOF10: 5
PAINLEVEL_OUTOF10: 4
PAINLEVEL_OUTOF10: 7
PAINLEVEL_OUTOF10: 8

## 2025-06-10 ASSESSMENT — PAIN DESCRIPTION - ORIENTATION
ORIENTATION: INNER
ORIENTATION: INNER

## 2025-06-10 ASSESSMENT — PAIN DESCRIPTION - ONSET
ONSET: ON-GOING
ONSET: ON-GOING

## 2025-06-10 NOTE — PLAN OF CARE
Problem: Respiratory - Adult  Goal: Achieves optimal ventilation and oxygenation  Outcome: Progressing     Problem: Respiratory - Adult  Goal: Clear lung sounds  Outcome: Progressing   Lung sounds are improved. Current treatment regimen will be discussed with provider to see if treatments may be changed.Patient mutually agreed on goals.

## 2025-06-10 NOTE — PLAN OF CARE
Problem: Chronic Conditions and Co-morbidities  Goal: Patient's chronic conditions and co-morbidity symptoms are monitored and maintained or improved  Outcome: Progressing  Flowsheets (Taken 6/10/2025 0019)  Care Plan - Patient's Chronic Conditions and Co-Morbidity Symptoms are Monitored and Maintained or Improved:   Monitor and assess patient's chronic conditions and comorbid symptoms for stability, deterioration, or improvement   Collaborate with multidisciplinary team to address chronic and comorbid conditions and prevent exacerbation or deterioration   Update acute care plan with appropriate goals if chronic or comorbid symptoms are exacerbated and prevent overall improvement and discharge     Problem: Discharge Planning  Goal: Discharge to home or other facility with appropriate resources  Outcome: Progressing  Flowsheets (Taken 6/10/2025 0019)  Discharge to home or other facility with appropriate resources:   Identify barriers to discharge with patient and caregiver   Arrange for needed discharge resources and transportation as appropriate   Identify discharge learning needs (meds, wound care, etc)     Problem: Safety - Adult  Goal: Free from fall injury  Outcome: Progressing  Flowsheets (Taken 6/10/2025 0019)  Free From Fall Injury:   Instruct family/caregiver on patient safety   Based on caregiver fall risk screen, instruct family/caregiver to ask for assistance with transferring infant if caregiver noted to have fall risk factors     Problem: Pain  Goal: Verbalizes/displays adequate comfort level or baseline comfort level  Outcome: Progressing  Flowsheets (Taken 6/10/2025 0019)  Verbalizes/displays adequate comfort level or baseline comfort level:   Encourage patient to monitor pain and request assistance   Assess pain using appropriate pain scale   Administer analgesics based on type and severity of pain and evaluate response   Implement non-pharmacological measures as appropriate and evaluate response

## 2025-06-11 ENCOUNTER — APPOINTMENT (OUTPATIENT)
Dept: GENERAL RADIOLOGY | Age: 70
DRG: 143 | End: 2025-06-11
Attending: OTOLARYNGOLOGY
Payer: MEDICARE

## 2025-06-11 ENCOUNTER — TELEPHONE (OUTPATIENT)
Dept: FAMILY MEDICINE CLINIC | Age: 70
End: 2025-06-11

## 2025-06-11 ENCOUNTER — ANESTHESIA (OUTPATIENT)
Dept: OPERATING ROOM | Age: 70
End: 2025-06-11
Payer: MEDICARE

## 2025-06-11 LAB
ANION GAP SERPL CALC-SCNC: 11 MEQ/L (ref 8–16)
BUN SERPL-MCNC: 10 MG/DL (ref 8–23)
CALCIUM SERPL-MCNC: 10.2 MG/DL (ref 8.8–10.2)
CHLORIDE SERPL-SCNC: 99 MEQ/L (ref 98–111)
CO2 SERPL-SCNC: 29 MEQ/L (ref 22–29)
CREAT SERPL-MCNC: 0.6 MG/DL (ref 0.7–1.2)
DEPRECATED RDW RBC AUTO: 45.1 FL (ref 35–45)
ERYTHROCYTE [DISTWIDTH] IN BLOOD BY AUTOMATED COUNT: 13.8 % (ref 11.5–14.5)
GFR SERPL CREATININE-BSD FRML MDRD: > 90 ML/MIN/1.73M2
GLUCOSE BLD STRIP.AUTO-MCNC: 133 MG/DL (ref 70–108)
GLUCOSE BLD STRIP.AUTO-MCNC: 136 MG/DL (ref 70–108)
GLUCOSE BLD STRIP.AUTO-MCNC: 155 MG/DL (ref 70–108)
GLUCOSE BLD STRIP.AUTO-MCNC: 263 MG/DL (ref 70–108)
GLUCOSE BLD STRIP.AUTO-MCNC: 342 MG/DL (ref 70–108)
GLUCOSE SERPL-MCNC: 156 MG/DL (ref 74–109)
HCT VFR BLD AUTO: 38.1 % (ref 42–52)
HGB BLD-MCNC: 12 GM/DL (ref 14–18)
MCH RBC QN AUTO: 28.2 PG (ref 26–33)
MCHC RBC AUTO-ENTMCNC: 31.5 GM/DL (ref 32.2–35.5)
MCV RBC AUTO: 89.6 FL (ref 80–94)
PLATELET # BLD AUTO: 335 THOU/MM3 (ref 130–400)
PMV BLD AUTO: 8.9 FL (ref 9.4–12.4)
POTASSIUM SERPL-SCNC: 3.6 MEQ/L (ref 3.5–5.2)
RBC # BLD AUTO: 4.25 MILL/MM3 (ref 4.7–6.1)
SODIUM SERPL-SCNC: 139 MEQ/L (ref 135–145)
WBC # BLD AUTO: 10.9 THOU/MM3 (ref 4.8–10.8)

## 2025-06-11 PROCEDURE — 2720000010 HC SURG SUPPLY STERILE: Performed by: OTOLARYNGOLOGY

## 2025-06-11 PROCEDURE — 88305 TISSUE EXAM BY PATHOLOGIST: CPT

## 2025-06-11 PROCEDURE — 2500000003 HC RX 250 WO HCPCS: Performed by: OTOLARYNGOLOGY

## 2025-06-11 PROCEDURE — 97530 THERAPEUTIC ACTIVITIES: CPT

## 2025-06-11 PROCEDURE — 2060000000 HC ICU INTERMEDIATE R&B

## 2025-06-11 PROCEDURE — 7100000000 HC PACU RECOVERY - FIRST 15 MIN: Performed by: OTOLARYNGOLOGY

## 2025-06-11 PROCEDURE — 6370000000 HC RX 637 (ALT 250 FOR IP): Performed by: INTERNAL MEDICINE

## 2025-06-11 PROCEDURE — 99024 POSTOP FOLLOW-UP VISIT: CPT | Performed by: NURSE PRACTITIONER

## 2025-06-11 PROCEDURE — 97535 SELF CARE MNGMENT TRAINING: CPT

## 2025-06-11 PROCEDURE — 3700000000 HC ANESTHESIA ATTENDED CARE: Performed by: OTOLARYNGOLOGY

## 2025-06-11 PROCEDURE — 7100000001 HC PACU RECOVERY - ADDTL 15 MIN: Performed by: OTOLARYNGOLOGY

## 2025-06-11 PROCEDURE — 80048 BASIC METABOLIC PNL TOTAL CA: CPT

## 2025-06-11 PROCEDURE — 85027 COMPLETE CBC AUTOMATED: CPT

## 2025-06-11 PROCEDURE — 94640 AIRWAY INHALATION TREATMENT: CPT

## 2025-06-11 PROCEDURE — 94760 N-INVAS EAR/PLS OXIMETRY 1: CPT

## 2025-06-11 PROCEDURE — 3600000014 HC SURGERY LEVEL 4 ADDTL 15MIN: Performed by: OTOLARYNGOLOGY

## 2025-06-11 PROCEDURE — 6360000002 HC RX W HCPCS

## 2025-06-11 PROCEDURE — 97116 GAIT TRAINING THERAPY: CPT

## 2025-06-11 PROCEDURE — 6360000002 HC RX W HCPCS: Performed by: ANESTHESIOLOGY

## 2025-06-11 PROCEDURE — 2700000000 HC OXYGEN THERAPY PER DAY

## 2025-06-11 PROCEDURE — 6370000000 HC RX 637 (ALT 250 FOR IP)

## 2025-06-11 PROCEDURE — 6370000000 HC RX 637 (ALT 250 FOR IP): Performed by: OTOLARYNGOLOGY

## 2025-06-11 PROCEDURE — 3600000004 HC SURGERY LEVEL 4 BASE: Performed by: OTOLARYNGOLOGY

## 2025-06-11 PROCEDURE — 2580000003 HC RX 258: Performed by: INTERNAL MEDICINE

## 2025-06-11 PROCEDURE — 2709999900 HC NON-CHARGEABLE SUPPLY: Performed by: OTOLARYNGOLOGY

## 2025-06-11 PROCEDURE — 2500000003 HC RX 250 WO HCPCS: Performed by: REGISTERED NURSE

## 2025-06-11 PROCEDURE — 0CBV8ZX EXCISION OF LEFT VOCAL CORD, VIA NATURAL OR ARTIFICIAL OPENING ENDOSCOPIC, DIAGNOSTIC: ICD-10-PCS | Performed by: OTOLARYNGOLOGY

## 2025-06-11 PROCEDURE — 6370000000 HC RX 637 (ALT 250 FOR IP): Performed by: STUDENT IN AN ORGANIZED HEALTH CARE EDUCATION/TRAINING PROGRAM

## 2025-06-11 PROCEDURE — 3700000001 HC ADD 15 MINUTES (ANESTHESIA): Performed by: OTOLARYNGOLOGY

## 2025-06-11 PROCEDURE — 99232 SBSQ HOSP IP/OBS MODERATE 35: CPT | Performed by: INTERNAL MEDICINE

## 2025-06-11 PROCEDURE — 6360000002 HC RX W HCPCS: Performed by: REGISTERED NURSE

## 2025-06-11 PROCEDURE — 36415 COLL VENOUS BLD VENIPUNCTURE: CPT

## 2025-06-11 PROCEDURE — 71045 X-RAY EXAM CHEST 1 VIEW: CPT

## 2025-06-11 PROCEDURE — 82948 REAGENT STRIP/BLOOD GLUCOSE: CPT

## 2025-06-11 RX ORDER — PHENYLEPHRINE HCL IN 0.9% NACL 1 MG/10 ML
SYRINGE (ML) INTRAVENOUS
Status: DISCONTINUED | OUTPATIENT
Start: 2025-06-11 | End: 2025-06-11 | Stop reason: SDUPTHER

## 2025-06-11 RX ORDER — MIDAZOLAM HYDROCHLORIDE 1 MG/ML
INJECTION, SOLUTION INTRAMUSCULAR; INTRAVENOUS
Status: DISCONTINUED | OUTPATIENT
Start: 2025-06-11 | End: 2025-06-11 | Stop reason: SDUPTHER

## 2025-06-11 RX ORDER — PROPOFOL 10 MG/ML
INJECTION, EMULSION INTRAVENOUS
Status: DISCONTINUED | OUTPATIENT
Start: 2025-06-11 | End: 2025-06-11 | Stop reason: SDUPTHER

## 2025-06-11 RX ORDER — SODIUM CHLORIDE, SODIUM LACTATE, POTASSIUM CHLORIDE, CALCIUM CHLORIDE 600; 310; 30; 20 MG/100ML; MG/100ML; MG/100ML; MG/100ML
INJECTION, SOLUTION INTRAVENOUS CONTINUOUS
Status: DISCONTINUED | OUTPATIENT
Start: 2025-06-11 | End: 2025-06-12

## 2025-06-11 RX ORDER — ROCURONIUM BROMIDE 10 MG/ML
INJECTION, SOLUTION INTRAVENOUS
Status: DISCONTINUED | OUTPATIENT
Start: 2025-06-11 | End: 2025-06-11 | Stop reason: SDUPTHER

## 2025-06-11 RX ORDER — ONDANSETRON 2 MG/ML
INJECTION INTRAMUSCULAR; INTRAVENOUS
Status: DISCONTINUED | OUTPATIENT
Start: 2025-06-11 | End: 2025-06-11 | Stop reason: SDUPTHER

## 2025-06-11 RX ORDER — FENTANYL CITRATE 50 UG/ML
INJECTION, SOLUTION INTRAMUSCULAR; INTRAVENOUS
Status: DISCONTINUED | OUTPATIENT
Start: 2025-06-11 | End: 2025-06-11 | Stop reason: SDUPTHER

## 2025-06-11 RX ORDER — DEXAMETHASONE SODIUM PHOSPHATE 4 MG/ML
10 INJECTION, SOLUTION INTRA-ARTICULAR; INTRALESIONAL; INTRAMUSCULAR; INTRAVENOUS; SOFT TISSUE ONCE
Status: DISCONTINUED | OUTPATIENT
Start: 2025-06-11 | End: 2025-06-13

## 2025-06-11 RX ORDER — LIDOCAINE HYDROCHLORIDE 20 MG/ML
INJECTION, SOLUTION INTRAVENOUS
Status: DISCONTINUED | OUTPATIENT
Start: 2025-06-11 | End: 2025-06-11 | Stop reason: SDUPTHER

## 2025-06-11 RX ORDER — DEXAMETHASONE SODIUM PHOSPHATE 4 MG/ML
INJECTION, SOLUTION INTRA-ARTICULAR; INTRALESIONAL; INTRAMUSCULAR; INTRAVENOUS; SOFT TISSUE
Status: DISCONTINUED | OUTPATIENT
Start: 2025-06-11 | End: 2025-06-11 | Stop reason: SDUPTHER

## 2025-06-11 RX ADMIN — ALBUTEROL SULFATE 2 PUFF: 90 AEROSOL, METERED RESPIRATORY (INHALATION) at 08:17

## 2025-06-11 RX ADMIN — CIPROFLOXACIN 400 MG: 400 INJECTION, SOLUTION INTRAVENOUS at 15:07

## 2025-06-11 RX ADMIN — GABAPENTIN 300 MG: 300 CAPSULE ORAL at 09:53

## 2025-06-11 RX ADMIN — Medication 200 MCG: at 15:58

## 2025-06-11 RX ADMIN — CIPROFLOXACIN 400 MG: 400 INJECTION, SOLUTION INTRAVENOUS at 06:07

## 2025-06-11 RX ADMIN — HYDROMORPHONE HYDROCHLORIDE 0.5 MG: 1 INJECTION, SOLUTION INTRAMUSCULAR; INTRAVENOUS; SUBCUTANEOUS at 16:55

## 2025-06-11 RX ADMIN — SODIUM CHLORIDE, SODIUM LACTATE, POTASSIUM CHLORIDE, AND CALCIUM CHLORIDE: .6; .31; .03; .02 INJECTION, SOLUTION INTRAVENOUS at 07:23

## 2025-06-11 RX ADMIN — GABAPENTIN 300 MG: 300 CAPSULE ORAL at 21:15

## 2025-06-11 RX ADMIN — ROCURONIUM BROMIDE 50 MG: 10 INJECTION, SOLUTION INTRAVENOUS at 15:53

## 2025-06-11 RX ADMIN — OXYCODONE 5 MG: 5 TABLET ORAL at 19:50

## 2025-06-11 RX ADMIN — SODIUM CHLORIDE, PRESERVATIVE FREE 10 ML: 5 INJECTION INTRAVENOUS at 21:17

## 2025-06-11 RX ADMIN — PROPOFOL 150 MG: 10 INJECTION, EMULSION INTRAVENOUS at 15:43

## 2025-06-11 RX ADMIN — HYDROMORPHONE HYDROCHLORIDE 0.5 MG: 1 INJECTION, SOLUTION INTRAMUSCULAR; INTRAVENOUS; SUBCUTANEOUS at 17:00

## 2025-06-11 RX ADMIN — Medication 300 MCG: at 16:03

## 2025-06-11 RX ADMIN — AMITRIPTYLINE HYDROCHLORIDE 50 MG: 50 TABLET ORAL at 21:15

## 2025-06-11 RX ADMIN — ACETAMINOPHEN 650 MG: 325 TABLET ORAL at 23:20

## 2025-06-11 RX ADMIN — Medication 100 MCG: at 15:53

## 2025-06-11 RX ADMIN — SUGAMMADEX 200 MG: 100 INJECTION, SOLUTION INTRAVENOUS at 16:30

## 2025-06-11 RX ADMIN — QUETIAPINE FUMARATE 200 MG: 100 TABLET ORAL at 21:15

## 2025-06-11 RX ADMIN — ATORVASTATIN CALCIUM 40 MG: 40 TABLET, FILM COATED ORAL at 07:37

## 2025-06-11 RX ADMIN — Medication 1 CAPSULE: at 18:34

## 2025-06-11 RX ADMIN — MIDAZOLAM 2 MG: 1 INJECTION INTRAMUSCULAR; INTRAVENOUS at 15:37

## 2025-06-11 RX ADMIN — BUPROPION HYDROCHLORIDE 150 MG: 150 TABLET, EXTENDED RELEASE ORAL at 07:37

## 2025-06-11 RX ADMIN — Medication 1 CAPSULE: at 07:40

## 2025-06-11 RX ADMIN — INSULIN LISPRO 3 UNITS: 100 INJECTION, SOLUTION INTRAVENOUS; SUBCUTANEOUS at 21:25

## 2025-06-11 RX ADMIN — ALBUTEROL SULFATE 2 PUFF: 90 AEROSOL, METERED RESPIRATORY (INHALATION) at 20:07

## 2025-06-11 RX ADMIN — ONDANSETRON 4 MG: 2 INJECTION, SOLUTION INTRAMUSCULAR; INTRAVENOUS at 15:46

## 2025-06-11 RX ADMIN — ACETAMINOPHEN 650 MG: 325 TABLET ORAL at 15:09

## 2025-06-11 RX ADMIN — DEXAMETHASONE SODIUM PHOSPHATE 10 MG: 4 INJECTION, SOLUTION INTRAMUSCULAR; INTRAVENOUS at 15:46

## 2025-06-11 RX ADMIN — PANTOPRAZOLE SODIUM 40 MG: 40 TABLET, DELAYED RELEASE ORAL at 06:05

## 2025-06-11 RX ADMIN — SODIUM CHLORIDE, PRESERVATIVE FREE 10 ML: 5 INJECTION INTRAVENOUS at 07:37

## 2025-06-11 RX ADMIN — Medication 60 MG: at 15:43

## 2025-06-11 RX ADMIN — PROPOFOL 150 MCG/KG/MIN: 10 INJECTION, EMULSION INTRAVENOUS at 15:42

## 2025-06-11 RX ADMIN — FENTANYL CITRATE 100 MCG: 50 INJECTION, SOLUTION INTRAMUSCULAR; INTRAVENOUS at 16:19

## 2025-06-11 ASSESSMENT — PAIN DESCRIPTION - LOCATION
LOCATION: THROAT

## 2025-06-11 ASSESSMENT — PAIN SCALES - GENERAL
PAINLEVEL_OUTOF10: 7
PAINLEVEL_OUTOF10: 7
PAINLEVEL_OUTOF10: 4
PAINLEVEL_OUTOF10: 6
PAINLEVEL_OUTOF10: 7

## 2025-06-11 ASSESSMENT — PAIN - FUNCTIONAL ASSESSMENT
PAIN_FUNCTIONAL_ASSESSMENT: ACTIVITIES ARE NOT PREVENTED
PAIN_FUNCTIONAL_ASSESSMENT: NONE - DENIES PAIN
PAIN_FUNCTIONAL_ASSESSMENT: ACTIVITIES ARE NOT PREVENTED
PAIN_FUNCTIONAL_ASSESSMENT: ACTIVITIES ARE NOT PREVENTED

## 2025-06-11 ASSESSMENT — PAIN DESCRIPTION - ONSET
ONSET: GRADUAL
ONSET: ON-GOING

## 2025-06-11 ASSESSMENT — PAIN DESCRIPTION - ORIENTATION
ORIENTATION: INNER

## 2025-06-11 ASSESSMENT — PAIN DESCRIPTION - FREQUENCY
FREQUENCY: CONTINUOUS
FREQUENCY: INTERMITTENT
FREQUENCY: CONTINUOUS
FREQUENCY: CONTINUOUS

## 2025-06-11 ASSESSMENT — PAIN DESCRIPTION - DESCRIPTORS
DESCRIPTORS: SHARP;DULL
DESCRIPTORS: SORE
DESCRIPTORS: SHARP
DESCRIPTORS: DULL

## 2025-06-11 ASSESSMENT — PAIN DESCRIPTION - PAIN TYPE
TYPE: SURGICAL PAIN

## 2025-06-11 ASSESSMENT — COPD QUESTIONNAIRES: CAT_SEVERITY: MODERATE

## 2025-06-11 NOTE — OP NOTE
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]    Post-Op Diagnosis: Same       Procedure(s):  SUPENSION LARYNGOSCOPY W/ MAPPING BIOPSY, W/ JET  VENTILATION    Surgeon(s):  Dheeraj Rodriguez MD    Assistant:   * No surgical staff found *    Anesthesia: General    Estimated Blood Loss (mL): Minimal    Complications: None    Specimens:   ID Type Source Tests Collected by Time Destination   A : LEFT ANTERIOR DISTAL DEEP COMMISSURE Tissue Vocal Cord SURGICAL PATHOLOGY Dheeraj Rodriguez MD 6/11/2025 1614    B : DEEP BIOPSY BEYOND SPECIMEN 1 Tissue Vocal Cord SURGICAL PATHOLOGY Dheeraj Rodriguez MD 6/11/2025 1617    C : ANTERIOR COMMISSURE BEYOND PRIOR RESECTION Tissue Vocal Cord SURGICAL PATHOLOGY Dheeraj Rodriguez MD 6/11/2025 1626        Implants:  * No implants in log *      Drains:   [REMOVED] Gastrostomy/Enterostomy/Jejunostomy Tube Percutaneous Endoscopic Gastrostomy (PEG) LUQ 20 fr (Removed)   Drainage Appearance None 06/08/25 1230   External Catheter Length (cm) 2 cm 06/06/25 1633   Site Description Leaking at site 06/09/25 0015   J Port Status Clamped 06/08/25 2019   G Port Status Clamped 06/08/25 2019   Surrounding Skin Clean, dry & intact 06/08/25 2019   Dressing Status Clean, dry & intact 06/08/25 2019   Dressing Type Pressure dressing 06/09/25 0015   G-Tube Care Completed Yes 06/08/25 1230   Tube Feeding 2.0 Calorie 06/08/25 1230   Tube Feeding Intake (mL) 237 ml 06/08/25 1158   Free Water/Flush (mL) 180 mL 06/08/25 1158   Action Taken Other (comment) 06/09/25 0015   Residual Volume (ml) 0 ml 06/08/25 1158       [REMOVED] Urinary Catheter 06/06/25 [place] (Removed)   Catheter Indications Perioperative use for selected surgical procedures 06/06/25 1633   Site Assessment [place] 06/06/25 1633   Urine Color Yellow 06/06/25 1633   Urine Appearance Clear 
commissure resection down to the cricothyroid membrane and cricoid laterally     Largely intact right true vocal fold and ventricular fold    Postintubation with a #7 cuffed endotracheal tube prior to end of case         Detailed Description of Procedure:   The patient was taken the operating waken placed in supine position.  General anesthesia was induced and the patient was intubated with a 6.0 MLT cuffed tube through the nose.  The table was turned 90 degrees for the procedure.  He was prepped and draped in usual fashion for transoral robotic assisted surgery.  This included the placement of 2 traction sutures on either side of the anterior tongue to be able to pull his tongue forward in his mouth.  A timeout was employed verifying the patient's identity and planned procedure.    Transoral robotic assisted left anterior vertical Ronni laryngectomy sparing superior false vocal fold: Using the robotic telescope and a headlight, I worked to extend the patient's larynx anteriorly leaving his epiglottis posterior to the used device.  I attempted a Ronni Monty device without being able to allow his epiglottis to prolapse posteriorly.  I settled on the short curved flat spatula with the standard extension system which gave me a panoramic view of the patient's larynx and the ability to look directly into the trachea from the mouth.  The da Jaimee robot was docked with the [place] dissector on the left hand and the narrow mouth needle  on the right-hand.    I brought onto the field a CO2 ultra pulse fiber laser system set at 10 W average power density followed by 20 W average power density in continuous ultra pulse mode.  I fashioned a handpiece using a short jet ventilation catheter that is not flammable to be able to grasp it with the robotic instruments without cracking the glass tube.  Visualizing the glottic aperture, I found the patient's tumor to extended into 4 pedunculated separate masses 2 which were very

## 2025-06-11 NOTE — CARE COORDINATION
DISCHARGE PLANNING EVALUATION  6/11/25, 10:27 AM EDT    Reason for Referral: new HH (nsg, PT/OT/ST)  Decision Maker: self   Current Services: none  New Services Requested: home health  Family/ Social/ Home environment: Dilan lives at home with his grandson \"NARGIS\" (Suhas) He reports his son also named Suhas is supportive as well  Payment Source:[facility] Medicare and Medicaid  Transportation at Discharge: family        Teach Back Method used with Dilan regarding care plan and needs  Patient verbalized understanding of the plan of care and contribute to goal setting.       Patient preferences and discharge plan: Patient agreeable to HH, pt has used [facility] in the past and would like to use them again, pt declines needing to review a list of agencies.     SW sent referral to [facility] through [facility]port    Electronically signed by PHIL Carrillo on 6/11/2025 at 10:27 AM         11:00 AM EDT  Call from [facility], they can accept pt at discharge. They will need AVS sent through careport at [place].

## 2025-06-11 NOTE — PLAN OF CARE
Problem: Chronic Conditions and Co-morbidities  Goal: Patient's chronic conditions and co-morbidity symptoms are monitored and maintained or improved  Outcome: Progressing  Flowsheets (Taken 6/11/2025 0028)  Care Plan - Patient's Chronic Conditions and Co-Morbidity Symptoms are Monitored and Maintained or Improved:   Monitor and assess patient's chronic conditions and comorbid symptoms for stability, deterioration, or improvement   Collaborate with multidisciplinary team to address chronic and comorbid conditions and prevent exacerbation or deterioration   Update acute care plan with appropriate goals if chronic or comorbid symptoms are exacerbated and prevent overall improvement and discharge     Problem: Discharge Planning  Goal: Discharge to home or other facility with appropriate resources  Outcome: Progressing  Flowsheets (Taken 6/11/2025 0028)  Discharge to home or other facility with appropriate resources:   Identify barriers to discharge with patient and caregiver   Arrange for needed discharge resources and transportation as appropriate   Identify discharge learning needs (meds, wound care, etc)     Problem: Safety - Adult  Goal: Free from fall injury  Outcome: Progressing  Flowsheets (Taken 6/11/2025 0028)  Free From Fall Injury: Instruct family/caregiver on patient safety     Problem: Pain  Goal: Verbalizes/displays adequate comfort level or baseline comfort level  Outcome: Progressing  Flowsheets (Taken 6/11/2025 0028)  Verbalizes/displays adequate comfort level or baseline comfort level:   Encourage patient to monitor pain and request assistance   Assess pain using appropriate pain scale   Administer analgesics based on type and severity of pain and evaluate response   Implement non-pharmacological measures as appropriate and evaluate response     Problem: Respiratory - Adult  Goal: Achieves optimal ventilation and oxygenation  Outcome: Progressing  Flowsheets (Taken 6/11/2025 0028)  Achieves optimal

## 2025-06-11 NOTE — PLAN OF CARE
Problem: Discharge Planning  Goal: Discharge to home or other facility with appropriate resources  6/11/2025 1032 by Adelaida Jackson LSW  Outcome: Progressing   SW consult received. See SW note 6/11/25

## 2025-06-11 NOTE — H&P
Adapted from prior ENT note:    Laryngeal cancer; Squamous cell carcinoma left vocal cord; Lung malignancy  No new symptoms    Past Medical History:   Diagnosis Date    Abnormal liver enzymes     Alcohol abuse     Allergic rhinitis     Cancer (HCC)     left side of vocal cords    Chronic back pain     Colonic polyp     COPD (chronic obstructive pulmonary disease) (HCC)     Depression     Diabetes mellitus (HCC)     DJD (degenerative joint disease) of cervical spine     DJD (degenerative joint disease) of lumbar spine     SP SURGERY    Environmental and seasonal allergies     runny nose    GERD (gastroesophageal reflux disease)     Hyperlipidemia     Hypertension     Neuropathy 06/28/2023    Osteoarthritis     Smoker     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       Past Surgical History:   Procedure Laterality Date    BACK SURGERY  2008    X 3    CARDIAC CATHETERIZATION  03/2015    [facility]    CERVICAL FUSION N/A 01/22/2025    ACDF C3-C4 performed by Lion Barraza MD at [facility] OR    CHOLECYSTECTOMY      COLONOSCOPY  06/2012    repeat 6/2017- no precancerous  polyps    CT NEEDLE BIOPSY LUNG PERCUTANEOUS W IMAGING GUIDANCE  04/29/2025    CT NEEDLE BIOPSY LUNG PERCUTANEOUS W IMAGING GUIDANCE 4/29/2025 [facility] CT SCAN    EKG 12-LEAD  04/06/2015         FACET JOINT INJECTION Bilateral 07/07/2020    BILATERAL C-FACET MBB @ C4-5,C5-6 and C6-7 performed by Damian Ryan MD at [facility] OR    FACET JOINT INJECTION Bilateral 08/06/2020    bilateral C-facet MBB # 2 @ C4-5, C5-6 and C6-7 performed by Damian Ryan MD at [facility] OR    FACET JOINT INJECTION Bilateral 05/10/2022    Bilateral C-facet RFA @ C4-5 and C5-6 performed by Damain Ryan MD at [facility] OR    FACET JOINT INJECTION N/A 06/05/2024    Cervical 2-3, 3-4 facet medial branch block performed by Damian Ryan MD at [facility] OR    FACET JOINT INJECTION Bilateral 07/10/2024    Bilateral 
Patient:  Dilan Pappas    Unit[facility]Bed[email or web address] OR (General) POOL R*  MRN: [id number]   PCP: Eric Orozco APRN - CNP  Date of Admission: 6/6/2025    Assessment and Plan (All pulmonary edema, renal failure, PE, and respiratory failure diagnoses are acute in nature unless otherwise specified)     Laryngeal cancer of left vocal cord s/p partial laterovertical laryngectomy: Squamous cell carcinoma of left vocal cord.  Procedure performed on 6/6.  Surgical pathology pending.  Patient may have ice chips on 6/6 and may attempt ice chips with sips of water on 6/7  COPD: Follows with pulmonology at [facility].  Last PFT showing moderate obstructive disease with FEV1 80%, FEV1/FVC 70%, DLCO severely diminished, no signal response to bronchodilator challenge.  Continue home inhalers  Pulmonary hygiene; I-S   Left squamous cell lung carcinoma: Recently diagnosed in 5/2025.  Patient underwent lung biopsy on 5/12/2025 which showed evidence of malignancy in all samples.  P16 negative.  Referral was placed for medical oncology and radiation oncology at last pulmonology appointment.  Recent PEG tube placement: S/p 6/5/2025 by Dr. Nguyen.  Continue Augmentin 500-125 mg 3 times daily for 10 days.  Site visualized without erythema, clean and dry.  Hypertension: Home medications include Lopressor 12.5 mg 2 times daily and lisinopril 2.5 mg daily.  Continue home medications.  Hyperlipidemia: Continue statin  IDDM 2: Home medications include metformin, pioglitazone, sitagliptin, Jardiance and Mounjaro.  Will continue with low-dose sliding scale as patient is currently NPO.  Glucose checks.  Hypoglycemia protocol.  GERD: Continue PPI  Former tobacco abuse disorder: Patient quit smoking cigarettes January 2024.  53.2-pack-year smoking history.  History of colonic polyp: Noted per chart review.  Chronic back pain: Patient reports mild lower back pain.  Medications include      CC: Laryngeal cancer  HPI: Patient is a 69-year-old male who presented to 
4 mg, 4 mg, IntraVENous, Q6H PRN  oxyCODONE (ROXICODONE) immediate release tablet 5 mg, 5 mg, PEG Tube, Q4H PRN **OR** oxyCODONE (ROXICODONE) immediate release tablet 10 mg, 10 mg, PEG Tube, Q4H PRN  HYDROmorphone (DILAUDID) injection 0.25 mg, 0.25 mg, IntraVENous, Q3H PRN **OR** HYDROmorphone (DILAUDID) injection 0.5 mg, 0.5 mg, IntraVENous, Q3H PRN  polyethylene glycol (GLYCOLAX) packet 17 g, 17 g, Per G Tube, Daily  bisacodyl (DULCOLAX) suppository 10 mg, 10 mg, Rectal, Daily PRN  amitriptyline (ELAVIL) tablet 50 mg, 50 mg, Oral, Nightly  atorvastatin (LIPITOR) tablet 40 mg, 40 mg, Oral, Daily  buPROPion (WELLBUTRIN XL) extended release tablet 150 mg, 150 mg, Oral, QAM  [Held by provider] empagliflozin (JARDIANCE) tablet 25 mg, 25 mg, Oral, Daily  gabapentin (NEURONTIN) capsule 300 mg, 300 mg, Oral, BID  lisinopril (PRINIVIL;ZESTRIL) tablet 2.5 mg, 2.5 mg, Oral, Daily  [Held by provider] metoprolol tartrate (LOPRESSOR) tablet 12.5 mg, 12.5 mg, Oral, BID  pantoprazole (PROTONIX) tablet 40 mg, 40 mg, Oral, QAM AC  QUEtiapine (SEROQUEL) tablet 200 mg, 200 mg, Oral, Nightly  potassium chloride 20 mEq/50 mL IVPB (Central Line), 20 mEq, IntraVENous, PRN **OR** potassium chloride 10 mEq/100 mL IVPB (Peripheral Line), 10 mEq, IntraVENous, PRN  magnesium sulfate 2000 mg in 50 mL IVPB premix, 2,000 mg, IntraVENous, PRN  insulin lispro (HUMALOG,ADMELOG) injection vial 0-4 Units, 0-4 Units, SubCUTAneous, 4x Daily AC & HS  albuterol (PROVENTIL) (2.5 MG/3ML) 0.083% nebulizer solution 2.5 mg, 2.5 mg, Nebulization, Q4H PRN           ASSESSMENT AND PLAN     POD 4 transoral robotic assisted vertical Ronni laryngectomy for an exophytic obstructive cancer of the left Ronni larynx      - Speech c/s, s/p MBS and recommend regular diet with chin tuck  - Continue regular diet with chin tuck, will defer gastrostomy tube replacement.  - NPO midnight  - Given culture results transitioned to IV Ciprofloxacin   - Continue pain regimen as

## 2025-06-11 NOTE — ANESTHESIA PRE PROCEDURE
a day as needed for SOB/wheezing 8/12/24  Yes Tao Campbell APRN - CNP   albuterol (PROVENTIL) (2.5 MG/3ML) 0.083% nebulizer solution USE 3 ML BY NEBULIZATION FOUR TIMES DAILY AS NEEDED FOR WHEEZING 5/1/24  Yes Tyrese Steele MD   OXYGEN Inhale into the lungs 3L/MIN/NC CONT   Yes ProviderEkaterina MD   azelastine (ASTELIN) 0.1 % nasal spray SPRAY ONCE IN EACH NOSTRIL TWICE DAILY AS DIRECTED 11/29/21  Yes Eric Orozco APRN - CNP   amoxicillin-clavulanate (AUGMENTIN) 500-125 MG per tablet Take 1 tablet by mouth 3 times daily for 10 days  Patient taking differently: Take 1 tablet by mouth 3 times daily HAS NOT STARTED 6/5/25 6/15/25  Lino Nguyen MD   empagliflozin (JARDIANCE) 25 MG tablet Take 1 tablet by mouth daily 2/1/25   Eric Orozco APRN - CNP   SITagliptin (JANUVIA) 100 MG tablet Take 1 tablet by mouth daily 1/8/25   Eric Orozco APRN - CNP   pioglitazone (ACTOS) 30 MG tablet Take 1 tablet by mouth daily 1/8/25   Eric Orozco APRN - CNP   metFORMIN (GLUCOPHAGE) 1000 MG tablet Take 1 tablet by mouth 2 times daily (with meals) 10/7/24   Eric Orozco APRN - CNP   sodium chloride, Inhalant, 3 % nebulizer solution Take 4 mLs by nebulization as needed for Other (Use every 4 hours as needed for thick sputum) 8/12/24   Tao Campbell APRN - CNP       Current medications:    Current Facility-Administered Medications   Medication Dose Route Frequency Provider Last Rate Last Admin   • lactated ringers infusion   IntraVENous Continuous Dieter Rodrigez  mL/hr at 06/11/25 0723 New Bag at 06/11/25 0723   • dexAMETHasone (DECADRON) injection 10 mg  10 mg IntraVENous Once April Kuo APRN - CNP       • acetaminophen (TYLENOL) tablet 650 mg  650 mg Oral Q6H PRN Ludivina Mcpherson DO   650 mg at 06/11/25 1509   • albuterol sulfate HFA (PROVENTIL;VENTOLIN;PROAIR) 108 (90 Base) MCG/ACT inhaler 2 puff  2 puff Inhalation BID Reyna Shaw MD   2 puff at 06/11/25 5934   •

## 2025-06-12 ENCOUNTER — HOSPITAL ENCOUNTER (OUTPATIENT)
Dept: SPEECH THERAPY | Age: 70
Setting detail: THERAPIES SERIES
Discharge: HOME | End: 2025-06-12

## 2025-06-12 DIAGNOSIS — R49.0 DYSPHONIA: ICD-10-CM

## 2025-06-12 DIAGNOSIS — C32.0 SQUAMOUS CELL CARCINOMA OF LEFT VOCAL CORD (HCC): Primary | ICD-10-CM

## 2025-06-12 LAB
ANION GAP SERPL CALC-SCNC: 10 MEQ/L (ref 8–16)
BUN SERPL-MCNC: 13 MG/DL (ref 8–23)
CALCIUM SERPL-MCNC: 9.8 MG/DL (ref 8.8–10.2)
CHLORIDE SERPL-SCNC: 99 MEQ/L (ref 98–111)
CO2 SERPL-SCNC: 28 MEQ/L (ref 22–29)
CREAT SERPL-MCNC: 0.6 MG/DL (ref 0.7–1.2)
DEPRECATED RDW RBC AUTO: 45.1 FL (ref 35–45)
ERYTHROCYTE [DISTWIDTH] IN BLOOD BY AUTOMATED COUNT: 13.5 % (ref 11.5–14.5)
GFR SERPL CREATININE-BSD FRML MDRD: > 90 ML/MIN/1.73M2
GLUCOSE BLD STRIP.AUTO-MCNC: 185 MG/DL (ref 70–108)
GLUCOSE BLD STRIP.AUTO-MCNC: 215 MG/DL (ref 70–108)
GLUCOSE BLD STRIP.AUTO-MCNC: 216 MG/DL (ref 70–108)
GLUCOSE BLD STRIP.AUTO-MCNC: 221 MG/DL (ref 70–108)
GLUCOSE SERPL-MCNC: 237 MG/DL (ref 74–109)
HCT VFR BLD AUTO: 36.3 % (ref 42–52)
HGB BLD-MCNC: 11.2 GM/DL (ref 14–18)
MCH RBC QN AUTO: 28 PG (ref 26–33)
MCHC RBC AUTO-ENTMCNC: 30.9 GM/DL (ref 32.2–35.5)
MCV RBC AUTO: 90.8 FL (ref 80–94)
PLATELET # BLD AUTO: 316 THOU/MM3 (ref 130–400)
PMV BLD AUTO: 9.3 FL (ref 9.4–12.4)
POTASSIUM SERPL-SCNC: 4.2 MEQ/L (ref 3.5–5.2)
RBC # BLD AUTO: 4 MILL/MM3 (ref 4.7–6.1)
SODIUM SERPL-SCNC: 137 MEQ/L (ref 135–145)
WBC # BLD AUTO: 9.6 THOU/MM3 (ref 4.8–10.8)

## 2025-06-12 PROCEDURE — 2060000000 HC ICU INTERMEDIATE R&B

## 2025-06-12 PROCEDURE — 82948 REAGENT STRIP/BLOOD GLUCOSE: CPT

## 2025-06-12 PROCEDURE — 6370000000 HC RX 637 (ALT 250 FOR IP)

## 2025-06-12 PROCEDURE — 99232 SBSQ HOSP IP/OBS MODERATE 35: CPT | Performed by: INTERNAL MEDICINE

## 2025-06-12 PROCEDURE — 6370000000 HC RX 637 (ALT 250 FOR IP): Performed by: STUDENT IN AN ORGANIZED HEALTH CARE EDUCATION/TRAINING PROGRAM

## 2025-06-12 PROCEDURE — 1200000000 HC SEMI PRIVATE

## 2025-06-12 PROCEDURE — 99024 POSTOP FOLLOW-UP VISIT: CPT | Performed by: NURSE PRACTITIONER

## 2025-06-12 PROCEDURE — 36415 COLL VENOUS BLD VENIPUNCTURE: CPT

## 2025-06-12 PROCEDURE — 6370000000 HC RX 637 (ALT 250 FOR IP): Performed by: INTERNAL MEDICINE

## 2025-06-12 PROCEDURE — 94640 AIRWAY INHALATION TREATMENT: CPT

## 2025-06-12 PROCEDURE — 80048 BASIC METABOLIC PNL TOTAL CA: CPT

## 2025-06-12 PROCEDURE — 92526 ORAL FUNCTION THERAPY: CPT

## 2025-06-12 PROCEDURE — 2700000000 HC OXYGEN THERAPY PER DAY

## 2025-06-12 PROCEDURE — 6360000002 HC RX W HCPCS

## 2025-06-12 PROCEDURE — 6370000000 HC RX 637 (ALT 250 FOR IP): Performed by: OTOLARYNGOLOGY

## 2025-06-12 PROCEDURE — 85027 COMPLETE CBC AUTOMATED: CPT

## 2025-06-12 PROCEDURE — 2500000003 HC RX 250 WO HCPCS: Performed by: OTOLARYNGOLOGY

## 2025-06-12 RX ADMIN — ACETAMINOPHEN 650 MG: 325 TABLET ORAL at 20:09

## 2025-06-12 RX ADMIN — ACETAMINOPHEN 650 MG: 325 TABLET ORAL at 07:36

## 2025-06-12 RX ADMIN — POLYETHYLENE GLYCOL 3350 17 G: 17 POWDER, FOR SOLUTION ORAL at 07:36

## 2025-06-12 RX ADMIN — ALBUTEROL SULFATE 2 PUFF: 90 AEROSOL, METERED RESPIRATORY (INHALATION) at 20:47

## 2025-06-12 RX ADMIN — ALBUTEROL SULFATE 2 PUFF: 90 AEROSOL, METERED RESPIRATORY (INHALATION) at 05:53

## 2025-06-12 RX ADMIN — SODIUM CHLORIDE, PRESERVATIVE FREE 10 ML: 5 INJECTION INTRAVENOUS at 20:37

## 2025-06-12 RX ADMIN — BUPROPION HYDROCHLORIDE 150 MG: 150 TABLET, EXTENDED RELEASE ORAL at 07:37

## 2025-06-12 RX ADMIN — INSULIN LISPRO 1 UNITS: 100 INJECTION, SOLUTION INTRAVENOUS; SUBCUTANEOUS at 12:09

## 2025-06-12 RX ADMIN — PANTOPRAZOLE SODIUM 40 MG: 40 TABLET, DELAYED RELEASE ORAL at 05:14

## 2025-06-12 RX ADMIN — INSULIN LISPRO 1 UNITS: 100 INJECTION, SOLUTION INTRAVENOUS; SUBCUTANEOUS at 10:09

## 2025-06-12 RX ADMIN — OXYCODONE 5 MG: 5 TABLET ORAL at 03:30

## 2025-06-12 RX ADMIN — GABAPENTIN 300 MG: 300 CAPSULE ORAL at 20:37

## 2025-06-12 RX ADMIN — Medication 1 CAPSULE: at 16:18

## 2025-06-12 RX ADMIN — GABAPENTIN 300 MG: 300 CAPSULE ORAL at 07:37

## 2025-06-12 RX ADMIN — CIPROFLOXACIN 400 MG: 400 INJECTION, SOLUTION INTRAVENOUS at 16:20

## 2025-06-12 RX ADMIN — QUETIAPINE FUMARATE 200 MG: 100 TABLET ORAL at 20:37

## 2025-06-12 RX ADMIN — Medication 1 CAPSULE: at 07:36

## 2025-06-12 RX ADMIN — ATORVASTATIN CALCIUM 40 MG: 40 TABLET, FILM COATED ORAL at 07:37

## 2025-06-12 RX ADMIN — CIPROFLOXACIN 400 MG: 400 INJECTION, SOLUTION INTRAVENOUS at 03:24

## 2025-06-12 RX ADMIN — INSULIN LISPRO 1 UNITS: 100 INJECTION, SOLUTION INTRAVENOUS; SUBCUTANEOUS at 20:36

## 2025-06-12 RX ADMIN — OXYCODONE 10 MG: 5 TABLET ORAL at 14:45

## 2025-06-12 RX ADMIN — AMITRIPTYLINE HYDROCHLORIDE 50 MG: 50 TABLET ORAL at 20:37

## 2025-06-12 RX ADMIN — INSULIN LISPRO 1 UNITS: 100 INJECTION, SOLUTION INTRAVENOUS; SUBCUTANEOUS at 16:18

## 2025-06-12 ASSESSMENT — PAIN - FUNCTIONAL ASSESSMENT
PAIN_FUNCTIONAL_ASSESSMENT: ACTIVITIES ARE NOT PREVENTED

## 2025-06-12 ASSESSMENT — PAIN DESCRIPTION - ONSET
ONSET: GRADUAL
ONSET: ON-GOING
ONSET: ON-GOING
ONSET: GRADUAL

## 2025-06-12 ASSESSMENT — PAIN DESCRIPTION - DESCRIPTORS
DESCRIPTORS: DULL
DESCRIPTORS: SHARP
DESCRIPTORS: SHARP;DULL
DESCRIPTORS: DULL

## 2025-06-12 ASSESSMENT — PAIN DESCRIPTION - ORIENTATION
ORIENTATION: INNER

## 2025-06-12 ASSESSMENT — PAIN DESCRIPTION - LOCATION
LOCATION: THROAT

## 2025-06-12 ASSESSMENT — PAIN SCALES - GENERAL
PAINLEVEL_OUTOF10: 7
PAINLEVEL_OUTOF10: 6
PAINLEVEL_OUTOF10: 0
PAINLEVEL_OUTOF10: 7
PAINLEVEL_OUTOF10: 6
PAINLEVEL_OUTOF10: 6
PAINLEVEL_OUTOF10: 4

## 2025-06-12 ASSESSMENT — PAIN DESCRIPTION - PAIN TYPE
TYPE: SURGICAL PAIN

## 2025-06-12 ASSESSMENT — PAIN DESCRIPTION - FREQUENCY
FREQUENCY: INTERMITTENT
FREQUENCY: CONTINUOUS
FREQUENCY: INTERMITTENT
FREQUENCY: INTERMITTENT

## 2025-06-12 NOTE — PLAN OF CARE
Problem: Chronic Conditions and Co-morbidities  Goal: Patient's chronic conditions and co-morbidity symptoms are monitored and maintained or improved  6/11/2025 2353 by Danielle Cespedes RN  Outcome: Progressing  6/11/2025 2352 by Danielle Cespedes RN  Flowsheets (Taken 6/11/2025 2352)  Care Plan - Patient's Chronic Conditions and Co-Morbidity Symptoms are Monitored and Maintained or Improved:   Monitor and assess patient's chronic conditions and comorbid symptoms for stability, deterioration, or improvement   Collaborate with multidisciplinary team to address chronic and comorbid conditions and prevent exacerbation or deterioration     Problem: Discharge Planning  Goal: Discharge to home or other facility with appropriate resources  6/11/2025 2353 by Danielle Cespedes RN  Outcome: Progressing     Problem: Safety - Adult  Goal: Free from fall injury  6/11/2025 2354 by Danielle Cespedes RN  Flowsheets (Taken 6/11/2025 2354)  Free From Fall Injury:   Instruct family/caregiver on patient safety   Based on caregiver fall risk screen, instruct family/caregiver to ask for assistance with transferring infant if caregiver noted to have fall risk factors     Problem: Pain  Goal: Verbalizes/displays adequate comfort level or baseline comfort level  6/11/2025 2353 by Danielle Cespedes RN  Outcome: Progressing  6/11/2025 2352 by Danielle Cespedes RN  Flowsheets (Taken 6/11/2025 2352)  Verbalizes/displays adequate comfort level or baseline comfort level:   Encourage patient to monitor pain and request assistance   Assess pain using appropriate pain scale   Consider cultural and social influences on pain and pain management   Administer analgesics based on type and severity of pain and evaluate response   Implement non-pharmacological measures as appropriate and evaluate response   Notify Licensed Independent Practitioner if interventions unsuccessful or patient reports new pain     Problem: Respiratory - Adult  Goal: Achieves optimal ventilation and

## 2025-06-12 NOTE — THERAPY DISCHARGE
[facility]  OUTPATIENT SPEECH THERAPY QUICK DISCHARGE NOTE  [facility]    Date: 2025  Patient Name:  Dilan Pappas  : 1955  MRN: [id number]  CSN: [id number]    Referring Practitioner Tyrese Steele MD [id number]      Diagnosis Dysphagia, unspecified  Dysphonia  Other nonspecific abnormal finding of lung field     Patient is discharged from Speech Therapy services at this time.  See last note for details related to results of therapy and goal achievement.    Reason for discharge: Patient currently hospitalized for scheduled ENT surgical procedure 25: transoral robotic assisted anterolateral vertical partial laryngectomy without reconstruction d/t ENT findings of Laryngeal cancer; Squamous cell carcinoma left vocal cord; Lung malignancy.  Patient currently IP status s/p procedure. Per IP chart review patient with potential plans to discharge with home health services.  Due to significance of new surgery patient will require new OP evaluation to re-evaluate. Patient to discharge from OP Services at this time.    ST recommending HH or OP Speech Therapy post hospital discharge as clinically appropriate.     Susana Velasquez, SLP

## 2025-06-12 NOTE — CARE COORDINATION
6/12/25, 12:13 PM EDT    DISCHARGE PLANNING EVALUATION     Call to [facility], spoke with Shefali, updated on anticipating dc home tomorrow.

## 2025-06-13 ENCOUNTER — APPOINTMENT (OUTPATIENT)
Dept: GENERAL RADIOLOGY | Age: 70
DRG: 143 | End: 2025-06-13
Attending: OTOLARYNGOLOGY
Payer: MEDICARE

## 2025-06-13 LAB
GLUCOSE BLD STRIP.AUTO-MCNC: 148 MG/DL (ref 70–108)
GLUCOSE BLD STRIP.AUTO-MCNC: 169 MG/DL (ref 70–108)
GLUCOSE BLD STRIP.AUTO-MCNC: 216 MG/DL (ref 70–108)
GLUCOSE BLD STRIP.AUTO-MCNC: 232 MG/DL (ref 70–108)

## 2025-06-13 PROCEDURE — 97116 GAIT TRAINING THERAPY: CPT

## 2025-06-13 PROCEDURE — 6370000000 HC RX 637 (ALT 250 FOR IP)

## 2025-06-13 PROCEDURE — 99024 POSTOP FOLLOW-UP VISIT: CPT

## 2025-06-13 PROCEDURE — 6370000000 HC RX 637 (ALT 250 FOR IP): Performed by: INTERNAL MEDICINE

## 2025-06-13 PROCEDURE — 2500000003 HC RX 250 WO HCPCS: Performed by: OTOLARYNGOLOGY

## 2025-06-13 PROCEDURE — 6360000002 HC RX W HCPCS

## 2025-06-13 PROCEDURE — 6370000000 HC RX 637 (ALT 250 FOR IP): Performed by: STUDENT IN AN ORGANIZED HEALTH CARE EDUCATION/TRAINING PROGRAM

## 2025-06-13 PROCEDURE — 97530 THERAPEUTIC ACTIVITIES: CPT

## 2025-06-13 PROCEDURE — 94640 AIRWAY INHALATION TREATMENT: CPT

## 2025-06-13 PROCEDURE — 99232 SBSQ HOSP IP/OBS MODERATE 35: CPT | Performed by: INTERNAL MEDICINE

## 2025-06-13 PROCEDURE — 82948 REAGENT STRIP/BLOOD GLUCOSE: CPT

## 2025-06-13 PROCEDURE — 2700000000 HC OXYGEN THERAPY PER DAY

## 2025-06-13 PROCEDURE — 2T015 HOSPITALIST 2ND TOUCH: CPT | Performed by: INTERNAL MEDICINE

## 2025-06-13 PROCEDURE — 71045 X-RAY EXAM CHEST 1 VIEW: CPT

## 2025-06-13 PROCEDURE — 1200000000 HC SEMI PRIVATE

## 2025-06-13 PROCEDURE — 94761 N-INVAS EAR/PLS OXIMETRY MLT: CPT

## 2025-06-13 PROCEDURE — 6370000000 HC RX 637 (ALT 250 FOR IP): Performed by: OTOLARYNGOLOGY

## 2025-06-13 RX ORDER — SODIUM CHLORIDE FOR INHALATION 0.9 %
3 VIAL, NEBULIZER (ML) INHALATION PRN
Status: DISCONTINUED | OUTPATIENT
Start: 2025-06-13 | End: 2025-06-16 | Stop reason: HOSPADM

## 2025-06-13 RX ORDER — BISACODYL 5 MG/1
5 TABLET, DELAYED RELEASE ORAL DAILY PRN
Status: DISCONTINUED | OUTPATIENT
Start: 2025-06-13 | End: 2025-06-16 | Stop reason: HOSPADM

## 2025-06-13 RX ADMIN — Medication 1 CAPSULE: at 08:18

## 2025-06-13 RX ADMIN — GABAPENTIN 300 MG: 300 CAPSULE ORAL at 08:17

## 2025-06-13 RX ADMIN — ACETAMINOPHEN 650 MG: 325 TABLET ORAL at 13:56

## 2025-06-13 RX ADMIN — PANTOPRAZOLE SODIUM 40 MG: 40 TABLET, DELAYED RELEASE ORAL at 06:01

## 2025-06-13 RX ADMIN — BISACODYL 10 MG: 10 SUPPOSITORY RECTAL at 08:17

## 2025-06-13 RX ADMIN — INSULIN LISPRO 1 UNITS: 100 INJECTION, SOLUTION INTRAVENOUS; SUBCUTANEOUS at 12:30

## 2025-06-13 RX ADMIN — SODIUM CHLORIDE, PRESERVATIVE FREE 10 ML: 5 INJECTION INTRAVENOUS at 08:18

## 2025-06-13 RX ADMIN — SODIUM CHLORIDE, PRESERVATIVE FREE 10 ML: 5 INJECTION INTRAVENOUS at 19:43

## 2025-06-13 RX ADMIN — OXYCODONE 10 MG: 5 TABLET ORAL at 08:42

## 2025-06-13 RX ADMIN — QUETIAPINE FUMARATE 200 MG: 100 TABLET ORAL at 19:43

## 2025-06-13 RX ADMIN — BUPROPION HYDROCHLORIDE 150 MG: 150 TABLET, EXTENDED RELEASE ORAL at 08:17

## 2025-06-13 RX ADMIN — POLYETHYLENE GLYCOL 3350 17 G: 17 POWDER, FOR SOLUTION ORAL at 08:18

## 2025-06-13 RX ADMIN — CIPROFLOXACIN 400 MG: 400 INJECTION, SOLUTION INTRAVENOUS at 15:08

## 2025-06-13 RX ADMIN — ALBUTEROL SULFATE 2 PUFF: 90 AEROSOL, METERED RESPIRATORY (INHALATION) at 21:25

## 2025-06-13 RX ADMIN — AMITRIPTYLINE HYDROCHLORIDE 50 MG: 50 TABLET ORAL at 19:43

## 2025-06-13 RX ADMIN — OXYCODONE 10 MG: 5 TABLET ORAL at 12:33

## 2025-06-13 RX ADMIN — GABAPENTIN 300 MG: 300 CAPSULE ORAL at 19:43

## 2025-06-13 RX ADMIN — CIPROFLOXACIN 400 MG: 400 INJECTION, SOLUTION INTRAVENOUS at 03:42

## 2025-06-13 RX ADMIN — OXYCODONE 10 MG: 5 TABLET ORAL at 19:43

## 2025-06-13 RX ADMIN — ATORVASTATIN CALCIUM 40 MG: 40 TABLET, FILM COATED ORAL at 08:19

## 2025-06-13 RX ADMIN — INSULIN LISPRO 1 UNITS: 100 INJECTION, SOLUTION INTRAVENOUS; SUBCUTANEOUS at 19:43

## 2025-06-13 RX ADMIN — ALBUTEROL SULFATE 2 PUFF: 90 AEROSOL, METERED RESPIRATORY (INHALATION) at 09:52

## 2025-06-13 RX ADMIN — Medication 1 CAPSULE: at 15:04

## 2025-06-13 RX ADMIN — EMPAGLIFLOZIN 25 MG: 10 TABLET, FILM COATED ORAL at 08:19

## 2025-06-13 ASSESSMENT — PAIN DESCRIPTION - ONSET
ONSET: ON-GOING

## 2025-06-13 ASSESSMENT — PAIN DESCRIPTION - DESCRIPTORS
DESCRIPTORS: DULL

## 2025-06-13 ASSESSMENT — PAIN SCALES - GENERAL
PAINLEVEL_OUTOF10: 5
PAINLEVEL_OUTOF10: 8
PAINLEVEL_OUTOF10: 7
PAINLEVEL_OUTOF10: 4
PAINLEVEL_OUTOF10: 4
PAINLEVEL_OUTOF10: 7

## 2025-06-13 ASSESSMENT — PAIN DESCRIPTION - ORIENTATION
ORIENTATION: INNER

## 2025-06-13 ASSESSMENT — PAIN DESCRIPTION - FREQUENCY
FREQUENCY: CONTINUOUS

## 2025-06-13 ASSESSMENT — PAIN DESCRIPTION - PAIN TYPE
TYPE: SURGICAL PAIN

## 2025-06-13 ASSESSMENT — PAIN - FUNCTIONAL ASSESSMENT
PAIN_FUNCTIONAL_ASSESSMENT: ACTIVITIES ARE NOT PREVENTED

## 2025-06-13 ASSESSMENT — PAIN SCALES - WONG BAKER
WONGBAKER_NUMERICALRESPONSE: NO HURT
WONGBAKER_NUMERICALRESPONSE: NO HURT

## 2025-06-13 ASSESSMENT — PAIN DESCRIPTION - LOCATION
LOCATION: THROAT

## 2025-06-13 NOTE — PLAN OF CARE
Problem: Respiratory - Adult  Goal: Clear lung sounds  6/13/2025 0953 by April Joshi, RCLUCINDA  Outcome: Progressing

## 2025-06-13 NOTE — DISCHARGE SUMMARY
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 *        Admitted for: (HPI)Surgical approach to obstructive cancer of the left hemilarynx.    Hospital Course:This is a pt with copd, diabetes mellitus, HFPEF who was discovered to have a laryngeal ca; he was admitted for surgery of this.    He was admitted and underwent surgery by Dr Rodriguez as outlined in his op note.  He was observed in ICU postop and then transferred to floor when he appeared stable.     The Hospitalist Service was asked to follow postop.  His course was fairly benign.  ENT has approved discharge today.     He had a home 02 eval and was found to need 4 liters at rest, 8 liters with activity; new equipment was ordered by .     He was discharged home and will follow up with his pcp and Dr Rodriguez.        Discharge Medications:       Medication List        CHANGE how you take these medications      OXYGEN  4 liters at rest, 8 liters with activity  What changed:   how to take this  additional instructions            CONTINUE taking these medications      * albuterol (2.5 MG/3ML) 0.083% nebulizer solution  Commonly known as: PROVENTIL  USE 3 ML BY NEBULIZATION FOUR TIMES DAILY AS NEEDED FOR WHEEZING     * albuterol sulfate  (90 Base) MCG/ACT inhaler  Commonly known as: PROVENTIL;VENTOLIN;PROAIR  2 inh four times a day as needed for SOB/wheezing     amitriptyline 50 MG tablet  Commonly known as:

## 2025-06-13 NOTE — CARE COORDINATION
6/13/25, 3:11 PM EDT    Patient goals/plan/ treatment preferences discussed by  and .  Patient goals/plan/ treatment preferences reviewed with patient/ family.  Patient/ family verbalize understanding of discharge plan and are in agreement with goal/plan/treatment preferences.  Understanding was demonstrated using the teach back method.  AVS provided by RN at time of discharge, which includes all necessary medical information pertaining to the patients current course of illness, treatment, post-discharge goals of care, and treatment preferences.     Services At/After Discharge: [facility] spoke with Kenia with [facility], updated on possible w/e dc, they can get orders, AVS through [employer].

## 2025-06-13 NOTE — PLAN OF CARE
Problem: Chronic Conditions and Co-morbidities  Goal: Patient's chronic conditions and co-morbidity symptoms are monitored and maintained or improved  Outcome: Progressing  Flowsheets (Taken 6/13/2025 0234)  Care Plan - Patient's Chronic Conditions and Co-Morbidity Symptoms are Monitored and Maintained or Improved:   Monitor and assess patient's chronic conditions and comorbid symptoms for stability, deterioration, or improvement   Collaborate with multidisciplinary team to address chronic and comorbid conditions and prevent exacerbation or deterioration   Update acute care plan with appropriate goals if chronic or comorbid symptoms are exacerbated and prevent overall improvement and discharge     Problem: Discharge Planning  Goal: Discharge to home or other facility with appropriate resources  Outcome: Progressing  Flowsheets (Taken 6/13/2025 0234)  Discharge to home or other facility with appropriate resources:   Identify barriers to discharge with patient and caregiver   Arrange for needed discharge resources and transportation as appropriate   Identify discharge learning needs (meds, wound care, etc)     Problem: Safety - Adult  Goal: Free from fall injury  Outcome: Progressing  Flowsheets (Taken 6/13/2025 0234)  Free From Fall Injury:   Based on caregiver fall risk screen, instruct family/caregiver to ask for assistance with transferring infant if caregiver noted to have fall risk factors   Instruct family/caregiver on patient safety     Problem: Pain  Goal: Verbalizes/displays adequate comfort level or baseline comfort level  Outcome: Progressing  Flowsheets (Taken 6/13/2025 0234)  Verbalizes/displays adequate comfort level or baseline comfort level:   Encourage patient to monitor pain and request assistance   Assess pain using appropriate pain scale   Administer analgesics based on type and severity of pain and evaluate response   Implement non-pharmacological measures as appropriate and evaluate response

## 2025-06-13 NOTE — DISCHARGE INSTRUCTIONS
Avoid heavy lifting for at least 2 weeks.     Please attend scheduled follow up with Dr. Rodriguez 6/23/2025 at 2:45 PM.      If you experience increased throat swelling, inability to swallow, shortness of breath, dizziness, coughing up blood, chest pain, fevers, or chills you should return to the ER for evaluation.    USE 4 LITERS OXYGEN AT REST, 8 LITERS WITH ACTIVITY        Zulema , [facility] [phone].

## 2025-06-13 NOTE — CARE COORDINATION
CM Note  [facility] oxygen 4L rest, 8L activity, new change; collaborated w patient, Anabella, [facility] Liaison, Attending, Demetrio Bansal RN  Electronically signed by Agusto Lay RN on 6/13/2025 at 1:16 PM

## 2025-06-13 NOTE — ANESTHESIA POSTPROCEDURE EVALUATION
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    Bhaskar Phase I: Bhaskar Score: 9    Bhaskar Phase II:      Anesthesia Post Evaluation    Comments: [facility]  POST-ANESTHESIA NOTE       Name:  Dilan Pappas                                         Age:  70 y.o.  MRN:  [id number]      Last Vitals:  /65   Pulse 75   Temp 97.3 °F (36.3 °C) (Oral)   Resp 16   Ht 1.854 m (6' 1\")   Wt 82.3 kg (181 lb 7 oz)   SpO2 90%   BMI 23.94 kg/m²   Patient Vitals in the past 4 hrs:  06/13/25 0700, BP:104/65, Temp:97.3 °F (36.3 °C), Temp src:Oral, Pulse:75, Resp:16, SpO2:90 %    Level of Consciousness:  Awake    Respiratory:  Stable    Oxygen Saturation:  Stable    Cardiovascular:  Stable    Hydration:  Adequate    PONV:  Stable    Post-op Pain:  Adequate analgesia    Post-op Assessment:  No apparent anesthetic complications    Additional Follow-Up / Treatment / Comment:  None    Arya Canada DO  June 13, 2025   9:09 AM      No notable events documented.

## 2025-06-13 NOTE — PLAN OF CARE
Problem: Respiratory - Adult  Goal: Achieves optimal ventilation and oxygenation  Outcome: Progressing  Goal: Clear lung sounds  Outcome: Progressing   Patient mutually agreed on goals.

## 2025-06-14 ENCOUNTER — APPOINTMENT (OUTPATIENT)
Dept: GENERAL RADIOLOGY | Age: 70
DRG: 143 | End: 2025-06-14
Attending: OTOLARYNGOLOGY
Payer: MEDICARE

## 2025-06-14 LAB
ACINETOBACTER CALCOACETICUS-BAUMANNII BY PCR: NOT DETECTED
BLACTX-M ISLT/SPM QL: NORMAL
BLAIMP ISLT/SPM QL: NORMAL
BLAKPC ISLT/SPM QL: NORMAL
BLAOXA-48-LIKE ISLT/SPM QL: NORMAL
BLAVIM ISLT/SPM QL: NORMAL
C PNEUM DNA LOWER RESP QL NAA+NON-PROBE: NOT DETECTED
ENTEROBACTER CLOACAE COMPLEX BY PCR: NOT DETECTED
ESCHERICHIA COLI BY PCR: NOT DETECTED
FLUAV RNA LOWER RESP QL NAA+NON-PROBE: NOT DETECTED
FLUBV RNA LOWER RESP QL NAA+NON-PROBE: NOT DETECTED
GLUCOSE BLD STRIP.AUTO-MCNC: 138 MG/DL (ref 70–108)
GLUCOSE BLD STRIP.AUTO-MCNC: 150 MG/DL (ref 70–108)
GLUCOSE BLD STRIP.AUTO-MCNC: 170 MG/DL (ref 70–108)
GLUCOSE BLD STRIP.AUTO-MCNC: 180 MG/DL (ref 70–108)
HADV DNA LOWER RESP QL NAA+NON-PROBE: NOT DETECTED
HAEMOPHILUS INFLUENZAE BY PCR: NOT DETECTED
HCOV RNA LOWER RESP QL NAA+NON-PROBE: NOT DETECTED
HMPV RNA LOWER RESP QL NAA+NON-PROBE: NOT DETECTED
HPIV RNA LOWER RESP QL NAA+NON-PROBE: NOT DETECTED
KLEBSIELLA AEROGENES BY PCR: NOT DETECTED
KLEBSIELLA OXYTOCA BY PCR: NOT DETECTED
KLEBSIELLA PNEUMONIAE GROUP BY PCR: NOT DETECTED
L PNEUMO DNA LOWER RESP QL NAA+NON-PROBE: NOT DETECTED
M PNEUMO DNA LOWER RESP QL NAA+NON-PROBE: NOT DETECTED
MORAXELLA CATARRHALIS BY PCR: NOT DETECTED
PROCALCITONIN SERPL IA-MCNC: 0.11 NG/ML (ref 0.01–0.09)
PROTEUS SPECIES BY PCR: NOT DETECTED
PSEUDOMONAS AERUGINOSA BY PCR: NOT DETECTED
RESISTANT GENE MECA/C & MREJ BY PCR: NORMAL
RESISTANT GENE NDM BY PCR: NORMAL
RSV RNA LOWER RESP QL NAA+NON-PROBE: NOT DETECTED
RV+EV RNA LOWER RESP QL NAA+NON-PROBE: NOT DETECTED
SERRATIA MARCESCENS BY PCR: NOT DETECTED
SOURCE: NORMAL
SPECIMEN ACCEPTABILITY: NORMAL
STAPH AUREUS BY PCR: NOT DETECTED
STREP AGALACTIAE BY PCR: NOT DETECTED
STREP PNEUMONIAE BY PCR: NOT DETECTED
STREP PYOGENES BY PCR: NOT DETECTED

## 2025-06-14 PROCEDURE — 84145 PROCALCITONIN (PCT): CPT

## 2025-06-14 PROCEDURE — 6370000000 HC RX 637 (ALT 250 FOR IP): Performed by: OTOLARYNGOLOGY

## 2025-06-14 PROCEDURE — 71046 X-RAY EXAM CHEST 2 VIEWS: CPT

## 2025-06-14 PROCEDURE — 87186 SC STD MICRODIL/AGAR DIL: CPT

## 2025-06-14 PROCEDURE — 87486 CHLMYD PNEUM DNA AMP PROBE: CPT

## 2025-06-14 PROCEDURE — 87070 CULTURE OTHR SPECIMN AEROBIC: CPT

## 2025-06-14 PROCEDURE — 6360000002 HC RX W HCPCS

## 2025-06-14 PROCEDURE — 87106 FUNGI IDENTIFICATION YEAST: CPT

## 2025-06-14 PROCEDURE — 6370000000 HC RX 637 (ALT 250 FOR IP): Performed by: INTERNAL MEDICINE

## 2025-06-14 PROCEDURE — 2500000003 HC RX 250 WO HCPCS: Performed by: OTOLARYNGOLOGY

## 2025-06-14 PROCEDURE — 94761 N-INVAS EAR/PLS OXIMETRY MLT: CPT

## 2025-06-14 PROCEDURE — 1200000000 HC SEMI PRIVATE

## 2025-06-14 PROCEDURE — 87205 SMEAR GRAM STAIN: CPT

## 2025-06-14 PROCEDURE — 87798 DETECT AGENT NOS DNA AMP: CPT

## 2025-06-14 PROCEDURE — 6370000000 HC RX 637 (ALT 250 FOR IP)

## 2025-06-14 PROCEDURE — 94640 AIRWAY INHALATION TREATMENT: CPT

## 2025-06-14 PROCEDURE — 87581 M.PNEUMON DNA AMP PROBE: CPT

## 2025-06-14 PROCEDURE — 6370000000 HC RX 637 (ALT 250 FOR IP): Performed by: STUDENT IN AN ORGANIZED HEALTH CARE EDUCATION/TRAINING PROGRAM

## 2025-06-14 PROCEDURE — 87541 LEGION PNEUMO DNA AMP PROB: CPT

## 2025-06-14 PROCEDURE — 2700000000 HC OXYGEN THERAPY PER DAY

## 2025-06-14 PROCEDURE — 2580000003 HC RX 258: Performed by: INTERNAL MEDICINE

## 2025-06-14 PROCEDURE — 82948 REAGENT STRIP/BLOOD GLUCOSE: CPT

## 2025-06-14 PROCEDURE — 87631 RESP VIRUS 3-5 TARGETS: CPT

## 2025-06-14 PROCEDURE — 87077 CULTURE AEROBIC IDENTIFY: CPT

## 2025-06-14 PROCEDURE — 36415 COLL VENOUS BLD VENIPUNCTURE: CPT

## 2025-06-14 PROCEDURE — 99232 SBSQ HOSP IP/OBS MODERATE 35: CPT | Performed by: INTERNAL MEDICINE

## 2025-06-14 RX ORDER — SODIUM CHLORIDE FOR INHALATION 3 %
4 VIAL, NEBULIZER (ML) INHALATION 3 TIMES DAILY
Status: DISCONTINUED | OUTPATIENT
Start: 2025-06-14 | End: 2025-06-16 | Stop reason: HOSPADM

## 2025-06-14 RX ORDER — CIPROFLOXACIN 500 MG/1
500 TABLET, FILM COATED ORAL EVERY 12 HOURS SCHEDULED
Status: DISCONTINUED | OUTPATIENT
Start: 2025-06-14 | End: 2025-06-16 | Stop reason: HOSPADM

## 2025-06-14 RX ORDER — ALOGLIPTIN 25 MG/1
25 TABLET, FILM COATED ORAL DAILY
Status: DISCONTINUED | OUTPATIENT
Start: 2025-06-14 | End: 2025-06-16 | Stop reason: HOSPADM

## 2025-06-14 RX ORDER — PIOGLITAZONE 30 MG/1
30 TABLET ORAL DAILY
Status: DISCONTINUED | OUTPATIENT
Start: 2025-06-14 | End: 2025-06-16 | Stop reason: HOSPADM

## 2025-06-14 RX ORDER — GUAIFENESIN 600 MG/1
600 TABLET, EXTENDED RELEASE ORAL 2 TIMES DAILY
Qty: 60 TABLET | Refills: 0 | Status: SHIPPED | OUTPATIENT
Start: 2025-06-14 | End: 2025-07-14

## 2025-06-14 RX ORDER — GUAIFENESIN 600 MG/1
1200 TABLET, EXTENDED RELEASE ORAL 2 TIMES DAILY
Status: DISCONTINUED | OUTPATIENT
Start: 2025-06-14 | End: 2025-06-16 | Stop reason: HOSPADM

## 2025-06-14 RX ORDER — GUAIFENESIN 600 MG/1
600 TABLET, EXTENDED RELEASE ORAL 2 TIMES DAILY
Status: DISCONTINUED | OUTPATIENT
Start: 2025-06-14 | End: 2025-06-14

## 2025-06-14 RX ADMIN — CIPROFLOXACIN 400 MG: 400 INJECTION, SOLUTION INTRAVENOUS at 04:26

## 2025-06-14 RX ADMIN — EMPAGLIFLOZIN 25 MG: 10 TABLET, FILM COATED ORAL at 08:01

## 2025-06-14 RX ADMIN — AMITRIPTYLINE HYDROCHLORIDE 50 MG: 50 TABLET ORAL at 20:49

## 2025-06-14 RX ADMIN — ALBUTEROL SULFATE 2 PUFF: 90 AEROSOL, METERED RESPIRATORY (INHALATION) at 09:10

## 2025-06-14 RX ADMIN — METFORMIN HYDROCHLORIDE 1000 MG: 500 TABLET ORAL at 15:43

## 2025-06-14 RX ADMIN — ALOGLIPTIN 25 MG: 25 TABLET, FILM COATED ORAL at 15:42

## 2025-06-14 RX ADMIN — ALBUTEROL SULFATE 2 PUFF: 90 AEROSOL, METERED RESPIRATORY (INHALATION) at 20:03

## 2025-06-14 RX ADMIN — SODIUM CHLORIDE, PRESERVATIVE FREE 10 ML: 5 INJECTION INTRAVENOUS at 20:49

## 2025-06-14 RX ADMIN — OXYCODONE 10 MG: 5 TABLET ORAL at 20:49

## 2025-06-14 RX ADMIN — OXYCODONE 10 MG: 5 TABLET ORAL at 07:58

## 2025-06-14 RX ADMIN — CIPROFLOXACIN HYDROCHLORIDE 500 MG: 500 TABLET, FILM COATED ORAL at 20:49

## 2025-06-14 RX ADMIN — PANTOPRAZOLE SODIUM 40 MG: 40 TABLET, DELAYED RELEASE ORAL at 05:43

## 2025-06-14 RX ADMIN — Medication 4 ML: at 16:43

## 2025-06-14 RX ADMIN — POLYETHYLENE GLYCOL 3350 17 G: 17 POWDER, FOR SOLUTION ORAL at 07:58

## 2025-06-14 RX ADMIN — INSULIN LISPRO 1 UNITS: 100 INJECTION, SOLUTION INTRAVENOUS; SUBCUTANEOUS at 12:35

## 2025-06-14 RX ADMIN — Medication 1 CAPSULE: at 07:58

## 2025-06-14 RX ADMIN — GUAIFENESIN 600 MG: 600 TABLET, MULTILAYER, EXTENDED RELEASE ORAL at 08:22

## 2025-06-14 RX ADMIN — BUPROPION HYDROCHLORIDE 150 MG: 150 TABLET, EXTENDED RELEASE ORAL at 08:00

## 2025-06-14 RX ADMIN — GABAPENTIN 300 MG: 300 CAPSULE ORAL at 20:48

## 2025-06-14 RX ADMIN — GUAIFENESIN 1200 MG: 600 TABLET, MULTILAYER, EXTENDED RELEASE ORAL at 20:49

## 2025-06-14 RX ADMIN — PIOGLITAZONE 30 MG: 30 TABLET ORAL at 15:43

## 2025-06-14 RX ADMIN — BISACODYL 5 MG: 5 TABLET, COATED ORAL at 07:59

## 2025-06-14 RX ADMIN — Medication 4 ML: at 20:03

## 2025-06-14 RX ADMIN — Medication 1 CAPSULE: at 15:37

## 2025-06-14 RX ADMIN — GABAPENTIN 300 MG: 300 CAPSULE ORAL at 08:00

## 2025-06-14 RX ADMIN — QUETIAPINE FUMARATE 200 MG: 100 TABLET ORAL at 20:49

## 2025-06-14 RX ADMIN — SODIUM CHLORIDE, PRESERVATIVE FREE 10 ML: 5 INJECTION INTRAVENOUS at 08:00

## 2025-06-14 RX ADMIN — OXYCODONE 10 MG: 5 TABLET ORAL at 15:37

## 2025-06-14 RX ADMIN — ATORVASTATIN CALCIUM 40 MG: 40 TABLET, FILM COATED ORAL at 07:59

## 2025-06-14 ASSESSMENT — PAIN SCALES - GENERAL
PAINLEVEL_OUTOF10: 10
PAINLEVEL_OUTOF10: 8
PAINLEVEL_OUTOF10: 9
PAINLEVEL_OUTOF10: 0

## 2025-06-14 ASSESSMENT — PAIN DESCRIPTION - DESCRIPTORS
DESCRIPTORS: SHARP;DULL
DESCRIPTORS: SHARP;ACHING

## 2025-06-14 ASSESSMENT — PAIN DESCRIPTION - ONSET
ONSET: ON-GOING
ONSET: ON-GOING

## 2025-06-14 ASSESSMENT — PAIN DESCRIPTION - PAIN TYPE
TYPE: SURGICAL PAIN
TYPE: SURGICAL PAIN

## 2025-06-14 ASSESSMENT — PAIN DESCRIPTION - FREQUENCY
FREQUENCY: CONTINUOUS
FREQUENCY: CONTINUOUS

## 2025-06-14 ASSESSMENT — PAIN DESCRIPTION - LOCATION
LOCATION: THROAT
LOCATION: NECK
LOCATION: THROAT

## 2025-06-14 ASSESSMENT — PAIN DESCRIPTION - ORIENTATION
ORIENTATION: INNER
ORIENTATION: INNER

## 2025-06-14 ASSESSMENT — PAIN SCALES - WONG BAKER: WONGBAKER_NUMERICALRESPONSE: NO HURT

## 2025-06-14 NOTE — PLAN OF CARE
Problem: Respiratory - Adult  Goal: Achieves optimal ventilation and oxygenation  Outcome: Progressing     Problem: Respiratory - Adult  Goal: Clear lung sounds  6/13/2025 2130 by Alejandra Almaraz RCP  Outcome: Progressing   Patient mutually agrees upon goals.

## 2025-06-14 NOTE — PLAN OF CARE
Problem: Respiratory - Adult  Goal: Clear lung sounds  6/14/2025 0913 by Bk Perla, P  Outcome: Progressing   Patient mutually agreed on goals.

## 2025-06-15 LAB
GLUCOSE BLD STRIP.AUTO-MCNC: 134 MG/DL (ref 70–108)
GLUCOSE BLD STRIP.AUTO-MCNC: 134 MG/DL (ref 70–108)
GLUCOSE BLD STRIP.AUTO-MCNC: 146 MG/DL (ref 70–108)
GLUCOSE BLD STRIP.AUTO-MCNC: 193 MG/DL (ref 70–108)
GLUCOSE BLD STRIP.AUTO-MCNC: 249 MG/DL (ref 70–108)
MAGNESIUM SERPL-MCNC: 1.9 MG/DL (ref 1.6–2.6)

## 2025-06-15 PROCEDURE — 1200000003 HC TELEMETRY R&B

## 2025-06-15 PROCEDURE — 2700000000 HC OXYGEN THERAPY PER DAY

## 2025-06-15 PROCEDURE — 2500000003 HC RX 250 WO HCPCS: Performed by: OTOLARYNGOLOGY

## 2025-06-15 PROCEDURE — 2580000003 HC RX 258: Performed by: INTERNAL MEDICINE

## 2025-06-15 PROCEDURE — 6370000000 HC RX 637 (ALT 250 FOR IP): Performed by: OTOLARYNGOLOGY

## 2025-06-15 PROCEDURE — 6370000000 HC RX 637 (ALT 250 FOR IP): Performed by: INTERNAL MEDICINE

## 2025-06-15 PROCEDURE — 83735 ASSAY OF MAGNESIUM: CPT

## 2025-06-15 PROCEDURE — 6370000000 HC RX 637 (ALT 250 FOR IP)

## 2025-06-15 PROCEDURE — 94640 AIRWAY INHALATION TREATMENT: CPT

## 2025-06-15 PROCEDURE — 36415 COLL VENOUS BLD VENIPUNCTURE: CPT

## 2025-06-15 PROCEDURE — 99232 SBSQ HOSP IP/OBS MODERATE 35: CPT | Performed by: INTERNAL MEDICINE

## 2025-06-15 PROCEDURE — 82948 REAGENT STRIP/BLOOD GLUCOSE: CPT

## 2025-06-15 PROCEDURE — 94761 N-INVAS EAR/PLS OXIMETRY MLT: CPT

## 2025-06-15 PROCEDURE — 6370000000 HC RX 637 (ALT 250 FOR IP): Performed by: STUDENT IN AN ORGANIZED HEALTH CARE EDUCATION/TRAINING PROGRAM

## 2025-06-15 RX ORDER — ALBUTEROL SULFATE 90 UG/1
2 INHALANT RESPIRATORY (INHALATION)
Status: DISCONTINUED | OUTPATIENT
Start: 2025-06-16 | End: 2025-06-16 | Stop reason: HOSPADM

## 2025-06-15 RX ORDER — CIPROFLOXACIN 500 MG/1
500 TABLET, FILM COATED ORAL EVERY 12 HOURS SCHEDULED
Qty: 4 TABLET | Refills: 0 | Status: SHIPPED | OUTPATIENT
Start: 2025-06-15 | End: 2025-06-16 | Stop reason: HOSPADM

## 2025-06-15 RX ADMIN — AMITRIPTYLINE HYDROCHLORIDE 50 MG: 50 TABLET ORAL at 20:54

## 2025-06-15 RX ADMIN — CIPROFLOXACIN HYDROCHLORIDE 500 MG: 500 TABLET, FILM COATED ORAL at 20:54

## 2025-06-15 RX ADMIN — GUAIFENESIN 1200 MG: 600 TABLET, MULTILAYER, EXTENDED RELEASE ORAL at 09:29

## 2025-06-15 RX ADMIN — BISACODYL 5 MG: 5 TABLET, COATED ORAL at 09:29

## 2025-06-15 RX ADMIN — GUAIFENESIN 1200 MG: 600 TABLET, MULTILAYER, EXTENDED RELEASE ORAL at 20:54

## 2025-06-15 RX ADMIN — BUPROPION HYDROCHLORIDE 150 MG: 150 TABLET, EXTENDED RELEASE ORAL at 09:29

## 2025-06-15 RX ADMIN — ALBUTEROL SULFATE 2 PUFF: 90 AEROSOL, METERED RESPIRATORY (INHALATION) at 21:07

## 2025-06-15 RX ADMIN — SODIUM CHLORIDE, PRESERVATIVE FREE 10 ML: 5 INJECTION INTRAVENOUS at 09:28

## 2025-06-15 RX ADMIN — Medication 4 ML: at 08:08

## 2025-06-15 RX ADMIN — Medication 4 ML: at 13:23

## 2025-06-15 RX ADMIN — SODIUM CHLORIDE, PRESERVATIVE FREE 10 ML: 5 INJECTION INTRAVENOUS at 20:54

## 2025-06-15 RX ADMIN — INSULIN LISPRO 1 UNITS: 100 INJECTION, SOLUTION INTRAVENOUS; SUBCUTANEOUS at 20:54

## 2025-06-15 RX ADMIN — METFORMIN HYDROCHLORIDE 1000 MG: 500 TABLET ORAL at 09:28

## 2025-06-15 RX ADMIN — POLYETHYLENE GLYCOL 3350 17 G: 17 POWDER, FOR SOLUTION ORAL at 09:28

## 2025-06-15 RX ADMIN — ATORVASTATIN CALCIUM 40 MG: 40 TABLET, FILM COATED ORAL at 09:28

## 2025-06-15 RX ADMIN — OXYCODONE 10 MG: 5 TABLET ORAL at 09:29

## 2025-06-15 RX ADMIN — Medication 1 CAPSULE: at 16:13

## 2025-06-15 RX ADMIN — ALBUTEROL SULFATE 2 PUFF: 90 AEROSOL, METERED RESPIRATORY (INHALATION) at 08:08

## 2025-06-15 RX ADMIN — PIOGLITAZONE 30 MG: 30 TABLET ORAL at 09:32

## 2025-06-15 RX ADMIN — ALOGLIPTIN 25 MG: 25 TABLET, FILM COATED ORAL at 09:28

## 2025-06-15 RX ADMIN — Medication 4 ML: at 21:07

## 2025-06-15 RX ADMIN — CIPROFLOXACIN HYDROCHLORIDE 500 MG: 500 TABLET, FILM COATED ORAL at 09:28

## 2025-06-15 RX ADMIN — EMPAGLIFLOZIN 25 MG: 10 TABLET, FILM COATED ORAL at 09:28

## 2025-06-15 RX ADMIN — OXYCODONE 10 MG: 5 TABLET ORAL at 20:54

## 2025-06-15 RX ADMIN — QUETIAPINE FUMARATE 200 MG: 100 TABLET ORAL at 20:54

## 2025-06-15 RX ADMIN — GABAPENTIN 300 MG: 300 CAPSULE ORAL at 09:29

## 2025-06-15 RX ADMIN — GABAPENTIN 300 MG: 300 CAPSULE ORAL at 20:55

## 2025-06-15 RX ADMIN — PANTOPRAZOLE SODIUM 40 MG: 40 TABLET, DELAYED RELEASE ORAL at 05:41

## 2025-06-15 RX ADMIN — INSULIN LISPRO 1 UNITS: 100 INJECTION, SOLUTION INTRAVENOUS; SUBCUTANEOUS at 12:37

## 2025-06-15 RX ADMIN — METFORMIN HYDROCHLORIDE 1000 MG: 500 TABLET ORAL at 18:50

## 2025-06-15 RX ADMIN — Medication 1 CAPSULE: at 09:28

## 2025-06-15 RX ADMIN — OXYCODONE 10 MG: 5 TABLET ORAL at 16:13

## 2025-06-15 ASSESSMENT — PAIN SCALES - GENERAL
PAINLEVEL_OUTOF10: 8
PAINLEVEL_OUTOF10: 9
PAINLEVEL_OUTOF10: 7
PAINLEVEL_OUTOF10: 4
PAINLEVEL_OUTOF10: 5

## 2025-06-15 ASSESSMENT — PAIN DESCRIPTION - ONSET: ONSET: ON-GOING

## 2025-06-15 ASSESSMENT — PAIN DESCRIPTION - ORIENTATION
ORIENTATION: UPPER
ORIENTATION: INNER

## 2025-06-15 ASSESSMENT — PAIN DESCRIPTION - PAIN TYPE: TYPE: SURGICAL PAIN

## 2025-06-15 ASSESSMENT — PAIN DESCRIPTION - LOCATION
LOCATION: THROAT
LOCATION: THROAT
LOCATION: NECK

## 2025-06-15 ASSESSMENT — PAIN DESCRIPTION - DESCRIPTORS
DESCRIPTORS: DULL;SHARP
DESCRIPTORS: SHARP;DISCOMFORT
DESCRIPTORS: BURNING;DISCOMFORT

## 2025-06-15 ASSESSMENT — PAIN DESCRIPTION - FREQUENCY: FREQUENCY: CONTINUOUS

## 2025-06-15 NOTE — PLAN OF CARE
Problem: Respiratory - Adult  Goal: Achieves optimal ventilation and oxygenation  6/15/2025 0816 by Bk Perla, CHELSI  Outcome: Progressing  Problem: Respiratory - Adult  Goal: Clear lung sounds  6/15/2025 0816 by Bk Perla, ALCIDESP  Outcome: Progressing   Patient mutually agreed on goals.

## 2025-06-15 NOTE — PLAN OF CARE
Problem: Respiratory - Adult  Goal: Achieves optimal ventilation and oxygenation  Outcome: Progressing     Problem: Respiratory - Adult  Goal: Clear lung sounds  6/14/2025 2008 by Alejandra Almaraz RCP  Outcome: Progressing   Patient mutually agrees upon goals.

## 2025-06-15 NOTE — PLAN OF CARE
Problem: Chronic Conditions and Co-morbidities  Goal: Patient's chronic conditions and co-morbidity symptoms are monitored and maintained or improved  Outcome: Not Progressing     Problem: Pain  Goal: Verbalizes/displays adequate comfort level or baseline comfort level  Outcome: Not Progressing     Problem: Respiratory - Adult  Goal: Achieves optimal ventilation and oxygenation  6/15/2025 0402 by Colby Oshea RN  Outcome: Not Progressing  6/14/2025 2008 by Alejandra Almaraz RCP  Outcome: Progressing     Problem: Chronic Conditions and Co-morbidities  Goal: Patient's chronic conditions and co-morbidity symptoms are monitored and maintained or improved  Outcome: Not Progressing     Problem: Pain  Goal: Verbalizes/displays adequate comfort level or baseline comfort level  Outcome: Not Progressing     Problem: Respiratory - Adult  Goal: Achieves optimal ventilation and oxygenation  6/15/2025 0402 by Colby Oshea RN  Outcome: Not Progressing  6/14/2025 2008 by Alejandra Almaraz RCP  Outcome: Progressing  Care plan reviewed with patient. Patient verbalize understanding of the plan of care and contribute to goal setting.

## 2025-06-16 ENCOUNTER — TELEPHONE (OUTPATIENT)
Dept: ONCOLOGY | Age: 70
End: 2025-06-16

## 2025-06-16 VITALS
HEART RATE: 81 BPM | TEMPERATURE: 97.5 F | DIASTOLIC BLOOD PRESSURE: 59 MMHG | OXYGEN SATURATION: 96 % | RESPIRATION RATE: 20 BRPM | HEIGHT: 73 IN | BODY MASS INDEX: 23.32 KG/M2 | SYSTOLIC BLOOD PRESSURE: 105 MMHG | WEIGHT: 175.93 LBS

## 2025-06-16 DIAGNOSIS — Z91.89 ADJUSTMENT TO LIFE THREATENING ILLNESS: Primary | ICD-10-CM

## 2025-06-16 LAB
ANION GAP SERPL CALC-SCNC: 11 MEQ/L (ref 8–16)
BUN SERPL-MCNC: 11 MG/DL (ref 8–23)
CALCIUM SERPL-MCNC: 10.1 MG/DL (ref 8.8–10.2)
CHLORIDE SERPL-SCNC: 97 MEQ/L (ref 98–111)
CO2 SERPL-SCNC: 28 MEQ/L (ref 22–29)
CREAT SERPL-MCNC: 0.7 MG/DL (ref 0.7–1.2)
DEPRECATED RDW RBC AUTO: 45.1 FL (ref 35–45)
ERYTHROCYTE [DISTWIDTH] IN BLOOD BY AUTOMATED COUNT: 14 % (ref 11.5–14.5)
GFR SERPL CREATININE-BSD FRML MDRD: > 90 ML/MIN/1.73M2
GLUCOSE BLD STRIP.AUTO-MCNC: 133 MG/DL (ref 70–108)
GLUCOSE BLD STRIP.AUTO-MCNC: 145 MG/DL (ref 70–108)
GLUCOSE SERPL-MCNC: 129 MG/DL (ref 74–109)
HCT VFR BLD AUTO: 38.1 % (ref 42–52)
HGB BLD-MCNC: 11.9 GM/DL (ref 14–18)
MCH RBC QN AUTO: 27.9 PG (ref 26–33)
MCHC RBC AUTO-ENTMCNC: 31.2 GM/DL (ref 32.2–35.5)
MCV RBC AUTO: 89.4 FL (ref 80–94)
PLATELET # BLD AUTO: 332 THOU/MM3 (ref 130–400)
PMV BLD AUTO: 9.2 FL (ref 9.4–12.4)
POTASSIUM SERPL-SCNC: 4 MEQ/L (ref 3.5–5.2)
RBC # BLD AUTO: 4.26 MILL/MM3 (ref 4.7–6.1)
SODIUM SERPL-SCNC: 136 MEQ/L (ref 135–145)
WBC # BLD AUTO: 11.8 THOU/MM3 (ref 4.8–10.8)

## 2025-06-16 PROCEDURE — 2500000003 HC RX 250 WO HCPCS: Performed by: OTOLARYNGOLOGY

## 2025-06-16 PROCEDURE — 85027 COMPLETE CBC AUTOMATED: CPT

## 2025-06-16 PROCEDURE — 2700000000 HC OXYGEN THERAPY PER DAY

## 2025-06-16 PROCEDURE — 6370000000 HC RX 637 (ALT 250 FOR IP): Performed by: OTOLARYNGOLOGY

## 2025-06-16 PROCEDURE — 6370000000 HC RX 637 (ALT 250 FOR IP): Performed by: INTERNAL MEDICINE

## 2025-06-16 PROCEDURE — 80048 BASIC METABOLIC PNL TOTAL CA: CPT

## 2025-06-16 PROCEDURE — 82948 REAGENT STRIP/BLOOD GLUCOSE: CPT

## 2025-06-16 PROCEDURE — 36415 COLL VENOUS BLD VENIPUNCTURE: CPT

## 2025-06-16 PROCEDURE — 6370000000 HC RX 637 (ALT 250 FOR IP)

## 2025-06-16 PROCEDURE — 2580000003 HC RX 258: Performed by: INTERNAL MEDICINE

## 2025-06-16 PROCEDURE — 97530 THERAPEUTIC ACTIVITIES: CPT

## 2025-06-16 PROCEDURE — 97535 SELF CARE MNGMENT TRAINING: CPT

## 2025-06-16 PROCEDURE — 94640 AIRWAY INHALATION TREATMENT: CPT

## 2025-06-16 RX ORDER — OXYCODONE HYDROCHLORIDE 5 MG/1
5 TABLET ORAL EVERY 6 HOURS PRN
Qty: 12 TABLET | Refills: 0 | Status: SHIPPED | OUTPATIENT
Start: 2025-06-16 | End: 2025-06-21

## 2025-06-16 RX ADMIN — Medication 1 CAPSULE: at 09:17

## 2025-06-16 RX ADMIN — LISINOPRIL 2.5 MG: 2.5 TABLET ORAL at 09:21

## 2025-06-16 RX ADMIN — OXYCODONE 10 MG: 5 TABLET ORAL at 05:55

## 2025-06-16 RX ADMIN — METFORMIN HYDROCHLORIDE 1000 MG: 500 TABLET ORAL at 09:21

## 2025-06-16 RX ADMIN — GUAIFENESIN 1200 MG: 600 TABLET, MULTILAYER, EXTENDED RELEASE ORAL at 09:21

## 2025-06-16 RX ADMIN — OXYCODONE 10 MG: 5 TABLET ORAL at 01:25

## 2025-06-16 RX ADMIN — CIPROFLOXACIN HYDROCHLORIDE 500 MG: 500 TABLET, FILM COATED ORAL at 09:21

## 2025-06-16 RX ADMIN — ALOGLIPTIN 25 MG: 25 TABLET, FILM COATED ORAL at 09:21

## 2025-06-16 RX ADMIN — Medication 4 ML: at 08:34

## 2025-06-16 RX ADMIN — GABAPENTIN 300 MG: 300 CAPSULE ORAL at 09:21

## 2025-06-16 RX ADMIN — BUPROPION HYDROCHLORIDE 150 MG: 150 TABLET, EXTENDED RELEASE ORAL at 09:21

## 2025-06-16 RX ADMIN — OXYCODONE 10 MG: 5 TABLET ORAL at 15:32

## 2025-06-16 RX ADMIN — POLYETHYLENE GLYCOL 3350 17 G: 17 POWDER, FOR SOLUTION ORAL at 09:17

## 2025-06-16 RX ADMIN — ALBUTEROL SULFATE 2 PUFF: 90 AEROSOL, METERED RESPIRATORY (INHALATION) at 08:34

## 2025-06-16 RX ADMIN — PIOGLITAZONE 30 MG: 30 TABLET ORAL at 09:21

## 2025-06-16 RX ADMIN — ALBUTEROL SULFATE 2 PUFF: 90 AEROSOL, METERED RESPIRATORY (INHALATION) at 13:10

## 2025-06-16 RX ADMIN — ATORVASTATIN CALCIUM 40 MG: 40 TABLET, FILM COATED ORAL at 09:21

## 2025-06-16 RX ADMIN — Medication 4 ML: at 13:10

## 2025-06-16 RX ADMIN — EMPAGLIFLOZIN 25 MG: 10 TABLET, FILM COATED ORAL at 09:21

## 2025-06-16 RX ADMIN — OXYCODONE 5 MG: 5 TABLET ORAL at 11:01

## 2025-06-16 RX ADMIN — PANTOPRAZOLE SODIUM 40 MG: 40 TABLET, DELAYED RELEASE ORAL at 05:55

## 2025-06-16 RX ADMIN — SODIUM CHLORIDE, PRESERVATIVE FREE 10 ML: 5 INJECTION INTRAVENOUS at 09:20

## 2025-06-16 ASSESSMENT — PAIN DESCRIPTION - ORIENTATION
ORIENTATION: ANTERIOR
ORIENTATION: ANTERIOR

## 2025-06-16 ASSESSMENT — PAIN DESCRIPTION - DESCRIPTORS
DESCRIPTORS: ACHING;SORE
DESCRIPTORS: ACHING;SHARP
DESCRIPTORS: ACHING;SORE
DESCRIPTORS: ACHING;SORE

## 2025-06-16 ASSESSMENT — PAIN DESCRIPTION - LOCATION
LOCATION: THROAT
LOCATION: NECK
LOCATION: THROAT
LOCATION: THROAT

## 2025-06-16 ASSESSMENT — PAIN SCALES - GENERAL
PAINLEVEL_OUTOF10: 5
PAINLEVEL_OUTOF10: 5
PAINLEVEL_OUTOF10: 7

## 2025-06-16 ASSESSMENT — PAIN - FUNCTIONAL ASSESSMENT
PAIN_FUNCTIONAL_ASSESSMENT: ACTIVITIES ARE NOT PREVENTED
PAIN_FUNCTIONAL_ASSESSMENT: PREVENTS OR INTERFERES SOME ACTIVE ACTIVITIES AND ADLS

## 2025-06-16 NOTE — TELEPHONE ENCOUNTER
Oncology Social Work     Date: 5/30/2025  Time: 10:20 AM  Name: Dilan Pappas  MRN: [id number]      Contact Type: Distress Tool Follow-up     Note:   Situation: This staff called Dilan Pappas via phone support to introduce myself as the Oncology Social Worker.      Background: Dilan was referred to this staff by the Pulmonology Dept after a recent appointment here. This staff was calling to review the Distress Thermometer provided during their visit.     Coping Score 2     Areas of Concern Identified     Physical Concern: Changes in appetite/weight     Expressive Concern: Changes in appearance     Social Concern: None selected     Practical Concern: Taking care of myself and Treatment decisions     Existential Concern: None selected     Assessment: This staff had the opportunity to speak with Dilan. He seemed pleasant as we discussed the call's purpose was to educate about the services provided by the . He was struggling to express his thoughts as the words were difficult to speak (voice is very raspy). He isn't sure about the treatment plan but will be meeting with the radiation and infusion doctors next week   - The opportunity to review his filed Advance Directive was provided. All information is accurate at this time  - No community referrals were placed now because he is too early to know or understand what services he may need. Therefore, his referral services may be reconsidered should his needs regarding assistance change.     Recommendation: Follow-up will be initiated based on need.  provided my contact information and will remain available for support.          Pili Samuel, MSW, LSW, ONC-C, ACH[facility]-  Oncology Social Worker

## 2025-06-16 NOTE — CARE COORDINATION
6/16/25, 9:33 AM EDT    DISCHARGE PLANNING EVALUATION    SW messaged [facility]  this morning, anticipating dc home today.     6/16/25, 12:48 PM EDT    Patient goals/plan/ treatment preferences discussed by  and .  Patient goals/plan/ treatment preferences reviewed with patient/ family.  Patient/ family verbalize understanding of discharge plan and are in agreement with goal/plan/treatment preferences.  Understanding was demonstrated using the teach back method.  AVS provided by RN at time of discharge, which includes all necessary medical information pertaining to the patients current course of illness, treatment, post-discharge goals of care, and treatment preferences.     Services At/After Discharge: [facility] [place]    Call to [facility] [place], updated on pt discharging home today, they can get orders and AVS through [employer].

## 2025-06-16 NOTE — PROGRESS NOTES
Hospitalist Progress Note  Internal Medicine Resident      Patient: Dilan Pappas 70 y.o. male      Unit/Bed: -22/022-A    Admit Date: 6/6/2025      ASSESSMENT AND PLAN  Active Problems  Acute on chronic hypoxic respiratory failure: P Pt required 4 L NC oxygen which is baseline. ENT planning to observe over the weekend due to concern of increased mucus production.  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 Surgical pathology positive for  enterobacter cloacae complex. Pt was Unasyn but switched to cipr0 for cartilage infection prevention.  Continue pain meds  Per ENT hold off G tube placement at this time.  Tolerating p.o   Monitor for bleed and worsening respiration  Pt noted to have continued mucus production  Physical deconditioning: PT/OT. Ordered Home health care with PT/OT, HHA, SN and SLP    Resolved Problems  HAGMA  Reactive leukocytosis  Chronic Conditions (reviewed and stable unless otherwise stated)  Nonobstructive CAD  HFpEF not in acute exacerbation   COPD on 3 L nasal cannula at baseline:  Continue DuoNebs. Patient on Trelegy at home, however cannot start on Flonase due to surgery. Incentive spirometer and Acapella.    Hypertension: hold lisinopril   Hyperlipidemia: cont  lipitor   IDDM2: hold Jardiance and Januvia. Cont LDSSI. Hypoglycemia control.  GERD:   Thyroid nodules  Chronic back pain  Depression        LDA: []CVC / []PICC / []Midline / []Hilton / []Drains / []Mediport / [x]None  Antibiotics: Cipro  Steroids: None  Labs (still needed?): [x]Yes / []No  IVF (still needed?): []Yes / [x]No    Level of care: [x]Step Down / []Med-Surg  Bed Status: [x]Inpatient / []Observation  Telemetry: [x]Yes / []No  PT/OT: []Yes / []No    DVT Prophylaxis: [] Lovenox / [] Heparin / [] SCDs / [] Already on Systemic Anticoagulation / [x] None 
    Hospitalist Progress Note  Internal Medicine Resident      Patient: Dilan Pappas 70 y.o. male      Unit/Bed: -22/022-A    Admit Date: 6/6/2025      ASSESSMENT AND PLAN  Active Problems  Acute on chronic hypoxic respiratory failure: Pt noted to have oxygen requirement higher than baseline on on exertion.  Patient on 4 L on exertion.  However noted requiring 6 L nasal cannula with exertion on 6/13.  Possible etiology include mucus production.  Chest x-ray no acute changes from prior images.  Given this new higher oxygen requirement on exertion, will hold off discharging patient today and reassess if patient is candidate for discharge on 6/14.  Continue weaning down oxygen requirement to baseline.  Continue incentive spirometry and Acapella.  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 Surgical pathology positive for  enterobacter cloacae complex. Pt was Unasyn but switched to cipr0 for cartilage infection prevention.  Continue pain meds  Per ENT hold off G tube placement at this time.  Tolerating p.o   Monitor for bleed and worsening respiration  Pt noted to have continued mucus production  Physical deconditioning: PT/OT. Ordered Home health care with PT/OT, HHA, SN and SLP    Resolved Problems  HAGMA  Reactive leukocytosis  Chronic Conditions (reviewed and stable unless otherwise stated)  Nonobstructive CAD  HFpEF not in acute exacerbation   COPD on 3 L nasal cannula at baseline:  Continue DuoNebs. Patient on Trelegy at home, however cannot start on Flonase due to surgery. Incentive spirometer and Acapella.    Hypertension: hold lisinopril   Hyperlipidemia: cont  lipitor   IDDM2: hold Jardiance and Januvia. Cont LDSSI. Hypoglycemia control.  GERD:   Thyroid nodules  Chronic back pain  Depression        LDA: []CVC / []PICC / []Midline / []Hilton / 
    Hospitalist Progress Note  Internal Medicine Resident      Patient: Dilan Pappas 70 y.o. male      Unit/Bed: -22/022-A    Admit Date: 6/6/2025      ASSESSMENT AND PLAN  Active Problems  Chronic hypoxic respiratory failure: Patient currently at baseline 4 L oxygen requirement at home, currently saturating well on 4 L.  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   BAL washing positive for Enterobacter cloacae a complex, currently on ciprofloxacin, will continue at discharge for total of 4 more days 500 mg every 12 hours  Patient educated about chin tuck method for swallowing to avoid risk of aspiration  Contacted ENT service about discharge, would like to monitor for an additional 24 hrs plan to DC in am 6/16/265  Physical deconditioning: Home health ordered for PT OT home health aide  Nonobstructive CAD  HFpEF not in acute exacerbation   COPD not in acute exacerbation:   Hypertension  Hyperlipidemia  IDDM2  GERD:   Thyroid nodules  Chronic back pain  Depression    LDA: []CVC / []PICC / []Midline / []Hilton / []Drains / []Mediport / [x]None  Antibiotics: Cipro  Steroids: None  Labs (still needed?): [x]Yes / []No  IVF (still needed?): []Yes / [x]No    Level of care: [x]Step Down / []Med-Surg  Bed Status: [x]Inpatient / []Observation  Telemetry: [x]Yes / []No  PT/OT: []Yes / []No    DVT Prophylaxis: [] Lovenox / [] Heparin / [] SCDs / [] Already on Systemic Anticoagulation / [x] None     Expected discharge date: Pending clinical course  Disposition: Home     Code status: Full Code     ===================================================================    Chief Complaint: Plan 
    Hospitalist Progress Note  Internal Medicine Resident      Patient: Dilan Pappas 70 y.o. 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  Continue pain meds  Per ENT hold off G tube placement at this time.  Passed MBS, advance diet to regular  NPO MN for debridement   COPD on 3 L nasal cannula at baseline  Continue DuoNebs  Patient on Trelegy at home, however cannot start on Flonase due to surgery.  Continue HFNC as needed for humidification  Nasal cannula intermittently  Incentive spirometer and Acapella  Pt back to baseline 4 L NC and satting over 90.  HAGMA: likely secondary to starvation ketosis. Pt currently started on regular diet. Anticipate to improve as diet is advanced.   Reactive Leukocytosis: improving. Pt afebrile. No infection concern at this time, however pt on Cipro already  Resolved Problems    Chronic Conditions (reviewed and stable unless otherwise stated)  Nonobstructive CAD  HFpEF not in acute exacerbation   Hypertension: hold lisinopril   Hyperlipidemia: cont  lipitor   IDDM2: hold Jardiance and Januvia. Cont LDSSI. Hypoglycemia control.  GERD:   Thyroid nodules  Chronic back pain  Depression        LDA: []CVC / []PICC / []Midline / []Hilton / []Drains / []Mediport / [x]None  Antibiotics: Cipro  Steroids: None  Labs (still needed?): [x]Yes / []No  IVF (still needed?): []Yes / [x]No    Level of care: [x]Step Down / []Med-Surg  Bed Status: [x]Inpatient / []Observation  Telemetry: [x]Yes / []No  PT/OT: []Yes / []No    DVT Prophylaxis: [] Lovenox / [] Heparin / [] SCDs / [] Already on Systemic Anticoagulation / [x] None     Expected discharge date: Pending 
    Hospitalist Progress Note  Internal Medicine Resident      Patient: Dilan Pappas 70 y.o. 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 Surgical pathology positive for  enterobacter cloacae complex. Pt was Unasyn but switched to cipr0 for cartilage infection prevention.  Continue pain meds  Per ENT hold off G tube placement at this time.  Tolerating p.o   Monitor for bleed and worsening respiration  [facility]  Physical deconditioning: PT/OT. Ordered Home health care with PT/OT, HHA, SN and SLP  COPD on 3 L nasal cannula at baseline: wean oxygen at tolerated to baseline. Continue DuoNebs. Patient on Trelegy at home, however cannot start on Flonase due to surgery. Incentive spirometer and Acapella. Wean oxygen to baseline as tolerated  Resolved Problems  HAGMA  Reactive leukocytosis  Chronic Conditions (reviewed and stable unless otherwise stated)  Nonobstructive CAD  HFpEF not in acute exacerbation   Hypertension: hold lisinopril   Hyperlipidemia: cont  lipitor   IDDM2: hold Jardiance and Januvia. Cont LDSSI. Hypoglycemia control.  GERD:   Thyroid nodules  Chronic back pain  Depression        LDA: []CVC / []PICC / []Midline / []Hilton / []Drains / []Mediport / [x]None  Antibiotics: Cipro  Steroids: None  Labs (still needed?): [x]Yes / []No  IVF (still needed?): []Yes / [x]No    Level of care: [x]Step Down / []Med-Surg  Bed Status: [x]Inpatient / []Observation  Telemetry: [x]Yes / []No  PT/OT: []Yes / []No    DVT Prophylaxis: [] Lovenox / [] Heparin / [] SCDs / [] Already on Systemic Anticoagulation / [x] None     Expected discharge date: Pending clinical course  Disposition: Home     Code status: Full Code 
  CRITICAL CARE PROGRESS NOTE      Patient:  Dilan Pappas    Unit/Bed:4D-07/007-A  YOB: 1955  MRN: [id number]   PCP: Eric Orozco APRN - CNP  Date of Admission: 6/6/2025  Chief Complaint:-     Assessment and Plan:    Laryngeal cancer left vocal cord status post partial lateral vertical laryngectomy:    Squamous cell carcinoma of left vocal cord s/p laryngectomy 6/6/2025 by Dr. Rodriguez  Surgical pathology pending  Okay to have ice chips  Started bolus tube feeds today  Continue with ICU care to closely monitor for laryngeal bleeding  COPD on 3 L nasal cannula at baseline:    Followed by pulmonology at [facility]  Recent PFTs revealed moderate obstructive disease with FEV1 80%, FEV1/FVC 70%, DLCO severely diminished, no signal response to bronchodilator challenge.  Pulmonary hygiene; incentive spirometry  , Medications: Albuterol inhaler as needed, albuterol nebulization as needed, Trelegy Ellipta 1 puff daily, Ohtuvayre   Left squamous cell lung carcinoma:    Diagnosed 5/2025  Had undergone lung biopsy 5/12/2025 concerning for malignancy in samples.  P16 negative.    Followed by Dr. Valero, radiation oncology, and Dr. Kimble, hematology/oncology outpatient.  Status post PEG tube placement 6/5/2025:    Placed by Dr. Nguyen  Continue with Augmentin 500-125 mg 3 times daily expected end date 6/16/2025  Nonobstructive CAD:   Catheterization April 2015 showed nonobstructive coronary artery disease and normal left ventricular function.  HFpEF not in acute exacerbation:    Echo 1/18/2024: EF 55%, normal systolic function, grade 1 diastolic dysfunction.  Hypertension:    Home medications: Lopressor 12.5 twice daily, lisinopril 2.5 mg  Continue home medications  Hyperlipidemia:    Home medication: Lipitor 40 mg  IDDM 2:    Home medication: Jardiance 25 mg, Januvia 100 mg, Actos 30 mg, Mounjaro  Low-dose sliding scale  Hypoglycemia protocol  GERD:    Home medication: Protonix 40 mg  Thyroid nodules  Biopsied 
  CRITICAL CARE PROGRESS NOTE      Patient:  Dilan Pappas    Unit/Bed:4D-07/007-A  YOB: 1955  MRN: [id number]   PCP: Eric Orozco APRN - CNP  Date of Admission: 6/6/2025  Chief Complaint:-     Assessment and Plan:    Laryngeal cancer left vocal cord status post partial lateral vertical laryngectomy:    Squamous cell carcinoma of left vocal cord s/p laryngectomy 6/6/2025 by Dr. Rodriguez  Surgical pathology pending  Tolerating ice chips  Continue with high flow per ENT  Continue with bolus tube feed  COPD on 3 L nasal cannula at baseline:    Followed by pulmonology at [facility]  Recent PFTs revealed moderate obstructive disease with FEV1 80%, FEV1/FVC 70%, DLCO severely diminished, no signal response to bronchodilator challenge.  Pulmonary hygiene; incentive spirometry  Medications: Albuterol inhaler as needed, albuterol nebulization as needed, Trelegy Ellipta 1 puff daily, Ohtuvayre   Left squamous cell lung carcinoma:    Diagnosed 5/2025  Had undergone lung biopsy 5/12/2025 concerning for malignancy in samples.  P16 negative.    Followed by Dr. Valero, radiation oncology, and Dr. Kimble, hematology/oncology outpatient.  Status post PEG tube placement 6/5/2025:    Placed by Dr. Nguyen  Continue with Augmentin 500-125 mg 3 times daily expected end date 6/16/2025  Nonobstructive CAD:   Catheterization April 2015 showed nonobstructive coronary artery disease and normal left ventricular function.  HFpEF not in acute exacerbation:    Echo 1/18/2024: EF 55%, normal systolic function, grade 1 diastolic dysfunction.  Hypertension:    Home medications: Lopressor 12.5 twice daily, lisinopril 2.5 mg  Continue home medications  Hyperlipidemia:    Home medication: Lipitor 40 mg  IDDM 2:    Home medication: Jardiance 25 mg, Januvia 100 mg, Actos 30 mg, Mounjaro  BG 90s to 120s  Low-dose sliding scale  Hypoglycemia protocol  GERD:    Home medication: Protonix 40 mg  Thyroid nodules  Biopsied previously with 
  Physician Progress Note      PATIENT:               WAYLON CRAWFORD  [facility] #:                  [id number]  :                       1955  ADMIT DATE:       2025 9:09 AM  DISCH DATE:  RESPONDING  PROVIDER #:        Reyna Shaw MD          QUERY TEXT:    Please clarify the patient?s nutritional status:    The clinical indicators include:  Malnutrition Assessment:  Malnutrition Status:  Severe malnutrition (25 1004)  Context:  Chronic Illness  Findings of the 6 clinical characteristics of malnutrition:  Energy Intake:  75% or less estimated energy requirements for 1 month or   longer (since 10/2024)  Weight Loss:   (16% loss in the last 5 months)  Body Fat Loss:   (Moderate Body Fat Loss -triceps, orbital, fat overlying   ribs, buccal)  Muscle Mass Loss:   (Moderate Muscle Mass Loss-clavicles, thigh, calf. Mild   Muscle Mass Loss temples)  Fluid Accumulation:  No fluid accumulation   Strength:  Not Performed    Nutrition Assessment:  Pt. severely malnourished AEB criteria listed above.  At risk for further   nutritional compromise r/t NPO status, dysphagia, s/p PEG placement 25,   admit with squamous cell cancer left vocal cord, s/p 25 ENT: TORS vertical   larteral partial laryngectomy and bronchoscopy with lavage, lung lesion, and   underlying medical condition (hx: OA, chronic back pain, depression, GERD,   HTN, COPD, smoking, alcohol abuse, DM, HLD).    Current Nutrition Intake & Therapies:  Diet NPO Exceptions are: Ice Chips  ADULT TUBE FEEDING; PEG; 2.0 Calorie; Bolus; 5 Times Daily; 237; Syringe Push;   90; Before and after each bolus  Options provided:  -- Severe Protein Calorie Malnutrition confirmed  -- Other - I will add my own diagnosis  -- Disagree - Not applicable / Not valid  -- Disagree - Clinically unable to determine / Unknown  -- Refer to Clinical Documentation Reviewer    PROVIDER RESPONSE TEXT:    Severe Protein Calorie Malnutrition confirmed    Query created by: Codie 
 [facility]  INPATIENT PHYSICAL THERAPY  DAILY NOTE  STRZ ICU STEPDOWN TELEMETRY 4K - 4K-22/022-A      Discharge Recommendations: Home with Home Health PT  Equipment Recommendations: No             Time In: 1410  Time Out: 1433  Timed Code Treatment Minutes: 23 Minutes  Minutes: 23          Date: 6/10/2025  Patient Name: Dilan Pappas,  Gender:  male        MRN: [id number]  : 1955  (70 y.o.)     Referring Practitioner: Dheeraj Rodriguez MD  Diagnosis: Squamous cell carcinoma of left vocal cord (HCC)  Additional Pertinent Hx: Dilan Pappas is a 70 y.o. male with PMHx of laryngeal cancer, squamous cell lung carcinoma, COPD on oxygen at baseline, HFpEF, nonobstructive CAD, hypertension, hyperlipidemia, DM type II, GERD, chronic back pain  who was admitted to the ICU on 2025 status post partial lateral vertical laryngectomy in setting of airway obstruction and severe dysphagia.  He recently has had a PEG tube placed on 2025.  He was managed with heated high flow nasal cannula while in the ICU.  He was doing well postoperatively so felt to be stable for transfer out of ICU to stepdown on .     Prior Level of Function:  Lives With: Alone  Type of Home: House  Home Layout: One level  Home Access: Level entry  Home Equipment: Cane, Walker - Rolling, Oxygen   Bathroom Shower/Tub: Tub/Shower unit  Bathroom Toilet: Standard  Bathroom Equipment: Grab bars in shower  Bathroom Accessibility: Accessible    Receives Help From: Family  Prior Level of Assist for ADLs: Independent  Prior Level of Assist for Homemaking: Independent  Homemaking Responsibilities: Yes  Prior Level of Assist for Transfers: Independent  Active : Yes  IADL Comments: owns a dog  Additional Comments: Patient states his grandson is going to be staying with him initially.  Patient also said he uses cane when he takes his dog out.  Prior Level of Assist for Ambulation: Independent community ambulator, with or without 
 [facility]  INPATIENT PHYSICAL THERAPY  DAILY NOTE  STRZ ICU STEPDOWN TELEMETRY 4K - 4K-22/022-A      Discharge Recommendations: Home with Home Health PT  Equipment Recommendations: No             Time In: 1056  Time Out: 1124  Timed Code Treatment Minutes: 28 Minutes  Minutes: 28          Date: 2025  Patient Name: Dilan Pappas,  Gender:  male        MRN: [id number]  : 1955  (70 y.o.)     Referring Practitioner: Dheeraj Rodriguez MD  Diagnosis: Squamous cell carcinoma of left vocal cord (HCC)  Additional Pertinent Hx: Dilan Pappas is a 70 y.o. male with PMHx of laryngeal cancer, squamous cell lung carcinoma, COPD on oxygen at baseline, HFpEF, nonobstructive CAD, hypertension, hyperlipidemia, DM type II, GERD, chronic back pain  who was admitted to the ICU on 2025 status post partial lateral vertical laryngectomy in setting of airway obstruction and severe dysphagia.  He recently has had a PEG tube placed on 2025.  He was managed with heated high flow nasal cannula while in the ICU.  He was doing well postoperatively so felt to be stable for transfer out of ICU to stepdown on .     Prior Level of Function:  Lives With: Alone  Type of Home: House  Home Layout: One level  Home Access: Level entry  Home Equipment: Cane, Walker - Rolling, Oxygen   Bathroom Shower/Tub: Tub/Shower unit  Bathroom Toilet: Standard  Bathroom Equipment: Grab bars in shower  Bathroom Accessibility: Accessible    Receives Help From: Family  Prior Level of Assist for ADLs: Independent  Prior Level of Assist for Homemaking: Independent  Homemaking Responsibilities: Yes  Prior Level of Assist for Transfers: Independent  Active : Yes  IADL Comments: owns a dog  Additional Comments: Patient states his grandson is going to be staying with him initially.  Patient also said he uses cane when he takes his dog out.  Prior Level of Assist for Ambulation: Independent community ambulator, with or without 
 [facility]  INPATIENT PHYSICAL THERAPY  DAILY NOTE  STRZ ICU STEPDOWN TELEMETRY 4K - 4K-22/022-A      Discharge Recommendations: Home with Home Health PT  Equipment Recommendations: No               Time In: 1314  Time Out: 1347  Timed Code Treatment Minutes: 33 Minutes  Minutes: 33          Date: 2025  Patient Name: Dilan Pappas,  Gender:  male        MRN: [id number]  : 1955  (70 y.o.)     Referring Practitioner: Dheeraj Rodriguez MD  Diagnosis: Squamous cell carcinoma of left vocal cord (HCC)  Additional Pertinent Hx: Dilan Pappas is a 70 y.o. male with PMHx of laryngeal cancer, squamous cell lung carcinoma, COPD on oxygen at baseline, HFpEF, nonobstructive CAD, hypertension, hyperlipidemia, DM type II, GERD, chronic back pain  who was admitted to the ICU on 2025 status post partial lateral vertical laryngectomy in setting of airway obstruction and severe dysphagia.  He recently has had a PEG tube placed on 2025.  He was managed with heated high flow nasal cannula while in the ICU.  He was doing well postoperatively so felt to be stable for transfer out of ICU to stepdown on .     Prior Level of Function:  Lives With: Alone  Type of Home: House  Home Layout: One level  Home Access: Level entry  Home Equipment: Cane, Walker - Rolling, Oxygen   Bathroom Shower/Tub: Tub/Shower unit  Bathroom Toilet: Standard  Bathroom Equipment: Grab bars in shower  Bathroom Accessibility: Accessible    Receives Help From: Family  Prior Level of Assist for ADLs: Independent  Prior Level of Assist for Homemaking: Independent  Homemaking Responsibilities: Yes  Prior Level of Assist for Transfers: Independent  Active : Yes  IADL Comments: owns a dog  Additional Comments: Patient states his grandson is going to be staying with him initially.  Patient also said he uses cane when he takes his dog out.  Prior Level of Assist for Ambulation: Independent community ambulator, with or without 
1454- pt to pacu, reports pain. Medicated per anesthesia. VSS. Pt appears in no acute distress    1515- pt tolerates ice chips, reports pain improving, denies nausea. VSS    1522-pt continues to report pain, not tolerable, continue to medicate. VSS    1527- report called to  nurse Soledad    1536- Pt transported to  07 instable condition with transport and resp. Therapy.     1540- Call to update pt Son with pt status, POC.       
1640: Pt arrives to pacu, awakens to verbal stimuli. Pt on 6LNC, respirations easy and unlabored. Suctioned small amount of blood-tinged secretions from mouth. VSS    1648: X-ray at bedside    1650: Dr. Rodriguez at bedside, no new orders     1655: Pt c/o pain 7/10, 0.5mg of dilaudid administered    1700: No change in pain, 0.5mg of dilaudid given    1705: Pt resting off and on with eyes closed, easily awakens to voice. Noted improvement in pain    1708: Report given to Sebastien on 4K    1720: Pt meets criteria for discharge from pacu, awaiting transport    1724: Pt transported back to  in stable condition. VSS                  
A home oxygen evaluation has been completed.     [x]Patient is an inpatient. It is expected that the patient will be discharged within the next 48 hours. Qualified provider to write order for home prescription if patient qualifies. Social service/care managers will arrange for home oxygen.  If patient is active, arrange for Home Medical supplier to assess for Oxygen Conserving Device per pulse oximetry.  []Patient is an outpatient. Results will be faxed to the ordering provider. Qualified provider to write order for home prescription if patient qualifies and arranges for home oxygen.      Patient was placed on room air for 3 minutes. SpO2 was 85 % on room air at rest. Patients SpO2 was below 89% and qualified for home oxygen. Oxygen was applied at 4 lpm via nasal cannula to maintain a SpO2 between 90-92% while at rest. Actual SpO2 was 91 %. Patient can ambulate for exercise flow rate. Patients was ambulated, SpO2 was 90% on 10 lpm to maintain SpO2 between 90-92% while exercising.    If oxygen need is greater than 4 lpm the SpO2 on 4 lpm was 84.     Note: For any SpO2 at 89% see policy and procedure for possible qualifications.  
A home oxygen evaluation has been completed.     [x]Patient is an inpatient. It is expected that the patient will be discharged within the next 48 hours. Qualified provider to write order for home prescription if patient qualifies. Social service/care managers will arrange for home oxygen.  If patient is active, arrange for Home Medical supplier to assess for Oxygen Conserving Device per pulse oximetry.  []Patient is an outpatient. Results will be faxed to the ordering provider. Qualified provider to write order for home prescription if patient qualifies and arranges for home oxygen.      Patient was placed on room air for 5 minutes. SpO2 was 84 % on room air at rest. Patients SpO2 was below 89% and qualified for home oxygen. Oxygen was applied at 4 lpm via nasal cannula to maintain a SpO2 between 90-92% while at rest. Actual SpO2 was 92 %. Patient can ambulate for exercise flow rate. Patients was ambulated, SpO2 was 89% on 8 lpm to maintain SpO2 between 90-92% while exercising.    If oxygen need is greater than 4 lpm the SpO2 on 4 lpm was 81% with walking, spo2 86% on 6lpm walking.     Note: For any SpO2 at 89% see policy and procedure for possible qualifications.  
All discharge instructions given to patient and family with no further questions at this time. Patient discharged off unit via wheelchair. Chart contents placed in yellow bin.     
[facility]  SPEECH THERAPY COMMUNICATION NOTE  STRZ ICU STEPDOWN TELEMETRY 4K      Date: 2025  Patient Name: Dilan Pappas        MRN: [id number]    : 1955  (70 y.o.)    REASON FOR COMMUNICATION:     ST in direct communication with  Mika, requesting order for OP ST services following HH services to ensure appropriate continuation of care, especially given pertinent ENT/HNC status. Mika reporting order placed.     Dorene Rod M.A., CF-SLP  [id number]                    
Comprehensive Nutrition Assessment    Type and Reason for Visit:  Initial, Consult, Positive nutrition screen (tubefeeding ordering and management, bolus feeding requested by Dr. Be, poor oral intake, unplanned weight  loss prior to admit)    Nutrition Recommendations/Plan:   To begin tubefeeding boluses via PE Kevin HN,  bolus 60 mls 1st bolus (~0800), if tolerated advance to 120 mls 2nd bolus (~ 1200), if tolerated advance to 180 mls 3rd bolus (~ 1600), if tolerated advance to 237mls goal 4th bolus (~ 2000), and 237 mls 5th bolus (~ 2400). Goal 237 mls (8oz can for home) 2 Kevin HN, 5 times per day (around~ 0800, 1200, 1600, 2000, 2400).   Recommend 90 mls free water flush before and 90 after each bolus.   NPO, ice chips.  Further diet recommendations per Provider.   Will monitor home tubefeeding education needs.      Malnutrition Assessment:  Malnutrition Status:  Severe malnutrition (25 1004)    Context:  Chronic Illness     Findings of the 6 clinical characteristics of malnutrition:  Energy Intake:  75% or less estimated energy requirements for 1 month or longer (since 10/2024)  Weight Loss:   (16% loss in the last 5 months)     Body Fat Loss:   (Moderate Body Fat Loss -triceps, orbital, fat overlying ribs, buccal)     Muscle Mass Loss:   (Moderate Muscle Mass Loss-clavicles, thigh, calf. Mild Muscle Mass Loss temples)    Fluid Accumulation:  No fluid accumulation     Strength:  Not Performed    Nutrition Assessment:     Pt. severely malnourished AEB criteria listed above.  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       Nutrition Related Findings:    Pt. 
Comprehensive Nutrition Assessment    Type and Reason for Visit:  Reassess    Nutrition Recommendations/Plan:   Diet as per SLP.  May may need to consider CHO controlled diet if hyperglycemic and eating well.  Continue Ensure Plus TID.  Monitor need for lower CHO ONS.  Encouraged po, good nutrition at best efforts.  Discussed appropriate use of ONS, etc.  Provided coupons.  Discussed lower cost options.  Recommend f/u with OP RD at cancer center as able.  Will monitor need for additional nutrition interventions.      Malnutrition Assessment:  Malnutrition Status:  Severe malnutrition (06/07/25 1004)    Context:  Chronic Illness     Findings of the 6 clinical characteristics of malnutrition:  Energy Intake:  75% or less estimated energy requirements for 1 month or longer (since 10/2024)  Weight Loss:   (16% loss in the last 5 months)     Body Fat Loss:   (Moderate Body Fat Loss -triceps, orbital, fat overlying ribs, buccal)     Muscle Mass Loss:   (Moderate Muscle Mass Loss-clavicles, thigh, calf. Mild Muscle Mass Loss temples)    Fluid Accumulation:  No fluid accumulation     Strength:  Not Performed    Nutrition Assessment:     Pt. severely malnourished AEB criteria listed above.  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     Nutrition Related Findings:    Pt. Report/Treatments/Miscellaneous:   6/16- pt. Seen- states consuming most of the food on his tray; states acceptance of Ensures; reports trying to purchase at home w/ food stamps but only receives ~29$ per month; discussed lower cost options; pt. States he has to chew up his food well so it doesn't get stuck in throat - 
Comprehensive Nutrition Assessment    Type and Reason for Visit:  Reassess, Nutrition support    Nutrition Recommendations/Plan:   RN mentions SLP to see pt.  Continue clear liquid diet per orders.  Send Berry Ensure Clear TID ( pt dislikes apple juice)  Monitor medical decisions re: PEG plans. RN mentions PEG became dislodged (6/8) when he tried to get up on his own & go to bathroom.     If PEG replaced, & tube feedings are re-ordered, suggest : 2 Kevin HN,  bolus 60 mls 1st bolus, if tolerated advance to 120 mls 2nd bolus ( if tolerated advance to 180 mls 3rd bolus , if tolerated advance to 237mls goal 4th bolus  and 237 mls 5th bolus . Goal 237 mls (8oz can for home) 2 Kevin HN, 5 times per day (around~ 0800, 1200, 1600, 2000, 2400).   Recommend 90 mls free water flush before and 90 after each bolus.   Monitor medical decisions, po intake, swallowing, labs & wt.      Malnutrition Assessment:  Malnutrition Status:  Severe malnutrition (06/07/25 1004)    Context:  Chronic Illness     Findings of the 6 clinical characteristics of malnutrition:  Energy Intake:  75% or less estimated energy requirements for 1 month or longer (since 10/2024)  Weight Loss:   (16% loss in the last 5 months)     Body Fat Loss:   (Moderate Body Fat Loss -triceps, orbital, fat overlying ribs, buccal)     Muscle Mass Loss:   (Moderate Muscle Mass Loss-clavicles, thigh, calf. Mild Muscle Mass Loss temples)    Fluid Accumulation:  No fluid accumulation     Strength:  Not Performed    Nutrition Assessment:     Pt. improving from a nutritional status AEB tolerated clear liquids at lunch however difficult to meet needs on this diet.   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  Patient instructed to call and able to expel mucus without hemoptysis and suction.  O2 increased to 6 L nasal cannula and oxygen saturation increased to 90% on 6 L nasal cannula.  Patient denies shortness of breath but does state that he woke up with some extra chest congestion this morning.  RN at bedside called respiratory for breathing treatment.     A complete multi-organ review of systems was performed using a new patient questionnaire, and reviewed by me.  ENT:  negative except as noted in HPI  CONSTITUTIONAL:  negative except as noted in HPI  EYES:  negative except as noted in HPI  RESPIRATORY:  negative except as noted in HPI  CARDIOVASCULAR:  negative except as noted in HPI  GASTROINTESTINAL:  negative except as noted in HPI  GENITOURINARY:  negative except as noted in HPI  MUSCULOSKELETAL:  negative except as noted in HPI  SKIN:  negative except as noted in HPI  ENDOCRINE/METABOLIC: negative except as noted in HPI  HEMATOLOGIC/LYMPHATIC:  negative except as noted in HPI  ALLERGY/IMMUN: negative except as noted in HPI  NEUROLOGICAL:  negative except as noted in HPI  BEHAVIOR/PSYCH:  negative except as noted in HPI     OBJECTIVE       Physical  VITALS:  /73   Pulse 90   Temp 97.6 °F (36.4 °C) (Oral)   Resp 16   Ht 1.854 m (6' 1\")   Wt 82.3 kg (181 lb 7 oz)   SpO2 91%   BMI 23.94 kg/m²      Patient sitting comfortably in chair without acute respiratory distress.  When instructed patient able to cough up mucus and suction by itself without complication.  Oxygen was increased as above to 6 L nasal cannula and 
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  States pain is 5/10 and just took oral pain medication.  He is taking ice chips and sips of water on my arrival without difficulty.  Afebrile.      A complete multi-organ review of systems was performed using a new patient questionnaire, and reviewed by me.  ENT:  negative except as noted in HPI  CONSTITUTIONAL:  negative except as noted in HPI  EYES:  negative except as noted in HPI  RESPIRATORY:  negative except as noted in HPI  CARDIOVASCULAR:  negative except as noted in HPI  GASTROINTESTINAL:  negative except as noted in HPI  GENITOURINARY:  negative except as noted in HPI  MUSCULOSKELETAL:  negative except as noted in HPI  SKIN:  negative except as noted in HPI  ENDOCRINE/METABOLIC: negative except as noted in HPI  HEMATOLOGIC/LYMPHATIC:  negative except as noted in HPI  ALLERGY/IMMUN: negative except as noted in HPI  NEUROLOGICAL:  negative except as noted in HPI  BEHAVIOR/PSYCH:  negative except as noted in HPI    OBJECTIVE      Physical  VITALS:  BP 98/67   Pulse 65   Temp 97.6 °F (36.4 °C) (Oral)   Resp 14   Ht 1.854 m (6' 1\")   Wt 80.3 kg (177 lb 0.5 oz)   SpO2 90%   BMI 23.36 kg/m²     Patient resting in bed.  Alert, oriented and cooperative.  No distress.  No stridor.  No coughing or throat clearing at this time.  Voice is moderately raspy, but intelligible.  Supplemental oxygen via nasal cannula.  Oral mucous membranes moist.  Neck is symmetrical, supple.    Data  CBC with Differential:    Lab Results   Component Value Date/Time    WBC 9.6 06/12/2025 05:40 AM    RBC 4.00 06/12/2025 05:40 AM    HGB 11.2 06/12/2025 05:40 AM    HGB 15.2 11/09/2011 11:05 AM    HCT 36.3 06/12/2025 05:40 AM    
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  Patient instructed to call and able to expel mucus without hemoptysis and suction.  O2 increased to 6 L nasal cannula and oxygen saturation increased to 90% on 6 L nasal cannula.  Patient denies shortness of breath but does state that he woke up with some extra chest congestion this morning.  RN at bedside called respiratory for breathing treatment.    A complete multi-organ review of systems was performed using a new patient questionnaire, and reviewed by me.  ENT:  negative except as noted in HPI  CONSTITUTIONAL:  negative except as noted in HPI  EYES:  negative except as noted in HPI  RESPIRATORY:  negative except as noted in HPI  CARDIOVASCULAR:  negative except as noted in HPI  GASTROINTESTINAL:  negative except as noted in HPI  GENITOURINARY:  negative except as noted in HPI  MUSCULOSKELETAL:  negative except as noted in HPI  SKIN:  negative except as noted in HPI  ENDOCRINE/METABOLIC: negative except as noted in HPI  HEMATOLOGIC/LYMPHATIC:  negative except as noted in HPI  ALLERGY/IMMUN: negative except as noted in HPI  NEUROLOGICAL:  negative except as noted in HPI  BEHAVIOR/PSYCH:  negative except as noted in HPI    OBJECTIVE      Physical  VITALS:  /73   Pulse 90   Temp 97.6 °F (36.4 °C) (Oral)   Resp 16   Ht 1.854 m (6' 1\")   Wt 82.3 kg (181 lb 7 oz)   SpO2 91%   BMI 23.94 kg/m²     Patient sitting comfortably in chair without acute respiratory distress.  When instructed patient able to cough up mucus and suction by itself without complication.  Oxygen was increased as above to 6 L nasal cannula and 
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  He has had some drainage from the site but denies significant pain.  He is on a clear liquid diet and is tolerating this well without coughing or choking.  Plan for SLP evaluation and MBS.  Of note WBC 11->13, pt on IV Unasyn, will CTM cultures.    A complete multi-organ review of systems was performed using a new patient questionnaire, and reviewed by me.  ENT:  negative except as noted in HPI  CONSTITUTIONAL:  negative except as noted in HPI  EYES:  negative except as noted in HPI  RESPIRATORY:  negative except as noted in HPI  CARDIOVASCULAR:  negative except as noted in HPI  GASTROINTESTINAL:  negative except as noted in HPI  GENITOURINARY:  negative except as noted in HPI  MUSCULOSKELETAL:  negative except as noted in HPI  SKIN:  negative except as noted in HPI  ENDOCRINE/METABOLIC: negative except as noted in HPI  HEMATOLOGIC/LYMPHATIC:  negative except as noted in HPI  ALLERGY/IMMUN: negative except as noted in HPI  NEUROLOGICAL:  negative except as noted in HPI  BEHAVIOR/PSYCH:  negative except as noted in HPI    OBJECTIVE      Physical  VITALS:  BP 97/74   Pulse 77   Temp 97.8 °F (36.6 °C) (Oral)   Resp 18   Ht 1.854 m (6' 1\")   Wt 72 kg (158 lb 11.7 oz)   SpO2 98%   BMI 20.94 kg/m²     Patient is 70-year-old male lying comfortably in bed without acute respiratory distress on room air.  Voice is notable for breathy quality which is worsened since prior but expected given the decrease in swelling postoperatively.  Tongue is with significantly decreased swelling and patient states he feels it is back to normal.  Oropharynx evidence of blood or purulent drainage.  Anterior neck without 
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  He states that he feels that clear liquids are going well and he denies coughing or difficulty with swallowing.  MBS with speech today showed that patient was having deep laryngeal penetration and aspiration but with chin tuck this resolved.  Diet advanced to regular diet with chin tuck.  We also discussed plan for OR tomorrow with suspension laryngoscopy for mapping biopsies with jet ventilation.    A complete multi-organ review of systems was performed using a new patient questionnaire, and reviewed by me.  ENT:  negative except as noted in HPI  CONSTITUTIONAL:  negative except as noted in HPI  EYES:  negative except as noted in HPI  RESPIRATORY:  negative except as noted in HPI  CARDIOVASCULAR:  negative except as noted in HPI  GASTROINTESTINAL:  negative except as noted in HPI  GENITOURINARY:  negative except as noted in HPI  MUSCULOSKELETAL:  negative except as noted in HPI  SKIN:  negative except as noted in HPI  ENDOCRINE/METABOLIC: negative except as noted in HPI  HEMATOLOGIC/LYMPHATIC:  negative except as noted in HPI  ALLERGY/IMMUN: negative except as noted in HPI  NEUROLOGICAL:  negative except as noted in HPI  BEHAVIOR/PSYCH:  negative except as noted in HPI    OBJECTIVE      Physical  VITALS:  /66   Pulse 88   Temp 97.9 °F (36.6 °C) (Oral)   Resp 18   Ht 1.854 m (6' 1\")   Wt 72.9 kg (160 lb 11.5 oz)   SpO2 90%   BMI 21.20 kg/m²     Patient is 70-year-old male lying comfortably in bed without acute respiratory distress on 4L NC which is his baseline. Voice is notable for breathy quality but stable. Tongue without significant swelling.  Oropharynx evidence of blood or purulent drainage. Anterior neck without swelling or tenderness to palpation.  
IV in LFA infiltrated with IV Cipro was infusing. LFA swollen and \"tight\" per patient but not painful. Warm compress applied.   
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 male with PMHx of laryngeal cancer, squamous cell lung carcinoma, COPD on oxygen at baseline, HFpEF, nonobstructive CAD, hypertension, hyperlipidemia, DM type II, GERD, chronic back pain  who was admitted to the ICU on 2025 status post partial lateral vertical laryngectomy in setting of airway obstruction and severe dysphagia.  He recently has had a PEG tube placed on 2025.  He was managed with heated high flow nasal cannula while in the ICU.  He was doing well postoperatively so felt to be stable for transfer out of ICU to stepdown on .     Restrictions/Precautions:  Restrictions/Precautions: Fall Risk, General Precautions, Surgical Protocols     Other Position/Activity Restrictions: partial lateral vertical laryngectomy on 25    Required Braces or Orthoses?: No    Subjective:  Chart Reviewed: Yes  Patient assessed for rehabilitation services?: Yes  Subjective: Nurse approved session.  Patient very pleasant and cooperative, required multiple rest breaks for SpO2 recovery.    General:  Orientation Level: Oriented X4  Vision: Impaired  Vision Exceptions: Wears glasses at all times  Hearing: Within functional limits     Pain: 7/10: throat pain    Vitals: Oxygen: 83% following ambulation with 4 minute recovery to resting value of 91%; on 4 L.    Social/Functional 
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 Miller's on 2025.  Patient had presented to Dr. Miller's office this morning for follow-up of laryngeal cancer.  Patient also has a new diagnosis of squamous cell lung cancer which is p16 negative.  Patient had reported changes in his voice that started 1 year ago, which was attributed to his smoking habits.  Patient has not smoked since 2024.  Patient also has a history of thyroid nodules which were biopsied and results were negative.  He undergoes an ultrasound every 2 years for surveillance, but has been a few years since his last ultrasound.  Patient recently had PEG tube placed on .  He is due to continue Augmentin for 10 days per Dr. Nguyen.  EGD performed on  showing distal esophagitis, gastritis.\" patient underwent a TORS Partial Laryngectomy, laterovertical on 25.    Restrictions/Precautions:  Restrictions/Precautions: Fall Risk, General Precautions, Surgical Protocols  Position Activity Restriction  Other Position/Activity Restrictions: 4L O2 at rest, increase to 6L with ambulation per RN; partial lateral vertical laryngectomy on 25, PEG (ripped out)      Social/Functional History:  Lives With: Alone  Type of Home: House  Home Layout: One level  Home Access: Level entry  Home Equipment: Cane, Walker - Rolling, Oxygen 
[facility]  SPEECH THERAPY  STRZ ICU STEPDOWN TELEMETRY 4K  Modified Barium Swallow + Dysphagia Therapy    Discharge Recommendations: Outpatient Speech Therapy  DIET ORDER RECOMMENDATIONS AFTER EVALUATION: Regular, thin liquids (CHIN TUCK)  Strategies:  Full Upright Position, Small Bite/Sip, Pulmonary Monitoring, Oral Care after all Meals, Intermittent Supervision, Alternate Solids and Liquids, Limit Distractions, and Monitor for Fatigue     SLP Individual Minutes  Time In: 1138  Time Out: 1200  Minutes: 22  Timed Code Treatment Minutes: 0 Minutes  Modified barium swallow: 13 minutes  Dysphagia therapy: 9 minutes       Date: 6/10/2025  Patient Name: Dilan Pappas      CSN: [id number]   : 1955  (70 y.o.)  Gender: male   Referring Physician:  Catherine Funes PA-C  Diagnosis: 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    Pertinent hx for dysphagia, most recent MBS completed on 2025 with derived findings for a mild oral dysphagia, mild-moderate pharyngeal dysphagia with PAS score of 8 with thin barium to equate to silent tracheal aspiration.     Patient has a past medical history of Abnormal liver enzymes, Alcohol abuse, Allergic rhinitis, Cancer (HCC), Chronic back pain, Colonic polyp, COPD (chronic obstructive pulmonary disease) (HCC), Depression, Diabetes mellitus (HCC), DJD (degenerative joint disease) of cervical spine, DJD (degenerative joint disease) of lumbar spine, Environmental and seasonal allergies, GERD (gastroesophageal reflux disease), Hyperlipidemia, Hypertension, Neuropathy, 
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 Miller's on 2025.  Patient had presented to Dr. Miller's office this morning for follow-up of laryngeal cancer.  Patient also has a new diagnosis of squamous cell lung cancer which is p16 negative.  Patient had reported changes in his voice that started 1 year ago, which was attributed to his smoking habits.  Patient has not smoked since 2024.  Patient also has a history of thyroid nodules which were biopsied and results were negative.  He undergoes an ultrasound every 2 years for surveillance, but has been a few years since his last ultrasound.  Patient recently had PEG tube placed on .  He is due to continue Augmentin for 10 days per Dr. Nguyen.  EGD performed on  showing distal esophagitis, gastritis.\" patient underwent a TORS Partial Laryngectomy, laterovertical on 25.    Restrictions/Precautions:  Restrictions/Precautions: Fall Risk, General Precautions, Surgical Protocols  Position Activity Restriction  Other Position/Activity Restrictions: partial lateral vertical laryngectomy on 25, PEG (ripped out)    Subjective  Chart Reviewed: Yes, Orders, Progress Notes, History and Physical, Operative Notes  Patient assessed for rehabilitation services?: Yes  Family / Caregiver Present: No    Subjective: RN approved 
PAT call attempted, patient unavailable, left message to please call us back at your earliest convenience; [phone]   
Patient oriented to Same Day department and admitted to Same Day Surgery room 15.   Patient verbalized approval for first name, last initial with physician name on unit whiteboard.     Plan of care reviewed with patient.   Patient room whiteboard filled out and discussed with patient and responsible adult.   Patient and responsible adult offered Same Day Welcome Packet to review.    Call light in reach.   Bed in lowest position, locked, with one bed rail up.   SCDs and warming blanket in place.  Appropriate arm bands on patient.   Bathroom offered.   All questions and concerns of patient addressed.        Meds to Beds:   Patient informed of [facility] Meds to Beds program during admission. Patient is agreeable to program.   Contact information for the pharmacy and the Meds to Beds program:   Name: Dilan   Relationship to patient:patient   Phone number: in house           
Patient was evaluated today for the diagnosis of COPD.  I entered a DME order for home oxygen at 4 lpm because the diagnosis and testing require the patient to have supplemental oxygen.  Condition will improve or be benefited by oxygen use.  The patient is  able to perform good mobility in a home setting and therefore does require the use of a portable oxygen system.  The need for this equipment was discussed with the patient and he understands and is in agreement.   He needs 8 liters with activity.  
Progress Note  Date:2025       Room:[facility]07/007-A  Patient Name:Dilan Pappas     YOB: 1955     Age:70 y.o.    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  The patient reports feeling much better today breathing comfortably.  He is producing a much louder voice which she was pleased with given how much tissue was removed and the incredibly poor quality of voice he had to this obstructive tumor.  He is also tolerating a wide variety of clear liquids and would like to have his diet advanced sometime in the near future if possible.  He has brought no new blood and reports breathing comfortably.  I tried him with his nasal cannula off for a few minutes and he maintained saturations in the low 90s.  He reported being comfortable.    Subjective    Subjective   As above  Review of Systems  As above  Objective         Vitals Last 24 Hours:  TEMPERATURE:  Temp  Av.2 °F (36.8 °C)  Min: 98 °F (36.7 °C)  Max: 98.3 °F (36.8 °C)  RESPIRATIONS RANGE: Resp  Av.1  Min: 11  Max: 27  PULSE OXIMETRY RANGE: SpO2  Av.5 %  Min: 82 %  Max: 98 %  PULSE RANGE: Pulse  Av.8  Min: 71  Max: 117  BLOOD PRESSURE RANGE: Systolic (24hrs), Av , Min:76 , Max:135   ; Diastolic (24hrs), Av, Min:35, Max:85    I/O (24Hr):    Intake/Output Summary (Last 24 hours) at 2025 1601  Last data filed at 2025 1158  Gross per 24 hour   Intake 1631 ml   Output 2550 ml   Net -919 ml     Objective    On general physical exam the patient is remarkably well-appearing cachectic male in no acute distress.  His voice is vibratory and easy to hear.  It had a mild degree of breathiness to it.  I heard no throat clearing coughing or inspiratory stridor.  With forced coughing, he had no sputum that was audibly within his airway.  As stated above I took his high flow nasal cannula off for few minutes and watch his 
Progress Note  Date:2025       Room:[facility]/007-A  Patient Name:Dilan Pappas     YOB: 1955     Age:70 y.o.    Postop day #1 ICU day #1 status post transoral robotic assisted anterolateral vertical partial laryngectomy without reconstruction.  The patient reports tongue swelling but no shortness of breath.  He is on high flow nasal cannula oxygen.  He has had some blood-tinged sputum, up which is still evident in his Yankauer suction tubing.  He has a substantially improved voice despite the fact that he has had a subtotal cordectomy on the left side.    Subjective    Subjective   As above  Review of Systems  As above  Objective         Vitals Last 24 Hours:  TEMPERATURE:  Temp  Av °F (36.7 °C)  Min: 97.5 °F (36.4 °C)  Max: 98.5 °F (36.9 °C)  RESPIRATIONS RANGE: Resp  Av.7  Min: 9  Max: 27  PULSE OXIMETRY RANGE: SpO2  Av.1 %  Min: 88 %  Max: 100 %  PULSE RANGE: Pulse  Av.7  Min: 64  Max: 87  BLOOD PRESSURE RANGE: Systolic (24hrs), Av , Min:86 , Max:162   ; Diastolic (24hrs), Av, Min:55, Max:132    I/O (24Hr):    Intake/Output Summary (Last 24 hours) at 2025 1057  Last data filed at 2025 0941  Gross per 24 hour   Intake 2960 ml   Output 2825 ml   Net 135 ml     Objective    On general physical exam the patient is a pleasant cachectic older than stated age appearing adult male in no acute distress.  His voice and speech pattern surprisingly clear and easy to understand with only mild to moderate breathiness.  I heard occasional throat clearing and some thick sputum that he was able to expectorate and suction away with his Yankauer.  It was blood-tinged.  I heard no inspiratory stridor whatsoever.  The patient was up in a bedside chair.    Labs/Imaging/Diagnostics    Labs:  CBC:  Recent Labs     25  0409   WBC 14.1* 12.6*   RBC 3.98* 3.91*   HGB 11.3* 11.2*   HCT 36.5* 36.1*   MCV 91.7 92.3    280     CHEMISTRIES:  Recent Labs     
This nurse attempted to call son to inform that patient is being transferred to [facility]. Call went straight to voicemail box that is full.     
This nurse called son Suhas to inform him that his father would be going down now for his procedures with dr. Miller's. The original time for the procedure was at 1700.   
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 Miller's on 2025.  Patient had presented to Dr. Miller's office this morning for follow-up of laryngeal cancer.  Patient also has a new diagnosis of squamous cell lung cancer which is p16 negative.  Patient had reported changes in his voice that started 1 year ago, which was attributed to his smoking habits.  Patient has not smoked since 2024.  Patient also has a history of thyroid nodules which were biopsied and results were negative.  He undergoes an ultrasound every 2 years for surveillance, but has been a few years since his last ultrasound.  Patient recently had PEG tube placed on .  He is due to continue Augmentin for 10 days per Dr. Nguyen.  EGD performed on  showing distal esophagitis, gastritis.\" patient underwent a TORS Partial Laryngectomy, laterovertical on 25.    Restrictions/Precautions:  Restrictions/Precautions: Fall Risk, General Precautions, Surgical Protocols  Position Activity Restriction  Other Position/Activity Restrictions: 4L O2 at rest, increase to 6L with ambulation per RN; partial lateral vertical laryngectomy on 25, PEG (ripped out)     Social/Functional History:  Lives With: Alone  Type of Home: House  Home Layout: One level  Home Access: Level entry  Home Equipment: Cane, Walker - Rolling, 
[facility]  INPATIENT SPEECH THERAPY  STRZ ICU STEPDOWN TELEMETRY 4K  DAILY NOTE    Discharge Recommendations: Home Health *OP ST following HH   DIET ORDER RECOMMENDATIONS: Regular solids/thin liquids *CHIN TUCK     STRATEGIES: Full Upright Position, Small Bite/Sip, Chin Tuck, Medications Whole with Thin, Alternate Solids and Liquids, Limit Distractions, and Monitor for Fatigue     SLP Individual Minutes  Time In: 1305  Time Out: 1326  Minutes: 21  Timed Code Treatment Minutes: 0 Minutes       Date: 2025  Patient Name: Dialn Pappas      CSN: [id number]   : 1955  (70 y.o.)  Gender: male   Referring Physician: Catherine Funes PA-C   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 Patient presented seated upright in bed, alert, pleasant. Adequate voicing achieved for conversational comprehension. Patient was accompanied by his ex-wife and daughter.     Short-Term Goals:  SHORT TERM GOAL #1:  Goal 1: Patient will consume regular diet, thin liquids with stable pulmonary status and independnet incorporation of trained compensatory strategies (CHIN TUCK FLUIDS) to assist with nutrition/hydration measures  INTERVENTIONS:  *ST observing patient with PO consumption via lunch tray (chicken, cooked carrots, rice, biscuit, milk). Oral phase slightly impaired: diminished rotary jaw movement + coarse textural breakdown achieved through primarily lingual-palatal mash d/t edentulous state, globally prolonged gross motor movements throughout entirety of meal; however, breakdown able to be achieved with min oral residuals following swallow. Patient demonstrating some degree of impulsivity via 
Standard  Bathroom Equipment: Grab bars in shower  Bathroom Accessibility: Accessible  IADL Comments: owns a dog    Receives Help From: Family  Prior Level of Assist for ADLs: Independent  Prior Level of Assist for Homemaking: Independent  Homemaking Responsibilities: Yes  Prior Level of Assist for Transfers: Independent  Prior Level of Assist for Ambulation: Independent community ambulator, with or without device  Has the patient had two or more falls in the past year or any fall with injury in the past year?: No    Active : Yes  Occupation: Retired, On disability  Additional Comments: Patient states his grandson is going to be staying with him initially.  Patient also said he uses cane when he takes his dog out.    SUBJECTIVE: Pt seated EOB upon arrival with nursing staff present in prep for mobility, pt agreeable to therapy session. RN okayed therapy.    PAIN: No c/o.    Vitals: Vitals not assessed per clinical judgement, see nursing flowsheet  Oxygen: Pt on 4L O2 throughout via nasal cannula and >90% throughout mobility  Heart Rate: WFL during mobility    COGNITION: Decreased Safety Awareness and Impaired Attention    ADL:   Feeding: Not applicable.  Pt had PEG tube, however, was dislodged. Pt drinking juice upon therapist arrival, however, noted that significant amount of fluid was leaking from open incision where PEG tube was. RN was notified and aware- okayed therapist to patch site using wash cloth during mobility and in at end of session to change dressing.  Upper Extremity Dressing: Minimal Assistance.  To manage hospital gown d/t lines.  Lower Extremity Dressing: Minimal Assistance.  Threading clothing around BLE and managing over hips while doffing soiled hospital shorts and donning clean set.    IADL:   Not Tested    BED MOBILITY:  Supine to Sit: Modified Independent    Sit to Supine: Modified Independent    Scooting: Modified Independent EOB    TRANSFERS:  Sit to Stand:  Stand By Assistance.  
(g/day): ~ 91 grams (1.2 grams/kg/day)     Fluid (ml/day): ~ 9414-4194 mls (25-30 mls/kg/day) or per Provider    Nutrition Diagnosis:   Severe malnutrition, in context of chronic illness related to Altered GI structure, swallowing difficulty, decreased appetite as evidenced by criteria as identified in malnutrition assessment    Nutrition Interventions:   Food and/or Nutrient Delivery: Continue Current Diet, Modify Oral Nutrition Supplement  Nutrition Education/Counseling: Education/Counseling initiated (Encouraged good oral intake best effort & ONS intake as tolerated)  Coordination of Nutrition Care: Continue to monitor while inpatient       Goals:  Goals: PO intake 75% or greater, by next RD assessment  Type of Goal: New goal  Previous Goal Met: Progressing toward Goal(s)    Nutrition Monitoring and Evaluation:      Food/Nutrient Intake Outcomes: Diet Advancement/Tolerance, Food and Nutrient Intake, Supplement Intake, Vitamin/Mineral Intake  Physical Signs/Symptoms Outcomes: Biochemical Data, Chewing or Swallowing, GI Status, Fluid Status or Edema, Hemodynamic Status, Nutrition Focused Physical Findings, Skin, Weight    Discharge Planning:    Too soon to determine     Lindsey Westfall RD, LD  Contact:   [phone]   
[Held by provider] metoprolol tartrate  12.5 mg Oral BID    pantoprazole  40 mg Oral QAM AC    QUEtiapine  200 mg Oral Nightly    insulin lispro  0-4 Units SubCUTAneous 4x Daily AC & HS    PRN Meds: acetaminophen, ipratropium 0.5 mg-albuterol 2.5 mg, glucose, dextrose bolus **OR** dextrose bolus, glucagon (rDNA), dextrose, sodium chloride flush, sodium chloride, ondansetron **OR** ondansetron, oxyCODONE **OR** oxyCODONE, HYDROmorphone **OR** HYDROmorphone, bisacodyl, potassium chloride **OR** potassium chloride, magnesium sulfate, albuterol    Exam:  /73   Pulse 80   Temp 97.4 °F (36.3 °C) (Temporal)   Resp 13   Ht 1.854 m (6' 1\")   Wt 74.7 kg (164 lb 10.9 oz)   SpO2 93%   BMI 21.73 kg/m²   General: No distress, appears stated age.   Eyes:  PERRL. Conjunctivae/corneas clear.  HENT: Head normal appearing. Nares normal. Oral mucosa moist.  Hearing intact.   Neck: Supple, with full range of motion. Trachea midline.  No gross JVD appreciated.  Respiratory:  Normal effort. Clear to auscultation, without rales or wheezes or rhonchi.  Cardiovascular: Normal rate, regular rhythm with normal S1/S2 without murmurs.    No lower extremity edema.   Abdomen: Soft, non-tender, non-distended with normal bowel sounds.  Musculoskeletal: No joint swelling or tenderness. Normal tone. No abnormal movements.   Skin: Warm and dry. No rashes or lesions.  Neurologic:  No focal sensory/motor deficits in the upper or lower extremities. .     Psychiatric: Alert and oriented, normal insight and thought content.   Capillary Refill: Brisk,< 3 seconds.  Peripheral Pulses: +2 palpable, equal bilaterally.       Labs/Radiology: See chart or assessment above.     Electronically signed by Amaris Julien MD on 6/11/2025 at 5:04 PM  Case was discussed with Attending, Dr. Rodrigez  
potassium chloride **OR** potassium chloride, magnesium sulfate, albuterol    Exam:  /66   Pulse 91   Temp 98.1 °F (36.7 °C) (Oral)   Resp 16   Ht 1.854 m (6' 1\")   Wt 72 kg (158 lb 11.7 oz)   SpO2 94%   BMI 20.94 kg/m²   General: No distress, appears stated age.   Eyes:  PERRL. Conjunctivae/corneas clear.  HENT: Head normal appearing. Nares normal. Oral mucosa moist.  Hearing intact.   Neck: Supple, with full range of motion. Trachea midline.  No gross JVD appreciated.  Respiratory:  Normal effort. Clear to auscultation, without rales or wheezes or rhonchi.  Cardiovascular: Normal rate, regular rhythm with normal S1/S2 without murmurs.    No lower extremity edema.   Abdomen: Soft, non-tender, non-distended with normal bowel sounds.  Musculoskeletal: No joint swelling or tenderness. Normal tone. No abnormal movements.   Skin: Warm and dry. No rashes or lesions.  Neurologic:  No focal sensory/motor deficits in the upper or lower extremities. .     Psychiatric: Alert and oriented, normal insight and thought content.   Capillary Refill: Brisk,< 3 seconds.  Peripheral Pulses: +2 palpable, equal bilaterally.       Labs/Radiology: See chart or assessment above.     Electronically signed by Amaris Julien MD on 6/9/2025 at 5:41 PM  Case was discussed with Attending, Dr. Shaw.

## 2025-06-16 NOTE — RT PROTOCOL NOTE
Patient Seen in: [facility] Emergency Department    History   Patient presents with:  Urinary Symptoms (urologic)    Stated Complaint: uti    VON Sandhu is a 11year-old who presents for evaluation of increased urinary frequency.   Mom noted that he
RT Inhaler-Nebulizer Bronchodilator Protocol Note    There is a bronchodilator order in the chart from a provider indicating to follow the RT Bronchodilator Protocol and there is an “Initiate RT Inhaler-Nebulizer Bronchodilator Protocol” order as well (see protocol at bottom of note).    CXR Findings:  No results found.    The findings from the last RT Protocol Assessment were as follows:   History Pulmonary Disease: Chronic pulmonary disease  Respiratory Pattern: Dyspnea on exertion or RR 21-25 bpm  Breath Sounds: Slightly diminished and/or crackles  Cough: Strong, productive  Indication for Bronchodilator Therapy: Decreased or absent breath sounds  Bronchodilator Assessment Score: 7    Aerosolized bronchodilator medication orders have been revised according to the RT Inhaler-Nebulizer Bronchodilator Protocol below.    Respiratory Therapist to perform RT Therapy Protocol Assessment initially then follow the protocol.  Repeat RT Therapy Protocol Assessment PRN for score 0-3 or on second treatment, BID, and PRN for scores above 3.    No Indications - adjust the frequency to every 6 hours PRN wheezing or bronchospasm, if no treatments needed after 48 hours then discontinue using Per Protocol order mode.     If indication present, adjust the RT bronchodilator orders based on the Bronchodilator Assessment Score as indicated below.  Use Inhaler orders unless patient has one or more of the following: on home nebulizer, not able to hold breath for 10 seconds, is not alert and oriented, cannot activate and use MDI correctly, or respiratory rate 25 breaths per minute or more, then use the equivalent nebulizer order(s) with same Frequency and PRN reasons based on the score.  If a patient is on this medication at home then do not decrease Frequency below that used at home.    0-3 - enter or revise RT bronchodilator order(s) to equivalent RT Bronchodilator order with Frequency of every 4 hours PRN for wheezing or increased work 
RT Inhaler-Nebulizer Bronchodilator Protocol Note    There is a bronchodilator order in the chart from a provider indicating to follow the RT Bronchodilator Protocol and there is an “Initiate RT Inhaler-Nebulizer Bronchodilator Protocol” order as well (see protocol at bottom of note).    CXR Findings:  No results found.    The findings from the last RT Protocol Assessment were as follows:   History Pulmonary Disease: Chronic pulmonary disease  Respiratory Pattern: Mild dyspnea at rest, irregular pattern, or RR 21-25 bpm  Breath Sounds: Slightly diminished and/or crackles  Cough: Strong, spontaneous, non-productive  Indication for Bronchodilator Therapy: Decreased or absent breath sounds  Bronchodilator Assessment Score: 8    Aerosolized bronchodilator medication orders have been revised according to the RT Inhaler-Nebulizer Bronchodilator Protocol below.    Respiratory Therapist to perform RT Therapy Protocol Assessment initially then follow the protocol.  Repeat RT Therapy Protocol Assessment PRN for score 0-3 or on second treatment, BID, and PRN for scores above 3.    No Indications - adjust the frequency to every 6 hours PRN wheezing or bronchospasm, if no treatments needed after 48 hours then discontinue using Per Protocol order mode.     If indication present, adjust the RT bronchodilator orders based on the Bronchodilator Assessment Score as indicated below.  Use Inhaler orders unless patient has one or more of the following: on home nebulizer, not able to hold breath for 10 seconds, is not alert and oriented, cannot activate and use MDI correctly, or respiratory rate 25 breaths per minute or more, then use the equivalent nebulizer order(s) with same Frequency and PRN reasons based on the score.  If a patient is on this medication at home then do not decrease Frequency below that used at home.    0-3 - enter or revise RT bronchodilator order(s) to equivalent RT Bronchodilator order with Frequency of every 4 
RT Inhaler-Nebulizer Bronchodilator Protocol Note    There is a bronchodilator order in the chart from a provider indicating to follow the RT Bronchodilator Protocol and there is an “Initiate RT Inhaler-Nebulizer Bronchodilator Protocol” order as well (see protocol at bottom of note).    CXR Findings:  No results found.    The findings from the last RT Protocol Assessment were as follows:   History Pulmonary Disease: Chronic pulmonary disease  Respiratory Pattern: Regular pattern and RR 12-20 bpm  Breath Sounds: Slightly diminished and/or crackles  Cough: Strong, productive  Indication for Bronchodilator Therapy: Decreased or absent breath sounds  Bronchodilator Assessment Score: 5    Aerosolized bronchodilator medication orders have been revised according to the RT Inhaler-Nebulizer Bronchodilator Protocol below.    Respiratory Therapist to perform RT Therapy Protocol Assessment initially then follow the protocol.  Repeat RT Therapy Protocol Assessment PRN for score 0-3 or on second treatment, BID, and PRN for scores above 3.    No Indications - adjust the frequency to every 6 hours PRN wheezing or bronchospasm, if no treatments needed after 48 hours then discontinue using Per Protocol order mode.     If indication present, adjust the RT bronchodilator orders based on the Bronchodilator Assessment Score as indicated below.  Use Inhaler orders unless patient has one or more of the following: on home nebulizer, not able to hold breath for 10 seconds, is not alert and oriented, cannot activate and use MDI correctly, or respiratory rate 25 breaths per minute or more, then use the equivalent nebulizer order(s) with same Frequency and PRN reasons based on the score.  If a patient is on this medication at home then do not decrease Frequency below that used at home.    0-3 - enter or revise RT bronchodilator order(s) to equivalent RT Bronchodilator order with Frequency of every 4 hours PRN for wheezing or increased work of 
RT Inhaler-Nebulizer Bronchodilator Protocol Note    There is a bronchodilator order in the chart from a provider indicating to follow the RT Bronchodilator Protocol and there is an “Initiate RT Inhaler-Nebulizer Bronchodilator Protocol” order as well (see protocol at bottom of note).    CXR Findings:  XR CHEST PORTABLE  Result Date: 6/11/2025  Impression: Chronic lung pathology. Question concurrent acute pneumonia in the left mid/lower lung zones. This document has been electronically signed by: Luzmaria Zacarias MD on 06/11/2025 05:25 PM      The findings from the last RT Protocol Assessment were as follows:   History Pulmonary Disease: Chronic pulmonary disease  Respiratory Pattern: Regular pattern and RR 12-20 bpm  Breath Sounds: Slightly diminished and/or crackles  Cough: Strong, spontaneous, non-productive  Indication for Bronchodilator Therapy: Decreased or absent breath sounds  Bronchodilator Assessment Score: 4    Aerosolized bronchodilator medication orders have been revised according to the RT Inhaler-Nebulizer Bronchodilator Protocol below.    Respiratory Therapist to perform RT Therapy Protocol Assessment initially then follow the protocol.  Repeat RT Therapy Protocol Assessment PRN for score 0-3 or on second treatment, BID, and PRN for scores above 3.    No Indications - adjust the frequency to every 6 hours PRN wheezing or bronchospasm, if no treatments needed after 48 hours then discontinue using Per Protocol order mode.     If indication present, adjust the RT bronchodilator orders based on the Bronchodilator Assessment Score as indicated below.  Use Inhaler orders unless patient has one or more of the following: on home nebulizer, not able to hold breath for 10 seconds, is not alert and oriented, cannot activate and use MDI correctly, or respiratory rate 25 breaths per minute or more, then use the equivalent nebulizer order(s) with same Frequency and PRN reasons based on the score.  If a 
RT Inhaler-Nebulizer Bronchodilator Protocol Note    There is a bronchodilator order in the chart from a provider indicating to follow the RT Bronchodilator Protocol and there is an “Initiate RT Inhaler-Nebulizer Bronchodilator Protocol” order as well (see protocol at bottom of note).    CXR Findings:  XR CHEST PORTABLE  Result Date: 6/13/2025  1. Normal heart size. Prior anterior cervical fusion. 2. Moderate mixed infiltrates/pneumonia involving both lungs relatively diffusely. 3. Cannot exclude underlying element of interstitial fibrosis in either lung. 4. Findings in right lung have slightly worsened since prior study. Findings left lung base have slightly improved. **This report has been created using voice recognition software.  It may contain minor errors which are inherent in voice recognition technology.** Electronically signed by Dr. Agusto Medel      The findings from the last RT Protocol Assessment were as follows:   History Pulmonary Disease: Chronic pulmonary disease  Respiratory Pattern: Dyspnea on exertion or RR 21-25 bpm  Breath Sounds: Slightly diminished and/or crackles  Cough: Strong, spontaneous, non-productive  Indication for Bronchodilator Therapy: Decreased or absent breath sounds  Bronchodilator Assessment Score: 6    Aerosolized bronchodilator medication orders have been revised according to the RT Inhaler-Nebulizer Bronchodilator Protocol below.    Respiratory Therapist to perform RT Therapy Protocol Assessment initially then follow the protocol.  Repeat RT Therapy Protocol Assessment PRN for score 0-3 or on second treatment, BID, and PRN for scores above 3.    No Indications - adjust the frequency to every 6 hours PRN wheezing or bronchospasm, if no treatments needed after 48 hours then discontinue using Per Protocol order mode.     If indication present, adjust the RT bronchodilator orders based on the Bronchodilator Assessment Score as indicated below.  Use Inhaler orders unless patient 
RT Inhaler-Nebulizer Bronchodilator Protocol Note    There is a bronchodilator order in the chart from a provider indicating to follow the RT Bronchodilator Protocol and there is an “Initiate RT Inhaler-Nebulizer Bronchodilator Protocol” order as well (see protocol at bottom of note).    CXR Findings:  XR CHEST PORTABLE  Result Date: 6/13/2025  1. Normal heart size. Prior anterior cervical fusion. 2. Moderate mixed infiltrates/pneumonia involving both lungs relatively diffusely. 3. Cannot exclude underlying element of interstitial fibrosis in either lung. 4. Findings in right lung have slightly worsened since prior study. Findings left lung base have slightly improved. **This report has been created using voice recognition software.  It may contain minor errors which are inherent in voice recognition technology.** Electronically signed by Dr. Agusto Medel      The findings from the last RT Protocol Assessment were as follows:   History Pulmonary Disease: Chronic pulmonary disease  Respiratory Pattern: Regular pattern and RR 12-20 bpm  Breath Sounds: Slightly diminished and/or crackles  Cough: Strong, spontaneous, non-productive  Indication for Bronchodilator Therapy: Decreased or absent breath sounds  Bronchodilator Assessment Score: 4    Aerosolized bronchodilator medication orders have been revised according to the RT Inhaler-Nebulizer Bronchodilator Protocol below.    Respiratory Therapist to perform RT Therapy Protocol Assessment initially then follow the protocol.  Repeat RT Therapy Protocol Assessment PRN for score 0-3 or on second treatment, BID, and PRN for scores above 3.    No Indications - adjust the frequency to every 6 hours PRN wheezing or bronchospasm, if no treatments needed after 48 hours then discontinue using Per Protocol order mode.     If indication present, adjust the RT bronchodilator orders based on the Bronchodilator Assessment Score as indicated below.  Use Inhaler orders unless patient 
RT Inhaler-Nebulizer Bronchodilator Protocol Note    There is a bronchodilator order in the chart from a provider indicating to follow the RT Bronchodilator Protocol and there is an “Initiate RT Inhaler-Nebulizer Bronchodilator Protocol” order as well (see protocol at bottom of note).    CXR Findings:  XR CHEST PORTABLE  Result Date: 6/6/2025  Diffuse interstitial opacities bilaterally. This could be related to pulmonary vascular congestion. **This report has been created using voice recognition software. It may contain minor errors which are inherent in voice recognition technology.** Electronically signed by Dr Alvin Guidry      The findings from the last RT Protocol Assessment were as follows:   History Pulmonary Disease: Chronic pulmonary disease  Respiratory Pattern: Dyspnea on exertion or RR 21-25 bpm  Breath Sounds: Slightly diminished and/or crackles  Cough: Strong, productive  Indication for Bronchodilator Therapy: Decreased or absent breath sounds, On home bronchodilators  Bronchodilator Assessment Score: 7    Aerosolized bronchodilator medication orders have been revised according to the RT Inhaler-Nebulizer Bronchodilator Protocol below.    Respiratory Therapist to perform RT Therapy Protocol Assessment initially then follow the protocol.  Repeat RT Therapy Protocol Assessment PRN for score 0-3 or on second treatment, BID, and PRN for scores above 3.    No Indications - adjust the frequency to every 6 hours PRN wheezing or bronchospasm, if no treatments needed after 48 hours then discontinue using Per Protocol order mode.     If indication present, adjust the RT bronchodilator orders based on the Bronchodilator Assessment Score as indicated below.  Use Inhaler orders unless patient has one or more of the following: on home nebulizer, not able to hold breath for 10 seconds, is not alert and oriented, cannot activate and use MDI correctly, or respiratory rate 25 breaths per minute or more, then use 
RT Inhaler-Nebulizer Bronchodilator Protocol Note    There is a bronchodilator order in the chart from a provider indicating to follow the RT Bronchodilator Protocol and there is an “Initiate RT Inhaler-Nebulizer Bronchodilator Protocol” order as well (see protocol at bottom of note).    CXR Findings:  XR CHEST PORTABLE  Result Date: 6/6/2025  Diffuse interstitial opacities bilaterally. This could be related to pulmonary vascular congestion. **This report has been created using voice recognition software. It may contain minor errors which are inherent in voice recognition technology.** Electronically signed by Dr Alvin Guidry      The findings from the last RT Protocol Assessment were as follows:   History Pulmonary Disease: Chronic pulmonary disease  Respiratory Pattern: Dyspnea on exertion or RR 21-25 bpm  Breath Sounds: Slightly diminished and/or crackles  Cough: Strong, productive  Indication for Bronchodilator Therapy: Decreased or absent breath sounds, On home bronchodilators (Takes BID)  Bronchodilator Assessment Score: 7    Aerosolized bronchodilator medication orders have been revised according to the RT Inhaler-Nebulizer Bronchodilator Protocol below.    Respiratory Therapist to perform RT Therapy Protocol Assessment initially then follow the protocol.  Repeat RT Therapy Protocol Assessment PRN for score 0-3 or on second treatment, BID, and PRN for scores above 3.    No Indications - adjust the frequency to every 6 hours PRN wheezing or bronchospasm, if no treatments needed after 48 hours then discontinue using Per Protocol order mode.     If indication present, adjust the RT bronchodilator orders based on the Bronchodilator Assessment Score as indicated below.  Use Inhaler orders unless patient has one or more of the following: on home nebulizer, not able to hold breath for 10 seconds, is not alert and oriented, cannot activate and use MDI correctly, or respiratory rate 25 breaths per minute or 
RT Inhaler-Nebulizer Bronchodilator Protocol Note    There is a bronchodilator order in the chart from a provider indicating to follow the RT Bronchodilator Protocol and there is an “Initiate RT Inhaler-Nebulizer Bronchodilator Protocol” order as well (see protocol at bottom of note).    CXR Findings:  XR CHEST PORTABLE  Result Date: 6/8/2025  1. Diffuse peripheral reticular markings are seen in both lungs that can relate to underlying chronic interstitial disease/fibrosis. 2. Mild increased opacity in the left lung base can relate to acute infiltrate. 3. Otherwise, there has been no other significant interval change in the radiographic appearance of the chest  from chest radiograph 6/6/2025. **This report has been created using voice recognition software.  It may contain minor errors which are inherent in voice recognition technology.** Electronically signed by Dr. Deepa Del Toro      The findings from the last RT Protocol Assessment were as follows:   History Pulmonary Disease: Chronic pulmonary disease  Respiratory Pattern: Regular pattern and RR 12-20 bpm  Breath Sounds: Slightly diminished and/or crackles  Cough: Strong, productive  Indication for Bronchodilator Therapy: Decreased or absent breath sounds, On home bronchodilators  Bronchodilator Assessment Score: 5    Aerosolized bronchodilator medication orders have been revised according to the RT Inhaler-Nebulizer Bronchodilator Protocol below.    Respiratory Therapist to perform RT Therapy Protocol Assessment initially then follow the protocol.  Repeat RT Therapy Protocol Assessment PRN for score 0-3 or on second treatment, BID, and PRN for scores above 3.    No Indications - adjust the frequency to every 6 hours PRN wheezing or bronchospasm, if no treatments needed after 48 hours then discontinue using Per Protocol order mode.     If indication present, adjust the RT bronchodilator orders based on the Bronchodilator Assessment Score as indicated below.  
Exposure - Never Smoker                                      Packs/day: 0.00      Years: 0.00      Smokeless tobacco: Never Used                          Review of Systems    Positive for stated complaint: uti  Other systems are as noted in HPI.   Constitut
more, then use the equivalent nebulizer order(s) with same Frequency and PRN reasons based on the score.  If a patient is on this medication at home then do not decrease Frequency below that used at home.    0-3 - enter or revise RT bronchodilator order(s) to equivalent RT Bronchodilator order with Frequency of every 4 hours PRN for wheezing or increased work of breathing using Per Protocol order mode.        4-6 - enter or revise RT Bronchodilator order(s) to two equivalent RT bronchodilator orders with one order with BID Frequency and one order with Frequency of every 4 hours PRN wheezing or increased work of breathing using Per Protocol order mode.        7-10 - enter or revise RT Bronchodilator order(s) to two equivalent RT bronchodilator orders with one order with TID Frequency and one order with Frequency of every 4 hours PRN wheezing or increased work of breathing using Per Protocol order mode.       11-13 - enter or revise RT Bronchodilator order(s) to one equivalent RT bronchodilator order with QID Frequency and an Albuterol order with Frequency of every 4 hours PRN wheezing or increased work of breathing using Per Protocol order mode.      Greater than 13 - enter or revise RT Bronchodilator order(s) to one equivalent RT bronchodilator order with every 4 hours Frequency and an Albuterol order with Frequency of every 2 hours PRN wheezing or increased work of breathing using Per Protocol order mode.     RT to enter RT Home Evaluation for COPD & MDI Assessment order using Per Protocol order mode.    Electronically signed by Karla Bermeo RCP on 6/7/2025 at 8:54 AM  
Course  ------------------------------------------------------------  MDM   He presents for evaluation of frequent urination. We obtained a urinalysis which showed a small amount of blood with negative nitrites and negative leukocyte esterase.   There was

## 2025-06-16 NOTE — DISCHARGE INSTR - DIET
Good nutrition is important when healing from an illness, injury, or surgery.  Follow any nutrition recommendations given to you during your hospital stay.   If you were given an oral nutrition supplement while in the hospital, continue to take this supplement at home.  You can take it with meals, in-between meals, and/or before bedtime. These supplements can be purchased at most local grocery stores, pharmacies, and chain [employer]-stores.   If you have any questions about your diet or nutrition, call the hospital and ask for the dietitian.      Consider Glucerna, sugar free carnation instant breakfast or Equate Diabetic Care shakes if your blood sugars are elevated.

## 2025-06-16 NOTE — PLAN OF CARE
Problem: Chronic Conditions and Co-morbidities  Goal: Patient's chronic conditions and co-morbidity symptoms are monitored and maintained or improved  Outcome: Progressing     Problem: Discharge Planning  Goal: Discharge to home or other facility with appropriate resources  Outcome: Progressing     Problem: Safety - Adult  Goal: Free from fall injury  Outcome: Progressing     Problem: Pain  Goal: Verbalizes/displays adequate comfort level or baseline comfort level  Outcome: Progressing     Problem: Respiratory - Adult  Goal: Achieves optimal ventilation and oxygenation  6/16/2025 0515 by Molly Walls RN  Outcome: Progressing     Problem: Cardiovascular - Adult  Goal: Absence of cardiac dysrhythmias or at baseline  Outcome: Progressing     Problem: Nutrition Deficit:  Goal: Optimize nutritional status  Outcome: Progressing     Care plan reviewed with patient. Patient verbalized understanding of the plan of care and contribute to goal setting.

## 2025-06-16 NOTE — PLAN OF CARE
Problem: Respiratory - Adult  Goal: Achieves optimal ventilation and oxygenation  6/15/2025 2112 by Alejandra Almaraz RCP  Outcome: Progressing     Problem: Respiratory - Adult  Goal: Clear lung sounds  6/15/2025 2112 by Alejandra Almaraz RCP  Outcome: Progressing   Patient mutually agrees upon goals.

## 2025-06-16 NOTE — DISCHARGE SUMMARY
Resident Discharge Summary (Hospitalist)      Patient: Dilan Pappas 70 y.o. male  : 1955  MRN: [id number]   Account: [id number]   Patient's PCP: Eric Orozco APRN - CNP    Admit Date: 2025   Discharge Date:   2015    Admitting Physician: John Cherry MD  Discharge Physician: Amaris Julien MD       Outpatient Follow-up Rec's:  CBC in one week  BMP in one week  Adjust home O2 requirement with 8-10 L NC on exertion or tolerated   Follow up PCP in one week  Follow your ENT appointment in 1 week  Avoid heavy lifting for at least 2 weeks.         Hospital Course:   Patient is a 69-year-old male who presented to [facility] for partial laterovertical laryngectomy with Dr. Miller's on 2025.  Patient had presented to Dr. Miller's office this morning for follow-up of laryngeal cancer.  Patient also has a new diagnosis of squamous cell lung cancer which is p16 negative.  Patient had reported changes in his voice that started 1 year ago, which was attributed to his smoking habits.  Patient has not smoked since 2024.  Patient also has a history of thyroid nodules which were biopsied and results were negative.  He undergoes an ultrasound every 2 years for surveillance, but has been a few years since his last ultrasound.  Patient recently had PEG tube placed on .  He is due to continue Augmentin for 10 days per Dr. Nguyen.  EGD performed on  showing distal esophagitis, gastritis.  Patient had modified barium swallow test on 6/10 where he was recommended for regular thin liquids with chin tuck when eating and reading.  Patient had debridement on  with ENT.        Discharge Diagnoses:  Chronic hypoxic respiratory failure: Patient currently at baseline 4 L oxygen requirement at home, currently saturating well on 4 L. Home O2 evaluation showed patient requires 4 L with rest,8 L. Repeat O2 eval showing pt requiring 10 L on exertion. Discharged with appropriate home oxygen

## 2025-06-16 NOTE — PLAN OF CARE
Problem: Respiratory - Adult  Goal: Achieves optimal ventilation and oxygenation  6/16/2025 0838 by Peyton Maya RCP  Outcome: Progressing     Problem: Respiratory - Adult  Goal: Clear lung sounds  6/16/2025 0838 by Peyton Maya RCP  Outcome: Progressing

## 2025-06-17 ENCOUNTER — CARE COORDINATION (OUTPATIENT)
Dept: CARE COORDINATION | Age: 70
End: 2025-06-17

## 2025-06-17 ENCOUNTER — TELEPHONE (OUTPATIENT)
Dept: FAMILY MEDICINE CLINIC | Age: 70
End: 2025-06-17

## 2025-06-17 LAB
FUNGUS SPEC CULT: ABNORMAL
FUNGUS SPEC FUNGUS STN: ABNORMAL
ORGANISM: ABNORMAL

## 2025-06-17 NOTE — TELEPHONE ENCOUNTER
[facility] Transitions Initial Follow Up Call    Outreach made within 2 business days of discharge: Yes    Patient: Dilan Pappas Patient : 1955   MRN: [id number]  Reason for Admission: There are no discharge diagnoses documented for the most recent discharge.  Discharge Date: 25       Spoke with: EDMUNDO for Dilan    Discharge department/facility: [facility] Interactive Patient Contact:  Was patient able to fill all prescriptions:   Was patient instructed to bring all medications to the follow-up visit:   Is patient taking all medications as directed in the discharge summary?   Does patient understand their discharge instructions:   Does patient have questions or concerns that need addressed prior to 7 day follow up office visit:     Scheduled appointment with PCP within 7-14   Days    Pt needs HFU appt    Follow Up  Future Appointments   Date Time Provider Department Center   2025  2:45 PM Dheeraj Rodriguez MD N ENT [facility]   2025  8:30 AM STR MRI RM1 STRZ MRI STR Rad/Card   2025 11:00 AM Emmanuelle Kimble MD N Oncology [facility]   2025  8:15 AM STR PET IMAGING ROOM STRZ PET STR Rad/Card   7/3/2025  8:00 AM STR CT IMAGING RM1  [facility] STRZ OUT EXP STR Rad/Card   2025  8:20 AM Eric Orozco APRN - CNP [facility]   2025  9:00 AM Tao Campbell APRN - CNP N Pulm Med [facility]       Jessica Lucas MA

## 2025-06-17 NOTE — CARE COORDINATION
Care Transitions Note    Initial Call - Call within 2 business days of discharge: Yes    Attempted to reach patient, family, son Suhas for transitions of care follow up. Unable to reach patient, family, son Suhas.    Outreach Attempts:   HIPAA compliant voicemail left for patient.     1st attempt to contact pt for initial care transition follow up.  Unable to reach pt.  Left message with contact information and request for call back.      Contacted pt's son Suhas (on HIPAA).  He states Dilan is doing pretty good for the most part.  Reports he is using his oxygen.  States he is using the oxygen as ordered.  Using 4 L of oxygen at rest; 6 L of oxygen when sleeping; 10 L of oxygen with activity.  He states his father can speak but it's only a whisper.  He has not seen his dad but for a couple of minutes today so could not give any more information.  He states [facility] to make a visit today.  Informed him that will try to call pt again at a later time.      Patient: Dilan Pappas    Patient : 1955   MRN: [id number]    Reason for Admission: s/p EGD with PEG; s/p TORS vertical lateral partial laryngectomy, bronchoscopy with lavage  Discharge Date: 25  RURS: Readmission Risk Score: 12.8    Last Discharge Facility       Date Complaint Diagnosis Description Type Department Provider    25  S/P laryngectomy ... Admission (Discharged) STR Urbano Cevallos MD            Was this an external facility discharge? No    Follow Up Appointment:   Patient has hospital follow up appointment scheduled within 7 days of discharge.    Future Appointments         Provider Specialty Dept Phone    2025 2:45 PM Dheeraj Rodriguez MD Otolaryngology [phone]    2025 8:30 AM (Arrive by 8:00 AM) [facility] MRI RM1 Radiology [phone]    2025 11:00 AM Emmanuelle Kimble MD Oncology [phone]    2025 8:15 AM (Arrive by 7:45 AM) [facility] PET IMAGING ROOM Radiology [phone]    7/3/2025 8:00 AM

## 2025-06-18 ENCOUNTER — CARE COORDINATION (OUTPATIENT)
Dept: CARE COORDINATION | Age: 70
End: 2025-06-18

## 2025-06-18 DIAGNOSIS — C32.0 SQUAMOUS CELL CARCINOMA OF LEFT VOCAL CORD (HCC): Primary | ICD-10-CM

## 2025-06-18 LAB
BACTERIA SPEC RESP CULT: ABNORMAL
GRAM STN SPEC: ABNORMAL
ORGANISM: ABNORMAL
ORGANISM: ABNORMAL

## 2025-06-18 PROCEDURE — 1111F DSCHRG MED/CURRENT MED MERGE: CPT | Performed by: NURSE PRACTITIONER

## 2025-06-18 NOTE — CARE COORDINATION
Care Transitions Note    Follow Up Call     Patient Current Location:  Home: [address] [place] [address], [address]  [place] [id number]    Care Transition Nurse contacted the patient by telephone. Verified name and  as identifiers.    Additional needs identified to be addressed with provider   No needs identified                 Method of communication with provider: chart routing.    Care Summary Note: RN outreached to patient for follow up. Patient noted doing well. Patient speaks in a whisper. Patient stated that [facility] was out to visit yesterday but he is unsure when the next visit it. Patient noted he is home with his grandson who helps him during the day. Patient noted eating and drinking well, taking all medication as prescribed. Noted he is not taking monjuro at this time due to weight loss. Noted he is monitoring his s/s at home. Gaby any increased SOB, HA, dizziness.   Patient denied any needs or concerns at this time.       Plan of care updates since last contact:  Home Health: [facility] has been out to visit         Advance Care Planning:   Does patient have an Advance Directive: deferred at this time, will discuss on future follow up. .    Medication Review:  Medication review was performed with patient,1111F entered: yes.    Remote Patient Monitoring:  Offered patient enrollment in the Remote Patient Monitoring (RPM) program for in-home monitoring: Deferred at this time because patient not interested at this time ; will discuss at next outreach.    Assessments:  Patient noted doing well at this time.     Follow Up Appointment:   Reviewed upcoming appointment(s).  Future Appointments         Provider Specialty Dept Phone    2025 2:45 PM Dheeraj Rodriguez MD Otolaryngology [phone]    2025 8:30 AM (Arrive by 8:00 AM) STR MRI RM1 Radiology [phone]    2025 11:00 AM Emmanuelle Kimble MD Oncology [phone]    2025 8:15 AM (Arrive by 7:45 AM) STR PET IMAGING ROOM Radiology [phone]    7/3/2025

## 2025-06-18 NOTE — TELEPHONE ENCOUNTER
Patient called in. I asked if he want to see Eric for his HP fu and he wanted to be seen this week. You don't have any opening until next Wednesday 6/25/25.    Please advise

## 2025-06-18 NOTE — TELEPHONE ENCOUNTER
Left message on answering machine. Requested pt to call back at [phone], at their earliest convenience.

## 2025-06-18 NOTE — TELEPHONE ENCOUNTER
Recent Visits  Date Type Provider Dept   04/09/25 Office Visit Orozco, Jan, APRN - CNP [facility]   03/24/25 Office Visit Orozco, Jan, APRN - CNP [facility]   02/11/25 Office Visit Eric Orozco, APRN - CNP [facility]   01/30/25 Office Visit Eric Orozco, APRN - CNP [facility]   01/09/25 Office Visit Orozco, Jan, APRN - CNP [facility]   01/08/25 Office Visit Eric Orozco, APRN - CNP [facility]   10/07/24 Office Visit Eric Orozco, APRN - CNP [facility]   04/10/24 Office Visit Eric Orozco, APRN - CNP [facility]   01/10/24 Office Visit Eric Orozco, APRN - CNP [facility]   Showing recent visits within past 540 days with a meds authorizing provider and meeting all other requirements  Future Appointments  Date Type Provider Dept   06/19/25 Appointment Orozco, Jan, APRN - CNP [facility]   07/09/25 Appointment Eric Orozco, APRN - CNP [facility]   Showing future appointments within next 150 days with a meds authorizing provider and meeting all other requirements

## 2025-06-19 ENCOUNTER — OFFICE VISIT (OUTPATIENT)
Dept: FAMILY MEDICINE CLINIC | Age: 70
End: 2025-06-19

## 2025-06-19 VITALS
RESPIRATION RATE: 20 BRPM | DIASTOLIC BLOOD PRESSURE: 54 MMHG | TEMPERATURE: 97.8 F | HEART RATE: 98 BPM | BODY MASS INDEX: 21.02 KG/M2 | WEIGHT: 158.6 LBS | OXYGEN SATURATION: 90 % | HEIGHT: 73 IN | SYSTOLIC BLOOD PRESSURE: 96 MMHG

## 2025-06-19 DIAGNOSIS — I10 PRIMARY HYPERTENSION: ICD-10-CM

## 2025-06-19 DIAGNOSIS — Z74.2 NEED FOR HOME HEALTH CARE: ICD-10-CM

## 2025-06-19 DIAGNOSIS — G62.9 NEUROPATHY: ICD-10-CM

## 2025-06-19 DIAGNOSIS — C32.0 SQUAMOUS CELL CARCINOMA OF LEFT VOCAL CORD (HCC): ICD-10-CM

## 2025-06-19 DIAGNOSIS — J84.112 IDIOPATHIC DIFFUSE INTERSTITIAL PULMONARY FIBROSIS (HCC): ICD-10-CM

## 2025-06-19 DIAGNOSIS — C34.12 PRIMARY MALIGNANT NEOPLASM OF LEFT UPPER LOBE OF LUNG (HCC): ICD-10-CM

## 2025-06-19 DIAGNOSIS — Z99.81 OXYGEN DEPENDENT: ICD-10-CM

## 2025-06-19 DIAGNOSIS — R53.81 PHYSICAL DECONDITIONING: ICD-10-CM

## 2025-06-19 DIAGNOSIS — Z09 HOSPITAL DISCHARGE FOLLOW-UP: Primary | ICD-10-CM

## 2025-06-19 DIAGNOSIS — J43.9 PULMONARY EMPHYSEMA, UNSPECIFIED EMPHYSEMA TYPE (HCC): ICD-10-CM

## 2025-06-19 DIAGNOSIS — K21.9 GASTROESOPHAGEAL REFLUX DISEASE WITHOUT ESOPHAGITIS: ICD-10-CM

## 2025-06-19 DIAGNOSIS — E11.9 TYPE 2 DIABETES MELLITUS WITHOUT COMPLICATION, WITHOUT LONG-TERM CURRENT USE OF INSULIN (HCC): ICD-10-CM

## 2025-06-19 DIAGNOSIS — F32.4 MAJOR DEPRESSIVE DISORDER WITH SINGLE EPISODE, IN PARTIAL REMISSION: ICD-10-CM

## 2025-06-19 DIAGNOSIS — E44.0 MODERATE PROTEIN-CALORIE MALNUTRITION: ICD-10-CM

## 2025-06-19 PROBLEM — D72.829 LEUKOCYTOSIS: Status: RESOLVED | Noted: 2025-06-08 | Resolved: 2025-06-19

## 2025-06-19 PROBLEM — J98.8 AIRWAY COMPROMISE: Status: RESOLVED | Noted: 2025-06-06 | Resolved: 2025-06-19

## 2025-06-19 PROBLEM — J96.21 ACUTE ON CHRONIC HYPOXIC RESPIRATORY FAILURE (HCC): Status: RESOLVED | Noted: 2025-06-08 | Resolved: 2025-06-19

## 2025-06-19 PROBLEM — J38.3 LESION OF VOCAL FOLD: Status: RESOLVED | Noted: 2025-03-13 | Resolved: 2025-06-19

## 2025-06-19 LAB — HBA1C MFR BLD: 6.5 % (ref 4.3–5.7)

## 2025-06-19 NOTE — PROGRESS NOTES
fluticasone-umeclidin-vilant (TRELEGY ELLIPTA) 100-62.5-25 MCG/ACT AEPB inhaler INHALE 1 PUFF INTO THE LUNGS DAILY 3 each 3    Tirzepatide (MOUNJARO) 5 MG/0.5ML SOAJ Inject 5 mg into the skin once a week 6 mL 2    pantoprazole (PROTONIX) 40 MG tablet TAKE 1 TABLET BY MOUTH DAILY 90 tablet 4    atorvastatin (LIPITOR) 40 MG tablet TAKE 1 TABLET BY MOUTH DAILY 90 tablet 3    empagliflozin (JARDIANCE) 25 MG tablet Take 1 tablet by mouth daily 90 tablet 4    QUEtiapine (SEROQUEL) 200 MG tablet TAKE 1 TABLET BY MOUTH AT BEDTIME 90 tablet 3    metoprolol tartrate (LOPRESSOR) 25 MG tablet TAKE 1/2 TABLET BY MOUTH TWICE DAILY, HOLD IF HEART RATE LESS THAN 60 90 tablet 3    gabapentin (NEURONTIN) 300 MG capsule Take 1 capsule by mouth 2 times daily for 180 days. Intended supply: 90 days 180 capsule 1    SITagliptin (JANUVIA) 100 MG tablet Take 1 tablet by mouth daily 90 tablet 4    pioglitazone (ACTOS) 30 MG tablet Take 1 tablet by mouth daily 90 tablet 1    amitriptyline (ELAVIL) 50 MG tablet Take 1 tablet by mouth nightly 90 tablet 4    buPROPion (WELLBUTRIN XL) 150 MG extended release tablet Take 1 tablet by mouth every morning 90 tablet 3    metFORMIN (GLUCOPHAGE) 1000 MG tablet Take 1 tablet by mouth 2 times daily (with meals) 180 tablet 4    sodium chloride, Inhalant, 3 % nebulizer solution Take 4 mLs by nebulization as needed for Other (Use every 4 hours as needed for thick sputum) 120 mL 5    albuterol sulfate HFA (PROVENTIL;VENTOLIN;PROAIR) 108 (90 Base) MCG/ACT inhaler 2 inh four times a day as needed for SOB/wheezing 54 g 5    albuterol (PROVENTIL) (2.5 MG/3ML) 0.083% nebulizer solution USE 3 ML BY NEBULIZATION FOUR TIMES DAILY AS NEEDED FOR WHEEZING 90 each 3    azelastine (ASTELIN) 0.1 % nasal spray SPRAY ONCE IN EACH NOSTRIL TWICE DAILY AS DIRECTED 1 each 3        Medications patient taking as of now reconciled against medications ordered at time of hospital discharge: Yes    A comprehensive review of systems

## 2025-06-20 LAB
FUNGUS SPEC CULT: ABNORMAL
FUNGUS SPEC FUNGUS STN: ABNORMAL
ORGANISM: ABNORMAL

## 2025-06-23 ENCOUNTER — OFFICE VISIT (OUTPATIENT)
Dept: ENT CLINIC | Age: 70
End: 2025-06-23
Payer: MEDICARE

## 2025-06-23 ENCOUNTER — CLINICAL DOCUMENTATION (OUTPATIENT)
Dept: RADIATION ONCOLOGY | Age: 70
End: 2025-06-23

## 2025-06-23 VITALS
HEART RATE: 98 BPM | SYSTOLIC BLOOD PRESSURE: 128 MMHG | OXYGEN SATURATION: 94 % | TEMPERATURE: 99 F | WEIGHT: 159.7 LBS | BODY MASS INDEX: 21.17 KG/M2 | HEIGHT: 73 IN | DIASTOLIC BLOOD PRESSURE: 66 MMHG

## 2025-06-23 DIAGNOSIS — Z99.81 DEPENDENCE ON CONTINUOUS SUPPLEMENTAL OXYGEN: ICD-10-CM

## 2025-06-23 DIAGNOSIS — R49.0 HOARSENESS: ICD-10-CM

## 2025-06-23 DIAGNOSIS — R05.3 CHRONIC COUGH: ICD-10-CM

## 2025-06-23 DIAGNOSIS — J43.9 PULMONARY EMPHYSEMA, UNSPECIFIED EMPHYSEMA TYPE (HCC): Primary | ICD-10-CM

## 2025-06-23 DIAGNOSIS — C32.0 SQUAMOUS CELL CARCINOMA OF LEFT VOCAL CORD (HCC): ICD-10-CM

## 2025-06-23 DIAGNOSIS — R13.14 PHARYNGOESOPHAGEAL DYSPHAGIA: ICD-10-CM

## 2025-06-23 PROCEDURE — 31624 DX BRONCHOSCOPE/LAVAGE: CPT | Performed by: OTOLARYNGOLOGY

## 2025-06-23 PROCEDURE — 31575 DIAGNOSTIC LARYNGOSCOPY: CPT | Performed by: OTOLARYNGOLOGY

## 2025-06-23 PROCEDURE — 99024 POSTOP FOLLOW-UP VISIT: CPT | Performed by: OTOLARYNGOLOGY

## 2025-06-23 NOTE — PROGRESS NOTES
[facility] [place] [facility]  [facility] EAR, NOSE AND THROAT  [address]   [address]  [facility] [address]  Dept: [phone]  Dept Fax: [phone]  Loc: [phone]    Dilan Pappas is a 70 y.o. male who was referred by No ref. provider found for:  Chief Complaint   Patient presents with    Post-Op Check     Patient here for a post op, patient states that he is in pain all the time. Patient states he is on 10 liters of oxygen now        HPI:       History of Present Illness  Dilan Pappas is a 70 y.o. male who presents for evaluation of breathing issues after a transoral robotic assisted anterolateral partial laryngectomy.  He reports eating well with only occasional coughing drinking thin liquids.  He has had no fevers that he is aware of.  His oxygen needs have gone up to 6 L when active and down to 4 L when resting or sleeping. He reports difficulty in coughing.        History:     No Known Allergies  Current Outpatient Medications   Medication Sig Dispense Refill    OXYGEN 4 liters at rest, 10 liters with activity 1 each 5    guaiFENesin (MUCINEX) 600 MG extended release tablet Take 1 tablet by mouth 2 times daily 60 tablet 0    lisinopril (PRINIVIL;ZESTRIL) 2.5 MG tablet TAKE 1 TABLET BY MOUTH DAILY 90 tablet 3    OHTUVAYRE 3 MG/2.5ML SUSP Inhale 2.5 mLs into the lungs 2 times daily      fluticasone-umeclidin-vilant (TRELEGY ELLIPTA) 100-62.5-25 MCG/ACT AEPB inhaler INHALE 1 PUFF INTO THE LUNGS DAILY 3 each 3    pantoprazole (PROTONIX) 40 MG tablet TAKE 1 TABLET BY MOUTH DAILY 90 tablet 4    atorvastatin (LIPITOR) 40 MG tablet TAKE 1 TABLET BY MOUTH DAILY 90 tablet 3    empagliflozin (JARDIANCE) 25 MG tablet Take 1 tablet by mouth daily 90 tablet 4    QUEtiapine (SEROQUEL) 200 MG tablet TAKE 1 TABLET BY MOUTH AT BEDTIME 90 tablet 3    metoprolol tartrate (LOPRESSOR) 25 MG tablet TAKE 1/2 TABLET BY MOUTH TWICE DAILY, HOLD IF HEART RATE LESS THAN 60 90 tablet 3    gabapentin

## 2025-06-23 NOTE — PROGRESS NOTES
Oncology Nutrition    Contact Type: Phone call    Contact Reason: High risk nutrition screen    Recommendations: Left message on pt phone # in EMR with call back number.     Per EMR, PEG tube was placed then was pulled out. Pt prescribed Ensure Plus 3x/d. Thin liquids w/ chin tuck per SLP.    Follow-up:  Will follow up when pt returns call or when starting tx.

## 2025-06-24 LAB
FUNGUS SPEC CULT: ABNORMAL
FUNGUS SPEC FUNGUS STN: ABNORMAL
ORGANISM: ABNORMAL
REASON FOR REJECTION: NORMAL
REJECTED TEST: NORMAL

## 2025-06-25 ENCOUNTER — TELEPHONE (OUTPATIENT)
Dept: FAMILY MEDICINE CLINIC | Age: 70
End: 2025-06-25

## 2025-06-25 NOTE — TELEPHONE ENCOUNTER
Orders are already there in his chart , for these 2 items, under other orders section please print of and fax to [facility] Thank s

## 2025-06-25 NOTE — TELEPHONE ENCOUNTER
Sven  called asking about pt DME order for a shower chair and rolling walker. I see where it was ordered but not faxed. I called pt to see where he would like the orders to go. LMOM.

## 2025-06-26 ENCOUNTER — APPOINTMENT (OUTPATIENT)
Dept: CT IMAGING | Age: 70
DRG: 194 | End: 2025-06-26
Payer: MEDICARE

## 2025-06-26 ENCOUNTER — TELEPHONE (OUTPATIENT)
Dept: ENT CLINIC | Age: 70
End: 2025-06-26

## 2025-06-26 ENCOUNTER — HOSPITAL ENCOUNTER (OUTPATIENT)
Dept: MRI IMAGING | Age: 70
Discharge: HOME | End: 2025-06-26
Payer: MEDICARE

## 2025-06-26 ENCOUNTER — TELEPHONE (OUTPATIENT)
Dept: FAMILY MEDICINE CLINIC | Age: 70
End: 2025-06-26

## 2025-06-26 ENCOUNTER — APPOINTMENT (OUTPATIENT)
Dept: GENERAL RADIOLOGY | Age: 70
DRG: 194 | End: 2025-06-26
Payer: MEDICARE

## 2025-06-26 ENCOUNTER — CARE COORDINATION (OUTPATIENT)
Dept: CASE MANAGEMENT | Age: 70
End: 2025-06-26

## 2025-06-26 ENCOUNTER — HOSPITAL ENCOUNTER (INPATIENT)
Age: 70
LOS: 3 days | Discharge: HOME | DRG: 194 | End: 2025-06-29
Attending: STUDENT IN AN ORGANIZED HEALTH CARE EDUCATION/TRAINING PROGRAM | Admitting: STUDENT IN AN ORGANIZED HEALTH CARE EDUCATION/TRAINING PROGRAM
Payer: MEDICARE

## 2025-06-26 DIAGNOSIS — R42 LIGHTHEADEDNESS: ICD-10-CM

## 2025-06-26 DIAGNOSIS — C32.0 SQUAMOUS CELL CARCINOMA OF LEFT VOCAL CORD (HCC): Primary | ICD-10-CM

## 2025-06-26 DIAGNOSIS — R13.14 PHARYNGOESOPHAGEAL DYSPHAGIA: ICD-10-CM

## 2025-06-26 DIAGNOSIS — I95.1 ORTHOSTATIC HYPOTENSION: ICD-10-CM

## 2025-06-26 DIAGNOSIS — C34.92 SQUAMOUS CELL CARCINOMA LUNG, LEFT (HCC): ICD-10-CM

## 2025-06-26 DIAGNOSIS — R49.0 DYSPHONIA: ICD-10-CM

## 2025-06-26 DIAGNOSIS — Z91.81 AT HIGH RISK FOR FALLS: ICD-10-CM

## 2025-06-26 DIAGNOSIS — R05.3 CHRONIC COUGH: ICD-10-CM

## 2025-06-26 DIAGNOSIS — R49.0 HOARSENESS: ICD-10-CM

## 2025-06-26 DIAGNOSIS — J18.9 COMMUNITY ACQUIRED PNEUMONIA OF RIGHT LOWER LOBE OF LUNG: Primary | ICD-10-CM

## 2025-06-26 PROBLEM — J15.9 COMMUNITY ACQUIRED BACTERIAL PNEUMONIA: Status: ACTIVE | Noted: 2025-06-26

## 2025-06-26 LAB
ANION GAP SERPL CALC-SCNC: 12 MEQ/L (ref 8–16)
BASOPHILS ABSOLUTE: 0.1 THOU/MM3 (ref 0–0.1)
BASOPHILS NFR BLD AUTO: 0.6 %
BILIRUB UR QL STRIP.AUTO: NEGATIVE
BUN SERPL-MCNC: 7 MG/DL (ref 8–23)
CALCIUM SERPL-MCNC: 9.4 MG/DL (ref 8.8–10.2)
CHARACTER UR: CLEAR
CHLORIDE SERPL-SCNC: 100 MEQ/L (ref 98–111)
CO2 SERPL-SCNC: 23 MEQ/L (ref 22–29)
COLOR, UA: YELLOW
CREAT SERPL-MCNC: 0.6 MG/DL (ref 0.7–1.2)
D DIMER PPP IA.FEU-MCNC: 1883 NG/ML FEU (ref 0–500)
DEPRECATED RDW RBC AUTO: 46.4 FL (ref 35–45)
EKG ATRIAL RATE: 70 BPM
EKG P AXIS: 58 DEGREES
EKG P-R INTERVAL: 176 MS
EKG Q-T INTERVAL: 382 MS
EKG QRS DURATION: 90 MS
EKG QTC CALCULATION (BAZETT): 412 MS
EKG R AXIS: 75 DEGREES
EKG T AXIS: 62 DEGREES
EKG VENTRICULAR RATE: 70 BPM
EOSINOPHIL NFR BLD AUTO: 2.5 %
EOSINOPHILS ABSOLUTE: 0.3 THOU/MM3 (ref 0–0.4)
ERYTHROCYTE [DISTWIDTH] IN BLOOD BY AUTOMATED COUNT: 14.4 % (ref 11.5–14.5)
GFR SERPL CREATININE-BSD FRML MDRD: > 90 ML/MIN/1.73M2
GLUCOSE SERPL-MCNC: 131 MG/DL (ref 74–109)
GLUCOSE UR QL STRIP.AUTO: >= 1000 MG/DL
HCT VFR BLD AUTO: 36.9 % (ref 42–52)
HGB BLD-MCNC: 11.8 GM/DL (ref 14–18)
HGB UR QL STRIP.AUTO: NEGATIVE
IMM GRANULOCYTES # BLD AUTO: 0.05 THOU/MM3 (ref 0–0.07)
IMM GRANULOCYTES NFR BLD AUTO: 0.4 %
KETONES UR QL STRIP.AUTO: NEGATIVE
LACTIC ACID, SEPSIS: 1.1 MMOL/L (ref 0.5–1.9)
LACTIC ACID, SEPSIS: 1.8 MMOL/L (ref 0.5–1.9)
LYMPHOCYTES ABSOLUTE: 2 THOU/MM3 (ref 1–4.8)
LYMPHOCYTES NFR BLD AUTO: 16 %
MAGNESIUM SERPL-MCNC: 1.5 MG/DL (ref 1.6–2.6)
MCH RBC QN AUTO: 28 PG (ref 26–33)
MCHC RBC AUTO-ENTMCNC: 32 GM/DL (ref 32.2–35.5)
MCV RBC AUTO: 87.4 FL (ref 80–94)
MONOCYTES ABSOLUTE: 1 THOU/MM3 (ref 0.4–1.3)
MONOCYTES NFR BLD AUTO: 8.2 %
NEUTROPHILS ABSOLUTE: 8.9 THOU/MM3 (ref 1.8–7.7)
NEUTROPHILS NFR BLD AUTO: 72.3 %
NITRITE UR QL STRIP: NEGATIVE
NRBC BLD AUTO-RTO: 0 /100 WBC
OSMOLALITY SERPL CALC.SUM OF ELEC: 269.9 MOSMOL/KG (ref 275–300)
PH UR STRIP.AUTO: 6 [PH] (ref 5–9)
PLATELET # BLD AUTO: 365 THOU/MM3 (ref 130–400)
PMV BLD AUTO: 8.4 FL (ref 9.4–12.4)
POTASSIUM SERPL-SCNC: 3.7 MEQ/L (ref 3.5–5.2)
PROT UR STRIP.AUTO-MCNC: NEGATIVE MG/DL
RBC # BLD AUTO: 4.22 MILL/MM3 (ref 4.7–6.1)
SODIUM SERPL-SCNC: 135 MEQ/L (ref 135–145)
SP GR UR REFRACT.AUTO: 1.02 (ref 1–1.03)
T4 FREE SERPL-MCNC: 0.8 NG/DL (ref 0.92–1.68)
TROPONIN, HIGH SENSITIVITY: 13 NG/L (ref 0–12)
TROPONIN, HIGH SENSITIVITY: 8 NG/L (ref 0–12)
TSH SERPL DL<=0.05 MIU/L-ACNC: 0.82 UIU/ML (ref 0.27–4.2)
UROBILINOGEN, URINE: 0.2 EU/DL (ref 0–1)
WBC # BLD AUTO: 12.3 THOU/MM3 (ref 4.8–10.8)
WBC #/AREA URNS HPF: NEGATIVE /[HPF]

## 2025-06-26 PROCEDURE — 83605 ASSAY OF LACTIC ACID: CPT

## 2025-06-26 PROCEDURE — 84439 ASSAY OF FREE THYROXINE: CPT

## 2025-06-26 PROCEDURE — 1200000000 HC SEMI PRIVATE

## 2025-06-26 PROCEDURE — 96365 THER/PROPH/DIAG IV INF INIT: CPT

## 2025-06-26 PROCEDURE — 85025 COMPLETE CBC W/AUTO DIFF WBC: CPT

## 2025-06-26 PROCEDURE — 85379 FIBRIN DEGRADATION QUANT: CPT

## 2025-06-26 PROCEDURE — 84484 ASSAY OF TROPONIN QUANT: CPT

## 2025-06-26 PROCEDURE — A9579 GAD-BASE MR CONTRAST NOS,1ML: HCPCS

## 2025-06-26 PROCEDURE — 93005 ELECTROCARDIOGRAM TRACING: CPT | Performed by: EMERGENCY MEDICINE

## 2025-06-26 PROCEDURE — 80048 BASIC METABOLIC PNL TOTAL CA: CPT

## 2025-06-26 PROCEDURE — 99285 EMERGENCY DEPT VISIT HI MDM: CPT

## 2025-06-26 PROCEDURE — 84443 ASSAY THYROID STIM HORMONE: CPT

## 2025-06-26 PROCEDURE — 89220 SPUTUM SPECIMEN COLLECTION: CPT

## 2025-06-26 PROCEDURE — 6360000004 HC RX CONTRAST MEDICATION

## 2025-06-26 PROCEDURE — 36415 COLL VENOUS BLD VENIPUNCTURE: CPT

## 2025-06-26 PROCEDURE — 1200000003 HC TELEMETRY R&B

## 2025-06-26 PROCEDURE — 2500000003 HC RX 250 WO HCPCS: Performed by: STUDENT IN AN ORGANIZED HEALTH CARE EDUCATION/TRAINING PROGRAM

## 2025-06-26 PROCEDURE — 71275 CT ANGIOGRAPHY CHEST: CPT

## 2025-06-26 PROCEDURE — 6360000002 HC RX W HCPCS: Performed by: STUDENT IN AN ORGANIZED HEALTH CARE EDUCATION/TRAINING PROGRAM

## 2025-06-26 PROCEDURE — 70553 MRI BRAIN STEM W/O & W/DYE: CPT

## 2025-06-26 PROCEDURE — 2580000003 HC RX 258: Performed by: STUDENT IN AN ORGANIZED HEALTH CARE EDUCATION/TRAINING PROGRAM

## 2025-06-26 PROCEDURE — 87040 BLOOD CULTURE FOR BACTERIA: CPT

## 2025-06-26 PROCEDURE — 71046 X-RAY EXAM CHEST 2 VIEWS: CPT

## 2025-06-26 PROCEDURE — 93010 ELECTROCARDIOGRAM REPORT: CPT | Performed by: INTERNAL MEDICINE

## 2025-06-26 PROCEDURE — 81003 URINALYSIS AUTO W/O SCOPE: CPT

## 2025-06-26 PROCEDURE — 96375 TX/PRO/DX INJ NEW DRUG ADDON: CPT

## 2025-06-26 PROCEDURE — 6360000002 HC RX W HCPCS

## 2025-06-26 PROCEDURE — 83735 ASSAY OF MAGNESIUM: CPT

## 2025-06-26 RX ORDER — IOPAMIDOL 755 MG/ML
80 INJECTION, SOLUTION INTRAVASCULAR
Status: COMPLETED | OUTPATIENT
Start: 2025-06-26 | End: 2025-06-26

## 2025-06-26 RX ORDER — LISINOPRIL 2.5 MG/1
2.5 TABLET ORAL DAILY
Status: DISCONTINUED | OUTPATIENT
Start: 2025-06-27 | End: 2025-06-29 | Stop reason: HOSPADM

## 2025-06-26 RX ORDER — METOPROLOL TARTRATE 25 MG/1
25 TABLET, FILM COATED ORAL 2 TIMES DAILY
Status: DISCONTINUED | OUTPATIENT
Start: 2025-06-26 | End: 2025-06-29 | Stop reason: HOSPADM

## 2025-06-26 RX ORDER — AMITRIPTYLINE HYDROCHLORIDE 50 MG/1
50 TABLET ORAL NIGHTLY
Status: DISCONTINUED | OUTPATIENT
Start: 2025-06-26 | End: 2025-06-29 | Stop reason: HOSPADM

## 2025-06-26 RX ORDER — SODIUM CHLORIDE, SODIUM LACTATE, POTASSIUM CHLORIDE, AND CALCIUM CHLORIDE .6; .31; .03; .02 G/100ML; G/100ML; G/100ML; G/100ML
1000 INJECTION, SOLUTION INTRAVENOUS ONCE
Status: COMPLETED | OUTPATIENT
Start: 2025-06-26 | End: 2025-06-26

## 2025-06-26 RX ORDER — BUDESONIDE AND FORMOTEROL FUMARATE DIHYDRATE 80; 4.5 UG/1; UG/1
2 AEROSOL RESPIRATORY (INHALATION)
Status: DISCONTINUED | OUTPATIENT
Start: 2025-06-26 | End: 2025-06-29 | Stop reason: HOSPADM

## 2025-06-26 RX ORDER — ATORVASTATIN CALCIUM 40 MG/1
40 TABLET, FILM COATED ORAL NIGHTLY
Status: DISCONTINUED | OUTPATIENT
Start: 2025-06-26 | End: 2025-06-29 | Stop reason: HOSPADM

## 2025-06-26 RX ORDER — INSULIN LISPRO 100 [IU]/ML
0-4 INJECTION, SOLUTION INTRAVENOUS; SUBCUTANEOUS
Status: DISCONTINUED | OUTPATIENT
Start: 2025-06-26 | End: 2025-06-29 | Stop reason: HOSPADM

## 2025-06-26 RX ORDER — ALBUTEROL SULFATE 0.83 MG/ML
2.5 SOLUTION RESPIRATORY (INHALATION)
Status: DISCONTINUED | OUTPATIENT
Start: 2025-06-26 | End: 2025-06-29

## 2025-06-26 RX ORDER — SODIUM CHLORIDE 0.9 % (FLUSH) 0.9 %
5-40 SYRINGE (ML) INJECTION PRN
Status: DISCONTINUED | OUTPATIENT
Start: 2025-06-26 | End: 2025-06-29 | Stop reason: HOSPADM

## 2025-06-26 RX ORDER — DEXTROSE MONOHYDRATE 100 MG/ML
INJECTION, SOLUTION INTRAVENOUS CONTINUOUS PRN
Status: DISCONTINUED | OUTPATIENT
Start: 2025-06-26 | End: 2025-06-29 | Stop reason: HOSPADM

## 2025-06-26 RX ORDER — QUETIAPINE FUMARATE 100 MG/1
200 TABLET, FILM COATED ORAL NIGHTLY
Status: DISCONTINUED | OUTPATIENT
Start: 2025-06-26 | End: 2025-06-29 | Stop reason: HOSPADM

## 2025-06-26 RX ORDER — GLUCAGON 1 MG/ML
1 KIT INJECTION PRN
Status: DISCONTINUED | OUTPATIENT
Start: 2025-06-26 | End: 2025-06-29 | Stop reason: HOSPADM

## 2025-06-26 RX ORDER — POTASSIUM CHLORIDE 7.45 MG/ML
10 INJECTION INTRAVENOUS PRN
Status: DISCONTINUED | OUTPATIENT
Start: 2025-06-26 | End: 2025-06-29 | Stop reason: HOSPADM

## 2025-06-26 RX ORDER — SODIUM CHLORIDE 0.9 % (FLUSH) 0.9 %
5-40 SYRINGE (ML) INJECTION EVERY 12 HOURS SCHEDULED
Status: DISCONTINUED | OUTPATIENT
Start: 2025-06-26 | End: 2025-06-29 | Stop reason: HOSPADM

## 2025-06-26 RX ORDER — ONDANSETRON 2 MG/ML
4 INJECTION INTRAMUSCULAR; INTRAVENOUS EVERY 6 HOURS PRN
Status: DISCONTINUED | OUTPATIENT
Start: 2025-06-26 | End: 2025-06-29 | Stop reason: HOSPADM

## 2025-06-26 RX ORDER — SODIUM CHLORIDE 9 MG/ML
INJECTION, SOLUTION INTRAVENOUS PRN
Status: DISCONTINUED | OUTPATIENT
Start: 2025-06-26 | End: 2025-06-29 | Stop reason: HOSPADM

## 2025-06-26 RX ORDER — BUPROPION HYDROCHLORIDE 150 MG/1
150 TABLET ORAL EVERY MORNING
Status: DISCONTINUED | OUTPATIENT
Start: 2025-06-27 | End: 2025-06-29 | Stop reason: HOSPADM

## 2025-06-26 RX ORDER — ACETAMINOPHEN 325 MG/1
650 TABLET ORAL EVERY 6 HOURS PRN
Status: DISCONTINUED | OUTPATIENT
Start: 2025-06-26 | End: 2025-06-29 | Stop reason: HOSPADM

## 2025-06-26 RX ORDER — ONDANSETRON 4 MG/1
4 TABLET, ORALLY DISINTEGRATING ORAL EVERY 8 HOURS PRN
Status: DISCONTINUED | OUTPATIENT
Start: 2025-06-26 | End: 2025-06-29 | Stop reason: HOSPADM

## 2025-06-26 RX ORDER — GABAPENTIN 300 MG/1
300 CAPSULE ORAL 2 TIMES DAILY
Status: DISCONTINUED | OUTPATIENT
Start: 2025-06-26 | End: 2025-06-29 | Stop reason: HOSPADM

## 2025-06-26 RX ORDER — GADOTERIDOL 279.3 MG/ML
15 INJECTION INTRAVENOUS
Status: COMPLETED | OUTPATIENT
Start: 2025-06-26 | End: 2025-06-26

## 2025-06-26 RX ORDER — ACETAMINOPHEN 650 MG/1
650 SUPPOSITORY RECTAL EVERY 6 HOURS PRN
Status: DISCONTINUED | OUTPATIENT
Start: 2025-06-26 | End: 2025-06-29 | Stop reason: HOSPADM

## 2025-06-26 RX ORDER — POTASSIUM CHLORIDE 1500 MG/1
40 TABLET, EXTENDED RELEASE ORAL PRN
Status: DISCONTINUED | OUTPATIENT
Start: 2025-06-26 | End: 2025-06-29 | Stop reason: HOSPADM

## 2025-06-26 RX ORDER — ENOXAPARIN SODIUM 100 MG/ML
40 INJECTION SUBCUTANEOUS DAILY
Status: DISCONTINUED | OUTPATIENT
Start: 2025-06-27 | End: 2025-06-29 | Stop reason: HOSPADM

## 2025-06-26 RX ORDER — MAGNESIUM SULFATE IN WATER 40 MG/ML
2000 INJECTION, SOLUTION INTRAVENOUS PRN
Status: DISCONTINUED | OUTPATIENT
Start: 2025-06-26 | End: 2025-06-29 | Stop reason: HOSPADM

## 2025-06-26 RX ORDER — SODIUM CHLORIDE FOR INHALATION 3 %
4 VIAL, NEBULIZER (ML) INHALATION EVERY 4 HOURS PRN
Status: DISCONTINUED | OUTPATIENT
Start: 2025-06-26 | End: 2025-06-29 | Stop reason: HOSPADM

## 2025-06-26 RX ORDER — POLYETHYLENE GLYCOL 3350 17 G/17G
17 POWDER, FOR SOLUTION ORAL DAILY PRN
Status: DISCONTINUED | OUTPATIENT
Start: 2025-06-26 | End: 2025-06-29 | Stop reason: HOSPADM

## 2025-06-26 RX ORDER — MAGNESIUM SULFATE 1 G/100ML
1000 INJECTION INTRAVENOUS ONCE
Status: COMPLETED | OUTPATIENT
Start: 2025-06-26 | End: 2025-06-26

## 2025-06-26 RX ADMIN — AZITHROMYCIN MONOHYDRATE 500 MG: 500 INJECTION, POWDER, LYOPHILIZED, FOR SOLUTION INTRAVENOUS at 17:31

## 2025-06-26 RX ADMIN — GADOTERIDOL 15 ML: 279.3 INJECTION, SOLUTION INTRAVENOUS at 09:21

## 2025-06-26 RX ADMIN — SODIUM CHLORIDE, SODIUM LACTATE, POTASSIUM CHLORIDE, AND CALCIUM CHLORIDE 1000 ML: .6; .31; .03; .02 INJECTION, SOLUTION INTRAVENOUS at 16:08

## 2025-06-26 RX ADMIN — IOPAMIDOL 80 ML: 755 INJECTION, SOLUTION INTRAVENOUS at 19:31

## 2025-06-26 RX ADMIN — MAGNESIUM SULFATE HEPTAHYDRATE 1000 MG: 1 INJECTION, SOLUTION INTRAVENOUS at 19:21

## 2025-06-26 RX ADMIN — WATER 1000 MG: 1 INJECTION INTRAMUSCULAR; INTRAVENOUS; SUBCUTANEOUS at 17:25

## 2025-06-26 ASSESSMENT — PAIN DESCRIPTION - PAIN TYPE: TYPE: SURGICAL PAIN

## 2025-06-26 ASSESSMENT — PAIN SCALES - GENERAL
PAINLEVEL_OUTOF10: 6
PAINLEVEL_OUTOF10: 0

## 2025-06-26 ASSESSMENT — PAIN SCALES - WONG BAKER: WONGBAKER_NUMERICALRESPONSE: NO HURT

## 2025-06-26 ASSESSMENT — PAIN DESCRIPTION - LOCATION: LOCATION: NECK;THROAT

## 2025-06-26 NOTE — ED NOTES
Pt restful on cart watching TV. CCUA collected and sent. Pt updated on POC/need for CT scan. Pt verbalized understanding.

## 2025-06-26 NOTE — CARE COORDINATION
[phone]            Care Transition Nurse provided contact information.  Plan for follow-up call in 6-10 days based on severity of symptoms and risk factors.  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

## 2025-06-26 NOTE — TELEPHONE ENCOUNTER
I am working on getting Dilan scheduled for surgery.     PLEASE PLACE SURGERY ORDERS.    Notes from visit on 6/23/25:

## 2025-06-26 NOTE — TELEPHONE ENCOUNTER
Left message on answering machine. Requested pt to call back at [phone], at their earliest convenience.

## 2025-06-26 NOTE — TELEPHONE ENCOUNTER
Contact OT and pt and let them know pt currently should be taking 1/2 tablet metoprolol bid, he can decrease his metoprolol to once a day and see if that improves hs hypotension if not we can discontinue it all together. Make sure pt is hydrating well to bring BP up, recheck BP again in 1.5 hours and call us back to let us know what it is. If dizziness has not resolved though recommend ER

## 2025-06-26 NOTE — ED PROVIDER NOTES
left lung base.   3. Infiltrates right lung have worsened since prior study but infiltrates left   lung have slightly improved. No effusion is seen.            **This report has been created using voice recognition software.  It may contain   minor errors which are inherent in voice recognition technology.**            Electronically signed by Dr. Agusto Medel      CTA Chest W/WO Contrast Pulmonary Embolism  Eval    (Results Pending)     See ED course below for my interpretation if applicable.  All radiology images independently reviewed by me in the clinical context of this patient, in addition to interpretation provided by the radiologist.      EKG interpretation:  See ED course (if applicable) [none if blank]  All EKG results are individually reviewed and interpreted by me in the clinical context of this patient.  All EKGs are also interpreted by our Cardiology department, final interpretation may not be available as of the writing of this note.      PREVIOUS RECORDS  AND EXTERNAL INFORMATION REVIEWED   History obtained from: the patient.  Pertinent previous and/or external records reviewed: Noncontributory.  Case discussed with specialties other than Emergency Medicine: Hospitalist      MEDICAL DECISION MAKING   Differential Diagnosis includes but is not limited to:  PNA, PTX, dehydration/fluid down status, ACS/MI, infection    Decision Rules/Clinical Scores utilized:    Not Applicable    MIPS documentation:  N/A    Medical Decision Making  70M with PMH significant for COPD (on 4-10L O2 depending on activity and positioning at home), T2DM, HTN, HLD, SqCC of larynx s/p laryngoscopy who presents to the ED for severe lightheadedness and multiple presyncopal episodes earlier today.  No new O2 requirements.  Orthostatics positive upon exam today.  Patient chronically ill-appearing. Vitals stable, on baseline O2. Physical exam reveals chronically ill-appearing patient. Labs, EKG, Imaging obtained - discussed in ED

## 2025-06-26 NOTE — TELEPHONE ENCOUNTER
Lety BLANCO saying patient was seen earlier this morning by OT and his sitting BP was 80/60 unable to stand due to dizziness and light head.     Elty just got their and check his sitting BP again 80/62 not able to stand.    Please advise

## 2025-06-26 NOTE — ED NOTES
Received report from LUCIA Lopez. PT resting in bed at this time, pt denies any needs. Call light within reach.

## 2025-06-27 ENCOUNTER — RESULTS FOLLOW-UP (OUTPATIENT)
Dept: ENT CLINIC | Age: 70
End: 2025-06-27

## 2025-06-27 LAB
ACINETOBACTER CALCOACETICUS-BAUMANNII BY PCR: NOT DETECTED
ALBUMIN SERPL BCG-MCNC: 2.8 G/DL (ref 3.4–4.9)
ALP SERPL-CCNC: 99 U/L (ref 40–129)
ALT SERPL W/O P-5'-P-CCNC: 17 U/L (ref 10–50)
ANION GAP SERPL CALC-SCNC: 12 MEQ/L (ref 8–16)
AST SERPL-CCNC: 20 U/L (ref 10–50)
BASOPHILS ABSOLUTE: 0.1 THOU/MM3 (ref 0–0.1)
BASOPHILS NFR BLD AUTO: 0.6 %
BILIRUB CONJ SERPL-MCNC: 0.2 MG/DL (ref 0–0.2)
BILIRUB SERPL-MCNC: 0.3 MG/DL (ref 0.3–1.2)
BLACTX-M ISLT/SPM QL: NORMAL
BLAIMP ISLT/SPM QL: NORMAL
BLAKPC ISLT/SPM QL: NORMAL
BLAOXA-48-LIKE ISLT/SPM QL: NORMAL
BLAVIM ISLT/SPM QL: NORMAL
BUN SERPL-MCNC: 6 MG/DL (ref 8–23)
C PNEUM DNA LOWER RESP QL NAA+NON-PROBE: NOT DETECTED
CALCIUM SERPL-MCNC: 9.6 MG/DL (ref 8.8–10.2)
CHLORIDE SERPL-SCNC: 101 MEQ/L (ref 98–111)
CO2 SERPL-SCNC: 24 MEQ/L (ref 22–29)
CREAT SERPL-MCNC: 0.7 MG/DL (ref 0.7–1.2)
DEPRECATED RDW RBC AUTO: 46.3 FL (ref 35–45)
ENTEROBACTER CLOACAE COMPLEX BY PCR: NOT DETECTED
EOSINOPHIL NFR BLD AUTO: 2.8 %
EOSINOPHILS ABSOLUTE: 0.3 THOU/MM3 (ref 0–0.4)
ERYTHROCYTE [DISTWIDTH] IN BLOOD BY AUTOMATED COUNT: 14.4 % (ref 11.5–14.5)
ESCHERICHIA COLI BY PCR: NOT DETECTED
FLUAV RNA LOWER RESP QL NAA+NON-PROBE: NOT DETECTED
FLUBV RNA LOWER RESP QL NAA+NON-PROBE: NOT DETECTED
GFR SERPL CREATININE-BSD FRML MDRD: > 90 ML/MIN/1.73M2
GLUCOSE BLD STRIP.AUTO-MCNC: 111 MG/DL (ref 70–108)
GLUCOSE BLD STRIP.AUTO-MCNC: 161 MG/DL (ref 70–108)
GLUCOSE BLD STRIP.AUTO-MCNC: 192 MG/DL (ref 70–108)
GLUCOSE BLD STRIP.AUTO-MCNC: 206 MG/DL (ref 70–108)
GLUCOSE SERPL-MCNC: 102 MG/DL (ref 74–109)
HADV DNA LOWER RESP QL NAA+NON-PROBE: NOT DETECTED
HAEMOPHILUS INFLUENZAE BY PCR: NOT DETECTED
HCOV RNA LOWER RESP QL NAA+NON-PROBE: NOT DETECTED
HCT VFR BLD AUTO: 34.4 % (ref 42–52)
HGB BLD-MCNC: 10.8 GM/DL (ref 14–18)
HMPV RNA LOWER RESP QL NAA+NON-PROBE: NOT DETECTED
HPIV RNA LOWER RESP QL NAA+NON-PROBE: NOT DETECTED
IMM GRANULOCYTES # BLD AUTO: 0.05 THOU/MM3 (ref 0–0.07)
IMM GRANULOCYTES NFR BLD AUTO: 0.5 %
KLEBSIELLA AEROGENES BY PCR: NOT DETECTED
KLEBSIELLA OXYTOCA BY PCR: NOT DETECTED
KLEBSIELLA PNEUMONIAE GROUP BY PCR: NOT DETECTED
L PNEUMO DNA LOWER RESP QL NAA+NON-PROBE: NOT DETECTED
LYMPHOCYTES ABSOLUTE: 2.3 THOU/MM3 (ref 1–4.8)
LYMPHOCYTES NFR BLD AUTO: 21.5 %
M PNEUMO DNA LOWER RESP QL NAA+NON-PROBE: NOT DETECTED
MAGNESIUM SERPL-MCNC: 1.9 MG/DL (ref 1.6–2.6)
MCH RBC QN AUTO: 27.6 PG (ref 26–33)
MCHC RBC AUTO-ENTMCNC: 31.4 GM/DL (ref 32.2–35.5)
MCV RBC AUTO: 88 FL (ref 80–94)
MONOCYTES ABSOLUTE: 0.9 THOU/MM3 (ref 0.4–1.3)
MONOCYTES NFR BLD AUTO: 8.6 %
MORAXELLA CATARRHALIS BY PCR: NOT DETECTED
NEUTROPHILS ABSOLUTE: 7.1 THOU/MM3 (ref 1.8–7.7)
NEUTROPHILS NFR BLD AUTO: 66 %
NRBC BLD AUTO-RTO: 0 /100 WBC
OSMOLALITY SERPL CALC.SUM OF ELEC: 271.6 MOSMOL/KG (ref 275–300)
PHOSPHATE SERPL-MCNC: 3 MG/DL (ref 2.5–4.5)
PLATELET # BLD AUTO: 316 THOU/MM3 (ref 130–400)
PMV BLD AUTO: 8.6 FL (ref 9.4–12.4)
POTASSIUM SERPL-SCNC: 3.6 MEQ/L (ref 3.5–5.2)
PROT SERPL-MCNC: 6.1 G/DL (ref 6.4–8.3)
PROTEUS SPECIES BY PCR: NOT DETECTED
PSEUDOMONAS AERUGINOSA BY PCR: NOT DETECTED
RBC # BLD AUTO: 3.91 MILL/MM3 (ref 4.7–6.1)
RESISTANT GENE MECA/C & MREJ BY PCR: NORMAL
RESISTANT GENE NDM BY PCR: NORMAL
RSV RNA LOWER RESP QL NAA+NON-PROBE: NOT DETECTED
RV+EV RNA LOWER RESP QL NAA+NON-PROBE: NOT DETECTED
SERRATIA MARCESCENS BY PCR: NOT DETECTED
SODIUM SERPL-SCNC: 137 MEQ/L (ref 135–145)
SOURCE: NORMAL
SPECIMEN ACCEPTABILITY: NORMAL
STAPH AUREUS BY PCR: NOT DETECTED
STREP AGALACTIAE BY PCR: NOT DETECTED
STREP PNEUMONIAE BY PCR: NOT DETECTED
STREP PYOGENES BY PCR: NOT DETECTED
WBC # BLD AUTO: 10.7 THOU/MM3 (ref 4.8–10.8)

## 2025-06-27 PROCEDURE — 83735 ASSAY OF MAGNESIUM: CPT

## 2025-06-27 PROCEDURE — 87070 CULTURE OTHR SPECIMN AEROBIC: CPT

## 2025-06-27 PROCEDURE — 87631 RESP VIRUS 3-5 TARGETS: CPT

## 2025-06-27 PROCEDURE — 6360000002 HC RX W HCPCS: Performed by: STUDENT IN AN ORGANIZED HEALTH CARE EDUCATION/TRAINING PROGRAM

## 2025-06-27 PROCEDURE — 94760 N-INVAS EAR/PLS OXIMETRY 1: CPT

## 2025-06-27 PROCEDURE — 6370000000 HC RX 637 (ALT 250 FOR IP): Performed by: STUDENT IN AN ORGANIZED HEALTH CARE EDUCATION/TRAINING PROGRAM

## 2025-06-27 PROCEDURE — 87798 DETECT AGENT NOS DNA AMP: CPT

## 2025-06-27 PROCEDURE — 92526 ORAL FUNCTION THERAPY: CPT

## 2025-06-27 PROCEDURE — 36415 COLL VENOUS BLD VENIPUNCTURE: CPT

## 2025-06-27 PROCEDURE — 82948 REAGENT STRIP/BLOOD GLUCOSE: CPT

## 2025-06-27 PROCEDURE — 85025 COMPLETE CBC W/AUTO DIFF WBC: CPT

## 2025-06-27 PROCEDURE — 80053 COMPREHEN METABOLIC PANEL: CPT

## 2025-06-27 PROCEDURE — 2700000000 HC OXYGEN THERAPY PER DAY

## 2025-06-27 PROCEDURE — 97166 OT EVAL MOD COMPLEX 45 MIN: CPT

## 2025-06-27 PROCEDURE — 87541 LEGION PNEUMO DNA AMP PROB: CPT

## 2025-06-27 PROCEDURE — 87205 SMEAR GRAM STAIN: CPT

## 2025-06-27 PROCEDURE — 97530 THERAPEUTIC ACTIVITIES: CPT

## 2025-06-27 PROCEDURE — 92610 EVALUATE SWALLOWING FUNCTION: CPT

## 2025-06-27 PROCEDURE — 82248 BILIRUBIN DIRECT: CPT

## 2025-06-27 PROCEDURE — 87581 M.PNEUMON DNA AMP PROBE: CPT

## 2025-06-27 PROCEDURE — 2580000003 HC RX 258: Performed by: STUDENT IN AN ORGANIZED HEALTH CARE EDUCATION/TRAINING PROGRAM

## 2025-06-27 PROCEDURE — 2500000003 HC RX 250 WO HCPCS: Performed by: STUDENT IN AN ORGANIZED HEALTH CARE EDUCATION/TRAINING PROGRAM

## 2025-06-27 PROCEDURE — 1200000003 HC TELEMETRY R&B

## 2025-06-27 PROCEDURE — 87486 CHLMYD PNEUM DNA AMP PROBE: CPT

## 2025-06-27 PROCEDURE — 99223 1ST HOSP IP/OBS HIGH 75: CPT | Performed by: STUDENT IN AN ORGANIZED HEALTH CARE EDUCATION/TRAINING PROGRAM

## 2025-06-27 PROCEDURE — 94640 AIRWAY INHALATION TREATMENT: CPT

## 2025-06-27 PROCEDURE — 84100 ASSAY OF PHOSPHORUS: CPT

## 2025-06-27 RX ORDER — SODIUM CHLORIDE 9 MG/ML
INJECTION, SOLUTION INTRAVENOUS CONTINUOUS
Status: DISCONTINUED | OUTPATIENT
Start: 2025-06-27 | End: 2025-06-29

## 2025-06-27 RX ORDER — PANTOPRAZOLE SODIUM 40 MG/1
40 TABLET, DELAYED RELEASE ORAL
Status: DISCONTINUED | OUTPATIENT
Start: 2025-06-28 | End: 2025-06-29 | Stop reason: HOSPADM

## 2025-06-27 RX ADMIN — INSULIN LISPRO 1 UNITS: 100 INJECTION, SOLUTION INTRAVENOUS; SUBCUTANEOUS at 20:32

## 2025-06-27 RX ADMIN — ALBUTEROL SULFATE 2.5 MG: 2.5 SOLUTION RESPIRATORY (INHALATION) at 10:50

## 2025-06-27 RX ADMIN — AZITHROMYCIN MONOHYDRATE 500 MG: 500 INJECTION, POWDER, LYOPHILIZED, FOR SOLUTION INTRAVENOUS at 17:08

## 2025-06-27 RX ADMIN — ALBUTEROL SULFATE 2.5 MG: 2.5 SOLUTION RESPIRATORY (INHALATION) at 07:36

## 2025-06-27 RX ADMIN — INSULIN LISPRO 1 UNITS: 100 INJECTION, SOLUTION INTRAVENOUS; SUBCUTANEOUS at 17:08

## 2025-06-27 RX ADMIN — AMITRIPTYLINE HYDROCHLORIDE 50 MG: 50 TABLET ORAL at 00:23

## 2025-06-27 RX ADMIN — TIOTROPIUM BROMIDE INHALATION SPRAY 5 MCG: 3.12 SPRAY, METERED RESPIRATORY (INHALATION) at 07:36

## 2025-06-27 RX ADMIN — BUDESONIDE AND FORMOTEROL FUMARATE DIHYDRATE 2 PUFF: 80; 4.5 AEROSOL RESPIRATORY (INHALATION) at 07:36

## 2025-06-27 RX ADMIN — SODIUM CHLORIDE, PRESERVATIVE FREE 10 ML: 5 INJECTION INTRAVENOUS at 00:25

## 2025-06-27 RX ADMIN — SODIUM CHLORIDE, PRESERVATIVE FREE 10 ML: 5 INJECTION INTRAVENOUS at 20:38

## 2025-06-27 RX ADMIN — GABAPENTIN 300 MG: 300 CAPSULE ORAL at 20:32

## 2025-06-27 RX ADMIN — BUDESONIDE AND FORMOTEROL FUMARATE DIHYDRATE 2 PUFF: 80; 4.5 AEROSOL RESPIRATORY (INHALATION) at 21:43

## 2025-06-27 RX ADMIN — SODIUM CHLORIDE: 0.9 INJECTION, SOLUTION INTRAVENOUS at 06:37

## 2025-06-27 RX ADMIN — BUPROPION HYDROCHLORIDE 150 MG: 150 TABLET, EXTENDED RELEASE ORAL at 09:57

## 2025-06-27 RX ADMIN — GABAPENTIN 300 MG: 300 CAPSULE ORAL at 09:57

## 2025-06-27 RX ADMIN — ATORVASTATIN CALCIUM 40 MG: 40 TABLET, FILM COATED ORAL at 20:32

## 2025-06-27 RX ADMIN — ATORVASTATIN CALCIUM 40 MG: 40 TABLET, FILM COATED ORAL at 00:27

## 2025-06-27 RX ADMIN — QUETIAPINE FUMARATE 200 MG: 100 TABLET ORAL at 20:32

## 2025-06-27 RX ADMIN — WATER 1000 MG: 1 INJECTION INTRAMUSCULAR; INTRAVENOUS; SUBCUTANEOUS at 17:17

## 2025-06-27 RX ADMIN — ALBUTEROL SULFATE 2.5 MG: 2.5 SOLUTION RESPIRATORY (INHALATION) at 15:32

## 2025-06-27 RX ADMIN — ALBUTEROL SULFATE 2.5 MG: 2.5 SOLUTION RESPIRATORY (INHALATION) at 21:39

## 2025-06-27 RX ADMIN — ACETAMINOPHEN 650 MG: 325 TABLET ORAL at 11:43

## 2025-06-27 RX ADMIN — AMITRIPTYLINE HYDROCHLORIDE 50 MG: 50 TABLET ORAL at 20:32

## 2025-06-27 RX ADMIN — QUETIAPINE FUMARATE 200 MG: 100 TABLET ORAL at 00:24

## 2025-06-27 RX ADMIN — ENOXAPARIN SODIUM 40 MG: 100 INJECTION SUBCUTANEOUS at 09:57

## 2025-06-27 RX ADMIN — GABAPENTIN 300 MG: 300 CAPSULE ORAL at 00:24

## 2025-06-27 ASSESSMENT — PAIN SCALES - GENERAL: PAINLEVEL_OUTOF10: 6

## 2025-06-27 ASSESSMENT — PAIN DESCRIPTION - LOCATION: LOCATION: HEAD

## 2025-06-27 NOTE — H&P
History & Physical  Internal Medicine Resident         Patient: Dilan Pappas 70 y.o. male      : 1955  Date of Admission: 2025  Date of Service: Pt seen/examined on 25 and Admitted to Inpatient with expected LOS greater than two midnights due to medical therapy.       ASSESSMENT AND PLAN  Orthostatic hypotension: Patient presented from home with an episode of lightheadedness, feeling weak and unsteady on his feet while working with home PT/OT.  Patient is on metoprolol 25 mg twice daily and lisinopril 2.5 mg at home  Orthostatic vitals on presentation positive, patient was given 1 L bolus  Continue normal saline at 75 mL/h  Holding metoprolol and lisinopril.  Patient denied any presyncopal episodes of nausea, headache or vomiting or any palpitation or chest pain before lightheadedness.  Orthostatic vital every shift  Community-acquired pneumonia: Patient has been complaining of productive sputum with chills with no fevers since hospital discharge  CXR with Moderate interstitial process scattered diffusely in both lungs, suspicion of superimposed interstitial pneumonitis/edema right mid and lower lung fields and left lung base, infiltrates right lung have worsened since prior study but infiltrates left lung have slightly improved, no effusion.  Patient was started on azithromycin and Rocephin in ED, will continue that.  Pneumonia panel, respiratory culture and blood cultures  Lactate 1.8  Hypomagnesemia: Magnesium on admission 1.5, given replacement.   Elevated D-dimer: D-dimer on admission 1883, baseline D-dimer 1561 on 2025, CTA chest was done in the ED with no PE.  Elevated D-dimer could be from above.  History of laryngeal carcinoma: s/p partial lateral vertebral laryngotomy with Dr. Hand on 2025 due to laryngeal cancer.   COPD, not in acute exacerbation: Patient uses 4 to 10 L supplemental oxygen at home depending on the activity, patient is currently on 4 L, will not add

## 2025-06-27 NOTE — ED NOTES
ED to inpatient nurses report      Chief Complaint:  Chief Complaint   Patient presents with    Hypotension     Present to ED from: home    MOA:     LOC: alert and orientated to name, place, date  Mobility: Independent  Oxygen Baseline: 95    Current needs required: 4L nasal cannula     Code Status:   Full Code    What abnormal results were found and what did you give/do to treat them? Pneumonia   Any procedures or intervention occur? Antibiotic, Magnesium replacement.     Mental Status:  Level of Consciousness: Alert (0)    Psych Assessment:        Vitals:  Patient Vitals for the past 24 hrs:   BP Temp Temp src Pulse Resp SpO2 Weight   06/26/25 2111 90/71 -- -- 73 18 96 % --   06/26/25 2016 112/68 -- -- 77 18 92 % --   06/26/25 1906 109/61 -- -- 77 16 98 % --   06/26/25 1832 98/63 -- -- 73 20 100 % --   06/26/25 1730 99/75 -- -- 74 -- 97 % --   06/26/25 1729 (!) 93/46 -- -- 74 20 97 % --   06/26/25 1630 102/65 -- -- 68 20 97 % --   06/26/25 1607 -- -- -- -- 20 96 % --   06/26/25 1600 -- -- -- 76 20 96 % --   06/26/25 1502 97/65 97.8 °F (36.6 °C) Oral 73 22 97 % 72.1 kg (159 lb)        LDAs:   Peripheral IV 06/26/25 Left Antecubital (Active)   Site Assessment Clean, dry & intact 06/26/25 2111   Line Status Normal saline locked;Capped;Flushed 06/26/25 2111       Ambulatory Status:  Presents to emergency department  because of falls (Syncope, seizure, or loss of consciousness): No, Age > 70: No, Altered Mental Status, Intoxication with alcohol or substance confusion (Disorientation, impaired judgment, poor safety awaremess, or inability to follow instructions): No, Impaired Mobility: Ambulates or transfers with assistive devices or assistance; Unable to ambulate or transer.: Yes, Nursing Judgement: Yes    Diagnosis:  DISPOSITION Admitted 06/26/2025 08:47:53 PM   Final diagnoses:   Orthostatic hypotension   Community acquired pneumonia of right lower lobe of lung   Lightheadedness   At high risk for falls     No Yes

## 2025-06-27 NOTE — PLAN OF CARE
Dilan Pappas was evaluated today and a DME order was entered for a wheeled walker with seat because he requires this to successfully complete daily living tasks of eating, bathing, personal cares, ambulating, grooming, hygiene, dressing upper body, dressing lower body, meal preparation, and taking own medications.  A wheeled walker with seat is necessary due to the patient's unsteady gait, upper body weakness, inability to  and ambulation device, ambulating only short distances by pushing a walker, and the need to sit for a short time before resuming ambulation.  These tasks cannot be completed with a lesser ambulation device such as a cane, crutch, or standard walker.  The need for this equipment was discussed with the patient and he understands and is in agreement.

## 2025-06-27 NOTE — RESULT ENCOUNTER NOTE
Please see rejection notice regarding the specimen and look into what happened and why please.  Thank you  PFC

## 2025-06-27 NOTE — CARE COORDINATION
06/27/25 1600   Readmission Assessment   Number of Days since last admission? 8-30 days   Previous Disposition Home with Home Health   Who is being Interviewed Patient   What was the patient's/caregiver's perception as to why they think they needed to return back to the hospital? Other (Comment)  (low BP)   Did you visit your Primary Care Physician after you left the hospital, before you returned this time? Yes   Did you see a specialist, such as Cardiac, Pulmonary, Orthopedic Physician, etc. after you left the hospital? Yes   Who advised the patient to return to the hospital? Home Health Staff   Does the patient report anything that got in the way of taking their medications? No   In our efforts to provide the best possible care to you and others like you, can you think of anything that we could have done to help you after you left the hospital the first time, so that you might not have needed to return so soon? Other (Comment)  (no)

## 2025-06-27 NOTE — CARE COORDINATION
DISCHARGE PLANNING EVALUATION  6/27/25, 11:54 AM EDT    Reason for Referral: Current [facility]   Decision Maker: Patient makes decisions  Current Services: [facility] Car[facility]allo; nursing, PT & OT  New Services Requested: none  Family/ Social/ Home environment: Spoke with patient, lives with his grandson NARGIS. NARGIS assists with ADL's as needed. Son is supportive.  Patient wants to continue with LUCINDA MIRANDA, no additional services requested.   Payment Source:[facility] Medicare and Medicaid.  Transportation at Discharge: son or daughter  Post-acute (PAC) provider list was provided to patient. Patient was informed of their freedom to choose PAC provider. Discussed and offered to show the patient the relevant PAC Providers quality and resource use measures on Medicare Compare web site via computer based on patient's goals of care and treatment preferences. Questions regarding selection process were answered.      Teach Back Method used with pateint regarding care plan and discharge planning.  Patient  verbalized understanding of the plan of care and contribute to goal setting.       Patient preferences and discharge plan: Plan home with grandson and resume [facility] MIRANDA.    Sent referral to LUCINDA MIRANDA through [facility] to confirm services.    Electronically signed by PHIL Mccormick on 6/27/2025 at 11:54 AM

## 2025-06-27 NOTE — PLAN OF CARE
Problem: Respiratory - Adult  Goal: Achieves optimal ventilation and oxygenation  Outcome: Progressing  Flowsheets (Taken 6/27/2025 6468)  Achieves optimal ventilation and oxygenation:   Assess for changes in respiratory status   Assess for changes in mentation and behavior   Assess and instruct to report shortness of breath or any respiratory difficulty   Respiratory therapy support as indicated

## 2025-06-27 NOTE — ED NOTES
Updated patient and family on POC and bed assingment. Pt requesting meal tray. Will provide patient food.

## 2025-06-27 NOTE — ED NOTES
Pt to be transferred to [facility]. Pt in stale condition at this time. Spoke with  staff Martha prior to transport.

## 2025-06-27 NOTE — CARE COORDINATION
Case Management Assessment Initial Evaluation    Date/Time of Evaluation: 2025 4:44 PM  Assessment Completed by: Karla Urbina RN    If patient is discharged prior to next notation, then this note serves as note for discharge by case management.    Patient Name: Dilan Pappas                   YOB: 1955  Diagnosis: Orthostatic hypotension [I95.1]  Lightheadedness [R42]  Community acquired bacterial pneumonia [J15.9]  At high risk for falls [Z91.81]  Community acquired pneumonia of right lower lobe of lung [J18.9]                   Date / Time: 2025  2:53 PM  Location: [facility]     Patient Admission Status: Inpatient   Readmission Risk Low 0-14, Mod 15-19), High > 20: Readmission Risk Score: 23.9    Current PCP: Eric Orozco APRN - CNP  Health Care Decision Makers:   Primary Decision Maker: MianSuhas - Child - [phone]    Secondary Decision Maker: Jolynn Pappas - Child - [phone]    Additional Case Management Notes: Admitted from ER with hypotension. Therapy ordered. Continue on O2. IVF. IV Zithromax and Rocephin.     Orthostatic Vitals:      2025     6:22 AM   Orthostatic Vitals   Orthostatic B/P and Pulse? Yes   Blood Pressure Lying 92/58   Pulse Lying 69 PER MINUTE   Blood Pressure Sitting 65/47   Pulse Sitting 83 PER MINUTE   Blood Pressure Standing 64/49   Pulse Standing 89 PER MINUTE       Procedures: none    Imagin/26 CTA Chest W WO: 1. No pulmonary emboli.   2. Increased bilateral pneumonia, worst in the left lower lobe.   3. Multiple ill-defined nodular densities, possibly reflecting focal   pneumonitis. Presumably reactive mediastinal hilar adenopathy. Follow up   chest CT with contrast in 3 months is recommended to exclude underlying   neoplasm.   4. Coronary artery disease.     Patient Goals/Plan/Treatment Preferences: Planning to return home with paulina and ALBARO MIRANDA. See  notes.            25 0744   Service Assessment   Patient Orientation

## 2025-06-28 PROBLEM — R53.81 PHYSICAL DECONDITIONING: Status: ACTIVE | Noted: 2025-06-28

## 2025-06-28 PROBLEM — R42 LIGHTHEADEDNESS: Status: ACTIVE | Noted: 2025-06-28

## 2025-06-28 LAB
BACTERIA SPEC RESP CULT: ABNORMAL
BACTERIA SPEC RESP CULT: ABNORMAL
GLUCOSE BLD STRIP.AUTO-MCNC: 134 MG/DL (ref 70–108)
GLUCOSE BLD STRIP.AUTO-MCNC: 151 MG/DL (ref 70–108)
GLUCOSE BLD STRIP.AUTO-MCNC: 153 MG/DL (ref 70–108)
GLUCOSE BLD STRIP.AUTO-MCNC: 165 MG/DL (ref 70–108)
GLUCOSE BLD STRIP.AUTO-MCNC: 173 MG/DL (ref 70–108)
GRAM STN SPEC: ABNORMAL
ORGANISM: ABNORMAL

## 2025-06-28 PROCEDURE — 6370000000 HC RX 637 (ALT 250 FOR IP): Performed by: STUDENT IN AN ORGANIZED HEALTH CARE EDUCATION/TRAINING PROGRAM

## 2025-06-28 PROCEDURE — 2580000003 HC RX 258: Performed by: STUDENT IN AN ORGANIZED HEALTH CARE EDUCATION/TRAINING PROGRAM

## 2025-06-28 PROCEDURE — 6360000002 HC RX W HCPCS: Performed by: STUDENT IN AN ORGANIZED HEALTH CARE EDUCATION/TRAINING PROGRAM

## 2025-06-28 PROCEDURE — 94640 AIRWAY INHALATION TREATMENT: CPT

## 2025-06-28 PROCEDURE — 2500000003 HC RX 250 WO HCPCS: Performed by: STUDENT IN AN ORGANIZED HEALTH CARE EDUCATION/TRAINING PROGRAM

## 2025-06-28 PROCEDURE — 1200000003 HC TELEMETRY R&B

## 2025-06-28 PROCEDURE — 99233 SBSQ HOSP IP/OBS HIGH 50: CPT | Performed by: STUDENT IN AN ORGANIZED HEALTH CARE EDUCATION/TRAINING PROGRAM

## 2025-06-28 PROCEDURE — 2700000000 HC OXYGEN THERAPY PER DAY

## 2025-06-28 PROCEDURE — 82948 REAGENT STRIP/BLOOD GLUCOSE: CPT

## 2025-06-28 PROCEDURE — 94761 N-INVAS EAR/PLS OXIMETRY MLT: CPT

## 2025-06-28 RX ADMIN — TIOTROPIUM BROMIDE INHALATION SPRAY 5 MCG: 3.12 SPRAY, METERED RESPIRATORY (INHALATION) at 07:58

## 2025-06-28 RX ADMIN — ATORVASTATIN CALCIUM 40 MG: 40 TABLET, FILM COATED ORAL at 21:00

## 2025-06-28 RX ADMIN — AZITHROMYCIN MONOHYDRATE 500 MG: 500 INJECTION, POWDER, LYOPHILIZED, FOR SOLUTION INTRAVENOUS at 18:26

## 2025-06-28 RX ADMIN — ALBUTEROL SULFATE 2.5 MG: 2.5 SOLUTION RESPIRATORY (INHALATION) at 16:01

## 2025-06-28 RX ADMIN — AMITRIPTYLINE HYDROCHLORIDE 50 MG: 50 TABLET ORAL at 21:00

## 2025-06-28 RX ADMIN — PANTOPRAZOLE SODIUM 40 MG: 40 TABLET, DELAYED RELEASE ORAL at 06:45

## 2025-06-28 RX ADMIN — GABAPENTIN 300 MG: 300 CAPSULE ORAL at 08:41

## 2025-06-28 RX ADMIN — WATER 1000 MG: 1 INJECTION INTRAMUSCULAR; INTRAVENOUS; SUBCUTANEOUS at 12:33

## 2025-06-28 RX ADMIN — BUDESONIDE AND FORMOTEROL FUMARATE DIHYDRATE 2 PUFF: 80; 4.5 AEROSOL RESPIRATORY (INHALATION) at 07:58

## 2025-06-28 RX ADMIN — QUETIAPINE FUMARATE 200 MG: 100 TABLET ORAL at 21:00

## 2025-06-28 RX ADMIN — BUDESONIDE AND FORMOTEROL FUMARATE DIHYDRATE 2 PUFF: 80; 4.5 AEROSOL RESPIRATORY (INHALATION) at 22:19

## 2025-06-28 RX ADMIN — ALBUTEROL SULFATE 2.5 MG: 2.5 SOLUTION RESPIRATORY (INHALATION) at 12:09

## 2025-06-28 RX ADMIN — ALBUTEROL SULFATE 2.5 MG: 2.5 SOLUTION RESPIRATORY (INHALATION) at 22:18

## 2025-06-28 RX ADMIN — GABAPENTIN 300 MG: 300 CAPSULE ORAL at 21:00

## 2025-06-28 RX ADMIN — ALBUTEROL SULFATE 2.5 MG: 2.5 SOLUTION RESPIRATORY (INHALATION) at 07:58

## 2025-06-28 RX ADMIN — ENOXAPARIN SODIUM 40 MG: 100 INJECTION SUBCUTANEOUS at 08:41

## 2025-06-28 RX ADMIN — BUPROPION HYDROCHLORIDE 150 MG: 150 TABLET, EXTENDED RELEASE ORAL at 08:41

## 2025-06-28 RX ADMIN — ACETAMINOPHEN 650 MG: 325 TABLET ORAL at 21:00

## 2025-06-28 ASSESSMENT — PAIN SCALES - GENERAL
PAINLEVEL_OUTOF10: 4
PAINLEVEL_OUTOF10: 6

## 2025-06-28 ASSESSMENT — PAIN DESCRIPTION - DESCRIPTORS: DESCRIPTORS: ACHING

## 2025-06-28 ASSESSMENT — PAIN SCALES - WONG BAKER: WONGBAKER_NUMERICALRESPONSE: NO HURT

## 2025-06-28 ASSESSMENT — PAIN DESCRIPTION - LOCATION: LOCATION: HEAD

## 2025-06-28 NOTE — PLAN OF CARE
Problem: Respiratory - Adult  Goal: Achieves optimal ventilation and oxygenation  6/27/2025 2143 by Bismark Naylor RCP  Outcome: Progressing

## 2025-06-28 NOTE — PROGRESS NOTES
Hospitalist Progress Note      Patient:  Dilan Pappas    Unit/Bed:8A-03/003-A  YOB: 1955  MRN: [id number]   Acct: [id number]   PCP: Eric Orozco APRN - CNP  Date of Admission: 6/26/2025    Assessment/Plan:    Orthostatic hypotension/ Borderline BP possibly due to pneumonia  Patient presented from home with an episode of lightheadedness, feeling weak and unsteady on his feet while working with home PT/OT. Orthostatic vitals on presentation positive, patient was given 1 L bolus.   -Continue normal saline at 75 mL/h given borderline BP   -Holding metoprolol and lisinopril  -Check Orthostatic vital every shift  -Will consider checking Echo, last one done in 2023 was normal     Community-acquired pneumonia  Patient has been complaining of productive sputum with chills with no fevers since hospital discharge. CXR with Moderate interstitial process scattered diffusely in both lungs, suspicion of superimposed interstitial pneumonitis/edema right mid and lower lung fields and left lung base, infiltrates right lung have worsened since prior study but infiltrates left lung have slightly improved, no effusion.  -Continue Rocephin and Zithromax, will complete 5 days  -Pneumonia panel, respiratory culture and blood cultures pending  -incentive spirometer    Hypomagnesemia: Magnesium on admission 1.5, given replacement. Trend levels and replete levels as needed.   Elevated D-dimer: D-dimer on admission 1883, baseline D-dimer 1561 on 2/11/2025, CTA chest was done in the ED with no PE. Possibly reactive.     Hx laryngeal carcinoma s/p partial lateral vertebral laryngotomy with Dr. Hand on 6/6/2025  SLP consulted to assess for dysphagia. Currently on liquid diet only but patient states he was tolerating regular diet at home. \"Adult, thin liquids with chin tuck when eating and reading per recent recommendations\".     Recent distal esophagitis and 
                                                   Hospitalist Progress Note      Patient:  Dilan Pappas    Unit/Bed:8A-03/003-A  YOB: 1955  MRN: [id number]   Acct: [id number]   PCP: Eric Orozco APRN - CNP  Date of Admission: 6/26/2025    Assessment/Plan:    Orthostatic hypotension/ Borderline BP possibly due to pneumonia  Patient presented from home with an episode of lightheadedness, feeling weak and unsteady on his feet while working with home PT/OT. Orthostatic vitals on presentation positive, patient was given 1 L bolus.   -Continue normal saline at 75 mL/h given borderline BP, monitor vitals closely, check BP frequently   -Holding metoprolol and lisinopril  -Check Orthostatic vital every shift, last checked was positive   -Will consider checking Echo, last one done in 2023 was normal     Suspected Community-acquired pneumonia  Patient has been complaining of productive sputum with chills with no fevers since hospital discharge. CXR with Moderate interstitial process scattered diffusely in both lungs, suspicion of superimposed interstitial pneumonitis/edema right mid and lower lung fields and left lung base, infiltrates right lung have worsened since prior study but infiltrates left lung have slightly improved, no effusion.  -Continue Rocephin and Zithromax, will complete 5 days  -Pneumonia panel, respiratory culture and blood cultures pending  -incentive spirometer    Hypomagnesemia: Magnesium on admission 1.5, improved to 1.9 with replacement. Trend levels and replete levels as needed.   Elevated D-dimer: D-dimer on admission 1883, baseline D-dimer 1561 on 2/11/2025, CTA chest was done in the ED with no PE. Possibly reactive.     Hx laryngeal carcinoma s/p partial lateral vertebral laryngotomy with Dr. Hnad on 6/6/2025  SLP consulted to assess for dysphagia. Currently on liquid diet only but patient states he was tolerating regular diet at home. \"Adult, thin liquids with chin tuck 
[facility]  SPEECH THERAPY  STRZ MED SURG 8AB  Clinical Swallow Evaluation    Discharge Recommendations: Home Health  DIET ORDER RECOMMENDATIONS AFTER EVALUATION: Regular diet, thin liquids with a chin tuck with thin liquids  Strategies:  Full Upright Position, Small Bite/Sip, Chin Tuck, and Alternate Solids and Liquids     SLP Individual Minutes  Time In: 0959  Time Out: 1015  Minutes: 16  Timed Code Treatment Minutes: 0 Minutes       Date: 2025  Patient Name: Dilan Pappas      CSN: [id number]   : 1955  (70 y.o.)  Gender: male   Referring Physician:  Isaac Brambila MD     Diagnosis: Community acquired bacterial pneumonia    History of Present Illness/Injury: Dilan Pappas is a 70 y.o. male with PMHx of non-insulin-dependent diabetes mellitus, hypertension, hyperlipidemia, COPD (4 to 10 L depending on activity) and laryngeal CA s/p partial lateral vertical laryngectomy who presents to [facility] from home with lightheadedness presyncopal episodes.    Patient states that he was at home working with home PT/OT, when he tried to stand up he was having severe lightheadedness, feeling of being unsteady on her feet and feeling like his feet are going to give out.  Patient denies any episode of nausea, vomiting, headache before the episode.  Patient denied losing loss of consciousness or having a fall.  Patient denies using any assistive devices//wheelchair/cane at home.  Patient states that he had recently had partial lateral vertebral laryngotomy with Dr. Hand on 2025 due to laryngeal cancer.  Patient was diagnosed with squamous cell lung CA too.  Patient reported having sputum production since his admission, cleared to greenish in color, denies any associated fever, chest pain or worsening shortness of breath but reports having chills.  Patient reported stop smoking in 2024.  Patient also has a history of thyroid nodules which were biopsied and results 
is a 71 yo male that presents with below new performance deficits secondary to pnuemonia. Pt is requiring increased assistance for ADLs, functional mobility, ADL transfers compared to baseline level of function. Skilled OT services is warranted to improve below performance deficits and progress pt towards PLOF. Without OT pt is at risk for falls, further decline in functional abilities, increased caregiver burden, increased risk for medical complication as a result of reduce mobility and inability to return to prior level of living.   Performance deficits / Impairments: Decreased functional mobility , Decreased safe awareness, Decreased balance, Decreased ADL status, Decreased posture, Decreased ROM, Decreased endurance, Decreased high-level IADLs, Decreased strength  Prognosis: Good  REQUIRES OT FOLLOW-UP: Yes  Decision Making: Medium Complexity    Treatment Initiated: Treatment and education initiated within context of evaluation.  Evaluation time included review of current medical information, gathering information related to past medical, social and functional history, completion of standardized testing, formal and informal observation of tasks, assessment of data and development of plan of care and goals.  Treatment time included skilled education and facilitation of tasks to increase safety and independence with ADL's for improved functional independence and quality of life.    Plan:  Times Per Week: 5x  Current Treatment Recommendations: Strengthening, ROM, Balance training, Functional mobility training, Endurance training, Safety education & training, Patient/Caregiver education & training, Equipment evaluation, education, & procurement, Self-Care / ADL, Home management training.  See long-term goal time frame for expected duration of plan of care.  If no long-term goals established, a short length of stay is anticipated.    Goals:     Short Term Goals  Time Frame for Short Term Goals: Until

## 2025-06-28 NOTE — RT PROTOCOL NOTE
RT Inhaler-Nebulizer Bronchodilator Protocol Note    There is a bronchodilator order in the chart from a provider indicating to follow the RT Bronchodilator Protocol and there is an “Initiate RT Inhaler-Nebulizer Bronchodilator Protocol” order as well (see protocol at bottom of note).    CXR Findings:  No results found.    The findings from the last RT Protocol Assessment were as follows:   History Pulmonary Disease: Chronic pulmonary disease  Respiratory Pattern: Regular pattern and RR 12-20 bpm  Breath Sounds: Slightly diminished and/or crackles  Cough: Weak, productive  Indication for Bronchodilator Therapy: On home bronchodilators, Decreased or absent breath sounds  Bronchodilator Assessment Score: 6    Aerosolized bronchodilator medication orders have been revised according to the RT Inhaler-Nebulizer Bronchodilator Protocol below.    Respiratory Therapist to perform RT Therapy Protocol Assessment initially then follow the protocol.  Repeat RT Therapy Protocol Assessment PRN for score 0-3 or on second treatment, BID, and PRN for scores above 3.    No Indications - adjust the frequency to every 6 hours PRN wheezing or bronchospasm, if no treatments needed after 48 hours then discontinue using Per Protocol order mode.     If indication present, adjust the RT bronchodilator orders based on the Bronchodilator Assessment Score as indicated below.  Use Inhaler orders unless patient has one or more of the following: on home nebulizer, not able to hold breath for 10 seconds, is not alert and oriented, cannot activate and use MDI correctly, or respiratory rate 25 breaths per minute or more, then use the equivalent nebulizer order(s) with same Frequency and PRN reasons based on the score.  If a patient is on this medication at home then do not decrease Frequency below that used at home.    0-3 - enter or revise RT bronchodilator order(s) to equivalent RT Bronchodilator order with Frequency of every 4 hours PRN for

## 2025-06-28 NOTE — PLAN OF CARE
Problem: Chronic Conditions and Co-morbidities  Goal: Patient's chronic conditions and co-morbidity symptoms are monitored and maintained or improved  Outcome: Progressing  Flowsheets (Taken 6/28/2025 0259)  Care Plan - Patient's Chronic Conditions and Co-Morbidity Symptoms are Monitored and Maintained or Improved: Monitor and assess patient's chronic conditions and comorbid symptoms for stability, deterioration, or improvement     Problem: Discharge Planning  Goal: Discharge to home or other facility with appropriate resources  Outcome: Progressing     Problem: Pain  Goal: Verbalizes/displays adequate comfort level or baseline comfort level  Outcome: Progressing     Problem: Skin/Tissue Integrity  Goal: Skin integrity remains intact  Description: 1.  Monitor for areas of redness and/or skin breakdown  2.  Assess vascular access sites hourly  3.  Every 4-6 hours minimum:  Change oxygen saturation probe site  4.  Every 4-6 hours:  If on nasal continuous positive airway pressure, respiratory therapy assess nares and determine need for appliance change or resting period  Outcome: Progressing     Problem: Safety - Adult  Goal: Free from fall injury  Outcome: Progressing     Problem: Respiratory - Adult  Goal: Achieves optimal ventilation and oxygenation  6/28/2025 0259 by Evelyn Beltran RN  Outcome: Progressing  6/27/2025 2143 by Bismark Naylor RCP  Outcome: Progressing

## 2025-06-28 NOTE — PLAN OF CARE
Problem: Chronic Conditions and Co-morbidities  Goal: Patient's chronic conditions and co-morbidity symptoms are monitored and maintained or improved  Outcome: Progressing     Problem: Discharge Planning  Goal: Discharge to home or other facility with appropriate resources  Outcome: Progressing     Problem: Pain  Goal: Verbalizes/displays adequate comfort level or baseline comfort level  Outcome: Progressing  Flowsheets (Taken 6/28/2025 0714)  Verbalizes/displays adequate comfort level or baseline comfort level:   Encourage patient to monitor pain and request assistance   Assess pain using appropriate pain scale     Problem: Skin/Tissue Integrity  Goal: Skin integrity remains intact  Description: 1.  Monitor for areas of redness and/or skin breakdown  2.  Assess vascular access sites hourly  3.  Every 4-6 hours minimum:  Change oxygen saturation probe site  4.  Every 4-6 hours:  If on nasal continuous positive airway pressure, respiratory therapy assess nares and determine need for appliance change or resting period  Outcome: Progressing     Problem: Safety - Adult  Goal: Free from fall injury  Outcome: Progressing     Problem: Respiratory - Adult  Goal: Achieves optimal ventilation and oxygenation  Outcome: Progressing  Flowsheets (Taken 6/28/2025 2683 by Plescher, Payton, CHELSI)  Achieves optimal ventilation and oxygenation:   Assess for changes in respiratory status   Assess for changes in mentation and behavior   Respiratory therapy support as indicated

## 2025-06-29 VITALS
RESPIRATION RATE: 16 BRPM | BODY MASS INDEX: 20.98 KG/M2 | WEIGHT: 159 LBS | TEMPERATURE: 98.1 F | SYSTOLIC BLOOD PRESSURE: 95 MMHG | DIASTOLIC BLOOD PRESSURE: 63 MMHG | OXYGEN SATURATION: 92 % | HEART RATE: 77 BPM

## 2025-06-29 LAB
ALBUMIN SERPL BCG-MCNC: 2.9 G/DL (ref 3.4–4.9)
ANION GAP SERPL CALC-SCNC: 11 MEQ/L (ref 8–16)
BACTERIA SPEC RESP CULT: NORMAL
BUN SERPL-MCNC: 8 MG/DL (ref 8–23)
CALCIUM SERPL-MCNC: 9.8 MG/DL (ref 8.8–10.2)
CHLORIDE SERPL-SCNC: 103 MEQ/L (ref 98–111)
CO2 SERPL-SCNC: 25 MEQ/L (ref 22–29)
CREAT SERPL-MCNC: 0.7 MG/DL (ref 0.7–1.2)
DEPRECATED RDW RBC AUTO: 48.3 FL (ref 35–45)
ERYTHROCYTE [DISTWIDTH] IN BLOOD BY AUTOMATED COUNT: 14.6 % (ref 11.5–14.5)
GFR SERPL CREATININE-BSD FRML MDRD: > 90 ML/MIN/1.73M2
GLUCOSE BLD STRIP.AUTO-MCNC: 126 MG/DL (ref 70–108)
GLUCOSE BLD STRIP.AUTO-MCNC: 132 MG/DL (ref 70–108)
GLUCOSE SERPL-MCNC: 126 MG/DL (ref 74–109)
GRAM STN SPEC: NORMAL
HCT VFR BLD AUTO: 35.6 % (ref 42–52)
HGB BLD-MCNC: 10.9 GM/DL (ref 14–18)
MCH RBC QN AUTO: 27.7 PG (ref 26–33)
MCHC RBC AUTO-ENTMCNC: 30.6 GM/DL (ref 32.2–35.5)
MCV RBC AUTO: 90.6 FL (ref 80–94)
PLATELET # BLD AUTO: 331 THOU/MM3 (ref 130–400)
PMV BLD AUTO: 8.2 FL (ref 9.4–12.4)
POTASSIUM SERPL-SCNC: 3.6 MEQ/L (ref 3.5–5.2)
RBC # BLD AUTO: 3.93 MILL/MM3 (ref 4.7–6.1)
SODIUM SERPL-SCNC: 139 MEQ/L (ref 135–145)
WBC # BLD AUTO: 9.9 THOU/MM3 (ref 4.8–10.8)

## 2025-06-29 PROCEDURE — 82948 REAGENT STRIP/BLOOD GLUCOSE: CPT

## 2025-06-29 PROCEDURE — 6360000002 HC RX W HCPCS: Performed by: STUDENT IN AN ORGANIZED HEALTH CARE EDUCATION/TRAINING PROGRAM

## 2025-06-29 PROCEDURE — 6370000000 HC RX 637 (ALT 250 FOR IP): Performed by: STUDENT IN AN ORGANIZED HEALTH CARE EDUCATION/TRAINING PROGRAM

## 2025-06-29 PROCEDURE — 82040 ASSAY OF SERUM ALBUMIN: CPT

## 2025-06-29 PROCEDURE — 2500000003 HC RX 250 WO HCPCS: Performed by: STUDENT IN AN ORGANIZED HEALTH CARE EDUCATION/TRAINING PROGRAM

## 2025-06-29 PROCEDURE — 99239 HOSP IP/OBS DSCHRG MGMT >30: CPT | Performed by: STUDENT IN AN ORGANIZED HEALTH CARE EDUCATION/TRAINING PROGRAM

## 2025-06-29 PROCEDURE — 85027 COMPLETE CBC AUTOMATED: CPT

## 2025-06-29 PROCEDURE — 94640 AIRWAY INHALATION TREATMENT: CPT

## 2025-06-29 PROCEDURE — 2700000000 HC OXYGEN THERAPY PER DAY

## 2025-06-29 PROCEDURE — 36415 COLL VENOUS BLD VENIPUNCTURE: CPT

## 2025-06-29 PROCEDURE — 2580000003 HC RX 258: Performed by: STUDENT IN AN ORGANIZED HEALTH CARE EDUCATION/TRAINING PROGRAM

## 2025-06-29 PROCEDURE — 80048 BASIC METABOLIC PNL TOTAL CA: CPT

## 2025-06-29 RX ORDER — ALBUTEROL SULFATE 0.83 MG/ML
2.5 SOLUTION RESPIRATORY (INHALATION)
Status: DISCONTINUED | OUTPATIENT
Start: 2025-06-29 | End: 2025-06-29 | Stop reason: HOSPADM

## 2025-06-29 RX ADMIN — ALBUTEROL SULFATE 2.5 MG: 2.5 SOLUTION RESPIRATORY (INHALATION) at 12:02

## 2025-06-29 RX ADMIN — ENOXAPARIN SODIUM 40 MG: 100 INJECTION SUBCUTANEOUS at 09:31

## 2025-06-29 RX ADMIN — BUPROPION HYDROCHLORIDE 150 MG: 150 TABLET, EXTENDED RELEASE ORAL at 09:32

## 2025-06-29 RX ADMIN — Medication 4 ML: at 07:46

## 2025-06-29 RX ADMIN — ALBUTEROL SULFATE 2.5 MG: 2.5 SOLUTION RESPIRATORY (INHALATION) at 07:31

## 2025-06-29 RX ADMIN — BUDESONIDE AND FORMOTEROL FUMARATE DIHYDRATE 2 PUFF: 80; 4.5 AEROSOL RESPIRATORY (INHALATION) at 07:31

## 2025-06-29 RX ADMIN — TIOTROPIUM BROMIDE INHALATION SPRAY 5 MCG: 3.12 SPRAY, METERED RESPIRATORY (INHALATION) at 07:31

## 2025-06-29 RX ADMIN — GABAPENTIN 300 MG: 300 CAPSULE ORAL at 09:31

## 2025-06-29 RX ADMIN — PANTOPRAZOLE SODIUM 40 MG: 40 TABLET, DELAYED RELEASE ORAL at 06:06

## 2025-06-29 RX ADMIN — WATER 1000 MG: 1 INJECTION INTRAMUSCULAR; INTRAVENOUS; SUBCUTANEOUS at 09:37

## 2025-06-29 ASSESSMENT — PAIN SCALES - WONG BAKER: WONGBAKER_NUMERICALRESPONSE: NO HURT

## 2025-06-29 NOTE — DISCHARGE SUMMARY
albuterol (2.5 MG/3ML) 0.083% nebulizer solution  Commonly known as: PROVENTIL  USE 3 ML BY NEBULIZATION FOUR TIMES DAILY AS NEEDED FOR WHEEZING  What changed: Another medication with the same name was removed. Continue taking this medication, and follow the directions you see here.            CONTINUE taking these medications      amitriptyline 50 MG tablet  Commonly known as: ELAVIL  Take 1 tablet by mouth nightly     atorvastatin 40 MG tablet  Commonly known as: LIPITOR  TAKE 1 TABLET BY MOUTH DAILY     azelastine 0.1 % nasal spray  Commonly known as: ASTELIN  SPRAY ONCE IN EACH NOSTRIL TWICE DAILY AS DIRECTED     buPROPion 150 MG extended release tablet  Commonly known as: WELLBUTRIN XL  Take 1 tablet by mouth every morning     empagliflozin 25 MG tablet  Commonly known as: Jardiance  Take 1 tablet by mouth daily     gabapentin 300 MG capsule  Commonly known as: NEURONTIN  Take 1 capsule by mouth 2 times daily for 180 days. Intended supply: 90 days     guaiFENesin 600 MG extended release tablet  Commonly known as: Mucinex  Take 1 tablet by mouth 2 times daily     metFORMIN 1000 MG tablet  Commonly known as: GLUCOPHAGE  Take 1 tablet by mouth 2 times daily (with meals)     Mounjaro 5 MG/0.5ML Soaj pen  Generic drug: Tirzepatide  Inject 5 mg into the skin once a week     Ohtuvayre 3 MG/2.5ML Susp  Generic drug: Ensifentrine     OXYGEN  4 liters at rest, 10 liters with activity     pantoprazole 40 MG tablet  Commonly known as: PROTONIX  TAKE 1 TABLET BY MOUTH DAILY     pioglitazone 30 MG tablet  Commonly known as: Actos  Take 1 tablet by mouth daily     QUEtiapine 200 MG tablet  Commonly known as: SEROQUEL  TAKE 1 TABLET BY MOUTH AT BEDTIME     SITagliptin 100 MG tablet  Commonly known as: Januvia  Take 1 tablet by mouth daily     sodium chloride (Inhalant) 3 % nebulizer solution  Take 4 mLs by nebulization as needed for Other (Use every 4 hours as needed for thick sputum)     Trelegy Ellipta 100-62.5-25

## 2025-06-29 NOTE — PLAN OF CARE
Problem: Respiratory - Adult  Goal: Achieves optimal ventilation and oxygenation  6/28/2025 2221 by Jennifer Martinez RCP  Outcome: Progressing

## 2025-06-29 NOTE — PLAN OF CARE
Problem: Discharge Planning  Goal: Discharge to home or other facility with appropriate resources  6/28/2025 2350 by Terry Perea RN  Outcome: Progressing  6/28/2025 1739 by Nanci Talamantes RN  Outcome: Progressing     Problem: Chronic Conditions and Co-morbidities  Goal: Patient's chronic conditions and co-morbidity symptoms are monitored and maintained or improved  6/28/2025 2350 by Terry Perea RN  Outcome: Progressing  6/28/2025 1739 by Nanci Talamantes RN  Outcome: Progressing     Problem: Pain  Goal: Verbalizes/displays adequate comfort level or baseline comfort level  6/28/2025 2350 by Terry Perea RN  Outcome: Progressing  6/28/2025 1739 by Nanci Talamantes RN  Outcome: Progressing  Flowsheets (Taken 6/28/2025 0831)  Verbalizes/displays adequate comfort level or baseline comfort level:   Encourage patient to monitor pain and request assistance   Assess pain using appropriate pain scale     Problem: Skin/Tissue Integrity  Goal: Skin integrity remains intact  Description: 1.  Monitor for areas of redness and/or skin breakdown  2.  Assess vascular access sites hourly  3.  Every 4-6 hours minimum:  Change oxygen saturation probe site  4.  Every 4-6 hours:  If on nasal continuous positive airway pressure, respiratory therapy assess nares and determine need for appliance change or resting period  6/28/2025 2350 by Terry Perea RN  Outcome: Progressing  6/28/2025 1739 by Nanci Talamantes RN  Outcome: Progressing     Problem: Safety - Adult  Goal: Free from fall injury  6/28/2025 2350 by Terry Perea RN  Outcome: Progressing  6/28/2025 1739 by Nanci Talamantes RN  Outcome: Progressing     Problem: Respiratory - Adult  Goal: Achieves optimal ventilation and oxygenation  6/28/2025 2350 by Terry Perea RN  Outcome: Progressing  6/28/2025 2221 by Jennifer Martinez RCP  Outcome: Progressing  6/28/2025 1739 by Nanci Talamantes RN  Outcome: Progressing  Flowsheets (Taken 6/28/2025 8638 by Plescher,

## 2025-06-29 NOTE — PLAN OF CARE
Problem: Chronic Conditions and Co-morbidities  Goal: Patient's chronic conditions and co-morbidity symptoms are monitored and maintained or improved  6/28/2025 2350 by Terry Perea RN  Outcome: Progressing  6/28/2025 1739 by Nanci Talamantes RN  Outcome: Progressing     Problem: Discharge Planning  Goal: Discharge to home or other facility with appropriate resources  6/28/2025 2350 by Terry Perea RN  Outcome: Progressing  6/28/2025 1739 by Nanci Talamantes RN  Outcome: Progressing     Problem: Pain  Goal: Verbalizes/displays adequate comfort level or baseline comfort level  6/28/2025 2350 by Terry Perea RN  Outcome: Progressing  6/28/2025 1739 by Nanci Talamantes RN  Outcome: Progressing  Flowsheets (Taken 6/28/2025 0831)  Verbalizes/displays adequate comfort level or baseline comfort level:   Encourage patient to monitor pain and request assistance   Assess pain using appropriate pain scale     Problem: Skin/Tissue Integrity  Goal: Skin integrity remains intact  Description: 1.  Monitor for areas of redness and/or skin breakdown  2.  Assess vascular access sites hourly  3.  Every 4-6 hours minimum:  Change oxygen saturation probe site  4.  Every 4-6 hours:  If on nasal continuous positive airway pressure, respiratory therapy assess nares and determine need for appliance change or resting period  6/28/2025 2350 by Terry Perea RN  Outcome: Progressing  6/28/2025 1739 by Nanci Talamantes RN  Outcome: Progressing     Problem: Safety - Adult  Goal: Free from fall injury  6/28/2025 2350 by Terry Perea RN  Outcome: Progressing  6/28/2025 1739 by Nanci Talamantes RN  Outcome: Progressing     Problem: Respiratory - Adult  Goal: Achieves optimal ventilation and oxygenation  6/28/2025 2350 by Terry Perea RN  Outcome: Progressing  6/28/2025 2221 by Jennifer Martinez RCP  Outcome: Progressing  6/28/2025 1739 by Nanci Talamantes RN  Outcome: Progressing  Flowsheets (Taken 6/28/2025 7534 by Plescher,

## 2025-06-29 NOTE — PLAN OF CARE
Problem: Chronic Conditions and Co-morbidities  Goal: Patient's chronic conditions and co-morbidity symptoms are monitored and maintained or improved  Outcome: Completed     Problem: Discharge Planning  Goal: Discharge to home or other facility with appropriate resources  Outcome: Completed     Problem: Pain  Goal: Verbalizes/displays adequate comfort level or baseline comfort level  Outcome: Completed  Flowsheets (Taken 6/29/2025 4670)  Verbalizes/displays adequate comfort level or baseline comfort level:   Assess pain using appropriate pain scale   Encourage patient to monitor pain and request assistance     Problem: Skin/Tissue Integrity  Goal: Skin integrity remains intact  Description: 1.  Monitor for areas of redness and/or skin breakdown  2.  Assess vascular access sites hourly  3.  Every 4-6 hours minimum:  Change oxygen saturation probe site  4.  Every 4-6 hours:  If on nasal continuous positive airway pressure, respiratory therapy assess nares and determine need for appliance change or resting period  Outcome: Completed     Problem: Safety - Adult  Goal: Free from fall injury  Outcome: Completed     Problem: Respiratory - Adult  Goal: Achieves optimal ventilation and oxygenation  Outcome: Completed  Flowsheets (Taken 6/29/2025 8942 by Plescher, Payton, CHELSI)  Achieves optimal ventilation and oxygenation:   Assess for changes in respiratory status   Assess for changes in mentation and behavior   Respiratory therapy support as indicated

## 2025-06-30 ENCOUNTER — CARE COORDINATION (OUTPATIENT)
Dept: CARE COORDINATION | Age: 70
End: 2025-06-30

## 2025-06-30 ENCOUNTER — TELEPHONE (OUTPATIENT)
Dept: FAMILY MEDICINE CLINIC | Age: 70
End: 2025-06-30

## 2025-06-30 DIAGNOSIS — J15.9 COMMUNITY ACQUIRED BACTERIAL PNEUMONIA: Primary | ICD-10-CM

## 2025-06-30 PROCEDURE — 1111F DSCHRG MED/CURRENT MED MERGE: CPT | Performed by: NURSE PRACTITIONER

## 2025-06-30 NOTE — TELEPHONE ENCOUNTER
[facility] Transitions Initial Follow Up Call    Outreach made within 2 business days of discharge: Yes    Patient: Dilan Pappas Patient : 1955   MRN: [id number]  Reason for Admission: There are no discharge diagnoses documented for the most recent discharge.  Discharge Date: 25       Spoke with: YOGI    Discharge department/facility: [facility]    TCM Interactive Patient Contact:  Was patient able to fill all prescriptions: Yes  Was patient instructed to bring all medications to the follow-up visit: Yes  Is patient taking all medications as directed in the discharge summary? Yes   Does patient understand their discharge instructions: Yes  Does patient have questions or concerns that need addressed prior to 7 day follow up office visit: No    Patient is scheduled for an office visit on  for a 3 month follow up. Okay to change to HFU?    Follow Up  Future Appointments   Date Time Provider Department Center   2025  8:15 AM STR PET IMAGING ROOM STR[facility] PET STR Rad/Card   2025  8:45 AM SCHEDULE, STRZ DMITRI RAD NURSE [facility] DM[facility] HOD   2025 10:30 AM STR PULMONARY FUNCTION ROOM 1 STR[facility] PFT [facility]   7/3/2025  8:00 AM STR CT IMAGING RM1  OP EXPRESS STRZ OUT EXP STR Rad/Card   2025  9:00 AM STR OUT PT ONC CMA STRZ OP ONC [facility] HOD   2025  9:00 AM Emmanuelle Kimble MD N Oncology P - [place]   2025  8:20 AM Eric Orozco APRN - CNP [facility]am Med [facility] DEP   7/10/2025 11:00 AM Estefanía Sanchez, SLP STRZ SPEECH [facility] HOD   2025  9:40 AM Eric Orozco APRN - CNP Fam Med [facility] DEP   2025  9:00 AM Tao Campbell, APRN - CNP N Pulm Med [facility]       Petty Porras LPN

## 2025-06-30 NOTE — TELEPHONE ENCOUNTER
Pt needs hos fu and questions answered     [facility] Transitions Initial Follow Up Call    Outreach made within 2 business days of discharge: Yes    Patient: Dilan Pappas Patient : 1955   MRN: [id number]  Reason for Admission: There are no discharge diagnoses documented for the most recent discharge.  Discharge Date: 25       Spoke with: YOGI    Discharge department/facility: [facility] Interactive Patient Contact:  Was patient able to fill all prescriptions:   Was patient instructed to bring all medications to the follow-up visit:   Is patient taking all medications as directed in the discharge summary?   Does patient understand their discharge instructions:   Does patient have questions or concerns that need addressed prior to 7 day follow up office visit:     Scheduled appointment with PCP within 7-14 days    Follow Up  Future Appointments   Date Time Provider Department Center   2025  8:15 AM STR PET IMAGING ROOM STRZ PET STR Rad/Card   2025  8:45 AM SCHEDULE, STRZ DMITRI RAD NURSE [facility] HOD   2025 10:30 AM STR PULMONARY FUNCTION ROOM 1 STRZ PFT [place] HOD   7/3/2025  8:00 AM STR CT IMAGING RM1  OP EXPRESS STRZ OUT EXP STR Rad/Card   2025  9:00 AM STR OUT PT ONC CMA STRZ OP ONC [place] HOD   2025  9:00 AM Emmanuelle Kimble MD N Oncology [facility] - [place]   2025  8:20 AM Eric Orozco APRN - CNP Fam Med [facility] DEP   7/10/2025 11:00 AM Estefanía Sanchez, SLP STRZ SPEECH [place] HOD   2025  9:40 AM Eric Orozco APRN - CNP Fam Med [facility] ECC DEP   2025  9:00 AM Tao Campbell APRN - CNP N Pulm Med [facility] [place]       Kirsten Barahona MA

## 2025-06-30 NOTE — CARE COORDINATION
Care Transitions Note    Initial Call - Call within 2 business days of discharge: Yes    Patient Current Location:  Home: [address] [place] [address], [place] [address]    Care Transition Nurse contacted the patient by telephone to perform post hospital discharge assessment, verified name and  as identifiers.  Provided introduction to self, and explanation of the Care Transition Nurse role.    Patient: Dilan Pappas    Patient : 1955   MRN: [id number]    Reason for Admission: Hypotension, Comm acquired PNA  Discharge Date: 25  RURS: Readmission Risk Score: 24.3      Last Discharge Facility       Date Complaint Diagnosis Description Type Department Provider    25 Hypotension Community acquired pneumonia of right lower lobe of lung ... ED to Hosp-Admission (Discharged) (ADMITTED) Angelica Funez MD; Cong [facility].            Was this an external facility discharge? 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  Pt is a 30 day readmission.  Very difficult to hear pt d/t whispering.  Pt previously had partial lateral vertebral laryngectomy.  Dilan states he is doing pretty good so far.  Reports felt short of breath earlier today but no longer feeling short of breath.  Pt using 6 L of continuous oxygen.  Continues to have a cough.  No c/o chest pain/discomfort, pressure, tightness.  Pt continues to monitor his BP.  /86.  He is aware to

## 2025-06-30 NOTE — CARE COORDINATION
6/30/25, 3:53 PM EDT  Late Entry  Patient goals/plan/ treatment preferences discussed by  and .  Patient goals/plan/ treatment preferences reviewed with patient/ family.  Patient/ family verbalize understanding of discharge plan and are in agreement with goal/plan/treatment preferences.  Understanding was demonstrated using the teach back method.  AVS provided by RN at time of discharge, which includes all necessary medical information pertaining to the patients current course of illness, treatment, post-discharge goals of care, and treatment preferences.     Services At/After Discharge: Home Health, Aide services, OT, and PT       Patient discharged on 6:29 to home with grandson and resume [facility] Rashmi MIRANDA.  Message sent to [facility] through [facility] informing of discharge.

## 2025-06-30 NOTE — TELEPHONE ENCOUNTER
Done     Future Appointments   Date Time Provider Department Center   7/1/2025  8:15 AM STR PET IMAGING ROOM STRZ PET STR Rad/Card   7/2/2025  8:45 AM SCHEDULE, STRZ DMITRI RAD NURSE STRZ DM[facility]RI [facility]   7/2/2025 10:30 AM STR PULMONARY FUNCTION ROOM 1 STRZ PFT [facility]   7/3/2025  8:00 AM STR CT IMAGING RM1  OP EXPRESS STRZ OUT EXP STR Rad/Card   7/8/2025  9:00 AM STR OUT PT ONC CMA STRZ OP ONC [facility]   7/8/2025  9:00 AM Emmanuelle Kimble MD N Oncology [facility]   7/9/2025  8:20 AM Eric Orozco APRN - CNP Fam Med [facility] DEP   7/10/2025 11:00 AM Estefanía Sanchez, SLP STRZ SPEECH [place] HOD   7/17/2025  9:40 AM Eric Orozco APRN - CNP Fam Med [facility] ECC DEP   8/14/2025  9:00 AM Tao Campbell, APRN - CNP N Pulm Med [facility]

## 2025-07-01 ENCOUNTER — TELEPHONE (OUTPATIENT)
Dept: PULMONOLOGY | Age: 70
End: 2025-07-01

## 2025-07-01 ENCOUNTER — TELEPHONE (OUTPATIENT)
Dept: FAMILY MEDICINE CLINIC | Age: 70
End: 2025-07-01

## 2025-07-01 ENCOUNTER — HOSPITAL ENCOUNTER (OUTPATIENT)
Dept: PET IMAGING | Age: 70
Discharge: HOME | End: 2025-07-01
Attending: INTERNAL MEDICINE
Payer: MEDICARE

## 2025-07-01 DIAGNOSIS — F32.0 CURRENT MILD EPISODE OF MAJOR DEPRESSIVE DISORDER, UNSPECIFIED WHETHER RECURRENT: Primary | ICD-10-CM

## 2025-07-01 DIAGNOSIS — C34.92 SQUAMOUS CELL CARCINOMA OF LEFT LUNG (HCC): ICD-10-CM

## 2025-07-01 DIAGNOSIS — Z72.0 TOBACCO ABUSE: ICD-10-CM

## 2025-07-01 LAB
BACTERIA BLD AEROBE CULT: NORMAL
BACTERIA BLD AEROBE CULT: NORMAL

## 2025-07-01 PROCEDURE — 3430000000 HC RX DIAGNOSTIC RADIOPHARMACEUTICAL: Performed by: INTERNAL MEDICINE

## 2025-07-01 PROCEDURE — 78815 PET IMAGE W/CT SKULL-THIGH: CPT

## 2025-07-01 PROCEDURE — A9609 HC RX DIAGNOSTIC RADIOPHARMACEUTICAL: HCPCS | Performed by: INTERNAL MEDICINE

## 2025-07-01 RX ORDER — FLUDEOXYGLUCOSE F 18 200 MCI/ML
10.2 INJECTION, SOLUTION INTRAVENOUS
Status: COMPLETED | OUTPATIENT
Start: 2025-07-01 | End: 2025-07-01

## 2025-07-01 RX ADMIN — FLUDEOXYGLUCOSE F 18 10.2 MILLICURIE: 200 INJECTION, SOLUTION INTRAVENOUS at 08:19

## 2025-07-01 NOTE — TELEPHONE ENCOUNTER
Marlene is currently with Dilan, His sitting BP is 106/86 when standing BP 58/42 and his oxygen if at 82.

## 2025-07-01 NOTE — TELEPHONE ENCOUNTER
Blake called in to let us know home health will follow for twice a week for 2 weeks and then one time before discharge.    Also Dilan has expressed depression towards his situation feeling to blake and thought maybe a antidepressed may help.    If you can call something in to [facility] on [address].

## 2025-07-01 NOTE — TELEPHONE ENCOUNTER
Maribell from CT dept wants to clarify - patient had cta chest 6.26.25 , PET 7.1.25 ( today)  are we cancelling order for ct chest on 7.3.25 ? Thank you !

## 2025-07-01 NOTE — TELEPHONE ENCOUNTER
Pt nursing informed of note . Pt voiced understanding with no further questions at this time.

## 2025-07-02 ENCOUNTER — HOSPITAL ENCOUNTER (OUTPATIENT)
Dept: RADIATION ONCOLOGY | Age: 70
Discharge: HOME | End: 2025-07-02

## 2025-07-02 ENCOUNTER — PREP FOR PROCEDURE (OUTPATIENT)
Dept: ENT CLINIC | Age: 70
End: 2025-07-02

## 2025-07-02 ENCOUNTER — HOSPITAL ENCOUNTER (OUTPATIENT)
Dept: PULMONOLOGY | Age: 70
Discharge: HOME | End: 2025-07-02
Payer: MEDICARE

## 2025-07-02 DIAGNOSIS — J43.9 COMBINED PULMONARY FIBROSIS AND EMPHYSEMA (CPFE) (HCC): ICD-10-CM

## 2025-07-02 DIAGNOSIS — J84.10 COMBINED PULMONARY FIBROSIS AND EMPHYSEMA (CPFE) (HCC): ICD-10-CM

## 2025-07-02 DIAGNOSIS — R49.0 DYSPHONIA: ICD-10-CM

## 2025-07-02 PROCEDURE — 94010 BREATHING CAPACITY TEST: CPT

## 2025-07-02 PROCEDURE — 94729 DIFFUSING CAPACITY: CPT

## 2025-07-02 RX ORDER — ESCITALOPRAM OXALATE 10 MG/1
10 TABLET ORAL DAILY
Qty: 30 TABLET | Refills: 3 | Status: SHIPPED | OUTPATIENT
Start: 2025-07-02

## 2025-07-02 NOTE — TELEPHONE ENCOUNTER
Let pt know I sent in a medication called lexapro for him to take once a day for depression it is safe to take with his elavil and wellbutrin, it may take a  few weeks until he starts to notice an improvement in his symptoms.

## 2025-07-03 ENCOUNTER — CARE COORDINATION (OUTPATIENT)
Dept: CASE MANAGEMENT | Age: 70
End: 2025-07-03

## 2025-07-03 ENCOUNTER — TELEPHONE (OUTPATIENT)
Dept: FAMILY MEDICINE CLINIC | Age: 70
End: 2025-07-03

## 2025-07-03 ENCOUNTER — HOSPITAL ENCOUNTER (OUTPATIENT)
Dept: CT IMAGING | Age: 70
Discharge: HOME | End: 2025-07-03
Attending: INTERNAL MEDICINE
Payer: MEDICARE

## 2025-07-03 DIAGNOSIS — E11.9 TYPE 2 DIABETES MELLITUS WITHOUT COMPLICATION, WITHOUT LONG-TERM CURRENT USE OF INSULIN (HCC): ICD-10-CM

## 2025-07-03 DIAGNOSIS — I95.1 ORTHOSTATIC HYPOTENSION: Primary | ICD-10-CM

## 2025-07-03 DIAGNOSIS — R91.8 PULMONARY INFILTRATES: ICD-10-CM

## 2025-07-03 PROCEDURE — 71250 CT THORAX DX C-: CPT

## 2025-07-03 RX ORDER — PIOGLITAZONE 30 MG/1
30 TABLET ORAL DAILY
Qty: 90 TABLET | Refills: 1 | Status: SHIPPED | OUTPATIENT
Start: 2025-07-03

## 2025-07-03 RX ORDER — MIDODRINE HYDROCHLORIDE 2.5 MG/1
2.5 TABLET ORAL 3 TIMES DAILY
Qty: 90 TABLET | Refills: 0 | Status: SHIPPED | OUTPATIENT
Start: 2025-07-03

## 2025-07-03 NOTE — TELEPHONE ENCOUNTER
I called CT dept this morning , I spoke with Nadiya. I never had a chance yesterday to get in my in-basket and a message was routed to me in the morning from [facility] to cancel CT for today. Nadiya said the CT was already completed and the patient had left. I told Nadiya I wish someone from the dept would have called again either yesterday or this morning to check on the order that was in question (since Maribell was questioning the safety of completing another CT due to the amt of radiation) . Nadiya stated \"she was too busy as well and did not get out of work until 6pm\" Nadiya also stated \" well his lungs look like crap anyways and I dont think it would have hurt him anymore\" . Nadiya then stated she didn't call this morning or send the patient away because \" he came in on oxygen and drove all this way and I didn't want to send him home\".

## 2025-07-03 NOTE — CARE COORDINATION
Care Transitions Note    Follow Up Call     Patient Current Location:  Home: [address] [place] [address]. [address]  [facility] [address]    Care Transition Nurse contacted the patient by telephone. Verified name and  as identifiers.    Additional needs identified to be addressed with provider   No needs identified                 Method of communication with provider: none.    Care Summary Note:     Called [phone], left vm at 09:39     Dilan Pappas called back.     CTN LPN contacted the patient for a Care Transition follow-up and spoke to Dilan Pappas continues to whisper from his partial lateral vertebral laryngectomy     - Since the last CTN call, the patient had an appointment with a PET scan on 2025, Spirometry PFT on 2025, and  CT Chest Without Contrast on 2025   - /80  - Lisinopril and Metoprolol continue to be on hold   - HR 99  - Pulse Ox 91%  - wearing 6 L/min of oxygen  -  fasting     - [facility] nurse, PT, and OT therapy continue to visit; one was there yesterday.   - had some dizziness/lightheadedness when standing up, then sat right back down, denies right now   - Appetite continues to be good on a regular diet, no complaints  - Fluid intake is well   - Had a headache yesterday, takes Tylenol as needed   - cough, with yellow productive sputum   - Sore throat is getting better   - denies ear pain; it went away   - Augmentin (1 dose on  ) completed   - denies shortness of breath, chest pain, fever, or chills     - Plan on getting Lexapro today, for depression, stated he is aware it may take a few weeks until he starts to notice an improvement in his symptoms.   - denies feeling on edge  - Does feel nervous, anxious, and gets worried about his condition and starts shaking, which gives him little interest or pleasure in doing things, which makes him feel down, depressed, or hopeless.  - The home health nurse has been really helpful during this change in condition. He is aware CTN LPN

## 2025-07-03 NOTE — TELEPHONE ENCOUNTER
Evelia with [facility] home health called with positive orthostatic BP 91-59 sitting - heart rate of 54  74/49 - standing with lightheaded and dizziness   [facility] and hadn't been drinking much water   But blood pressure is sitting good now at 108/70  Patient can monitor on his own   Just had to notify

## 2025-07-03 NOTE — TELEPHONE ENCOUNTER
Due to hypotension I recommend we start pt on midodrine to raise the BP and prevent the hypotension causing dizziness and lightheadedness.I also recommend he wear compression socks to keep BP up in normal range and continue to hold all BP meds  Midodrine will start at 2.5 mg TID and we will titrate up as needed.

## 2025-07-03 NOTE — TELEPHONE ENCOUNTER
Recent Visits  Date Type Provider Dept   06/19/25 Office Visit Eric Orozco, APRN - CNP [facility]   04/09/25 Office Visit Eric Orozco, APRN - CNP [facility]   03/24/25 Office Visit Eric Orozco, APRN - CNP [facility]   02/11/25 Office Visit Eric Orozco, APRN - CNP [facility]   01/30/25 Office Visit Eric Orozco, APRN - CNP [facility]   01/09/25 Office Visit Eric Orozco, APRN - CNP [facility]   01/08/25 Office Visit Eric Orozco, APRN - CNP [facility]   10/07/24 Office Visit Eric Orozco, APRN - CNP [facility]   04/10/24 Office Visit Eric Orozco, APRN - CNP [facility]   01/10/24 Office Visit Eric OrozcoGERARDO - CNP [facility]   Showing recent visits within past 540 days with a meds authorizing provider and meeting all other requirements  Future Appointments  Date Type Provider Dept   07/09/25 Appointment Eric Orozco, APRN - CNP [facility]   07/17/25 Appointment Orozco, Jan, APRN - CNP [facility]   Showing future appointments within next 150 days with a meds authorizing provider and meeting all other requirements

## 2025-07-07 ENCOUNTER — TELEPHONE (OUTPATIENT)
Age: 70
End: 2025-07-07

## 2025-07-07 ENCOUNTER — OFFICE VISIT (OUTPATIENT)
Dept: FAMILY MEDICINE CLINIC | Age: 70
End: 2025-07-07
Payer: MEDICARE

## 2025-07-07 VITALS
OXYGEN SATURATION: 94 % | HEART RATE: 84 BPM | DIASTOLIC BLOOD PRESSURE: 64 MMHG | BODY MASS INDEX: 20.7 KG/M2 | WEIGHT: 156.2 LBS | SYSTOLIC BLOOD PRESSURE: 110 MMHG | HEIGHT: 73 IN | RESPIRATION RATE: 18 BRPM | TEMPERATURE: 97.7 F

## 2025-07-07 DIAGNOSIS — R79.0 LOW MAGNESIUM LEVEL: ICD-10-CM

## 2025-07-07 DIAGNOSIS — R53.81 PHYSICAL DECONDITIONING: ICD-10-CM

## 2025-07-07 DIAGNOSIS — Z99.81 OXYGEN DEPENDENT: ICD-10-CM

## 2025-07-07 DIAGNOSIS — D64.9 NORMOCYTIC ANEMIA: ICD-10-CM

## 2025-07-07 DIAGNOSIS — Z09 HOSPITAL DISCHARGE FOLLOW-UP: Primary | ICD-10-CM

## 2025-07-07 DIAGNOSIS — R06.02 SHORTNESS OF BREATH: ICD-10-CM

## 2025-07-07 DIAGNOSIS — F33.0 MILD EPISODE OF RECURRENT MAJOR DEPRESSIVE DISORDER: ICD-10-CM

## 2025-07-07 DIAGNOSIS — J18.9 PNEUMONIA DUE TO INFECTIOUS ORGANISM, UNSPECIFIED LATERALITY, UNSPECIFIED PART OF LUNG: ICD-10-CM

## 2025-07-07 DIAGNOSIS — I95.9 HYPOTENSION, UNSPECIFIED HYPOTENSION TYPE: ICD-10-CM

## 2025-07-07 PROBLEM — R42 LIGHTHEADEDNESS: Status: RESOLVED | Noted: 2025-06-28 | Resolved: 2025-07-07

## 2025-07-07 PROCEDURE — 1123F ACP DISCUSS/DSCN MKR DOCD: CPT | Performed by: NURSE PRACTITIONER

## 2025-07-07 PROCEDURE — G0181 HOME HEALTH CARE SUPERVISION: HCPCS | Performed by: NURSE PRACTITIONER

## 2025-07-07 PROCEDURE — 1159F MED LIST DOCD IN RCRD: CPT | Performed by: NURSE PRACTITIONER

## 2025-07-07 PROCEDURE — 1111F DSCHRG MED/CURRENT MED MERGE: CPT | Performed by: NURSE PRACTITIONER

## 2025-07-07 PROCEDURE — 1160F RVW MEDS BY RX/DR IN RCRD: CPT | Performed by: NURSE PRACTITIONER

## 2025-07-07 PROCEDURE — 99215 OFFICE O/P EST HI 40 MIN: CPT | Performed by: NURSE PRACTITIONER

## 2025-07-07 RX ORDER — FERROUS SULFATE 325(65) MG
325 TABLET ORAL
Qty: 90 TABLET | Refills: 1 | Status: SHIPPED | OUTPATIENT
Start: 2025-07-07

## 2025-07-07 NOTE — TELEPHONE ENCOUNTER
This is a spiritual health encounter with patient, Dilan, regarding outpatient  support. Patient shared that he has good support in his life right now and he declined to schedule a  visit at this time.  team will remain available to support patient/family, PRN.     FELIBERTO Nayak.[facility]     [place] [address]  [phone]

## 2025-07-07 NOTE — PROGRESS NOTES
mouth daily 30 tablet 3    OXYGEN 4 liters at rest, 10 liters with activity 1 each 5    guaiFENesin (MUCINEX) 600 MG extended release tablet Take 1 tablet by mouth 2 times daily 60 tablet 0    OHTUVAYRE 3 MG/2.5ML SUSP Inhale 2.5 mLs into the lungs 2 times daily      fluticasone-umeclidin-vilant (TRELEGY ELLIPTA) 100-62.5-25 MCG/ACT AEPB inhaler INHALE 1 PUFF INTO THE LUNGS DAILY 3 each 3    pantoprazole (PROTONIX) 40 MG tablet TAKE 1 TABLET BY MOUTH DAILY 90 tablet 4    atorvastatin (LIPITOR) 40 MG tablet TAKE 1 TABLET BY MOUTH DAILY 90 tablet 3    empagliflozin (JARDIANCE) 25 MG tablet Take 1 tablet by mouth daily 90 tablet 4    QUEtiapine (SEROQUEL) 200 MG tablet TAKE 1 TABLET BY MOUTH AT BEDTIME 90 tablet 3    gabapentin (NEURONTIN) 300 MG capsule Take 1 capsule by mouth 2 times daily for 180 days. Intended supply: 90 days 180 capsule 1    SITagliptin (JANUVIA) 100 MG tablet Take 1 tablet by mouth daily 90 tablet 4    amitriptyline (ELAVIL) 50 MG tablet Take 1 tablet by mouth nightly 90 tablet 4    buPROPion (WELLBUTRIN XL) 150 MG extended release tablet Take 1 tablet by mouth every morning 90 tablet 3    metFORMIN (GLUCOPHAGE) 1000 MG tablet Take 1 tablet by mouth 2 times daily (with meals) 180 tablet 4    sodium chloride, Inhalant, 3 % nebulizer solution Take 4 mLs by nebulization as needed for Other (Use every 4 hours as needed for thick sputum) 120 mL 5    albuterol (PROVENTIL) (2.5 MG/3ML) 0.083% nebulizer solution USE 3 ML BY NEBULIZATION FOUR TIMES DAILY AS NEEDED FOR WHEEZING 90 each 3    azelastine (ASTELIN) 0.1 % nasal spray SPRAY ONCE IN EACH NOSTRIL TWICE DAILY AS DIRECTED 1 each 3        Medications patient taking as of now reconciled against medications ordered at time of hospital discharge: Yes    A comprehensive review of systems was negative except for what was noted in the HPI.    Objective:       Vitals:    07/07/25 0832 07/07/25 0854   BP: 110/64    Pulse: 84    Resp: 18    Temp: 97.7 °F

## 2025-07-08 ENCOUNTER — TELEPHONE (OUTPATIENT)
Dept: FAMILY MEDICINE CLINIC | Age: 70
End: 2025-07-08

## 2025-07-08 ENCOUNTER — HOSPITAL ENCOUNTER (OUTPATIENT)
Dept: INFUSION THERAPY | Age: 70
Discharge: HOME | End: 2025-07-08
Payer: MEDICARE

## 2025-07-08 ENCOUNTER — TELEPHONE (OUTPATIENT)
Dept: PULMONOLOGY | Age: 70
End: 2025-07-08

## 2025-07-08 ENCOUNTER — OFFICE VISIT (OUTPATIENT)
Dept: ONCOLOGY | Age: 70
End: 2025-07-08
Payer: MEDICARE

## 2025-07-08 ENCOUNTER — CARE COORDINATION (OUTPATIENT)
Dept: CARE COORDINATION | Age: 70
End: 2025-07-08

## 2025-07-08 ENCOUNTER — HOSPITAL ENCOUNTER (OUTPATIENT)
Dept: RADIATION ONCOLOGY | Age: 70
Discharge: HOME | End: 2025-07-08
Payer: MEDICARE

## 2025-07-08 VITALS
DIASTOLIC BLOOD PRESSURE: 59 MMHG | RESPIRATION RATE: 18 BRPM | OXYGEN SATURATION: 90 % | WEIGHT: 156 LBS | SYSTOLIC BLOOD PRESSURE: 102 MMHG | TEMPERATURE: 98.4 F | BODY MASS INDEX: 21.16 KG/M2 | HEART RATE: 90 BPM

## 2025-07-08 VITALS
WEIGHT: 156 LBS | OXYGEN SATURATION: 90 % | TEMPERATURE: 98.4 F | SYSTOLIC BLOOD PRESSURE: 102 MMHG | HEIGHT: 72 IN | DIASTOLIC BLOOD PRESSURE: 59 MMHG | HEART RATE: 90 BPM | RESPIRATION RATE: 18 BRPM | BODY MASS INDEX: 21.13 KG/M2

## 2025-07-08 DIAGNOSIS — Z43.1 ENCOUNTER FOR ATTENTION TO GASTROSTOMY (HCC): ICD-10-CM

## 2025-07-08 DIAGNOSIS — C34.92 SQUAMOUS CELL CARCINOMA OF LEFT LUNG (HCC): Primary | ICD-10-CM

## 2025-07-08 DIAGNOSIS — C34.92 SQUAMOUS CELL CARCINOMA OF LEFT LUNG (HCC): ICD-10-CM

## 2025-07-08 LAB
ALBUMIN SERPL BCG-MCNC: 3.5 G/DL (ref 3.4–4.9)
ALP SERPL-CCNC: 105 U/L (ref 40–129)
ALT SERPL W/O P-5'-P-CCNC: 17 U/L (ref 10–50)
AST SERPL-CCNC: 20 U/L (ref 10–50)
BASOPHILS ABSOLUTE: 0 THOU/MM3 (ref 0–0.1)
BASOPHILS NFR BLD AUTO: 0 % (ref 0–3)
BILIRUB CONJ SERPL-MCNC: 0.2 MG/DL (ref 0–0.2)
BILIRUB SERPL-MCNC: 0.3 MG/DL (ref 0.3–1.2)
BUN BLDP-MCNC: 5 MG/DL (ref 8–26)
CHLORIDE BLD-SCNC: 98 MEQ/L (ref 98–109)
CREAT BLD-MCNC: 0.7 MG/DL (ref 0.5–1.2)
EOSINOPHIL NFR BLD AUTO: 4 % (ref 0–4)
EOSINOPHILS ABSOLUTE: 0.5 THOU/MM3 (ref 0–0.4)
ERYTHROCYTE [DISTWIDTH] IN BLOOD BY AUTOMATED COUNT: 14.8 % (ref 11.5–14.5)
GFR SERPL CREATININE-BSD FRML MDRD: > 90 ML/MIN/1.73M2
GLUCOSE BLD-MCNC: 110 MG/DL (ref 70–108)
HCT VFR BLD AUTO: 38.7 % (ref 42–52)
HGB BLD-MCNC: 12 GM/DL (ref 14–18)
IMMATURE GRANULOCYTES %: 1 %
IMMATURE GRANULOCYTES ABSOLUTE: 0.07 THOU/MM3 (ref 0–0.07)
IONIZED CALCIUM, WHOLE BLOOD: 1.26 MMOL/L (ref 1.12–1.32)
LYMPHOCYTES ABSOLUTE: 1.6 THOU/MM3 (ref 1–4.8)
LYMPHOCYTES NFR BLD AUTO: 15 % (ref 15–47)
MCH RBC QN AUTO: 27.6 PG (ref 26–33)
MCHC RBC AUTO-ENTMCNC: 31 GM/DL (ref 32.2–35.5)
MCV RBC AUTO: 89 FL (ref 80–94)
MONOCYTES ABSOLUTE: 0.8 THOU/MM3 (ref 0.4–1.3)
MONOCYTES NFR BLD AUTO: 7 % (ref 0–12)
NEUTROPHILS ABSOLUTE: 8 THOU/MM3 (ref 1.8–7.7)
NEUTROPHILS NFR BLD AUTO: 73 % (ref 43–75)
PLATELET # BLD AUTO: 358 THOU/MM3 (ref 130–400)
PMV BLD AUTO: 7.9 FL (ref 9.4–12.4)
POTASSIUM BLD-SCNC: 3.4 MEQ/L (ref 3.5–4.9)
PROT SERPL-MCNC: 7.5 G/DL (ref 6.4–8.3)
RBC # BLD AUTO: 4.34 MILL/MM3 (ref 4.7–6.1)
SODIUM BLD-SCNC: 139 MEQ/L (ref 138–146)
TOTAL CO2, WHOLE BLOOD: 31 MEQ/L (ref 23–33)
WBC # BLD AUTO: 11 THOU/MM3 (ref 4.8–10.8)

## 2025-07-08 PROCEDURE — 99211 OFF/OP EST MAY X REQ PHY/QHP: CPT

## 2025-07-08 PROCEDURE — 99214 OFFICE O/P EST MOD 30 MIN: CPT

## 2025-07-08 PROCEDURE — 1126F AMNT PAIN NOTED NONE PRSNT: CPT | Performed by: INTERNAL MEDICINE

## 2025-07-08 PROCEDURE — 85025 COMPLETE CBC W/AUTO DIFF WBC: CPT

## 2025-07-08 PROCEDURE — 99214 OFFICE O/P EST MOD 30 MIN: CPT | Performed by: INTERNAL MEDICINE

## 2025-07-08 PROCEDURE — 1123F ACP DISCUSS/DSCN MKR DOCD: CPT | Performed by: INTERNAL MEDICINE

## 2025-07-08 PROCEDURE — 99212 OFFICE O/P EST SF 10 MIN: CPT

## 2025-07-08 PROCEDURE — 36415 COLL VENOUS BLD VENIPUNCTURE: CPT

## 2025-07-08 PROCEDURE — 1159F MED LIST DOCD IN RCRD: CPT | Performed by: INTERNAL MEDICINE

## 2025-07-08 PROCEDURE — 80047 BASIC METABLC PNL IONIZED CA: CPT

## 2025-07-08 PROCEDURE — 80076 HEPATIC FUNCTION PANEL: CPT

## 2025-07-08 ASSESSMENT — ENCOUNTER SYMPTOMS
BACK PAIN: 1
CHEST TIGHTNESS: 0
WHEEZING: 0
SHORTNESS OF BREATH: 1
BLOOD IN STOOL: 0
TROUBLE SWALLOWING: 0
COUGH: 1
VOICE CHANGE: 1
APNEA: 0
NAUSEA: 0
ABDOMINAL PAIN: 0
SORE THROAT: 0
VOMITING: 0

## 2025-07-08 NOTE — PROGRESS NOTES
[facility]           [address]        [place] [address]        O: [phone]        F: [phone]       KOEZY            FOLLOW UP NOTE    Date of Service: 2025  Patient ID: Dilan Pappas   : 1955  MRN: [id number]   Acct Number: [id number]       DATE OF SERVICE: 2025   LOCATION: [facility]  PROVIDER: Alicia Moya PA-C    FOLLOW UP PHYSICIANS: Dr. Emmanuelle Kimble ([facility]) Dr. Dheeraj Rodriguez (ENT) EVA CARRILLO-DOMINGO (Pulm)     ASSESSMENT AND PLAN:   Cancer Staging   Primary malignant neoplasm of left upper lobe of lung (HCC)  Staging form: Lung, AJCC V9  - Clinical stage from 2025: Stage IA3 (cT1c, cN0, cM0) - Unsigned      - Dilan Pappas is a 70 y.o. 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 We discussed that this is all concerning for malignancy, but that the LLL consolidations may reflect pneumonia, which the patient was under treatment for at the time of the PET/CT  - Reviewed the recommendations from his case presentation at [facility] tumor board today as per Dr. Valero, namely that there is no role for radiation for the glottic cancer at this time, but that the lung needs further evaluation at this time. The pathology of the previous lung biopsy was reviewed and is unclear; thus it was recommended that he undergo biopsy of the left lung again, with consideration of 2 sites for biopsy (the previously biopsied CHEPE, and new LLL). Treatment plan will be based on those results. The patient voiced understanding and agreement with this plan. I discussed this also with Dr. Kimble,

## 2025-07-08 NOTE — TELEPHONE ENCOUNTER
ENT is requesting pulmonary clearance for this patient for a procedure on 07/30/25. I see where in your visit note from  05/29/25 you addressed the risks for him to have a procedure involving general anesthesia. Can they use that same note for clearance or would you like to see him in the office prior to the new surgery date? Please advise, thank you.

## 2025-07-08 NOTE — PROGRESS NOTES
brain    IMPRESSION:  1. No evidence of intracranial metastatic disease.  2. Mild atrophy and probable ischemic changes in the white matter. No evidence  of an acute infarct     6/26/2025 Imaging    CTA chest    Findings:  The heart size is normal. RV/LV ratio is normal.  Unremarkable thoracic aorta and great vessels. No aneurysm.  No pulmonary artery filling defects.  The visualized thyroid is within normal limits. There is increased   mediastinal adenopathy, measuring 9 mm short axis in the right   paratracheal location, and 14 mm short axis in the precarinal location.   Calcified subcarinal and left hilar lymph nodes are present as before.   Stable 10 mm right hilar adenopathy is present.     Increased, severe left lower lobe airspace opacity. Increased, moderate   left upper lobe airspace opacity. Increased, moderate diffuse   reticulonodular pulmonary opacity within the bilateral lungs,   right-greater-than-left. Scattered ill-defined nodular densities (for   example, within the left upper lobe laterally measuring 40 mm on image   103). Moderate apical predominant emphysema.     The upper abdomen is unremarkable.  No acute fractures.     IMPRESSION:  1. No pulmonary emboli.  2. Increased bilateral pneumonia, worst in the left lower lobe.  3. Multiple ill-defined nodular densities, possibly reflecting focal   pneumonitis. Presumably reactive mediastinal hilar adenopathy. Follow up   chest CT with contrast in 3 months is recommended to exclude underlying   neoplasm.  4. Coronary artery disease.     7/1/2025 Imaging    PET/CT    FINDINGS:     Neck: A new nonenlarged left-sided level 4 cervical lymph node has a maximum SUV  of 4.9 (image 92). There is surgical changes of prior left laryngectomy.  Previously identified activity in the region of the left vocal cord is no longer  visualized.     Chest: Cardiac size is normal. Atherosclerotic calcifications are present in the  thoracic aorta and coronary arteries

## 2025-07-08 NOTE — TELEPHONE ENCOUNTER
Looks like he was just admitted to the hospital last month for pneumonia.  With significant event since his last visit, he should probably be evaluated prior to clearance for another ENT procedure.  I did get him on my schedule that we can be properly evaluated prior to surgical clearance.  Thanks

## 2025-07-08 NOTE — CARE COORDINATION
Care Transitions Note    Follow Up Call     Patient Current Location:  Home: [address]  [address]  [place] [address]    Care Transition Nurse contacted the patient by telephone. Verified name and  as identifiers.    Additional needs identified to be addressed with provider   No needs identified                 Method of communication with provider: none.    Care Summary Note: CTN call to Dilan today and he says he is feeling better. Speaks in a whisper. Denies sob, chest pain, cough, fever, chills, n/v/d, swelling, dizziness, malaise.  AV=547/70 had to take 1 Midodrine yesterday for low BP.  O2 6-10 lpm cont nc pO2=>90%  BLOOD SUGAR=120  Wt.=156 down. Oncology aware of this. Eating soft foods ok. Drinking 2 Protein drinks daily.  PCP 25-> CXR in 2 weeks, hold metoprolol/lisinopril, CACHORRO hose, CBC/Mg in 1 week, start Iron daily, echo ordered.  Onc 25->ref to [facility] Pul, f/u 3 weeks  [facility] HH/PT/OT active  Only uses walker to assist w/ ambulation when out of the house.  Started Lexapro and feels it is helping w/ depression. Although he says he does not feeling like getting out of bed most days but does.  Speech Therapy 7/10/25  PCP f/u 25  Onc 25  Echo 25  Pulm 25 MH (switching to [facility] Pul)  Denies need for transportation.   ENT scheduled 25:  TORS Laryngoplasty with flap, reconstruction of left eli Larynx   Denies problems w/ urination/bowels. No other concerns voiced at this time. Will continue to follow.    Plan of care updates since last contact:  Review of patient management of conditions/medications: PNA->CXR 2 weeks, ref to [facility] Pul; wt. Loss->monitor daily wt., continue high protein drinks Onc 25  Home Health: [facility] HH/PT/OT        Advance Care Planning:   Does patient have an Advance Directive: reviewed during previous call, see note. .    Medication Review:  Medications changed since last call, reviewed today.     Remote Patient Monitoring:  Offered patient enrollment in

## 2025-07-08 NOTE — TELEPHONE ENCOUNTER
Dilan is scheduled for another stage of surgery with Dr Rodriguez on 7/30/25. We are requesting a pulmonary clearance.    Surgery: TORS Laryngoplasty with flap, reconstruction of left eli Larynx    Please advise.    Thank you!

## 2025-07-08 NOTE — TELEPHONE ENCOUNTER
Dilan is scheduled for the next phase of surgery with Dr Rodriguez on 7/30/25. We are asking for medical clearance. We are obtaining a separate pulmonary clearance as well from Dr Steele.    Surgery:  TORS Laryngoplasty with flap, reconstruction of left eli larynx    Please advise.    Thank you!

## 2025-07-08 NOTE — TELEPHONE ENCOUNTER
Called and got patient scheduled    Thank you     Future Appointments   Date Time Provider Department Center   7/10/2025 11:00 AM Estefanía Sanchez, SLP STRZ SPEECH [facility] HOD   7/21/2025  1:20 PM Eric Orozco APRN - CNP [facility] ECC DEP   7/29/2025  8:00 AM Rodwy Saldivar MD N Oncology [facility] [place]   8/4/2025  9:00 AM STR ECHO 2 STRZ [place] STR Rad/Card   8/14/2025  9:00 AM Tao Campbell APRN - CNP N Pulm Med [facility]   10/7/2025 10:40 AM Eric Orozco APRN - CNP Fam Med [facility] ECC DEP

## 2025-07-09 VITALS
SYSTOLIC BLOOD PRESSURE: 102 MMHG | HEART RATE: 90 BPM | DIASTOLIC BLOOD PRESSURE: 59 MMHG | RESPIRATION RATE: 18 BRPM | TEMPERATURE: 98.4 F

## 2025-07-09 DIAGNOSIS — G62.9 NEUROPATHY: ICD-10-CM

## 2025-07-09 RX ORDER — GABAPENTIN 300 MG/1
300 CAPSULE ORAL 2 TIMES DAILY
Qty: 180 CAPSULE | Refills: 1 | Status: SHIPPED | OUTPATIENT
Start: 2025-07-09 | End: 2025-10-07

## 2025-07-09 NOTE — TELEPHONE ENCOUNTER
Future Appointments   Date Time Provider Department Center   7/10/2025 11:00 AM Estefanía Sanchez, SLP [facility] SPEECH [facility]OD   7/21/2025  1:20 PM Eric Orozco APRN - CNP Fam Med [facility] DEP   7/29/2025  8:00 AM Rowdy Saldivar MD N Oncology [facility]   8/4/2025  9:00 AM STR ECHO 2 [facility] [place] STR Rad/Card   8/14/2025  9:00 AM Tao Campbell APRN - CNP N Pulm Med [facility]   10/7/2025 10:40 AM Eric Orozco APRN - CNP Fam Med [facility] DEP

## 2025-07-14 RX ORDER — LEVOFLOXACIN 750 MG/1
750 TABLET, FILM COATED ORAL DAILY
Qty: 10 TABLET | Refills: 0 | Status: SHIPPED | OUTPATIENT
Start: 2025-07-14 | End: 2025-07-24

## 2025-07-15 ENCOUNTER — CARE COORDINATION (OUTPATIENT)
Dept: CARE COORDINATION | Age: 70
End: 2025-07-15

## 2025-07-15 NOTE — CARE COORDINATION
Care Transitions Note    Final Call     Patient Current Location:  Home: [address] [place] [address]  [address]  [facility] [id number]    Care Transition Nurse contacted the patient by telephone. Verified name and  as identifiers.    Patient graduated from the Care Transitions program on 7/15/25.  Patient/family verbalizes confidence in the ability to self-manage at this time..      Advance Care Planning:   Does patient have an Advance Directive: reviewed and current.    Handoff:   Patient/family agreeable to [facility] outreach.  Message sent to Marylu ABRAHAM. Insurance requests.    Care Summary Note: CTN call to Dilan today and he says he is feeling ok. Speaks in a whisper.   Denies pain, fever, chills, inc. Sob, chest pain/tightness, n/v/d, swelling, dizziness, malaise.  [facility] Pulm 25 for pre-op clearance  PCP 25 for pre-op clearance  Onc f/u 25  ENT surgery scheduled for 25  Echo 25  Denies need for transportation.  [facility] HH/PT/OT active.  O2 6-10 lpm pO2=>90% at rest will drop to 88% is active w/ O2. He rests and takes some deep breaths.  WZ=233/59 takes Midodrine prn  BLOOD SUGAR=131  Eating, drinking & sleeping ok. Denies problems w/ urination/bowels. Drinks protein drinks 2 x/day.  Wt.= doesn't have a scale 156 lb. At Onc office.  Discussed ACM handoff to Marylu ADORNO And final CTN call today. He expressed understanding.    Assessments:  Care Transitions Subsequent and Final Call    Schedule Follow Up Appointment with PCP: Completed  Subsequent and Final Calls  Do you have any ongoing symptoms?: No  Have your medications changed?: No  Do you have any questions related to your medications?: No  Do you currently have any active services?: Yes  Are you currently active with any services?: Home Health  Do you have any needs or concerns that I can assist you with?: No  Identified Barriers: Lack of Education, Impairment  Care Transitions Interventions   Home Care Waiver: Completed Physical Therapy: Completed

## 2025-07-15 NOTE — PROGRESS NOTES
[facility]    Patient: WAYLON CRAWFORD, 70 y.o.   : 1955      Patient of mine    Assessment/Plan      Diagnosis Orders   1. Preop respiratory exam        2. Squamous cell carcinoma lung, left (HCC)        3. Chronic respiratory failure with hypoxia (HCC)        4. Stage 1 mild COPD by GOLD classification (HCC)        5. Bronchiectasis without complication (HCC)        6. 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 Former smoker        8. Alpha-1-antitrypsin deficiency (HCC)        9. 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  He has very mild scattered crackles noted to lung fields without any significant wheezing, rhonchi  -His spirometry he completed recently was reviewed personally and discussed with the patient which does not show any significant obstruction but does show a severely decreased DLCO likely related to underlying pulmonary fibrosis, emphysema, lung cancer  -Patient's respiratory symptoms are well-controlled relating to COPD at this time.  Continue on Trelegy 100, 1 puff once daily  -Continue to use albuterol via nebulizer as needed every 4 hours  -Patient is awaiting a call from interventional pulmonology at [facility] to schedule repeat bronchoscopy for biopsy of left upper lobe and left lower lobe nodules to determine next steps in care from oncology.  -I personally reached out to Dr. Serrano today to ensure that this patient and his information had been received by their team.  He confirms that they received the request to complete a repeat bronchoscopy with biopsies, he is waiting to discuss the case with oncology before scheduling here at [facility].    -From a pulmonary standpoint, patient would be at very high risk for pulmonary complications going for a procedure involving general anesthesia and mechanical ventilation, due to COPD, chronic

## 2025-07-17 ENCOUNTER — OFFICE VISIT (OUTPATIENT)
Dept: PULMONOLOGY | Age: 70
End: 2025-07-17
Payer: MEDICARE

## 2025-07-17 VITALS
OXYGEN SATURATION: 95 % | HEART RATE: 93 BPM | SYSTOLIC BLOOD PRESSURE: 108 MMHG | WEIGHT: 256.6 LBS | BODY MASS INDEX: 34.75 KG/M2 | TEMPERATURE: 98 F | DIASTOLIC BLOOD PRESSURE: 64 MMHG | HEIGHT: 72 IN

## 2025-07-17 DIAGNOSIS — C34.92 SQUAMOUS CELL CARCINOMA LUNG, LEFT (HCC): ICD-10-CM

## 2025-07-17 DIAGNOSIS — J44.9 STAGE 1 MILD COPD BY GOLD CLASSIFICATION (HCC): ICD-10-CM

## 2025-07-17 DIAGNOSIS — E88.01 ALPHA-1-ANTITRYPSIN DEFICIENCY (HCC): ICD-10-CM

## 2025-07-17 DIAGNOSIS — J96.11 CHRONIC RESPIRATORY FAILURE WITH HYPOXIA (HCC): ICD-10-CM

## 2025-07-17 DIAGNOSIS — Z87.891 FORMER SMOKER: ICD-10-CM

## 2025-07-17 DIAGNOSIS — J84.10 COMBINED PULMONARY FIBROSIS AND EMPHYSEMA (CPFE) (HCC): ICD-10-CM

## 2025-07-17 DIAGNOSIS — J47.9 BRONCHIECTASIS WITHOUT COMPLICATION (HCC): ICD-10-CM

## 2025-07-17 DIAGNOSIS — C32.0 SQUAMOUS CELL CARCINOMA OF LEFT VOCAL CORD (HCC): ICD-10-CM

## 2025-07-17 DIAGNOSIS — J43.9 COMBINED PULMONARY FIBROSIS AND EMPHYSEMA (CPFE) (HCC): ICD-10-CM

## 2025-07-17 DIAGNOSIS — Z01.811 PREOP RESPIRATORY EXAM: Primary | ICD-10-CM

## 2025-07-17 PROCEDURE — 1123F ACP DISCUSS/DSCN MKR DOCD: CPT

## 2025-07-17 PROCEDURE — 99215 OFFICE O/P EST HI 40 MIN: CPT

## 2025-07-17 PROCEDURE — 1159F MED LIST DOCD IN RCRD: CPT

## 2025-07-17 ASSESSMENT — ENCOUNTER SYMPTOMS
SINUS PAIN: 0
RHINORRHEA: 0
COUGH: 1
SHORTNESS OF BREATH: 1
WHEEZING: 0
SINUS PRESSURE: 0
CHEST TIGHTNESS: 0

## 2025-07-21 ENCOUNTER — HOSPITAL ENCOUNTER (OUTPATIENT)
Age: 70
Discharge: HOME | End: 2025-07-21
Payer: MEDICARE

## 2025-07-21 ENCOUNTER — HOSPITAL ENCOUNTER (OUTPATIENT)
Dept: GENERAL RADIOLOGY | Age: 70
Discharge: HOME | End: 2025-07-21
Payer: MEDICARE

## 2025-07-21 ENCOUNTER — OFFICE VISIT (OUTPATIENT)
Dept: FAMILY MEDICINE CLINIC | Age: 70
End: 2025-07-21
Payer: MEDICARE

## 2025-07-21 VITALS
SYSTOLIC BLOOD PRESSURE: 112 MMHG | HEIGHT: 72 IN | OXYGEN SATURATION: 92 % | DIASTOLIC BLOOD PRESSURE: 62 MMHG | WEIGHT: 160 LBS | TEMPERATURE: 98.3 F | HEART RATE: 89 BPM | BODY MASS INDEX: 21.67 KG/M2 | RESPIRATION RATE: 16 BRPM

## 2025-07-21 DIAGNOSIS — Z01.818 PREOPERATIVE CLEARANCE: Primary | ICD-10-CM

## 2025-07-21 DIAGNOSIS — J18.9 PNEUMONIA DUE TO INFECTIOUS ORGANISM, UNSPECIFIED LATERALITY, UNSPECIFIED PART OF LUNG: ICD-10-CM

## 2025-07-21 DIAGNOSIS — C32.0 SQUAMOUS CELL CARCINOMA OF LEFT VOCAL CORD (HCC): ICD-10-CM

## 2025-07-21 DIAGNOSIS — Z90.02 S/P LARYNGECTOMY: ICD-10-CM

## 2025-07-21 DIAGNOSIS — D64.9 NORMOCYTIC ANEMIA: ICD-10-CM

## 2025-07-21 DIAGNOSIS — C34.12 PRIMARY MALIGNANT NEOPLASM OF LEFT UPPER LOBE OF LUNG (HCC): ICD-10-CM

## 2025-07-21 DIAGNOSIS — R79.0 LOW MAGNESIUM LEVEL: ICD-10-CM

## 2025-07-21 DIAGNOSIS — Z99.81 OXYGEN DEPENDENT: ICD-10-CM

## 2025-07-21 LAB
BASOPHILS ABSOLUTE: 0 THOU/MM3 (ref 0–0.1)
BASOPHILS NFR BLD AUTO: 0.4 %
DEPRECATED RDW RBC AUTO: 51.8 FL (ref 35–45)
EOSINOPHIL NFR BLD AUTO: 4 %
EOSINOPHILS ABSOLUTE: 0.5 THOU/MM3 (ref 0–0.4)
ERYTHROCYTE [DISTWIDTH] IN BLOOD BY AUTOMATED COUNT: 15.9 % (ref 11.5–14.5)
HCT VFR BLD AUTO: 38.6 % (ref 42–52)
HGB BLD-MCNC: 12 GM/DL (ref 14–18)
IMM GRANULOCYTES # BLD AUTO: 0.06 THOU/MM3 (ref 0–0.07)
IMM GRANULOCYTES NFR BLD AUTO: 0.5 %
LYMPHOCYTES ABSOLUTE: 2 THOU/MM3 (ref 1–4.8)
LYMPHOCYTES NFR BLD AUTO: 16.4 %
MAGNESIUM SERPL-MCNC: 1.3 MG/DL (ref 1.6–2.6)
MCH RBC QN AUTO: 27.6 PG (ref 26–33)
MCHC RBC AUTO-ENTMCNC: 31.1 GM/DL (ref 32.2–35.5)
MCV RBC AUTO: 88.9 FL (ref 80–94)
MONOCYTES ABSOLUTE: 0.8 THOU/MM3 (ref 0.4–1.3)
MONOCYTES NFR BLD AUTO: 6.7 %
NEUTROPHILS ABSOLUTE: 8.9 THOU/MM3 (ref 1.8–7.7)
NEUTROPHILS NFR BLD AUTO: 72 %
NRBC BLD AUTO-RTO: 0 /100 WBC
PLATELET # BLD AUTO: 357 THOU/MM3 (ref 130–400)
PMV BLD AUTO: 8.8 FL (ref 9.4–12.4)
RBC # BLD AUTO: 4.34 MILL/MM3 (ref 4.7–6.1)
WBC # BLD AUTO: 12.3 THOU/MM3 (ref 4.8–10.8)

## 2025-07-21 PROCEDURE — 1160F RVW MEDS BY RX/DR IN RCRD: CPT | Performed by: NURSE PRACTITIONER

## 2025-07-21 PROCEDURE — 1159F MED LIST DOCD IN RCRD: CPT | Performed by: NURSE PRACTITIONER

## 2025-07-21 PROCEDURE — 36415 COLL VENOUS BLD VENIPUNCTURE: CPT

## 2025-07-21 PROCEDURE — 1123F ACP DISCUSS/DSCN MKR DOCD: CPT | Performed by: NURSE PRACTITIONER

## 2025-07-21 PROCEDURE — 85025 COMPLETE CBC W/AUTO DIFF WBC: CPT

## 2025-07-21 PROCEDURE — 99215 OFFICE O/P EST HI 40 MIN: CPT | Performed by: NURSE PRACTITIONER

## 2025-07-21 PROCEDURE — 71046 X-RAY EXAM CHEST 2 VIEWS: CPT

## 2025-07-21 PROCEDURE — 83735 ASSAY OF MAGNESIUM: CPT

## 2025-07-21 NOTE — PROGRESS NOTES
Dilan Pappas is a 70 y.o. male that presents for Pre-op Exam (Laryngoplasty with flap on 7/30/25 with Michael, no concerns )        At the request of Dr. Rodriguez Dilan Pappas presents for pre-operative evaluation for his surgery.    Planned Surgery: TORS laryngoplasty with flap   S/P laryngectomy    Going to have surgery to reconstruct his larynx  Will help alleviate his hoarseness    Hx laryngeal carcinoma s/p partial lateral vertebral laryngotomy with Dr. Hand on 6/6/2025  SLP consulted to assess for dysphagia. Currently on full normal .      Recent distal esophagitis and gastritis  Noted on EGD 6/5/25. Continue PPI to avoid GI bleed. Follow up outpatient.     Physical deconditioning/ Generalized weakness: Patient lives with his grandson, independent in his ADLs and IADLs, power of  his son Suhas.  PT OT with home health.    Oxygen dependent/COPD  5 liters per N/C  -on trelegy, albuterol aerosol prn     Had midodrine 2.5 mg TID ordered but pt not taking     Having upcoming lung BX possible lung cancer     EKG 6/2025-NSR    T2DM  -taking metformin 1000 mg bid, januvia 100 mg  and actose 30 mg daily  jardiance 25 mg   Hemoglobin A1C POC   Date Value Ref Range Status   06/19/2025 6.5 (H) 4.3 - 5.7 % Final     Comment:     Performed at [facility] Office under CLIA #  [id number]        Lab Results   Component Value Date    WBC 11.0 (H) 07/08/2025    HGB 12.0 (L) 07/08/2025    HCT 38.7 (L) 07/08/2025    MCV 89 07/08/2025     07/08/2025      Lab Results   Component Value Date     07/08/2025    K 3.4 (L) 07/08/2025     06/29/2025    CO2 25 06/29/2025    BUN 8 06/29/2025    CREATININE 0.7 07/08/2025    GLUCOSE 126 (H) 06/29/2025    CALCIUM 9.8 06/29/2025    BILITOT 0.3 07/08/2025    ALKPHOS 105 07/08/2025    AST 20 07/08/2025    ALT 17 07/08/2025    LABGLOM > 90 07/08/2025        Patient Active Problem List    Diagnosis Date Noted    Encounter for attention to gastrostomy (Z43.1)

## 2025-07-22 ENCOUNTER — RESULTS FOLLOW-UP (OUTPATIENT)
Dept: FAMILY MEDICINE CLINIC | Age: 70
End: 2025-07-22

## 2025-07-22 ENCOUNTER — ANESTHESIA EVENT (OUTPATIENT)
Dept: OPERATING ROOM | Age: 70
End: 2025-07-22
Payer: MEDICARE

## 2025-07-22 DIAGNOSIS — J18.9 PNEUMONIA DUE TO INFECTIOUS ORGANISM, UNSPECIFIED LATERALITY, UNSPECIFIED PART OF LUNG: Primary | ICD-10-CM

## 2025-07-22 DIAGNOSIS — E83.42 HYPOMAGNESEMIA: ICD-10-CM

## 2025-07-22 RX ORDER — LEVOFLOXACIN 750 MG/1
750 TABLET, FILM COATED ORAL DAILY
Qty: 7 TABLET | Refills: 0 | Status: SHIPPED | OUTPATIENT
Start: 2025-07-22 | End: 2025-07-29

## 2025-07-23 ENCOUNTER — CARE COORDINATION (OUTPATIENT)
Dept: CARE COORDINATION | Age: 70
End: 2025-07-23

## 2025-07-23 ASSESSMENT — ENCOUNTER SYMPTOMS: DYSPNEA ASSOCIATED WITH: EXERTION

## 2025-07-23 NOTE — CARE COORDINATION
Ambulatory Care Coordination Note     2025 1:19 PM     Patient Current Location:  Home: [address] [place] [address]. [address] [place] [id number]     This patient was received as a referral from Care Transition Nurse.    ACM contacted the patient by telephone. Verified name and  with patient as identifiers. Provided introduction to self, and explanation of the ACM role.   Patient accepted care management services at this time.          ACM: Loan Amos RN     Challenges to be reviewed by the provider   Additional needs identified to be addressed with provider No                  Method of communication with provider: none.    Utilization: Initial Call - N/A    Care Summary Note: Spoke with Dilan   Introduced self/role  Was CTN Referral for support  Hx of laryngeal/lung cancer  Follows with [facility] pulmonology, Dr. Hagen  Using O2 and has needed supplies  Active with palliative care  No longer working with HH as they discharged him  Uses walker  Declines RPM, self monitoring  Monitors blood pressures at home  Preparing for upcoming surgery with ENT  Denies needs from myself or PCP office at this time  Will follow up in 2 weeks after 30 day CTN follow up was completed and will monitor for discharge from hospital from scheduled surgery      Offered patient enrollment in the Remote Patient Monitoring (RPM) program for in-home monitoring: Yes, but did not enroll at this time: already monitoring with home equipment.     Assessments Completed:   Diabetes Assessment    Medic Alert ID: No  Meal Planning: Avoidance of concentrated sweets   How often do you test your blood sugar?: Daily   Do you have barriers with adherence to non-pharmacologic self-management interventions? (Nutrition/Exercise/Self-Monitoring): No   Have you ever had to go to the ED for symptoms of low blood sugar?: No       Do you have hyperglycemia symptoms?: No   Do you have hypoglycemia symptoms?: No   Last Blood Sugar Value: 130         ,   COPD Assessment

## 2025-07-23 NOTE — CARE COORDINATION
Eric,     I have enrolled Dilan into Care Coordination following CTN referral for support.  Please approve Plan of Care using [facility] . [facility].  Thank you!

## 2025-07-29 ENCOUNTER — CLINICAL DOCUMENTATION (OUTPATIENT)
Dept: CASE MANAGEMENT | Age: 70
End: 2025-07-29

## 2025-07-29 ENCOUNTER — OFFICE VISIT (OUTPATIENT)
Dept: ONCOLOGY | Age: 70
End: 2025-07-29
Payer: MEDICARE

## 2025-07-29 ENCOUNTER — HOSPITAL ENCOUNTER (OUTPATIENT)
Dept: INFUSION THERAPY | Age: 70
Discharge: HOME | End: 2025-07-29
Payer: MEDICARE

## 2025-07-29 VITALS
OXYGEN SATURATION: 92 % | TEMPERATURE: 97.7 F | RESPIRATION RATE: 18 BRPM | DIASTOLIC BLOOD PRESSURE: 62 MMHG | SYSTOLIC BLOOD PRESSURE: 112 MMHG | HEART RATE: 81 BPM

## 2025-07-29 VITALS
OXYGEN SATURATION: 92 % | SYSTOLIC BLOOD PRESSURE: 112 MMHG | TEMPERATURE: 97.7 F | HEART RATE: 81 BPM | WEIGHT: 157.4 LBS | HEIGHT: 72 IN | BODY MASS INDEX: 21.32 KG/M2 | DIASTOLIC BLOOD PRESSURE: 62 MMHG | RESPIRATION RATE: 18 BRPM

## 2025-07-29 DIAGNOSIS — C34.92 SQUAMOUS CELL CARCINOMA OF LEFT LUNG (HCC): Primary | ICD-10-CM

## 2025-07-29 PROCEDURE — 99211 OFF/OP EST MAY X REQ PHY/QHP: CPT

## 2025-07-29 PROCEDURE — 1126F AMNT PAIN NOTED NONE PRSNT: CPT | Performed by: INTERNAL MEDICINE

## 2025-07-29 PROCEDURE — 99213 OFFICE O/P EST LOW 20 MIN: CPT | Performed by: INTERNAL MEDICINE

## 2025-07-29 PROCEDURE — 1123F ACP DISCUSS/DSCN MKR DOCD: CPT | Performed by: INTERNAL MEDICINE

## 2025-07-29 NOTE — PROGRESS NOTES
Name: Dilan Pappas  : 1955  MRN: [id number]    Oncology Navigation Follow-Up Note    Contact Type:  Medical Oncology    Subjective: uses O2 continous(4L sitting/6L lying down/10 L while moving)     Reconstruction surgery   per Dr. Rodriguez    Objective: MD apt to discuss tx plan    Oncology Plan of Care :   -presented tumor board 2025 (determine if cervical lymph node needs biopsy)     Plan to be seen by Rad Onc after tumor board-discuss tx plan     -discussed possible tx options/TBD after board discussion    -return MD apt 2025-finalize tx      Assistance Needed: denies any at this time    Receptive to Advanced Care Planning / Palliative Care:  deferred    Education: sindy reiterated MED ONC POC    Referrals: n/a    Notes: sindy following to assist & support as needed    Electronically signed by Marily Boss, RN on 2025 at 1:12 PM

## 2025-07-30 ENCOUNTER — HOSPITAL ENCOUNTER (INPATIENT)
Age: 70
LOS: 8 days | Discharge: HOME | End: 2025-08-07
Attending: OTOLARYNGOLOGY | Admitting: INTERNAL MEDICINE
Payer: MEDICARE

## 2025-07-30 ENCOUNTER — ANESTHESIA (OUTPATIENT)
Dept: OPERATING ROOM | Age: 70
End: 2025-07-30
Payer: MEDICARE

## 2025-07-30 ENCOUNTER — APPOINTMENT (OUTPATIENT)
Dept: GENERAL RADIOLOGY | Age: 70
End: 2025-07-30
Attending: OTOLARYNGOLOGY
Payer: MEDICARE

## 2025-07-30 DIAGNOSIS — R06.02 SHORTNESS OF BREATH: Primary | ICD-10-CM

## 2025-07-30 DIAGNOSIS — R49.0 DYSPHONIA: ICD-10-CM

## 2025-07-30 DIAGNOSIS — R49.0 HOARSENESS: ICD-10-CM

## 2025-07-30 DIAGNOSIS — C32.0 SQUAMOUS CELL CARCINOMA OF LEFT VOCAL CORD (HCC): ICD-10-CM

## 2025-07-30 DIAGNOSIS — R13.14 PHARYNGOESOPHAGEAL DYSPHAGIA: ICD-10-CM

## 2025-07-30 DIAGNOSIS — R05.3 CHRONIC COUGH: ICD-10-CM

## 2025-07-30 PROBLEM — Z98.890 POST-OPERATIVE STATE: Status: ACTIVE | Noted: 2025-07-30

## 2025-07-30 PROBLEM — R06.89 AIRWAY CLEARANCE IMPAIRMENT: Status: ACTIVE | Noted: 2025-07-30

## 2025-07-30 LAB
GLUCOSE BLD STRIP.AUTO-MCNC: 100 MG/DL (ref 70–108)
GLUCOSE BLD STRIP.AUTO-MCNC: 132 MG/DL (ref 70–108)
GLUCOSE BLD STRIP.AUTO-MCNC: 138 MG/DL (ref 70–108)
GLUCOSE BLD STRIP.AUTO-MCNC: 80 MG/DL (ref 70–108)

## 2025-07-30 PROCEDURE — 5A0955A ASSISTANCE WITH RESPIRATORY VENTILATION, GREATER THAN 96 CONSECUTIVE HOURS, HIGH NASAL FLOW/VELOCITY: ICD-10-PCS | Performed by: OTOLARYNGOLOGY

## 2025-07-30 PROCEDURE — 87070 CULTURE OTHR SPECIMN AEROBIC: CPT

## 2025-07-30 PROCEDURE — 7100000001 HC PACU RECOVERY - ADDTL 15 MIN: Performed by: OTOLARYNGOLOGY

## 2025-07-30 PROCEDURE — 0CQS8ZZ REPAIR LARYNX, VIA NATURAL OR ARTIFICIAL OPENING ENDOSCOPIC: ICD-10-PCS | Performed by: OTOLARYNGOLOGY

## 2025-07-30 PROCEDURE — 6370000000 HC RX 637 (ALT 250 FOR IP): Performed by: OTOLARYNGOLOGY

## 2025-07-30 PROCEDURE — 3600000019 HC SURGERY ROBOT ADDTL 15MIN: Performed by: OTOLARYNGOLOGY

## 2025-07-30 PROCEDURE — 6360000002 HC RX W HCPCS: Performed by: OTOLARYNGOLOGY

## 2025-07-30 PROCEDURE — 82948 REAGENT STRIP/BLOOD GLUCOSE: CPT

## 2025-07-30 PROCEDURE — 6370000000 HC RX 637 (ALT 250 FOR IP)

## 2025-07-30 PROCEDURE — 6360000002 HC RX W HCPCS: Performed by: REGISTERED NURSE

## 2025-07-30 PROCEDURE — 6360000002 HC RX W HCPCS: Performed by: NURSE ANESTHETIST, CERTIFIED REGISTERED

## 2025-07-30 PROCEDURE — 87102 FUNGUS ISOLATION CULTURE: CPT

## 2025-07-30 PROCEDURE — 71045 X-RAY EXAM CHEST 1 VIEW: CPT

## 2025-07-30 PROCEDURE — 87205 SMEAR GRAM STAIN: CPT

## 2025-07-30 PROCEDURE — 2500000003 HC RX 250 WO HCPCS: Performed by: OTOLARYNGOLOGY

## 2025-07-30 PROCEDURE — C1601 HC ENDOSCOPE, PULM, IMAGING/ILLUMINATION DEVICE: HCPCS | Performed by: OTOLARYNGOLOGY

## 2025-07-30 PROCEDURE — 3700000000 HC ANESTHESIA ATTENDED CARE: Performed by: OTOLARYNGOLOGY

## 2025-07-30 PROCEDURE — 94761 N-INVAS EAR/PLS OXIMETRY MLT: CPT

## 2025-07-30 PROCEDURE — 8E098CZ ROBOTIC ASSISTED PROCEDURE OF HEAD AND NECK REGION, VIA NATURAL OR ARTIFICIAL OPENING ENDOSCOPIC: ICD-10-PCS | Performed by: OTOLARYNGOLOGY

## 2025-07-30 PROCEDURE — 2720000010 HC SURG SUPPLY STERILE: Performed by: OTOLARYNGOLOGY

## 2025-07-30 PROCEDURE — 94640 AIRWAY INHALATION TREATMENT: CPT

## 2025-07-30 PROCEDURE — S2900 ROBOTIC SURGICAL SYSTEM: HCPCS | Performed by: OTOLARYNGOLOGY

## 2025-07-30 PROCEDURE — 2709999900 HC NON-CHARGEABLE SUPPLY: Performed by: OTOLARYNGOLOGY

## 2025-07-30 PROCEDURE — 31552 LARYNGOPLASTY LARYNGEAL STEN: CPT | Performed by: OTOLARYNGOLOGY

## 2025-07-30 PROCEDURE — 7100000000 HC PACU RECOVERY - FIRST 15 MIN: Performed by: OTOLARYNGOLOGY

## 2025-07-30 PROCEDURE — 2580000003 HC RX 258: Performed by: OTOLARYNGOLOGY

## 2025-07-30 PROCEDURE — 2500000003 HC RX 250 WO HCPCS: Performed by: REGISTERED NURSE

## 2025-07-30 PROCEDURE — 0B9G8ZX DRAINAGE OF LEFT UPPER LUNG LOBE, VIA NATURAL OR ARTIFICIAL OPENING ENDOSCOPIC, DIAGNOSTIC: ICD-10-PCS | Performed by: OTOLARYNGOLOGY

## 2025-07-30 PROCEDURE — 3700000001 HC ADD 15 MINUTES (ANESTHESIA): Performed by: OTOLARYNGOLOGY

## 2025-07-30 PROCEDURE — 2000000000 HC ICU R&B

## 2025-07-30 PROCEDURE — 99233 SBSQ HOSP IP/OBS HIGH 50: CPT | Performed by: SURGERY

## 2025-07-30 PROCEDURE — 3600000009 HC SURGERY ROBOT BASE: Performed by: OTOLARYNGOLOGY

## 2025-07-30 PROCEDURE — 2700000000 HC OXYGEN THERAPY PER DAY

## 2025-07-30 PROCEDURE — 2500000003 HC RX 250 WO HCPCS: Performed by: NURSE ANESTHETIST, CERTIFIED REGISTERED

## 2025-07-30 RX ORDER — EPINEPHRINE 1 MG/ML
INJECTION, SOLUTION, CONCENTRATE INTRAVENOUS PRN
Status: DISCONTINUED | OUTPATIENT
Start: 2025-07-30 | End: 2025-07-30 | Stop reason: ALTCHOICE

## 2025-07-30 RX ORDER — ROCURONIUM BROMIDE 10 MG/ML
INJECTION, SOLUTION INTRAVENOUS
Status: DISCONTINUED | OUTPATIENT
Start: 2025-07-30 | End: 2025-07-30 | Stop reason: SDUPTHER

## 2025-07-30 RX ORDER — MIDODRINE HYDROCHLORIDE 2.5 MG/1
2.5 TABLET ORAL 3 TIMES DAILY
Status: DISCONTINUED | OUTPATIENT
Start: 2025-07-30 | End: 2025-07-30

## 2025-07-30 RX ORDER — ONDANSETRON 2 MG/ML
INJECTION INTRAMUSCULAR; INTRAVENOUS
Status: DISCONTINUED | OUTPATIENT
Start: 2025-07-30 | End: 2025-07-30 | Stop reason: SDUPTHER

## 2025-07-30 RX ORDER — PROPOFOL 10 MG/ML
INJECTION, EMULSION INTRAVENOUS
Status: DISCONTINUED | OUTPATIENT
Start: 2025-07-30 | End: 2025-07-30 | Stop reason: SDUPTHER

## 2025-07-30 RX ORDER — FENTANYL CITRATE 50 UG/ML
INJECTION, SOLUTION INTRAMUSCULAR; INTRAVENOUS
Status: DISCONTINUED | OUTPATIENT
Start: 2025-07-30 | End: 2025-07-30 | Stop reason: SDUPTHER

## 2025-07-30 RX ORDER — ATORVASTATIN CALCIUM 40 MG/1
40 TABLET, FILM COATED ORAL NIGHTLY
Status: DISCONTINUED | OUTPATIENT
Start: 2025-07-31 | End: 2025-08-07 | Stop reason: HOSPADM

## 2025-07-30 RX ORDER — GABAPENTIN 300 MG/1
300 CAPSULE ORAL 2 TIMES DAILY
Status: DISCONTINUED | OUTPATIENT
Start: 2025-07-31 | End: 2025-08-07 | Stop reason: HOSPADM

## 2025-07-30 RX ORDER — DEXAMETHASONE SODIUM PHOSPHATE 10 MG/ML
10 INJECTION, SOLUTION INTRAMUSCULAR; INTRAVENOUS ONCE
Status: COMPLETED | OUTPATIENT
Start: 2025-07-30 | End: 2025-07-30

## 2025-07-30 RX ORDER — GLUCAGON 1 MG/ML
1 KIT INJECTION PRN
Status: DISCONTINUED | OUTPATIENT
Start: 2025-07-30 | End: 2025-08-07 | Stop reason: HOSPADM

## 2025-07-30 RX ORDER — SODIUM CHLORIDE 9 MG/ML
INJECTION, SOLUTION INTRAVENOUS PRN
Status: DISCONTINUED | OUTPATIENT
Start: 2025-07-30 | End: 2025-08-07 | Stop reason: HOSPADM

## 2025-07-30 RX ORDER — OXYMETAZOLINE HYDROCHLORIDE 0.05 G/100ML
SPRAY NASAL PRN
Status: DISCONTINUED | OUTPATIENT
Start: 2025-07-30 | End: 2025-07-30 | Stop reason: ALTCHOICE

## 2025-07-30 RX ORDER — INSULIN LISPRO 100 [IU]/ML
0-8 INJECTION, SOLUTION INTRAVENOUS; SUBCUTANEOUS
Status: DISCONTINUED | OUTPATIENT
Start: 2025-07-30 | End: 2025-08-07 | Stop reason: HOSPADM

## 2025-07-30 RX ORDER — SODIUM CHLORIDE 9 MG/ML
INJECTION, SOLUTION INTRAVENOUS PRN
Status: DISCONTINUED | OUTPATIENT
Start: 2025-07-30 | End: 2025-07-30 | Stop reason: HOSPADM

## 2025-07-30 RX ORDER — ALBUTEROL SULFATE 0.83 MG/ML
2.5 SOLUTION RESPIRATORY (INHALATION) EVERY 6 HOURS PRN
Status: DISCONTINUED | OUTPATIENT
Start: 2025-07-30 | End: 2025-08-07 | Stop reason: HOSPADM

## 2025-07-30 RX ORDER — SODIUM CHLORIDE, SODIUM LACTATE, POTASSIUM CHLORIDE, CALCIUM CHLORIDE 600; 310; 30; 20 MG/100ML; MG/100ML; MG/100ML; MG/100ML
INJECTION, SOLUTION INTRAVENOUS CONTINUOUS
Status: DISCONTINUED | OUTPATIENT
Start: 2025-07-30 | End: 2025-08-02

## 2025-07-30 RX ORDER — AMITRIPTYLINE HYDROCHLORIDE 50 MG/1
50 TABLET ORAL NIGHTLY
Status: DISCONTINUED | OUTPATIENT
Start: 2025-07-31 | End: 2025-08-07 | Stop reason: HOSPADM

## 2025-07-30 RX ORDER — SODIUM CHLORIDE FOR INHALATION 0.9 %
3 VIAL, NEBULIZER (ML) INHALATION
Status: DISCONTINUED | OUTPATIENT
Start: 2025-07-30 | End: 2025-08-07 | Stop reason: HOSPADM

## 2025-07-30 RX ORDER — LIDOCAINE HCL/PF 100 MG/5ML
SYRINGE (ML) INJECTION
Status: DISCONTINUED | OUTPATIENT
Start: 2025-07-30 | End: 2025-07-30 | Stop reason: SDUPTHER

## 2025-07-30 RX ORDER — IPRATROPIUM BROMIDE AND ALBUTEROL SULFATE 2.5; .5 MG/3ML; MG/3ML
1 SOLUTION RESPIRATORY (INHALATION) EVERY 4 HOURS PRN
Status: DISCONTINUED | OUTPATIENT
Start: 2025-07-30 | End: 2025-07-30 | Stop reason: HOSPADM

## 2025-07-30 RX ORDER — SODIUM CHLORIDE 0.9 % (FLUSH) 0.9 %
5-40 SYRINGE (ML) INJECTION PRN
Status: DISCONTINUED | OUTPATIENT
Start: 2025-07-30 | End: 2025-07-30 | Stop reason: HOSPADM

## 2025-07-30 RX ORDER — GUAIFENESIN/DEXTROMETHORPHAN 100-10MG/5
5 SYRUP ORAL EVERY 8 HOURS PRN
Status: DISCONTINUED | OUTPATIENT
Start: 2025-07-30 | End: 2025-08-07 | Stop reason: HOSPADM

## 2025-07-30 RX ORDER — QUETIAPINE FUMARATE 100 MG/1
200 TABLET, FILM COATED ORAL NIGHTLY
Status: DISCONTINUED | OUTPATIENT
Start: 2025-07-31 | End: 2025-08-07 | Stop reason: HOSPADM

## 2025-07-30 RX ORDER — SODIUM CHLORIDE 0.9 % (FLUSH) 0.9 %
5-40 SYRINGE (ML) INJECTION PRN
Status: DISCONTINUED | OUTPATIENT
Start: 2025-07-30 | End: 2025-08-07 | Stop reason: HOSPADM

## 2025-07-30 RX ORDER — DEXTROSE MONOHYDRATE 100 MG/ML
INJECTION, SOLUTION INTRAVENOUS CONTINUOUS PRN
Status: DISCONTINUED | OUTPATIENT
Start: 2025-07-30 | End: 2025-08-07 | Stop reason: HOSPADM

## 2025-07-30 RX ORDER — ONDANSETRON 2 MG/ML
4 INJECTION INTRAMUSCULAR; INTRAVENOUS EVERY 6 HOURS PRN
Status: DISCONTINUED | OUTPATIENT
Start: 2025-07-30 | End: 2025-08-07 | Stop reason: HOSPADM

## 2025-07-30 RX ORDER — SODIUM CHLORIDE 0.9 % (FLUSH) 0.9 %
5-40 SYRINGE (ML) INJECTION EVERY 12 HOURS SCHEDULED
Status: DISCONTINUED | OUTPATIENT
Start: 2025-07-30 | End: 2025-08-07 | Stop reason: HOSPADM

## 2025-07-30 RX ORDER — KETOROLAC TROMETHAMINE 30 MG/ML
15 INJECTION, SOLUTION INTRAMUSCULAR; INTRAVENOUS EVERY 6 HOURS
Status: COMPLETED | OUTPATIENT
Start: 2025-07-30 | End: 2025-08-02

## 2025-07-30 RX ORDER — SODIUM CHLORIDE 0.9 % (FLUSH) 0.9 %
5-40 SYRINGE (ML) INJECTION EVERY 12 HOURS SCHEDULED
Status: DISCONTINUED | OUTPATIENT
Start: 2025-07-30 | End: 2025-07-30 | Stop reason: HOSPADM

## 2025-07-30 RX ORDER — ONDANSETRON 4 MG/1
4 TABLET, ORALLY DISINTEGRATING ORAL EVERY 8 HOURS PRN
Status: DISCONTINUED | OUTPATIENT
Start: 2025-07-30 | End: 2025-08-07 | Stop reason: HOSPADM

## 2025-07-30 RX ORDER — SODIUM CHLORIDE FOR INHALATION 3 %
4 VIAL, NEBULIZER (ML) INHALATION PRN
Status: DISCONTINUED | OUTPATIENT
Start: 2025-07-30 | End: 2025-08-07 | Stop reason: HOSPADM

## 2025-07-30 RX ORDER — PANTOPRAZOLE SODIUM 40 MG/1
40 TABLET, DELAYED RELEASE ORAL
Status: DISCONTINUED | OUTPATIENT
Start: 2025-07-31 | End: 2025-08-07 | Stop reason: HOSPADM

## 2025-07-30 RX ORDER — LISINOPRIL 2.5 MG/1
2.5 TABLET ORAL DAILY
Status: DISCONTINUED | OUTPATIENT
Start: 2025-07-30 | End: 2025-08-07 | Stop reason: HOSPADM

## 2025-07-30 RX ORDER — BISACODYL 5 MG/1
5 TABLET, DELAYED RELEASE ORAL DAILY
Status: DISCONTINUED | OUTPATIENT
Start: 2025-07-30 | End: 2025-08-07 | Stop reason: HOSPADM

## 2025-07-30 RX ORDER — METOPROLOL TARTRATE 25 MG/1
25 TABLET, FILM COATED ORAL DAILY
Status: DISCONTINUED | OUTPATIENT
Start: 2025-07-30 | End: 2025-08-07 | Stop reason: HOSPADM

## 2025-07-30 RX ORDER — BUPROPION HYDROCHLORIDE 150 MG/1
150 TABLET ORAL EVERY MORNING
Status: DISCONTINUED | OUTPATIENT
Start: 2025-07-31 | End: 2025-08-07 | Stop reason: HOSPADM

## 2025-07-30 RX ORDER — POLYETHYLENE GLYCOL 3350 17 G/17G
17 POWDER, FOR SOLUTION ORAL DAILY
Status: DISCONTINUED | OUTPATIENT
Start: 2025-07-30 | End: 2025-08-07 | Stop reason: HOSPADM

## 2025-07-30 RX ORDER — ESCITALOPRAM OXALATE 10 MG/1
10 TABLET ORAL DAILY
Status: DISCONTINUED | OUTPATIENT
Start: 2025-07-31 | End: 2025-08-07 | Stop reason: HOSPADM

## 2025-07-30 RX ADMIN — SUGAMMADEX 200 MG: 100 INJECTION, SOLUTION INTRAVENOUS at 17:57

## 2025-07-30 RX ADMIN — FENTANYL CITRATE 50 MCG: 50 INJECTION, SOLUTION INTRAMUSCULAR; INTRAVENOUS at 17:22

## 2025-07-30 RX ADMIN — ROCURONIUM BROMIDE 30 MG: 10 INJECTION, SOLUTION INTRAVENOUS at 15:51

## 2025-07-30 RX ADMIN — SODIUM CHLORIDE 3000 MG: 9 INJECTION, SOLUTION INTRAVENOUS at 15:01

## 2025-07-30 RX ADMIN — FENTANYL CITRATE 50 MCG: 50 INJECTION, SOLUTION INTRAMUSCULAR; INTRAVENOUS at 15:36

## 2025-07-30 RX ADMIN — BISACODYL 5 MG: 5 TABLET, COATED ORAL at 20:41

## 2025-07-30 RX ADMIN — ISODIUM CHLORIDE 3 ML: 0.03 SOLUTION RESPIRATORY (INHALATION) at 20:36

## 2025-07-30 RX ADMIN — ONDANSETRON 4 MG: 2 INJECTION, SOLUTION INTRAMUSCULAR; INTRAVENOUS at 17:55

## 2025-07-30 RX ADMIN — DEXAMETHASONE SODIUM PHOSPHATE 10 MG: 10 INJECTION, SOLUTION INTRAMUSCULAR; INTRAVENOUS at 15:00

## 2025-07-30 RX ADMIN — ROCURONIUM BROMIDE 10 MG: 10 INJECTION, SOLUTION INTRAVENOUS at 17:24

## 2025-07-30 RX ADMIN — Medication 100 MG: at 15:18

## 2025-07-30 RX ADMIN — SODIUM CHLORIDE: 9 INJECTION, SOLUTION INTRAVENOUS at 12:41

## 2025-07-30 RX ADMIN — FENTANYL CITRATE 50 MCG: 50 INJECTION, SOLUTION INTRAMUSCULAR; INTRAVENOUS at 15:24

## 2025-07-30 RX ADMIN — KETOROLAC TROMETHAMINE 15 MG: 30 INJECTION, SOLUTION INTRAMUSCULAR at 18:54

## 2025-07-30 RX ADMIN — PROPOFOL 200 MG: 10 INJECTION, EMULSION INTRAVENOUS at 15:18

## 2025-07-30 RX ADMIN — FENTANYL CITRATE 50 MCG: 50 INJECTION, SOLUTION INTRAMUSCULAR; INTRAVENOUS at 16:16

## 2025-07-30 RX ADMIN — SODIUM CHLORIDE 3000 MG: 9 INJECTION, SOLUTION INTRAVENOUS at 22:59

## 2025-07-30 RX ADMIN — SODIUM CHLORIDE, SODIUM LACTATE, POTASSIUM CHLORIDE, AND CALCIUM CHLORIDE: .6; .31; .03; .02 INJECTION, SOLUTION INTRAVENOUS at 18:57

## 2025-07-30 RX ADMIN — SODIUM CHLORIDE: 9 INJECTION, SOLUTION INTRAVENOUS at 17:13

## 2025-07-30 RX ADMIN — PROPOFOL 150 MCG/KG/MIN: 10 INJECTION, EMULSION INTRAVENOUS at 15:22

## 2025-07-30 RX ADMIN — ROCURONIUM BROMIDE 10 MG: 10 INJECTION, SOLUTION INTRAVENOUS at 17:40

## 2025-07-30 RX ADMIN — SODIUM CHLORIDE 3000 MG: 9 INJECTION, SOLUTION INTRAVENOUS at 16:53

## 2025-07-30 RX ADMIN — HYDROMORPHONE HYDROCHLORIDE 0.25 MG: 1 INJECTION, SOLUTION INTRAMUSCULAR; INTRAVENOUS; SUBCUTANEOUS at 20:57

## 2025-07-30 RX ADMIN — ROCURONIUM BROMIDE 10 MG: 10 INJECTION, SOLUTION INTRAVENOUS at 16:42

## 2025-07-30 RX ADMIN — GUAIFENESIN SYRUP AND DEXTROMETHORPHAN 5 ML: 100; 10 SYRUP ORAL at 21:17

## 2025-07-30 RX ADMIN — ROCURONIUM BROMIDE 20 MG: 10 INJECTION, SOLUTION INTRAVENOUS at 16:15

## 2025-07-30 RX ADMIN — SODIUM CHLORIDE, PRESERVATIVE FREE 10 ML: 5 INJECTION INTRAVENOUS at 20:41

## 2025-07-30 RX ADMIN — POLYETHYLENE GLYCOL 3350 17 G: 17 POWDER, FOR SOLUTION ORAL at 20:40

## 2025-07-30 RX ADMIN — ROCURONIUM BROMIDE 50 MG: 10 INJECTION, SOLUTION INTRAVENOUS at 15:18

## 2025-07-30 ASSESSMENT — PAIN DESCRIPTION - DESCRIPTORS
DESCRIPTORS: STABBING
DESCRIPTORS: ACHING
DESCRIPTORS: DISCOMFORT

## 2025-07-30 ASSESSMENT — PAIN - FUNCTIONAL ASSESSMENT: PAIN_FUNCTIONAL_ASSESSMENT: 0-10

## 2025-07-30 ASSESSMENT — PAIN SCALES - GENERAL
PAINLEVEL_OUTOF10: 7
PAINLEVEL_OUTOF10: 6
PAINLEVEL_OUTOF10: 0

## 2025-07-30 ASSESSMENT — PAIN DESCRIPTION - LOCATION
LOCATION: THROAT
LOCATION: BACK
LOCATION: THROAT

## 2025-07-30 ASSESSMENT — PAIN DESCRIPTION - ORIENTATION: ORIENTATION: UPPER

## 2025-07-30 ASSESSMENT — COPD QUESTIONNAIRES: CAT_SEVERITY: MODERATE

## 2025-07-30 NOTE — ANESTHESIA PRE PROCEDURE
Department of Anesthesiology  Preprocedure Note       Name:  Dilan Pappas   Age:  70 y.o.  :  1955                                          MRN:  [id number]         Date:  2025      Surgeon: Surgeon(s):  Dheeraj Rodriguez MD    Procedure: Procedure(s):  TORS Laryngoplasty with flap (of left eli larynx)    Medications prior to admission:   Prior to Admission medications    Medication Sig Start Date End Date Taking? Authorizing Provider   gabapentin (NEURONTIN) 300 MG capsule Take 1 capsule by mouth 2 times daily for 90 days. 7/9/25 10/7/25 Yes Eric Orozco APRN - CNP   ferrous sulfate (IRON 325) 325 (65 Fe) MG tablet Take 1 tablet by mouth daily (with breakfast) 25  Yes Eric Orozco APRN - CNP   pioglitazone (ACTOS) 30 MG tablet TAKE 1 TABLET BY MOUTH DAILY 7/3/25  Yes Eric Orozco APRN - CNP   midodrine (PROAMATINE) 2.5 MG tablet Take 1 tablet by mouth 3 times daily 7/3/25  Yes Eric Orozco APRN - CNP   escitalopram (LEXAPRO) 10 MG tablet Take 1 tablet by mouth daily 25  Yes Eric Orozco APRN - CNP   OXYGEN 4 liters at rest, 10 liters with activity 25  Yes Ryne Bowman MD   OHTUVAYRE 3 MG/2.5ML SUSP Inhale 2.5 mLs into the lungs 2 times daily  Patient not taking: Reported on 2025 3/17/25  Yes ProviderEkaterina MD   fluticasone-umeclidin-vilant (TRELEGY ELLIPTA) 100-62.5-25 MCG/ACT AEPB inhaler INHALE 1 PUFF INTO THE LUNGS DAILY 25  Yes Eric Orozco APRN - CNP   pantoprazole (PROTONIX) 40 MG tablet TAKE 1 TABLET BY MOUTH DAILY 3/24/25  Yes Eric Orozco APRN - CNP   atorvastatin (LIPITOR) 40 MG tablet TAKE 1 TABLET BY MOUTH DAILY 25  Yes Eric Orozco APRN - CNP   empagliflozin (JARDIANCE) 25 MG tablet Take 1 tablet by mouth daily 25  Yes Eric Orozco APRN - CNP   QUEtiapine (SEROQUEL) 200 MG tablet TAKE 1 TABLET BY MOUTH AT BEDTIME 25  Yes Eric Orozco, APRN - CNP   SITagliptin (UVIA) 100 MG tablet Take 1 tablet by mouth

## 2025-07-30 NOTE — ANESTHESIA POSTPROCEDURE EVALUATION
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    Bhaskar Phase I: Bhaskar Score: 9    Bhaskar Phase II:      Anesthesia Post Evaluation    Patient location during evaluation: PACU  Patient participation: complete - patient participated  Level of consciousness: awake  Airway patency: patent  Nausea & Vomiting: no vomiting and no nausea  Cardiovascular status: hemodynamically stable  Respiratory status: acceptable and nasal cannula  Hydration status: stable  Comments: High flow o2 NC  Pain management: adequate    No notable events documented.

## 2025-07-31 ENCOUNTER — CARE COORDINATION (OUTPATIENT)
Dept: CARE COORDINATION | Age: 70
End: 2025-07-31

## 2025-07-31 LAB
ANION GAP SERPL CALC-SCNC: 18 MEQ/L (ref 8–16)
B-OH-BUTYR SERPL-MSCNC: 37.1 MG/DL (ref 0.2–2.81)
BASOPHILS ABSOLUTE: 0 THOU/MM3 (ref 0–0.1)
BASOPHILS NFR BLD AUTO: 0.1 %
BUN SERPL-MCNC: 10 MG/DL (ref 8–23)
CALCIUM SERPL-MCNC: 9.4 MG/DL (ref 8.8–10.2)
CHLORIDE SERPL-SCNC: 102 MEQ/L (ref 98–111)
CO2 SERPL-SCNC: 20 MEQ/L (ref 22–29)
CREAT SERPL-MCNC: 0.7 MG/DL (ref 0.7–1.2)
DEPRECATED RDW RBC AUTO: 53.8 FL (ref 35–45)
EOSINOPHIL NFR BLD AUTO: 0 %
EOSINOPHILS ABSOLUTE: 0 THOU/MM3 (ref 0–0.4)
ERYTHROCYTE [DISTWIDTH] IN BLOOD BY AUTOMATED COUNT: 16.1 % (ref 11.5–14.5)
GFR SERPL CREATININE-BSD FRML MDRD: > 90 ML/MIN/1.73M2
GLUCOSE BLD STRIP.AUTO-MCNC: 118 MG/DL (ref 70–108)
GLUCOSE BLD STRIP.AUTO-MCNC: 147 MG/DL (ref 70–108)
GLUCOSE BLD STRIP.AUTO-MCNC: 201 MG/DL (ref 70–108)
GLUCOSE BLD STRIP.AUTO-MCNC: 214 MG/DL (ref 70–108)
GLUCOSE SERPL-MCNC: 111 MG/DL (ref 74–109)
HCT VFR BLD AUTO: 35.9 % (ref 42–52)
HGB BLD-MCNC: 11 GM/DL (ref 14–18)
IMM GRANULOCYTES # BLD AUTO: 0.09 THOU/MM3 (ref 0–0.07)
IMM GRANULOCYTES NFR BLD AUTO: 0.6 %
LYMPHOCYTES ABSOLUTE: 1.7 THOU/MM3 (ref 1–4.8)
LYMPHOCYTES NFR BLD AUTO: 12.3 %
MCH RBC QN AUTO: 27.6 PG (ref 26–33)
MCHC RBC AUTO-ENTMCNC: 30.6 GM/DL (ref 32.2–35.5)
MCV RBC AUTO: 90 FL (ref 80–94)
MONOCYTES ABSOLUTE: 0.6 THOU/MM3 (ref 0.4–1.3)
MONOCYTES NFR BLD AUTO: 4.1 %
NEUTROPHILS ABSOLUTE: 11.6 THOU/MM3 (ref 1.8–7.7)
NEUTROPHILS NFR BLD AUTO: 82.9 %
NRBC BLD AUTO-RTO: 0 /100 WBC
PLATELET # BLD AUTO: 306 THOU/MM3 (ref 130–400)
PMV BLD AUTO: 8.8 FL (ref 9.4–12.4)
POTASSIUM SERPL-SCNC: 4.1 MEQ/L (ref 3.5–5.2)
RBC # BLD AUTO: 3.99 MILL/MM3 (ref 4.7–6.1)
SODIUM SERPL-SCNC: 140 MEQ/L (ref 135–145)
WBC # BLD AUTO: 14 THOU/MM3 (ref 4.8–10.8)

## 2025-07-31 PROCEDURE — 6370000000 HC RX 637 (ALT 250 FOR IP)

## 2025-07-31 PROCEDURE — 6370000000 HC RX 637 (ALT 250 FOR IP): Performed by: OTOLARYNGOLOGY

## 2025-07-31 PROCEDURE — 36415 COLL VENOUS BLD VENIPUNCTURE: CPT

## 2025-07-31 PROCEDURE — 94761 N-INVAS EAR/PLS OXIMETRY MLT: CPT

## 2025-07-31 PROCEDURE — 2700000000 HC OXYGEN THERAPY PER DAY

## 2025-07-31 PROCEDURE — 80048 BASIC METABOLIC PNL TOTAL CA: CPT

## 2025-07-31 PROCEDURE — 99233 SBSQ HOSP IP/OBS HIGH 50: CPT | Performed by: SURGERY

## 2025-07-31 PROCEDURE — 6360000002 HC RX W HCPCS: Performed by: OTOLARYNGOLOGY

## 2025-07-31 PROCEDURE — 82010 KETONE BODYS QUAN: CPT

## 2025-07-31 PROCEDURE — 85025 COMPLETE CBC W/AUTO DIFF WBC: CPT

## 2025-07-31 PROCEDURE — 6360000002 HC RX W HCPCS

## 2025-07-31 PROCEDURE — 94640 AIRWAY INHALATION TREATMENT: CPT

## 2025-07-31 PROCEDURE — 2000000000 HC ICU R&B

## 2025-07-31 PROCEDURE — 99024 POSTOP FOLLOW-UP VISIT: CPT | Performed by: REGISTERED NURSE

## 2025-07-31 PROCEDURE — 82948 REAGENT STRIP/BLOOD GLUCOSE: CPT

## 2025-07-31 PROCEDURE — 2580000003 HC RX 258: Performed by: OTOLARYNGOLOGY

## 2025-07-31 RX ORDER — LOPERAMIDE HYDROCHLORIDE 2 MG/1
2 CAPSULE ORAL 4 TIMES DAILY PRN
Status: DISCONTINUED | OUTPATIENT
Start: 2025-07-31 | End: 2025-08-07 | Stop reason: HOSPADM

## 2025-07-31 RX ORDER — HYDROCODONE BITARTRATE AND ACETAMINOPHEN 5; 325 MG/1; MG/1
1 TABLET ORAL ONCE
Refills: 0 | Status: COMPLETED | OUTPATIENT
Start: 2025-07-31 | End: 2025-07-31

## 2025-07-31 RX ORDER — SIMETHICONE 80 MG
80 TABLET,CHEWABLE ORAL EVERY 6 HOURS PRN
Status: DISCONTINUED | OUTPATIENT
Start: 2025-07-31 | End: 2025-08-07 | Stop reason: HOSPADM

## 2025-07-31 RX ORDER — MIDODRINE HYDROCHLORIDE 2.5 MG/1
2.5 TABLET ORAL
Status: DISCONTINUED | OUTPATIENT
Start: 2025-07-31 | End: 2025-08-01

## 2025-07-31 RX ADMIN — GUAIFENESIN SYRUP AND DEXTROMETHORPHAN 5 ML: 100; 10 SYRUP ORAL at 20:14

## 2025-07-31 RX ADMIN — ESCITALOPRAM OXALATE 10 MG: 10 TABLET ORAL at 08:19

## 2025-07-31 RX ADMIN — ISODIUM CHLORIDE 3 ML: 0.03 SOLUTION RESPIRATORY (INHALATION) at 13:58

## 2025-07-31 RX ADMIN — LOPERAMIDE HYDROCHLORIDE 2 MG: 2 CAPSULE ORAL at 20:24

## 2025-07-31 RX ADMIN — POLYETHYLENE GLYCOL 3350 17 G: 17 POWDER, FOR SOLUTION ORAL at 08:18

## 2025-07-31 RX ADMIN — KETOROLAC TROMETHAMINE 15 MG: 30 INJECTION, SOLUTION INTRAMUSCULAR at 11:38

## 2025-07-31 RX ADMIN — ATORVASTATIN CALCIUM 40 MG: 40 TABLET, FILM COATED ORAL at 21:01

## 2025-07-31 RX ADMIN — MIDODRINE HYDROCHLORIDE 2.5 MG: 2.5 TABLET ORAL at 11:39

## 2025-07-31 RX ADMIN — INSULIN LISPRO 2 UNITS: 100 INJECTION, SOLUTION INTRAVENOUS; SUBCUTANEOUS at 11:38

## 2025-07-31 RX ADMIN — BISACODYL 5 MG: 5 TABLET, COATED ORAL at 08:19

## 2025-07-31 RX ADMIN — SODIUM CHLORIDE, SODIUM LACTATE, POTASSIUM CHLORIDE, AND CALCIUM CHLORIDE: .6; .31; .03; .02 INJECTION, SOLUTION INTRAVENOUS at 15:48

## 2025-07-31 RX ADMIN — HYDROCODONE BITARTRATE AND ACETAMINOPHEN 1 TABLET: 5; 325 TABLET ORAL at 21:01

## 2025-07-31 RX ADMIN — ISODIUM CHLORIDE 3 ML: 0.03 SOLUTION RESPIRATORY (INHALATION) at 18:11

## 2025-07-31 RX ADMIN — MIDODRINE HYDROCHLORIDE 2.5 MG: 2.5 TABLET ORAL at 17:27

## 2025-07-31 RX ADMIN — INSULIN LISPRO 2 UNITS: 100 INJECTION, SOLUTION INTRAVENOUS; SUBCUTANEOUS at 17:25

## 2025-07-31 RX ADMIN — SODIUM CHLORIDE 3000 MG: 9 INJECTION, SOLUTION INTRAVENOUS at 17:31

## 2025-07-31 RX ADMIN — GABAPENTIN 300 MG: 300 CAPSULE ORAL at 08:19

## 2025-07-31 RX ADMIN — TIOTROPIUM BROMIDE AND OLODATEROL 2 PUFF: 3.124; 2.736 SPRAY, METERED RESPIRATORY (INHALATION) at 08:40

## 2025-07-31 RX ADMIN — SODIUM CHLORIDE, SODIUM LACTATE, POTASSIUM CHLORIDE, AND CALCIUM CHLORIDE: .6; .31; .03; .02 INJECTION, SOLUTION INTRAVENOUS at 05:55

## 2025-07-31 RX ADMIN — QUETIAPINE FUMARATE 200 MG: 100 TABLET ORAL at 21:01

## 2025-07-31 RX ADMIN — ALBUTEROL SULFATE 2.5 MG: 2.5 SOLUTION RESPIRATORY (INHALATION) at 08:40

## 2025-07-31 RX ADMIN — KETOROLAC TROMETHAMINE 15 MG: 30 INJECTION, SOLUTION INTRAMUSCULAR at 08:20

## 2025-07-31 RX ADMIN — KETOROLAC TROMETHAMINE 15 MG: 30 INJECTION, SOLUTION INTRAMUSCULAR at 02:04

## 2025-07-31 RX ADMIN — GABAPENTIN 300 MG: 300 CAPSULE ORAL at 21:01

## 2025-07-31 RX ADMIN — AMITRIPTYLINE HYDROCHLORIDE 50 MG: 50 TABLET ORAL at 21:01

## 2025-07-31 RX ADMIN — SIMETHICONE 80 MG: 80 TABLET, CHEWABLE ORAL at 20:24

## 2025-07-31 RX ADMIN — KETOROLAC TROMETHAMINE 15 MG: 30 INJECTION, SOLUTION INTRAMUSCULAR at 17:25

## 2025-07-31 RX ADMIN — SODIUM CHLORIDE 3000 MG: 9 INJECTION, SOLUTION INTRAVENOUS at 11:45

## 2025-07-31 RX ADMIN — ISODIUM CHLORIDE 3 ML: 0.03 SOLUTION RESPIRATORY (INHALATION) at 08:40

## 2025-07-31 RX ADMIN — SODIUM CHLORIDE 3000 MG: 9 INJECTION, SOLUTION INTRAVENOUS at 04:32

## 2025-07-31 RX ADMIN — MIDODRINE HYDROCHLORIDE 2.5 MG: 2.5 TABLET ORAL at 05:44

## 2025-07-31 RX ADMIN — PANTOPRAZOLE SODIUM 40 MG: 40 TABLET, DELAYED RELEASE ORAL at 06:48

## 2025-07-31 RX ADMIN — BUPROPION HYDROCHLORIDE 150 MG: 150 TABLET, EXTENDED RELEASE ORAL at 08:20

## 2025-07-31 RX ADMIN — ISODIUM CHLORIDE 3 ML: 0.03 SOLUTION RESPIRATORY (INHALATION) at 21:53

## 2025-07-31 RX ADMIN — HYDROMORPHONE HYDROCHLORIDE 0.5 MG: 1 INJECTION, SOLUTION INTRAMUSCULAR; INTRAVENOUS; SUBCUTANEOUS at 21:57

## 2025-07-31 ASSESSMENT — PAIN DESCRIPTION - ORIENTATION
ORIENTATION: INNER
ORIENTATION: INNER
ORIENTATION: INNER;LOWER

## 2025-07-31 ASSESSMENT — PAIN SCALES - GENERAL
PAINLEVEL_OUTOF10: 7
PAINLEVEL_OUTOF10: 8
PAINLEVEL_OUTOF10: 7

## 2025-07-31 ASSESSMENT — PAIN DESCRIPTION - LOCATION
LOCATION: THROAT

## 2025-07-31 ASSESSMENT — PAIN DESCRIPTION - DESCRIPTORS
DESCRIPTORS: DISCOMFORT
DESCRIPTORS: DISCOMFORT
DESCRIPTORS: DISCOMFORT;SORE

## 2025-07-31 ASSESSMENT — PAIN DESCRIPTION - FREQUENCY: FREQUENCY: CONTINUOUS

## 2025-07-31 ASSESSMENT — PAIN DESCRIPTION - PAIN TYPE: TYPE: SURGICAL PAIN

## 2025-08-01 LAB
ANION GAP SERPL CALC-SCNC: 11 MEQ/L (ref 8–16)
BACTERIA SPEC RESP CULT: NORMAL
BUN SERPL-MCNC: 8 MG/DL (ref 8–23)
CALCIUM SERPL-MCNC: 9.2 MG/DL (ref 8.8–10.2)
CHLORIDE SERPL-SCNC: 101 MEQ/L (ref 98–111)
CO2 SERPL-SCNC: 27 MEQ/L (ref 22–29)
CREAT SERPL-MCNC: 0.6 MG/DL (ref 0.7–1.2)
DEPRECATED RDW RBC AUTO: 51.7 FL (ref 35–45)
ERYTHROCYTE [DISTWIDTH] IN BLOOD BY AUTOMATED COUNT: 16.4 % (ref 11.5–14.5)
GFR SERPL CREATININE-BSD FRML MDRD: > 90 ML/MIN/1.73M2
GLUCOSE BLD STRIP.AUTO-MCNC: 115 MG/DL (ref 70–108)
GLUCOSE BLD STRIP.AUTO-MCNC: 131 MG/DL (ref 70–108)
GLUCOSE BLD STRIP.AUTO-MCNC: 241 MG/DL (ref 70–108)
GLUCOSE SERPL-MCNC: 114 MG/DL (ref 74–109)
GRAM STN SPEC: NORMAL
HCT VFR BLD AUTO: 33.8 % (ref 42–52)
HGB BLD-MCNC: 10.8 GM/DL (ref 14–18)
LACTATE SERPL-SCNC: 1.9 MMOL/L (ref 0.5–2.2)
MCH RBC QN AUTO: 28 PG (ref 26–33)
MCHC RBC AUTO-ENTMCNC: 32 GM/DL (ref 32.2–35.5)
MCV RBC AUTO: 87.6 FL (ref 80–94)
PHOSPHATE SERPL-MCNC: 1.9 MG/DL (ref 2.5–4.5)
PLATELET # BLD AUTO: 312 THOU/MM3 (ref 130–400)
PMV BLD AUTO: 8.7 FL (ref 9.4–12.4)
POTASSIUM SERPL-SCNC: 3.2 MEQ/L (ref 3.5–5.2)
POTASSIUM SERPL-SCNC: 3.2 MEQ/L (ref 3.5–5.2)
RBC # BLD AUTO: 3.86 MILL/MM3 (ref 4.7–6.1)
SODIUM SERPL-SCNC: 139 MEQ/L (ref 135–145)
WBC # BLD AUTO: 12.1 THOU/MM3 (ref 4.8–10.8)

## 2025-08-01 PROCEDURE — 6370000000 HC RX 637 (ALT 250 FOR IP): Performed by: OTOLARYNGOLOGY

## 2025-08-01 PROCEDURE — 6360000002 HC RX W HCPCS: Performed by: OTOLARYNGOLOGY

## 2025-08-01 PROCEDURE — 2500000003 HC RX 250 WO HCPCS: Performed by: REGISTERED NURSE

## 2025-08-01 PROCEDURE — 6360000002 HC RX W HCPCS

## 2025-08-01 PROCEDURE — 83605 ASSAY OF LACTIC ACID: CPT

## 2025-08-01 PROCEDURE — 84132 ASSAY OF SERUM POTASSIUM: CPT

## 2025-08-01 PROCEDURE — 94640 AIRWAY INHALATION TREATMENT: CPT

## 2025-08-01 PROCEDURE — 2700000000 HC OXYGEN THERAPY PER DAY

## 2025-08-01 PROCEDURE — 82948 REAGENT STRIP/BLOOD GLUCOSE: CPT

## 2025-08-01 PROCEDURE — 2580000003 HC RX 258: Performed by: OTOLARYNGOLOGY

## 2025-08-01 PROCEDURE — 36415 COLL VENOUS BLD VENIPUNCTURE: CPT

## 2025-08-01 PROCEDURE — 99024 POSTOP FOLLOW-UP VISIT: CPT | Performed by: NURSE PRACTITIONER

## 2025-08-01 PROCEDURE — 2500000003 HC RX 250 WO HCPCS: Performed by: OTOLARYNGOLOGY

## 2025-08-01 PROCEDURE — 85027 COMPLETE CBC AUTOMATED: CPT

## 2025-08-01 PROCEDURE — 6370000000 HC RX 637 (ALT 250 FOR IP)

## 2025-08-01 PROCEDURE — 84100 ASSAY OF PHOSPHORUS: CPT

## 2025-08-01 PROCEDURE — 6370000000 HC RX 637 (ALT 250 FOR IP): Performed by: NURSE PRACTITIONER

## 2025-08-01 PROCEDURE — 99233 SBSQ HOSP IP/OBS HIGH 50: CPT | Performed by: SURGERY

## 2025-08-01 PROCEDURE — 80048 BASIC METABOLIC PNL TOTAL CA: CPT

## 2025-08-01 PROCEDURE — 2060000000 HC ICU INTERMEDIATE R&B

## 2025-08-01 PROCEDURE — 94761 N-INVAS EAR/PLS OXIMETRY MLT: CPT

## 2025-08-01 PROCEDURE — 2580000003 HC RX 258

## 2025-08-01 PROCEDURE — 2580000003 HC RX 258: Performed by: REGISTERED NURSE

## 2025-08-01 RX ORDER — MIDODRINE HYDROCHLORIDE 10 MG/1
10 TABLET ORAL
Status: DISCONTINUED | OUTPATIENT
Start: 2025-08-01 | End: 2025-08-07 | Stop reason: HOSPADM

## 2025-08-01 RX ORDER — OXYCODONE AND ACETAMINOPHEN 5; 325 MG/1; MG/1
1 TABLET ORAL EVERY 4 HOURS PRN
Refills: 0 | Status: DISCONTINUED | OUTPATIENT
Start: 2025-08-01 | End: 2025-08-07 | Stop reason: HOSPADM

## 2025-08-01 RX ORDER — 0.9 % SODIUM CHLORIDE 0.9 %
500 INTRAVENOUS SOLUTION INTRAVENOUS ONCE
Status: COMPLETED | OUTPATIENT
Start: 2025-08-02 | End: 2025-08-02

## 2025-08-01 RX ORDER — POTASSIUM CHLORIDE 7.45 MG/ML
10 INJECTION INTRAVENOUS
Status: COMPLETED | OUTPATIENT
Start: 2025-08-02 | End: 2025-08-02

## 2025-08-01 RX ORDER — MIDODRINE HYDROCHLORIDE 10 MG/1
10 TABLET ORAL ONCE
Status: COMPLETED | OUTPATIENT
Start: 2025-08-01 | End: 2025-08-01

## 2025-08-01 RX ORDER — MIDODRINE HYDROCHLORIDE 5 MG/1
5 TABLET ORAL
Status: DISCONTINUED | OUTPATIENT
Start: 2025-08-01 | End: 2025-08-01

## 2025-08-01 RX ADMIN — ALBUTEROL SULFATE 2.5 MG: 2.5 SOLUTION RESPIRATORY (INHALATION) at 08:58

## 2025-08-01 RX ADMIN — KETOROLAC TROMETHAMINE 15 MG: 30 INJECTION, SOLUTION INTRAMUSCULAR at 06:14

## 2025-08-01 RX ADMIN — POTASSIUM CHLORIDE 10 MEQ: 7.45 INJECTION INTRAVENOUS at 23:17

## 2025-08-01 RX ADMIN — TIOTROPIUM BROMIDE AND OLODATEROL 2 PUFF: 3.124; 2.736 SPRAY, METERED RESPIRATORY (INHALATION) at 09:00

## 2025-08-01 RX ADMIN — MIDODRINE HYDROCHLORIDE 10 MG: 10 TABLET ORAL at 10:45

## 2025-08-01 RX ADMIN — OXYCODONE AND ACETAMINOPHEN 1 TABLET: 5; 325 TABLET ORAL at 20:25

## 2025-08-01 RX ADMIN — KETOROLAC TROMETHAMINE 15 MG: 30 INJECTION, SOLUTION INTRAMUSCULAR at 00:13

## 2025-08-01 RX ADMIN — AMITRIPTYLINE HYDROCHLORIDE 50 MG: 50 TABLET ORAL at 20:26

## 2025-08-01 RX ADMIN — BUPROPION HYDROCHLORIDE 150 MG: 150 TABLET, EXTENDED RELEASE ORAL at 09:46

## 2025-08-01 RX ADMIN — ATORVASTATIN CALCIUM 40 MG: 40 TABLET, FILM COATED ORAL at 20:26

## 2025-08-01 RX ADMIN — QUETIAPINE FUMARATE 200 MG: 100 TABLET ORAL at 21:56

## 2025-08-01 RX ADMIN — ALBUTEROL SULFATE 2.5 MG: 2.5 SOLUTION RESPIRATORY (INHALATION) at 12:50

## 2025-08-01 RX ADMIN — ESCITALOPRAM OXALATE 10 MG: 10 TABLET ORAL at 09:47

## 2025-08-01 RX ADMIN — SODIUM CHLORIDE, SODIUM LACTATE, POTASSIUM CHLORIDE, AND CALCIUM CHLORIDE: .6; .31; .03; .02 INJECTION, SOLUTION INTRAVENOUS at 20:20

## 2025-08-01 RX ADMIN — KETOROLAC TROMETHAMINE 15 MG: 30 INJECTION, SOLUTION INTRAMUSCULAR at 12:54

## 2025-08-01 RX ADMIN — GABAPENTIN 300 MG: 300 CAPSULE ORAL at 09:46

## 2025-08-01 RX ADMIN — ISODIUM CHLORIDE 3 ML: 0.03 SOLUTION RESPIRATORY (INHALATION) at 12:49

## 2025-08-01 RX ADMIN — SODIUM CHLORIDE 3000 MG: 9 INJECTION, SOLUTION INTRAVENOUS at 04:56

## 2025-08-01 RX ADMIN — MIDODRINE HYDROCHLORIDE 10 MG: 10 TABLET ORAL at 12:51

## 2025-08-01 RX ADMIN — MIDODRINE HYDROCHLORIDE 10 MG: 10 TABLET ORAL at 18:57

## 2025-08-01 RX ADMIN — SODIUM CHLORIDE 3000 MG: 9 INJECTION, SOLUTION INTRAVENOUS at 20:20

## 2025-08-01 RX ADMIN — ISODIUM CHLORIDE 3 ML: 0.03 SOLUTION RESPIRATORY (INHALATION) at 21:23

## 2025-08-01 RX ADMIN — SODIUM CHLORIDE 500 ML: 0.9 INJECTION, SOLUTION INTRAVENOUS at 23:40

## 2025-08-01 RX ADMIN — ISODIUM CHLORIDE 3 ML: 0.03 SOLUTION RESPIRATORY (INHALATION) at 16:16

## 2025-08-01 RX ADMIN — BISACODYL 5 MG: 5 TABLET, COATED ORAL at 09:46

## 2025-08-01 RX ADMIN — SODIUM CHLORIDE, PRESERVATIVE FREE 10 ML: 5 INJECTION INTRAVENOUS at 20:26

## 2025-08-01 RX ADMIN — SODIUM CHLORIDE 3000 MG: 9 INJECTION, SOLUTION INTRAVENOUS at 00:13

## 2025-08-01 RX ADMIN — KETOROLAC TROMETHAMINE 15 MG: 30 INJECTION, SOLUTION INTRAMUSCULAR at 17:58

## 2025-08-01 RX ADMIN — GABAPENTIN 300 MG: 300 CAPSULE ORAL at 20:25

## 2025-08-01 RX ADMIN — SODIUM CHLORIDE 3000 MG: 9 INJECTION, SOLUTION INTRAVENOUS at 12:51

## 2025-08-01 RX ADMIN — SODIUM PHOSPHATE, MONOBASIC, MONOHYDRATE AND SODIUM PHOSPHATE, DIBASIC, ANHYDROUS 15 MMOL: 276; 142 INJECTION, SOLUTION INTRAVENOUS at 21:58

## 2025-08-01 RX ADMIN — Medication 4 ML: at 08:58

## 2025-08-01 RX ADMIN — INSULIN LISPRO 2 UNITS: 100 INJECTION, SOLUTION INTRAVENOUS; SUBCUTANEOUS at 20:25

## 2025-08-01 ASSESSMENT — PAIN DESCRIPTION - LOCATION
LOCATION: THROAT
LOCATION: THROAT
LOCATION: THROAT;NECK

## 2025-08-01 ASSESSMENT — PAIN DESCRIPTION - DESCRIPTORS: DESCRIPTORS: DISCOMFORT;SORE

## 2025-08-01 ASSESSMENT — PAIN SCALES - GENERAL
PAINLEVEL_OUTOF10: 5
PAINLEVEL_OUTOF10: 0
PAINLEVEL_OUTOF10: 7
PAINLEVEL_OUTOF10: 8

## 2025-08-01 ASSESSMENT — PAIN DESCRIPTION - FREQUENCY: FREQUENCY: CONTINUOUS

## 2025-08-01 ASSESSMENT — PAIN DESCRIPTION - PAIN TYPE: TYPE: SURGICAL PAIN

## 2025-08-02 ENCOUNTER — APPOINTMENT (OUTPATIENT)
Dept: GENERAL RADIOLOGY | Age: 70
End: 2025-08-02
Attending: OTOLARYNGOLOGY
Payer: MEDICARE

## 2025-08-02 LAB
ANION GAP SERPL CALC-SCNC: 9 MEQ/L (ref 8–16)
BUN SERPL-MCNC: 5 MG/DL (ref 8–23)
CA-I BLD ISE-SCNC: 1.2 MMOL/L (ref 1.12–1.32)
CALCIUM SERPL-MCNC: 8.3 MG/DL (ref 8.8–10.2)
CHLORIDE SERPL-SCNC: 104 MEQ/L (ref 98–111)
CO2 SERPL-SCNC: 29 MEQ/L (ref 22–29)
CREAT SERPL-MCNC: 0.5 MG/DL (ref 0.7–1.2)
DEPRECATED RDW RBC AUTO: 53.4 FL (ref 35–45)
ERYTHROCYTE [DISTWIDTH] IN BLOOD BY AUTOMATED COUNT: 16.5 % (ref 11.5–14.5)
GFR SERPL CREATININE-BSD FRML MDRD: > 90 ML/MIN/1.73M2
GLUCOSE BLD STRIP.AUTO-MCNC: 117 MG/DL (ref 70–108)
GLUCOSE BLD STRIP.AUTO-MCNC: 132 MG/DL (ref 70–108)
GLUCOSE BLD STRIP.AUTO-MCNC: 168 MG/DL (ref 70–108)
GLUCOSE BLD STRIP.AUTO-MCNC: 177 MG/DL (ref 70–108)
GLUCOSE SERPL-MCNC: 121 MG/DL (ref 74–109)
HCT VFR BLD AUTO: 30.6 % (ref 42–52)
HGB BLD-MCNC: 9.7 GM/DL (ref 14–18)
MAGNESIUM SERPL-MCNC: 1.4 MG/DL (ref 1.6–2.6)
MAGNESIUM SERPL-MCNC: 1.7 MG/DL (ref 1.6–2.6)
MCH RBC QN AUTO: 27.7 PG (ref 26–33)
MCHC RBC AUTO-ENTMCNC: 31.7 GM/DL (ref 32.2–35.5)
MCV RBC AUTO: 87.4 FL (ref 80–94)
NT-PROBNP SERPL IA-MCNC: 888 PG/ML (ref 0–124)
PHOSPHATE SERPL-MCNC: 2.8 MG/DL (ref 2.5–4.5)
PLATELET # BLD AUTO: 277 THOU/MM3 (ref 130–400)
PMV BLD AUTO: 9.1 FL (ref 9.4–12.4)
POTASSIUM SERPL-SCNC: 3.1 MEQ/L (ref 3.5–5.2)
POTASSIUM SERPL-SCNC: 3.5 MEQ/L (ref 3.5–5.2)
PROCALCITONIN SERPL IA-MCNC: 0.15 NG/ML (ref 0.01–0.09)
RBC # BLD AUTO: 3.5 MILL/MM3 (ref 4.7–6.1)
SODIUM SERPL-SCNC: 142 MEQ/L (ref 135–145)
WBC # BLD AUTO: 13.6 THOU/MM3 (ref 4.8–10.8)

## 2025-08-02 PROCEDURE — 82948 REAGENT STRIP/BLOOD GLUCOSE: CPT

## 2025-08-02 PROCEDURE — 87070 CULTURE OTHR SPECIMN AEROBIC: CPT

## 2025-08-02 PROCEDURE — 94669 MECHANICAL CHEST WALL OSCILL: CPT

## 2025-08-02 PROCEDURE — 87106 FUNGI IDENTIFICATION YEAST: CPT

## 2025-08-02 PROCEDURE — 84132 ASSAY OF SERUM POTASSIUM: CPT

## 2025-08-02 PROCEDURE — 83880 ASSAY OF NATRIURETIC PEPTIDE: CPT

## 2025-08-02 PROCEDURE — 6360000002 HC RX W HCPCS: Performed by: OTOLARYNGOLOGY

## 2025-08-02 PROCEDURE — 2060000000 HC ICU INTERMEDIATE R&B

## 2025-08-02 PROCEDURE — 84145 PROCALCITONIN (PCT): CPT

## 2025-08-02 PROCEDURE — 82330 ASSAY OF CALCIUM: CPT

## 2025-08-02 PROCEDURE — 99232 SBSQ HOSP IP/OBS MODERATE 35: CPT | Performed by: STUDENT IN AN ORGANIZED HEALTH CARE EDUCATION/TRAINING PROGRAM

## 2025-08-02 PROCEDURE — 6360000002 HC RX W HCPCS: Performed by: STUDENT IN AN ORGANIZED HEALTH CARE EDUCATION/TRAINING PROGRAM

## 2025-08-02 PROCEDURE — 83735 ASSAY OF MAGNESIUM: CPT

## 2025-08-02 PROCEDURE — 84100 ASSAY OF PHOSPHORUS: CPT

## 2025-08-02 PROCEDURE — 94761 N-INVAS EAR/PLS OXIMETRY MLT: CPT

## 2025-08-02 PROCEDURE — 71045 X-RAY EXAM CHEST 1 VIEW: CPT

## 2025-08-02 PROCEDURE — 6370000000 HC RX 637 (ALT 250 FOR IP)

## 2025-08-02 PROCEDURE — 6360000002 HC RX W HCPCS

## 2025-08-02 PROCEDURE — 2580000003 HC RX 258: Performed by: OTOLARYNGOLOGY

## 2025-08-02 PROCEDURE — 85027 COMPLETE CBC AUTOMATED: CPT

## 2025-08-02 PROCEDURE — 36415 COLL VENOUS BLD VENIPUNCTURE: CPT

## 2025-08-02 PROCEDURE — 2700000000 HC OXYGEN THERAPY PER DAY

## 2025-08-02 PROCEDURE — 2500000003 HC RX 250 WO HCPCS: Performed by: REGISTERED NURSE

## 2025-08-02 PROCEDURE — 99024 POSTOP FOLLOW-UP VISIT: CPT | Performed by: REGISTERED NURSE

## 2025-08-02 PROCEDURE — 6370000000 HC RX 637 (ALT 250 FOR IP): Performed by: NURSE PRACTITIONER

## 2025-08-02 PROCEDURE — 94640 AIRWAY INHALATION TREATMENT: CPT

## 2025-08-02 PROCEDURE — 2500000003 HC RX 250 WO HCPCS: Performed by: OTOLARYNGOLOGY

## 2025-08-02 PROCEDURE — 6370000000 HC RX 637 (ALT 250 FOR IP): Performed by: OTOLARYNGOLOGY

## 2025-08-02 PROCEDURE — 87081 CULTURE SCREEN ONLY: CPT

## 2025-08-02 PROCEDURE — 87205 SMEAR GRAM STAIN: CPT

## 2025-08-02 PROCEDURE — 80048 BASIC METABOLIC PNL TOTAL CA: CPT

## 2025-08-02 RX ORDER — FUROSEMIDE 10 MG/ML
20 INJECTION INTRAMUSCULAR; INTRAVENOUS ONCE
Status: COMPLETED | OUTPATIENT
Start: 2025-08-02 | End: 2025-08-02

## 2025-08-02 RX ORDER — DEXTROSE, SOYBEAN OIL, ELECTROLYTES, LYSINE, PHENYLALANINE, LEUCINE, VALINE, THREONINE, METHIONINE, ISOLEUCINE, TRYPTOPHAN, ALANINE, ARGININE, GLYCINE, PROLINE, HISTIDINE, GLUTAMIC ACID, SERINE, ASPARTIC ACID AND TYROSINE 6.8; 3.5; 170; 174; 105; 68; 20; 187; 164; 164; 152; 116; 116; 116; 40; 333; 235; 164; 141; 141; 116; 94; 71; 4.8 G/100ML; G/100ML; MG/100ML; MG/100ML; MG/100ML; MG/100ML; MG/100ML; MG/100ML; MG/100ML; MG/100ML; MG/100ML; MG/100ML; MG/100ML; MG/100ML; MG/100ML; MG/100ML; MG/100ML; MG/100ML; MG/100ML; MG/100ML; MG/100ML; MG/100ML; MG/100ML; MG/100ML
1080 INJECTION, EMULSION INTRAVENOUS CONTINUOUS
Status: DISPENSED | OUTPATIENT
Start: 2025-08-02 | End: 2025-08-03

## 2025-08-02 RX ORDER — MAGNESIUM SULFATE IN WATER 40 MG/ML
2000 INJECTION, SOLUTION INTRAVENOUS ONCE
Status: COMPLETED | OUTPATIENT
Start: 2025-08-02 | End: 2025-08-02

## 2025-08-02 RX ORDER — MAGNESIUM SULFATE IN WATER 40 MG/ML
2000 INJECTION, SOLUTION INTRAVENOUS PRN
Status: DISCONTINUED | OUTPATIENT
Start: 2025-08-02 | End: 2025-08-04 | Stop reason: SDUPTHER

## 2025-08-02 RX ADMIN — SODIUM CHLORIDE 3000 MG: 9 INJECTION, SOLUTION INTRAVENOUS at 16:56

## 2025-08-02 RX ADMIN — ISODIUM CHLORIDE 3 ML: 0.03 SOLUTION RESPIRATORY (INHALATION) at 16:16

## 2025-08-02 RX ADMIN — OXYCODONE AND ACETAMINOPHEN 1 TABLET: 5; 325 TABLET ORAL at 19:41

## 2025-08-02 RX ADMIN — SODIUM CHLORIDE 3000 MG: 9 INJECTION, SOLUTION INTRAVENOUS at 20:27

## 2025-08-02 RX ADMIN — SODIUM CHLORIDE, PRESERVATIVE FREE 10 ML: 5 INJECTION INTRAVENOUS at 09:07

## 2025-08-02 RX ADMIN — MIDODRINE HYDROCHLORIDE 10 MG: 10 TABLET ORAL at 16:58

## 2025-08-02 RX ADMIN — POTASSIUM CHLORIDE 10 MEQ: 7.45 INJECTION INTRAVENOUS at 03:52

## 2025-08-02 RX ADMIN — MIDODRINE HYDROCHLORIDE 10 MG: 10 TABLET ORAL at 12:53

## 2025-08-02 RX ADMIN — KETOROLAC TROMETHAMINE 15 MG: 30 INJECTION, SOLUTION INTRAMUSCULAR at 06:06

## 2025-08-02 RX ADMIN — KETOROLAC TROMETHAMINE 15 MG: 30 INJECTION, SOLUTION INTRAMUSCULAR at 12:52

## 2025-08-02 RX ADMIN — ATORVASTATIN CALCIUM 40 MG: 40 TABLET, FILM COATED ORAL at 20:11

## 2025-08-02 RX ADMIN — FUROSEMIDE 20 MG: 10 INJECTION, SOLUTION INTRAMUSCULAR; INTRAVENOUS at 16:57

## 2025-08-02 RX ADMIN — SODIUM CHLORIDE, SODIUM LACTATE, POTASSIUM CHLORIDE, AND CALCIUM CHLORIDE: .6; .31; .03; .02 INJECTION, SOLUTION INTRAVENOUS at 12:50

## 2025-08-02 RX ADMIN — SODIUM CHLORIDE 3000 MG: 9 INJECTION, SOLUTION INTRAVENOUS at 01:19

## 2025-08-02 RX ADMIN — BUPROPION HYDROCHLORIDE 150 MG: 150 TABLET, EXTENDED RELEASE ORAL at 09:06

## 2025-08-02 RX ADMIN — ISODIUM CHLORIDE 3 ML: 0.03 SOLUTION RESPIRATORY (INHALATION) at 08:58

## 2025-08-02 RX ADMIN — DEXTROSE, SOYBEAN OIL, ELECTROLYTES, LYSINE, PHENYLALANINE, LEUCINE, VALINE, THREONINE, METHIONINE, ISOLEUCINE, TRYPTOPHAN, ALANINE, ARGININE, GLYCINE, PROLINE, HISTIDINE, GLUTAMIC ACID, SERINE, ASPARTIC ACID AND TYROSINE 1080 ML
6.8; 3.5; 170; 174; 105; 68; 20; 187; 164; 164; 152; 116; 116; 116; 40; 333; 235; 164; 141; 141; 116; 94; 71; 4.8 INJECTION, EMULSION INTRAVENOUS at 17:43

## 2025-08-02 RX ADMIN — MAGNESIUM SULFATE HEPTAHYDRATE 2000 MG: 40 INJECTION, SOLUTION INTRAVENOUS at 06:02

## 2025-08-02 RX ADMIN — GABAPENTIN 300 MG: 300 CAPSULE ORAL at 09:06

## 2025-08-02 RX ADMIN — SODIUM CHLORIDE, PRESERVATIVE FREE 10 ML: 5 INJECTION INTRAVENOUS at 20:12

## 2025-08-02 RX ADMIN — TIOTROPIUM BROMIDE AND OLODATEROL 2 PUFF: 3.124; 2.736 SPRAY, METERED RESPIRATORY (INHALATION) at 08:59

## 2025-08-02 RX ADMIN — KETOROLAC TROMETHAMINE 15 MG: 30 INJECTION, SOLUTION INTRAMUSCULAR at 01:17

## 2025-08-02 RX ADMIN — ISODIUM CHLORIDE 3 ML: 0.03 SOLUTION RESPIRATORY (INHALATION) at 21:04

## 2025-08-02 RX ADMIN — SODIUM CHLORIDE 3000 MG: 9 INJECTION, SOLUTION INTRAVENOUS at 09:59

## 2025-08-02 RX ADMIN — ISODIUM CHLORIDE 3 ML: 0.03 SOLUTION RESPIRATORY (INHALATION) at 13:26

## 2025-08-02 RX ADMIN — PANTOPRAZOLE SODIUM 40 MG: 40 TABLET, DELAYED RELEASE ORAL at 06:06

## 2025-08-02 RX ADMIN — GABAPENTIN 300 MG: 300 CAPSULE ORAL at 20:11

## 2025-08-02 RX ADMIN — BISACODYL 5 MG: 5 TABLET, COATED ORAL at 09:06

## 2025-08-02 RX ADMIN — POTASSIUM CHLORIDE 10 MEQ: 7.45 INJECTION INTRAVENOUS at 05:23

## 2025-08-02 RX ADMIN — POTASSIUM CHLORIDE 10 MEQ: 7.45 INJECTION INTRAVENOUS at 02:39

## 2025-08-02 RX ADMIN — MIDODRINE HYDROCHLORIDE 10 MG: 10 TABLET ORAL at 09:06

## 2025-08-02 RX ADMIN — AMITRIPTYLINE HYDROCHLORIDE 50 MG: 50 TABLET ORAL at 20:11

## 2025-08-02 RX ADMIN — ESCITALOPRAM OXALATE 10 MG: 10 TABLET ORAL at 09:06

## 2025-08-02 RX ADMIN — QUETIAPINE FUMARATE 200 MG: 100 TABLET ORAL at 20:11

## 2025-08-02 ASSESSMENT — PAIN SCALES - GENERAL
PAINLEVEL_OUTOF10: 0
PAINLEVEL_OUTOF10: 2
PAINLEVEL_OUTOF10: 0
PAINLEVEL_OUTOF10: 0
PAINLEVEL_OUTOF10: 7
PAINLEVEL_OUTOF10: 8
PAINLEVEL_OUTOF10: 0
PAINLEVEL_OUTOF10: 0

## 2025-08-02 ASSESSMENT — PAIN SCALES - WONG BAKER
WONGBAKER_NUMERICALRESPONSE: NO HURT

## 2025-08-02 ASSESSMENT — PAIN DESCRIPTION - DESCRIPTORS
DESCRIPTORS: ACHING
DESCRIPTORS: ACHING

## 2025-08-02 ASSESSMENT — PAIN - FUNCTIONAL ASSESSMENT: PAIN_FUNCTIONAL_ASSESSMENT: PREVENTS OR INTERFERES SOME ACTIVE ACTIVITIES AND ADLS

## 2025-08-02 ASSESSMENT — PAIN DESCRIPTION - LOCATION
LOCATION: THROAT;HEAD
LOCATION: NECK;HEAD

## 2025-08-02 ASSESSMENT — PAIN DESCRIPTION - ORIENTATION: ORIENTATION: MID

## 2025-08-03 PROBLEM — B37.9 CANDIDA ALBICANS INFECTION: Status: ACTIVE | Noted: 2025-08-03

## 2025-08-03 LAB
ANION GAP SERPL CALC-SCNC: 10 MEQ/L (ref 8–16)
BUN SERPL-MCNC: 4 MG/DL (ref 8–23)
CA-I BLD ISE-SCNC: 1.22 MMOL/L (ref 1.12–1.32)
CALCIUM SERPL-MCNC: 8.8 MG/DL (ref 8.8–10.2)
CHLORIDE SERPL-SCNC: 102 MEQ/L (ref 98–111)
CO2 SERPL-SCNC: 29 MEQ/L (ref 22–29)
CREAT SERPL-MCNC: 0.6 MG/DL (ref 0.7–1.2)
DEPRECATED RDW RBC AUTO: 53.1 FL (ref 35–45)
ERYTHROCYTE [DISTWIDTH] IN BLOOD BY AUTOMATED COUNT: 16.1 % (ref 11.5–14.5)
GFR SERPL CREATININE-BSD FRML MDRD: > 90 ML/MIN/1.73M2
GLUCOSE BLD STRIP.AUTO-MCNC: 163 MG/DL (ref 70–108)
GLUCOSE BLD STRIP.AUTO-MCNC: 183 MG/DL (ref 70–108)
GLUCOSE BLD STRIP.AUTO-MCNC: 252 MG/DL (ref 70–108)
GLUCOSE SERPL-MCNC: 138 MG/DL (ref 74–109)
HCT VFR BLD AUTO: 33.5 % (ref 42–52)
HGB BLD-MCNC: 10.3 GM/DL (ref 14–18)
MAGNESIUM SERPL-MCNC: 1.8 MG/DL (ref 1.6–2.6)
MCH RBC QN AUTO: 27.5 PG (ref 26–33)
MCHC RBC AUTO-ENTMCNC: 30.7 GM/DL (ref 32.2–35.5)
MCV RBC AUTO: 89.3 FL (ref 80–94)
PHOSPHATE SERPL-MCNC: 2.3 MG/DL (ref 2.5–4.5)
PHOSPHATE SERPL-MCNC: 2.6 MG/DL (ref 2.5–4.5)
PLATELET # BLD AUTO: 280 THOU/MM3 (ref 130–400)
PMV BLD AUTO: 9 FL (ref 9.4–12.4)
POTASSIUM SERPL-SCNC: 3.2 MEQ/L (ref 3.5–5.2)
POTASSIUM SERPL-SCNC: 3.2 MEQ/L (ref 3.5–5.2)
RBC # BLD AUTO: 3.75 MILL/MM3 (ref 4.7–6.1)
SODIUM SERPL-SCNC: 141 MEQ/L (ref 135–145)
WBC # BLD AUTO: 11.1 THOU/MM3 (ref 4.8–10.8)

## 2025-08-03 PROCEDURE — 36415 COLL VENOUS BLD VENIPUNCTURE: CPT

## 2025-08-03 PROCEDURE — 85027 COMPLETE CBC AUTOMATED: CPT

## 2025-08-03 PROCEDURE — 6370000000 HC RX 637 (ALT 250 FOR IP)

## 2025-08-03 PROCEDURE — 94669 MECHANICAL CHEST WALL OSCILL: CPT

## 2025-08-03 PROCEDURE — 83735 ASSAY OF MAGNESIUM: CPT

## 2025-08-03 PROCEDURE — 94761 N-INVAS EAR/PLS OXIMETRY MLT: CPT

## 2025-08-03 PROCEDURE — C1751 CATH, INF, PER/CENT/MIDLINE: HCPCS

## 2025-08-03 PROCEDURE — 94640 AIRWAY INHALATION TREATMENT: CPT

## 2025-08-03 PROCEDURE — 6370000000 HC RX 637 (ALT 250 FOR IP): Performed by: OTOLARYNGOLOGY

## 2025-08-03 PROCEDURE — 84132 ASSAY OF SERUM POTASSIUM: CPT

## 2025-08-03 PROCEDURE — 82330 ASSAY OF CALCIUM: CPT

## 2025-08-03 PROCEDURE — 82948 REAGENT STRIP/BLOOD GLUCOSE: CPT

## 2025-08-03 PROCEDURE — 84100 ASSAY OF PHOSPHORUS: CPT

## 2025-08-03 PROCEDURE — 2580000003 HC RX 258: Performed by: REGISTERED NURSE

## 2025-08-03 PROCEDURE — 99223 1ST HOSP IP/OBS HIGH 75: CPT | Performed by: STUDENT IN AN ORGANIZED HEALTH CARE EDUCATION/TRAINING PROGRAM

## 2025-08-03 PROCEDURE — 2060000000 HC ICU INTERMEDIATE R&B

## 2025-08-03 PROCEDURE — 2700000000 HC OXYGEN THERAPY PER DAY

## 2025-08-03 PROCEDURE — 36591 DRAW BLOOD OFF VENOUS DEVICE: CPT

## 2025-08-03 PROCEDURE — 87040 BLOOD CULTURE FOR BACTERIA: CPT

## 2025-08-03 PROCEDURE — 6360000002 HC RX W HCPCS: Performed by: REGISTERED NURSE

## 2025-08-03 PROCEDURE — 99232 SBSQ HOSP IP/OBS MODERATE 35: CPT | Performed by: STUDENT IN AN ORGANIZED HEALTH CARE EDUCATION/TRAINING PROGRAM

## 2025-08-03 PROCEDURE — 76937 US GUIDE VASCULAR ACCESS: CPT

## 2025-08-03 PROCEDURE — 80048 BASIC METABOLIC PNL TOTAL CA: CPT

## 2025-08-03 PROCEDURE — 02HV33Z INSERTION OF INFUSION DEVICE INTO SUPERIOR VENA CAVA, PERCUTANEOUS APPROACH: ICD-10-PCS | Performed by: OTOLARYNGOLOGY

## 2025-08-03 PROCEDURE — 6370000000 HC RX 637 (ALT 250 FOR IP): Performed by: NURSE PRACTITIONER

## 2025-08-03 PROCEDURE — 6360000002 HC RX W HCPCS: Performed by: OTOLARYNGOLOGY

## 2025-08-03 PROCEDURE — 99024 POSTOP FOLLOW-UP VISIT: CPT | Performed by: REGISTERED NURSE

## 2025-08-03 PROCEDURE — 2500000003 HC RX 250 WO HCPCS: Performed by: OTOLARYNGOLOGY

## 2025-08-03 PROCEDURE — 36569 INSJ PICC 5 YR+ W/O IMAGING: CPT

## 2025-08-03 PROCEDURE — 2580000003 HC RX 258: Performed by: OTOLARYNGOLOGY

## 2025-08-03 PROCEDURE — 2500000003 HC RX 250 WO HCPCS: Performed by: REGISTERED NURSE

## 2025-08-03 PROCEDURE — 6360000002 HC RX W HCPCS

## 2025-08-03 RX ORDER — POTASSIUM CHLORIDE 29.8 MG/ML
20 INJECTION INTRAVENOUS ONCE
Status: COMPLETED | OUTPATIENT
Start: 2025-08-03 | End: 2025-08-03

## 2025-08-03 RX ORDER — DEXTROSE, SOYBEAN OIL, ELECTROLYTES, LYSINE, PHENYLALANINE, LEUCINE, VALINE, THREONINE, METHIONINE, ISOLEUCINE, TRYPTOPHAN, ALANINE, ARGININE, GLYCINE, PROLINE, HISTIDINE, GLUTAMIC ACID, SERINE, ASPARTIC ACID AND TYROSINE 6.8; 3.5; 170; 174; 105; 68; 20; 187; 164; 164; 152; 116; 116; 116; 40; 333; 235; 164; 141; 141; 116; 94; 71; 4.8 G/100ML; G/100ML; MG/100ML; MG/100ML; MG/100ML; MG/100ML; MG/100ML; MG/100ML; MG/100ML; MG/100ML; MG/100ML; MG/100ML; MG/100ML; MG/100ML; MG/100ML; MG/100ML; MG/100ML; MG/100ML; MG/100ML; MG/100ML; MG/100ML; MG/100ML; MG/100ML; MG/100ML
1080 INJECTION, EMULSION INTRAVENOUS CONTINUOUS
Status: DISPENSED | OUTPATIENT
Start: 2025-08-03 | End: 2025-08-04

## 2025-08-03 RX ORDER — POTASSIUM CHLORIDE 7.45 MG/ML
10 INJECTION INTRAVENOUS
Status: COMPLETED | OUTPATIENT
Start: 2025-08-03 | End: 2025-08-03

## 2025-08-03 RX ORDER — POTASSIUM CHLORIDE 7.45 MG/ML
10 INJECTION INTRAVENOUS ONCE
Status: COMPLETED | OUTPATIENT
Start: 2025-08-03 | End: 2025-08-03

## 2025-08-03 RX ORDER — POTASSIUM CHLORIDE 7.45 MG/ML
10 INJECTION INTRAVENOUS
Status: DISCONTINUED | OUTPATIENT
Start: 2025-08-03 | End: 2025-08-03

## 2025-08-03 RX ADMIN — BUPROPION HYDROCHLORIDE 150 MG: 150 TABLET, EXTENDED RELEASE ORAL at 08:09

## 2025-08-03 RX ADMIN — POLYETHYLENE GLYCOL 3350 17 G: 17 POWDER, FOR SOLUTION ORAL at 08:10

## 2025-08-03 RX ADMIN — OXYCODONE AND ACETAMINOPHEN 1 TABLET: 5; 325 TABLET ORAL at 08:18

## 2025-08-03 RX ADMIN — SODIUM PHOSPHATE, MONOBASIC, MONOHYDRATE AND SODIUM PHOSPHATE, DIBASIC, ANHYDROUS 15 MMOL: 276; 142 INJECTION, SOLUTION INTRAVENOUS at 12:30

## 2025-08-03 RX ADMIN — POTASSIUM CHLORIDE 10 MEQ: 7.46 INJECTION, SOLUTION INTRAVENOUS at 11:22

## 2025-08-03 RX ADMIN — SODIUM CHLORIDE 3000 MG: 9 INJECTION, SOLUTION INTRAVENOUS at 20:18

## 2025-08-03 RX ADMIN — SODIUM CHLORIDE 3000 MG: 9 INJECTION, SOLUTION INTRAVENOUS at 15:07

## 2025-08-03 RX ADMIN — ISODIUM CHLORIDE 3 ML: 0.03 SOLUTION RESPIRATORY (INHALATION) at 20:09

## 2025-08-03 RX ADMIN — DEXTROSE, SOYBEAN OIL, ELECTROLYTES, LYSINE, PHENYLALANINE, LEUCINE, VALINE, THREONINE, METHIONINE, ISOLEUCINE, TRYPTOPHAN, ALANINE, ARGININE, GLYCINE, PROLINE, HISTIDINE, GLUTAMIC ACID, SERINE, ASPARTIC ACID AND TYROSINE 1080 ML
6.8; 3.5; 170; 174; 105; 68; 20; 187; 164; 164; 152; 116; 116; 116; 40; 333; 235; 164; 141; 141; 116; 94; 71; 4.8 INJECTION, EMULSION INTRAVENOUS at 18:13

## 2025-08-03 RX ADMIN — ISODIUM CHLORIDE 3 ML: 0.03 SOLUTION RESPIRATORY (INHALATION) at 08:33

## 2025-08-03 RX ADMIN — SODIUM CHLORIDE, PRESERVATIVE FREE 10 ML: 5 INJECTION INTRAVENOUS at 08:10

## 2025-08-03 RX ADMIN — AMITRIPTYLINE HYDROCHLORIDE 50 MG: 50 TABLET ORAL at 20:18

## 2025-08-03 RX ADMIN — POTASSIUM CHLORIDE 20 MEQ: 29.8 INJECTION, SOLUTION INTRAVENOUS at 20:56

## 2025-08-03 RX ADMIN — ISODIUM CHLORIDE 3 ML: 0.03 SOLUTION RESPIRATORY (INHALATION) at 11:53

## 2025-08-03 RX ADMIN — TIOTROPIUM BROMIDE AND OLODATEROL 2 PUFF: 3.124; 2.736 SPRAY, METERED RESPIRATORY (INHALATION) at 08:33

## 2025-08-03 RX ADMIN — POTASSIUM CHLORIDE 10 MEQ: 7.46 INJECTION, SOLUTION INTRAVENOUS at 10:29

## 2025-08-03 RX ADMIN — OXYCODONE AND ACETAMINOPHEN 1 TABLET: 5; 325 TABLET ORAL at 15:07

## 2025-08-03 RX ADMIN — ESCITALOPRAM OXALATE 10 MG: 10 TABLET ORAL at 08:09

## 2025-08-03 RX ADMIN — SODIUM CHLORIDE 3000 MG: 9 INJECTION, SOLUTION INTRAVENOUS at 08:08

## 2025-08-03 RX ADMIN — POTASSIUM CHLORIDE 10 MEQ: 7.46 INJECTION, SOLUTION INTRAVENOUS at 09:39

## 2025-08-03 RX ADMIN — PANTOPRAZOLE SODIUM 40 MG: 40 TABLET, DELAYED RELEASE ORAL at 05:58

## 2025-08-03 RX ADMIN — INSULIN LISPRO 4 UNITS: 100 INJECTION, SOLUTION INTRAVENOUS; SUBCUTANEOUS at 20:18

## 2025-08-03 RX ADMIN — ATORVASTATIN CALCIUM 40 MG: 40 TABLET, FILM COATED ORAL at 20:18

## 2025-08-03 RX ADMIN — GABAPENTIN 300 MG: 300 CAPSULE ORAL at 20:18

## 2025-08-03 RX ADMIN — ISODIUM CHLORIDE 3 ML: 0.03 SOLUTION RESPIRATORY (INHALATION) at 16:51

## 2025-08-03 RX ADMIN — QUETIAPINE FUMARATE 200 MG: 100 TABLET ORAL at 20:18

## 2025-08-03 RX ADMIN — MIDODRINE HYDROCHLORIDE 10 MG: 10 TABLET ORAL at 16:48

## 2025-08-03 RX ADMIN — GABAPENTIN 300 MG: 300 CAPSULE ORAL at 08:09

## 2025-08-03 RX ADMIN — POTASSIUM CHLORIDE 10 MEQ: 7.46 INJECTION, SOLUTION INTRAVENOUS at 21:41

## 2025-08-03 RX ADMIN — SODIUM CHLORIDE 3000 MG: 9 INJECTION, SOLUTION INTRAVENOUS at 01:49

## 2025-08-03 RX ADMIN — SODIUM CHLORIDE: 0.9 INJECTION, SOLUTION INTRAVENOUS at 20:59

## 2025-08-03 RX ADMIN — MIDODRINE HYDROCHLORIDE 10 MG: 10 TABLET ORAL at 12:30

## 2025-08-03 RX ADMIN — BISACODYL 5 MG: 5 TABLET, COATED ORAL at 08:12

## 2025-08-03 RX ADMIN — POTASSIUM CHLORIDE 10 MEQ: 7.46 INJECTION, SOLUTION INTRAVENOUS at 12:18

## 2025-08-03 RX ADMIN — MIDODRINE HYDROCHLORIDE 10 MG: 10 TABLET ORAL at 08:09

## 2025-08-03 ASSESSMENT — PAIN - FUNCTIONAL ASSESSMENT
PAIN_FUNCTIONAL_ASSESSMENT: PREVENTS OR INTERFERES SOME ACTIVE ACTIVITIES AND ADLS

## 2025-08-03 ASSESSMENT — PAIN SCALES - GENERAL
PAINLEVEL_OUTOF10: 3
PAINLEVEL_OUTOF10: 7
PAINLEVEL_OUTOF10: 3
PAINLEVEL_OUTOF10: 3
PAINLEVEL_OUTOF10: 8
PAINLEVEL_OUTOF10: 0
PAINLEVEL_OUTOF10: 3
PAINLEVEL_OUTOF10: 0
PAINLEVEL_OUTOF10: 8
PAINLEVEL_OUTOF10: 0
PAINLEVEL_OUTOF10: 3

## 2025-08-03 ASSESSMENT — PAIN DESCRIPTION - LOCATION
LOCATION: NECK;HEAD

## 2025-08-03 ASSESSMENT — PAIN DESCRIPTION - FREQUENCY: FREQUENCY: CONTINUOUS

## 2025-08-03 ASSESSMENT — PAIN DESCRIPTION - DESCRIPTORS
DESCRIPTORS: ACHING

## 2025-08-03 ASSESSMENT — PAIN DESCRIPTION - ORIENTATION
ORIENTATION: MID;LOWER
ORIENTATION: MID;LOWER
ORIENTATION: MID

## 2025-08-03 ASSESSMENT — PAIN DESCRIPTION - PAIN TYPE: TYPE: SURGICAL PAIN

## 2025-08-04 ENCOUNTER — APPOINTMENT (OUTPATIENT)
Dept: GENERAL RADIOLOGY | Age: 70
End: 2025-08-04
Attending: OTOLARYNGOLOGY
Payer: MEDICARE

## 2025-08-04 ENCOUNTER — HOSPITAL ENCOUNTER (OUTPATIENT)
Age: 70
Discharge: HOME | End: 2025-08-04

## 2025-08-04 LAB
ANION GAP SERPL CALC-SCNC: 10 MEQ/L (ref 8–16)
BUN SERPL-MCNC: 4 MG/DL (ref 8–23)
CA-I BLD ISE-SCNC: 1.25 MMOL/L (ref 1.12–1.32)
CALCIUM SERPL-MCNC: 8.9 MG/DL (ref 8.8–10.2)
CHLORIDE SERPL-SCNC: 101 MEQ/L (ref 98–111)
CO2 SERPL-SCNC: 26 MEQ/L (ref 22–29)
CREAT SERPL-MCNC: 0.5 MG/DL (ref 0.7–1.2)
FUNGUS SPEC CULT: NORMAL
FUNGUS SPEC FUNGUS STN: NORMAL
GFR SERPL CREATININE-BSD FRML MDRD: > 90 ML/MIN/1.73M2
GLUCOSE BLD STRIP.AUTO-MCNC: 137 MG/DL (ref 70–108)
GLUCOSE BLD STRIP.AUTO-MCNC: 144 MG/DL (ref 70–108)
GLUCOSE BLD STRIP.AUTO-MCNC: 192 MG/DL (ref 70–108)
GLUCOSE BLD STRIP.AUTO-MCNC: 202 MG/DL (ref 70–108)
GLUCOSE SERPL-MCNC: 139 MG/DL (ref 74–109)
MAGNESIUM SERPL-MCNC: 1.6 MG/DL (ref 1.6–2.6)
MAGNESIUM SERPL-MCNC: 3.6 MG/DL (ref 1.6–2.6)
MRSA SPEC QL CULT: NORMAL
PHOSPHATE SERPL-MCNC: 2.1 MG/DL (ref 2.5–4.5)
PHOSPHATE SERPL-MCNC: 2.3 MG/DL (ref 2.5–4.5)
POTASSIUM SERPL-SCNC: 3.4 MEQ/L (ref 3.5–5.2)
POTASSIUM SERPL-SCNC: 3.4 MEQ/L (ref 3.5–5.2)
SODIUM SERPL-SCNC: 137 MEQ/L (ref 135–145)

## 2025-08-04 PROCEDURE — 6370000000 HC RX 637 (ALT 250 FOR IP)

## 2025-08-04 PROCEDURE — 83735 ASSAY OF MAGNESIUM: CPT

## 2025-08-04 PROCEDURE — 6360000002 HC RX W HCPCS: Performed by: REGISTERED NURSE

## 2025-08-04 PROCEDURE — 2580000003 HC RX 258: Performed by: REGISTERED NURSE

## 2025-08-04 PROCEDURE — 71045 X-RAY EXAM CHEST 1 VIEW: CPT

## 2025-08-04 PROCEDURE — 6370000000 HC RX 637 (ALT 250 FOR IP): Performed by: NURSE PRACTITIONER

## 2025-08-04 PROCEDURE — 94669 MECHANICAL CHEST WALL OSCILL: CPT

## 2025-08-04 PROCEDURE — 2700000000 HC OXYGEN THERAPY PER DAY

## 2025-08-04 PROCEDURE — 82948 REAGENT STRIP/BLOOD GLUCOSE: CPT

## 2025-08-04 PROCEDURE — 2500000003 HC RX 250 WO HCPCS: Performed by: REGISTERED NURSE

## 2025-08-04 PROCEDURE — 2060000000 HC ICU INTERMEDIATE R&B

## 2025-08-04 PROCEDURE — 99024 POSTOP FOLLOW-UP VISIT: CPT | Performed by: REGISTERED NURSE

## 2025-08-04 PROCEDURE — 2580000003 HC RX 258: Performed by: OTOLARYNGOLOGY

## 2025-08-04 PROCEDURE — 6360000002 HC RX W HCPCS: Performed by: OPHTHALMOLOGY

## 2025-08-04 PROCEDURE — 82330 ASSAY OF CALCIUM: CPT

## 2025-08-04 PROCEDURE — 36591 DRAW BLOOD OFF VENOUS DEVICE: CPT

## 2025-08-04 PROCEDURE — 84100 ASSAY OF PHOSPHORUS: CPT

## 2025-08-04 PROCEDURE — 94640 AIRWAY INHALATION TREATMENT: CPT

## 2025-08-04 PROCEDURE — 80048 BASIC METABOLIC PNL TOTAL CA: CPT

## 2025-08-04 PROCEDURE — 99232 SBSQ HOSP IP/OBS MODERATE 35: CPT | Performed by: STUDENT IN AN ORGANIZED HEALTH CARE EDUCATION/TRAINING PROGRAM

## 2025-08-04 PROCEDURE — 2500000003 HC RX 250 WO HCPCS: Performed by: OTOLARYNGOLOGY

## 2025-08-04 PROCEDURE — 99232 SBSQ HOSP IP/OBS MODERATE 35: CPT | Performed by: OPHTHALMOLOGY

## 2025-08-04 PROCEDURE — 84132 ASSAY OF SERUM POTASSIUM: CPT

## 2025-08-04 PROCEDURE — 6370000000 HC RX 637 (ALT 250 FOR IP): Performed by: OPHTHALMOLOGY

## 2025-08-04 PROCEDURE — 94761 N-INVAS EAR/PLS OXIMETRY MLT: CPT

## 2025-08-04 PROCEDURE — 6370000000 HC RX 637 (ALT 250 FOR IP): Performed by: OTOLARYNGOLOGY

## 2025-08-04 RX ORDER — MAGNESIUM SULFATE IN WATER 40 MG/ML
2000 INJECTION, SOLUTION INTRAVENOUS PRN
Status: DISCONTINUED | OUTPATIENT
Start: 2025-08-04 | End: 2025-08-07 | Stop reason: HOSPADM

## 2025-08-04 RX ORDER — POTASSIUM CHLORIDE 7.45 MG/ML
10 INJECTION INTRAVENOUS PRN
Status: DISCONTINUED | OUTPATIENT
Start: 2025-08-04 | End: 2025-08-07 | Stop reason: HOSPADM

## 2025-08-04 RX ORDER — POTASSIUM CHLORIDE 1500 MG/1
40 TABLET, EXTENDED RELEASE ORAL PRN
Status: DISCONTINUED | OUTPATIENT
Start: 2025-08-04 | End: 2025-08-07 | Stop reason: HOSPADM

## 2025-08-04 RX ADMIN — PANTOPRAZOLE SODIUM 40 MG: 40 TABLET, DELAYED RELEASE ORAL at 06:27

## 2025-08-04 RX ADMIN — ESCITALOPRAM OXALATE 10 MG: 10 TABLET ORAL at 08:13

## 2025-08-04 RX ADMIN — MIDODRINE HYDROCHLORIDE 10 MG: 10 TABLET ORAL at 08:13

## 2025-08-04 RX ADMIN — OXYCODONE AND ACETAMINOPHEN 1 TABLET: 5; 325 TABLET ORAL at 12:40

## 2025-08-04 RX ADMIN — ATORVASTATIN CALCIUM 40 MG: 40 TABLET, FILM COATED ORAL at 19:49

## 2025-08-04 RX ADMIN — ISODIUM CHLORIDE 3 ML: 0.03 SOLUTION RESPIRATORY (INHALATION) at 09:17

## 2025-08-04 RX ADMIN — ISODIUM CHLORIDE 3 ML: 0.03 SOLUTION RESPIRATORY (INHALATION) at 20:34

## 2025-08-04 RX ADMIN — SODIUM PHOSPHATE, MONOBASIC, MONOHYDRATE AND SODIUM PHOSPHATE, DIBASIC, ANHYDROUS 15 MMOL: 276; 142 INJECTION, SOLUTION INTRAVENOUS at 19:54

## 2025-08-04 RX ADMIN — OXYCODONE AND ACETAMINOPHEN 1 TABLET: 5; 325 TABLET ORAL at 18:22

## 2025-08-04 RX ADMIN — SODIUM CHLORIDE 3000 MG: 9 INJECTION, SOLUTION INTRAVENOUS at 23:12

## 2025-08-04 RX ADMIN — OXYCODONE AND ACETAMINOPHEN 1 TABLET: 5; 325 TABLET ORAL at 22:29

## 2025-08-04 RX ADMIN — ISODIUM CHLORIDE 3 ML: 0.03 SOLUTION RESPIRATORY (INHALATION) at 15:48

## 2025-08-04 RX ADMIN — BUPROPION HYDROCHLORIDE 150 MG: 150 TABLET, EXTENDED RELEASE ORAL at 08:13

## 2025-08-04 RX ADMIN — GABAPENTIN 300 MG: 300 CAPSULE ORAL at 08:13

## 2025-08-04 RX ADMIN — OXYCODONE AND ACETAMINOPHEN 1 TABLET: 5; 325 TABLET ORAL at 06:26

## 2025-08-04 RX ADMIN — Medication 4 ML: at 05:49

## 2025-08-04 RX ADMIN — GABAPENTIN 300 MG: 300 CAPSULE ORAL at 19:49

## 2025-08-04 RX ADMIN — INSULIN LISPRO 2 UNITS: 100 INJECTION, SOLUTION INTRAVENOUS; SUBCUTANEOUS at 18:12

## 2025-08-04 RX ADMIN — SODIUM CHLORIDE, PRESERVATIVE FREE 10 ML: 5 INJECTION INTRAVENOUS at 19:50

## 2025-08-04 RX ADMIN — QUETIAPINE FUMARATE 200 MG: 100 TABLET ORAL at 19:49

## 2025-08-04 RX ADMIN — POTASSIUM BICARBONATE 40 MEQ: 782 TABLET, EFFERVESCENT ORAL at 12:12

## 2025-08-04 RX ADMIN — TIOTROPIUM BROMIDE AND OLODATEROL 2 PUFF: 3.124; 2.736 SPRAY, METERED RESPIRATORY (INHALATION) at 09:17

## 2025-08-04 RX ADMIN — SODIUM CHLORIDE 3000 MG: 9 INJECTION, SOLUTION INTRAVENOUS at 12:08

## 2025-08-04 RX ADMIN — MAGNESIUM SULFATE HEPTAHYDRATE 2000 MG: 40 INJECTION, SOLUTION INTRAVENOUS at 12:12

## 2025-08-04 RX ADMIN — MIDODRINE HYDROCHLORIDE 10 MG: 10 TABLET ORAL at 12:15

## 2025-08-04 RX ADMIN — SODIUM CHLORIDE 3000 MG: 9 INJECTION, SOLUTION INTRAVENOUS at 18:19

## 2025-08-04 RX ADMIN — INSULIN LISPRO 2 UNITS: 100 INJECTION, SOLUTION INTRAVENOUS; SUBCUTANEOUS at 20:04

## 2025-08-04 RX ADMIN — MIDODRINE HYDROCHLORIDE 10 MG: 10 TABLET ORAL at 16:12

## 2025-08-04 RX ADMIN — AMITRIPTYLINE HYDROCHLORIDE 50 MG: 50 TABLET ORAL at 19:49

## 2025-08-04 RX ADMIN — SODIUM PHOSPHATE, MONOBASIC, MONOHYDRATE AND SODIUM PHOSPHATE, DIBASIC, ANHYDROUS 15 MMOL: 276; 142 INJECTION, SOLUTION INTRAVENOUS at 07:02

## 2025-08-04 ASSESSMENT — PAIN SCALES - WONG BAKER
WONGBAKER_NUMERICALRESPONSE: NO HURT

## 2025-08-04 ASSESSMENT — PAIN DESCRIPTION - LOCATION
LOCATION: HEAD;NECK
LOCATION: NECK;THROAT
LOCATION: THROAT
LOCATION: THROAT
LOCATION: NECK;HEAD

## 2025-08-04 ASSESSMENT — PAIN - FUNCTIONAL ASSESSMENT
PAIN_FUNCTIONAL_ASSESSMENT: PREVENTS OR INTERFERES SOME ACTIVE ACTIVITIES AND ADLS
PAIN_FUNCTIONAL_ASSESSMENT: ACTIVITIES ARE NOT PREVENTED
PAIN_FUNCTIONAL_ASSESSMENT: ACTIVITIES ARE NOT PREVENTED

## 2025-08-04 ASSESSMENT — PAIN SCALES - GENERAL
PAINLEVEL_OUTOF10: 0
PAINLEVEL_OUTOF10: 7
PAINLEVEL_OUTOF10: 5
PAINLEVEL_OUTOF10: 0
PAINLEVEL_OUTOF10: 8
PAINLEVEL_OUTOF10: 7
PAINLEVEL_OUTOF10: 0
PAINLEVEL_OUTOF10: 6
PAINLEVEL_OUTOF10: 4
PAINLEVEL_OUTOF10: 0
PAINLEVEL_OUTOF10: 0

## 2025-08-04 ASSESSMENT — PAIN DESCRIPTION - PAIN TYPE
TYPE: SURGICAL PAIN

## 2025-08-04 ASSESSMENT — PAIN DESCRIPTION - FREQUENCY
FREQUENCY: CONTINUOUS

## 2025-08-04 ASSESSMENT — PAIN DESCRIPTION - ORIENTATION
ORIENTATION: MID;LOWER
ORIENTATION: MID;LOWER
ORIENTATION: MID;INNER
ORIENTATION: INNER
ORIENTATION: INNER

## 2025-08-04 ASSESSMENT — PAIN DESCRIPTION - DESCRIPTORS
DESCRIPTORS: ACHING

## 2025-08-04 ASSESSMENT — PAIN DESCRIPTION - ONSET
ONSET: ON-GOING

## 2025-08-05 LAB
ANION GAP SERPL CALC-SCNC: 9 MEQ/L (ref 8–16)
BUN SERPL-MCNC: 7 MG/DL (ref 8–23)
CALCIUM SERPL-MCNC: 8.9 MG/DL (ref 8.8–10.2)
CHLORIDE SERPL-SCNC: 103 MEQ/L (ref 98–111)
CO2 SERPL-SCNC: 26 MEQ/L (ref 22–29)
CREAT SERPL-MCNC: 0.5 MG/DL (ref 0.7–1.2)
GFR SERPL CREATININE-BSD FRML MDRD: > 90 ML/MIN/1.73M2
GLUCOSE BLD STRIP.AUTO-MCNC: 124 MG/DL (ref 70–108)
GLUCOSE BLD STRIP.AUTO-MCNC: 150 MG/DL (ref 70–108)
GLUCOSE BLD STRIP.AUTO-MCNC: 155 MG/DL (ref 70–108)
GLUCOSE BLD STRIP.AUTO-MCNC: 178 MG/DL (ref 70–108)
GLUCOSE SERPL-MCNC: 129 MG/DL (ref 74–109)
MAGNESIUM SERPL-MCNC: 2.1 MG/DL (ref 1.6–2.6)
PHOSPHATE SERPL-MCNC: 2.7 MG/DL (ref 2.5–4.5)
POTASSIUM SERPL-SCNC: 4 MEQ/L (ref 3.5–5.2)
SODIUM SERPL-SCNC: 138 MEQ/L (ref 135–145)

## 2025-08-05 PROCEDURE — 6370000000 HC RX 637 (ALT 250 FOR IP): Performed by: NURSE PRACTITIONER

## 2025-08-05 PROCEDURE — 6370000000 HC RX 637 (ALT 250 FOR IP)

## 2025-08-05 PROCEDURE — 2580000003 HC RX 258: Performed by: REGISTERED NURSE

## 2025-08-05 PROCEDURE — 83735 ASSAY OF MAGNESIUM: CPT

## 2025-08-05 PROCEDURE — 36415 COLL VENOUS BLD VENIPUNCTURE: CPT

## 2025-08-05 PROCEDURE — 2500000003 HC RX 250 WO HCPCS: Performed by: OTOLARYNGOLOGY

## 2025-08-05 PROCEDURE — 94761 N-INVAS EAR/PLS OXIMETRY MLT: CPT

## 2025-08-05 PROCEDURE — 80048 BASIC METABOLIC PNL TOTAL CA: CPT

## 2025-08-05 PROCEDURE — 84100 ASSAY OF PHOSPHORUS: CPT

## 2025-08-05 PROCEDURE — 6360000002 HC RX W HCPCS: Performed by: OTOLARYNGOLOGY

## 2025-08-05 PROCEDURE — 82948 REAGENT STRIP/BLOOD GLUCOSE: CPT

## 2025-08-05 PROCEDURE — 2580000003 HC RX 258: Performed by: OTOLARYNGOLOGY

## 2025-08-05 PROCEDURE — 6370000000 HC RX 637 (ALT 250 FOR IP): Performed by: OTOLARYNGOLOGY

## 2025-08-05 PROCEDURE — 99232 SBSQ HOSP IP/OBS MODERATE 35: CPT | Performed by: OPHTHALMOLOGY

## 2025-08-05 PROCEDURE — 94640 AIRWAY INHALATION TREATMENT: CPT

## 2025-08-05 PROCEDURE — 2700000000 HC OXYGEN THERAPY PER DAY

## 2025-08-05 PROCEDURE — 6360000002 HC RX W HCPCS: Performed by: REGISTERED NURSE

## 2025-08-05 PROCEDURE — 94669 MECHANICAL CHEST WALL OSCILL: CPT

## 2025-08-05 PROCEDURE — 99232 SBSQ HOSP IP/OBS MODERATE 35: CPT | Performed by: STUDENT IN AN ORGANIZED HEALTH CARE EDUCATION/TRAINING PROGRAM

## 2025-08-05 PROCEDURE — 2060000000 HC ICU INTERMEDIATE R&B

## 2025-08-05 RX ADMIN — MIDODRINE HYDROCHLORIDE 10 MG: 10 TABLET ORAL at 13:22

## 2025-08-05 RX ADMIN — SODIUM CHLORIDE 3000 MG: 9 INJECTION, SOLUTION INTRAVENOUS at 13:27

## 2025-08-05 RX ADMIN — ISODIUM CHLORIDE 3 ML: 0.03 SOLUTION RESPIRATORY (INHALATION) at 16:20

## 2025-08-05 RX ADMIN — OXYCODONE AND ACETAMINOPHEN 1 TABLET: 5; 325 TABLET ORAL at 05:10

## 2025-08-05 RX ADMIN — SODIUM CHLORIDE, PRESERVATIVE FREE 10 ML: 5 INJECTION INTRAVENOUS at 07:48

## 2025-08-05 RX ADMIN — QUETIAPINE FUMARATE 200 MG: 100 TABLET ORAL at 20:52

## 2025-08-05 RX ADMIN — HYDROMORPHONE HYDROCHLORIDE 0.25 MG: 1 INJECTION, SOLUTION INTRAMUSCULAR; INTRAVENOUS; SUBCUTANEOUS at 20:03

## 2025-08-05 RX ADMIN — MIDODRINE HYDROCHLORIDE 10 MG: 10 TABLET ORAL at 07:47

## 2025-08-05 RX ADMIN — BISACODYL 5 MG: 5 TABLET, COATED ORAL at 07:47

## 2025-08-05 RX ADMIN — ISODIUM CHLORIDE 3 ML: 0.03 SOLUTION RESPIRATORY (INHALATION) at 21:29

## 2025-08-05 RX ADMIN — OXYCODONE AND ACETAMINOPHEN 1 TABLET: 5; 325 TABLET ORAL at 13:24

## 2025-08-05 RX ADMIN — GABAPENTIN 300 MG: 300 CAPSULE ORAL at 13:22

## 2025-08-05 RX ADMIN — OXYCODONE AND ACETAMINOPHEN 1 TABLET: 5; 325 TABLET ORAL at 18:29

## 2025-08-05 RX ADMIN — MIDODRINE HYDROCHLORIDE 10 MG: 10 TABLET ORAL at 16:55

## 2025-08-05 RX ADMIN — SODIUM CHLORIDE 3000 MG: 9 INJECTION, SOLUTION INTRAVENOUS at 23:38

## 2025-08-05 RX ADMIN — SODIUM CHLORIDE 3000 MG: 9 INJECTION, SOLUTION INTRAVENOUS at 05:17

## 2025-08-05 RX ADMIN — GABAPENTIN 300 MG: 300 CAPSULE ORAL at 20:52

## 2025-08-05 RX ADMIN — ISODIUM CHLORIDE 3 ML: 0.03 SOLUTION RESPIRATORY (INHALATION) at 12:12

## 2025-08-05 RX ADMIN — SODIUM CHLORIDE, PRESERVATIVE FREE 10 ML: 5 INJECTION INTRAVENOUS at 20:08

## 2025-08-05 RX ADMIN — ATORVASTATIN CALCIUM 40 MG: 40 TABLET, FILM COATED ORAL at 20:07

## 2025-08-05 RX ADMIN — SODIUM CHLORIDE 3000 MG: 9 INJECTION, SOLUTION INTRAVENOUS at 16:55

## 2025-08-05 RX ADMIN — BUPROPION HYDROCHLORIDE 150 MG: 150 TABLET, EXTENDED RELEASE ORAL at 07:47

## 2025-08-05 RX ADMIN — ESCITALOPRAM OXALATE 10 MG: 10 TABLET ORAL at 07:47

## 2025-08-05 RX ADMIN — PANTOPRAZOLE SODIUM 40 MG: 40 TABLET, DELAYED RELEASE ORAL at 05:10

## 2025-08-05 RX ADMIN — GUAIFENESIN SYRUP AND DEXTROMETHORPHAN 5 ML: 100; 10 SYRUP ORAL at 07:50

## 2025-08-05 RX ADMIN — AMITRIPTYLINE HYDROCHLORIDE 50 MG: 50 TABLET ORAL at 20:07

## 2025-08-05 RX ADMIN — TIOTROPIUM BROMIDE AND OLODATEROL 2 PUFF: 3.124; 2.736 SPRAY, METERED RESPIRATORY (INHALATION) at 08:24

## 2025-08-05 RX ADMIN — Medication 4 ML: at 08:24

## 2025-08-05 ASSESSMENT — PAIN DESCRIPTION - FREQUENCY
FREQUENCY: CONTINUOUS

## 2025-08-05 ASSESSMENT — PAIN SCALES - GENERAL
PAINLEVEL_OUTOF10: 7
PAINLEVEL_OUTOF10: 3
PAINLEVEL_OUTOF10: 7
PAINLEVEL_OUTOF10: 3
PAINLEVEL_OUTOF10: 7
PAINLEVEL_OUTOF10: 5
PAINLEVEL_OUTOF10: 4
PAINLEVEL_OUTOF10: 3
PAINLEVEL_OUTOF10: 0
PAINLEVEL_OUTOF10: 4

## 2025-08-05 ASSESSMENT — PAIN DESCRIPTION - ORIENTATION
ORIENTATION: MID
ORIENTATION: MID;LOWER
ORIENTATION: MID
ORIENTATION: MID;LOWER
ORIENTATION: MID

## 2025-08-05 ASSESSMENT — PAIN - FUNCTIONAL ASSESSMENT
PAIN_FUNCTIONAL_ASSESSMENT: ACTIVITIES ARE NOT PREVENTED
PAIN_FUNCTIONAL_ASSESSMENT: ACTIVITIES ARE NOT PREVENTED
PAIN_FUNCTIONAL_ASSESSMENT: PREVENTS OR INTERFERES SOME ACTIVE ACTIVITIES AND ADLS
PAIN_FUNCTIONAL_ASSESSMENT: ACTIVITIES ARE NOT PREVENTED
PAIN_FUNCTIONAL_ASSESSMENT: PREVENTS OR INTERFERES SOME ACTIVE ACTIVITIES AND ADLS

## 2025-08-05 ASSESSMENT — PAIN DESCRIPTION - PAIN TYPE
TYPE: SURGICAL PAIN

## 2025-08-05 ASSESSMENT — PAIN DESCRIPTION - LOCATION
LOCATION: NECK;HEAD
LOCATION: THROAT
LOCATION: NECK;THROAT
LOCATION: NECK;THROAT
LOCATION: THROAT

## 2025-08-05 ASSESSMENT — PAIN DESCRIPTION - DESCRIPTORS
DESCRIPTORS: ACHING
DESCRIPTORS: ACHING;SORE
DESCRIPTORS: ACHING
DESCRIPTORS: ACHING;SORE
DESCRIPTORS: ACHING

## 2025-08-05 ASSESSMENT — PAIN SCALES - WONG BAKER: WONGBAKER_NUMERICALRESPONSE: NO HURT

## 2025-08-05 ASSESSMENT — PAIN DESCRIPTION - ONSET
ONSET: ON-GOING

## 2025-08-06 ENCOUNTER — APPOINTMENT (OUTPATIENT)
Age: 70
End: 2025-08-06
Attending: NURSE PRACTITIONER
Payer: MEDICARE

## 2025-08-06 PROBLEM — Z87.09 HISTORY OF COPD: Status: ACTIVE | Noted: 2025-08-06

## 2025-08-06 PROBLEM — Z86.39 H/O NON-INSULIN DEPENDENT DIABETES MELLITUS: Status: ACTIVE | Noted: 2025-08-06

## 2025-08-06 PROBLEM — R06.02 SHORTNESS OF BREATH: Status: ACTIVE | Noted: 2025-08-06

## 2025-08-06 LAB
BACTERIA SPEC RESP CULT: ABNORMAL
BACTERIA SPEC RESP CULT: ABNORMAL
ECHO AO ASC DIAM: 3.9 CM
ECHO AO ASCENDING AORTA INDEX: 2 CM/M2
ECHO AV CUSP MM: 1.7 CM
ECHO AV PEAK GRADIENT: 5 MMHG
ECHO AV PEAK VELOCITY: 1.1 M/S
ECHO AV VELOCITY RATIO: 1
ECHO BSA: 1.91 M2
ECHO EST RA PRESSURE: 3 MMHG
ECHO LA AREA 2C: 12.7 CM2
ECHO LA AREA 4C: 14.1 CM2
ECHO LA DIAMETER INDEX: 1.69 CM/M2
ECHO LA DIAMETER: 3.3 CM
ECHO LA MAJOR AXIS: 4.6 CM
ECHO LA MINOR AXIS: 4.6 CM
ECHO LA VOL BP: 32 ML (ref 18–58)
ECHO LA VOL MOD A2C: 29 ML (ref 18–58)
ECHO LA VOL MOD A4C: 36 ML (ref 18–58)
ECHO LA VOL/BSA BIPLANE: 16 ML/M2 (ref 16–34)
ECHO LA VOLUME INDEX MOD A2C: 15 ML/M2 (ref 16–34)
ECHO LA VOLUME INDEX MOD A4C: 18 ML/M2 (ref 16–34)
ECHO LV E' LATERAL VELOCITY: 12.1 CM/S
ECHO LV E' SEPTAL VELOCITY: 9.5 CM/S
ECHO LV EF PHYSICIAN: 55 %
ECHO LV FRACTIONAL SHORTENING: 29 % (ref 28–44)
ECHO LV INTERNAL DIMENSION DIASTOLE INDEX: 2.51 CM/M2
ECHO LV INTERNAL DIMENSION DIASTOLIC: 4.9 CM (ref 4.2–5.9)
ECHO LV INTERNAL DIMENSION SYSTOLIC INDEX: 1.79 CM/M2
ECHO LV INTERNAL DIMENSION SYSTOLIC: 3.5 CM
ECHO LV ISOVOLUMETRIC RELAXATION TIME (IVRT): 95 MS
ECHO LV IVSD: 0.9 CM (ref 0.6–1)
ECHO LV MASS 2D: 141.9 G (ref 88–224)
ECHO LV MASS INDEX 2D: 72.8 G/M2 (ref 49–115)
ECHO LV POSTERIOR WALL DIASTOLIC: 0.8 CM (ref 0.6–1)
ECHO LV RELATIVE WALL THICKNESS RATIO: 0.33
ECHO LVOT PEAK GRADIENT: 5 MMHG
ECHO LVOT PEAK VELOCITY: 1.1 M/S
ECHO MV A VELOCITY: 0.71 M/S
ECHO MV E DECELERATION TIME (DT): 262 MS
ECHO MV E VELOCITY: 0.57 M/S
ECHO MV E/A RATIO: 0.8
ECHO MV E/E' LATERAL: 4.71
ECHO MV E/E' RATIO (AVERAGED): 5.36
ECHO MV E/E' SEPTAL: 6
ECHO MV REGURGITANT PEAK GRADIENT: 16 MMHG
ECHO MV REGURGITANT PEAK VELOCITY: 2 M/S
ECHO PV MAX VELOCITY: 0.8 M/S
ECHO PV PEAK GRADIENT: 2 MMHG
ECHO RIGHT VENTRICULAR SYSTOLIC PRESSURE (RVSP): 33 MMHG
ECHO RV INTERNAL DIMENSION: 3.1 CM
ECHO RV TAPSE: 2.1 CM (ref 1.7–?)
ECHO TV E WAVE: 0.4 M/S
ECHO TV REGURGITANT MAX VELOCITY: 2.76 M/S
ECHO TV REGURGITANT PEAK GRADIENT: 30 MMHG
GLUCOSE BLD STRIP.AUTO-MCNC: 122 MG/DL (ref 70–108)
GLUCOSE BLD STRIP.AUTO-MCNC: 165 MG/DL (ref 70–108)
GLUCOSE BLD STRIP.AUTO-MCNC: 189 MG/DL (ref 70–108)
GLUCOSE BLD STRIP.AUTO-MCNC: 192 MG/DL (ref 70–108)
GRAM STN SPEC: ABNORMAL
ORGANISM: ABNORMAL

## 2025-08-06 PROCEDURE — 6370000000 HC RX 637 (ALT 250 FOR IP)

## 2025-08-06 PROCEDURE — 2500000003 HC RX 250 WO HCPCS: Performed by: OTOLARYNGOLOGY

## 2025-08-06 PROCEDURE — 6370000000 HC RX 637 (ALT 250 FOR IP): Performed by: NURSE PRACTITIONER

## 2025-08-06 PROCEDURE — 94669 MECHANICAL CHEST WALL OSCILL: CPT

## 2025-08-06 PROCEDURE — 99232 SBSQ HOSP IP/OBS MODERATE 35: CPT | Performed by: NURSE PRACTITIONER

## 2025-08-06 PROCEDURE — 6370000000 HC RX 637 (ALT 250 FOR IP): Performed by: OTOLARYNGOLOGY

## 2025-08-06 PROCEDURE — 99024 POSTOP FOLLOW-UP VISIT: CPT | Performed by: NURSE PRACTITIONER

## 2025-08-06 PROCEDURE — 94640 AIRWAY INHALATION TREATMENT: CPT

## 2025-08-06 PROCEDURE — 82948 REAGENT STRIP/BLOOD GLUCOSE: CPT

## 2025-08-06 PROCEDURE — 97166 OT EVAL MOD COMPLEX 45 MIN: CPT

## 2025-08-06 PROCEDURE — 6360000002 HC RX W HCPCS: Performed by: REGISTERED NURSE

## 2025-08-06 PROCEDURE — 6360000004 HC RX CONTRAST MEDICATION: Performed by: INTERNAL MEDICINE

## 2025-08-06 PROCEDURE — 93306 TTE W/DOPPLER COMPLETE: CPT | Performed by: INTERNAL MEDICINE

## 2025-08-06 PROCEDURE — 2580000003 HC RX 258: Performed by: REGISTERED NURSE

## 2025-08-06 PROCEDURE — 99232 SBSQ HOSP IP/OBS MODERATE 35: CPT | Performed by: STUDENT IN AN ORGANIZED HEALTH CARE EDUCATION/TRAINING PROGRAM

## 2025-08-06 PROCEDURE — C8929 TTE W OR WO FOL WCON,DOPPLER: HCPCS

## 2025-08-06 PROCEDURE — 97535 SELF CARE MNGMENT TRAINING: CPT

## 2025-08-06 PROCEDURE — 1200000003 HC TELEMETRY R&B

## 2025-08-06 RX ADMIN — PANTOPRAZOLE SODIUM 40 MG: 40 TABLET, DELAYED RELEASE ORAL at 05:19

## 2025-08-06 RX ADMIN — OXYCODONE AND ACETAMINOPHEN 1 TABLET: 5; 325 TABLET ORAL at 21:37

## 2025-08-06 RX ADMIN — ISODIUM CHLORIDE 3 ML: 0.03 SOLUTION RESPIRATORY (INHALATION) at 08:29

## 2025-08-06 RX ADMIN — SODIUM CHLORIDE, PRESERVATIVE FREE 10 ML: 5 INJECTION INTRAVENOUS at 20:28

## 2025-08-06 RX ADMIN — ESCITALOPRAM OXALATE 10 MG: 10 TABLET ORAL at 08:28

## 2025-08-06 RX ADMIN — OXYCODONE AND ACETAMINOPHEN 1 TABLET: 5; 325 TABLET ORAL at 02:43

## 2025-08-06 RX ADMIN — SULFUR HEXAFLUORIDE 2 ML: KIT at 17:19

## 2025-08-06 RX ADMIN — GUAIFENESIN SYRUP AND DEXTROMETHORPHAN 5 ML: 100; 10 SYRUP ORAL at 21:37

## 2025-08-06 RX ADMIN — SODIUM CHLORIDE 3000 MG: 9 INJECTION, SOLUTION INTRAVENOUS at 23:21

## 2025-08-06 RX ADMIN — SODIUM CHLORIDE 3000 MG: 9 INJECTION, SOLUTION INTRAVENOUS at 05:20

## 2025-08-06 RX ADMIN — BISACODYL 5 MG: 5 TABLET, COATED ORAL at 08:28

## 2025-08-06 RX ADMIN — AMITRIPTYLINE HYDROCHLORIDE 50 MG: 50 TABLET ORAL at 20:26

## 2025-08-06 RX ADMIN — ATORVASTATIN CALCIUM 40 MG: 40 TABLET, FILM COATED ORAL at 20:26

## 2025-08-06 RX ADMIN — INSULIN LISPRO 2 UNITS: 100 INJECTION, SOLUTION INTRAVENOUS; SUBCUTANEOUS at 17:16

## 2025-08-06 RX ADMIN — ISODIUM CHLORIDE 3 ML: 0.03 SOLUTION RESPIRATORY (INHALATION) at 11:56

## 2025-08-06 RX ADMIN — BUPROPION HYDROCHLORIDE 150 MG: 150 TABLET, EXTENDED RELEASE ORAL at 08:28

## 2025-08-06 RX ADMIN — OXYCODONE AND ACETAMINOPHEN 1 TABLET: 5; 325 TABLET ORAL at 13:03

## 2025-08-06 RX ADMIN — INSULIN LISPRO 2 UNITS: 100 INJECTION, SOLUTION INTRAVENOUS; SUBCUTANEOUS at 20:26

## 2025-08-06 RX ADMIN — MIDODRINE HYDROCHLORIDE 10 MG: 10 TABLET ORAL at 17:16

## 2025-08-06 RX ADMIN — SODIUM CHLORIDE, PRESERVATIVE FREE 10 ML: 5 INJECTION INTRAVENOUS at 08:28

## 2025-08-06 RX ADMIN — GABAPENTIN 300 MG: 300 CAPSULE ORAL at 08:28

## 2025-08-06 RX ADMIN — TIOTROPIUM BROMIDE AND OLODATEROL 2 PUFF: 3.124; 2.736 SPRAY, METERED RESPIRATORY (INHALATION) at 08:29

## 2025-08-06 RX ADMIN — GABAPENTIN 300 MG: 300 CAPSULE ORAL at 20:26

## 2025-08-06 RX ADMIN — OXYCODONE AND ACETAMINOPHEN 1 TABLET: 5; 325 TABLET ORAL at 17:20

## 2025-08-06 RX ADMIN — QUETIAPINE FUMARATE 200 MG: 100 TABLET ORAL at 20:26

## 2025-08-06 RX ADMIN — OXYCODONE AND ACETAMINOPHEN 1 TABLET: 5; 325 TABLET ORAL at 08:27

## 2025-08-06 RX ADMIN — ISODIUM CHLORIDE 3 ML: 0.03 SOLUTION RESPIRATORY (INHALATION) at 20:30

## 2025-08-06 RX ADMIN — SODIUM CHLORIDE 3000 MG: 9 INJECTION, SOLUTION INTRAVENOUS at 11:47

## 2025-08-06 RX ADMIN — SODIUM CHLORIDE 3000 MG: 9 INJECTION, SOLUTION INTRAVENOUS at 17:19

## 2025-08-06 RX ADMIN — MIDODRINE HYDROCHLORIDE 10 MG: 10 TABLET ORAL at 08:28

## 2025-08-06 RX ADMIN — ISODIUM CHLORIDE 3 ML: 0.03 SOLUTION RESPIRATORY (INHALATION) at 15:55

## 2025-08-06 RX ADMIN — MIDODRINE HYDROCHLORIDE 10 MG: 10 TABLET ORAL at 11:45

## 2025-08-06 ASSESSMENT — PAIN SCALES - GENERAL
PAINLEVEL_OUTOF10: 7
PAINLEVEL_OUTOF10: 5
PAINLEVEL_OUTOF10: 6
PAINLEVEL_OUTOF10: 8
PAINLEVEL_OUTOF10: 5
PAINLEVEL_OUTOF10: 8
PAINLEVEL_OUTOF10: 3
PAINLEVEL_OUTOF10: 6
PAINLEVEL_OUTOF10: 7
PAINLEVEL_OUTOF10: 6

## 2025-08-06 ASSESSMENT — PAIN DESCRIPTION - ORIENTATION
ORIENTATION: INNER;MID
ORIENTATION: INNER
ORIENTATION: MID
ORIENTATION: INNER

## 2025-08-06 ASSESSMENT — PAIN - FUNCTIONAL ASSESSMENT
PAIN_FUNCTIONAL_ASSESSMENT: ACTIVITIES ARE NOT PREVENTED

## 2025-08-06 ASSESSMENT — PAIN DESCRIPTION - PAIN TYPE
TYPE: SURGICAL PAIN
TYPE: SURGICAL PAIN

## 2025-08-06 ASSESSMENT — PAIN DESCRIPTION - FREQUENCY
FREQUENCY: CONTINUOUS
FREQUENCY: CONTINUOUS

## 2025-08-06 ASSESSMENT — PAIN DESCRIPTION - DESCRIPTORS
DESCRIPTORS: ACHING
DESCRIPTORS: ACHING
DESCRIPTORS: ACHING;SORE
DESCRIPTORS: ACHING
DESCRIPTORS: ACHING
DESCRIPTORS: SORE

## 2025-08-06 ASSESSMENT — PAIN DESCRIPTION - LOCATION
LOCATION: THROAT

## 2025-08-06 ASSESSMENT — PAIN SCALES - WONG BAKER: WONGBAKER_NUMERICALRESPONSE: NO HURT

## 2025-08-06 ASSESSMENT — PAIN DESCRIPTION - ONSET
ONSET: ON-GOING
ONSET: ON-GOING

## 2025-08-07 VITALS
HEART RATE: 86 BPM | OXYGEN SATURATION: 93 % | HEIGHT: 73 IN | BODY MASS INDEX: 20.69 KG/M2 | TEMPERATURE: 97.8 F | RESPIRATION RATE: 18 BRPM | SYSTOLIC BLOOD PRESSURE: 118 MMHG | WEIGHT: 156.1 LBS | DIASTOLIC BLOOD PRESSURE: 77 MMHG

## 2025-08-07 LAB
ANION GAP SERPL CALC-SCNC: 10 MEQ/L (ref 8–16)
BUN SERPL-MCNC: 9 MG/DL (ref 8–23)
CALCIUM SERPL-MCNC: 9.3 MG/DL (ref 8.8–10.2)
CHLORIDE SERPL-SCNC: 100 MEQ/L (ref 98–111)
CO2 SERPL-SCNC: 26 MEQ/L (ref 22–29)
CREAT SERPL-MCNC: 0.6 MG/DL (ref 0.7–1.2)
DEPRECATED RDW RBC AUTO: 51 FL (ref 35–45)
ERYTHROCYTE [DISTWIDTH] IN BLOOD BY AUTOMATED COUNT: 15.7 % (ref 11.5–14.5)
GFR SERPL CREATININE-BSD FRML MDRD: > 90 ML/MIN/1.73M2
GLUCOSE BLD STRIP.AUTO-MCNC: 114 MG/DL (ref 70–108)
GLUCOSE BLD STRIP.AUTO-MCNC: 177 MG/DL (ref 70–108)
GLUCOSE SERPL-MCNC: 166 MG/DL (ref 74–109)
HCT VFR BLD AUTO: 36.3 % (ref 42–52)
HGB BLD-MCNC: 11.2 GM/DL (ref 14–18)
MAGNESIUM SERPL-MCNC: 1.7 MG/DL (ref 1.6–2.6)
MCH RBC QN AUTO: 27.3 PG (ref 26–33)
MCHC RBC AUTO-ENTMCNC: 30.9 GM/DL (ref 32.2–35.5)
MCV RBC AUTO: 88.5 FL (ref 80–94)
PHOSPHATE SERPL-MCNC: 2.3 MG/DL (ref 2.5–4.5)
PLATELET # BLD AUTO: 336 THOU/MM3 (ref 130–400)
PMV BLD AUTO: 8.6 FL (ref 9.4–12.4)
POTASSIUM SERPL-SCNC: 4.1 MEQ/L (ref 3.5–5.2)
RBC # BLD AUTO: 4.1 MILL/MM3 (ref 4.7–6.1)
SODIUM SERPL-SCNC: 136 MEQ/L (ref 135–145)
WBC # BLD AUTO: 15.2 THOU/MM3 (ref 4.8–10.8)

## 2025-08-07 PROCEDURE — 85027 COMPLETE CBC AUTOMATED: CPT

## 2025-08-07 PROCEDURE — 6370000000 HC RX 637 (ALT 250 FOR IP)

## 2025-08-07 PROCEDURE — 82948 REAGENT STRIP/BLOOD GLUCOSE: CPT

## 2025-08-07 PROCEDURE — 94669 MECHANICAL CHEST WALL OSCILL: CPT

## 2025-08-07 PROCEDURE — 94761 N-INVAS EAR/PLS OXIMETRY MLT: CPT

## 2025-08-07 PROCEDURE — 84100 ASSAY OF PHOSPHORUS: CPT

## 2025-08-07 PROCEDURE — 80048 BASIC METABOLIC PNL TOTAL CA: CPT

## 2025-08-07 PROCEDURE — 99232 SBSQ HOSP IP/OBS MODERATE 35: CPT | Performed by: STUDENT IN AN ORGANIZED HEALTH CARE EDUCATION/TRAINING PROGRAM

## 2025-08-07 PROCEDURE — 97530 THERAPEUTIC ACTIVITIES: CPT

## 2025-08-07 PROCEDURE — 6370000000 HC RX 637 (ALT 250 FOR IP): Performed by: NURSE PRACTITIONER

## 2025-08-07 PROCEDURE — 94640 AIRWAY INHALATION TREATMENT: CPT

## 2025-08-07 PROCEDURE — 2700000000 HC OXYGEN THERAPY PER DAY

## 2025-08-07 PROCEDURE — 83735 ASSAY OF MAGNESIUM: CPT

## 2025-08-07 PROCEDURE — 2500000003 HC RX 250 WO HCPCS: Performed by: OTOLARYNGOLOGY

## 2025-08-07 PROCEDURE — 99024 POSTOP FOLLOW-UP VISIT: CPT | Performed by: NURSE PRACTITIONER

## 2025-08-07 PROCEDURE — 6370000000 HC RX 637 (ALT 250 FOR IP): Performed by: OTOLARYNGOLOGY

## 2025-08-07 PROCEDURE — 97535 SELF CARE MNGMENT TRAINING: CPT

## 2025-08-07 RX ORDER — SODIUM CHLORIDE FOR INHALATION 3 %
4 VIAL, NEBULIZER (ML) INHALATION 2 TIMES DAILY PRN
Qty: 240 ML | Refills: 1 | Status: SHIPPED | OUTPATIENT
Start: 2025-08-07 | End: 2025-09-06

## 2025-08-07 RX ORDER — SODIUM CHLORIDE FOR INHALATION 0.9 %
3 VIAL, NEBULIZER (ML) INHALATION 3 TIMES DAILY
Qty: 270 ML | Refills: 1 | Status: SHIPPED | OUTPATIENT
Start: 2025-08-07 | End: 2025-09-06

## 2025-08-07 RX ADMIN — ESCITALOPRAM OXALATE 10 MG: 10 TABLET ORAL at 10:04

## 2025-08-07 RX ADMIN — BISACODYL 5 MG: 5 TABLET, COATED ORAL at 10:04

## 2025-08-07 RX ADMIN — MIDODRINE HYDROCHLORIDE 10 MG: 10 TABLET ORAL at 10:04

## 2025-08-07 RX ADMIN — GUAIFENESIN SYRUP AND DEXTROMETHORPHAN 5 ML: 100; 10 SYRUP ORAL at 10:03

## 2025-08-07 RX ADMIN — TIOTROPIUM BROMIDE AND OLODATEROL 2 PUFF: 3.124; 2.736 SPRAY, METERED RESPIRATORY (INHALATION) at 08:40

## 2025-08-07 RX ADMIN — SODIUM CHLORIDE, PRESERVATIVE FREE 10 ML: 5 INJECTION INTRAVENOUS at 10:05

## 2025-08-07 RX ADMIN — PANTOPRAZOLE SODIUM 40 MG: 40 TABLET, DELAYED RELEASE ORAL at 06:32

## 2025-08-07 RX ADMIN — BUPROPION HYDROCHLORIDE 150 MG: 150 TABLET, EXTENDED RELEASE ORAL at 10:04

## 2025-08-07 RX ADMIN — GABAPENTIN 300 MG: 300 CAPSULE ORAL at 10:04

## 2025-08-07 RX ADMIN — MIDODRINE HYDROCHLORIDE 10 MG: 10 TABLET ORAL at 12:51

## 2025-08-07 RX ADMIN — OXYCODONE AND ACETAMINOPHEN 1 TABLET: 5; 325 TABLET ORAL at 10:03

## 2025-08-07 RX ADMIN — ISODIUM CHLORIDE 3 ML: 0.03 SOLUTION RESPIRATORY (INHALATION) at 08:40

## 2025-08-07 RX ADMIN — POLYETHYLENE GLYCOL 3350 17 G: 17 POWDER, FOR SOLUTION ORAL at 10:03

## 2025-08-07 RX ADMIN — AMOXICILLIN AND CLAVULANATE POTASSIUM 1 TABLET: 875; 125 TABLET, FILM COATED ORAL at 10:06

## 2025-08-07 ASSESSMENT — PAIN DESCRIPTION - DESCRIPTORS
DESCRIPTORS: ACHING
DESCRIPTORS: ACHING

## 2025-08-07 ASSESSMENT — PAIN SCALES - GENERAL
PAINLEVEL_OUTOF10: 3
PAINLEVEL_OUTOF10: 6
PAINLEVEL_OUTOF10: 8

## 2025-08-07 ASSESSMENT — PAIN DESCRIPTION - LOCATION
LOCATION: THROAT
LOCATION: THROAT

## 2025-08-07 ASSESSMENT — PAIN DESCRIPTION - ONSET: ONSET: ON-GOING

## 2025-08-07 ASSESSMENT — PAIN DESCRIPTION - PAIN TYPE: TYPE: SURGICAL PAIN

## 2025-08-07 ASSESSMENT — PAIN SCALES - WONG BAKER: WONGBAKER_NUMERICALRESPONSE: NO HURT

## 2025-08-07 ASSESSMENT — PAIN DESCRIPTION - ORIENTATION
ORIENTATION: INNER;MID
ORIENTATION: INNER

## 2025-08-07 ASSESSMENT — PAIN DESCRIPTION - FREQUENCY: FREQUENCY: CONTINUOUS

## 2025-08-08 ENCOUNTER — TELEPHONE (OUTPATIENT)
Dept: FAMILY MEDICINE CLINIC | Age: 70
End: 2025-08-08

## 2025-08-08 ENCOUNTER — CARE COORDINATION (OUTPATIENT)
Dept: CARE COORDINATION | Age: 70
End: 2025-08-08

## 2025-08-08 DIAGNOSIS — C32.0 SQUAMOUS CELL CARCINOMA OF VOCAL CORD (HCC): Primary | ICD-10-CM

## 2025-08-08 LAB
BACTERIA BLD AEROBE CULT: NORMAL
BACTERIA BLD AEROBE CULT: NORMAL

## 2025-08-08 PROCEDURE — 1111F DSCHRG MED/CURRENT MED MERGE: CPT | Performed by: NURSE PRACTITIONER

## 2025-08-11 ENCOUNTER — OFFICE VISIT (OUTPATIENT)
Dept: ENT CLINIC | Age: 70
End: 2025-08-11

## 2025-08-11 VITALS
RESPIRATION RATE: 22 BRPM | TEMPERATURE: 98.3 F | DIASTOLIC BLOOD PRESSURE: 60 MMHG | HEART RATE: 107 BPM | BODY MASS INDEX: 20.21 KG/M2 | HEIGHT: 72 IN | WEIGHT: 149.2 LBS | OXYGEN SATURATION: 90 % | SYSTOLIC BLOOD PRESSURE: 136 MMHG

## 2025-08-11 DIAGNOSIS — J18.9 PNEUMONIA OF LEFT LOWER LOBE DUE TO INFECTIOUS ORGANISM: ICD-10-CM

## 2025-08-11 DIAGNOSIS — C32.0 SQUAMOUS CELL CARCINOMA OF LEFT VOCAL CORD (HCC): ICD-10-CM

## 2025-08-11 DIAGNOSIS — R49.0 HOARSENESS: ICD-10-CM

## 2025-08-11 DIAGNOSIS — Z99.81 OXYGEN DEPENDENT: ICD-10-CM

## 2025-08-11 DIAGNOSIS — J38.3 GLOTTIC INSUFFICIENCY: ICD-10-CM

## 2025-08-11 DIAGNOSIS — R05.3 CHRONIC COUGH: ICD-10-CM

## 2025-08-11 DIAGNOSIS — R64 CANCER CACHEXIA: Primary | ICD-10-CM

## 2025-08-11 DIAGNOSIS — E44.0 MODERATE PROTEIN-CALORIE MALNUTRITION: ICD-10-CM

## 2025-08-11 RX ORDER — PNV NO.95/FERROUS FUM/FOLIC AC 28MG-0.8MG
TABLET ORAL
COMMUNITY
Start: 2025-07-24

## 2025-08-11 RX ORDER — TRAMADOL HYDROCHLORIDE 50 MG/1
50 TABLET ORAL EVERY 6 HOURS PRN
Qty: 28 TABLET | Refills: 0 | Status: SHIPPED | OUTPATIENT
Start: 2025-08-11 | End: 2025-08-25

## 2025-08-12 DIAGNOSIS — R64 CANCER CACHEXIA: ICD-10-CM

## 2025-08-12 DIAGNOSIS — E44.0 MODERATE PROTEIN-CALORIE MALNUTRITION: Primary | ICD-10-CM

## 2025-08-12 PROBLEM — J38.3 GLOTTIC INSUFFICIENCY: Status: ACTIVE | Noted: 2025-08-12

## 2025-08-12 RX ORDER — ZINC OXIDE 216 MG/ML
1 LOTION TOPICAL
Qty: 237 ML | Refills: 5 | Status: CANCELLED | OUTPATIENT
Start: 2025-08-12

## 2025-08-13 ENCOUNTER — TELEPHONE (OUTPATIENT)
Dept: FAMILY MEDICINE CLINIC | Age: 70
End: 2025-08-13

## 2025-08-13 ENCOUNTER — OFFICE VISIT (OUTPATIENT)
Dept: FAMILY MEDICINE CLINIC | Age: 70
End: 2025-08-13

## 2025-08-13 VITALS
OXYGEN SATURATION: 97 % | RESPIRATION RATE: 18 BRPM | SYSTOLIC BLOOD PRESSURE: 128 MMHG | TEMPERATURE: 97.4 F | WEIGHT: 149.8 LBS | DIASTOLIC BLOOD PRESSURE: 64 MMHG | HEIGHT: 72 IN | HEART RATE: 97 BPM | BODY MASS INDEX: 20.29 KG/M2

## 2025-08-13 DIAGNOSIS — R49.0 DYSPHONIA: ICD-10-CM

## 2025-08-13 DIAGNOSIS — Z09 HOSPITAL DISCHARGE FOLLOW-UP: Primary | ICD-10-CM

## 2025-08-13 DIAGNOSIS — Z99.81 OXYGEN DEPENDENT: ICD-10-CM

## 2025-08-13 DIAGNOSIS — E43 SEVERE MALNUTRITION: ICD-10-CM

## 2025-08-13 DIAGNOSIS — R53.81 PHYSICAL DECONDITIONING: ICD-10-CM

## 2025-08-13 DIAGNOSIS — C34.12 PRIMARY MALIGNANT NEOPLASM OF LEFT UPPER LOBE OF LUNG (HCC): ICD-10-CM

## 2025-08-13 DIAGNOSIS — J84.112 IDIOPATHIC DIFFUSE INTERSTITIAL PULMONARY FIBROSIS (HCC): ICD-10-CM

## 2025-08-13 DIAGNOSIS — C32.0 SQUAMOUS CELL CARCINOMA OF LEFT VOCAL CORD (HCC): ICD-10-CM

## 2025-08-13 DIAGNOSIS — E11.9 TYPE 2 DIABETES MELLITUS WITHOUT COMPLICATION, WITHOUT LONG-TERM CURRENT USE OF INSULIN (HCC): ICD-10-CM

## 2025-08-13 DIAGNOSIS — L89.152 PRESSURE INJURY OF SACRAL REGION, STAGE 2 (HCC): ICD-10-CM

## 2025-08-13 PROBLEM — R05.3 CHRONIC COUGH: Status: RESOLVED | Noted: 2025-06-23 | Resolved: 2025-08-13

## 2025-08-13 PROBLEM — Z86.39 H/O NON-INSULIN DEPENDENT DIABETES MELLITUS: Status: RESOLVED | Noted: 2025-08-06 | Resolved: 2025-08-13

## 2025-08-13 PROBLEM — Z87.09 HISTORY OF COPD: Status: RESOLVED | Noted: 2025-08-06 | Resolved: 2025-08-13

## 2025-08-13 PROBLEM — R06.02 SHORTNESS OF BREATH: Status: RESOLVED | Noted: 2025-08-06 | Resolved: 2025-08-13

## 2025-08-13 LAB
BACTERIA SPEC RESP CULT: ABNORMAL
BACTERIA SPEC RESP CULT: ABNORMAL
GRAM STN SPEC: ABNORMAL
ORGANISM: ABNORMAL

## 2025-08-14 ENCOUNTER — CARE COORDINATION (OUTPATIENT)
Dept: CARE COORDINATION | Age: 70
End: 2025-08-14

## 2025-08-19 ENCOUNTER — TELEPHONE (OUTPATIENT)
Dept: FAMILY MEDICINE CLINIC | Age: 70
End: 2025-08-19

## 2025-08-19 DIAGNOSIS — R49.0 DYSPHONIA: ICD-10-CM
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
## 2025-08-19 DIAGNOSIS — T17.320A CHOKING DUE TO FOOD IN LARYNX, INITIAL ENCOUNTER: ICD-10-CM

## 2025-08-19 DIAGNOSIS — R13.19 ESOPHAGEAL DYSPHAGIA: Primary | ICD-10-CM

## 2025-08-20 ENCOUNTER — TELEPHONE (OUTPATIENT)
Dept: FAMILY MEDICINE CLINIC | Age: 70
End: 2025-08-20

## 2025-08-21 ENCOUNTER — TELEPHONE (OUTPATIENT)
Dept: FAMILY MEDICINE CLINIC | Age: 70
End: 2025-08-21

## 2025-08-21 ENCOUNTER — CARE COORDINATION (OUTPATIENT)
Dept: CARE COORDINATION | Age: 70
End: 2025-08-21

## 2025-08-21 DIAGNOSIS — C34.92 SQUAMOUS CELL CARCINOMA OF LEFT LUNG (HCC): Primary | ICD-10-CM

## 2025-08-22 ENCOUNTER — CARE COORDINATION (OUTPATIENT)
Dept: CARE COORDINATION | Age: 70
End: 2025-08-22

## 2025-08-25 ENCOUNTER — HOSPITAL ENCOUNTER (OUTPATIENT)
Dept: INFUSION THERAPY | Age: 70
Discharge: HOME | End: 2025-08-25
Payer: MEDICARE

## 2025-08-25 ENCOUNTER — CLINICAL DOCUMENTATION (OUTPATIENT)
Dept: CASE MANAGEMENT | Age: 70
End: 2025-08-25

## 2025-08-25 ENCOUNTER — CLINICAL DOCUMENTATION (OUTPATIENT)
Dept: RADIATION ONCOLOGY | Age: 70
End: 2025-08-25

## 2025-08-25 VITALS
OXYGEN SATURATION: 95 % | TEMPERATURE: 97.5 F | DIASTOLIC BLOOD PRESSURE: 65 MMHG | HEART RATE: 88 BPM | RESPIRATION RATE: 20 BRPM | SYSTOLIC BLOOD PRESSURE: 117 MMHG

## 2025-08-25 PROCEDURE — 99211 OFF/OP EST MAY X REQ PHY/QHP: CPT

## 2025-08-26 ENCOUNTER — TELEPHONE (OUTPATIENT)
Dept: ENT CLINIC | Age: 70
End: 2025-08-26

## 2025-08-26 ENCOUNTER — TELEPHONE (OUTPATIENT)
Dept: FAMILY MEDICINE CLINIC | Age: 70
End: 2025-08-26

## 2025-08-27 ENCOUNTER — CARE COORDINATION (OUTPATIENT)
Dept: CARE COORDINATION | Age: 70
End: 2025-08-27

## 2025-08-27 DIAGNOSIS — I95.1 ORTHOSTATIC HYPOTENSION: ICD-10-CM

## 2025-08-27 RX ORDER — MIDODRINE HYDROCHLORIDE 2.5 MG/1
2.5 TABLET ORAL 3 TIMES DAILY
Qty: 90 TABLET | Refills: 4 | Status: SHIPPED | OUTPATIENT
Start: 2025-08-27

## 2025-08-28 ENCOUNTER — OFFICE VISIT (OUTPATIENT)
Dept: SURGERY | Age: 70
End: 2025-08-28
Payer: MEDICARE

## 2025-08-28 ENCOUNTER — TELEPHONE (OUTPATIENT)
Dept: SURGERY | Age: 70
End: 2025-08-28

## 2025-08-28 VITALS
HEIGHT: 72 IN | WEIGHT: 148 LBS | DIASTOLIC BLOOD PRESSURE: 52 MMHG | TEMPERATURE: 98.2 F | BODY MASS INDEX: 20.05 KG/M2 | SYSTOLIC BLOOD PRESSURE: 118 MMHG

## 2025-08-28 DIAGNOSIS — C32.0 SQUAMOUS CELL CARCINOMA OF LEFT VOCAL CORD (HCC): Primary | ICD-10-CM

## 2025-08-28 PROCEDURE — 99204 OFFICE O/P NEW MOD 45 MIN: CPT | Performed by: SURGERY

## 2025-08-28 PROCEDURE — 1123F ACP DISCUSS/DSCN MKR DOCD: CPT | Performed by: SURGERY

## 2025-08-29 ENCOUNTER — ANESTHESIA (OUTPATIENT)
Dept: OPERATING ROOM | Age: 70
End: 2025-08-29
Payer: MEDICARE

## 2025-08-29 ENCOUNTER — ANESTHESIA EVENT (OUTPATIENT)
Dept: OPERATING ROOM | Age: 70
End: 2025-08-29
Payer: MEDICARE

## 2025-08-29 ENCOUNTER — APPOINTMENT (OUTPATIENT)
Dept: GENERAL RADIOLOGY | Age: 70
End: 2025-08-29
Attending: SURGERY
Payer: MEDICARE

## 2025-08-29 ENCOUNTER — HOSPITAL ENCOUNTER (OUTPATIENT)
Age: 70
Setting detail: OUTPATIENT SURGERY
Discharge: HOME | End: 2025-08-29
Attending: SURGERY | Admitting: SURGERY
Payer: MEDICARE

## 2025-08-29 ENCOUNTER — CARE COORDINATION (OUTPATIENT)
Dept: CARE COORDINATION | Age: 70
End: 2025-08-29

## 2025-08-29 VITALS
DIASTOLIC BLOOD PRESSURE: 50 MMHG | BODY MASS INDEX: 18.79 KG/M2 | HEART RATE: 80 BPM | RESPIRATION RATE: 20 BRPM | TEMPERATURE: 97.6 F | WEIGHT: 141.8 LBS | SYSTOLIC BLOOD PRESSURE: 94 MMHG | HEIGHT: 73 IN | OXYGEN SATURATION: 98 %

## 2025-08-29 DIAGNOSIS — C32.0 SQUAMOUS CELL CARCINOMA OF LEFT VOCAL CORD (HCC): Primary | ICD-10-CM

## 2025-08-29 PROBLEM — Z98.890 POST-OPERATIVE STATE: Status: RESOLVED | Noted: 2025-07-30 | Resolved: 2025-08-29

## 2025-08-29 LAB — GLUCOSE BLD STRIP.AUTO-MCNC: 125 MG/DL (ref 70–108)

## 2025-08-29 PROCEDURE — 7100000011 HC PHASE II RECOVERY - ADDTL 15 MIN: Performed by: SURGERY

## 2025-08-29 PROCEDURE — 71045 X-RAY EXAM CHEST 1 VIEW: CPT

## 2025-08-29 PROCEDURE — 77001 FLUOROGUIDE FOR VEIN DEVICE: CPT

## 2025-08-29 PROCEDURE — 77001 FLUOROGUIDE FOR VEIN DEVICE: CPT | Performed by: SURGERY

## 2025-08-29 PROCEDURE — 2500000003 HC RX 250 WO HCPCS: Performed by: SURGERY

## 2025-08-29 PROCEDURE — C1788 PORT, INDWELLING, IMP: HCPCS | Performed by: SURGERY

## 2025-08-29 PROCEDURE — 36561 INSERT TUNNELED CV CATH: CPT | Performed by: SURGERY

## 2025-08-29 PROCEDURE — 6370000000 HC RX 637 (ALT 250 FOR IP): Performed by: SURGERY

## 2025-08-29 PROCEDURE — 82948 REAGENT STRIP/BLOOD GLUCOSE: CPT

## 2025-08-29 PROCEDURE — 6360000002 HC RX W HCPCS

## 2025-08-29 PROCEDURE — 2709999900 HC NON-CHARGEABLE SUPPLY: Performed by: SURGERY

## 2025-08-29 PROCEDURE — 3600000002 HC SURGERY LEVEL 2 BASE: Performed by: SURGERY

## 2025-08-29 PROCEDURE — 6360000002 HC RX W HCPCS: Performed by: SURGERY

## 2025-08-29 PROCEDURE — 2580000003 HC RX 258: Performed by: SURGERY

## 2025-08-29 PROCEDURE — 3700000000 HC ANESTHESIA ATTENDED CARE: Performed by: SURGERY

## 2025-08-29 PROCEDURE — 3700000001 HC ADD 15 MINUTES (ANESTHESIA): Performed by: SURGERY

## 2025-08-29 PROCEDURE — 3600000012 HC SURGERY LEVEL 2 ADDTL 15MIN: Performed by: SURGERY

## 2025-08-29 PROCEDURE — 7100000010 HC PHASE II RECOVERY - FIRST 15 MIN: Performed by: SURGERY

## 2025-08-29 DEVICE — PORT INFUS SGL LUMN ATTCH POLYUR OPN END CATH 8FR POWERPRT: Type: IMPLANTABLE DEVICE | Status: FUNCTIONAL

## 2025-08-29 RX ORDER — HEPARIN 100 UNIT/ML
SYRINGE INTRAVENOUS PRN
Status: DISCONTINUED | OUTPATIENT
Start: 2025-08-29 | End: 2025-08-29 | Stop reason: ALTCHOICE

## 2025-08-29 RX ORDER — SODIUM CHLORIDE 0.9 % (FLUSH) 0.9 %
5-40 SYRINGE (ML) INJECTION PRN
Status: DISCONTINUED | OUTPATIENT
Start: 2025-08-29 | End: 2025-08-29 | Stop reason: HOSPADM

## 2025-08-29 RX ORDER — HYDROCODONE BITARTRATE AND ACETAMINOPHEN 5; 325 MG/1; MG/1
1 TABLET ORAL EVERY 6 HOURS PRN
Qty: 12 TABLET | Refills: 0 | Status: SHIPPED | OUTPATIENT
Start: 2025-08-29 | End: 2025-09-01

## 2025-08-29 RX ORDER — ACETAMINOPHEN 500 MG
1000 TABLET ORAL ONCE
Status: COMPLETED | OUTPATIENT
Start: 2025-08-29 | End: 2025-08-29

## 2025-08-29 RX ORDER — SODIUM CHLORIDE 0.9 % (FLUSH) 0.9 %
5-40 SYRINGE (ML) INJECTION EVERY 12 HOURS SCHEDULED
Status: DISCONTINUED | OUTPATIENT
Start: 2025-08-29 | End: 2025-08-29 | Stop reason: HOSPADM

## 2025-08-29 RX ORDER — IBUPROFEN 400 MG/1
400 TABLET, FILM COATED ORAL ONCE
Status: COMPLETED | OUTPATIENT
Start: 2025-08-29 | End: 2025-08-29

## 2025-08-29 RX ORDER — SODIUM CHLORIDE 9 MG/ML
INJECTION, SOLUTION INTRAVENOUS CONTINUOUS
Status: DISCONTINUED | OUTPATIENT
Start: 2025-08-29 | End: 2025-08-29 | Stop reason: HOSPADM

## 2025-08-29 RX ORDER — PROPOFOL 10 MG/ML
INJECTION, EMULSION INTRAVENOUS
Status: DISCONTINUED | OUTPATIENT
Start: 2025-08-29 | End: 2025-08-29 | Stop reason: SDUPTHER

## 2025-08-29 RX ORDER — LIDOCAINE HYDROCHLORIDE 20 MG/ML
INJECTION, SOLUTION INTRAVENOUS
Status: DISCONTINUED | OUTPATIENT
Start: 2025-08-29 | End: 2025-08-29 | Stop reason: SDUPTHER

## 2025-08-29 RX ORDER — SODIUM CHLORIDE 9 MG/ML
INJECTION, SOLUTION INTRAVENOUS PRN
Status: DISCONTINUED | OUTPATIENT
Start: 2025-08-29 | End: 2025-08-29 | Stop reason: HOSPADM

## 2025-08-29 RX ADMIN — PROPOFOL 10 MG: 10 INJECTION, EMULSION INTRAVENOUS at 07:45

## 2025-08-29 RX ADMIN — SODIUM CHLORIDE: 0.9 INJECTION, SOLUTION INTRAVENOUS at 07:04

## 2025-08-29 RX ADMIN — PROPOFOL 10 MG: 10 INJECTION, EMULSION INTRAVENOUS at 07:51

## 2025-08-29 RX ADMIN — WATER 2000 MG: 1 INJECTION, SOLUTION INTRAVENOUS at 07:30

## 2025-08-29 RX ADMIN — PROPOFOL 10 MG: 10 INJECTION, EMULSION INTRAVENOUS at 07:46

## 2025-08-29 RX ADMIN — Medication 50 MG: at 07:43

## 2025-08-29 RX ADMIN — IBUPROFEN 400 MG: 400 TABLET ORAL at 07:07

## 2025-08-29 RX ADMIN — ACETAMINOPHEN 1000 MG: 500 TABLET ORAL at 07:07

## 2025-08-29 RX ADMIN — PROPOFOL 10 MG: 10 INJECTION, EMULSION INTRAVENOUS at 07:43

## 2025-08-29 RX ADMIN — PROPOFOL 10 MG: 10 INJECTION, EMULSION INTRAVENOUS at 07:47

## 2025-08-29 RX ADMIN — PROPOFOL 10 MG: 10 INJECTION, EMULSION INTRAVENOUS at 07:49

## 2025-08-29 ASSESSMENT — PAIN SCALES - GENERAL: PAINLEVEL_OUTOF10: 6

## 2025-08-29 ASSESSMENT — PAIN - FUNCTIONAL ASSESSMENT: PAIN_FUNCTIONAL_ASSESSMENT: ACTIVITIES ARE NOT PREVENTED

## 2025-08-29 ASSESSMENT — PAIN DESCRIPTION - ORIENTATION: ORIENTATION: POSTERIOR;LOWER

## 2025-08-29 ASSESSMENT — PAIN DESCRIPTION - DESCRIPTORS: DESCRIPTORS: DISCOMFORT;ACHING

## 2025-08-29 ASSESSMENT — PAIN DESCRIPTION - LOCATION: LOCATION: HEAD

## 2025-08-29 ASSESSMENT — COPD QUESTIONNAIRES: CAT_SEVERITY: SEVERE

## 2025-08-30 LAB
FOUNDATION MEDICINE RESULT STATUS: NORMAL
FOUNDATION MEDICINE RESULT STATUS: NORMAL

## 2025-08-31 ENCOUNTER — CLINICAL DOCUMENTATION (OUTPATIENT)
Facility: HOSPITAL | Age: 70
End: 2025-08-31

## 2025-09-01 PROBLEM — R05.3 CHRONIC COUGH: Status: ACTIVE | Noted: 2025-08-12

## 2025-09-01 LAB
FUNGUS SPEC CULT: NORMAL
FUNGUS SPEC FUNGUS STN: NORMAL

## 2025-09-02 ENCOUNTER — CLINICAL DOCUMENTATION (OUTPATIENT)
Facility: HOSPITAL | Age: 70
End: 2025-09-02

## 2025-09-03 LAB — FOUNDATION MEDICINE RESULT STATUS: NORMAL

## 2025-09-03 RX ORDER — EPINEPHRINE 1 MG/ML
0.3 INJECTION, SOLUTION INTRAMUSCULAR; SUBCUTANEOUS PRN
OUTPATIENT
Start: 2025-09-03

## 2025-09-03 RX ORDER — HEPARIN 100 UNIT/ML
500 SYRINGE INTRAVENOUS PRN
OUTPATIENT
Start: 2025-09-03

## 2025-09-03 RX ORDER — SODIUM CHLORIDE 9 MG/ML
25 INJECTION, SOLUTION INTRAVENOUS PRN
OUTPATIENT
Start: 2025-09-03

## 2025-09-03 RX ORDER — HYDROCORTISONE SODIUM SUCCINATE 100 MG/2ML
100 INJECTION INTRAMUSCULAR; INTRAVENOUS
OUTPATIENT
Start: 2025-09-03

## 2025-09-03 RX ORDER — SODIUM CHLORIDE 0.9 % (FLUSH) 0.9 %
5-40 SYRINGE (ML) INJECTION PRN
OUTPATIENT
Start: 2025-09-03

## 2025-09-03 RX ORDER — DIPHENHYDRAMINE HYDROCHLORIDE 50 MG/ML
50 INJECTION, SOLUTION INTRAMUSCULAR; INTRAVENOUS
OUTPATIENT
Start: 2025-09-03

## 2025-09-03 RX ORDER — SODIUM CHLORIDE 9 MG/ML
INJECTION, SOLUTION INTRAVENOUS PRN
OUTPATIENT
Start: 2025-09-03

## 2025-09-03 RX ORDER — ACETAMINOPHEN 325 MG/1
650 TABLET ORAL
OUTPATIENT
Start: 2025-09-03

## 2025-09-03 RX ORDER — ALBUTEROL SULFATE 90 UG/1
4 INHALANT RESPIRATORY (INHALATION) PRN
OUTPATIENT
Start: 2025-09-03

## 2025-09-03 RX ORDER — ONDANSETRON 2 MG/ML
8 INJECTION INTRAMUSCULAR; INTRAVENOUS
OUTPATIENT
Start: 2025-09-03

## 2025-09-04 ENCOUNTER — HOSPITAL ENCOUNTER (OUTPATIENT)
Dept: INFUSION THERAPY | Age: 70
Discharge: HOME | End: 2025-09-04
Payer: MEDICARE

## 2025-09-04 PROCEDURE — 99212 OFFICE O/P EST SF 10 MIN: CPT

## 2025-09-05 ENCOUNTER — CARE COORDINATION (OUTPATIENT)
Dept: CARE COORDINATION | Age: 70
End: 2025-09-05

## (undated) DEVICE — SYRINGE MED 10ML LUERLOCK TIP W/O SFTY DISP

## (undated) DEVICE — Device

## (undated) DEVICE — SOLUTION IV 1000ML 0.9% SOD CHL PH 5 INJ USP VIAFLX PLAS

## (undated) DEVICE — COVER ARMBRD W13XL28.5IN IMPERV BLU FOR OP RM

## (undated) DEVICE — NEEDLE SYR 18GA L1.5IN RED PLAS HUB S STL BLNT FILL W/O

## (undated) DEVICE — ARM DRAPE

## (undated) DEVICE — GAUZE,SPONGE,4"X4",12PLY,STERILE,LF,2'S: Brand: MEDLINE

## (undated) DEVICE — GLOVE ORANGE PI 7 1/2   MSG9075

## (undated) DEVICE — SYRINGE MED 5ML STD CLR PLAS LUERLOCK TIP N CTRL DISP

## (undated) DEVICE — GOWN,SIRUS,NON REINFRCD,LARGE,SET IN SL: Brand: MEDLINE

## (undated) DEVICE — SUTURE VICRYL + SZ 3-0 L27IN ABSRB UD FS2 3/8 CIR REV CUT

## (undated) DEVICE — GAUZE SPONGES,USP TYPE VII GAUZE, 12 PLY: Brand: CURITY

## (undated) DEVICE — SOLUTION ANTIFOG VIS SYS CLEARIFY LAPSCP

## (undated) DEVICE — NEEDLE SPNL 22GA L3.5IN BLK HUB S STL REG WALL FIT STYL W/

## (undated) DEVICE — SUTURE ETHIBOND EXCEL SZ 0 L30IN NONABSORBABLE GRN CT1 L36MM X424H

## (undated) DEVICE — CHLORAPREP 26ML CLEAR

## (undated) DEVICE — NEEDLE SPNL 22GA L3.5IN BLK HUB S STL REG WALL FIT STYL

## (undated) DEVICE — HYPODERMIC SAFETY NEEDLE: Brand: MAGELLAN

## (undated) DEVICE — SHEET,DRAPE,3/4,53X77,STERILE: Brand: MEDLINE

## (undated) DEVICE — 6 ML SYRINGE LUER-LOCK TIP: Brand: MONOJECT

## (undated) DEVICE — DRAIN SURG FLAT W7MMXL20CM FULL PERF

## (undated) DEVICE — BREAST HERNIA PACK: Brand: MEDLINE INDUSTRIES, INC.

## (undated) DEVICE — SUTURE ETHICON SZ 4-0 PDSII 18IN P-3 PRM MP D10132

## (undated) DEVICE — GLOVE ORANGE PI 7   MSG9070

## (undated) DEVICE — CLIP LIG M TI 6 SIL HNDL FOR OPN AND ENDOSCP SGL APPL

## (undated) DEVICE — SYRINGE IRRIG 60ML SFT PLIABLE BLB EZ TO GRP 1 HND USE W/

## (undated) DEVICE — LABEL MED WHT OR W O INITIALS AND DATE SECT PRESOURCE

## (undated) DEVICE — ADHESIVE SKIN CLOSURE TOP 0.8 CC PREM PUR LIQUIBAND RAPID LF

## (undated) DEVICE — NEEDLE SYRINGE 18GA L1.5IN RED PLAS HUB S STL BLNT FILL W/O

## (undated) DEVICE — TOWEL,OR,DSP,ST,BLUE,STD,4/PK,20PK/CS: Brand: MEDLINE

## (undated) DEVICE — 3 ML SYRINGE LUER-LOCK TIP: Brand: MONOJECT

## (undated) DEVICE — 3M™ STERI-DRAPE™ INSTRUMENT POUCH 1018: Brand: STERI-DRAPE™

## (undated) DEVICE — GLOVE SURG SZ 7.5 L11.73IN FNGR THK9.8MIL STRW LTX POLYMER

## (undated) DEVICE — CONTAINER,SPECIMEN,PNEU TUBE,4OZ,OR STRL: Brand: MEDLINE

## (undated) DEVICE — SEAL UNIVERSAL 5-12MM

## (undated) DEVICE — SPONGE,NEURO,1"X3",XR,STRL,LF,10/PK: Brand: MEDLINE

## (undated) DEVICE — TUBING 1895522 5PK STRAIGHTSHOT TO XPS: Brand: STRAIGHTSHOT®

## (undated) DEVICE — 4.0MM ROUND FLUTED AGGRESSIVE

## (undated) DEVICE — PREP SOL PVP IODINE 4%  4 OZ/BTL

## (undated) DEVICE — PACK SUCT CATHETER 14FR OPN WHSTL W NO VLV

## (undated) DEVICE — GLOVE ORANGE PI 8   MSG9080

## (undated) DEVICE — PACK-MAJOR

## (undated) DEVICE — SUTURE MONOCRYL SZ 4-0 L27IN ABSRB UD L19MM PS-2 1/2 CIR PRIM Y426H

## (undated) DEVICE — UNIVERSAL MONOPOLAR LAPAROSCOPIC CABLE 10FT, 4MM PIN CONNECTOR: Brand: CONMED

## (undated) DEVICE — APPLICATOR MEDICATED 10.5 CC SOLUTION HI LT ORNG CHLORAPREP

## (undated) DEVICE — TUBING IRRIG ADPT FOR BLDE AND BUR XPS 3000 PWR ENT SYS

## (undated) DEVICE — TUBING SCTION CONN 1/4X20 RIB

## (undated) DEVICE — SYRINGE MED 3ML CLR PLAS STD N CTRL LUERLOCK TIP DISP

## (undated) DEVICE — BLADE 1884030HRE TRICUT 4MM 22CM M4 IRR: Brand: TRICUT®

## (undated) DEVICE — GLOVE SURG SZ 65 THK91MIL LTX FREE SYN POLYISOPRENE

## (undated) DEVICE — DRAPE EQUIP CLMN ROBOTIC DISP DA VINCI XI

## (undated) DEVICE — SPONGE GZ W4XL4IN COT 12 PLY TYP VII WVN C FLD DSGN

## (undated) DEVICE — STRIP,CLOSURE,WOUND,MEDI-STRIP,1/2X4: Brand: MEDLINE

## (undated) DEVICE — PAD,NON-ADHERENT,3X8,STERILE,LF,1/PK: Brand: MEDLINE

## (undated) DEVICE — LARYNGOSCOPY - BRONCHOSCOPY: Brand: MEDLINE INDUSTRIES, INC.

## (undated) DEVICE — BIOGUARD A/W CLEANING ADAPTER

## (undated) DEVICE — SUPPORT SCROT L AD L39-44IN SFT KNIT PCH SUSP WAISTBAND

## (undated) DEVICE — CONMED ACCESSORY ELECTRODE, NEEDLE ELECTRODE: Brand: CONMED

## (undated) DEVICE — COVER EQUIP W30XL36IN CLR POLYETH INTENS BND DISPOSABLE

## (undated) DEVICE — SUTURE VICRYL + SZ 3-0 L27IN ABSRB UD L26MM SH 1/2 CIR VCP416H

## (undated) DEVICE — SPONGE GZ W4XL4IN COT 12 PLY TYP VII WVN C FLD DSGN STERILE

## (undated) DEVICE — SUTURE PROL SZ 2-0 L18IN NONABSORBABLE BLU FS L26MM 3/8 CIR 8685H

## (undated) DEVICE — KIT DETRGNT ENZYMATIC SOLUTION 100 ML SOAK SHLD 5 VI LG HUMD

## (undated) DEVICE — GAUZE,SPONGE,8"X4",12PLY,XRAY,STRL,LF: Brand: MEDLINE

## (undated) DEVICE — SUTURE VICRYL + SZ 2-0 L27IN ABSRB WHT SH 1/2 CIR TAPERCUT VCP417H

## (undated) DEVICE — FLEXIBLE ENDOSCOPE AND SPECIMEN SAMPLING SYSTEM: Brand: ASCOPE™ 5 BRONCHO HD 5.6/2.8 SAMPLER SET

## (undated) DEVICE — SPONGE,NEURO,0.5"X3",XR,STRL,LF,10/PK: Brand: MEDLINE

## (undated) DEVICE — CORD MPLR UNIV 10FT E S SGL USE

## (undated) DEVICE — TAPE,CLOTH/SILK,CURAD,3"X10YD,LF,40/CS: Brand: CURAD

## (undated) DEVICE — BLADE CLIPPER GEN PURP NS

## (undated) DEVICE — PEG KIT STD 20 FR PUSH W/ ENFIT ENDOVIVE

## (undated) DEVICE — COLUMN DRAPE

## (undated) DEVICE — BASIC SINGLE BASIN BTC-LF: Brand: MEDLINE INDUSTRIES, INC.

## (undated) DEVICE — DRAPE MICSCP W132XL406CM LENS DIA68MM W VARI LENS2 FOR LEICA

## (undated) DEVICE — SUTURE PROL SZ 2-0 L36IN NONABSORBABLE BLU SH L26MM 1/2 CIR 8523H

## (undated) DEVICE — 1LYRTR 16FR10ML 100%SILI SNAP: Brand: MEDLINE INDUSTRIES, INC.

## (undated) DEVICE — EVACUATOR SURG 100CC SIL BLB SUCT RESVR FOR CLS WND DRNGE

## (undated) DEVICE — ZINACTIVE USE 2537982 PAD GRND FOR RF PAIN MGMT DISP

## (undated) DEVICE — SUTURE ETHILON SZ 3-0 L18IN NONABSORBABLE BLK L24MM FS-1 3/8 663G

## (undated) DEVICE — PAD,ABDOMINAL,5"X9",ST,LF,25/BX: Brand: MEDLINE INDUSTRIES, INC.

## (undated) DEVICE — SPONGE,NEURO,0.5"X0.5",XR,STRL,10/PK: Brand: MEDLINE

## (undated) DEVICE — COLLAR CERV UNIV AD L13-19IN TRACH OPN TWO PC RIG POLYETH

## (undated) DEVICE — Device: Brand: FABCO

## (undated) DEVICE — TUBING, SUCTION, 1/4" X 20', STRAIGHT: Brand: MEDLINE INDUSTRIES, INC.

## (undated) DEVICE — DRILL 14MM: Brand: SHORELINE ACS

## (undated) DEVICE — PAD,O.R.,TABLE,CONV FOAM,2X20X72: Brand: MEDLINE

## (undated) DEVICE — DRAPE ARM W21XH19XL10.5IN DA VINCI XI INTUITIVE SURGICAL

## (undated) DEVICE — SUTURE NONABSORBABLE 3-0 12X18 IN PRE-CUT VICRYL VIO SUTUPAK VCP104G

## (undated) DEVICE — AGENT HEMOSTATIC SURGIFLOW MATRIX KIT W/THROMBIN

## (undated) DEVICE — POUCH INSTR W11XL7IN TRNSPAR PLAS RELIABLE ADH STRP

## (undated) DEVICE — GOWN SURG XL L47IN BLU NONREINFORCED HK AND LOOP AAMI LEV 3

## (undated) DEVICE — SOLUTION SURG PREP POV IOD 7.5% 4 OZ

## (undated) DEVICE — TAPE,ELASTIC,FOAM,CURAD,4"X5.5YD,LF: Brand: CURAD

## (undated) DEVICE — SEAL

## (undated) DEVICE — SHEET, T, LAPAROTOMY, STERILE: Brand: MEDLINE

## (undated) DEVICE — PENCIL SMK EVAC ALL IN 1 DSGN ENH VISIBILITY IMPROVED AIR

## (undated) DEVICE — GLOVE SURG SZ 9 THK91MIL LTX FREE SYN POLYISOPRENE ANTI

## (undated) DEVICE — MASTISOL ADHESIVE LIQ 2/3ML

## (undated) DEVICE — DRESSING,GAUZE,XEROFORM,CURAD,5"X9",ST: Brand: CURAD

## (undated) DEVICE — TUBE ET OD8.5MM ID6MM NSL ORAL LSR RESIST 2 CUF MURPHY EYE